# Patient Record
Sex: MALE | Race: WHITE | Employment: OTHER | URBAN - NONMETROPOLITAN AREA
[De-identification: names, ages, dates, MRNs, and addresses within clinical notes are randomized per-mention and may not be internally consistent; named-entity substitution may affect disease eponyms.]

---

## 2017-01-06 ENCOUNTER — OFFICE VISIT (OUTPATIENT)
Dept: UROLOGY | Age: 82
End: 2017-01-06

## 2017-01-06 VITALS
SYSTOLIC BLOOD PRESSURE: 130 MMHG | HEIGHT: 71 IN | HEART RATE: 64 BPM | DIASTOLIC BLOOD PRESSURE: 86 MMHG | WEIGHT: 201.4 LBS | BODY MASS INDEX: 28.19 KG/M2

## 2017-01-06 DIAGNOSIS — R97.20 ABNORMAL PSA: Primary | ICD-10-CM

## 2017-01-06 DIAGNOSIS — N40.1 BENIGN NON-NODULAR PROSTATIC HYPERPLASIA WITH LOWER URINARY TRACT SYMPTOMS: ICD-10-CM

## 2017-01-06 DIAGNOSIS — N30.00 ACUTE CYSTITIS WITHOUT HEMATURIA: ICD-10-CM

## 2017-01-06 PROCEDURE — 51798 US URINE CAPACITY MEASURE: CPT | Performed by: UROLOGY

## 2017-01-06 PROCEDURE — 99213 OFFICE O/P EST LOW 20 MIN: CPT | Performed by: UROLOGY

## 2017-01-09 ENCOUNTER — TELEPHONE (OUTPATIENT)
Dept: INTERNAL MEDICINE | Age: 82
End: 2017-01-09

## 2017-01-20 ENCOUNTER — OFFICE VISIT (OUTPATIENT)
Dept: PRIMARY CARE CLINIC | Age: 82
End: 2017-01-20

## 2017-01-20 VITALS
DIASTOLIC BLOOD PRESSURE: 80 MMHG | HEIGHT: 71 IN | WEIGHT: 202 LBS | BODY MASS INDEX: 28.28 KG/M2 | SYSTOLIC BLOOD PRESSURE: 160 MMHG | TEMPERATURE: 98.9 F | HEART RATE: 85 BPM | OXYGEN SATURATION: 93 %

## 2017-01-20 DIAGNOSIS — J31.0 RHINITIS, UNSPECIFIED TYPE: Primary | ICD-10-CM

## 2017-01-20 PROCEDURE — 1123F ACP DISCUSS/DSCN MKR DOCD: CPT | Performed by: NURSE PRACTITIONER

## 2017-01-20 PROCEDURE — 99213 OFFICE O/P EST LOW 20 MIN: CPT | Performed by: NURSE PRACTITIONER

## 2017-01-20 PROCEDURE — G8420 CALC BMI NORM PARAMETERS: HCPCS | Performed by: NURSE PRACTITIONER

## 2017-01-20 PROCEDURE — G8427 DOCREV CUR MEDS BY ELIG CLIN: HCPCS | Performed by: NURSE PRACTITIONER

## 2017-01-20 PROCEDURE — G8484 FLU IMMUNIZE NO ADMIN: HCPCS | Performed by: NURSE PRACTITIONER

## 2017-01-20 PROCEDURE — 4040F PNEUMOC VAC/ADMIN/RCVD: CPT | Performed by: NURSE PRACTITIONER

## 2017-01-20 PROCEDURE — 1036F TOBACCO NON-USER: CPT | Performed by: NURSE PRACTITIONER

## 2017-01-20 PROCEDURE — G8598 ASA/ANTIPLAT THER USED: HCPCS | Performed by: NURSE PRACTITIONER

## 2017-01-20 ASSESSMENT — ENCOUNTER SYMPTOMS
COUGH: 0
NAUSEA: 0
RHINORRHEA: 1
SINUS PAIN: 0
VOMITING: 0
WHEEZING: 0
SORE THROAT: 0

## 2017-01-23 RX ORDER — ALPRAZOLAM 0.25 MG/1
TABLET ORAL
Qty: 45 TABLET | Refills: 0 | OUTPATIENT
Start: 2017-01-23 | End: 2017-02-22 | Stop reason: SDUPTHER

## 2017-02-07 ENCOUNTER — PROCEDURE VISIT (OUTPATIENT)
Dept: UROLOGY | Age: 82
End: 2017-02-07

## 2017-02-07 ENCOUNTER — OFFICE VISIT (OUTPATIENT)
Dept: PRIMARY CARE CLINIC | Age: 82
End: 2017-02-07

## 2017-02-07 VITALS
HEART RATE: 64 BPM | HEIGHT: 71 IN | SYSTOLIC BLOOD PRESSURE: 122 MMHG | DIASTOLIC BLOOD PRESSURE: 88 MMHG | WEIGHT: 200.6 LBS | BODY MASS INDEX: 28.08 KG/M2

## 2017-02-07 VITALS
BODY MASS INDEX: 28 KG/M2 | DIASTOLIC BLOOD PRESSURE: 88 MMHG | TEMPERATURE: 97.5 F | WEIGHT: 200 LBS | OXYGEN SATURATION: 98 % | HEIGHT: 71 IN | HEART RATE: 70 BPM | RESPIRATION RATE: 14 BRPM | SYSTOLIC BLOOD PRESSURE: 138 MMHG

## 2017-02-07 DIAGNOSIS — R31.9 HEMATURIA: Primary | ICD-10-CM

## 2017-02-07 DIAGNOSIS — I10 ESSENTIAL HYPERTENSION: Primary | ICD-10-CM

## 2017-02-07 DIAGNOSIS — N30.00 ACUTE CYSTITIS WITHOUT HEMATURIA: ICD-10-CM

## 2017-02-07 LAB
BILIRUBIN, POC: NORMAL
BLOOD URINE, POC: NORMAL
CLARITY, POC: NORMAL
COLOR, POC: NORMAL
GLUCOSE URINE, POC: NORMAL
KETONES, POC: NORMAL
LEUKOCYTE EST, POC: NORMAL
NITRITE, POC: NORMAL
PH, POC: 8
PROTEIN, POC: NORMAL
SPECIFIC GRAVITY, POC: 1.01
UROBILINOGEN, POC: 0.2

## 2017-02-07 PROCEDURE — 1123F ACP DISCUSS/DSCN MKR DOCD: CPT | Performed by: FAMILY MEDICINE

## 2017-02-07 PROCEDURE — 4040F PNEUMOC VAC/ADMIN/RCVD: CPT | Performed by: UROLOGY

## 2017-02-07 PROCEDURE — 87086 URINE CULTURE/COLONY COUNT: CPT | Performed by: UROLOGY

## 2017-02-07 PROCEDURE — G8427 DOCREV CUR MEDS BY ELIG CLIN: HCPCS | Performed by: UROLOGY

## 2017-02-07 PROCEDURE — 1123F ACP DISCUSS/DSCN MKR DOCD: CPT | Performed by: UROLOGY

## 2017-02-07 PROCEDURE — 1036F TOBACCO NON-USER: CPT | Performed by: UROLOGY

## 2017-02-07 PROCEDURE — G8420 CALC BMI NORM PARAMETERS: HCPCS | Performed by: FAMILY MEDICINE

## 2017-02-07 PROCEDURE — 81003 URINALYSIS AUTO W/O SCOPE: CPT | Performed by: UROLOGY

## 2017-02-07 PROCEDURE — G8427 DOCREV CUR MEDS BY ELIG CLIN: HCPCS | Performed by: FAMILY MEDICINE

## 2017-02-07 PROCEDURE — 99212 OFFICE O/P EST SF 10 MIN: CPT | Performed by: FAMILY MEDICINE

## 2017-02-07 PROCEDURE — 99213 OFFICE O/P EST LOW 20 MIN: CPT | Performed by: UROLOGY

## 2017-02-07 PROCEDURE — G8420 CALC BMI NORM PARAMETERS: HCPCS | Performed by: UROLOGY

## 2017-02-07 PROCEDURE — G8598 ASA/ANTIPLAT THER USED: HCPCS | Performed by: FAMILY MEDICINE

## 2017-02-07 PROCEDURE — G8484 FLU IMMUNIZE NO ADMIN: HCPCS | Performed by: FAMILY MEDICINE

## 2017-02-07 PROCEDURE — 87186 SC STD MICRODIL/AGAR DIL: CPT | Performed by: UROLOGY

## 2017-02-07 PROCEDURE — G8484 FLU IMMUNIZE NO ADMIN: HCPCS | Performed by: UROLOGY

## 2017-02-07 PROCEDURE — 4040F PNEUMOC VAC/ADMIN/RCVD: CPT | Performed by: FAMILY MEDICINE

## 2017-02-07 PROCEDURE — G8598 ASA/ANTIPLAT THER USED: HCPCS | Performed by: UROLOGY

## 2017-02-07 PROCEDURE — 1036F TOBACCO NON-USER: CPT | Performed by: FAMILY MEDICINE

## 2017-02-07 RX ORDER — NITROFURANTOIN 25; 75 MG/1; MG/1
100 CAPSULE ORAL 2 TIMES DAILY
Qty: 20 CAPSULE | Refills: 0 | Status: SHIPPED | OUTPATIENT
Start: 2017-02-07 | End: 2017-02-07 | Stop reason: SDUPTHER

## 2017-02-07 RX ORDER — TRIMETHOPRIM 100 MG/1
100 TABLET ORAL DAILY
Qty: 90 TABLET | Refills: 3 | Status: ON HOLD | OUTPATIENT
Start: 2017-02-07 | End: 2018-02-14 | Stop reason: HOSPADM

## 2017-02-07 RX ORDER — NITROFURANTOIN MACROCRYSTALS 100 MG/1
100 CAPSULE ORAL 3 TIMES DAILY
Qty: 9 CAPSULE | Refills: 0 | Status: CANCELLED | OUTPATIENT
Start: 2017-02-07 | End: 2017-02-10

## 2017-02-07 RX ORDER — NITROFURANTOIN 25; 75 MG/1; MG/1
100 CAPSULE ORAL 2 TIMES DAILY
Qty: 20 CAPSULE | Refills: 0 | Status: SHIPPED | OUTPATIENT
Start: 2017-02-07 | End: 2017-02-17

## 2017-02-07 RX ORDER — POLYMYXIN B SULFATE AND TRIMETHOPRIM 1; 10000 MG/ML; [USP'U]/ML
SOLUTION OPHTHALMIC
COMMUNITY
Start: 2016-12-31 | End: 2017-12-13 | Stop reason: ALTCHOICE

## 2017-02-07 ASSESSMENT — ENCOUNTER SYMPTOMS
SHORTNESS OF BREATH: 0
NAUSEA: 1
COUGH: 0
WHEEZING: 0
ABDOMINAL PAIN: 1
CHEST TIGHTNESS: 0

## 2017-02-09 LAB
CULTURE: ABNORMAL
Lab: ABNORMAL
ORGANISM: ABNORMAL
SPECIMEN DESCRIPTION: ABNORMAL
STATUS: ABNORMAL

## 2017-02-10 ENCOUNTER — TELEPHONE (OUTPATIENT)
Dept: UROLOGY | Age: 82
End: 2017-02-10

## 2017-02-15 RX ORDER — FLUTICASONE PROPIONATE 50 MCG
SPRAY, SUSPENSION (ML) NASAL
Qty: 1 BOTTLE | Refills: 11 | Status: SHIPPED | OUTPATIENT
Start: 2017-02-15 | End: 2018-02-01 | Stop reason: SDUPTHER

## 2017-02-16 ENCOUNTER — OFFICE VISIT (OUTPATIENT)
Dept: CARDIOLOGY | Age: 82
End: 2017-02-16

## 2017-02-16 VITALS
WEIGHT: 199 LBS | BODY MASS INDEX: 27.86 KG/M2 | HEIGHT: 71 IN | HEART RATE: 65 BPM | RESPIRATION RATE: 16 BRPM | DIASTOLIC BLOOD PRESSURE: 80 MMHG | SYSTOLIC BLOOD PRESSURE: 134 MMHG

## 2017-02-16 DIAGNOSIS — I10 ESSENTIAL HYPERTENSION: Primary | ICD-10-CM

## 2017-02-16 PROCEDURE — G8598 ASA/ANTIPLAT THER USED: HCPCS | Performed by: INTERNAL MEDICINE

## 2017-02-16 PROCEDURE — G8484 FLU IMMUNIZE NO ADMIN: HCPCS | Performed by: INTERNAL MEDICINE

## 2017-02-16 PROCEDURE — G8427 DOCREV CUR MEDS BY ELIG CLIN: HCPCS | Performed by: INTERNAL MEDICINE

## 2017-02-16 PROCEDURE — G8420 CALC BMI NORM PARAMETERS: HCPCS | Performed by: INTERNAL MEDICINE

## 2017-02-16 PROCEDURE — 4040F PNEUMOC VAC/ADMIN/RCVD: CPT | Performed by: INTERNAL MEDICINE

## 2017-02-16 PROCEDURE — 1123F ACP DISCUSS/DSCN MKR DOCD: CPT | Performed by: INTERNAL MEDICINE

## 2017-02-16 PROCEDURE — 93000 ELECTROCARDIOGRAM COMPLETE: CPT | Performed by: INTERNAL MEDICINE

## 2017-02-16 PROCEDURE — 1036F TOBACCO NON-USER: CPT | Performed by: INTERNAL MEDICINE

## 2017-02-16 PROCEDURE — 99213 OFFICE O/P EST LOW 20 MIN: CPT | Performed by: INTERNAL MEDICINE

## 2017-02-22 RX ORDER — ALPRAZOLAM 0.25 MG/1
TABLET ORAL
Qty: 45 TABLET | Refills: 0 | OUTPATIENT
Start: 2017-02-22 | End: 2017-03-28 | Stop reason: SDUPTHER

## 2017-02-23 ENCOUNTER — OFFICE VISIT (OUTPATIENT)
Dept: PODIATRY | Age: 82
End: 2017-02-23

## 2017-02-23 VITALS
WEIGHT: 199.4 LBS | BODY MASS INDEX: 27.92 KG/M2 | RESPIRATION RATE: 20 BRPM | HEART RATE: 68 BPM | DIASTOLIC BLOOD PRESSURE: 60 MMHG | HEIGHT: 71 IN | SYSTOLIC BLOOD PRESSURE: 118 MMHG

## 2017-02-23 DIAGNOSIS — M79.675 PAIN IN TOES OF BOTH FEET: ICD-10-CM

## 2017-02-23 DIAGNOSIS — M79.674 PAIN IN TOES OF BOTH FEET: ICD-10-CM

## 2017-02-23 DIAGNOSIS — B35.1 DERMATOPHYTOSIS OF NAIL: Primary | ICD-10-CM

## 2017-02-23 PROCEDURE — 11720 DEBRIDE NAIL 1-5: CPT | Performed by: PODIATRIST

## 2017-02-23 PROCEDURE — 1036F TOBACCO NON-USER: CPT | Performed by: PODIATRIST

## 2017-03-07 RX ORDER — SIMVASTATIN 20 MG
TABLET ORAL
Qty: 30 TABLET | Refills: 11 | Status: SHIPPED | OUTPATIENT
Start: 2017-03-07

## 2017-03-08 RX ORDER — SIMVASTATIN 20 MG
TABLET ORAL
Qty: 30 TABLET | Refills: 11 | Status: CANCELLED | OUTPATIENT
Start: 2017-03-08

## 2017-03-29 RX ORDER — ALPRAZOLAM 0.25 MG/1
TABLET ORAL
Qty: 45 TABLET | Refills: 0 | OUTPATIENT
Start: 2017-03-29 | End: 2017-04-27 | Stop reason: SDUPTHER

## 2017-04-16 LAB
CULTURE: NORMAL
Lab: NORMAL
SPECIMEN DESCRIPTION: NORMAL
STATUS: NORMAL

## 2017-04-20 ENCOUNTER — OFFICE VISIT (OUTPATIENT)
Dept: INTERNAL MEDICINE | Age: 82
End: 2017-04-20
Payer: MEDICARE

## 2017-04-20 VITALS
WEIGHT: 198.2 LBS | TEMPERATURE: 97.2 F | RESPIRATION RATE: 16 BRPM | SYSTOLIC BLOOD PRESSURE: 132 MMHG | DIASTOLIC BLOOD PRESSURE: 68 MMHG | BODY MASS INDEX: 27.75 KG/M2 | HEART RATE: 68 BPM | HEIGHT: 71 IN

## 2017-04-20 DIAGNOSIS — E66.3 OVERWEIGHT: ICD-10-CM

## 2017-04-20 DIAGNOSIS — I10 ESSENTIAL HYPERTENSION: ICD-10-CM

## 2017-04-20 DIAGNOSIS — E78.2 MIXED HYPERLIPIDEMIA: ICD-10-CM

## 2017-04-20 DIAGNOSIS — K57.30 DIVERTICULOSIS OF LARGE INTESTINE WITHOUT HEMORRHAGE: ICD-10-CM

## 2017-04-20 DIAGNOSIS — N39.0 RECURRENT UTI: ICD-10-CM

## 2017-04-20 DIAGNOSIS — I47.1 PSVT (PAROXYSMAL SUPRAVENTRICULAR TACHYCARDIA) (HCC): ICD-10-CM

## 2017-04-20 DIAGNOSIS — M15.9 PRIMARY OSTEOARTHRITIS INVOLVING MULTIPLE JOINTS: ICD-10-CM

## 2017-04-20 DIAGNOSIS — F41.9 ANXIETY: ICD-10-CM

## 2017-04-20 DIAGNOSIS — I48.0 PAF (PAROXYSMAL ATRIAL FIBRILLATION) (HCC): ICD-10-CM

## 2017-04-20 DIAGNOSIS — I25.10 CORONARY ARTERY DISEASE INVOLVING NATIVE CORONARY ARTERY OF NATIVE HEART, ANGINA PRESENCE UNSPECIFIED: ICD-10-CM

## 2017-04-20 DIAGNOSIS — Z00.00 ROUTINE GENERAL MEDICAL EXAMINATION AT A HEALTH CARE FACILITY: Primary | ICD-10-CM

## 2017-04-20 PROCEDURE — 4040F PNEUMOC VAC/ADMIN/RCVD: CPT | Performed by: INTERNAL MEDICINE

## 2017-04-20 PROCEDURE — 99214 OFFICE O/P EST MOD 30 MIN: CPT | Performed by: INTERNAL MEDICINE

## 2017-04-20 PROCEDURE — 1036F TOBACCO NON-USER: CPT | Performed by: INTERNAL MEDICINE

## 2017-04-20 PROCEDURE — 1123F ACP DISCUSS/DSCN MKR DOCD: CPT | Performed by: INTERNAL MEDICINE

## 2017-04-20 PROCEDURE — G0439 PPPS, SUBSEQ VISIT: HCPCS | Performed by: INTERNAL MEDICINE

## 2017-04-20 PROCEDURE — G8598 ASA/ANTIPLAT THER USED: HCPCS | Performed by: INTERNAL MEDICINE

## 2017-04-20 PROCEDURE — G8420 CALC BMI NORM PARAMETERS: HCPCS | Performed by: INTERNAL MEDICINE

## 2017-04-20 PROCEDURE — G8427 DOCREV CUR MEDS BY ELIG CLIN: HCPCS | Performed by: INTERNAL MEDICINE

## 2017-04-20 ASSESSMENT — ANXIETY QUESTIONNAIRES: GAD7 TOTAL SCORE: 1

## 2017-04-20 ASSESSMENT — PATIENT HEALTH QUESTIONNAIRE - PHQ9: SUM OF ALL RESPONSES TO PHQ QUESTIONS 1-9: 0

## 2017-04-20 ASSESSMENT — LIFESTYLE VARIABLES: HOW OFTEN DO YOU HAVE A DRINK CONTAINING ALCOHOL: 0

## 2017-04-27 RX ORDER — ALPRAZOLAM 0.25 MG/1
TABLET ORAL
Qty: 45 TABLET | Refills: 0 | OUTPATIENT
Start: 2017-04-27 | End: 2017-05-30 | Stop reason: SDUPTHER

## 2017-05-09 ENCOUNTER — OFFICE VISIT (OUTPATIENT)
Dept: PODIATRY | Age: 82
End: 2017-05-09
Payer: MEDICARE

## 2017-05-09 VITALS
BODY MASS INDEX: 28.29 KG/M2 | HEART RATE: 70 BPM | DIASTOLIC BLOOD PRESSURE: 78 MMHG | WEIGHT: 197.6 LBS | SYSTOLIC BLOOD PRESSURE: 128 MMHG | HEIGHT: 70 IN

## 2017-05-09 DIAGNOSIS — M79.675 PAIN IN TOES OF BOTH FEET: ICD-10-CM

## 2017-05-09 DIAGNOSIS — B35.1 DERMATOPHYTOSIS OF NAIL: Primary | ICD-10-CM

## 2017-05-09 DIAGNOSIS — M79.674 PAIN IN TOES OF BOTH FEET: ICD-10-CM

## 2017-05-09 PROCEDURE — 11720 DEBRIDE NAIL 1-5: CPT | Performed by: PODIATRIST

## 2017-05-09 PROCEDURE — 1036F TOBACCO NON-USER: CPT | Performed by: PODIATRIST

## 2017-05-30 ENCOUNTER — PROCEDURE VISIT (OUTPATIENT)
Dept: UROLOGY | Age: 82
End: 2017-05-30
Payer: MEDICARE

## 2017-05-30 VITALS
WEIGHT: 198.6 LBS | BODY MASS INDEX: 28.5 KG/M2 | DIASTOLIC BLOOD PRESSURE: 80 MMHG | SYSTOLIC BLOOD PRESSURE: 118 MMHG | HEART RATE: 60 BPM

## 2017-05-30 DIAGNOSIS — N40.0 BENIGN NON-NODULAR PROSTATIC HYPERPLASIA WITHOUT LOWER URINARY TRACT SYMPTOMS: ICD-10-CM

## 2017-05-30 DIAGNOSIS — R31.9 HEMATURIA: Primary | ICD-10-CM

## 2017-05-30 LAB
BILIRUBIN, POC: NORMAL
BLOOD URINE, POC: NORMAL
CLARITY, POC: NORMAL
COLOR, POC: NORMAL
GLUCOSE URINE, POC: NORMAL
KETONES, POC: NORMAL
LEUKOCYTE EST, POC: NORMAL
NITRITE, POC: NORMAL
PH, POC: 6
PROTEIN, POC: NORMAL
SPECIFIC GRAVITY, POC: 1.01
UROBILINOGEN, POC: 0.2

## 2017-05-30 PROCEDURE — 52000 CYSTOURETHROSCOPY: CPT | Performed by: UROLOGY

## 2017-05-30 PROCEDURE — 99999 PR OFFICE/OUTPT VISIT,PROCEDURE ONLY: CPT | Performed by: UROLOGY

## 2017-05-30 PROCEDURE — 81003 URINALYSIS AUTO W/O SCOPE: CPT | Performed by: UROLOGY

## 2017-05-30 PROCEDURE — 1036F TOBACCO NON-USER: CPT | Performed by: UROLOGY

## 2017-05-30 RX ORDER — ALPRAZOLAM 0.25 MG/1
TABLET ORAL
Qty: 45 TABLET | Refills: 0 | OUTPATIENT
Start: 2017-05-30 | End: 2017-06-30 | Stop reason: SDUPTHER

## 2017-06-21 ENCOUNTER — OFFICE VISIT (OUTPATIENT)
Dept: PRIMARY CARE CLINIC | Age: 82
End: 2017-06-21
Payer: MEDICARE

## 2017-06-21 VITALS
HEIGHT: 71 IN | TEMPERATURE: 97.9 F | SYSTOLIC BLOOD PRESSURE: 130 MMHG | OXYGEN SATURATION: 97 % | WEIGHT: 199 LBS | DIASTOLIC BLOOD PRESSURE: 80 MMHG | HEART RATE: 68 BPM | BODY MASS INDEX: 27.86 KG/M2 | RESPIRATION RATE: 16 BRPM

## 2017-06-21 DIAGNOSIS — H10.12 ALLERGIC CONJUNCTIVITIS, LEFT: Primary | ICD-10-CM

## 2017-06-21 PROCEDURE — G8419 CALC BMI OUT NRM PARAM NOF/U: HCPCS | Performed by: NURSE PRACTITIONER

## 2017-06-21 PROCEDURE — 1123F ACP DISCUSS/DSCN MKR DOCD: CPT | Performed by: NURSE PRACTITIONER

## 2017-06-21 PROCEDURE — G8427 DOCREV CUR MEDS BY ELIG CLIN: HCPCS | Performed by: NURSE PRACTITIONER

## 2017-06-21 PROCEDURE — 99213 OFFICE O/P EST LOW 20 MIN: CPT | Performed by: NURSE PRACTITIONER

## 2017-06-21 PROCEDURE — 4040F PNEUMOC VAC/ADMIN/RCVD: CPT | Performed by: NURSE PRACTITIONER

## 2017-06-21 PROCEDURE — 1036F TOBACCO NON-USER: CPT | Performed by: NURSE PRACTITIONER

## 2017-06-21 PROCEDURE — G8598 ASA/ANTIPLAT THER USED: HCPCS | Performed by: NURSE PRACTITIONER

## 2017-06-21 ASSESSMENT — ENCOUNTER SYMPTOMS
EYE ITCHING: 1
EYE DISCHARGE: 1
RESPIRATORY NEGATIVE: 1
EYE REDNESS: 1

## 2017-06-29 RX ORDER — LISINOPRIL 10 MG/1
10 TABLET ORAL DAILY
Qty: 90 TABLET | Refills: 3 | Status: ON HOLD | OUTPATIENT
Start: 2017-06-29 | End: 2018-08-21 | Stop reason: HOSPADM

## 2017-06-30 RX ORDER — ALPRAZOLAM 0.25 MG/1
TABLET ORAL
Qty: 45 TABLET | Refills: 0 | Status: CANCELLED | OUTPATIENT
Start: 2017-06-30

## 2017-06-30 RX ORDER — ALPRAZOLAM 0.25 MG/1
TABLET ORAL
Qty: 45 TABLET | Refills: 0 | OUTPATIENT
Start: 2017-06-30 | End: 2017-08-02 | Stop reason: SDUPTHER

## 2017-07-19 ENCOUNTER — OFFICE VISIT (OUTPATIENT)
Dept: PODIATRY | Age: 82
End: 2017-07-19
Payer: MEDICARE

## 2017-07-19 VITALS
HEART RATE: 68 BPM | RESPIRATION RATE: 18 BRPM | DIASTOLIC BLOOD PRESSURE: 60 MMHG | WEIGHT: 201 LBS | BODY MASS INDEX: 28.14 KG/M2 | HEIGHT: 71 IN | SYSTOLIC BLOOD PRESSURE: 120 MMHG

## 2017-07-19 DIAGNOSIS — M79.676 PAIN OF TOE, UNSPECIFIED LATERALITY: ICD-10-CM

## 2017-07-19 DIAGNOSIS — B35.1 DERMATOPHYTOSIS OF NAIL: Primary | ICD-10-CM

## 2017-07-19 PROCEDURE — 1036F TOBACCO NON-USER: CPT | Performed by: PODIATRIST

## 2017-07-19 PROCEDURE — 11720 DEBRIDE NAIL 1-5: CPT | Performed by: PODIATRIST

## 2017-08-02 RX ORDER — ALPRAZOLAM 0.25 MG/1
TABLET ORAL
Qty: 45 TABLET | Refills: 0 | Status: SHIPPED | OUTPATIENT
Start: 2017-08-02 | End: 2017-09-02 | Stop reason: SDUPTHER

## 2017-09-14 ENCOUNTER — OFFICE VISIT (OUTPATIENT)
Dept: CARDIOLOGY | Age: 82
End: 2017-09-14
Payer: MEDICARE

## 2017-09-14 VITALS
HEIGHT: 71 IN | SYSTOLIC BLOOD PRESSURE: 122 MMHG | WEIGHT: 199 LBS | BODY MASS INDEX: 27.86 KG/M2 | DIASTOLIC BLOOD PRESSURE: 70 MMHG | HEART RATE: 65 BPM

## 2017-09-14 DIAGNOSIS — I10 ESSENTIAL HYPERTENSION: Primary | ICD-10-CM

## 2017-09-14 PROCEDURE — 1123F ACP DISCUSS/DSCN MKR DOCD: CPT | Performed by: INTERNAL MEDICINE

## 2017-09-14 PROCEDURE — G8427 DOCREV CUR MEDS BY ELIG CLIN: HCPCS | Performed by: INTERNAL MEDICINE

## 2017-09-14 PROCEDURE — 93000 ELECTROCARDIOGRAM COMPLETE: CPT | Performed by: INTERNAL MEDICINE

## 2017-09-14 PROCEDURE — G8417 CALC BMI ABV UP PARAM F/U: HCPCS | Performed by: INTERNAL MEDICINE

## 2017-09-14 PROCEDURE — G8598 ASA/ANTIPLAT THER USED: HCPCS | Performed by: INTERNAL MEDICINE

## 2017-09-14 PROCEDURE — 99213 OFFICE O/P EST LOW 20 MIN: CPT | Performed by: INTERNAL MEDICINE

## 2017-09-14 PROCEDURE — 4040F PNEUMOC VAC/ADMIN/RCVD: CPT | Performed by: INTERNAL MEDICINE

## 2017-09-14 PROCEDURE — 1036F TOBACCO NON-USER: CPT | Performed by: INTERNAL MEDICINE

## 2017-09-28 ENCOUNTER — OFFICE VISIT (OUTPATIENT)
Dept: PODIATRY | Age: 82
End: 2017-09-28
Payer: MEDICARE

## 2017-09-28 VITALS
WEIGHT: 198.8 LBS | SYSTOLIC BLOOD PRESSURE: 124 MMHG | HEART RATE: 68 BPM | RESPIRATION RATE: 20 BRPM | BODY MASS INDEX: 27.83 KG/M2 | DIASTOLIC BLOOD PRESSURE: 70 MMHG | HEIGHT: 71 IN

## 2017-09-28 DIAGNOSIS — M79.674 PAIN IN TOES OF BOTH FEET: ICD-10-CM

## 2017-09-28 DIAGNOSIS — B35.1 DERMATOPHYTOSIS OF NAIL: Primary | ICD-10-CM

## 2017-09-28 DIAGNOSIS — M79.675 PAIN IN TOES OF BOTH FEET: ICD-10-CM

## 2017-09-28 PROCEDURE — 1036F TOBACCO NON-USER: CPT | Performed by: PODIATRIST

## 2017-09-28 PROCEDURE — 11720 DEBRIDE NAIL 1-5: CPT | Performed by: PODIATRIST

## 2017-10-03 DIAGNOSIS — F41.9 ANXIETY: Primary | ICD-10-CM

## 2017-10-03 RX ORDER — ALPRAZOLAM 0.25 MG/1
TABLET ORAL
Qty: 45 TABLET | Refills: 0 | OUTPATIENT
Start: 2017-10-03 | End: 2017-11-01 | Stop reason: SDUPTHER

## 2017-10-19 ENCOUNTER — OFFICE VISIT (OUTPATIENT)
Dept: INTERNAL MEDICINE | Age: 82
End: 2017-10-19
Payer: MEDICARE

## 2017-10-19 VITALS
HEART RATE: 68 BPM | SYSTOLIC BLOOD PRESSURE: 122 MMHG | WEIGHT: 203.6 LBS | BODY MASS INDEX: 27.58 KG/M2 | DIASTOLIC BLOOD PRESSURE: 68 MMHG | HEIGHT: 72 IN | RESPIRATION RATE: 16 BRPM

## 2017-10-19 DIAGNOSIS — E66.3 OVERWEIGHT: ICD-10-CM

## 2017-10-19 DIAGNOSIS — I10 ESSENTIAL HYPERTENSION: Primary | ICD-10-CM

## 2017-10-19 DIAGNOSIS — E78.2 MIXED HYPERLIPIDEMIA: ICD-10-CM

## 2017-10-19 DIAGNOSIS — F41.9 ANXIETY: ICD-10-CM

## 2017-10-19 DIAGNOSIS — I48.0 PAF (PAROXYSMAL ATRIAL FIBRILLATION) (HCC): ICD-10-CM

## 2017-10-19 DIAGNOSIS — I47.1 PSVT (PAROXYSMAL SUPRAVENTRICULAR TACHYCARDIA) (HCC): ICD-10-CM

## 2017-10-19 DIAGNOSIS — M15.9 PRIMARY OSTEOARTHRITIS INVOLVING MULTIPLE JOINTS: ICD-10-CM

## 2017-10-19 DIAGNOSIS — I25.10 CORONARY ARTERY DISEASE INVOLVING NATIVE CORONARY ARTERY OF NATIVE HEART, ANGINA PRESENCE UNSPECIFIED: ICD-10-CM

## 2017-10-19 DIAGNOSIS — R49.0 HOARSENESS: ICD-10-CM

## 2017-10-19 PROCEDURE — G8417 CALC BMI ABV UP PARAM F/U: HCPCS | Performed by: INTERNAL MEDICINE

## 2017-10-19 PROCEDURE — 1123F ACP DISCUSS/DSCN MKR DOCD: CPT | Performed by: INTERNAL MEDICINE

## 2017-10-19 PROCEDURE — G8598 ASA/ANTIPLAT THER USED: HCPCS | Performed by: INTERNAL MEDICINE

## 2017-10-19 PROCEDURE — 99214 OFFICE O/P EST MOD 30 MIN: CPT | Performed by: INTERNAL MEDICINE

## 2017-10-19 PROCEDURE — G8484 FLU IMMUNIZE NO ADMIN: HCPCS | Performed by: INTERNAL MEDICINE

## 2017-10-19 PROCEDURE — 4040F PNEUMOC VAC/ADMIN/RCVD: CPT | Performed by: INTERNAL MEDICINE

## 2017-10-19 PROCEDURE — 1036F TOBACCO NON-USER: CPT | Performed by: INTERNAL MEDICINE

## 2017-10-19 PROCEDURE — G8427 DOCREV CUR MEDS BY ELIG CLIN: HCPCS | Performed by: INTERNAL MEDICINE

## 2017-10-19 NOTE — PROGRESS NOTES
DR. Adam Burgos - PROGRESS NOTE    CHIEF COMPLAINT/HISTORY OF CHIEF COMPLAINT: This 80 y.o.  male comes in today for ongoing evaluation and management of his hypertension, hyperlipidemia, coronary artery disease, paroxysmal atrial fibrillation, paroxysmal supraventricular tachycardia, anxiety, osteoarthritis, and being overweight. He has been having more problems with a hoarse voice. It is affecting his ability to sing. He has had some problems with runny nose for about a month and he took a little Cipro that he had left over and he thought that helped a little bit. He is on trimethoprim for urinary tract infection suppression. He is taking aspirin, Toprol XL, lisinopril, and Rythmol for hypertension, coronary artery disease, and paroxysmal supraventricular tachycardia. He sees the cardiologists for the PSVT as well. He denies any chest pain, dizziness, or palpitations. He uses clonidine once in a while, \"when his blood pressure spikes. \" He is on simvastatin for hyperlipidemia. He is not having any muscle aches from the simvastatin. He takes Xanax for anxiety. He uses glucosamine/chondroitin sulfate for arthritis, which has been stable. Otherwise he seems to be doing fairly well and denies any other complaints.        ALLERGIES/INTOLERANCES:   Allergies   Allergen Reactions    Pcn [Penicillins] Swelling     As a child    Sulfa Antibiotics     Cefdinir Other (See Comments)       MEDICATIONS:   Outpatient Prescriptions Marked as Taking for the 10/19/17 encounter (Office Visit) with Juana Martínez DO   Medication Sig Dispense Refill    ALPRAZolam (XANAX) 0.25 MG tablet Take 1/2 tablet by mouth in the morning and 1 tablet at bedtime 45 tablet 0    lisinopril (PRINIVIL;ZESTRIL) 10 MG tablet Take 1 tablet by mouth daily 90 tablet 3    simvastatin (ZOCOR) 20 MG tablet TAKE 1 TABLET BY MOUTH EVERY OTHER DAY  30 tablet 11    fluticasone (FLONASE) 50 MCG/ACT nasal spray USE 2 SPRAYS TO EACH NOSTRIL DAILY 1 Bottle 11    trimethoprim (TRIMPEX) 100 MG tablet Take 1 tablet by mouth daily 90 tablet 3    trimethoprim-polymyxin b (POLYTRIM) 24580-5.1 UNIT/ML-% ophthalmic solution       propafenone (RYTHMOL SR) 225 MG extended release capsule Take 1 capsule by mouth 2 times daily 180 capsule 3    metoprolol succinate (TOPROL XL) 25 MG extended release tablet Take 1 tablet by mouth 2 times daily 180 tablet 3    cloNIDine (CATAPRES) 0.1 MG tablet TAKE 1 TABLET BY MOUTH DAILY AS NEEDED (\"FOR SPIKE IN BLOOD PRESSURE\") 60 tablet 11    Multiple Vitamins-Minerals (PRESERVISION AREDS 2 PO) Take 1 capsule by mouth daily      acetaminophen (TYLENOL) 500 MG tablet Take 500 mg by mouth every 6 hours as needed for Pain      Coenzyme Q-10 100 MG CAPS Take 100 mg by mouth every other day.  tolnaftate (TINACTIN) 1 % external solution Apply topically nightly to toenails per Dr. Arnold Stein.  aspirin 81 MG tablet Take 81 mg by mouth daily.  GLUCOSAMINE-CHONDROITIN PO Take 3 tablets by mouth Daily. IMMUNIZATIONS: Reviewed for influenza and pneumococcal status as indicated in electronic record.     REVIEW OF SYSTEMS:     General: negative for - chills, fever or night sweats  Skin: negative for - pruritus or rash  Head: Negative for: headache or recent history of head trauma  Ear, Nose, Throat: positive for - hoarseness  negative for - epistaxis, nasal congestion, nasal discharge, sore throat, tinnitus or vertigo  Cardiovascular: negative for - chest pain, dyspnea on exertion or shortness of breath  Respiratory: negative for - cough, hemoptysis or wheezing  Gastrointestinal: negative for - constipation, diarrhea or nausea/vomiting  Genitourinary: negative for - dysuria, hematuria or nocturia  Musculoskeletal: positive for - joint pain  negative for - muscle pain or muscular weakness  Neurologic: negative for - gait disturbance, numbness/tingling, seizures or tremors  Psychiatric: positive for - anxiety  negative for - depression       PHYSICAL EXAMINATION:    Wt Readings from Last 2 Encounters:   10/19/17 203 lb 9.6 oz (92.4 kg)   09/28/17 198 lb 12.8 oz (90.2 kg)       Vitals: /68 (Site: Right Arm, Position: Sitting, Cuff Size: Medium Adult)   Pulse 68   Resp 16   Ht 5' 11.77\" (1.823 m)   Wt 203 lb 9.6 oz (92.4 kg)   BMI 27.79 kg/m²   General: This is a 80 y.o.  male who is alert and oriented to person, place and time. He appears to be his stated age and does not appear to be in any acute distress. Skin: Skin color, texture, turgor normal. No rashes or lesions. HEENT/Neck: Head: Normal, normocephalic, atraumatic. Eye: Normal external eye, conjunctiva, lids cornea, HAROON. Ears: Normal TM's bilaterally. Normal auditory canals and external ears. Non-tender. Nose: Normal external nose, mucus membranes and septum. Pharynx: Dental Hygiene adequate. Normal buccal mucosa. Normal pharynx. Neck / Thyroid: Supple, no masses, nodes, nodules or enlargement. Lungs: Normal - CTA without rales, rhonchi, or wheezing. Heart: regular rate and rhythm, S1, S2 normal, no murmur, click, rub or gallop No S3 or S4. Abdomen: Non-obese, soft, non-distended, non-tender, normal active bowel sounds, no masses palpated and no hepatosplenomegaly  Extremities: There is no clubbing, cyanosis, edema  Neurologic:  cranial nerves II-XII are grossly intact  Osteopathic Structural Examination: Patient was examined in the seated and standing positions. There was full range of motion in the cervical, thoracic, and lumbar spines. No scoliosis. No change in thoracic kyphosis or lumbar lordosis. No increased paravertebral muscle tenderness.

## 2017-10-19 NOTE — PROGRESS NOTES
Jone Aguero received counseling on the following healthy behaviors: nutrition and exercise  Reviewed prior labs and health maintenance  Continue current medications, diet and exercise. Discussed use, benefit, and side effects of prescribed medications. Barriers to medication compliance addressed. Patient given educational materials - see patient instructions  Was a self-tracking handout given in paper form or via EpicForcehart? No: not indicated    Requested Prescriptions      No prescriptions requested or ordered in this encounter       All patient questions answered. Patient voiced understanding. Quality Measures    Body mass index is 27.79 kg/m². Elevated. Weight control plan discussed: Healthy diet and regular exercise. BP: 122/68. Blood pressure is normal. Treatment plan: See main progress note    Fall Risk 4/20/2017 4/21/2016 4/16/2015 4/17/2014   2 or more falls in past year? no no no no   Fall with injury in past year? no no no no     The patient does not have a history of falls. I did not - not indicated , complete a risk assessment for falls. See progress note for plan, if risk assessment completed. Lab Results   Component Value Date    LDLCHOLESTEROL 73 04/14/2017    (goal LDL reduction with dx if diabetes is 50% LDL reduction)    PHQ Scores 4/20/2017 4/21/2016 4/16/2015 4/17/2014   PHQ2 Score 0 0 0 0   PHQ9 Score 0 0 0 0     See progress note for plan, if depression exists. Interpretation of Total Score: Major depression if the answer to questions 1 or 2 and 5 or more of questions 1 to 9 are at least [facility] than half the days. \"  Other depressive syndrome if questions 1 or 2 and two, three, or four of questions 1 to 9 are at least [facility] than half the days\"   Depression Severity: 5-9 = Mild depression, 10-14 = Moderate depression, 15-19 = Moderately severe depression, 20-27 = Severe depression

## 2017-10-19 NOTE — PROGRESS NOTES
Chronic Disease Visit Information    BP Readings from Last 3 Encounters:   09/28/17 124/70   09/14/17 122/70   07/19/17 120/60          LDL Cholesterol (mg/dL)   Date Value   04/14/2017 73     HDL (mg/dL)   Date Value   04/14/2017 32 (L)     BUN (mg/dL)   Date Value   04/14/2017 13     CREATININE (mg/dL)   Date Value   04/14/2017 0.96     Glucose (mg/dL)   Date Value   04/14/2017 102 (H)            Have you changed or started any medications since your last visit including any over-the-counter medicines, vitamins, or herbal medicines? no   Are you having any side effects from any of your medications? -  no  Have you stopped taking any of your medications? Is so, why? -  no    Have you seen any other physician or provider since your last visit? Yes - Records Obtained  Have you had any other diagnostic tests since your last visit? No  Have you been seen in the emergency room and/or had an admission to a hospital since we last saw you? No  Have you had your annual diabetic retinal (eye) exam? No  Have you had your routine dental cleaning in the past 6 months? yes -     Have you activated your NantMobile account? If not, what are your barriers?  No:      Patient Care Team:  Prince Davila,  as PCP - General (Internal Medicine)  Prince Lola DO as PCP - [facility] Attributed Provider         Medical History Review  Past Medical, Family, and Social History reviewed and does contribute to the patient presenting condition    Health Maintenance   Topic Date Due    DTaP/Tdap/Td vaccine (1 - Tdap) 12/31/1953    Flu vaccine (1) 10/20/2017 (Originally 9/1/2017)    Zostavax vaccine  Completed    Pneumococcal low/med risk  Completed

## 2017-10-19 NOTE — PATIENT INSTRUCTIONS
and call your doctor if you are having problems. It's also a good idea to know your test results and keep a list of the medicines you take. How can you care for yourself at home? Getting started  · Start slowly and set a short-term goal. For example, walk for 5 or 10 minutes every day. · Bit by bit, increase the amount you walk every day. Try for at least 30 minutes on most days of the week. You also may want to swim, bike, or do other activities. · If finding enough time is a problem, it is fine to be active in blocks of 10 minutes or more throughout your day and week. · To get the heart-healthy benefits of walking, you need to walk briskly enough to increase your heart rate and breathing, but not so fast that you cannot talk comfortably. · Wear comfortable shoes that fit well and provide good support for your feet and ankles. Staying with your plan  · After you've made walking a habit, set a longer-term goal. You may want to set a goal of walking briskly for longer or walking farther. Experts say to do 2½ hours of moderate activity a week. A faster heartbeat is what defines moderate-level activity. · To stay motivated, walk with friends, coworkers, or pets. · Use a phone gordon or pedometer to track your steps each day. Set a goal to increase your steps. Once you get there, set a higher goal. Aim for 10,000 steps a day. · If the weather keeps you from walking outside, go for walks at the mall with a friend. Local schools and churches may have indoor gyms where you can walk. Fitting a walk into your workday  · Park several blocks away from work, or get off the bus a few stops early. · Use the stairs instead of the elevator, at least for a few floors. · Suggest holding meetings with colleagues during a walk inside or outside the building. · Use the restroom that is the farthest from your desk or workstation. · Use your morning and afternoon breaks to take quick 15-minute walks.   Staying safe  · Know your surroundings. Walk in a well-lighted, safe place. If it is dark, walk with a partner. Wear light-colored clothing. If you can, buy a vest or jacket that reflects light. · Carry a cell phone for emergencies. · Drink plenty of water. Take a water bottle with you when you walk. This is very important if it is hot out. · Be careful not to slip on wet or icy ground. You can buy \"grippers\" for your shoes to help keep you from slipping. · Pay attention to your walking surface. Use sidewalks and paths. · If you have breathing problems like asthma or COPD, ask your doctor when it is safe for you to walk outdoors. Cold, dry air, smog, pollen, or other things in the air could cause breathing problems. Where can you learn more? Go to https://KeyweepepicAskablogr.healthVeenome. org and sign in to your Conduit account. Enter R159 in the M_SOLUTION box to learn more about \"Walking for Exercise: Care Instructions. \"     If you do not have an account, please click on the \"Sign Up Now\" link. Current as of: March 13, 2017  Content Version: 11.3  © 1324-1899 [employer]. Care instructions adapted under license by [facility] ([place] [facility]). If you have questions about a medical condition or this instruction, always ask your healthcare professional. [address] any warranty or liability for your use of this information.

## 2017-11-01 DIAGNOSIS — F41.9 ANXIETY: ICD-10-CM

## 2017-11-01 RX ORDER — CLONIDINE HYDROCHLORIDE 0.1 MG/1
TABLET ORAL
Qty: 60 TABLET | Refills: 11 | Status: ON HOLD | OUTPATIENT
Start: 2017-11-01 | End: 2018-02-14 | Stop reason: HOSPADM

## 2017-11-01 RX ORDER — ALPRAZOLAM 0.25 MG/1
TABLET ORAL
Qty: 45 TABLET | Refills: 0 | OUTPATIENT
Start: 2017-11-01 | End: 2017-12-07 | Stop reason: SDUPTHER

## 2017-11-08 ENCOUNTER — OFFICE VISIT (OUTPATIENT)
Dept: OTOLARYNGOLOGY | Age: 82
End: 2017-11-08
Payer: MEDICARE

## 2017-11-08 VITALS
DIASTOLIC BLOOD PRESSURE: 72 MMHG | HEART RATE: 72 BPM | SYSTOLIC BLOOD PRESSURE: 128 MMHG | HEIGHT: 71 IN | WEIGHT: 203 LBS | RESPIRATION RATE: 12 BRPM | BODY MASS INDEX: 28.42 KG/M2

## 2017-11-08 DIAGNOSIS — J38.7 PRESBYLARYNX: ICD-10-CM

## 2017-11-08 DIAGNOSIS — R49.0 DYSPHONIA: Primary | ICD-10-CM

## 2017-11-08 PROCEDURE — 31575 DIAGNOSTIC LARYNGOSCOPY: CPT | Performed by: OTOLARYNGOLOGY

## 2017-11-08 NOTE — PROGRESS NOTES
Larisa Calderon  11/8/17    Chief Complaint   Patient presents with   Orysia Emms     ref of Dr. Ángela Goodrich       HPI:  CO hoarseness x 2 mos, has recently improved, is mcghee, never smoked, no pain    Past Med Hx:     Past Medical History:   Diagnosis Date    Acute bronchitis     history of     Allergic rhinitis     Anxiety     Atrial fibrillation ([address]     paroxymsal     CAD (coronary artery disease)     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    Cataract, right     Choroidal nevus of right eye     Diverticulosis     Elevated PSA     Hemorrhoids     History of measles childhood    History of mumps childhood    Hyperlipidemia     Hypertension     Occult blood positive stool     refuses colonoscopy    Osteoarthritis     Overweight(278.02)     Palpitations     Pseudophakia, left eye     PSVT (paroxysmal supraventricular tachycardia) ([address]     Retinal detachment     left, history of     Seborrheic dermatitis         Past Surgical History:   Procedure Laterality Date    ABSCESS DRAINAGE  [id number]    multiple    APPENDECTOMY  1940 and [facility] [place] Left 5/7/2013    CORONARY ARTERY BYPASS GRAFT      RETINAL LASER Left 12/22/2011    detached retina    SKIN TAG REMOVAL  Nov 1999    TONSILLECTOMY         Family History:     Family History   Problem Relation Age of Onset    Diabetes Mother     Osteoporosis Mother     Other Father      complications of prostate surgery (bleeding)    Stroke Sister        Social Hx:     Social History     Social History    Marital status: Single     Spouse name: N/A    Number of children: N/A    Years of education: N/A     Occupational History    Not on file.      Social History Main Topics    Smoking status: Never Smoker    Smokeless tobacco: Never Used    Alcohol use No    Drug use: No    Sexual activity: Not on file     Other Topics Concern    Not on file     Social History Narrative    No narrative on file       ROS:   CV: cad   Endocrine:    Resp:     GI: n     Neuro:   PE:     General appearance:  Normal                 Ability to Communicate:  Normal       Head & Face:  Normal   Salivary Glands:  Normal              Facial Strength:  Normal   Ears:    Pinna:  Normal            EAC:  Normal      TMs:  Normal       [place]:  [place]     Finger Rub     Nose:    External: Normal    Septum:  Normal    Turbinates: Normal             Nasal Cavity: Normal         Naso Pharyngoscopy:     Nasal Endoscopy:      Oral Cavity & Oral Pharynx:    Tongue:  Normal    Teeth & Gums:             Palate:  Maryann     Tonsils:      FOM:  Normal     Other:     Procedure: FIBEROPTIC DIRECT LARYNGOSCOPY    Pre-op dx: dysphonia     Post - op dx: Same    Indications: same    Anesthesia: Topical spray of oxymetazoline & 4% lidocaine mixed 1:1    Description of procedure: Each nostril was sprayed with an oxymetazoline & 4% lidocaine mixture. After approximately 5 minutes a [employer] 4mm fiberoptic nasopharyngoscope was passed through the  nostril, through the naso pharynx to the hypopharynx. The base of tongue, epiglottis, aryepglotic folds, arytenoids, intra-arytenoid space, false and true vocal cords, and pyriform sinuses were visualized. Vocal cord mobility was also assessed. Findings:  Base of tongue: Normal    Epiglottis: Normal    Aryepiglottic folds: Normal    Arytenoids: Normal    False vocal cords: Normal    True vocal cords: Normal small glottic chink    Pyriform sinuses: Normal    Vocal cord mobility: Normal    Intra-aytenoid space: Normal    Hypopharynx : Normal   Neck:    Thyroid:Normal       Lymph nodes: Normal           Trachea:  Normal      Masses:  Normal    Other:        Eyes:    EOMs:      Nystagmus:      Neurological:    CN V:      CN VII:       Gait & Station:      Romberg:      Tandem Gait:      [place]:       Oriented x 3: Normal     Affect:  Normal    Data reviewed:      ASSESSMENT:  1. Dysphonia  MS LARYNGOSCOPY FLEXIBLE DIAGNOSTIC   2. Presbylarynx         PLAN: see prn, reassured pt  Return if symptoms worsen or fail to improve. No orders of the defined types were placed in this encounter.         Linda Bell MD

## 2017-11-11 DIAGNOSIS — I10 ESSENTIAL HYPERTENSION: ICD-10-CM

## 2017-11-13 ENCOUNTER — OFFICE VISIT (OUTPATIENT)
Dept: PRIMARY CARE CLINIC | Age: 82
End: 2017-11-13
Payer: MEDICARE

## 2017-11-13 VITALS
OXYGEN SATURATION: 99 % | HEART RATE: 79 BPM | BODY MASS INDEX: 27.72 KG/M2 | HEIGHT: 71 IN | DIASTOLIC BLOOD PRESSURE: 80 MMHG | WEIGHT: 198 LBS | SYSTOLIC BLOOD PRESSURE: 150 MMHG
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
## 2017-11-13 DIAGNOSIS — S00.81XA ABRASION, FACE W/O INFECTION: ICD-10-CM

## 2017-11-13 DIAGNOSIS — S61.412A SKIN TEAR OF LEFT HAND WITHOUT COMPLICATION, INITIAL ENCOUNTER: ICD-10-CM

## 2017-11-13 PROCEDURE — 1123F ACP DISCUSS/DSCN MKR DOCD: CPT | Performed by: NURSE PRACTITIONER

## 2017-11-13 PROCEDURE — 1036F TOBACCO NON-USER: CPT | Performed by: NURSE PRACTITIONER

## 2017-11-13 PROCEDURE — G8417 CALC BMI ABV UP PARAM F/U: HCPCS | Performed by: NURSE PRACTITIONER

## 2017-11-13 PROCEDURE — G8484 FLU IMMUNIZE NO ADMIN: HCPCS | Performed by: NURSE PRACTITIONER

## 2017-11-13 PROCEDURE — 99213 OFFICE O/P EST LOW 20 MIN: CPT | Performed by: NURSE PRACTITIONER

## 2017-11-13 PROCEDURE — G8598 ASA/ANTIPLAT THER USED: HCPCS | Performed by: NURSE PRACTITIONER

## 2017-11-13 PROCEDURE — G8427 DOCREV CUR MEDS BY ELIG CLIN: HCPCS | Performed by: NURSE PRACTITIONER

## 2017-11-13 PROCEDURE — 4040F PNEUMOC VAC/ADMIN/RCVD: CPT | Performed by: NURSE PRACTITIONER

## 2017-11-13 RX ORDER — METOPROLOL SUCCINATE 25 MG/1
25 TABLET, EXTENDED RELEASE ORAL 2 TIMES DAILY
Qty: 180 TABLET | Refills: 3 | Status: ON HOLD | OUTPATIENT
Start: 2017-11-13 | End: 2018-03-01 | Stop reason: HOSPADM

## 2017-11-13 ASSESSMENT — ENCOUNTER SYMPTOMS
VOMITING: 0
NAUSEA: 0
RESPIRATORY NEGATIVE: 1

## 2017-11-13 NOTE — PROGRESS NOTES
the morning and 1 tablet at bedtime 45 tablet 0    lisinopril (PRINIVIL;ZESTRIL) 10 MG tablet Take 1 tablet by mouth daily 90 tablet 3    simvastatin (ZOCOR) 20 MG tablet TAKE 1 TABLET BY MOUTH EVERY OTHER DAY  30 tablet 11    fluticasone (FLONASE) 50 MCG/ACT nasal spray USE 2 SPRAYS TO EACH NOSTRIL DAILY 1 Bottle 11    trimethoprim (TRIMPEX) 100 MG tablet Take 1 tablet by mouth daily 90 tablet 3    trimethoprim-polymyxin b (POLYTRIM) 72445-0.1 UNIT/ML-% ophthalmic solution       propafenone (RYTHMOL SR) 225 MG extended release capsule Take 1 capsule by mouth 2 times daily 180 capsule 3    Multiple Vitamins-Minerals (PRESERVISION AREDS 2 PO) Take 1 capsule by mouth daily      acetaminophen (TYLENOL) 500 MG tablet Take 500 mg by mouth every 6 hours as needed for Pain      Coenzyme Q-10 100 MG CAPS Take 100 mg by mouth every other day.  tolnaftate (TINACTIN) 1 % external solution Apply topically nightly to toenails per Dr. Sahara Arzola.  aspirin 81 MG tablet Take 81 mg by mouth daily.  GLUCOSAMINE-CHONDROITIN PO Take 3 tablets by mouth Daily. No current facility-administered medications for this visit. He is allergic to pcn [penicillins]; sulfa antibiotics; and cefdinir. Sylvia Jorge He  reports that he has never smoked. He has never used smokeless tobacco.      Objective:    Vitals:    11/13/17 1513   BP: (!) 150/80   Pulse: 79   SpO2: 99%     Body mass index is 27.62 kg/m². Review of Systems   Constitutional: Negative for appetite change and fever. HENT: Negative. Respiratory: Negative. Cardiovascular: Negative. Gastrointestinal: Negative for nausea and vomiting. Neurological: Negative for dizziness, syncope, facial asymmetry, light-headedness, numbness and headaches. Physical Exam   Constitutional: He is oriented to person, place, and time. He appears well-developed and well-nourished. HENT:   Head: Normocephalic.        Eyes: Pupils are equal, round, and reactive to light.   Neck: Normal range of motion. Neck supple. Cardiovascular: Normal rate, regular rhythm and normal heart sounds. Pulmonary/Chest: Effort normal and breath sounds normal.   Musculoskeletal:        Hands:  Neurological: He is alert and oriented to person, place, and time. He has normal strength. No cranial nerve deficit or sensory deficit. He displays a negative Romberg sign. GCS eye subscore is 4. GCS verbal subscore is 5. GCS motor subscore is 6. Skin: Skin is warm and dry. Psychiatric: He has a normal mood and affect. His behavior is normal. Thought content normal.   Nursing note and vitals reviewed. Assessment and Plan:    1. Fall, initial encounter     2. Skin tear of left hand without complication, initial encounter       Area on left hand cleansed with water and hebicleanse and steri strips placed. Patient tolerated well. Patient provided with information to care for the area. Follow up with PCP if symptoms worsen. Go to ER for vomiting, dizziness, or lightheadedness.      Electronically signed by Camden Davidson NP on 11/13/17 at 3:23 PM

## 2017-11-13 NOTE — PATIENT INSTRUCTIONS
you can lose your balance and fall. · Talk to your doctor if you have numbness in your feet. Preventing falls at home  · Remove raised doorway thresholds, throw rugs, and clutter. Repair loose carpet or raised areas in the floor. · Move furniture and electrical cords to keep them out of walking paths. · Use nonskid floor wax, and wipe up spills right away, especially on ceramic tile floors. · If you use a walker or cane, put rubber tips on it. If you use crutches, clean the bottoms of them regularly with an abrasive pad, such as steel wool. · Keep your house well lit, especially [place], and outside walkways. Use night-lights in areas such as hallways and bathrooms. Add extra light switches or use remote switches (such as switches that go on or off when you clap your hands) to make it easier to turn lights on if you have to get up during the night. · Install sturdy handrails on stairways. · Move items in your cabinets so that the things you use a lot are on the lower shelves (about waist level). · Keep a cordless phone and a flashlight with new batteries by your bed. If possible, put a phone in each of the main rooms of your house, or carry a cell phone in case you fall and cannot reach a phone. Or, you can wear a device around your neck or wrist. You push a button that sends a signal for help. · Wear low-heeled shoes that fit well and give your feet good support. Use footwear with nonskid soles. Check the heels and soles of your shoes for wear. Repair or replace worn heels or soles. · Do not wear socks without shoes on wood floors. · Walk on the grass when the sidewalks are slippery. If you live in an area that gets snow and ice in the winter, sprinkle salt on slippery steps and sidewalks. Preventing falls in the bath  · Install grab bars and nonskid mats inside and outside your shower or tub and near the toilet and sinks. · Use shower chairs and bath benches.   · Use a hand-held shower head that will allow you to sit while showering. · Get into a tub or shower by putting the weaker leg in first. Get out of a tub or shower with your strong side first.  · Repair loose toilet seats and consider installing a raised toilet seat to make getting on and off the toilet easier. · Keep your bathroom door unlocked while you are in the shower. Where can you learn more? Go to https://Levels Beyondpejameseweb.ImpactMedia. org and sign in to your Maven Biotechnologiest account. Enter 0476 [id number] 69 [phone] in the NewComLink box to learn more about \"Preventing Falls: Care Instructions. \"     If you do not have an account, please click on the \"Sign Up Now\" link. Current as of: August 4, 2016  Content Version: 11.3  © 0099-6021 [employer]. Care instructions adapted under license by [facility] ([facility]). If you have questions about a medical condition or this instruction, always ask your healthcare professional. [place] [address] any warranty or liability for your use of this information. Patient Education        Skin Tears: Care Instructions  Your Care Instructions  As we get older, our skin gets drier and more fragile. Sometimes this can cause the outer layers of skin to split and tear open. Skin tears are treated in different ways. In some cases, doctors use pieces of tape called Steri-Strips to pull the skin together and help it heal. Other times, it's best to leave the tear open and cover it with a special wound-care bandage. Skin tears are usually not serious. They usually heal in a few weeks. But how long you take to heal depends on your body and the type of tear you have. Sometimes the torn piece of skin is used to protect the wound while it heals. But that piece of skin does not heal. It may fall off on its own. Or the doctor may remove it. As your tear heals, it's important to keep it clean to help prevent infection. The doctor has checked you carefully, but problems can develop later.  If you notice any [employer] disclaims any warranty or liability for your use of this information.        Patient Education

## 2017-11-16 ENCOUNTER — OFFICE VISIT (OUTPATIENT)
Dept: PRIMARY CARE CLINIC | Age: 82
End: 2017-11-16
Payer: MEDICARE

## 2017-11-16 VITALS
BODY MASS INDEX: 28.22 KG/M2 | HEIGHT: 71 IN | SYSTOLIC BLOOD PRESSURE: 116 MMHG | HEART RATE: 60 BPM | RESPIRATION RATE: 16 BRPM | DIASTOLIC BLOOD PRESSURE: 64 MMHG | WEIGHT: 201.6 LBS | TEMPERATURE: 97.6 F

## 2017-11-16 DIAGNOSIS — L03.114 CELLULITIS OF LEFT HAND: Primary | ICD-10-CM

## 2017-11-16 PROCEDURE — 1123F ACP DISCUSS/DSCN MKR DOCD: CPT | Performed by: FAMILY MEDICINE

## 2017-11-16 PROCEDURE — G8417 CALC BMI ABV UP PARAM F/U: HCPCS | Performed by: FAMILY MEDICINE

## 2017-11-16 PROCEDURE — G8598 ASA/ANTIPLAT THER USED: HCPCS | Performed by: FAMILY MEDICINE

## 2017-11-16 PROCEDURE — G8484 FLU IMMUNIZE NO ADMIN: HCPCS | Performed by: FAMILY MEDICINE

## 2017-11-16 PROCEDURE — 1036F TOBACCO NON-USER: CPT | Performed by: FAMILY MEDICINE

## 2017-11-16 PROCEDURE — G8427 DOCREV CUR MEDS BY ELIG CLIN: HCPCS | Performed by: FAMILY MEDICINE

## 2017-11-16 PROCEDURE — 99213 OFFICE O/P EST LOW 20 MIN: CPT | Performed by: FAMILY MEDICINE

## 2017-11-16 PROCEDURE — 4040F PNEUMOC VAC/ADMIN/RCVD: CPT | Performed by: FAMILY MEDICINE

## 2017-11-16 RX ORDER — CIPROFLOXACIN 500 MG/1
500 TABLET, FILM COATED ORAL 2 TIMES DAILY
Qty: 10 TABLET | Refills: 0 | Status: SHIPPED | OUTPATIENT
Start: 2017-11-16 | End: 2017-11-21

## 2017-11-16 NOTE — PATIENT INSTRUCTIONS
peroxide or alcohol, which can slow healing. · If you have swelling in your legs (edema), support stockings and good skin care may help prevent leg sores and cellulitis. · Take care of your feet, especially if you have diabetes or other conditions that increase the risk of infection. Wear shoes and socks. Do not go barefoot. If you have athlete's foot or other skin problems on your feet, talk to your doctor about how to treat them. When should you call for help? Call your doctor now or seek immediate medical care if:  · You have signs that your infection is getting worse, such as:  ¨ Increased pain, swelling, warmth, or redness. ¨ Red streaks leading from the area. ¨ Pus draining from the area. ¨ A fever. · You get a rash. Watch closely for changes in your health, and be sure to contact your doctor if:  · You are not getting better after 1 day (24 hours). · You do not get better as expected. Where can you learn more? Go to [email or web address]://[email or web address] SuperTV.Restore [email or web address] org and sign in to your Work4 account. Enter X887 in the All My Data box to learn more about \"Cellulitis: Care Instructions. \"     If you do not have an account, please click on the \"Sign Up Now\" link. Current as of: October 13, 2016  Content Version: 11.3  © 7648-7906 [employer]Speak, [employer]orporated. Care instructions adapted under license by [facility] ([facility]). If you have questions about a medical condition or this instruction, always ask your healthcare professional. [place] [address] any warranty or liability for your use of this information.

## 2017-11-16 NOTE — PROGRESS NOTES
[facility]             [address] [facility], [place], [address]                        Telephone [phone]             Fax [phone]       Elsy Stern  1934  MRN:  [id number]  Date of visit:  11/16/17    Subjective:    Elsy Stern is a 80 y.o.  male who presents to [facility] today (11/16/17) for evaluation of: Other (seen in  11/13/17 for skin tear left hand,noticed this am area around site becoming red)      He was seen at Urgent Care on 11/13/2017 for a skin tear of the left hand. Steri-strips were applied. He has had increased redness and swelling over the past 1-2 days. He denies fever or chills. He is right-hand dominant.        He has the following problem list:  Patient Active Problem List   Diagnosis    Hypertension    Hyperlipidemia    Osteoarthritis    Allergic rhinitis    Diverticulosis    Overweight    Anxiety    [facility])    PSVT (paroxysmal supraventricular tachycardia) ([facility])    Dermatophytosis of nail    Coronary artery disease involving native coronary artery of native heart        Current medications are:  Current Outpatient Prescriptions   Medication Sig Dispense Refill    metoprolol succinate (TOPROL XL) 25 MG extended release tablet TAKE 1 TABLET BY MOUTH 2 TIMES DAILY 180 tablet 3    cloNIDine (CATAPRES) 0.1 MG tablet TAKE 1 TABLET BY MOUTH DAILY AS NEEDED (\"FOR SPIKE IN BLOOD PRESSURE\") 60 tablet 11    ALPRAZolam (XANAX) 0.25 MG tablet Take 1/2 tablet by mouth in the morning and 1 tablet at bedtime 45 tablet 0    lisinopril (PRINIVIL;ZESTRIL) 10 MG tablet Take 1 tablet by mouth daily 90 tablet 3    simvastatin (ZOCOR) 20 MG tablet TAKE 1 TABLET BY MOUTH EVERY OTHER DAY  30 tablet 11    fluticasone (FLONASE) 50 MCG/ACT nasal spray USE 2 SPRAYS TO EACH NOSTRIL DAILY 1 Bottle 11    trimethoprim (TRIMPEX) 100 MG tablet Take 1 tablet by mouth daily 90 tablet 3    trimethoprim-polymyxin b (POLYTRIM) 12439-8.1 UNIT/ML-% ophthalmic solution       propafenone (RYTHMOL SR) 225 MG extended release capsule Take 1 capsule by mouth 2 times daily 180 capsule 3    Multiple Vitamins-Minerals (PRESERVISION AREDS 2 PO) Take 1 capsule by mouth daily      acetaminophen (TYLENOL) 500 MG tablet Take 500 mg by mouth every 6 hours as needed for Pain      Coenzyme Q-10 100 MG CAPS Take 100 mg by mouth every other day.  tolnaftate (TINACTIN) 1 % external solution Apply topically nightly to toenails per Dr. Patti Dean.  aspirin 81 MG tablet Take 81 mg by mouth daily.  GLUCOSAMINE-CHONDROITIN PO Take 3 tablets by mouth Daily. No current facility-administered medications for this visit. He is allergic to pcn [penicillins]; sulfa antibiotics; and cefdinir. He  reports that he has never smoked. He has never used smokeless tobacco.      Objective:    Vitals:    11/16/17 0911   BP: 116/64   Site: Right Arm   Position: Sitting   Cuff Size: Large Adult   Pulse: 60   Resp: 16   Temp: 97.6 °F (36.4 °C)   Weight: 201 lb 9.6 oz (91.4 kg)   Height: 5' 11\" (1.803 m)     Body mass index is 28.12 kg/m². Well-nourished, well-developed overweight elderly  male, in no acute distress. There is a small skin tear on the dorsum of the left hand between the first and second metacarpo-phalangeal joint   There is a small area of erythema surrounding the skin tear. There is good range of motion of the left hand. Assessment and Plan:    Cellulitis of left hand  - ciprofloxacin (CIPRO) 500 MG tablet; Take 1 tablet by mouth 2 times daily for 5 days  Dispense: 10 tablet; Refill: 0    Printed information regarding Cellulitis was provided to the patient with his after visit summary. He was advised to follow up if symptoms worsen or do not resolve.        (Please note that portions of this note were completed with a voice-recognition program. Efforts were made to edit the dictation but occasionally words are mis-transcribed.)

## 2017-11-25 ENCOUNTER — OFFICE VISIT (OUTPATIENT)
Dept: PRIMARY CARE CLINIC | Age: 82
End: 2017-11-25
Payer: MEDICARE

## 2017-11-25 VITALS
BODY MASS INDEX: 28 KG/M2 | RESPIRATION RATE: 16 BRPM | HEIGHT: 71 IN | HEART RATE: 69 BPM | OXYGEN SATURATION: 98 % | DIASTOLIC BLOOD PRESSURE: 78 MMHG | WEIGHT: 200 LBS | SYSTOLIC BLOOD PRESSURE: 112 MMHG | TEMPERATURE: 97.6 F

## 2017-11-25 DIAGNOSIS — S61.402A OPEN WOUND OF LEFT HAND WITHOUT FOREIGN BODY, UNSPECIFIED WOUND TYPE, INITIAL ENCOUNTER: Primary | ICD-10-CM

## 2017-11-25 PROCEDURE — 4040F PNEUMOC VAC/ADMIN/RCVD: CPT | Performed by: PHYSICIAN ASSISTANT

## 2017-11-25 PROCEDURE — G8417 CALC BMI ABV UP PARAM F/U: HCPCS | Performed by: PHYSICIAN ASSISTANT

## 2017-11-25 PROCEDURE — 99213 OFFICE O/P EST LOW 20 MIN: CPT | Performed by: PHYSICIAN ASSISTANT

## 2017-11-25 PROCEDURE — G8484 FLU IMMUNIZE NO ADMIN: HCPCS | Performed by: PHYSICIAN ASSISTANT

## 2017-11-25 PROCEDURE — G8427 DOCREV CUR MEDS BY ELIG CLIN: HCPCS | Performed by: PHYSICIAN ASSISTANT

## 2017-11-25 PROCEDURE — 1036F TOBACCO NON-USER: CPT | Performed by: PHYSICIAN ASSISTANT

## 2017-11-25 PROCEDURE — G8598 ASA/ANTIPLAT THER USED: HCPCS | Performed by: PHYSICIAN ASSISTANT

## 2017-11-25 PROCEDURE — 1123F ACP DISCUSS/DSCN MKR DOCD: CPT | Performed by: PHYSICIAN ASSISTANT

## 2017-11-25 ASSESSMENT — ENCOUNTER SYMPTOMS: RESPIRATORY NEGATIVE: 1

## 2017-11-25 NOTE — PROGRESS NOTES
Subjective:      Patient ID: Isabel Baca is a 80 y.o. male. Wound Check   He was originally treated 3 to 5 days ago. The maximum temperature noted was less than 100.4 F. There has been bloody discharge from the wound. There is no redness present. The swelling has improved. There is no pain present. He has no difficulty moving the affected extremity or digit. Review of Systems   Constitutional: Negative. Respiratory: Negative. Cardiovascular: Negative. Skin: Positive for wound. Objective:   Physical Exam   Constitutional: He is oriented to person, place, and time. He appears well-developed. HENT:   Head: Normocephalic. Cardiovascular: Normal rate and regular rhythm. Pulmonary/Chest: Effort normal and breath sounds normal.   Neurological: He is alert and oriented to person, place, and time. Skin: Skin is warm. Superificial wound left 2nd MCP joint       Assessment:      1. Open wound of left hand without foreign body, unspecified wound type, initial encounter          Plan:      Topical Bactroban ointment. Local wound care advised. Follow-up PRN/as planned with PCP.

## 2017-11-27 DIAGNOSIS — I48.0 PAF (PAROXYSMAL ATRIAL FIBRILLATION) (HCC): ICD-10-CM

## 2017-11-27 RX ORDER — PROPAFENONE HYDROCHLORIDE 225 MG/1
225 CAPSULE, EXTENDED RELEASE ORAL 2 TIMES DAILY
Qty: 180 CAPSULE | Refills: 3 | Status: ON HOLD | OUTPATIENT
Start: 2017-11-27 | End: 2018-02-14 | Stop reason: HOSPADM

## 2017-11-27 NOTE — TELEPHONE ENCOUNTER
Pt is out of this medication and the pharmacy has given him enough to get to tomorrow. Could this be addressed by Dr Marlin Alvarado on 11/28/17?

## 2017-11-29 ENCOUNTER — OFFICE VISIT (OUTPATIENT)
Dept: PRIMARY CARE CLINIC | Age: 82
End: 2017-11-29
Payer: MEDICARE

## 2017-11-29 VITALS
BODY MASS INDEX: 28.11 KG/M2 | HEART RATE: 66 BPM | OXYGEN SATURATION: 97 % | HEIGHT: 71 IN | SYSTOLIC BLOOD PRESSURE: 118 MMHG | DIASTOLIC BLOOD PRESSURE: 66 MMHG | TEMPERATURE: 98.2 F | WEIGHT: 200.8 LBS

## 2017-11-29 DIAGNOSIS — S61.402D OPEN WOUND OF LEFT HAND WITHOUT FOREIGN BODY, UNSPECIFIED WOUND TYPE, SUBSEQUENT ENCOUNTER: Primary | ICD-10-CM

## 2017-11-29 PROCEDURE — 4040F PNEUMOC VAC/ADMIN/RCVD: CPT | Performed by: NURSE PRACTITIONER

## 2017-11-29 PROCEDURE — G8598 ASA/ANTIPLAT THER USED: HCPCS | Performed by: NURSE PRACTITIONER

## 2017-11-29 PROCEDURE — 1036F TOBACCO NON-USER: CPT | Performed by: NURSE PRACTITIONER

## 2017-11-29 PROCEDURE — 1123F ACP DISCUSS/DSCN MKR DOCD: CPT | Performed by: NURSE PRACTITIONER

## 2017-11-29 PROCEDURE — G8417 CALC BMI ABV UP PARAM F/U: HCPCS | Performed by: NURSE PRACTITIONER

## 2017-11-29 PROCEDURE — G8484 FLU IMMUNIZE NO ADMIN: HCPCS | Performed by: NURSE PRACTITIONER

## 2017-11-29 PROCEDURE — 99213 OFFICE O/P EST LOW 20 MIN: CPT | Performed by: NURSE PRACTITIONER

## 2017-11-29 PROCEDURE — G8427 DOCREV CUR MEDS BY ELIG CLIN: HCPCS | Performed by: NURSE PRACTITIONER

## 2017-11-29 ASSESSMENT — ENCOUNTER SYMPTOMS
RESPIRATORY NEGATIVE: 1
ABDOMINAL PAIN: 0
SWOLLEN GLANDS: 0
COUGH: 0

## 2017-11-29 NOTE — PATIENT INSTRUCTIONS
Patient Education        Skin Tears: Care Instructions  Your Care Instructions  As we get older, our skin gets drier and more fragile. Sometimes this can cause the outer layers of skin to split and tear open. Skin tears are treated in different ways. In some cases, doctors use pieces of tape called Steri-Strips to pull the skin together and help it heal. Other times, it's best to leave the tear open and cover it with a special wound-care bandage. Skin tears are usually not serious. They usually heal in a few weeks. But how long you take to heal depends on your body and the type of tear you have. Sometimes the torn piece of skin is used to protect the wound while it heals. But that piece of skin does not heal. It may fall off on its own. Or the doctor may remove it. As your tear heals, it's important to keep it clean to help prevent infection. The doctor has checked you carefully, but problems can develop later. If you notice any problems or new symptoms, get medical treatment right away. Follow-up care is a key part of your treatment and safety. Be sure to make and go to all appointments, and call your doctor if you are having problems. It's also a good idea to know your test results and keep a list of the medicines you take. How can you care for yourself at home? · If you have pain, ask your doctor if you can take an over-the-counter pain medicine, such as acetaminophen (Tylenol), ibuprofen (Advil, Motrin), or naproxen (Aleve). Be safe with medicines. Read and follow all instructions on the label. · If you have a bandage, follow your doctor's instructions for changing it. · If you have Steri-Strips, leave them on until they fall off. · Follow your doctor's instructions about bathing. · Gently wash the skin tear with plain water 2 times a day. Do not rub the area. · Let the area air dry. Or you can pat it carefully with a soft towel. When should you call for help?   Call your doctor now or seek immediate medical care if:  · You have signs of infection, such as:  ¨ Increased pain, swelling, warmth, or redness around the tear. ¨ Red streaks leading from the tear. ¨ Pus draining from the tear. ¨ A fever. · The tear starts to bleed a lot. Small amounts of blood are normal.  Watch closely for changes in your health, and be sure to contact your doctor if:  · You do not get better as expected. Where can you learn more? Go to https://Allinea Software.IntheGlo. org and sign in to your Vubiquity account. Enter A262 in the Wickr box to learn more about \"Skin Tears: Care Instructions. \"     If you do not have an account, please click on the \"Sign Up Now\" link. Current as of: March 20, 2017  Content Version: 11.3  © 1085-2585 LinPrim, Incorporated. Care instructions adapted under license by [facility] ([facility]). If you have questions about a medical condition or this instruction, always ask your healthcare professional. [place] [address] any warranty or liability for your use of this information.

## 2017-11-29 NOTE — PROGRESS NOTES
[facility]             [address] [facility], [place], [address]                        Telephone [phone]             Fax [phone]     Olinda Bradley  1934  MRN:  [id number]   Date of visit:  11/29/2017    Subjective:    Olinda Bradley is a 80 y.o.  male who presents to [facility] today (11/29/2017) for evaluation of:    Chief Complaint   Patient presents with    Hand Problem     infection on right hand       Hand Problem   This is a new problem. The current episode started 1 to 4 weeks ago (original injury on 11/13/17). The problem occurs constantly. The problem has been gradually improving. Pertinent negatives include no abdominal pain, chills, coughing, fever, headaches, rash, swollen glands or weakness. Nothing aggravates the symptoms. Treatments tried: Bactroban ointment. The treatment provided moderate relief. He has the following problem list:  Patient Active Problem List   Diagnosis    Hypertension    Hyperlipidemia    Osteoarthritis    Allergic rhinitis    Diverticulosis    Overweight    Anxiety    PA[facility]oxysmal [facility] fibrillation) ([facility])    PSVT (paroxysmal supraventricular tachycardia) ([facility])    Dermatophytosis of nail    Coronary artery disease involving native coronary artery of native heart        Current medications are:  Current Outpatient Prescriptions   Medication Sig Dispense Refill    propafenone (RYTHMOL SR) 225 MG extended release capsule Take 1 capsule by mouth 2 times daily 180 capsule 3    mupirocin (BACTROBAN) 2 % ointment Apply 2 times daily.  22 g 0    metoprolol succinate (TOPROL XL) 25 MG extended release tablet TAKE 1 TABLET BY MOUTH 2 TIMES DAILY 180 tablet 3    cloNIDine (CATAPRES) 0.1 MG tablet TAKE 1 TABLET BY MOUTH DAILY AS NEEDED (\"FOR SPIKE IN BLOOD PRESSURE\") 60 tablet 11    ALPRAZolam (XANAX) 0.25 MG tablet Take 1/2 tablet by mouth in the morning and 1

## 2017-12-07 DIAGNOSIS — F41.9 ANXIETY: ICD-10-CM

## 2017-12-07 RX ORDER — ALPRAZOLAM 0.25 MG/1
TABLET ORAL
Qty: 45 TABLET | Refills: 0 | OUTPATIENT
Start: 2017-12-07 | End: 2018-01-17 | Stop reason: SDUPTHER

## 2017-12-13 ENCOUNTER — OFFICE VISIT (OUTPATIENT)
Dept: OPHTHALMOLOGY | Age: 82
End: 2017-12-13
Payer: MEDICARE

## 2017-12-13 ENCOUNTER — OFFICE VISIT (OUTPATIENT)
Dept: PODIATRY | Age: 82
End: 2017-12-13
Payer: MEDICARE

## 2017-12-13 VITALS
DIASTOLIC BLOOD PRESSURE: 72 MMHG | SYSTOLIC BLOOD PRESSURE: 120 MMHG | HEART RATE: 64 BPM | WEIGHT: 203 LBS | HEIGHT: 71 IN | RESPIRATION RATE: 20 BRPM | BODY MASS INDEX: 28.42 KG/M2

## 2017-12-13 DIAGNOSIS — Z96.1 PSEUDOPHAKIA, LEFT EYE: ICD-10-CM

## 2017-12-13 DIAGNOSIS — M79.675 PAIN IN TOES OF BOTH FEET: ICD-10-CM

## 2017-12-13 DIAGNOSIS — H25.091 AGE-RELATED INCIPIENT CATARACT OF RIGHT EYE: Primary | ICD-10-CM

## 2017-12-13 DIAGNOSIS — B35.1 DERMATOPHYTOSIS OF NAIL: Primary | ICD-10-CM

## 2017-12-13 DIAGNOSIS — M79.674 PAIN IN TOES OF BOTH FEET: ICD-10-CM

## 2017-12-13 DIAGNOSIS — H35.3190 DRY ARMD: ICD-10-CM

## 2017-12-13 PROCEDURE — 11720 DEBRIDE NAIL 1-5: CPT | Performed by: PODIATRIST

## 2017-12-13 PROCEDURE — 4040F PNEUMOC VAC/ADMIN/RCVD: CPT | Performed by: OPHTHALMOLOGY

## 2017-12-13 PROCEDURE — 1123F ACP DISCUSS/DSCN MKR DOCD: CPT | Performed by: OPHTHALMOLOGY

## 2017-12-13 PROCEDURE — G8598 ASA/ANTIPLAT THER USED: HCPCS | Performed by: OPHTHALMOLOGY

## 2017-12-13 PROCEDURE — 1036F TOBACCO NON-USER: CPT | Performed by: OPHTHALMOLOGY

## 2017-12-13 PROCEDURE — G8427 DOCREV CUR MEDS BY ELIG CLIN: HCPCS | Performed by: OPHTHALMOLOGY

## 2017-12-13 PROCEDURE — 99214 OFFICE O/P EST MOD 30 MIN: CPT | Performed by: OPHTHALMOLOGY

## 2017-12-13 PROCEDURE — G8484 FLU IMMUNIZE NO ADMIN: HCPCS | Performed by: OPHTHALMOLOGY

## 2017-12-13 PROCEDURE — G8417 CALC BMI ABV UP PARAM F/U: HCPCS | Performed by: OPHTHALMOLOGY

## 2017-12-13 RX ORDER — PHENYLEPHRINE HCL 2.5 %
1 DROPS OPHTHALMIC (EYE) ONCE
Status: COMPLETED | OUTPATIENT
Start: 2017-12-13 | End: 2017-12-13

## 2017-12-13 RX ORDER — TROPICAMIDE 10 MG/ML
1 SOLUTION/ DROPS OPHTHALMIC ONCE
Status: COMPLETED | OUTPATIENT
Start: 2017-12-13 | End: 2017-12-13

## 2017-12-13 RX ORDER — BENOXINATE HCL/FLUORESCEIN SOD 0.4%-0.25%
1 DROPS OPHTHALMIC (EYE) ONCE
Status: COMPLETED | OUTPATIENT
Start: 2017-12-13 | End: 2017-12-13

## 2017-12-13 RX ADMIN — Medication 1 DROP: at 08:52

## 2017-12-13 RX ADMIN — Medication 1 DROP: at 08:44

## 2017-12-13 RX ADMIN — TROPICAMIDE 1 DROP: 10 SOLUTION/ DROPS OPHTHALMIC at 08:52

## 2017-12-13 ASSESSMENT — VISUAL ACUITY
METHOD: SNELLEN - LINEAR
OS_CC: 20/20
CORRECTION_TYPE: GLASSES
OD_CC+: -2

## 2017-12-13 ASSESSMENT — CONF VISUAL FIELD
OS_NORMAL: 1
METHOD: COUNTING FINGERS
OD_NORMAL: 1

## 2017-12-13 ASSESSMENT — SLIT LAMP EXAM - LIDS
COMMENTS: 2+ DERMATOCHALASIS - UPPER LID
COMMENTS: 3+ DERMATOCHALASIS - UPPER LID

## 2017-12-13 ASSESSMENT — TONOMETRY
OS_IOP_MMHG: 19
OD_IOP_MMHG: 20
IOP_METHOD: APPLANATION W FLURESS DROP

## 2017-12-13 ASSESSMENT — ENCOUNTER SYMPTOMS
GASTROINTESTINAL NEGATIVE: 1
RESPIRATORY NEGATIVE: 1

## 2017-12-13 NOTE — PROGRESS NOTES
tablet Take 225 mg by mouth 2 times daily. Yes Historical Provider, MD   GLUCOSAMINE-CHONDROITIN PO Take 3 tablets by mouth Daily. Yes Historical Provider, MD   Multiple Vitamins-Minerals (MULTIVITAMIN PO) Take 1 tablet by mouth daily. Yes Historical Provider, MD   Multiple Vitamins-Minerals (TOTAL FORMULA 3) TABS Take 1 tablet by mouth daily. Yes Historical Provider, MD   simvastatin (ZOCOR) 20 MG tablet Take 20 mg by mouth See Admin Instructions. ONE TABLET EVERY OTHER DAY    Historical Provider, MD       Social History   Substance Use Topics    Smoking status: Never Smoker    Smokeless tobacco: Never Used    Alcohol use No     Review of Systems: All 12 systems reviewed and pertinent positives noted above. Objective:  Vascular: DP and PT pulses palpable 2/4, bilateral.  CFT <3 seconds, bilateral.  Hair growth present to the level of the digits, bilateral.  Edema present, bilateral.  Varicosities absent, bilateral. Erythema absent, bilateral. Distal Rubor absent bilateral.  Temperature decreased bilateral. Hyperpigmentation absent bilateral. Atrophic skin no. Neurological: Sensation intact to light touch to level of digits, bilateral.  Protective sensation intact 10/10 sites via 5.07/10g [place] Monofilament, bilateral.  negative Tinel's, bilateral.  negative Valleix sign, bilateral.  Vibratory intact bilateral.  Reflexes Decreased bilateral.  Paresthesias negative. Dysthesias negative. Sharp/dull intact bilateral.    Musculoskeletal: pain present on palpation L/R hallux nail. Integument:  Nails left 1, 2 and right 1 thickened, dystrophic and crumbly, discolored with subungual debris. Hyperkeratotic tissue is absent. Assessment:  1. Dermatophytosis of nail  LA DEBRIDEMENT OF NAIL(S), 1-5   2. Pain in toes of both feet  LA DEBRIDEMENT OF NAIL(S), 1-5     Plan:  All Nails as mentioned above debrided in length and thickness.   Patient advised OTC methods for treatment of the mycotic nails. Patient will follow up in 10 weeks.

## 2017-12-13 NOTE — PROGRESS NOTES
He is here for a complete exam.     Past Medical History:   Diagnosis Date    Acute bronchitis     history of     Allergic rhinitis     Anxiety     Atrial fibrillation ([address]     paroxymsal     CAD (coronary artery disease)     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    Cataract, right     Choroidal nevus of right eye     Diverticulosis     Elevated PSA     Hemorrhoids     History of measles childhood    History of mumps childhood    Hyperlipidemia     Hypertension     Occult blood positive stool     refuses colonoscopy    Osteoarthritis     Overweight(278.02)     Palpitations     Pseudophakia, left eye     PSVT (paroxysmal supraventricular tachycardia) ([facility])     Retinal detachment     left, history of     Seborrheic dermatitis           Current Outpatient Prescriptions:     ALPRAZolam (XANAX) 0.25 MG tablet, Take 1/2 tablet by mouth in the morning and 1 tablet at bedtime. , Disp: 45 tablet, Rfl: 0    propafenone (RYTHMOL SR) 225 MG extended release capsule, Take 1 capsule by mouth 2 times daily, Disp: 180 capsule, Rfl: 3    metoprolol succinate (TOPROL XL) 25 MG extended release tablet, TAKE 1 TABLET BY MOUTH 2 TIMES DAILY, Disp: 180 tablet, Rfl: 3    cloNIDine (CATAPRES) 0.1 MG tablet, TAKE 1 TABLET BY MOUTH DAILY AS NEEDED (\"FOR SPIKE IN BLOOD PRESSURE\"), Disp: 60 tablet, Rfl: 11    lisinopril (PRINIVIL;ZESTRIL) 10 MG tablet, Take 1 tablet by mouth daily, Disp: 90 tablet, Rfl: 3    simvastatin (ZOCOR) 20 MG tablet, TAKE 1 TABLET BY MOUTH EVERY OTHER DAY , Disp: 30 tablet, Rfl: 11    fluticasone (FLONASE) 50 MCG/ACT nasal spray, USE 2 SPRAYS TO EACH NOSTRIL DAILY, Disp: 1 Bottle, Rfl: 11    trimethoprim (TRIMPEX) 100 MG tablet, Take 1 tablet by mouth daily, Disp: 90 tablet, Rfl: 3    Multiple Vitamins-Minerals (PRESERVISION AREDS 2 PO), Take 1 capsule by mouth daily, Disp: , Rfl:     acetaminophen (TYLENOL) 500 MG tablet, Take 500 mg by mouth every 6 hours as needed for Pain, Disp: , Rfl:     Coenzyme Q-10 100 MG CAPS, Take 100 mg by mouth every other day., Disp: , Rfl:     tolnaftate (TINACTIN) 1 % external solution, Apply topically nightly to toenails per Dr. Shyanne Horta., Disp: , Rfl:     aspirin 81 MG tablet, Take 81 mg by mouth daily. , Disp: , Rfl:     GLUCOSAMINE-CHONDROITIN PO, Take 3 tablets by mouth Daily. , Disp: , Rfl:      Allergies   Allergen Reactions    Pcn [Penicillins] Swelling     As a child    Sulfa Antibiotics     Cefdinir Other (See Comments)      ROS  Review of Systems   Constitutional: Negative. HENT: Negative. Respiratory: Negative. Cardiovascular: Negative. Gastrointestinal: Negative. Musculoskeletal: Negative. Skin: Negative. Neurological: Negative. Endo/Heme/Allergies: Negative.       Main Ophthalmology Exam     External Exam       Right Left    External Normal Normal          Slit Lamp Exam       Right Left    Lids/Lashes 3+ Dermatochalasis - upper lid 2+ Dermatochalasis - upper lid    Conjunctiva/Sclera White and quiet White and quiet    Cornea Clear Clear    Anterior Chamber Deep and quiet Deep and quiet    Iris Round and reactive Round and reactive    Lens 3+ Nuclear sclerosis Centered posterior chamber intraocular lens, Open posterior capsule    Vitreous Posterior vitreous detachment Normal          Fundus Exam       Right Left    Disc Normal Normal    C/D Ratio 0.25 0.25    Macula Mottling Intermediate soft drusen    Vessels Normal Normal    Periphery Choroidal nevus 1 * 0.5 DD flat medially Pavingstone degeneration                   Tonometry     Tonometry (Applanation w Fluress drop, 8:49 AM)       Right Left    Pressure 20 19              Visual Acuity     Visual Acuity (Snellen - Linear)       Right Left    Dist cc 20/25 -2 20/20    Correction:  Glasses              Pupils     Pupils       Pupils React APD    Right PERRL Brisk None    Left PERRL Brisk +1               Confrontational Visual Fields     Visual Fields (Counting fingers)       Right Left Full Full               Extraocular Movement     Extraocular Movement       Right Left     Full, Ortho Full, Ortho                IMPRESSION:  1. Age-related incipient cataract of right eye     2. Pseudophakia, left eye     3. Dry ARMD         PLAN:  Discussed all below with patient. 1. Age-related incipient cataract of right eye  monitor    2. Pseudophakia, left eye  Stable monitor    3. Dry ARMD  Antioxidants   BID    Patient Instructions   Take medication as prescribed. If you have any problems or concerns before your next scheduled appointment, please call the office at [phone]. No Follow-up on file.

## 2017-12-13 NOTE — PATIENT INSTRUCTIONS
Take medication as prescribed. If you have any problems or concerns before your next scheduled appointment, please call the office at [phone].

## 2018-01-10 ENCOUNTER — PROCEDURE VISIT (OUTPATIENT)
Dept: PODIATRY | Age: 83
End: 2018-01-10
Payer: MEDICARE

## 2018-01-10 VITALS
SYSTOLIC BLOOD PRESSURE: 130 MMHG | WEIGHT: 204 LBS | DIASTOLIC BLOOD PRESSURE: 70 MMHG | HEART RATE: 70 BPM | HEIGHT: 71 IN | BODY MASS INDEX: 28.56 KG/M2

## 2018-01-10 DIAGNOSIS — L03.032 PARONYCHIA, TOE, LEFT: Primary | ICD-10-CM

## 2018-01-10 PROCEDURE — 1036F TOBACCO NON-USER: CPT | Performed by: PODIATRIST

## 2018-01-10 PROCEDURE — G8427 DOCREV CUR MEDS BY ELIG CLIN: HCPCS | Performed by: PODIATRIST

## 2018-01-10 PROCEDURE — G8484 FLU IMMUNIZE NO ADMIN: HCPCS | Performed by: PODIATRIST

## 2018-01-10 PROCEDURE — G8598 ASA/ANTIPLAT THER USED: HCPCS | Performed by: PODIATRIST

## 2018-01-10 PROCEDURE — 4040F PNEUMOC VAC/ADMIN/RCVD: CPT | Performed by: PODIATRIST

## 2018-01-10 PROCEDURE — 1123F ACP DISCUSS/DSCN MKR DOCD: CPT | Performed by: PODIATRIST

## 2018-01-10 PROCEDURE — G8417 CALC BMI ABV UP PARAM F/U: HCPCS | Performed by: PODIATRIST

## 2018-01-10 PROCEDURE — 99213 OFFICE O/P EST LOW 20 MIN: CPT | Performed by: PODIATRIST

## 2018-01-10 RX ORDER — DOXYCYCLINE HYCLATE 100 MG/1
100 CAPSULE ORAL 2 TIMES DAILY
Qty: 20 CAPSULE | Refills: 0 | Status: SHIPPED | OUTPATIENT
Start: 2018-01-10 | End: 2018-01-20

## 2018-01-17 DIAGNOSIS — F41.9 ANXIETY: ICD-10-CM

## 2018-01-17 RX ORDER — ALPRAZOLAM 0.25 MG/1
TABLET ORAL
Qty: 45 TABLET | Refills: 0 | Status: ON HOLD | OUTPATIENT
Start: 2018-01-17 | End: 2018-03-16 | Stop reason: HOSPADM

## 2018-02-01 ENCOUNTER — OFFICE VISIT (OUTPATIENT)
Dept: PRIMARY CARE CLINIC | Age: 83
End: 2018-02-01
Payer: MEDICARE

## 2018-02-01 VITALS
DIASTOLIC BLOOD PRESSURE: 66 MMHG | WEIGHT: 202.8 LBS | OXYGEN SATURATION: 98 % | HEIGHT: 71 IN | RESPIRATION RATE: 16 BRPM | TEMPERATURE: 100.5 F | BODY MASS INDEX: 28.39 KG/M2 | SYSTOLIC BLOOD PRESSURE: 136 MMHG | HEART RATE: 74 BPM

## 2018-02-01 DIAGNOSIS — J04.0 LARYNGITIS: Primary | ICD-10-CM

## 2018-02-01 DIAGNOSIS — J00 ACUTE NASOPHARYNGITIS: ICD-10-CM

## 2018-02-01 PROCEDURE — G8598 ASA/ANTIPLAT THER USED: HCPCS | Performed by: NURSE PRACTITIONER

## 2018-02-01 PROCEDURE — 1036F TOBACCO NON-USER: CPT | Performed by: NURSE PRACTITIONER

## 2018-02-01 PROCEDURE — G8484 FLU IMMUNIZE NO ADMIN: HCPCS | Performed by: NURSE PRACTITIONER

## 2018-02-01 PROCEDURE — 4040F PNEUMOC VAC/ADMIN/RCVD: CPT | Performed by: NURSE PRACTITIONER

## 2018-02-01 PROCEDURE — 99213 OFFICE O/P EST LOW 20 MIN: CPT | Performed by: NURSE PRACTITIONER

## 2018-02-01 PROCEDURE — G8427 DOCREV CUR MEDS BY ELIG CLIN: HCPCS | Performed by: NURSE PRACTITIONER

## 2018-02-01 PROCEDURE — G8417 CALC BMI ABV UP PARAM F/U: HCPCS | Performed by: NURSE PRACTITIONER

## 2018-02-01 PROCEDURE — 1123F ACP DISCUSS/DSCN MKR DOCD: CPT | Performed by: NURSE PRACTITIONER

## 2018-02-01 RX ORDER — FLUTICASONE PROPIONATE 50 MCG
1 SPRAY, SUSPENSION (ML) NASAL DAILY
Qty: 1 BOTTLE | Refills: 0 | Status: SHIPPED | OUTPATIENT
Start: 2018-02-01 | End: 2022-08-22

## 2018-02-01 ASSESSMENT — ENCOUNTER SYMPTOMS
VOMITING: 0
DIARRHEA: 0
ABDOMINAL PAIN: 0
COUGH: 1
SORE THROAT: 0
VOICE CHANGE: 1
SHORTNESS OF BREATH: 0
SINUS PRESSURE: 0
RHINORRHEA: 1
WHEEZING: 0
NAUSEA: 0

## 2018-02-01 NOTE — PROGRESS NOTES
[facility]             [address] [facility], [place], [address]                        Telephone [phone]             Fax [phone]     Barrington Ramirez  1934  MRN:  [id number]   Date of visit:  2/1/2018    Subjective:    Barrington Ramirez is a 80 y.o.  male who presents to [facility] today (2/1/2018) for evaluation of:    Chief Complaint   Patient presents with   Linward Cutler     starting yesterday. bit of a runny nose.  Medication Refill     flonase       URI    This is a new problem. The current episode started in the past 7 days (X 5 days ). The problem has been gradually worsening. There has been no fever. Associated symptoms include congestion, coughing, rhinorrhea and sneezing. Pertinent negatives include no abdominal pain, chest pain, diarrhea, ear pain, headaches, nausea, plugged ear sensation, rash, sore throat, vomiting or wheezing. Treatments tried: flonase, tylenol. The treatment provided no relief. He has the following problem list:  Patient Active Problem List   Diagnosis    Hypertension    Hyperlipidemia    Osteoarthritis    Allergic rhinitis    Diverticulosis    Overweight    Anxiety    [facility]) ([facility])    PSVT (paroxysmal supraventricular tachycardia) ([facility])    Dermatophytosis of nail    Coronary artery disease involving native coronary artery of native heart        Current medications are:  Current Outpatient Prescriptions   Medication Sig Dispense Refill    fluticasone (FLONASE) 50 MCG/ACT nasal spray 1 spray by Nasal route daily 1 Bottle 0    ALPRAZolam (XANAX) 0.25 MG tablet Take 1/2 tablet by mouth in the morning and 1 tablet at bedtime.  45 tablet 0    propafenone (RYTHMOL SR) 225 MG extended release capsule Take 1 capsule by mouth 2 times daily 180 capsule 3    metoprolol succinate (TOPROL XL) 25 MG extended release tablet TAKE 1 TABLET BY MOUTH 2 TIMES Tympanic membrane, external ear and ear canal normal.   Left Ear: Tympanic membrane, external ear and ear canal normal.   Nose: Mucosal edema and rhinorrhea present. Right sinus exhibits maxillary sinus tenderness. Right sinus exhibits no frontal sinus tenderness. Left sinus exhibits maxillary sinus tenderness. Left sinus exhibits no frontal sinus tenderness. Mouth/Throat: Uvula is midline, oropharynx is clear and moist and mucous membranes are normal.   Eyes: Pupils are equal, round, and reactive to light. Neck: Normal range of motion. Neck supple. Cardiovascular: Normal rate, regular rhythm and normal heart sounds. Pulmonary/Chest: Effort normal and breath sounds normal.   Lymphadenopathy:     He has no cervical adenopathy. Neurological: He is alert and oriented to person, place, and time. Skin: Skin is warm and dry. Psychiatric: He has a normal mood and affect. His behavior is normal. Thought content normal.   Nursing note and vitals reviewed. Assessment and Plan:    1. Laryngitis     2. Acute nasopharyngitis  fluticasone (FLONASE) 50 MCG/ACT nasal spray     Warm salt water gargles and rest voice for 4-5 days. I also recommended Flonase and an antihistamine for sinus symptoms. he was also encouraged to use tylenol for fever. Increase water intake. Use cool mist humidifier at bedtime. Use nasal saline flush as needed. Good hand hygiene. he was instructed to return if there is no improvement or symptoms worsen.        Electronically signed by Rosa Ceballos NP on 2/1/18 at 9:47 AM

## 2018-02-04 ENCOUNTER — OFFICE VISIT (OUTPATIENT)
Dept: PRIMARY CARE CLINIC | Age: 83
End: 2018-02-04
Payer: MEDICARE

## 2018-02-04 VITALS
HEART RATE: 78 BPM | OXYGEN SATURATION: 96 % | DIASTOLIC BLOOD PRESSURE: 70 MMHG | HEIGHT: 71 IN | BODY MASS INDEX: 28.98 KG/M2 | RESPIRATION RATE: 16 BRPM | SYSTOLIC BLOOD PRESSURE: 120 MMHG | WEIGHT: 207 LBS | TEMPERATURE: 100.9 F

## 2018-02-04 DIAGNOSIS — J04.0 ACUTE VIRAL LARYNGITIS: Primary | ICD-10-CM

## 2018-02-04 PROCEDURE — 1036F TOBACCO NON-USER: CPT | Performed by: FAMILY MEDICINE

## 2018-02-04 PROCEDURE — G8417 CALC BMI ABV UP PARAM F/U: HCPCS | Performed by: FAMILY MEDICINE

## 2018-02-04 PROCEDURE — 1123F ACP DISCUSS/DSCN MKR DOCD: CPT | Performed by: FAMILY MEDICINE

## 2018-02-04 PROCEDURE — G8484 FLU IMMUNIZE NO ADMIN: HCPCS | Performed by: FAMILY MEDICINE

## 2018-02-04 PROCEDURE — G8598 ASA/ANTIPLAT THER USED: HCPCS | Performed by: FAMILY MEDICINE

## 2018-02-04 PROCEDURE — 99213 OFFICE O/P EST LOW 20 MIN: CPT | Performed by: FAMILY MEDICINE

## 2018-02-04 PROCEDURE — 4040F PNEUMOC VAC/ADMIN/RCVD: CPT | Performed by: FAMILY MEDICINE

## 2018-02-04 PROCEDURE — G8427 DOCREV CUR MEDS BY ELIG CLIN: HCPCS | Performed by: FAMILY MEDICINE

## 2018-02-04 ASSESSMENT — ENCOUNTER SYMPTOMS
WHEEZING: 0
COUGH: 1
EYES NEGATIVE: 1
TROUBLE SWALLOWING: 0
GASTROINTESTINAL NEGATIVE: 1
SHORTNESS OF BREATH: 0
RHINORRHEA: 1
CHEST TIGHTNESS: 0
ALLERGIC/IMMUNOLOGIC NEGATIVE: 1
CHOKING: 0
VOICE CHANGE: 1
STRIDOR: 0
SORE THROAT: 0

## 2018-02-04 NOTE — PROGRESS NOTES
Subjective:      Patient ID: Kostas House is a 80 y.o. male. HPI  Acute urgent care visit for laryngitis symptoms around his vocal cord area in the last 3-4 days. Mild cough at present. Not much. Some sense of fever. Tylenol helped. No sore throat to report. Rhinorrhea at the start, now better. Minimal congestion . Able to breath through nose. No trouble swallowing. No tightness of breathing or sob at present. Past Medical History:   Diagnosis Date    Acute bronchitis     history of     Allergic rhinitis     Anxiety     Atrial fibrillation (HCC)     paroxymsal     CAD (coronary artery disease)     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    Cataract, right     Choroidal nevus of right eye     Diverticulosis     Elevated PSA     Hemorrhoids     History of measles childhood    History of mumps childhood    Hyperlipidemia     Hypertension     Occult blood positive stool     refuses colonoscopy    Osteoarthritis     Overweight(278.02)     Palpitations     Pseudophakia, left eye     [facility]ox[facility] tachy[facility]) [facility])     Retinal detachment     left, history of     Seborrheic dermatitis      Past Surgical History:   Procedure Laterality Date    ABSCESS DRAINAGE  5426-7100    multiple    APPENDECTOMY  1940 and 1955    CATARACT REMOVAL Left 5/7/2013    CORONARY ARTERY BYPASS GRAFT      RETINAL LASER Left 12/22/2011    detached retina    SKIN TAG REMOVAL  Nov 1999    TONSILLECTOMY       Current Outpatient Prescriptions   Medication Sig Dispense Refill    fluticasone (FLONASE) 50 MCG/ACT nasal spray 1 spray by Nasal route daily 1 Bottle 0    ALPRAZolam (XANAX) 0.25 MG tablet Take 1/2 tablet by mouth in the morning and 1 tablet at bedtime.  45 tablet 0    propafenone (RYTHMOL SR) 225 MG extended release capsule Take 1 capsule by mouth 2 times daily 180 capsule 3    metoprolol succinate (TOPROL XL) 25 MG extended release tablet TAKE 1 TABLET BY MOUTH 2 TIMES DAILY 180 tablet 3   

## 2018-02-05 ENCOUNTER — HOSPITAL ENCOUNTER (OUTPATIENT)
Age: 83
Setting detail: SPECIMEN
Discharge: HOME | End: 2018-02-05
Payer: MEDICARE

## 2018-02-05 ENCOUNTER — OFFICE VISIT (OUTPATIENT)
Dept: PRIMARY CARE CLINIC | Age: 83
End: 2018-02-05
Payer: MEDICARE

## 2018-02-05 VITALS
DIASTOLIC BLOOD PRESSURE: 76 MMHG | SYSTOLIC BLOOD PRESSURE: 120 MMHG | WEIGHT: 205 LBS | TEMPERATURE: 101.8 F | HEART RATE: 80 BPM | BODY MASS INDEX: 28.59 KG/M2 | OXYGEN SATURATION: 95 %

## 2018-02-05 DIAGNOSIS — R30.0 DYSURIA: ICD-10-CM

## 2018-02-05 DIAGNOSIS — J06.9 VIRAL UPPER RESPIRATORY TRACT INFECTION: ICD-10-CM

## 2018-02-05 DIAGNOSIS — J04.0 LARYNGITIS: ICD-10-CM

## 2018-02-05 DIAGNOSIS — N30.01 ACUTE CYSTITIS WITH HEMATURIA: Primary | ICD-10-CM

## 2018-02-05 LAB
-: ABNORMAL
AMORPHOUS: ABNORMAL
BACTERIA: ABNORMAL
BILIRUBIN URINE: NEGATIVE
CASTS UA: ABNORMAL /LPF (ref 0–2)
COLOR: ABNORMAL
COMMENT UA: ABNORMAL
CRYSTALS, UA: ABNORMAL /HPF
EPITHELIAL CELLS UA: ABNORMAL /HPF (ref 0–5)
GLUCOSE URINE: NEGATIVE
KETONES, URINE: ABNORMAL
LEUKOCYTE ESTERASE, URINE: NEGATIVE
MUCUS: ABNORMAL
NITRITE, URINE: NEGATIVE
OTHER OBSERVATIONS UA: ABNORMAL
PH UA: 5 (ref 5–6)
PROTEIN UA: ABNORMAL
RBC UA: ABNORMAL /HPF (ref 0–4)
RENAL EPITHELIAL, UA: ABNORMAL /HPF
S PYO AG THROAT QL: NORMAL
SPECIFIC GRAVITY UA: 1.03 (ref 1.01–1.02)
TRICHOMONAS: ABNORMAL
TURBIDITY: ABNORMAL
URINE HGB: ABNORMAL
UROBILINOGEN, URINE: NORMAL
WBC UA: ABNORMAL /HPF (ref 0–4)
YEAST: ABNORMAL

## 2018-02-05 PROCEDURE — 81001 URINALYSIS AUTO W/SCOPE: CPT

## 2018-02-05 PROCEDURE — 87880 STREP A ASSAY W/OPTIC: CPT | Performed by: NURSE PRACTITIONER

## 2018-02-05 PROCEDURE — 99213 OFFICE O/P EST LOW 20 MIN: CPT | Performed by: NURSE PRACTITIONER

## 2018-02-05 PROCEDURE — G8427 DOCREV CUR MEDS BY ELIG CLIN: HCPCS | Performed by: NURSE PRACTITIONER

## 2018-02-05 PROCEDURE — G8484 FLU IMMUNIZE NO ADMIN: HCPCS | Performed by: NURSE PRACTITIONER

## 2018-02-05 PROCEDURE — 87077 CULTURE AEROBIC IDENTIFY: CPT

## 2018-02-05 PROCEDURE — G8598 ASA/ANTIPLAT THER USED: HCPCS | Performed by: NURSE PRACTITIONER

## 2018-02-05 PROCEDURE — 4040F PNEUMOC VAC/ADMIN/RCVD: CPT | Performed by: NURSE PRACTITIONER

## 2018-02-05 PROCEDURE — 87186 SC STD MICRODIL/AGAR DIL: CPT

## 2018-02-05 PROCEDURE — G8417 CALC BMI ABV UP PARAM F/U: HCPCS | Performed by: NURSE PRACTITIONER

## 2018-02-05 PROCEDURE — 87086 URINE CULTURE/COLONY COUNT: CPT

## 2018-02-05 PROCEDURE — 1036F TOBACCO NON-USER: CPT | Performed by: NURSE PRACTITIONER

## 2018-02-05 PROCEDURE — 1123F ACP DISCUSS/DSCN MKR DOCD: CPT | Performed by: NURSE PRACTITIONER

## 2018-02-05 RX ORDER — PREDNISONE 20 MG/1
20 TABLET ORAL DAILY
Qty: 5 TABLET | Refills: 0 | Status: SHIPPED | OUTPATIENT
Start: 2018-02-05 | End: 2018-02-10

## 2018-02-05 RX ORDER — LEVOFLOXACIN 500 MG/1
500 TABLET, FILM COATED ORAL DAILY
Qty: 10 TABLET | Refills: 0 | Status: ON HOLD | OUTPATIENT
Start: 2018-02-05 | End: 2018-02-14 | Stop reason: HOSPADM

## 2018-02-05 ASSESSMENT — ENCOUNTER SYMPTOMS
NAUSEA: 0
SHORTNESS OF BREATH: 0
VOMITING: 0
COUGH: 1
VOICE CHANGE: 1
SORE THROAT: 0
DIARRHEA: 0
RHINORRHEA: 1
ABDOMINAL PAIN: 0
WHEEZING: 0

## 2018-02-05 NOTE — PATIENT INSTRUCTIONS
clear your throat. This can cause more irritation of your larynx. Take an over-the-counter cough suppressant (if your doctor recommends it) if you have a dry cough that does not produce mucus. · Do not smoke or allow others to smoke around you. If you need help quitting, talk to your doctor about stop-smoking programs and medicines. These can increase your chances of quitting for good. · Use a humidifier or vaporizer to add moisture to your bedroom. Humidity helps to thin the mucus in the nasal membranes that causes stuffiness or postnasal drip. Follow the directions for cleaning the machine. · Drink plenty of fluids, enough so that your urine is light yellow or clear like water. If you have kidney, heart, or liver disease and have to limit fluids, talk with your doctor before you increase the amount of fluids you drink. · Use saline (saltwater) nasal washes to help keep your nasal passages open and wash out mucus and bacteria. You can buy saline nose drops at a grocery store or drugstore. Or, you can make your own at home by mixing ½ teaspoon salt, 1 cup water (at room temperature), and ½ teaspoon baking soda. If you make your own, fill a bulb syringe with the solution, insert the tip into your nostril, and squeeze gently. [employer] your nose. When should you call for help? Call 911 anytime you think you may need emergency care. For example, call if:  ? · You have trouble breathing. ?Call your doctor now or seek immediate medical care if:  ? · You have new or worse pain. ? · You have trouble swallowing. ? Watch closely for changes in your health, and be sure to contact your doctor if:  ? · You do not get better as expected. Where can you learn more? Go to https://[employer]pe[employer]iceweb.[employer]. org and sign in to your [employer] account. Enter Z507 in the [employer] box to learn more about \"Laryngitis: Care Instructions. \"     If you do not have an account, please click on the \"Sign Up Now\" the bladder. · Urinate often. Try to empty your bladder each time. · To relieve pain, take a hot bath or lay a heating pad (set on low) over your lower belly or genital area. Never go to sleep with a heating pad in place. To help prevent UTIs  · Drink plenty of fluids, enough so that your urine is light yellow or clear like water. If you have kidney, heart, or liver disease and have to limit fluids, talk with your doctor before you increase the amount of fluids you drink. · Urinate when you have the urge. Do not hold your urine for a long time. Urinate before you go to sleep. · Keep your penis clean. Catheter care  If you have a drainage tube (catheter) in place, the following steps will help you care for it. · Always wash your hands before and after touching your catheter. · Check the area around the urethra for inflammation or signs of infection. Signs of infection include irritated, swollen, red, or tender skin, or pus around the catheter. · Clean the area around the catheter with soap and water two times a day. Dry with a clean towel afterward. · Do not apply powder or lotion to the skin around the catheter. To empty the urine collection bag  · Wash your hands with soap and water. · Without touching the drain spout, remove the spout from its sleeve at the bottom of the collection bag. Open the valve on the spout. · Let the urine flow out of the bag and into the toilet or a container. Do not let the tubing or drain spout touch anything. · After you empty the bag, clean the end of the drain spout with tissue and water. Close the valve and put the drain spout back into its sleeve at the bottom of the collection bag. · Wash your hands with soap and water. When should you call for help? Call your doctor now or seek immediate medical care if:  ? · Symptoms such as a fever, chills, nausea, or vomiting get worse or happen for the first time. ? · You have new pain in your back just below your rib cage.

## 2018-02-05 NOTE — PROGRESS NOTES
is oriented to person, place, and time. He appears well-developed and well-nourished. HENT:   Head: Normocephalic. Right Ear: Tympanic membrane normal.   Left Ear: Tympanic membrane normal.   Nose: Mucosal edema and rhinorrhea present. Right sinus exhibits no maxillary sinus tenderness and no frontal sinus tenderness. Left sinus exhibits no maxillary sinus tenderness and no frontal sinus tenderness. Mouth/Throat: Uvula is midline and mucous membranes are normal. Posterior oropharyngeal erythema present. Eyes: Pupils are equal, round, and reactive to light. Neck: Normal range of motion. Neck supple. Cardiovascular: Normal rate, regular rhythm and normal heart sounds. Pulmonary/Chest: Effort normal and breath sounds normal.   Lymphadenopathy:     He has no cervical adenopathy. Neurological: He is alert and oriented to person, place, and time. Skin: Skin is warm and dry. Psychiatric: He has a normal mood and affect. His speech is normal and behavior is normal. Thought content normal. Cognition and memory are normal. He expresses inappropriate judgment (Patient urinated on pants when giving sample. Patient verbalized that he is \"not feeling well\". ). Nursing note and vitals reviewed.     Assessment and Plan:  Hospital Outpatient Visit on 02/05/2018   Component Date Value Ref Range Status    Color, UA 02/05/2018 NOT REPORTED  YEL Final    Turbidity UA 02/05/2018 NOT REPORTED  CLEAR Final    Glucose, Ur 02/05/2018 NEGATIVE  NEG Final    Bilirubin Urine 02/05/2018 NEGATIVE  NEG Final    Ketones, Urine 02/05/2018 1+* NEG Final    Specific Gravity, UA 02/05/2018 1.030* 1.010 - 1.025 Final    Urine Hgb 02/05/2018 1+* NEG Final    pH, UA 02/05/2018 5.0  5.0 - 6.0 Final    Protein, UA 02/05/2018 2+* NEG Final    Urobilinogen, Urine 02/05/2018 Normal  NORM Final    Nitrite, Urine 02/05/2018 NEGATIVE  NEG Final    Leukocyte Esterase, Urine 02/05/2018 NEGATIVE  NEG Final    Comment: Performed at

## 2018-02-06 ENCOUNTER — TELEPHONE (OUTPATIENT)
Dept: PRIMARY CARE CLINIC | Age: 83
End: 2018-02-06

## 2018-02-06 NOTE — TELEPHONE ENCOUNTER
Instructed per AARON HOSKINS to call and check on patient following up with UC visit from yesterday. Called and spoke with patient who was still very hoarse on the phone. Per patient he feels \"ok\" he no longer has a fever. He said he picked up his medication and took first dose last night. I instructed him to take the second dose asap. He was educated on the importance of drinking lots of water. Patient states when he first stands his \"knees feel wobbly\" I continued to educate on the importance of drinking enough fluids and taking the antibiotic as prescribed until completion. Patient voiced understanding. Instructed to the patient that I would call back and check on him again later today.

## 2018-02-07 ENCOUNTER — TELEPHONE (OUTPATIENT)
Dept: INTERNAL MEDICINE | Age: 83
End: 2018-02-07

## 2018-02-07 LAB
CULTURE: ABNORMAL
Lab: ABNORMAL
ORGANISM: ABNORMAL
ORGANISM: ABNORMAL
SPECIMEN DESCRIPTION: ABNORMAL
SPECIMEN DESCRIPTION: ABNORMAL
STATUS: ABNORMAL

## 2018-02-09 ENCOUNTER — OFFICE VISIT (OUTPATIENT)
Dept: PRIMARY CARE CLINIC | Age: 83
End: 2018-02-09
Payer: MEDICARE

## 2018-02-09 VITALS
HEIGHT: 71 IN | HEART RATE: 75 BPM | BODY MASS INDEX: 28.17 KG/M2 | SYSTOLIC BLOOD PRESSURE: 122 MMHG | OXYGEN SATURATION: 98 % | RESPIRATION RATE: 16 BRPM | DIASTOLIC BLOOD PRESSURE: 70 MMHG | WEIGHT: 201.2 LBS | TEMPERATURE: 98.3 F

## 2018-02-09 DIAGNOSIS — J04.0 LARYNGITIS: Primary | ICD-10-CM

## 2018-02-09 PROCEDURE — G8598 ASA/ANTIPLAT THER USED: HCPCS | Performed by: NURSE PRACTITIONER

## 2018-02-09 PROCEDURE — 1036F TOBACCO NON-USER: CPT | Performed by: NURSE PRACTITIONER

## 2018-02-09 PROCEDURE — G8484 FLU IMMUNIZE NO ADMIN: HCPCS | Performed by: NURSE PRACTITIONER

## 2018-02-09 PROCEDURE — G8417 CALC BMI ABV UP PARAM F/U: HCPCS | Performed by: NURSE PRACTITIONER

## 2018-02-09 PROCEDURE — 99213 OFFICE O/P EST LOW 20 MIN: CPT | Performed by: NURSE PRACTITIONER

## 2018-02-09 PROCEDURE — G8427 DOCREV CUR MEDS BY ELIG CLIN: HCPCS | Performed by: NURSE PRACTITIONER

## 2018-02-09 PROCEDURE — 1123F ACP DISCUSS/DSCN MKR DOCD: CPT | Performed by: NURSE PRACTITIONER

## 2018-02-09 PROCEDURE — 4040F PNEUMOC VAC/ADMIN/RCVD: CPT | Performed by: NURSE PRACTITIONER

## 2018-02-09 RX ORDER — PREDNISONE 10 MG/1
10 TABLET ORAL 2 TIMES DAILY
Qty: 10 TABLET | Refills: 0 | Status: ON HOLD | OUTPATIENT
Start: 2018-02-09 | End: 2018-02-14 | Stop reason: HOSPADM

## 2018-02-09 ASSESSMENT — ENCOUNTER SYMPTOMS
SORE THROAT: 1
COUGH: 1

## 2018-02-09 NOTE — PATIENT INSTRUCTIONS
link.  Current as of: May 12, 2017  Content Version: 11.5  © 2907-5759 [employer]. Care instructions adapted under license by [facility] ([facility]). If you have questions about a medical condition or this instruction, always ask your healthcare professional. [address] any warranty or liability for your use of this information.

## 2018-02-09 NOTE — PROGRESS NOTES
Subjective:      Patient ID: Jeanna Moss is a 80 y.o. male. Pharyngitis   This is a new problem. The current episode started in the past 7 days. The problem occurs constantly. The problem has been unchanged. Associated symptoms include coughing and a sore throat. Treatments tried: Levaquin; Prednisone; Tylenol. Review of Systems   Constitutional: Negative. HENT: Positive for sore throat. Respiratory: Positive for cough. Cardiovascular: Negative. Musculoskeletal: Negative. Skin: Negative. Neurological: Negative. Objective:   Physical Exam   Constitutional: He appears well-developed and well-nourished. HENT:   Head: Normocephalic and atraumatic. Right Ear: Tympanic membrane, external ear and ear canal normal.   Left Ear: Tympanic membrane, external ear and ear canal normal.   Nose: Nose normal.   Mouth/Throat: Uvula is midline, oropharynx is clear and moist and mucous membranes are normal.   Eyes: Conjunctivae, EOM and lids are normal. Pupils are equal, round, and reactive to light. Neck: Trachea normal and normal range of motion. Neck supple. Cardiovascular: Normal rate, regular rhythm, normal heart sounds and normal pulses. Pulmonary/Chest: Effort normal and breath sounds normal.   Abdominal: Soft. Bowel sounds are normal.   Musculoskeletal: Normal range of motion. Skin: Skin is warm, dry and intact. Vitals reviewed. Vitals:    02/09/18 1217   BP: 122/70   Pulse: 75   Resp: 16   Temp: 98.3 °F (36.8 °C)   SpO2: 98%     I have reviewed pertinent family and social history. Assessment:      1. Laryngitis  predniSONE (DELTASONE) 10 MG tablet           Plan:      Recommend continuing Levaquin that he just started two days ago due to mix up with other prescribed Prednisone. Will add more Prednisone for patient to take BID instead of daily. Recommend Neti Pot and Flonase OTC.   May use OTC acetaminophen prn pain/fever or ibuprofen prn

## 2018-02-10 ENCOUNTER — HOSPITAL ENCOUNTER (EMERGENCY)
Age: 83
Discharge: HOME | End: 2018-02-10
Attending: EMERGENCY MEDICINE
Payer: MEDICARE

## 2018-02-10 VITALS
SYSTOLIC BLOOD PRESSURE: 159 MMHG | DIASTOLIC BLOOD PRESSURE: 89 MMHG | RESPIRATION RATE: 14 BRPM | OXYGEN SATURATION: 96 % | TEMPERATURE: 98.2 F | HEART RATE: 74 BPM

## 2018-02-10 DIAGNOSIS — I10 HYPERTENSION, UNSPECIFIED TYPE: Primary | ICD-10-CM

## 2018-02-10 LAB
ABSOLUTE EOS #: 0.1 K/UL (ref 0–0.4)
ABSOLUTE IMMATURE GRANULOCYTE: ABNORMAL K/UL (ref 0–0.3)
ABSOLUTE LYMPH #: 0.8 K/UL (ref 1–4.8)
ABSOLUTE MONO #: 0.5 K/UL (ref 0.1–1.2)
ANION GAP SERPL CALCULATED.3IONS-SCNC: 13 MMOL/L (ref 9–17)
BASOPHILS # BLD: 1 % (ref 0–2)
BASOPHILS ABSOLUTE: 0 K/UL (ref 0–0.2)
BUN BLDV-MCNC: 17 MG/DL (ref 8–23)
BUN/CREAT BLD: 19 (ref 9–20)
CALCIUM SERPL-MCNC: 8.6 MG/DL (ref 8.6–10.4)
CHLORIDE BLD-SCNC: 94 MMOL/L (ref 98–107)
CO2: 28 MMOL/L (ref 20–31)
CREAT SERPL-MCNC: 0.88 MG/DL (ref 0.7–1.2)
DIFFERENTIAL TYPE: ABNORMAL
EKG ATRIAL RATE: 78 BPM
EKG P AXIS: 73 DEGREES
EKG P-R INTERVAL: 302 MS
EKG Q-T INTERVAL: 376 MS
EKG QRS DURATION: 90 MS
EKG QTC CALCULATION (BAZETT): 428 MS
EKG R AXIS: -45 DEGREES
EKG T AXIS: 71 DEGREES
EKG VENTRICULAR RATE: 78 BPM
EOSINOPHILS RELATIVE PERCENT: 2 % (ref 1–8)
GFR AFRICAN AMERICAN: >60 ML/MIN
GFR NON-AFRICAN AMERICAN: >60 ML/MIN
GFR SERPL CREATININE-BSD FRML MDRD: ABNORMAL ML/MIN/{1.73_M2}
GFR SERPL CREATININE-BSD FRML MDRD: ABNORMAL ML/MIN/{1.73_M2}
GLUCOSE BLD-MCNC: 109 MG/DL (ref 70–99)
HCT VFR BLD CALC: 45.3 % (ref 41–53)
HEMOGLOBIN: 15.4 G/DL (ref 13.5–17.5)
IMMATURE GRANULOCYTES: ABNORMAL %
LYMPHOCYTES # BLD: 9 % (ref 15–43)
MCH RBC QN AUTO: 31.2 PG (ref 26–34)
MCHC RBC AUTO-ENTMCNC: 34 G/DL (ref 31–37)
MCV RBC AUTO: 92 FL (ref 80–100)
MONOCYTES # BLD: 6 % (ref 6–14)
NRBC AUTOMATED: ABNORMAL PER 100 WBC
PDW BLD-RTO: 14.6 % (ref 11–14.5)
PLATELET # BLD: 240 K/UL (ref 140–450)
PLATELET ESTIMATE: ABNORMAL
PMV BLD AUTO: 8.8 FL (ref 6–12)
POTASSIUM SERPL-SCNC: 4.2 MMOL/L (ref 3.7–5.3)
RBC # BLD: 4.93 M/UL (ref 4.5–5.9)
RBC # BLD: ABNORMAL 10*6/UL
SEG NEUTROPHILS: 82 % (ref 44–74)
SEGMENTED NEUTROPHILS ABSOLUTE COUNT: 6.7 K/UL (ref 1.8–7.7)
SODIUM BLD-SCNC: 135 MMOL/L (ref 135–144)
WBC # BLD: 8.1 K/UL (ref 3.5–11)
WBC # BLD: ABNORMAL 10*3/UL

## 2018-02-10 PROCEDURE — 93005 ELECTROCARDIOGRAM TRACING: CPT

## 2018-02-10 PROCEDURE — 6370000000 HC RX 637 (ALT 250 FOR IP): Performed by: EMERGENCY MEDICINE

## 2018-02-10 PROCEDURE — 85025 COMPLETE CBC W/AUTO DIFF WBC: CPT

## 2018-02-10 PROCEDURE — 99283 EMERGENCY DEPT VISIT LOW MDM: CPT

## 2018-02-10 PROCEDURE — 36415 COLL VENOUS BLD VENIPUNCTURE: CPT

## 2018-02-10 PROCEDURE — 80048 BASIC METABOLIC PNL TOTAL CA: CPT

## 2018-02-10 RX ORDER — METOPROLOL TARTRATE 50 MG/1
50 TABLET, FILM COATED ORAL ONCE
Status: COMPLETED | OUTPATIENT
Start: 2018-02-10 | End: 2018-02-10

## 2018-02-10 RX ORDER — CLONIDINE HYDROCHLORIDE 0.1 MG/1
0.1 TABLET ORAL 2 TIMES DAILY
Qty: 30 TABLET | Refills: 0 | Status: ON HOLD | OUTPATIENT
Start: 2018-02-10 | End: 2018-02-14 | Stop reason: HOSPADM

## 2018-02-10 RX ORDER — OSELTAMIVIR PHOSPHATE 6 MG/ML
45 FOR SUSPENSION ORAL ONCE
Status: DISCONTINUED | OUTPATIENT
Start: 2018-02-10 | End: 2018-02-10 | Stop reason: HOSPADM

## 2018-02-10 RX ADMIN — METOPROLOL TARTRATE 50 MG: 50 TABLET ORAL at 18:56

## 2018-02-10 NOTE — ED PROVIDER NOTES
mouth every 6 hours as needed for Pain      Coenzyme Q-10 100 MG CAPS Take 100 mg by mouth every other day. tolnaftate (TINACTIN) 1 % external solution Apply topically nightly to toenails per Dr. Soledad Davis., Historical Med      aspirin 81 MG tablet Take 81 mg by mouth daily. GLUCOSAMINE-CHONDROITIN PO Take 3 tablets by mouth Daily. !! - Potential duplicate medications found. Please discuss with provider. ALLERGIES     is allergic to pcn [penicillins]; sulfa antibiotics; and cefdinir. FAMILY HISTORY     indicated that his mother is . He indicated that his father is . He indicated that the status of his sister is unknown.      family history includes Diabetes in his mother; Osteoporosis in his mother; Other in his father; Stroke in his sister. SOCIAL HISTORY      reports that he has never smoked. He has never used smokeless tobacco. He reports that he does not drink alcohol or use drugs. PHYSICAL EXAM     INITIAL VITALS:  tympanic temperature is 98.2 °F (36.8 °C). His blood pressure is 159/89 (abnormal) and his pulse is 74. His respiration is 14 and oxygen saturation is 96%. Constitutional: The patient is alert and well-developed, vital signs as noted. Psychiatric: Oriented to time, place and person, affect is appropriate for age. Eyes: Pupils equal and reactive to light. EOMI. Ears, nose, and throat: Oropharynx clearAirway is patent and there is no trismus present  Neck: No masses, trachea midline, and no thyromegaly. Respiratory: Clear to auscultation, full aeration throughout all fields. Cardiovascular: No murmurs, heart sounds normal, no thrills. Gastrointestinal: No masses, no hepatosplenomegaly, bowel sounds positive. No tenderness. Skin: No rashes or lesions on exposed surfaces, good skin turgor. No evidence of endorgan damage at this time  Extremities: Good range of motion, no edema.       DIFFERENTIAL DIAGNOSIS/ MEDICAL DECISION MAKING: [address]  [phone]    In 2 days        DISCHARGE MEDICATIONS:  Discharge Medication List as of 2/10/2018  7:31 PM      START taking these medications    Details   !! cloNIDine (CATAPRES) 0.1 MG tablet Take 1 tablet by mouth 2 times daily, Disp-30 tablet, R-0Print       !! - Potential duplicate medications found. Please discuss with provider.           (Please note that portions of this note were completed with a voice recognition program.  Efforts were made to edit the dictations but occasionally words are mis-transcribed.)    [place] DO  Attending Emergency Physician       Torrey Gold DO  02/11/18 1043

## 2018-02-12 ENCOUNTER — HOSPITAL ENCOUNTER (INPATIENT)
Age: 83
LOS: 2 days | Discharge: HOME | DRG: 193 | End: 2018-02-14
Attending: EMERGENCY MEDICINE | Admitting: INTERNAL MEDICINE
Payer: MEDICARE

## 2018-02-12 ENCOUNTER — HOSPITAL ENCOUNTER (OUTPATIENT)
Age: 83
Setting detail: SPECIMEN
Discharge: HOME | DRG: 193 | End: 2018-02-12
Payer: MEDICARE

## 2018-02-12 ENCOUNTER — APPOINTMENT (OUTPATIENT)
Dept: GENERAL RADIOLOGY | Age: 83
DRG: 193 | End: 2018-02-12
Payer: MEDICARE

## 2018-02-12 ENCOUNTER — OFFICE VISIT (OUTPATIENT)
Dept: PRIMARY CARE CLINIC | Age: 83
End: 2018-02-12

## 2018-02-12 VITALS
HEART RATE: 70 BPM | SYSTOLIC BLOOD PRESSURE: 90 MMHG | OXYGEN SATURATION: 95 % | TEMPERATURE: 98.4 F | WEIGHT: 198.2 LBS | HEIGHT: 71 IN | DIASTOLIC BLOOD PRESSURE: 68 MMHG | RESPIRATION RATE: 16 BRPM | BODY MASS INDEX: 27.75 KG/M2

## 2018-02-12 DIAGNOSIS — J18.9 PNEUMONIA DUE TO ORGANISM: Primary | ICD-10-CM

## 2018-02-12 DIAGNOSIS — I48.91 ATRIAL FIBRILLATION, UNSPECIFIED TYPE (HCC): Primary | ICD-10-CM

## 2018-02-12 DIAGNOSIS — R31.9 HEMATURIA, UNSPECIFIED TYPE: ICD-10-CM

## 2018-02-12 DIAGNOSIS — I48.0 PAROXYSMAL ATRIAL FIBRILLATION (HCC): ICD-10-CM

## 2018-02-12 DIAGNOSIS — I49.9 IRREGULAR HEARTBEAT: ICD-10-CM

## 2018-02-12 LAB
-: ABNORMAL
ABSOLUTE EOS #: 0.26 K/UL (ref 0–0.4)
ABSOLUTE IMMATURE GRANULOCYTE: ABNORMAL K/UL (ref 0–0.3)
ABSOLUTE LYMPH #: 0.96 K/UL (ref 1–4.8)
ABSOLUTE MONO #: 0.61 K/UL (ref 0.1–1.2)
AMORPHOUS: ABNORMAL
ANION GAP SERPL CALCULATED.3IONS-SCNC: 15 MMOL/L (ref 9–17)
BACTERIA: ABNORMAL
BASOPHILS # BLD: 1 % (ref 0–2)
BASOPHILS ABSOLUTE: 0.09 K/UL (ref 0–0.2)
BILIRUBIN URINE: NEGATIVE
BNP INTERPRETATION: ABNORMAL
BUN BLDV-MCNC: 14 MG/DL (ref 8–23)
BUN/CREAT BLD: 15 (ref 9–20)
CALCIUM SERPL-MCNC: 8.6 MG/DL (ref 8.6–10.4)
CASTS UA: ABNORMAL /LPF (ref 0–2)
CHLORIDE BLD-SCNC: 94 MMOL/L (ref 98–107)
CK MB: 4.6 NG/ML
CO2: 25 MMOL/L (ref 20–31)
COLOR: ABNORMAL
COMMENT UA: ABNORMAL
CREAT SERPL-MCNC: 0.96 MG/DL (ref 0.7–1.2)
CRYSTALS, UA: ABNORMAL /HPF
DIFFERENTIAL TYPE: ABNORMAL
DIRECT EXAM: NORMAL
EKG ATRIAL RATE: 117 BPM
EKG Q-T INTERVAL: 298 MS
EKG QRS DURATION: 92 MS
EKG QTC CALCULATION (BAZETT): 384 MS
EKG R AXIS: -49 DEGREES
EKG T AXIS: 84 DEGREES
EKG VENTRICULAR RATE: 100 BPM
EOSINOPHILS RELATIVE PERCENT: 3 % (ref 1–8)
EPITHELIAL CELLS UA: ABNORMAL /HPF (ref 0–5)
GFR AFRICAN AMERICAN: >60 ML/MIN
GFR NON-AFRICAN AMERICAN: >60 ML/MIN
GFR SERPL CREATININE-BSD FRML MDRD: ABNORMAL ML/MIN/{1.73_M2}
GFR SERPL CREATININE-BSD FRML MDRD: ABNORMAL ML/MIN/{1.73_M2}
GLUCOSE BLD-MCNC: 94 MG/DL (ref 70–99)
GLUCOSE URINE: NEGATIVE
HCT VFR BLD CALC: 46.4 % (ref 41–53)
HEMOGLOBIN: 15.6 G/DL (ref 13.5–17.5)
IMMATURE GRANULOCYTES: ABNORMAL %
INR BLD: 1.1
KETONES, URINE: NEGATIVE
LEUKOCYTE ESTERASE, URINE: ABNORMAL
LYMPHOCYTES # BLD: 11 % (ref 15–43)
Lab: NORMAL
MCH RBC QN AUTO: 31.4 PG (ref 26–34)
MCHC RBC AUTO-ENTMCNC: 33.7 G/DL (ref 31–37)
MCV RBC AUTO: 93.1 FL (ref 80–100)
MONOCYTES # BLD: 7 % (ref 6–14)
MORPHOLOGY: ABNORMAL
MUCUS: ABNORMAL
MYOGLOBIN: 81 NG/ML (ref 28–72)
NITRITE, URINE: NEGATIVE
NRBC AUTOMATED: ABNORMAL PER 100 WBC
OTHER OBSERVATIONS UA: ABNORMAL
PDW BLD-RTO: 13.8 % (ref 11–14.5)
PH UA: 5 (ref 5–6)
PLATELET # BLD: 297 K/UL (ref 140–450)
PLATELET ESTIMATE: ABNORMAL
PMV BLD AUTO: 9.1 FL (ref 6–12)
POTASSIUM SERPL-SCNC: 4.5 MMOL/L (ref 3.7–5.3)
PRO-BNP: 2833 PG/ML
PROTEIN UA: ABNORMAL
PROTHROMBIN TIME: 12 SEC (ref 9.4–11.3)
RBC # BLD: 4.98 M/UL (ref 4.5–5.9)
RBC # BLD: ABNORMAL 10*6/UL
RBC UA: >100 /HPF (ref 0–4)
RENAL EPITHELIAL, UA: ABNORMAL /HPF
SEG NEUTROPHILS: 78 % (ref 44–74)
SEGMENTED NEUTROPHILS ABSOLUTE COUNT: 6.78 K/UL (ref 1.8–7.7)
SODIUM BLD-SCNC: 134 MMOL/L (ref 135–144)
SPECIFIC GRAVITY UA: 1.02 (ref 1.01–1.02)
SPECIMEN DESCRIPTION: NORMAL
STATUS: NORMAL
TOTAL CK: 110 U/L (ref 39–308)
TRICHOMONAS: ABNORMAL
TROPONIN INTERP: NORMAL
TROPONIN INTERP: NORMAL
TROPONIN T: <0.03 NG/ML
TROPONIN T: <0.03 NG/ML
TURBIDITY: ABNORMAL
URINE HGB: ABNORMAL
UROBILINOGEN, URINE: NORMAL
WBC # BLD: 8.7 K/UL (ref 3.5–11)
WBC # BLD: ABNORMAL 10*3/UL
WBC UA: ABNORMAL /HPF (ref 0–4)
YEAST: ABNORMAL

## 2018-02-12 PROCEDURE — 85610 PROTHROMBIN TIME: CPT

## 2018-02-12 PROCEDURE — 93306 TTE W/DOPPLER COMPLETE: CPT

## 2018-02-12 PROCEDURE — 93005 ELECTROCARDIOGRAM TRACING: CPT

## 2018-02-12 PROCEDURE — 6360000002 HC RX W HCPCS: Performed by: INTERNAL MEDICINE

## 2018-02-12 PROCEDURE — 6370000000 HC RX 637 (ALT 250 FOR IP): Performed by: EMERGENCY MEDICINE

## 2018-02-12 PROCEDURE — 6360000002 HC RX W HCPCS: Performed by: EMERGENCY MEDICINE

## 2018-02-12 PROCEDURE — 83880 ASSAY OF NATRIURETIC PEPTIDE: CPT

## 2018-02-12 PROCEDURE — 82550 ASSAY OF CK (CPK): CPT

## 2018-02-12 PROCEDURE — 84484 ASSAY OF TROPONIN QUANT: CPT

## 2018-02-12 PROCEDURE — 36415 COLL VENOUS BLD VENIPUNCTURE: CPT

## 2018-02-12 PROCEDURE — 2060000000 HC ICU INTERMEDIATE R&B

## 2018-02-12 PROCEDURE — 6370000000 HC RX 637 (ALT 250 FOR IP)

## 2018-02-12 PROCEDURE — 97165 OT EVAL LOW COMPLEX 30 MIN: CPT | Performed by: OCCUPATIONAL THERAPIST

## 2018-02-12 PROCEDURE — 87040 BLOOD CULTURE FOR BACTERIA: CPT

## 2018-02-12 PROCEDURE — 6370000000 HC RX 637 (ALT 250 FOR IP): Performed by: INTERNAL MEDICINE

## 2018-02-12 PROCEDURE — 99285 EMERGENCY DEPT VISIT HI MDM: CPT

## 2018-02-12 PROCEDURE — 71045 X-RAY EXAM CHEST 1 VIEW: CPT

## 2018-02-12 PROCEDURE — G8987 SELF CARE CURRENT STATUS: HCPCS | Performed by: OCCUPATIONAL THERAPIST

## 2018-02-12 PROCEDURE — 87804 INFLUENZA ASSAY W/OPTIC: CPT

## 2018-02-12 PROCEDURE — 80048 BASIC METABOLIC PNL TOTAL CA: CPT

## 2018-02-12 PROCEDURE — 85025 COMPLETE CBC W/AUTO DIFF WBC: CPT

## 2018-02-12 PROCEDURE — G8989 SELF CARE D/C STATUS: HCPCS | Performed by: OCCUPATIONAL THERAPIST

## 2018-02-12 PROCEDURE — 82947 ASSAY GLUCOSE BLOOD QUANT: CPT

## 2018-02-12 PROCEDURE — G8988 SELF CARE GOAL STATUS: HCPCS | Performed by: OCCUPATIONAL THERAPIST

## 2018-02-12 PROCEDURE — 2580000003 HC RX 258: Performed by: INTERNAL MEDICINE

## 2018-02-12 PROCEDURE — 6360000002 HC RX W HCPCS

## 2018-02-12 PROCEDURE — 99223 1ST HOSP IP/OBS HIGH 75: CPT | Performed by: INTERNAL MEDICINE

## 2018-02-12 PROCEDURE — 82553 CREATINE MB FRACTION: CPT

## 2018-02-12 PROCEDURE — 83874 ASSAY OF MYOGLOBIN: CPT

## 2018-02-12 PROCEDURE — 96374 THER/PROPH/DIAG INJ IV PUSH: CPT

## 2018-02-12 PROCEDURE — 81001 URINALYSIS AUTO W/SCOPE: CPT

## 2018-02-12 RX ORDER — SODIUM CHLORIDE 0.9 % (FLUSH) 0.9 %
10 SYRINGE (ML) INJECTION EVERY 12 HOURS SCHEDULED
Status: DISCONTINUED | OUTPATIENT
Start: 2018-02-12 | End: 2018-02-14 | Stop reason: HOSPADM

## 2018-02-12 RX ORDER — LEVOFLOXACIN 5 MG/ML
750 INJECTION, SOLUTION INTRAVENOUS ONCE
Status: COMPLETED | OUTPATIENT
Start: 2018-02-12 | End: 2018-02-12

## 2018-02-12 RX ORDER — ACETAMINOPHEN 325 MG/1
650 TABLET ORAL EVERY 4 HOURS PRN
Status: DISCONTINUED | OUTPATIENT
Start: 2018-02-12 | End: 2018-02-14 | Stop reason: HOSPADM

## 2018-02-12 RX ORDER — LEVALBUTEROL 1.25 MG/.5ML
SOLUTION, CONCENTRATE RESPIRATORY (INHALATION)
Status: COMPLETED
Start: 2018-02-12 | End: 2018-02-12

## 2018-02-12 RX ORDER — ALBUTEROL SULFATE 2.5 MG/3ML
2.5 SOLUTION RESPIRATORY (INHALATION)
Status: DISCONTINUED | OUTPATIENT
Start: 2018-02-12 | End: 2018-02-12 | Stop reason: SDUPTHER

## 2018-02-12 RX ORDER — PROPAFENONE HYDROCHLORIDE 150 MG/1
150 TABLET, FILM COATED ORAL EVERY 8 HOURS SCHEDULED
Status: DISCONTINUED | OUTPATIENT
Start: 2018-02-12 | End: 2018-02-13

## 2018-02-12 RX ORDER — LISINOPRIL 5 MG/1
10 TABLET ORAL NIGHTLY
Status: DISCONTINUED | OUTPATIENT
Start: 2018-02-12 | End: 2018-02-14 | Stop reason: HOSPADM

## 2018-02-12 RX ORDER — ASPIRIN 81 MG/1
81 TABLET ORAL DAILY
Status: DISCONTINUED | OUTPATIENT
Start: 2018-02-12 | End: 2018-02-14 | Stop reason: HOSPADM

## 2018-02-12 RX ORDER — SODIUM CHLORIDE 0.9 % (FLUSH) 0.9 %
10 SYRINGE (ML) INJECTION PRN
Status: DISCONTINUED | OUTPATIENT
Start: 2018-02-12 | End: 2018-02-14 | Stop reason: HOSPADM

## 2018-02-12 RX ORDER — SODIUM CHLORIDE FOR INHALATION 0.9 %
3 VIAL, NEBULIZER (ML) INHALATION
Status: DISCONTINUED | OUTPATIENT
Start: 2018-02-12 | End: 2018-02-14 | Stop reason: HOSPADM

## 2018-02-12 RX ORDER — MULTIVITAMIN WITH FOLIC ACID 400 MCG
1 TABLET ORAL DAILY
Status: DISCONTINUED | OUTPATIENT
Start: 2018-02-12 | End: 2018-02-14 | Stop reason: HOSPADM

## 2018-02-12 RX ORDER — LEVOFLOXACIN 5 MG/ML
750 INJECTION, SOLUTION INTRAVENOUS EVERY 24 HOURS
Status: DISCONTINUED | OUTPATIENT
Start: 2018-02-13 | End: 2018-02-14 | Stop reason: HOSPADM

## 2018-02-12 RX ORDER — PROPAFENONE HYDROCHLORIDE 225 MG/1
225 CAPSULE, EXTENDED RELEASE ORAL 2 TIMES DAILY
Status: DISCONTINUED | OUTPATIENT
Start: 2018-02-12 | End: 2018-02-12 | Stop reason: CLARIF

## 2018-02-12 RX ORDER — METOPROLOL SUCCINATE 25 MG/1
25 TABLET, EXTENDED RELEASE ORAL 2 TIMES DAILY
Status: DISCONTINUED | OUTPATIENT
Start: 2018-02-12 | End: 2018-02-14 | Stop reason: HOSPADM

## 2018-02-12 RX ORDER — ALPRAZOLAM 0.25 MG/1
0.25 TABLET ORAL NIGHTLY
Status: DISCONTINUED | OUTPATIENT
Start: 2018-02-12 | End: 2018-02-14 | Stop reason: HOSPADM

## 2018-02-12 RX ORDER — CHOLECALCIFEROL (VITAMIN D3) 125 MCG
100 CAPSULE ORAL EVERY OTHER DAY
Status: DISCONTINUED | OUTPATIENT
Start: 2018-02-12 | End: 2018-02-12 | Stop reason: RX

## 2018-02-12 RX ORDER — SIMVASTATIN 20 MG
20 TABLET ORAL NIGHTLY
Status: DISCONTINUED | OUTPATIENT
Start: 2018-02-12 | End: 2018-02-14 | Stop reason: HOSPADM

## 2018-02-12 RX ORDER — ASPIRIN 81 MG/1
81 TABLET, CHEWABLE ORAL ONCE
Status: COMPLETED | OUTPATIENT
Start: 2018-02-12 | End: 2018-02-12

## 2018-02-12 RX ORDER — FLUTICASONE PROPIONATE 50 MCG
1 SPRAY, SUSPENSION (ML) NASAL DAILY PRN
Status: DISCONTINUED | OUTPATIENT
Start: 2018-02-12 | End: 2018-02-14 | Stop reason: HOSPADM

## 2018-02-12 RX ORDER — FUROSEMIDE 10 MG/ML
20 INJECTION INTRAMUSCULAR; INTRAVENOUS ONCE
Status: COMPLETED | OUTPATIENT
Start: 2018-02-12 | End: 2018-02-12

## 2018-02-12 RX ORDER — LEVALBUTEROL 1.25 MG/.5ML
1.25 SOLUTION, CONCENTRATE RESPIRATORY (INHALATION) EVERY 4 HOURS
Status: DISCONTINUED | OUTPATIENT
Start: 2018-02-12 | End: 2018-02-14 | Stop reason: HOSPADM

## 2018-02-12 RX ORDER — ALBUTEROL SULFATE 2.5 MG/3ML
2.5 SOLUTION RESPIRATORY (INHALATION)
Status: DISCONTINUED | OUTPATIENT
Start: 2018-02-12 | End: 2018-02-14 | Stop reason: HOSPADM

## 2018-02-12 RX ORDER — ALPRAZOLAM 0.25 MG/1
0.12 TABLET ORAL EVERY MORNING
Status: DISCONTINUED | OUTPATIENT
Start: 2018-02-13 | End: 2018-02-14 | Stop reason: HOSPADM

## 2018-02-12 RX ORDER — SODIUM CHLORIDE FOR INHALATION 0.9 %
VIAL, NEBULIZER (ML) INHALATION
Status: COMPLETED
Start: 2018-02-12 | End: 2018-02-12

## 2018-02-12 RX ADMIN — FUROSEMIDE 20 MG: 10 INJECTION, SOLUTION INTRAMUSCULAR; INTRAVENOUS at 11:14

## 2018-02-12 RX ADMIN — Medication 10 ML: at 20:48

## 2018-02-12 RX ADMIN — ALPRAZOLAM 0.25 MG: 0.25 TABLET ORAL at 21:08

## 2018-02-12 RX ADMIN — LEVALBUTEROL 1.25 MG: 1.25 SOLUTION, CONCENTRATE RESPIRATORY (INHALATION) at 16:55

## 2018-02-12 RX ADMIN — LEVALBUTEROL 1.25 MG: 1.25 SOLUTION, CONCENTRATE RESPIRATORY (INHALATION) at 23:50

## 2018-02-12 RX ADMIN — LEVOFLOXACIN 750 MG: 5 INJECTION, SOLUTION INTRAVENOUS at 11:46

## 2018-02-12 RX ADMIN — SIMVASTATIN 20 MG: 20 TABLET, FILM COATED ORAL at 21:07

## 2018-02-12 RX ADMIN — ENOXAPARIN SODIUM 90 MG: 100 INJECTION SUBCUTANEOUS at 13:21

## 2018-02-12 RX ADMIN — ISODIUM CHLORIDE 3 ML: 0.03 SOLUTION RESPIRATORY (INHALATION) at 16:50

## 2018-02-12 RX ADMIN — ENOXAPARIN SODIUM 90 MG: 100 INJECTION SUBCUTANEOUS at 21:04

## 2018-02-12 RX ADMIN — LEVALBUTEROL 1.25 MG: 1.25 SOLUTION, CONCENTRATE RESPIRATORY (INHALATION) at 20:21

## 2018-02-12 RX ADMIN — Medication 10 ML: at 13:21

## 2018-02-12 RX ADMIN — ASPIRIN 81 MG 81 MG: 81 TABLET ORAL at 09:58

## 2018-02-12 RX ADMIN — METOPROLOL SUCCINATE 25 MG: 25 TABLET, EXTENDED RELEASE ORAL at 21:07

## 2018-02-12 RX ADMIN — LISINOPRIL 10 MG: 5 TABLET ORAL at 21:07

## 2018-02-12 RX ADMIN — PROPAFENONE HYDROCHLORIDE 150 MG: 150 TABLET, FILM COATED ORAL at 18:59

## 2018-02-12 ASSESSMENT — PAIN DESCRIPTION - LOCATION: LOCATION: BACK

## 2018-02-12 ASSESSMENT — PAIN SCALES - GENERAL
PAINLEVEL_OUTOF10: 3
PAINLEVEL_OUTOF10: 0

## 2018-02-12 ASSESSMENT — ENCOUNTER SYMPTOMS
RESPIRATORY NEGATIVE: 1
GASTROINTESTINAL NEGATIVE: 1
EYES NEGATIVE: 1

## 2018-02-12 ASSESSMENT — PAIN DESCRIPTION - DESCRIPTORS: DESCRIPTORS: ACHING

## 2018-02-12 ASSESSMENT — PAIN DESCRIPTION - PAIN TYPE: TYPE: ACUTE PAIN

## 2018-02-12 ASSESSMENT — PAIN DESCRIPTION - ORIENTATION: ORIENTATION: LOWER

## 2018-02-12 NOTE — PROGRESS NOTES
Primary  Laundry Responsibility: Primary  Cleaning Responsibility: Primary  Shopping Responsibility: Primary  Ambulation Assistance: Independent  Transfer Assistance: Independent  Active : Yes  Occupation: Retired     Objective   Vision: Within Functional Limits  Hearing: Within functional limits    Orientation  Overall Orientation Status: Within Normal Limits  Standing Balance  Sit to stand: Modified independent  Stand to sit: Modified independent  ADL  LE Dressing: Independent  Toileting: Independent  Transfers  Sit to stand: Modified independent  Stand to sit: Modified independent     Cognition  Overall Cognitive Status: WNL  LUE AROM (degrees)  LUE AROM : WNL  Left Hand AROM (degrees)  Left Hand AROM: WNL  RUE AROM (degrees)  RUE AROM : WNL  Right Hand AROM (degrees)  Right Hand AROM: WNL  LUE Strength  Gross LUE Strength:  WNL  RUE Strength  Gross RUE Strength: WNL    Assessment   Performance deficits / Impairments: Decreased endurance;Decreased high-level IADLs  Decision Making: Low Complexity  Discharge Recommendations: Home with assist PRN  No Skilled OT: Independent with ADL's  Safety Devices  Safety Devices in place: Yes  Type of devices: Left in chair;Call light within reach        Discharge Recommendations:  Home with assist PRN     Plan   Plan  Times per week: acute care OT services not required    G-Code  OT G-codes  Functional Limitation: Self care  Self Care Current Status (): 0 percent impaired, limited or restricted  Self Care Goal Status (): 0 percent impaired, limited or restricted  Self Care Discharge Status (): 0 percent impaired, limited or restricted  OutComes Score    AM-PAC Score    Goals  Short term goals  Time Frame for Short term goals: n/a eval only     Therapy Time   Individual Concurrent Group Co-treatment   Time In 1552         Time Out 1600         Minutes 8         Timed Code Treatment Minutes: 0 Minutes       ROSA LU OTR, OT

## 2018-02-12 NOTE — PROGRESS NOTES
Patient taken to ED for further evaluation after EKG showed Afib. Patient was symptomatic with obvious diaphoresis, fatigue, irregular heart beat, abnormal EKG, and overall disheveled. Spoke with TK who ordered patient to ED. Patient taken to ED via wheelchair after agreement to plan.  Care of patient taken over at that time by ER personnel

## 2018-02-12 NOTE — H&P
(paroxysmal atrial fibrillation) (HCC)    PSVT (paroxysmal supraventricular tachycardia) (HCC)    Dermatophytosis of nail    Coronary artery disease involving native coronary artery of native heart    Atrial fibrillation (HCC)    Pneumonia due to organism   [facility]    IM  83 WM  [ANGELICA Clemente; MS Cardiology---[facility]]  FULL CODE     LOVENOX       RYTHMOL    Anti-infectives: Rocephin IV, Zithromax IV    Atrial fibrillation---RVR--2.12.2018----paroxysmal---recurrent        2D ECHO----2.12.2018---RVSP ~ 48 mm Hg---LVEF ~ 50%        EKG---2.12.2018---atrial fibrillation---104---LAHB---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  RML pneumonia---2.12.2018  UTI---POA---2.12.2018  Elevated BNP---2.12.2018    ASCVD         CABG---2000         MI---history   Arrhythmiahistory         PSVT         PAF  Hypertension  Hyperlipidemia  Overweight  Anxiety    PMH:   diverticulosis, allergic rhinitis, bronchitis, choroidal nevus right               eye, elevated PSA, measles, mumps, OA, hemorrhoids,               Hemocult-[+]stoolrefused colonoscopy, palpitations,              right cataract  PSH:   appendectomy3509-1656, tonsillectomy, left eye laser             detached retina---2011, left cataractIOL---2013, skin tag             MAKUOECH4728, multiple abscess drainages---3294-3897    Allergies:  Penicillin---swelling, sulfa, Cefdinir    Code Status: FULL CODE  OARRS---2.12.2018--[-]  PLAN:    1. Pneumonia--IV antibiotics---med nebs--O2  As needed  2. Atrial fibrllation----continued Rythmol for now----Cardiology to see--2D ECHO  3.  ASCVD---MI history-elevated BNP---see above---diuretics   4. UTI---POA---IV antibiotics--pending cultures  5. Home medications reviewed  6.   See orders     Note that over 50 minutes was spent in evaluation of the patient, review of the chart and pertinent records, discussion with family/staff, etc    Jessica Hernandez MD  3:49 PM  2/12/2018

## 2018-02-12 NOTE — PLAN OF CARE
Problem: Falls - Risk of  Goal: Absence of falls  Outcome: Ongoing      Problem: Safety:  Goal: Free from accidental physical injury  Free from accidental physical injury  Outcome: Ongoing    Goal: Free from intentional harm  Free from intentional harm  Outcome: Ongoing      Problem: Daily Care:  Goal: Daily care needs are met  Daily care needs are met  Outcome: Ongoing      Problem: Pain:  Goal: Patient's pain/discomfort is manageable  Patient's pain/discomfort is manageable  Outcome: Ongoing      Problem: Skin Integrity:  Goal: Skin integrity will stabilize  Skin integrity will stabilize  Outcome: Ongoing      Problem: Discharge Planning:  Goal: Patients continuum of care needs are met  Patients continuum of care needs are met  Outcome: Ongoing

## 2018-02-13 ENCOUNTER — APPOINTMENT (OUTPATIENT)
Dept: GENERAL RADIOLOGY | Age: 83
DRG: 193 | End: 2018-02-13
Payer: MEDICARE

## 2018-02-13 LAB
ABSOLUTE EOS #: 0.1 K/UL (ref 0–0.4)
ABSOLUTE IMMATURE GRANULOCYTE: ABNORMAL K/UL (ref 0–0.3)
ABSOLUTE LYMPH #: 1.1 K/UL (ref 1–4.8)
ABSOLUTE MONO #: 0.7 K/UL (ref 0.1–1.2)
ANION GAP SERPL CALCULATED.3IONS-SCNC: 17 MMOL/L (ref 9–17)
BASOPHILS # BLD: 0 % (ref 0–2)
BASOPHILS ABSOLUTE: 0 K/UL (ref 0–0.2)
BUN BLDV-MCNC: 12 MG/DL (ref 8–23)
BUN/CREAT BLD: 14 (ref 9–20)
CALCIUM SERPL-MCNC: 8.7 MG/DL (ref 8.6–10.4)
CHLORIDE BLD-SCNC: 93 MMOL/L (ref 98–107)
CO2: 26 MMOL/L (ref 20–31)
CREAT SERPL-MCNC: 0.87 MG/DL (ref 0.7–1.2)
DIFFERENTIAL TYPE: ABNORMAL
EOSINOPHILS RELATIVE PERCENT: 1 % (ref 1–8)
GFR AFRICAN AMERICAN: >60 ML/MIN
GFR NON-AFRICAN AMERICAN: >60 ML/MIN
GFR SERPL CREATININE-BSD FRML MDRD: ABNORMAL ML/MIN/{1.73_M2}
GFR SERPL CREATININE-BSD FRML MDRD: ABNORMAL ML/MIN/{1.73_M2}
GLUCOSE BLD-MCNC: 107 MG/DL (ref 75–110)
GLUCOSE BLD-MCNC: 111 MG/DL (ref 70–99)
HCT VFR BLD CALC: 52.2 % (ref 41–53)
HEMOGLOBIN: 17.4 G/DL (ref 13.5–17.5)
IMMATURE GRANULOCYTES: ABNORMAL %
LYMPHOCYTES # BLD: 11 % (ref 15–43)
MCH RBC QN AUTO: 30.8 PG (ref 26–34)
MCHC RBC AUTO-ENTMCNC: 33.3 G/DL (ref 31–37)
MCV RBC AUTO: 92.3 FL (ref 80–100)
MONOCYTES # BLD: 8 % (ref 6–14)
NRBC AUTOMATED: ABNORMAL PER 100 WBC
PDW BLD-RTO: 14.3 % (ref 11–14.5)
PLATELET # BLD: 296 K/UL (ref 140–450)
PLATELET ESTIMATE: ABNORMAL
PMV BLD AUTO: 8.9 FL (ref 6–12)
POTASSIUM SERPL-SCNC: 4 MMOL/L (ref 3.7–5.3)
RBC # BLD: 5.65 M/UL (ref 4.5–5.9)
RBC # BLD: ABNORMAL 10*6/UL
SEG NEUTROPHILS: 80 % (ref 44–74)
SEGMENTED NEUTROPHILS ABSOLUTE COUNT: 7.7 K/UL (ref 1.8–7.7)
SODIUM BLD-SCNC: 136 MMOL/L (ref 135–144)
TROPONIN INTERP: NORMAL
TROPONIN T: <0.03 NG/ML
WBC # BLD: 9.7 K/UL (ref 3.5–11)
WBC # BLD: ABNORMAL 10*3/UL

## 2018-02-13 PROCEDURE — 6360000002 HC RX W HCPCS: Performed by: INTERNAL MEDICINE

## 2018-02-13 PROCEDURE — 97116 GAIT TRAINING THERAPY: CPT | Performed by: PHYSICAL THERAPIST

## 2018-02-13 PROCEDURE — 2060000000 HC ICU INTERMEDIATE R&B

## 2018-02-13 PROCEDURE — 2580000003 HC RX 258: Performed by: INTERNAL MEDICINE

## 2018-02-13 PROCEDURE — 6370000000 HC RX 637 (ALT 250 FOR IP): Performed by: INTERNAL MEDICINE

## 2018-02-13 PROCEDURE — 99232 SBSQ HOSP IP/OBS MODERATE 35: CPT | Performed by: INTERNAL MEDICINE

## 2018-02-13 PROCEDURE — 6370000000 HC RX 637 (ALT 250 FOR IP)

## 2018-02-13 PROCEDURE — 51798 US URINE CAPACITY MEASURE: CPT

## 2018-02-13 PROCEDURE — 97161 PT EVAL LOW COMPLEX 20 MIN: CPT | Performed by: PHYSICAL THERAPIST

## 2018-02-13 PROCEDURE — 94640 AIRWAY INHALATION TREATMENT: CPT

## 2018-02-13 PROCEDURE — 80048 BASIC METABOLIC PNL TOTAL CA: CPT

## 2018-02-13 PROCEDURE — 36415 COLL VENOUS BLD VENIPUNCTURE: CPT

## 2018-02-13 PROCEDURE — 94760 N-INVAS EAR/PLS OXIMETRY 1: CPT

## 2018-02-13 PROCEDURE — 85025 COMPLETE CBC W/AUTO DIFF WBC: CPT

## 2018-02-13 PROCEDURE — 99222 1ST HOSP IP/OBS MODERATE 55: CPT | Performed by: INTERNAL MEDICINE

## 2018-02-13 PROCEDURE — G8979 MOBILITY GOAL STATUS: HCPCS | Performed by: PHYSICAL THERAPIST

## 2018-02-13 PROCEDURE — G8978 MOBILITY CURRENT STATUS: HCPCS | Performed by: PHYSICAL THERAPIST

## 2018-02-13 PROCEDURE — 71046 X-RAY EXAM CHEST 2 VIEWS: CPT

## 2018-02-13 RX ORDER — LISINOPRIL 5 MG/1
TABLET ORAL
Status: COMPLETED
Start: 2018-02-13 | End: 2018-02-13

## 2018-02-13 RX ADMIN — ASPIRIN 81 MG: 81 TABLET, COATED ORAL at 09:03

## 2018-02-13 RX ADMIN — Medication 10 ML: at 20:10

## 2018-02-13 RX ADMIN — HYDROCODONE BITARTRATE AND ACETAMINOPHEN 5 MG: 7.5; 325 TABLET ORAL at 22:49

## 2018-02-13 RX ADMIN — LEVALBUTEROL 1.25 MG: 1.25 SOLUTION, CONCENTRATE RESPIRATORY (INHALATION) at 15:54

## 2018-02-13 RX ADMIN — LEVALBUTEROL 1.25 MG: 1.25 SOLUTION, CONCENTRATE RESPIRATORY (INHALATION) at 04:37

## 2018-02-13 RX ADMIN — PROPAFENONE HYDROCHLORIDE 150 MG: 150 TABLET, FILM COATED ORAL at 05:53

## 2018-02-13 RX ADMIN — AMIODARONE HYDROCHLORIDE 1 MG/MIN: 50 INJECTION, SOLUTION INTRAVENOUS at 14:12

## 2018-02-13 RX ADMIN — AMIODARONE HYDROCHLORIDE 300 MG: 50 INJECTION, SOLUTION INTRAVENOUS at 13:42

## 2018-02-13 RX ADMIN — ENOXAPARIN SODIUM 90 MG: 100 INJECTION SUBCUTANEOUS at 09:03

## 2018-02-13 RX ADMIN — AMIODARONE HYDROCHLORIDE 1 MG/MIN: 50 INJECTION, SOLUTION INTRAVENOUS at 22:47

## 2018-02-13 RX ADMIN — LISINOPRIL 10 MG: 5 TABLET ORAL at 20:09

## 2018-02-13 RX ADMIN — THERA TABS 1 TABLET: TAB at 09:03

## 2018-02-13 RX ADMIN — ACETAMINOPHEN 650 MG: 325 TABLET ORAL at 20:09

## 2018-02-13 RX ADMIN — LEVALBUTEROL 1.25 MG: 1.25 SOLUTION, CONCENTRATE RESPIRATORY (INHALATION) at 20:27

## 2018-02-13 RX ADMIN — LEVALBUTEROL 1.25 MG: 1.25 SOLUTION, CONCENTRATE RESPIRATORY (INHALATION) at 11:38

## 2018-02-13 RX ADMIN — ALPRAZOLAM 0.25 MG: 0.25 TABLET ORAL at 20:09

## 2018-02-13 RX ADMIN — LEVALBUTEROL 1.25 MG: 1.25 SOLUTION, CONCENTRATE RESPIRATORY (INHALATION) at 23:57

## 2018-02-13 RX ADMIN — ENOXAPARIN SODIUM 90 MG: 100 INJECTION SUBCUTANEOUS at 21:00

## 2018-02-13 RX ADMIN — ALPRAZOLAM 0.12 MG: 0.25 TABLET ORAL at 09:03

## 2018-02-13 RX ADMIN — METOPROLOL SUCCINATE 25 MG: 25 TABLET, EXTENDED RELEASE ORAL at 20:09

## 2018-02-13 RX ADMIN — Medication 10 ML: at 09:03

## 2018-02-13 RX ADMIN — LEVOFLOXACIN 750 MG: 5 INJECTION, SOLUTION INTRAVENOUS at 09:03

## 2018-02-13 RX ADMIN — LEVALBUTEROL 1.25 MG: 1.25 SOLUTION, CONCENTRATE RESPIRATORY (INHALATION) at 08:49

## 2018-02-13 RX ADMIN — METOPROLOL SUCCINATE 25 MG: 25 TABLET, EXTENDED RELEASE ORAL at 08:10

## 2018-02-13 RX ADMIN — SIMVASTATIN 20 MG: 20 TABLET, FILM COATED ORAL at 20:09

## 2018-02-13 ASSESSMENT — PAIN SCALES - GENERAL
PAINLEVEL_OUTOF10: 7
PAINLEVEL_OUTOF10: 0
PAINLEVEL_OUTOF10: 0
PAINLEVEL_OUTOF10: 7
PAINLEVEL_OUTOF10: 4
PAINLEVEL_OUTOF10: 0

## 2018-02-13 ASSESSMENT — PAIN DESCRIPTION - PROGRESSION
CLINICAL_PROGRESSION: GRADUALLY WORSENING
CLINICAL_PROGRESSION: NOT CHANGED

## 2018-02-13 ASSESSMENT — PAIN DESCRIPTION - ONSET
ONSET: GRADUAL
ONSET: ON-GOING

## 2018-02-13 ASSESSMENT — PAIN DESCRIPTION - LOCATION
LOCATION: BACK
LOCATION: BACK

## 2018-02-13 ASSESSMENT — PAIN DESCRIPTION - DESCRIPTORS
DESCRIPTORS: ACHING;BURNING
DESCRIPTORS: ACHING

## 2018-02-13 ASSESSMENT — PAIN DESCRIPTION - ORIENTATION
ORIENTATION: LOWER
ORIENTATION: LOWER

## 2018-02-13 ASSESSMENT — PAIN DESCRIPTION - FREQUENCY
FREQUENCY: INTERMITTENT
FREQUENCY: INTERMITTENT

## 2018-02-13 ASSESSMENT — PAIN DESCRIPTION - PAIN TYPE
TYPE: ACUTE PAIN
TYPE: ACUTE PAIN

## 2018-02-13 NOTE — PROGRESS NOTES
Physical Therapy    Facility/Department: [facility]  PROGRESSIVE CARE  Initial Assessment    NAME: Kathy Smith  : 1934  MRN: [id number]    Date of Service: 2018    Patient Diagnosis(es): The primary encounter diagnosis was Pneumonia due to organism. A diagnosis of Paroxysmal atrial fibrillation (HCC) was also pertinent to this visit. has a past medical history of Acute bronchitis; Allergic rhinitis; Anxiety; Atrial fibrillation ([address] CAD (coronary artery disease); Cataract, right; Choroidal nevus of right eye; Diverticulosis; Elevated PSA; Hemorrhoids; History of measles; History of mumps; Hyperlipidemia; Hypertension; Occult blood positive stool; Osteoarthritis; Overweight(278.02); Palpitations; Pseudophakia, left eye; PSVT (paroxysmal supraventricular tachycardia) ([address] Retinal detachment; and Seborrheic dermatitis. has a past surgical history that includes Coronary artery bypass graft; Appendectomy ( and ); Tonsillectomy; retinal laser (Left, 2011); Cataract removal (Left, 2013); Skin tag removal (1999); and Abscess Drainage ([id number]).     Restrictions     Vision/Hearing  Vision: Within Functional Limits  Hearing: Within functional limits     Subjective  General  Chart Reviewed: Yes  Patient assessed for rehabilitation services?: Yes  Response To Previous Treatment: Not applicable  Family / Caregiver Present: No  Referring Practitioner: Giuliana Levy MD   Referral Date : 18  Diagnosis: Pneumonia; UTI  Follows Commands: Within Functional Limits  Subjective  Subjective: \"I feel alright today\"  Pain Screening  Patient Currently in Pain: Denies  Vital Signs  Patient Currently in Pain: Denies       Orientation  Orientation  Overall Orientation Status: Within Normal Limits    Social/Functional History  Social/Functional History  Lives With: Alone  Type of Home: House (condo)  Home Layout: One level  Home Access: Stairs to enter with rails  Entrance Stairs - Number of Steps: 2  Bathroom Shower/Tub: Walk-in shower  Bathroom Toilet: Handicap height  Bathroom Accessibility: Accessible  Receives Help From: [employer]  ADL Assistance: Independent  Homemaking Assistance: Independent  Homemaking Responsibilities: Yes  Meal Prep Responsibility: Primary  Laundry Responsibility: Primary  Cleaning Responsibility: Primary  Shopping Responsibility: Primary  Ambulation Assistance: Independent  Transfer Assistance: Independent  Active : Yes  Mode of Transportation: Car  Occupation: Retired  Leisure & Hobbies: [address] [place] [address] basketball games  Objective     Observation/Palpation  Posture: Good  Observation: Patient rec'd in chair, call light within reach, chair alarm in place (and returned upon exit of PT), agreeable to therapy    PROM RLE (degrees)  RLE PROM: WNL  AROM RLE (degrees)  RLE AROM: WNL  PROM LLE (degrees)  LLE PROM: WNL  AROM LLE (degrees)  LLE AROM : WNL  Strength RLE  Strength RLE: WFL  Strength LLE  Strength LLE: WFL        Bed mobility  Bridging: Independent  Rolling to Left: Independent  Rolling to Right: Independent  Supine to Sit: Independent  Sit to Supine: Independent  Scooting: Independent  Transfers  Sit to Stand: Independent  Stand to sit:  Independent  Bed to Chair: Independent  Stand Pivot Transfers: Independent  Lateral Transfers: Independent  Ambulation  Ambulation?: Yes  Ambulation 1  Surface: level tile  Device: Rolling Walker  Assistance: Stand by assistance  Quality of Gait: decreased step/stride length, decreased kirill, decreased endurance  Distance: 50'              Assessment   Body structures, Functions, Activity limitations: Decreased endurance;Decreased ADL status  Treatment Diagnosis: pneumonia, UTI  Prognosis: Good  Decision Making: Low Complexity  Patient Education: Yes  REQUIRES PT FOLLOW UP: Yes  Activity Tolerance  Activity Tolerance: Patient Tolerated treatment well;Patient limited by endurance     Discharge Recommendations:  Home independently      Plan   Plan  Times per week: 7  Times per day: Daily  Current Treatment Recommendations: Strengthening, ADL/Self-care Training, Gait Training, Home Exercise Program, Endurance Training  Safety Devices  Type of devices: All fall risk precautions in place, Call light within reach, Chair alarm in place, Gait belt, Patient at risk for falls, Left in chair, Nurse notified    G-Code  PT G-Codes  Functional Limitation: Mobility: Walking and moving around  Mobility: Walking and Moving Around Current Status ():  At least 1 percent but less than 20 percent impaired, limited or restricted  Mobility: Walking and Moving Around Goal Status (): 0 percent impaired, limited or restricted    Goals  Short term goals  Time Frame for Short term goals: 7 days  Short term goal 1: Assess functional ability, initiate HEP  Short term goal 2: Patient to amb 48' x 4 with least restrictive AD to prepare for D/C  Short term goal 3: Patient to tolerate 20+ min of strengthening activity and dynamic activity in room to prepare for D/C  Patient Goals   Patient goals : \"get stronger and go home\"       Therapy Time   Individual Concurrent Group Co-treatment   Time In 1105         Time Out 1130         Minutes 25         Timed Code Treatment Minutes: 12 Minutes       Jenny Whitt, PT, DPT

## 2018-02-13 NOTE — FLOWSHEET NOTE
Noris Henao is quite friendly but unable to speak above a wisper at the moment. Accepted prayer and blessing.

## 2018-02-13 NOTE — PROGRESS NOTES
Inocencia Fish is a 80 y.o. male patient.     Current Facility-Administered Medications   Medication Dose Route Frequency Provider Last Rate Last Dose    sodium chloride flush 0.9 % injection 10 mL  10 mL Intravenous 2 times per day Rendell Crew   10 mL at 02/12/18 2048    sodium chloride flush 0.9 % injection 10 mL  10 mL Intravenous PRN Rendell Crew        enoxaparin (LOVENOX) injection 90 mg  1 mg/kg Subcutaneous BID Rendell Crew   90 mg at 02/12/18 2104    levofloxacin (LEVAQUIN) 750 MG/150ML infusion 750 mg  750 mg Intravenous Q24H Nickolas Montana        acetaminophen (TYLENOL) tablet 650 mg  650 mg Oral Q4H PRN Rendell Crew        ALPRAZolam Valri Ko) tablet 0.25 mg  0.25 mg Oral Nightly Vallorie Cypher Montana   0.25 mg at 02/12/18 2108    aspirin EC tablet 81 mg  81 mg Oral Daily Nickolas Montana        fluticasone [facility]) 50 MCG/ACT nasal spray 1 spray  1 spray Nasal Daily PRN Rendell Crew        lisinopril (PRINIVIL;ZESTRIL) tablet 10 mg  10 mg Oral Nightly Rendell Crew   10 mg at 02/12/18 2107    metoprolol succinate (TOPROL XL) extended release tablet 25 mg  25 mg Oral BID Rendell Crew   25 mg at 02/12/18 2107    multivitamin 1 tablet  1 tablet Oral Daily Rendell Crew        simvastatin (ZOCOR) tablet 20 mg  20 mg Oral Nightly Rendell Crew   20 mg at 02/12/18 2107    levalbuterol (XOPENEX) nebulizer solution 1.25 mg  1.25 mg Nebulization Q4H Nickolas Montana   1.25 mg at 02/12/18 2350    ALPRAZolam (XANAX) tablet 0.125 mg  0.125 mg Oral QAM Rendell Crew        albuterol (PROVENTIL) nebulizer solution 2.5 mg  2.5 mg Nebulization As Directed RT PRN Rendell Crew        sodium chloride nebulizer 0.9 % solution 3 mL  3 mL Nebulization As Directed RT PRN Rendell Crew        benzocaine-menthol (CEPACOL) 1 lozenge  1 lozenge Oral Q2H PRN Rendell Crew        propafenone (RYTHMOL) tablet 150 mg  150 mg Oral 3 times per day Rendell Crew   150 mg at 02/12/18 2196     Allergies   Allergen Reactions    Pcn

## 2018-02-13 NOTE — PROGRESS NOTES
111 02/13/2018     Last 3 Troponin:  No results found for: TROPONINI        Physical Exam:  Vitals: BP (!) 142/77   Pulse 71   Temp 97.5 °F (36.4 °C)   Resp 18   Ht 5' 11\" (1.803 m)   Wt 198 lb (89.8 kg)   SpO2 96%   BMI 27.62 kg/m²   24 hour intake/output:  Intake/Output Summary (Last 24 hours) at 02/13/18 1157  Last data filed at 02/13/18 0828   Gross per 24 hour   Intake              240 ml   Output              200 ml   Net               40 ml     Last 3 weights: Wt Readings from Last 3 Encounters:   02/12/18 198 lb (89.8 kg)   02/12/18 198 lb 3.2 oz (89.9 kg)   02/09/18 201 lb 3.2 oz (91.3 kg)     HEENT: Normocephalic and Atraumatic  Neck: Supple, No Masses, Tenderness, Nodularity and No Lymphadenopathy  Chest/Lungs: Rales Present and Distant Breath Sounds  Cardiac: Irregularly Irregular  GI/Abdomen: Bowel Sounds Present and Soft, Non-tender, without Guarding or Rebound Tenderness  : Not examined  EXT/Skin: No Edema, No Cyanosis and No Clubbing  Neuro: Alert and Oriented and No Localizing Signs/Symptoms      Assessment:    Active Problems:    Atrial fibrillation (HCC)    Pneumonia due to organism  Resolved Problems:    * No resolved hospital problems.  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

## 2018-02-14 ENCOUNTER — APPOINTMENT (OUTPATIENT)
Dept: GENERAL RADIOLOGY | Age: 83
DRG: 193 | End: 2018-02-14
Payer: MEDICARE

## 2018-02-14 ENCOUNTER — APPOINTMENT (OUTPATIENT)
Dept: CT IMAGING | Age: 83
DRG: 193 | End: 2018-02-14
Payer: MEDICARE

## 2018-02-14 VITALS
HEIGHT: 71 IN | DIASTOLIC BLOOD PRESSURE: 87 MMHG | OXYGEN SATURATION: 97 % | SYSTOLIC BLOOD PRESSURE: 153 MMHG | RESPIRATION RATE: 18 BRPM | TEMPERATURE: 98.1 F | BODY MASS INDEX: 27.72 KG/M2 | HEART RATE: 88 BPM | WEIGHT: 198 LBS

## 2018-02-14 LAB
ABSOLUTE EOS #: 0.1 K/UL (ref 0–0.4)
ABSOLUTE IMMATURE GRANULOCYTE: ABNORMAL K/UL (ref 0–0.3)
ABSOLUTE LYMPH #: 1.1 K/UL (ref 1–4.8)
ABSOLUTE MONO #: 0.6 K/UL (ref 0.1–1.2)
ANION GAP SERPL CALCULATED.3IONS-SCNC: 15 MMOL/L (ref 9–17)
BASOPHILS # BLD: 1 % (ref 0–2)
BASOPHILS ABSOLUTE: 0 K/UL (ref 0–0.2)
BUN BLDV-MCNC: 15 MG/DL (ref 8–23)
BUN/CREAT BLD: 18 (ref 9–20)
CALCIUM SERPL-MCNC: 8.6 MG/DL (ref 8.6–10.4)
CHLORIDE BLD-SCNC: 98 MMOL/L (ref 98–107)
CO2: 24 MMOL/L (ref 20–31)
CREAT SERPL-MCNC: 0.85 MG/DL (ref 0.7–1.2)
DIFFERENTIAL TYPE: ABNORMAL
EOSINOPHILS RELATIVE PERCENT: 1 % (ref 1–8)
GFR AFRICAN AMERICAN: >60 ML/MIN
GFR NON-AFRICAN AMERICAN: >60 ML/MIN
GFR SERPL CREATININE-BSD FRML MDRD: ABNORMAL ML/MIN/{1.73_M2}
GFR SERPL CREATININE-BSD FRML MDRD: ABNORMAL ML/MIN/{1.73_M2}
GLUCOSE BLD-MCNC: 130 MG/DL (ref 70–99)
HCT VFR BLD CALC: 47.6 % (ref 41–53)
HEMOGLOBIN: 16.2 G/DL (ref 13.5–17.5)
IMMATURE GRANULOCYTES: ABNORMAL %
LYMPHOCYTES # BLD: 12 % (ref 15–43)
MCH RBC QN AUTO: 30.7 PG (ref 26–34)
MCHC RBC AUTO-ENTMCNC: 34 G/DL (ref 31–37)
MCV RBC AUTO: 90.4 FL (ref 80–100)
MONOCYTES # BLD: 7 % (ref 6–14)
NRBC AUTOMATED: ABNORMAL PER 100 WBC
PDW BLD-RTO: 14.4 % (ref 11–14.5)
PLATELET # BLD: 272 K/UL (ref 140–450)
PLATELET ESTIMATE: ABNORMAL
PMV BLD AUTO: 8.8 FL (ref 6–12)
POTASSIUM SERPL-SCNC: 3.9 MMOL/L (ref 3.7–5.3)
RBC # BLD: 5.27 M/UL (ref 4.5–5.9)
RBC # BLD: ABNORMAL 10*6/UL
SEG NEUTROPHILS: 79 % (ref 44–74)
SEGMENTED NEUTROPHILS ABSOLUTE COUNT: 7.2 K/UL (ref 1.8–7.7)
SODIUM BLD-SCNC: 137 MMOL/L (ref 135–144)
WBC # BLD: 9 K/UL (ref 3.5–11)
WBC # BLD: ABNORMAL 10*3/UL

## 2018-02-14 PROCEDURE — 6370000000 HC RX 637 (ALT 250 FOR IP): Performed by: INTERNAL MEDICINE

## 2018-02-14 PROCEDURE — 97116 GAIT TRAINING THERAPY: CPT

## 2018-02-14 PROCEDURE — 36415 COLL VENOUS BLD VENIPUNCTURE: CPT

## 2018-02-14 PROCEDURE — 6360000004 HC RX CONTRAST MEDICATION: Performed by: INTERNAL MEDICINE

## 2018-02-14 PROCEDURE — 99238 HOSP IP/OBS DSCHRG MGMT 30/<: CPT | Performed by: INTERNAL MEDICINE

## 2018-02-14 PROCEDURE — 71045 X-RAY EXAM CHEST 1 VIEW: CPT

## 2018-02-14 PROCEDURE — 85025 COMPLETE CBC W/AUTO DIFF WBC: CPT

## 2018-02-14 PROCEDURE — 6360000002 HC RX W HCPCS: Performed by: INTERNAL MEDICINE

## 2018-02-14 PROCEDURE — 2580000003 HC RX 258: Performed by: INTERNAL MEDICINE

## 2018-02-14 PROCEDURE — 71260 CT THORAX DX C+: CPT

## 2018-02-14 PROCEDURE — 80048 BASIC METABOLIC PNL TOTAL CA: CPT

## 2018-02-14 PROCEDURE — 94640 AIRWAY INHALATION TREATMENT: CPT

## 2018-02-14 PROCEDURE — 97110 THERAPEUTIC EXERCISES: CPT

## 2018-02-14 PROCEDURE — 99233 SBSQ HOSP IP/OBS HIGH 50: CPT | Performed by: INTERNAL MEDICINE

## 2018-02-14 RX ORDER — LEVOFLOXACIN 750 MG/1
750 TABLET ORAL DAILY
Qty: 4 TABLET | Refills: 0 | Status: SHIPPED | OUTPATIENT
Start: 2018-02-14 | End: 2018-02-18

## 2018-02-14 RX ORDER — AMIODARONE HYDROCHLORIDE 200 MG/1
400 TABLET ORAL 2 TIMES DAILY
Status: DISCONTINUED | OUTPATIENT
Start: 2018-02-14 | End: 2018-02-14 | Stop reason: HOSPADM

## 2018-02-14 RX ORDER — AMIODARONE HYDROCHLORIDE 400 MG/1
400 TABLET ORAL 2 TIMES DAILY
Qty: 30 TABLET | Refills: 0 | Status: SHIPPED | OUTPATIENT
Start: 2018-02-14 | End: 2018-02-22 | Stop reason: SDUPTHER

## 2018-02-14 RX ADMIN — THERA TABS 1 TABLET: TAB at 10:13

## 2018-02-14 RX ADMIN — ISODIUM CHLORIDE 3 ML: 0.03 SOLUTION RESPIRATORY (INHALATION) at 04:11

## 2018-02-14 RX ADMIN — AMIODARONE HYDROCHLORIDE 1 MG/MIN: 50 INJECTION, SOLUTION INTRAVENOUS at 07:10

## 2018-02-14 RX ADMIN — Medication 10 ML: at 10:13

## 2018-02-14 RX ADMIN — ASPIRIN 81 MG: 81 TABLET, COATED ORAL at 10:13

## 2018-02-14 RX ADMIN — LEVALBUTEROL 1.25 MG: 1.25 SOLUTION, CONCENTRATE RESPIRATORY (INHALATION) at 04:12

## 2018-02-14 RX ADMIN — LEVALBUTEROL 1.25 MG: 1.25 SOLUTION, CONCENTRATE RESPIRATORY (INHALATION) at 12:44

## 2018-02-14 RX ADMIN — METOPROLOL SUCCINATE 25 MG: 25 TABLET, EXTENDED RELEASE ORAL at 10:12

## 2018-02-14 RX ADMIN — LEVOFLOXACIN 750 MG: 5 INJECTION, SOLUTION INTRAVENOUS at 10:12

## 2018-02-14 RX ADMIN — AMIODARONE HYDROCHLORIDE 400 MG: 200 TABLET ORAL at 12:37

## 2018-02-14 RX ADMIN — ISODIUM CHLORIDE 3 ML: 0.03 SOLUTION RESPIRATORY (INHALATION) at 08:20

## 2018-02-14 RX ADMIN — ALPRAZOLAM 0.12 MG: 0.25 TABLET ORAL at 10:13

## 2018-02-14 RX ADMIN — LEVALBUTEROL 1.25 MG: 1.25 SOLUTION, CONCENTRATE RESPIRATORY (INHALATION) at 08:20

## 2018-02-14 RX ADMIN — IOPAMIDOL 75 ML: 755 INJECTION, SOLUTION INTRAVENOUS at 14:37

## 2018-02-14 RX ADMIN — ENOXAPARIN SODIUM 90 MG: 100 INJECTION SUBCUTANEOUS at 10:12

## 2018-02-14 RX ADMIN — ISODIUM CHLORIDE 3 ML: 0.03 SOLUTION RESPIRATORY (INHALATION) at 12:44

## 2018-02-14 ASSESSMENT — PAIN SCALES - GENERAL
PAINLEVEL_OUTOF10: 0

## 2018-02-14 NOTE — PROGRESS NOTES
Haja Mendoza is a 80 y.o. male patient.     Current Facility-Administered Medications   Medication Dose Route Frequency Provider Last Rate Last Dose    amiodarone (CORDARONE) 450 mg in dextrose 5 % 250 mL infusion  1 mg/min Intravenous Continuous Awa Becerra DO 33.3 mL/hr at 02/13/18 2247 1 mg/min at 02/13/18 2247    sodium chloride flush 0.9 % injection 10 mL  10 mL Intravenous 2 times per day Dorla Penning   10 mL at 02/13/18 2010    sodium chloride flush 0.9 % injection 10 mL  10 mL Intravenous PRN Dorla Penning        enoxaparin (LOVENOX) injection 90 mg  1 mg/kg Subcutaneous BID Dorla Penning   90 mg at 02/13/18 2100    levofloxacin (LEVAQUIN) 750 MG/150ML infusion 750 mg  750 mg Intravenous Q24H Dorla Penning   Stopped at 02/13/18 1040    acetaminophen (TYLENOL) tablet 650 mg  650 mg Oral Q4H PRN Dorla Penning   650 mg at 02/13/18 2009    ALPRAZolam Branda Lull) tablet 0.25 mg  0.25 mg Oral Nightly Nickolas D Montana   0.25 mg at 02/13/18 2009    aspirin EC tablet 81 mg  81 mg Oral Daily Dorla Penning   81 mg at 02/13/18 3010    fluticasone (FLONASE) 50 MCG/ACT nasal spray 1 spray  1 spray Nasal Daily PRN Dorla Penning        lisinopril (PRINIVIL;ZESTRIL) tablet 10 mg  10 mg Oral Nightly Dorla Penning   10 mg at 02/13/18 2009    metoprolol succinate (TOPROL XL) extended release tablet 25 mg  25 mg Oral BID Dorla Penning   25 mg at 02/13/18 2009    multivitamin 1 tablet  1 tablet Oral Daily Dorla Penning   1 tablet at 02/13/18 5299    simvastatin (ZOCOR) tablet 20 mg  20 mg Oral Nightly Dorla Penning   20 mg at 02/13/18 2009    levalbuterol (XOPENEX) nebulizer solution 1.25 mg  1.25 mg Nebulization Q4H Nickolas Montana   1.25 mg at 02/13/18 2027    ALPRAZolam (XANAX) tablet 0.125 mg  0.125 mg Oral QAM Nickolas Montana   0.125 mg at 02/13/18 2510    albuterol (PROVENTIL) nebulizer solution 2.5 mg  2.5 mg Nebulization As Directed RT PRN Dorla Riki        sodium chloride nebulizer 0.9 % solution 3 mL  3 mL

## 2018-02-14 NOTE — PROGRESS NOTES
Hospitalist Progress Note    Patient:  All Dailey     YOB: 1934    MRN: [id number]   Admit date: 2/12/2018     Acct: [de-identified]     PCP: DO RAN Ramos--Interval History:    Atrial fibrillation----remains in atrial fibrillation----amodarone gtt converted to PO---home-----2.14.2018 on Xarelto    Right-sided pneumonia----improved---has a possible mass present RUL ----> CT chest    UTI--on Levaquin    See note below         All other ROS negative except noted in HPI    Diet:  DIET CARDIAC;    Medications:  Scheduled Meds:   amiodarone  400 mg Oral BID    rivaroxaban  20 mg Oral Daily    sodium chloride flush  10 mL Intravenous 2 times per day    levofloxacin  750 mg Intravenous Q24H    ALPRAZolam  0.25 mg Oral Nightly    aspirin  81 mg Oral Daily    lisinopril  10 mg Oral Nightly    metoprolol succinate  25 mg Oral BID    multivitamin  1 tablet Oral Daily    simvastatin  20 mg Oral Nightly    levalbuterol  1.25 mg Nebulization Q4H    ALPRAZolam  0.125 mg Oral QAM     Continuous Infusions:   PRN Meds:sodium chloride flush, acetaminophen, fluticasone, albuterol, sodium chloride nebulizer, benzocaine-menthol    Objective:  Labs:  CBC with Differential:    Lab Results   Component Value Date    WBC 9.0 02/14/2018    RBC 5.27 02/14/2018    HGB 16.2 02/14/2018    HCT 47.6 02/14/2018     02/14/2018    MCV 90.4 02/14/2018    MCH 30.7 02/14/2018    MCHC 34.0 02/14/2018    RDW 14.4 02/14/2018    LYMPHOPCT 12 02/14/2018    MONOPCT 7 02/14/2018    BASOPCT 1 02/14/2018    MONOSABS 0.60 02/14/2018    LYMPHSABS 1.10 02/14/2018    EOSABS 0.10 02/14/2018    BASOSABS 0.00 02/14/2018    DIFFTYPE NOT REPORTED 02/14/2018     BMP:    Lab Results   Component Value Date     02/14/2018    K 3.9 02/14/2018    CL 98 02/14/2018    CO2 24 02/14/2018    BUN 15 02/14/2018    LABALBU 3.8 04/14/2017    CREATININE 0.85 02/14/2018    CALCIUM 8.6 02/14/2018    GFRAA >60 02/14/2018    LABGLOM >60 02/14/2018    GLUCOSE 130 02/14/2018           Physical Exam:  Vitals: BP (!) 160/84   Pulse 100   Temp 98.2 °F (36.8 °C) (Oral)   Resp 16   Ht 5' 11\" (1.803 m)   Wt 198 lb (89.8 kg)   SpO2 96%   BMI 27.62 kg/m²   24 hour intake/output:  Intake/Output Summary (Last 24 hours) at 02/14/18 1302  Last data filed at 02/14/18 1239   Gross per 24 hour   Intake             2306 ml   Output              600 ml   Net             1706 ml     Last 3 weights: Wt Readings from Last 3 Encounters:   02/12/18 198 lb (89.8 kg)   02/12/18 198 lb 3.2 oz (89.9 kg)   02/09/18 201 lb 3.2 oz (91.3 kg)     HEENT: Normocephalic and Atraumatic  Neck: Supple, No Masses, Tenderness, Nodularity and No Lymphadenopathy  Chest/Lungs: Clear to Auscultation without Rales, Rhonchi, or Wheezes  Cardiac: Irregularly Irregular  GI/Abdomen: Bowel Sounds Present, Soft, Non-tender, without Guarding or Rebound Tenderness, No Masses and No Tenderness  : Not examined  EXT/Skin: No Edema, No Cyanosis and No Clubbing  Neuro: Alert and Oriented, to Person, to Time, to Place, to Situation and No Localizing Signs/Symptoms      Assessment:    Active Problems:    Atrial fibrillation (HCC)    Pneumonia due to organism  Resolved Problems:    * No resolved hospital problems.  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  RML pneumonia---2.12.2018         CT chest2.14.2018---pending          CXR---2.14.2018---RUL infiltratecannot exclude mass CXR---2.13.2018---improving RUL airspace disease         CXR---2.12.2018RMLRUL airspace consolidation  UTI---POA---2.12.2018  Elevated BNP---2.12.2018  ASCVD         CABG---2000         MI---history   Arrhythmiahistory         PSVT         PAF  Hypertensionlabile   Hyperlipidemia  Overweight  Anxiety    PMH:   diverticulosis, allergic rhinitis, bronchitis, choroidal nevus right               eye, elevated PSA, measles, mumps, OA, hemorrhoids,               Hemocult-[+]stoolrefused colonoscopy, palpitations,              right cataract  PSH:   appendectomy2888-6164, tonsillectomy, left eye laser             detached retina---2011, left cataractIOL---2013, skin tag             TPHICIJI3745, multiple abscess drainages---9786-6101    Allergies:  Penicillin---swelling, sulfa, Cefdinir      Plan:  1. Home---2.14.2018  2. Atrial fibrillation----Amiodarone 400 BID and Xarelto  3. UTI---POA---Levaquin  4. Possible RUL mass ---CT chest  5. Home medications reviewed  6. Follow up [facility]  7. Follow up Dr. Alexandra Falk, IM  8.   See orders     Electronically signed by Rain Rojas on 2/14/2018 at 1:02 PM    Hospitalist

## 2018-02-14 NOTE — PLAN OF CARE
Problem: Falls - Risk of  Goal: Absence of falls  Outcome: Ongoing      Problem: Safety:  Goal: Free from accidental physical injury  Free from accidental physical injury   Outcome: Ongoing    Goal: Free from intentional harm  Free from intentional harm   Outcome: Ongoing      Problem: Daily Care:  Goal: Daily care needs are met  Daily care needs are met   Outcome: Ongoing      Problem: Pain:  Goal: Patient's pain/discomfort is manageable  Patient's pain/discomfort is manageable   Outcome: Ongoing    Goal: Pain level will decrease  Pain level will decrease   Outcome: Ongoing    Goal: Control of acute pain  Control of acute pain   Outcome: Ongoing    Goal: Control of chronic pain  Control of chronic pain   Outcome: Ongoing      Problem: Skin Integrity:  Goal: Skin integrity will stabilize  Skin integrity will stabilize   Outcome: Ongoing      Problem: Discharge Planning:  Goal: Patients continuum of care needs are met  Patients continuum of care needs are met   Outcome: Ongoing      Problem: IP MOBILITY  Goal: LTG - patient will demonstrate safe mobility requirements  Outcome: Ongoing

## 2018-02-15 ENCOUNTER — TELEPHONE (OUTPATIENT)
Dept: INTERNAL MEDICINE | Age: 83
End: 2018-02-15

## 2018-02-15 NOTE — DISCHARGE SUMMARY
[address]                   [address] [place] [address], [address] [place] [address]                                 DISCHARGE SUMMARY    PATIENT NAME: Lauren Ruffin                        :        1934  MED REC NO:   [id number]                             ROOM:       0217  ACCOUNT NO:   [de-identified]                           ADMIT DATE: 2018  PROVIDER:     Burgess Olivo                  DISCHARGE DATE: 2018    ATTENDING PHYSICIAN OF HOSPITALIZATION:  Pily Wild MD    PERSONAL PHYSICIAN:  Timmy Yates DO, Internal Medicine, [facility]. The patient is seen by [facility], [facility], this hospitalization. DIAGNOSES:  1. Right middle lobe pneumonia 2018. Initial CT scan of the chest  2018; right middle and right upper lobe airspace consolidation. Chest x-ray 2018, improving right upper lobe airspace disease. Chest  x-ray 2018, right upper lobe infiltrate, cannot exclude mass. CT  scan of the chest 2018, pending. 2.  Urinary tract infection, 2018. 3.  Atrial fibrillation, RVR, 2018, recurrent event. MI rule out,  2018. 2D echo 2018, RVSP approximately 48 mmHg, LVEF 50%. EKG  2018; atrial fibrillation ranging from 100 to 104, _____ old anterior  infarction. 4.  Coronary artery disease, CABG 2000, MI history. 5.  Arrhythmia history, PSVT and paroxysmal atrial fibrillation. 6.  Hypertension, labile. 7.  Hyperlipidemia. 8.  Overweight. 9.  Anxiety. Other medical problems set forth in the progress note of 2018,  incorporated for reference herein. HISTORY OF PRESENT ILLNESS AND HOSPITAL COURSE:  An 61-year-old white  female patient of  Timmy Yates DO, Internal Medicine, [facility], and also of [facility], [facility]. The patient presented with cough.   Imaging studies Coenzyme Q10, 100 mg p.o. every other day. 5.  Flonase nasal spray, one spray each nostril daily. 6.  Glucosamine and chondroitin sulfate three tablets daily. 7.  Lisinopril 10 mg p.o. daily. 8.  Metoprolol succinate (Toprol XL) 25 mg p.o. b.i.d.  9.  PreserVision one capsule by mouth daily. 10. Simvastatin (Zocor) 20 mg p.o. daily. 11.  _____ taken every other day. 12.  Tinactin 1% external solution topical to toenail daily per Dr. Debbie Miller,  DPM, wound care. SPECIFICALLY STOPPED THIS HOSPITALIZATION:  Clonidine, prednisone,  propafenone, trimethoprim (Trimpex). Note that the patient remains in atrial fibrillation at the time of  discharge. He will be seen and evaluated further by Cardiology in the  outpatient setting. Follow up with the patient's person physician, Ariel Mccollum DO, Internal Medicine, [facility].     Addendum:   CT demonstrate right-sided mass--T5 lytic changes----will be seen by pulmonology and to have PET scan---2.17.2018    Dougie Sanderson    D: 02/14/2018 13:39:17       T: 02/15/2018 2:53:45     [email or web address]  Job#: [id number]     Doc#: [id number]    CC:

## 2018-02-15 NOTE — TELEPHONE ENCOUNTER
Take 2 tablets by mouth daily 30 tablet 0    levofloxacin (LEVAQUIN) 750 MG tablet Take 1 tablet by mouth daily for 4 days 4 tablet 0    fluticasone (FLONASE) 50 MCG/ACT nasal spray 1 spray by Nasal route daily 1 Bottle 0    ALPRAZolam (XANAX) 0.25 MG tablet Take 1/2 tablet by mouth in the morning and 1 tablet at bedtime. 45 tablet 0    metoprolol succinate (TOPROL XL) 25 MG extended release tablet TAKE 1 TABLET BY MOUTH 2 TIMES DAILY 180 tablet 3    lisinopril (PRINIVIL;ZESTRIL) 10 MG tablet Take 1 tablet by mouth daily 90 tablet 3    simvastatin (ZOCOR) 20 MG tablet TAKE 1 TABLET BY MOUTH EVERY OTHER DAY  30 tablet 11    Multiple Vitamins-Minerals (PRESERVISION AREDS 2 PO) Take 1 capsule by mouth daily      acetaminophen (TYLENOL) 500 MG tablet Take 500 mg by mouth every 6 hours as needed for Pain      Coenzyme Q-10 100 MG CAPS Take 100 mg by mouth every other day.  tolnaftate (TINACTIN) 1 % external solution Apply topically nightly to toenails per Dr. Heavenly Zamarripa.  aspirin 81 MG tablet Take 81 mg by mouth daily.  GLUCOSAMINE-CHONDROITIN PO Take 3 tablets by mouth Daily. No current facility-administered medications for this visit. Current Medications (record all meds as 'taken' or 'not taken' in home med activity)  No outpatient prescriptions have been marked as taking for the 2/15/18 encounter (Telephone) with Willie Mayberry DO. Are there any questions about how the patient should take care of himself? No   Does the patient understand his diet regimen? yes   Does the patient understand his or her activity level? yes   Does the patient understand how to care for his wound? N/A   Does the patient understand how to monitor his weight?  yes   Does the patient understand what to do if he becomes short of breath? yes   Does the patient understand what to do if he has increased edema?   yes    Does the patient understand how to monitor Blood sugar?  yes    Is the patient having any trouble with ADL's? No        Home care services initiated: no     Services provided: none      Does that patient have equipment needs? No    Reinforce Follow-Up  - Does patient have an appointment with his PCP? yes  - Does patient have an appointment with his specialist physicians? Yes        Follow up appointment date: (7 days for more severe illness, 14 days for others)  Future Appointments  Date Time Provider Vanessa Aruna   2/21/2018 8:00 AM Griselda Carney, DPM [facility]   2/21/2018 9:00 AM AARON Holt [facility]   2/22/2018 9:00 AM MD RIANNA Eaton [facility]   2/28/2018 2:00 PM MD MARGARET KnoxOSCAR [facility]   4/12/2018 7:35 AM SCHEDULE, [address] [facility] [place]   4/19/2018 8:30 AM DO GENNARO Madera [facility]   9/13/2018 10:00 AM MD RIANNA Eaton [facility]       Other Interventions or Actions taken as result of  Assessment  - went over all medications that patient is to take and is not to take.         Cristal Mcintyre LPN

## 2018-02-17 ENCOUNTER — HOSPITAL ENCOUNTER (OUTPATIENT)
Dept: CT IMAGING | Age: 83
Discharge: HOME | End: 2018-02-19
Payer: MEDICARE

## 2018-02-17 DIAGNOSIS — R91.8 MASS OF UPPER LOBE OF RIGHT LUNG: ICD-10-CM

## 2018-02-17 PROCEDURE — 78815 PET IMAGE W/CT SKULL-THIGH: CPT

## 2018-02-18 LAB
CULTURE: NORMAL
Lab: NORMAL
SPECIMEN DESCRIPTION: NORMAL
STATUS: NORMAL

## 2018-02-21 ENCOUNTER — OFFICE VISIT (OUTPATIENT)
Dept: PODIATRY | Age: 83
End: 2018-02-21
Payer: MEDICARE

## 2018-02-21 ENCOUNTER — OFFICE VISIT (OUTPATIENT)
Dept: INTERNAL MEDICINE | Age: 83
End: 2018-02-21
Payer: MEDICARE

## 2018-02-21 VITALS
HEART RATE: 76 BPM | RESPIRATION RATE: 16 BRPM | SYSTOLIC BLOOD PRESSURE: 110 MMHG | TEMPERATURE: 97.8 F | BODY MASS INDEX: 23.53 KG/M2 | WEIGHT: 173.72 LBS | HEIGHT: 72 IN | OXYGEN SATURATION: 98 % | DIASTOLIC BLOOD PRESSURE: 60 MMHG

## 2018-02-21 VITALS
HEART RATE: 76 BPM | RESPIRATION RATE: 16 BRPM | BODY MASS INDEX: 24.22 KG/M2 | SYSTOLIC BLOOD PRESSURE: 110 MMHG | HEIGHT: 71 IN | WEIGHT: 173 LBS | DIASTOLIC BLOOD PRESSURE: 60 MMHG

## 2018-02-21 DIAGNOSIS — Z79.899 LONG TERM CURRENT USE OF AMIODARONE: ICD-10-CM

## 2018-02-21 DIAGNOSIS — J18.9 PNEUMONIA DUE TO ORGANISM: Primary | ICD-10-CM

## 2018-02-21 DIAGNOSIS — M79.674 PAIN IN TOES OF BOTH FEET: ICD-10-CM

## 2018-02-21 DIAGNOSIS — I48.91 ATRIAL FIBRILLATION WITH RVR (HCC): ICD-10-CM

## 2018-02-21 DIAGNOSIS — M79.675 PAIN IN TOES OF BOTH FEET: ICD-10-CM

## 2018-02-21 DIAGNOSIS — B35.1 DERMATOPHYTOSIS OF NAIL: Primary | ICD-10-CM

## 2018-02-21 DIAGNOSIS — R91.8 MASS OF UPPER LOBE OF RIGHT LUNG: ICD-10-CM

## 2018-02-21 PROCEDURE — 11720 DEBRIDE NAIL 1-5: CPT | Performed by: PODIATRIST

## 2018-02-21 PROCEDURE — 1111F DSCHRG MED/CURRENT MED MERGE: CPT | Performed by: INTERNAL MEDICINE

## 2018-02-21 PROCEDURE — 99495 TRANSJ CARE MGMT MOD F2F 14D: CPT | Performed by: INTERNAL MEDICINE

## 2018-02-21 NOTE — PROGRESS NOTES
Subjective:  Patient presents to [facility] today for routine foot care. Patient denies any new problems with their feet. Complains of pain in both big toes. Allergies   Allergen Reactions    Pcn [Penicillins] Swelling     As a child    Sulfa Antibiotics     Cefdinir Other (See Comments)       Past Medical History:   Diagnosis Date    Acute bronchitis     history of     Allergic rhinitis     Anxiety     Atrial fibrillation ([address]     paroxymsal     CAD (coronary artery disease)     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    Cataract, right     Choroidal nevus of right eye     Diverticulosis     Elevated PSA     Hemorrhoids     History of measles childhood    History of mumps childhood    Hyperlipidemia     Hypertension     Occult blood positive stool     refuses colonoscopy    Osteoarthritis     Overweight(278.02)     Palpitations     Pseudophakia, left eye     PSVT (paroxysmal supraventricular tachycardia) ([facility])     Retinal detachment     left, history of     Seborrheic dermatitis        Prior to Admission medications    Medication Sig Start Date End Date Taking? Authorizing Provider   Coenzyme Q10 (CO Q 10 PO) Take  by mouth daily. Yes Historical Provider, MD   POTASSIUM CHLORIDE by Does not apply route daily. Yes Historical Provider, MD   metoprolol (TOPROL-XL) 25 MG XL tablet Take 25 mg by mouth 2 times daily. Yes Historical Provider, MD   lisinopril (PRINIVIL;ZESTRIL) 10 MG tablet Take 10 mg by mouth daily. Yes Historical Provider, MD   aspirin 81 MG tablet Take 81 mg by mouth daily. Yes Historical Provider, MD   ALPRAZolam Pro Cools) 0.25 MG tablet Take 0.25 mg by mouth See Admin Instructions. 1/2 TABLET PO EVERY 8 HOURS PRN   Yes Historical Provider, MD    Extract (NEUTROGENA T/GEL EX) Apply  topically. Yes Historical Provider, MD   fluticasone (VERAMYST) 27.5 MCG/SPRAY nasal spray 2 sprays by Nasal route daily.    Yes Historical Provider, MD   propafenone (RYTHMOL) 225 MG

## 2018-02-21 NOTE — PROGRESS NOTES
Standing Status:   Future     Standing Expiration Date:   2/21/2019       Requested Prescriptions      No prescriptions requested or ordered in this encounter       Labs as ordered above. Patient's hospital record reviewed and medication list updated and reconciled. Patient's electronic medical record reviewed and updated with hospital information. Reviewed all progress notes, lab reports, and radiology reports from the hospitalization. All questions answered. Total time spent with patient was 45 minutes, more than 50% of which was spent educating him on his disease processes. He will return here as previously scheduled or sooner if needed. Medical decision making is moderate.         Electronically signed by Fabien Lozoya DO on 2/21/2018 at 9:56 AM  Internal Medicine

## 2018-02-22 ENCOUNTER — OFFICE VISIT (OUTPATIENT)
Dept: CARDIOLOGY | Age: 83
End: 2018-02-22
Payer: MEDICARE

## 2018-02-22 VITALS
WEIGHT: 195 LBS | BODY MASS INDEX: 27.3 KG/M2 | HEART RATE: 54 BPM | HEIGHT: 71 IN | SYSTOLIC BLOOD PRESSURE: 124 MMHG | DIASTOLIC BLOOD PRESSURE: 60 MMHG

## 2018-02-22 DIAGNOSIS — I48.0 PAF (PAROXYSMAL ATRIAL FIBRILLATION) (HCC): Primary | ICD-10-CM

## 2018-02-22 PROCEDURE — 99213 OFFICE O/P EST LOW 20 MIN: CPT | Performed by: INTERNAL MEDICINE

## 2018-02-22 RX ORDER — AMIODARONE HYDROCHLORIDE 200 MG/1
200 TABLET ORAL DAILY
Qty: 30 TABLET | Refills: 5 | Status: ON HOLD | OUTPATIENT
Start: 2018-02-22 | End: 2018-03-01 | Stop reason: HOSPADM

## 2018-02-25 ENCOUNTER — OFFICE VISIT (OUTPATIENT)
Dept: PRIMARY CARE CLINIC | Age: 83
End: 2018-02-25
Payer: MEDICARE

## 2018-02-25 ENCOUNTER — HOSPITAL ENCOUNTER (EMERGENCY)
Age: 83
Discharge: HOME | End: 2018-02-26
Attending: EMERGENCY MEDICINE
Payer: MEDICARE

## 2018-02-25 ENCOUNTER — HOSPITAL ENCOUNTER (OUTPATIENT)
Age: 83
Setting detail: SPECIMEN
Discharge: HOME | End: 2018-02-25
Payer: MEDICARE

## 2018-02-25 ENCOUNTER — APPOINTMENT (OUTPATIENT)
Dept: GENERAL RADIOLOGY | Age: 83
End: 2018-02-25
Payer: MEDICARE

## 2018-02-25 VITALS
BODY MASS INDEX: 27.2 KG/M2 | WEIGHT: 195 LBS | TEMPERATURE: 98.2 F | DIASTOLIC BLOOD PRESSURE: 88 MMHG | OXYGEN SATURATION: 98 % | HEART RATE: 88 BPM | SYSTOLIC BLOOD PRESSURE: 138 MMHG

## 2018-02-25 DIAGNOSIS — I10 ESSENTIAL HYPERTENSION: ICD-10-CM

## 2018-02-25 DIAGNOSIS — R30.0 DYSURIA: ICD-10-CM

## 2018-02-25 DIAGNOSIS — N39.0 URINARY TRACT INFECTION WITHOUT HEMATURIA, SITE UNSPECIFIED: Primary | ICD-10-CM

## 2018-02-25 DIAGNOSIS — I10 HYPERTENSION, UNSPECIFIED TYPE: Primary | ICD-10-CM

## 2018-02-25 LAB
-: ABNORMAL
ABSOLUTE EOS #: 0.2 K/UL (ref 0–0.4)
ABSOLUTE IMMATURE GRANULOCYTE: ABNORMAL K/UL (ref 0–0.3)
ABSOLUTE LYMPH #: 0.9 K/UL (ref 1–4.8)
ABSOLUTE MONO #: 0.4 K/UL (ref 0.1–1.2)
AMORPHOUS: ABNORMAL
ANION GAP SERPL CALCULATED.3IONS-SCNC: 11 MMOL/L (ref 9–17)
BACTERIA: ABNORMAL
BASOPHILS # BLD: 1 % (ref 0–2)
BASOPHILS ABSOLUTE: 0 K/UL (ref 0–0.2)
BILIRUBIN URINE: NEGATIVE
BUN BLDV-MCNC: 10 MG/DL (ref 8–23)
BUN/CREAT BLD: 9 (ref 9–20)
CALCIUM SERPL-MCNC: 9.1 MG/DL (ref 8.6–10.4)
CASTS UA: ABNORMAL /LPF (ref 0–2)
CHLORIDE BLD-SCNC: 93 MMOL/L (ref 98–107)
CO2: 31 MMOL/L (ref 20–31)
COLOR: ABNORMAL
COMMENT UA: ABNORMAL
CREAT SERPL-MCNC: 1.06 MG/DL (ref 0.7–1.2)
CRYSTALS, UA: ABNORMAL /HPF
DIFFERENTIAL TYPE: ABNORMAL
EKG ATRIAL RATE: 69 BPM
EKG P AXIS: 67 DEGREES
EKG P-R INTERVAL: 352 MS
EKG Q-T INTERVAL: 414 MS
EKG QRS DURATION: 94 MS
EKG QTC CALCULATION (BAZETT): 443 MS
EKG R AXIS: -56 DEGREES
EKG T AXIS: 79 DEGREES
EKG VENTRICULAR RATE: 69 BPM
EOSINOPHILS RELATIVE PERCENT: 2 % (ref 1–8)
EPITHELIAL CELLS UA: ABNORMAL /HPF (ref 0–5)
GFR AFRICAN AMERICAN: >60 ML/MIN
GFR NON-AFRICAN AMERICAN: >60 ML/MIN
GFR SERPL CREATININE-BSD FRML MDRD: ABNORMAL ML/MIN/{1.73_M2}
GFR SERPL CREATININE-BSD FRML MDRD: ABNORMAL ML/MIN/{1.73_M2}
GLUCOSE BLD-MCNC: 93 MG/DL (ref 70–99)
GLUCOSE URINE: NEGATIVE
HCT VFR BLD CALC: 46.7 % (ref 41–53)
HEMOGLOBIN: 16.2 G/DL (ref 13.5–17.5)
IMMATURE GRANULOCYTES: ABNORMAL %
KETONES, URINE: ABNORMAL
LEUKOCYTE ESTERASE, URINE: ABNORMAL
LYMPHOCYTES # BLD: 12 % (ref 15–43)
MCH RBC QN AUTO: 31.5 PG (ref 26–34)
MCHC RBC AUTO-ENTMCNC: 34.7 G/DL (ref 31–37)
MCV RBC AUTO: 90.9 FL (ref 80–100)
MONOCYTES # BLD: 6 % (ref 6–14)
MUCUS: ABNORMAL
MYOGLOBIN: 97 NG/ML (ref 28–72)
NITRITE, URINE: POSITIVE
NRBC AUTOMATED: ABNORMAL PER 100 WBC
OTHER OBSERVATIONS UA: ABNORMAL
PDW BLD-RTO: 14.4 % (ref 11–14.5)
PH UA: 5.5 (ref 5–6)
PLATELET # BLD: 181 K/UL (ref 140–450)
PLATELET ESTIMATE: ABNORMAL
PMV BLD AUTO: 8.4 FL (ref 6–12)
POTASSIUM SERPL-SCNC: 4.3 MMOL/L (ref 3.7–5.3)
PROTEIN UA: ABNORMAL
RBC # BLD: 5.14 M/UL (ref 4.5–5.9)
RBC # BLD: ABNORMAL 10*6/UL
RBC UA: >50 /HPF (ref 0–4)
RENAL EPITHELIAL, UA: ABNORMAL /HPF
SEG NEUTROPHILS: 79 % (ref 44–74)
SEGMENTED NEUTROPHILS ABSOLUTE COUNT: 6.1 K/UL (ref 1.8–7.7)
SODIUM BLD-SCNC: 135 MMOL/L (ref 135–144)
SPECIFIC GRAVITY UA: 1.03 (ref 1.01–1.02)
TRICHOMONAS: ABNORMAL
TROPONIN INTERP: NORMAL
TROPONIN T: <0.03 NG/ML
TURBIDITY: ABNORMAL
URINE HGB: ABNORMAL
UROBILINOGEN, URINE: NORMAL
WBC # BLD: 7.7 K/UL (ref 3.5–11)
WBC # BLD: ABNORMAL 10*3/UL
WBC UA: >50 /HPF (ref 0–4)
YEAST: ABNORMAL

## 2018-02-25 PROCEDURE — 87086 URINE CULTURE/COLONY COUNT: CPT

## 2018-02-25 PROCEDURE — 71045 X-RAY EXAM CHEST 1 VIEW: CPT

## 2018-02-25 PROCEDURE — 81001 URINALYSIS AUTO W/SCOPE: CPT

## 2018-02-25 PROCEDURE — 87186 SC STD MICRODIL/AGAR DIL: CPT

## 2018-02-25 PROCEDURE — 83874 ASSAY OF MYOGLOBIN: CPT

## 2018-02-25 PROCEDURE — G8417 CALC BMI ABV UP PARAM F/U: HCPCS | Performed by: FAMILY MEDICINE

## 2018-02-25 PROCEDURE — G8598 ASA/ANTIPLAT THER USED: HCPCS | Performed by: FAMILY MEDICINE

## 2018-02-25 PROCEDURE — 93005 ELECTROCARDIOGRAM TRACING: CPT

## 2018-02-25 PROCEDURE — 84484 ASSAY OF TROPONIN QUANT: CPT

## 2018-02-25 PROCEDURE — 36415 COLL VENOUS BLD VENIPUNCTURE: CPT

## 2018-02-25 PROCEDURE — G8484 FLU IMMUNIZE NO ADMIN: HCPCS | Performed by: FAMILY MEDICINE

## 2018-02-25 PROCEDURE — 80048 BASIC METABOLIC PNL TOTAL CA: CPT

## 2018-02-25 PROCEDURE — 87088 URINE BACTERIA CULTURE: CPT

## 2018-02-25 PROCEDURE — 99283 EMERGENCY DEPT VISIT LOW MDM: CPT

## 2018-02-25 PROCEDURE — 1111F DSCHRG MED/CURRENT MED MERGE: CPT | Performed by: FAMILY MEDICINE

## 2018-02-25 PROCEDURE — 85025 COMPLETE CBC W/AUTO DIFF WBC: CPT

## 2018-02-25 PROCEDURE — 1123F ACP DISCUSS/DSCN MKR DOCD: CPT | Performed by: FAMILY MEDICINE

## 2018-02-25 PROCEDURE — 6370000000 HC RX 637 (ALT 250 FOR IP): Performed by: EMERGENCY MEDICINE

## 2018-02-25 PROCEDURE — 1036F TOBACCO NON-USER: CPT | Performed by: FAMILY MEDICINE

## 2018-02-25 PROCEDURE — G8427 DOCREV CUR MEDS BY ELIG CLIN: HCPCS | Performed by: FAMILY MEDICINE

## 2018-02-25 PROCEDURE — 4040F PNEUMOC VAC/ADMIN/RCVD: CPT | Performed by: FAMILY MEDICINE

## 2018-02-25 PROCEDURE — 99214 OFFICE O/P EST MOD 30 MIN: CPT | Performed by: FAMILY MEDICINE

## 2018-02-25 RX ORDER — ASPIRIN 325 MG
325 TABLET ORAL ONCE
Status: COMPLETED | OUTPATIENT
Start: 2018-02-25 | End: 2018-02-25

## 2018-02-25 RX ORDER — NITROFURANTOIN 25; 75 MG/1; MG/1
100 CAPSULE ORAL 2 TIMES DAILY
Qty: 14 CAPSULE | Refills: 0 | Status: ON HOLD | OUTPATIENT
Start: 2018-02-25 | End: 2018-08-21 | Stop reason: HOSPADM

## 2018-02-25 RX ORDER — NITROGLYCERIN 0.4 MG/1
0.4 TABLET SUBLINGUAL EVERY 5 MIN PRN
Status: DISCONTINUED | OUTPATIENT
Start: 2018-02-25 | End: 2018-02-26 | Stop reason: HOSPADM

## 2018-02-25 RX ADMIN — NITROGLYCERIN 0.4 MG: 0.4 TABLET SUBLINGUAL at 23:37

## 2018-02-25 RX ADMIN — ASPIRIN 325 MG ORAL TABLET 325 MG: 325 PILL ORAL at 23:36

## 2018-02-25 ASSESSMENT — ENCOUNTER SYMPTOMS
BACK PAIN: 1
EYES NEGATIVE: 1
RESPIRATORY NEGATIVE: 1
GASTROINTESTINAL NEGATIVE: 1

## 2018-02-25 NOTE — PROGRESS NOTES
Diagnoses   Name Primary?  Urinary tract infection without hematuria, site unspecified Yes    Dysuria              Plan:      Orders Placed This Encounter   Medications    nitrofurantoin, macrocrystal-monohydrate, (MACROBID) 100 MG capsule     Sig: Take 1 capsule by mouth 2 times daily     Dispense:  14 capsule     Refill:  0     Orders Placed This Encounter   Procedures    UA W/REFLEX CULTURE     Standing Status:   Future     Number of Occurrences:   1     Standing Expiration Date:   2/25/2019     I did review his UA today which does show evidence of infection. RX as above. Increase fluids and rest    Return  if no improvement in symptoms or if any further symptoms arise.

## 2018-02-26 VITALS
HEART RATE: 72 BPM | DIASTOLIC BLOOD PRESSURE: 101 MMHG | WEIGHT: 195 LBS | OXYGEN SATURATION: 96 % | BODY MASS INDEX: 27.2 KG/M2 | RESPIRATION RATE: 20 BRPM | SYSTOLIC BLOOD PRESSURE: 198 MMHG | TEMPERATURE: 97.2 F

## 2018-02-26 VITALS
DIASTOLIC BLOOD PRESSURE: 84 MMHG | HEIGHT: 71 IN | HEART RATE: 64 BPM | SYSTOLIC BLOOD PRESSURE: 162 MMHG | WEIGHT: 195 LBS | OXYGEN SATURATION: 97 % | TEMPERATURE: 97.2 F | BODY MASS INDEX: 27.3 KG/M2 | RESPIRATION RATE: 18 BRPM

## 2018-02-26 DIAGNOSIS — I10 ESSENTIAL HYPERTENSION: Primary | ICD-10-CM

## 2018-02-26 LAB
MYOGLOBIN: 60 NG/ML (ref 28–72)
POC TROPONIN I: <0.02 NG/ML (ref 0–0.08)
POC TROPONIN INTERP: NORMAL

## 2018-02-26 PROCEDURE — 36415 COLL VENOUS BLD VENIPUNCTURE: CPT

## 2018-02-26 PROCEDURE — 83874 ASSAY OF MYOGLOBIN: CPT

## 2018-02-26 PROCEDURE — 84484 ASSAY OF TROPONIN QUANT: CPT

## 2018-02-26 PROCEDURE — 99283 EMERGENCY DEPT VISIT LOW MDM: CPT

## 2018-02-26 PROCEDURE — 6370000000 HC RX 637 (ALT 250 FOR IP): Performed by: EMERGENCY MEDICINE

## 2018-02-26 RX ORDER — HYDRALAZINE HYDROCHLORIDE 25 MG/1
25 TABLET, FILM COATED ORAL ONCE
Status: COMPLETED | OUTPATIENT
Start: 2018-02-26 | End: 2018-02-26

## 2018-02-26 RX ADMIN — HYDRALAZINE HYDROCHLORIDE 25 MG: 25 TABLET, FILM COATED ORAL at 06:34

## 2018-02-26 ASSESSMENT — ENCOUNTER SYMPTOMS
ABDOMINAL DISTENTION: 0
SHORTNESS OF BREATH: 0
EYE REDNESS: 0
FACIAL SWELLING: 0
SHORTNESS OF BREATH: 0
CHEST TIGHTNESS: 0
EYE DISCHARGE: 0
ABDOMINAL PAIN: 0
EYE DISCHARGE: 0
ABDOMINAL DISTENTION: 0
EYE REDNESS: 0
CHEST TIGHTNESS: 0
FACIAL SWELLING: 0
ABDOMINAL PAIN: 0

## 2018-02-26 NOTE — FLOWSHEET NOTE
Responded to a phone call from nurse in [facility] ED. Patient had been in ED twice with high blood pressure. Yeyo's only other living relative is an older sister who was transported via life-flight to [place] [address] yesterday. She is currently in Cardiac ICU and not responding to treatment. He has no family support and no way to get to [place]. Due to the hour and limited public resources in [place] I agree to transport and support him until spiritual care can take over at [place] [address] . Concern is transport home if she expires. Listened while cardiologist reviewed the code status with Demetrio Michelle and presented him with the recent history which included two times that her heart had failed. He advised comfort care and a consult with Palliative Care. Demetrio Michelle agreed with palliative care and consented to a DNR-CC. Touched base with Spiritual Care. Spoke with  who responded to the rapid response during the night. Updated her with the code change. With her help obtained contact information for his . Made contact with her and arranged to meet her at [facility]. Rickey[facility]. Remained with Demetrio Michelle for support until she arrived. Palliative Care consulted during the wait. Decision was made to begin to withdraw life support after her arrival.  Departed for home at 1300 with  agreeing to provide spiritual support and transportation for Demetrio Michelle after Lady's expected death.

## 2018-02-26 NOTE — FLOWSHEET NOTE
02/26/18 0645   Encounter Summary   Services provided to: Patient   Referral/Consult From: Nurse   Support System Yazidi[employer] community   Place of [employer] (first [employer])   Contact Yazidi Completed   Complexity of Encounter High   Length of Encounter 5 hours   Grief and Life Adjustment   Type End of life   Assessment Grieving;Loneliness;Coping   Intervention Active listening;Explored feelings, thoughts, concerns;Prayer;Contacted support as requested per patient/family request   Who? Jv Toscano)   Why? ([place]'s request)   Outcome Connection/belonging;Expressed feelings/needs/concerns;Coping;Grieving

## 2018-02-26 NOTE — ED PROVIDER NOTES
(MACROBID) 100 MG CAPSULE    Take 1 capsule by mouth 2 times daily    RIVAROXABAN (XARELTO) 10 MG TABS TABLET    Take 2 tablets by mouth daily    SIMVASTATIN (ZOCOR) 20 MG TABLET    TAKE 1 TABLET BY MOUTH EVERY OTHER DAY     TOLNAFTATE (TINACTIN) 1 % EXTERNAL SOLUTION    Apply topically nightly to toenails per Dr. Yara Nixon. ALLERGIES     Pcn [penicillins]; Sulfa antibiotics; and Cefdinir    FAMILY HISTORY       Family History   Problem Relation Age of Onset    Diabetes Mother     Osteoporosis Mother     Other Father      complications of prostate surgery (bleeding)    Stroke Sister           SOCIAL HISTORY       Social History     Social History    Marital status: Single     Spouse name: N/A    Number of children: N/A    Years of education: N/A     Social History Main Topics    Smoking status: Never Smoker    Smokeless tobacco: Never Used      Comment: manju kenney rrt 02/12/2018    Alcohol use No    Drug use: No    Sexual activity: Not Asked     Other Topics Concern    None     Social History Narrative    None         PHYSICAL EXAM    (up to 7 for level 4, 8 or more for level 5)     ED Triage Vitals [02/25/18 2311]   BP Temp Temp Source Pulse Resp SpO2 Height Weight   (!) 231/106 97.2 °F (36.2 °C) Tympanic 80 14 97 % 5' 11\" (1.803 m) 195 lb (88.5 kg)       Physical Exam   Constitutional: He is oriented to person, place, and time. He appears well-nourished. HENT:   Head: Normocephalic. Eyes: Conjunctivae are normal. Pupils are equal, round, and reactive to light. Neck: Normal range of motion. Neck supple. Cardiovascular: Normal rate and regular rhythm. Pulmonary/Chest: Effort normal and breath sounds normal.   Abdominal: Soft. He exhibits no distension. There is no tenderness. Musculoskeletal: Normal range of motion. He exhibits no edema. Neurological: He is alert and oriented to person, place, and time. Skin: Skin is warm and dry. Nursing note and vitals reviewed.         DIAGNOSTIC RESULTS     EKG: All EKG's are interpreted by the Emergency Department Physician who either signs or Co-signs this chart in the absence of a cardiologist.    EKG Interpretation    Interpreted by me    Rhythm: 1st degree AV block  Rate: normal  Axis: normal  Ectopy: none  Conduction: left ant fasicular block  ST Segments: no acute change  T Waves: no acute change  Q Waves: none    Clinical Impression: no acute changes and nonspecific EKG, compared with previous EKG from 2/10/18 no changes      RADIOLOGY:   Non-plain film images such as CT, Ultrasound and MRI are read by the radiologist. Plain radiographic images are visualized and preliminarily interpreted by the emergency physician with the below findings:    Xr Chest Portable    Result Date: 2/26/2018  Improving right upper lobe airspace disease. Interpretation per the Radiologist below, if available at the time of this note:    XR CHEST PORTABLE   Preliminary Result   Improving right upper lobe airspace disease. ED BEDSIDE ULTRASOUND:   Performed by ED Physician - none    LABS:  Labs Reviewed   CBC WITH AUTO DIFFERENTIAL - Abnormal; Notable for the following:        Result Value    Seg Neutrophils 79 (*)     Lymphocytes 12 (*)     Absolute Lymph # 0.90 (*)     All other components within normal limits   BASIC METABOLIC PANEL - Abnormal; Notable for the following:     Chloride 93 (*)     All other components within normal limits   MYOGLOBIN, SERUM - Abnormal; Notable for the following:     Myoglobin 97 (*)     All other components within normal limits   TROPONIN   MYOGLOBIN, SERUM   POCT TROPONIN       All other labs were within normal range or not returned as of this dictation.     EMERGENCY DEPARTMENT COURSE and DIFFERENTIAL DIAGNOSIS/MDM:   Vitals:    Vitals:    02/25/18 2311 02/25/18 2354 02/25/18 2355 02/26/18 0057   BP: (!) 231/106 127/76 134/80 (!) 176/86   Pulse: 80 84  74   Resp: 14 16  18   Temp: 97.2 °F (36.2 °C)      TempSrc: Tympanic SpO2: 97%   98%   Weight: 195 lb (88.5 kg)      Height: 5' 11\" (1.803 m)              RE-EVALUATION:    The pt is feeling better. BP improved. Tge pt is anxious to get home to his phone to see how his sister is doing. The patient denies chest pain or SOB, he denies any symptoms other then his UTI symptoms which he is on therapy for. I spoke with the patient about the test results and answered any questions. The patient is told to return to the ER if any changes or worsening of their symptoms. The patient voices understanding. CONSULTS:  None    PROCEDURES:  None    FINAL IMPRESSION      1. Hypertension, unspecified type    2. Essential hypertension          DISPOSITION/PLAN   DISPOSITION Decision To Discharge 02/26/2018 02:17:24 AM      CONDITION ON DISPOSITION:  stable    PATIENT REFERRED TO:  Gracy Davila DO  [address] [place] [address] [facility]  [phone]    Schedule an appointment as soon as possible for a visit in 2 days        DISCHARGE MEDICATIONS:  New Prescriptions    No medications on file       (Please note that portions of this note were completed with a voice recognition program.  Efforts were made to edit the dictations but occasionally words are mis-transcribed.)    Jasen Lopes D.O., F.A.C.E.P.   Attending Emergency Physician          Jasen Lopes DO  02/26/18 0076

## 2018-02-26 NOTE — ED TRIAGE NOTES
States B/P going back up after he found out that sister is dying.   Took another catapres 15 minutes PTA

## 2018-02-27 ENCOUNTER — HOSPITAL ENCOUNTER (EMERGENCY)
Age: 83
Discharge: HOME | End: 2018-02-27
Attending: EMERGENCY MEDICINE
Payer: MEDICARE

## 2018-02-27 ENCOUNTER — HOSPITAL ENCOUNTER (INPATIENT)
Age: 83
LOS: 2 days | Discharge: NURSING, REHAB OR LONG-TERM CARE | DRG: 813 | End: 2018-03-01
Attending: EMERGENCY MEDICINE | Admitting: INTERNAL MEDICINE
Payer: MEDICARE

## 2018-02-27 VITALS
BODY MASS INDEX: 27.3 KG/M2 | OXYGEN SATURATION: 95 % | WEIGHT: 195 LBS | HEART RATE: 82 BPM | RESPIRATION RATE: 16 BRPM | HEIGHT: 71 IN | TEMPERATURE: 97.3 F | SYSTOLIC BLOOD PRESSURE: 173 MMHG | DIASTOLIC BLOOD PRESSURE: 91 MMHG

## 2018-02-27 VITALS
SYSTOLIC BLOOD PRESSURE: 168 MMHG | DIASTOLIC BLOOD PRESSURE: 82 MMHG | OXYGEN SATURATION: 97 % | TEMPERATURE: 98.2 F | RESPIRATION RATE: 16 BRPM | HEART RATE: 85 BPM

## 2018-02-27 DIAGNOSIS — N39.0 URINARY TRACT INFECTION WITH HEMATURIA, SITE UNSPECIFIED: Primary | ICD-10-CM

## 2018-02-27 DIAGNOSIS — N39.0 URINARY TRACT INFECTION WITHOUT HEMATURIA, SITE UNSPECIFIED: Primary | ICD-10-CM

## 2018-02-27 DIAGNOSIS — R32 URINARY INCONTINENCE, UNSPECIFIED TYPE: ICD-10-CM

## 2018-02-27 DIAGNOSIS — E86.0 MILD DEHYDRATION: Primary | ICD-10-CM

## 2018-02-27 DIAGNOSIS — F41.9 ANXIETY: ICD-10-CM

## 2018-02-27 DIAGNOSIS — R31.9 URINARY TRACT INFECTION WITH HEMATURIA, SITE UNSPECIFIED: Primary | ICD-10-CM

## 2018-02-27 LAB
ANION GAP SERPL CALCULATED.3IONS-SCNC: 13 MMOL/L (ref 9–17)
BUN BLDV-MCNC: 10 MG/DL (ref 8–23)
BUN/CREAT BLD: 11 (ref 9–20)
CALCIUM SERPL-MCNC: 8.7 MG/DL (ref 8.6–10.4)
CHLORIDE BLD-SCNC: 91 MMOL/L (ref 98–107)
CO2: 28 MMOL/L (ref 20–31)
CREAT SERPL-MCNC: 0.94 MG/DL (ref 0.7–1.2)
GFR AFRICAN AMERICAN: >60 ML/MIN
GFR NON-AFRICAN AMERICAN: >60 ML/MIN
GFR SERPL CREATININE-BSD FRML MDRD: ABNORMAL ML/MIN/{1.73_M2}
GFR SERPL CREATININE-BSD FRML MDRD: ABNORMAL ML/MIN/{1.73_M2}
GLUCOSE BLD-MCNC: 110 MG/DL (ref 70–99)
HCT VFR BLD CALC: 39.4 % (ref 41–53)
HEMOGLOBIN: 13.7 G/DL (ref 13.5–17.5)
POTASSIUM SERPL-SCNC: 4.2 MMOL/L (ref 3.7–5.3)
SODIUM BLD-SCNC: 132 MMOL/L (ref 135–144)

## 2018-02-27 PROCEDURE — 2580000003 HC RX 258: Performed by: INTERNAL MEDICINE

## 2018-02-27 PROCEDURE — 80048 BASIC METABOLIC PNL TOTAL CA: CPT

## 2018-02-27 PROCEDURE — 36415 COLL VENOUS BLD VENIPUNCTURE: CPT

## 2018-02-27 PROCEDURE — 96360 HYDRATION IV INFUSION INIT: CPT

## 2018-02-27 PROCEDURE — 2060000000 HC ICU INTERMEDIATE R&B

## 2018-02-27 PROCEDURE — 96361 HYDRATE IV INFUSION ADD-ON: CPT

## 2018-02-27 PROCEDURE — 51702 INSERT TEMP BLADDER CATH: CPT

## 2018-02-27 PROCEDURE — G8978 MOBILITY CURRENT STATUS: HCPCS

## 2018-02-27 PROCEDURE — 85018 HEMOGLOBIN: CPT

## 2018-02-27 PROCEDURE — 99283 EMERGENCY DEPT VISIT LOW MDM: CPT

## 2018-02-27 PROCEDURE — 97161 PT EVAL LOW COMPLEX 20 MIN: CPT

## 2018-02-27 PROCEDURE — 2500000003 HC RX 250 WO HCPCS: Performed by: INTERNAL MEDICINE

## 2018-02-27 PROCEDURE — 94760 N-INVAS EAR/PLS OXIMETRY 1: CPT

## 2018-02-27 PROCEDURE — 85014 HEMATOCRIT: CPT

## 2018-02-27 PROCEDURE — 2580000003 HC RX 258: Performed by: EMERGENCY MEDICINE

## 2018-02-27 PROCEDURE — G8979 MOBILITY GOAL STATUS: HCPCS

## 2018-02-27 PROCEDURE — 99284 EMERGENCY DEPT VISIT MOD MDM: CPT

## 2018-02-27 PROCEDURE — 99223 1ST HOSP IP/OBS HIGH 75: CPT | Performed by: INTERNAL MEDICINE

## 2018-02-27 PROCEDURE — 6370000000 HC RX 637 (ALT 250 FOR IP): Performed by: INTERNAL MEDICINE

## 2018-02-27 RX ORDER — ASPIRIN 81 MG/1
81 TABLET ORAL DAILY
Status: DISCONTINUED | OUTPATIENT
Start: 2018-02-28 | End: 2018-03-01 | Stop reason: HOSPADM

## 2018-02-27 RX ORDER — ALPRAZOLAM 0.25 MG/1
0.12 TABLET ORAL EVERY MORNING
Status: DISCONTINUED | OUTPATIENT
Start: 2018-02-27 | End: 2018-03-01 | Stop reason: HOSPADM

## 2018-02-27 RX ORDER — SIMVASTATIN 20 MG
20 TABLET ORAL
Status: DISCONTINUED | OUTPATIENT
Start: 2018-02-27 | End: 2018-03-01 | Stop reason: HOSPADM

## 2018-02-27 RX ORDER — AMIODARONE HYDROCHLORIDE 200 MG/1
200 TABLET ORAL DAILY
Status: DISCONTINUED | OUTPATIENT
Start: 2018-02-28 | End: 2018-03-01

## 2018-02-27 RX ORDER — SODIUM CHLORIDE 9 MG/ML
INJECTION, SOLUTION INTRAVENOUS CONTINUOUS
Status: DISCONTINUED | OUTPATIENT
Start: 2018-02-27 | End: 2018-02-28

## 2018-02-27 RX ORDER — SODIUM CHLORIDE 0.9 % (FLUSH) 0.9 %
10 SYRINGE (ML) INJECTION EVERY 12 HOURS SCHEDULED
Status: DISCONTINUED | OUTPATIENT
Start: 2018-02-27 | End: 2018-03-01 | Stop reason: HOSPADM

## 2018-02-27 RX ORDER — CHOLECALCIFEROL (VITAMIN D3) 125 MCG
100 CAPSULE ORAL EVERY OTHER DAY
Status: DISCONTINUED | OUTPATIENT
Start: 2018-02-27 | End: 2018-02-27 | Stop reason: RX

## 2018-02-27 RX ORDER — ACETAMINOPHEN 325 MG/1
650 TABLET ORAL EVERY 4 HOURS PRN
Status: DISCONTINUED | OUTPATIENT
Start: 2018-02-27 | End: 2018-03-01 | Stop reason: HOSPADM

## 2018-02-27 RX ORDER — HYDROCODONE BITARTRATE AND ACETAMINOPHEN 5; 325 MG/1; MG/1
1 TABLET ORAL EVERY 4 HOURS PRN
Status: DISCONTINUED | OUTPATIENT
Start: 2018-02-27 | End: 2018-03-01 | Stop reason: HOSPADM

## 2018-02-27 RX ORDER — METOPROLOL SUCCINATE 25 MG/1
25 TABLET, EXTENDED RELEASE ORAL 2 TIMES DAILY
Status: DISCONTINUED | OUTPATIENT
Start: 2018-02-27 | End: 2018-03-01

## 2018-02-27 RX ORDER — SODIUM CHLORIDE 0.9 % (FLUSH) 0.9 %
10 SYRINGE (ML) INJECTION PRN
Status: DISCONTINUED | OUTPATIENT
Start: 2018-02-27 | End: 2018-03-01 | Stop reason: HOSPADM

## 2018-02-27 RX ORDER — ALPRAZOLAM 0.25 MG/1
0.25 TABLET ORAL NIGHTLY
Status: DISCONTINUED | OUTPATIENT
Start: 2018-02-27 | End: 2018-03-01 | Stop reason: HOSPADM

## 2018-02-27 RX ORDER — PHENAZOPYRIDINE HYDROCHLORIDE 100 MG/1
200 TABLET, FILM COATED ORAL
Status: DISCONTINUED | OUTPATIENT
Start: 2018-02-27 | End: 2018-03-01 | Stop reason: HOSPADM

## 2018-02-27 RX ORDER — LISINOPRIL 5 MG/1
10 TABLET ORAL DAILY
Status: DISCONTINUED | OUTPATIENT
Start: 2018-02-27 | End: 2018-03-01 | Stop reason: HOSPADM

## 2018-02-27 RX ORDER — MULTIVITAMIN WITH FOLIC ACID 400 MCG
1 TABLET ORAL DAILY
Status: DISCONTINUED | OUTPATIENT
Start: 2018-02-27 | End: 2018-03-01 | Stop reason: HOSPADM

## 2018-02-27 RX ORDER — FLUTICASONE PROPIONATE 50 MCG
1 SPRAY, SUSPENSION (ML) NASAL DAILY
Status: DISCONTINUED | OUTPATIENT
Start: 2018-02-28 | End: 2018-03-01 | Stop reason: HOSPADM

## 2018-02-27 RX ORDER — KETOCONAZOLE 20 MG/G
CREAM TOPICAL 2 TIMES DAILY
Status: DISCONTINUED | OUTPATIENT
Start: 2018-02-27 | End: 2018-03-01 | Stop reason: HOSPADM

## 2018-02-27 RX ORDER — 0.9 % SODIUM CHLORIDE 0.9 %
500 INTRAVENOUS SOLUTION INTRAVENOUS ONCE
Status: COMPLETED | OUTPATIENT
Start: 2018-02-27 | End: 2018-02-27

## 2018-02-27 RX ORDER — 0.9 % SODIUM CHLORIDE 0.9 %
1000 INTRAVENOUS SOLUTION INTRAVENOUS ONCE
Status: COMPLETED | OUTPATIENT
Start: 2018-02-27 | End: 2018-02-27

## 2018-02-27 RX ADMIN — KETOCONAZOLE: 20 CREAM TOPICAL at 23:18

## 2018-02-27 RX ADMIN — AZTREONAM 1 G: 1 INJECTION, POWDER, LYOPHILIZED, FOR SOLUTION INTRAMUSCULAR; INTRAVENOUS at 23:18

## 2018-02-27 RX ADMIN — PHENAZOPYRIDINE HYDROCHLORIDE 200 MG: 100 TABLET ORAL at 15:10

## 2018-02-27 RX ADMIN — SODIUM CHLORIDE 1000 ML: 9 INJECTION, SOLUTION INTRAVENOUS at 06:52

## 2018-02-27 RX ADMIN — SODIUM CHLORIDE: 9 INJECTION, SOLUTION INTRAVENOUS at 15:40

## 2018-02-27 RX ADMIN — ALPRAZOLAM 0.12 MG: 0.25 TABLET ORAL at 15:11

## 2018-02-27 RX ADMIN — SIMVASTATIN 20 MG: 20 TABLET, FILM COATED ORAL at 20:54

## 2018-02-27 RX ADMIN — HYDROCODONE BITARTRATE AND ACETAMINOPHEN 1 TABLET: 5; 325 TABLET ORAL at 15:10

## 2018-02-27 RX ADMIN — SODIUM CHLORIDE 500 ML: 9 INJECTION, SOLUTION INTRAVENOUS at 08:05

## 2018-02-27 RX ADMIN — THERA TABS 1 TABLET: TAB at 15:11

## 2018-02-27 RX ADMIN — METOPROLOL SUCCINATE 25 MG: 25 TABLET, EXTENDED RELEASE ORAL at 20:54

## 2018-02-27 RX ADMIN — LISINOPRIL 10 MG: 5 TABLET ORAL at 15:11

## 2018-02-27 RX ADMIN — AZTREONAM 1 G: 1 INJECTION, POWDER, LYOPHILIZED, FOR SOLUTION INTRAMUSCULAR; INTRAVENOUS at 17:45

## 2018-02-27 RX ADMIN — ALPRAZOLAM 0.25 MG: 0.25 TABLET ORAL at 20:54

## 2018-02-27 ASSESSMENT — PAIN SCALES - GENERAL
PAINLEVEL_OUTOF10: 0
PAINLEVEL_OUTOF10: 0
PAINLEVEL_OUTOF10: 3
PAINLEVEL_OUTOF10: 6
PAINLEVEL_OUTOF10: 8

## 2018-02-27 ASSESSMENT — ENCOUNTER SYMPTOMS
SORE THROAT: 0
VOMITING: 0
ABDOMINAL PAIN: 0
DIARRHEA: 0
NAUSEA: 0
SORE THROAT: 0
SHORTNESS OF BREATH: 0
SHORTNESS OF BREATH: 0
ABDOMINAL PAIN: 0
NAUSEA: 0
VOMITING: 0
DIARRHEA: 0

## 2018-02-27 ASSESSMENT — PAIN DESCRIPTION - LOCATION
LOCATION: PENIS
LOCATION: PENIS

## 2018-02-27 ASSESSMENT — PAIN DESCRIPTION - PAIN TYPE
TYPE: ACUTE PAIN
TYPE: ACUTE PAIN

## 2018-02-27 ASSESSMENT — PAIN DESCRIPTION - DESCRIPTORS
DESCRIPTORS: BURNING
DESCRIPTORS: SHARP

## 2018-02-27 ASSESSMENT — PAIN DESCRIPTION - FREQUENCY
FREQUENCY: CONTINUOUS
FREQUENCY: INTERMITTENT

## 2018-02-27 ASSESSMENT — PAIN DESCRIPTION - ONSET: ONSET: SUDDEN

## 2018-02-27 NOTE — ED PROVIDER NOTES
DISPOSITION        Condition on Disposition    stable    PATIENT REFERRED TO:  Bravo Gibbs, DO  [address] [facility]  [phone]            DISCHARGE MEDICATIONS:  New Prescriptions    No medications on file       (Please note that portions of this note were completed with a voice recognition program.  Efforts were made to edit the dictations but occasionally words are mis-transcribed.)    Siddiqui MD   Attending Emergency Physician         Shaan Polanco MD  02/27/18 1214       Shaan Polanco MD  02/27/18 1229

## 2018-02-27 NOTE — FLOWSHEET NOTE
Yeyo's sister  yesterday. He was left making end-of-life decisions with no family for support. His Evangelical family and the medical professionals have been very kind and compassionate. After several ED visits he finally agreed to admission for treatment. [id number] [phone]   Encounter Summary   Services provided to: Patient   Referral/Consult From: Nurse   Support System [employer]onden[employer]ational/[facility] community   [facility]   Contact Nondenominational Completed   Complexity of Encounter High   Length of Encounter 45 minutes   Grief and Life Adjustment   Type Grief and loss   Assessment Calm; Approachable   Intervention Explored feelings, thoughts, concerns;Contacted support as requested per patient/family request   Who? ([employer] support)   Why? Raeann Vyaskyle has no living close relatives.)   At Request Of Raeann Tran asked for support)   Outcome Connection/belonging

## 2018-02-27 NOTE — PROGRESS NOTES
Physical Therapy    Facility/Department: [facility]  PROGRESSIVE CARE  Initial Assessment    NAME: Nayely Arrington  : 1934  MRN: [id number]    Date of Service: 2018    Patient Diagnosis(es): The primary encounter diagnosis was Urinary tract infection with hematuria, site unspecified. A diagnosis of Urinary incontinence, unspecified type was also pertinent to this visit. has a past medical history of Acute bronchitis; Allergic rhinitis; Anxiety; Atrial fibrillation ([address] CAD (coronary artery disease); Cataract, right; Choroidal nevus of right eye; Diverticulosis; Elevated PSA; Hemorrhoids; History of measles; History of mumps; Hyperlipidemia; Hypertension; Occult blood positive stool; Osteoarthritis; Overweight(278.02); Palpitations; Pseudophakia, left eye; PSVT (paroxysmal supraventricular tachycardia) ([address] Retinal detachment; and Seborrheic dermatitis. has a past surgical history that includes Coronary artery bypass graft; Appendectomy ( and ); Tonsillectomy; retinal laser (Left, 2011); Cataract removal (Left, 2013); Skin tag removal (1999); and Abscess Drainage ([id number]).     Restrictions     Vision/Hearing        Subjective  General  Chart Reviewed: Yes  Patient assessed for rehabilitation services?: Yes  Pain Assessment  Pain Level: 8  Pain Type: Acute pain       Orientation  Orientation  Overall Orientation Status: Within Functional Limits    Social/Functional History  Social/Functional History  Lives With: Alone  Type of Home: Apartment  Home Layout: One level  Bathroom Shower/Tub: Tub/Shower unit  Bathroom Toilet: Standard  Bathroom Accessibility: Walker accessible  ADL Assistance: Independent  Homemaking Assistance: Independent  Homemaking Responsibilities: Yes  Ambulation Assistance: Independent  Transfer Assistance: Independent  Active : Yes  Occupation: Retired  Objective     Observation/Palpation  Posture: Good    AROM RLE (degrees)  RLE AROM: WFL  AROM LLE (degrees)  LLE AROM : WFL  Strength RLE  Strength RLE: WFL  Strength LLE  Strength LLE: WFL        Bed mobility  Supine to Sit: Contact guard assistance  Sit to Supine: Contact guard assistance  Transfers  Sit to Stand: Minimal Assistance  Stand to sit: Minimal Assistance  Ambulation  Ambulation?: Yes  Ambulation 1  Surface: level tile  Device: No Device  Assistance: Minimal assistance  Quality of Gait: antalgic   Distance: 5'              Assessment   Body structures, Functions, Activity limitations: Decreased functional mobility ; Decreased ADL status; Decreased balance  Prognosis: Good  Decision Making: Low Complexity  REQUIRES PT FOLLOW UP: Yes  Activity Tolerance  Activity Tolerance: Patient limited by pain     Discharge Recommendations:  Continue to assess pending progress, Home independently      Plan   Plan  Times per day: Daily  Current Treatment Recommendations: Strengthening, Balance Training, Transfer Training, Gait Training  Safety Devices  Type of devices: Call light within reach, Left in chair    G-Code  PT G-Codes  Functional Limitation: Mobility: Walking and moving around  Mobility: Walking and Moving Around Current Status ():  At least 1 percent but less than 20 percent impaired, limited or restricted  Mobility: Walking and Moving Around Goal Status (): 0 percent impaired, limited or restricted  OutComes Score                                           AM-PAC Score             Goals  Short term goals  Time Frame for Short term goals: 3days   Short term goal 1: Indpendent in amb x 50ft  Short term goal 2: Bed Mobility Mod I   Short term goal 3: Transfers Mod I   Patient Goals   Patient goals : return home        Therapy Time   Individual Concurrent Group Co-treatment   Time In 0240         Time Out 0250         Minutes [address], PT

## 2018-02-27 NOTE — ED PROVIDER NOTES
achievable. COMPARISON: None. HISTORY: ORDERING SYSTEM PROVIDED HISTORY: r/o right lung mass TECHNOLOGIST PROVIDED HISTORY: Ordering Physician Provided Reason for Exam: abnormal CXR, cough, A-fib, FINDINGS: Mediastinum: Enlarged right hilar lymph nodes are present, 14 mm. No mediastinal or left hilar adenopathy. No acute aortic abnormality. Heart size is normal.  Coronary artery calcification is evident. No pericardial effusion is seen. Lungs/pleura: Azygos lobe, a normal variation is noted. There is a mass extending to the right perihilar location of 4.3 x 1.9 x 3.0 cm, ill-defined, with satellite lesion of 2.0 x 0.8 cm located laterally and cranial to this location. A few smaller nodules along the superior aspect of this mass are noted averaging 3-4 mm. Appearance is suspicious for pulmonary malignancy. There is mild focal airspace disease in the posterior aspect of the right upper lobe. Granulomatous calcifications are present in the left lower lobe, and soft tissue density nodule of 14 mm is present in the left lower lobe on image 107. No effusions are present. Upper Abdomen: Incidental gallstones are present. Hypodensity is present in the left lobe of the liver of 8 mm, Hounsfield numbers suggesting a cyst.  No other findings of note are present in the upper abdomen. Soft Tissues/Bones: Spondylosis is present in the dorsal spine. The patient has a lytic area in the left superior aspect of T5. Given lung findings, findings are suspicious for possible metastatic lesion. No acute soft tissue abnormality. Estimated biologic radiation dose for this procedure:  580.62 mGy/cm2. Ill-defined irregular right upper lobe with satellite lesions and right hilar adenopathy most compatible with pulmonary malignancy. Patient has a lytic lesion on the left side of the superior endplate of T5 suspicious for metastasis. RECOMMENDATIONS: Consider tissue sampling of the lung nodule.   PET-CT imaging may prove airspace consolidation in the inferior aspect of the right upper lobe. No pneumothorax or sizable pleural effusion. No free air beneath the diaphragm. No evidence of acute osseous abnormality. Airspace consolidation in the mid right lung, likely right upper lobe pneumonia. Short-term radiographic follow-up to complete resolution is recommended. Pet Ct Skull Base To Mid Thigh    Result Date: 2/19/2018  EXAMINATION: WHOLE BODY PET/CT 2/17/2018 TECHNIQUE: Following IV injection of 16.4 mCi of F 18 -FDG, PET  tumor imaging was acquired from the base of the skull to the mid thighs. Computed tomography was used for purposes of attenuation correction and anatomic localization. Fusion imaging was utilized for interpretation. Uptake time 67 mins. Glucose level 83 mg/dl. COMPARISON: CT chest 02/14/2018 HISTORY: ORDERING SYSTEM PROVIDED HISTORY: Mass of upper lobe of right lung TECHNOLOGIST PROVIDED HISTORY: Reason for exam:->possible lytic lesion T5 in context of pneumonia/mass/hilar adenotpathy RUL. Identify targets for biopsy FINDINGS: HEAD/NECK: No metabolically active cervical lymphadenopathy. CHEST: Re- demonstration of right upper lobe masslike opacity measuring 4.3 cm which is FDG avid with maximum SUV of 8.9. No metabolically active adenopathy is visualized within the chest, in particular right hilar soft tissue prominence as seen on prior chest CT is not FDG avid. ABDOMEN/PELVIS: No metabolically active intraperitoneal mass. No metabolically active abdominal or pelvic lymphadenopathy. Physiologic activity in the gastrointestinal and genitourinary systems. BONES/SOFT TISSUE:  Focal activity extending along the L1 superior endplate. No aggressive osseous lesion, in particular well-circumscribed left superior T5 lytic lesion is not FDG avid. INCIDENTAL CT FINDINGS: Azygos lobe is noted. Coronary artery calcification. Few small layering gallstones. Prostate is mildly enlarged.   Multiple pelvic phleboliths. Mild aortic atherosclerosis. 1. Re- demonstration of right upper lobe masslike opacity which is FDG avid most consistent with primary lung malignancy. 2. No evidence of metastatic disease, in particular no FDG avid mediastinal adenopathy and no focal uptake is seen in region of right hilar soft tissue prominence and well-circumscribed left superior T5 lucent lesion is not FDG avid suggesting benign etiology likely bone cyst. 3. Focal uptake seen along superior endplate of L1 likely relating to combination of degenerative change and mild chronic compression deformity. 4. Multiple chronic findings as detailed above. LABS:  Results for orders placed or performed during the hospital encounter of 37/69/86   Basic Metabolic Panel   Result Value Ref Range    Glucose 110 (H) 70 - 99 mg/dL    BUN 10 8 - 23 mg/dL    CREATININE 0.94 0.70 - 1.20 mg/dL    Bun/Cre Ratio 11 9 - 20    Calcium 8.7 8.6 - 10.4 mg/dL    Sodium 132 (L) 135 - 144 mmol/L    Potassium 4.2 3.7 - 5.3 mmol/L    Chloride 91 (L) 98 - 107 mmol/L    CO2 28 20 - 31 mmol/L    Anion Gap 13 9 - 17 mmol/L    GFR Non-African American >60 >60 mL/min    GFR African American >60 >60 mL/min    GFR Comment          GFR Staging NOT REPORTED        EMERGENCY DEPARTMENT COURSE:     The patient was given the following medications:  Orders Placed This Encounter   Medications    0.9 % sodium chloride bolus    0.9 % sodium chloride bolus        Vitals:    Vitals:    02/27/18 0622   BP: (!) 197/95   Pulse: 74   Resp: 16   Temp: 97.3 °F (36.3 °C)   TempSrc: Tympanic   SpO2: 95%   Weight: 195 lb (88.5 kg)   Height: 5' 11\" (1.803 m)     -------------------------  BP: (!) 197/95, Temp: 97.3 °F (36.3 °C), Pulse: 74, Resp: 16      Re-evaluation Notes    7:22 AM:   Patient is doing well on reevaluation. No acute changes. Creatinine is normal.  Mild hyponatremia. Plan will be to continue with some IV hydration and discharge home. He is declining admission.

## 2018-02-27 NOTE — ED PROVIDER NOTES
. He indicated that his father is . He indicated that the status of his sister is unknown.      family history includes Diabetes in his mother; Osteoporosis in his mother; Other in his father; Stroke in his sister. SOCIAL HISTORY      reports that he has never smoked. He has never used smokeless tobacco. He reports that he does not drink alcohol or use drugs. PHYSICAL EXAM     INITIAL VITALS:  tympanic temperature is 98.2 °F (36.8 °C). His blood pressure is 168/82 (abnormal) and his pulse is 85. His respiration is 16 and oxygen saturation is 97%. Physical Exam   Constitutional: He is well-developed, well-nourished, and in no distress. Non-toxic appearance. He does not have a sickly appearance. No distress. HENT:   Head: Normocephalic and atraumatic. Right Ear: External ear normal.   Left Ear: External ear normal.   Nose: Nose normal.   Mouth/Throat: Oropharynx is clear and moist.   Eyes: Conjunctivae and EOM are normal. Right eye exhibits no discharge. Left eye exhibits no discharge. No scleral icterus. Neck: Normal range of motion. Neck supple. Cardiovascular: Normal rate, regular rhythm, normal heart sounds and intact distal pulses. Pulmonary/Chest: Effort normal and breath sounds normal. He has no wheezes. Abdominal: Soft. Bowel sounds are normal. There is no tenderness. There is no guarding. No abdominal tenderness. Benign abdominal exam.   Musculoskeletal: Normal range of motion. Neurological: He is alert. GCS score is 15. Skin: Skin is warm and dry. He is not diaphoretic. Psychiatric: Affect normal.   Vitals reviewed. DIFFERENTIAL DIAGNOSIS/ MDM:     At this time I feel the patient has decreased urination because he has not been drinking. He tells me since yesterday he has only had water with his medications. Clinically he does not appear dehydrated. He appears otherwise well and nontoxic.   At this time I do feel he is appropriate for discharge and

## 2018-02-27 NOTE — H&P
HOSPITALIST ADMISSION H&P      REASON FOR ADMISSION:  UTI---POA---uncontrolled urination----hematuria  ESTIMATED LENGTH OF STAY:  > 2 midnights---2-3 days    ATTENDING/ADMITTING PHYSICIAN: West Davila MD  PCP: [address] [place] [address]    HISTORY OF PRESENT ILLNESS:      The patient is a 80 y.o. male patient of [address] [place] [address] who presents with lower abdominal pain--suprapubic pain----initially had difficulty starting a stream, after hydration unable to control his bladder function with spontaneous uncontrolled urination----has hematuria---grossly bloody----sanchez catheter placed---has some clots and episodic bladder spasm----patient is on Xarelto due paroxysmal atrial fibrillation which further exacerbates his bleeding. Grief reaction--death of sister yesterday    Anxiety        See below for additional PMH.     Patient xlai-pvxqtotbwu-[id number]-available records reviewed, including, but not limited to,  ER reports--labs--office records----personal notes       Past Medical History:   Diagnosis Date    Acute bronchitis     history of     Allergic rhinitis     Anxiety     Atrial fibrillation ([address]     paroxymsal     CAD (coronary artery disease)     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    Cataract, right     Choroidal nevus of right eye     Diverticulosis     Elevated PSA     Hemorrhoids     History of measles childhood    History of mumps childhood    Hyperlipidemia     Hypertension     Occult blood positive stool     refuses colonoscopy    Osteoarthritis     Overweight(278.02)     Palpitations     Pseudophakia, left eye     PSVT (paroxysmal supraventricular tachycardia) ([address]     Retinal detachment     left, history of     Seborrheic dermatitis            Past Surgical History:   Procedure Laterality Date    ABSCESS DRAINAGE  [id number]    multiple    APPENDECTOMY  1940 and [facility] Left 5/7/2013    CORONARY ARTERY BYPASS GRAFT      RETINAL LASER Left 12/22/2011    detached EXAM:  Vitals:  BP (!) 185/82   Pulse 78   Temp 99.1 °F (37.3 °C)   Resp 18   Ht 5' 11\" (1.803 m)   Wt 192 lb 10.9 oz (87.4 kg)   SpO2 98%   BMI 26.87 kg/m²     HEENT: Normocephalic and Atraumatic  Neck: Supple, No Masses, Tenderness, Nodularity and No Lymphadenopathy  Chest/Lungs: Clear to Auscultation without Rales, Rhonchi, or Wheezes  Cardiac: Regular Rate and Rhythm  GI/Abdomen:  Bowel Sounds Present and Soft, Non-tender, without Guarding or Rebound Tenderness  : suprapubic tenderness---sanchez catheter---gross hematuia--cherry-to-dark red  EXT/Skin: No Edema, No Cyanosis and No Clubbing  Neuro: Alert and Oriented, to Person, to Time, to Place, to Situation and No Localizing Signs/Symptoms        LABS:    CBC with Differential:    Lab Results   Component Value Date    WBC 7.7 02/25/2018    RBC 5.14 02/25/2018    HGB 16.2 02/25/2018    HCT 46.7 02/25/2018     02/25/2018    MCV 90.9 02/25/2018    MCH 31.5 02/25/2018    MCHC 34.7 02/25/2018    RDW 14.4 02/25/2018    LYMPHOPCT 12 02/25/2018    MONOPCT 6 02/25/2018    BASOPCT 1 02/25/2018    MONOSABS 0.40 02/25/2018    LYMPHSABS 0.90 02/25/2018    EOSABS 0.20 02/25/2018    BASOSABS 0.00 02/25/2018    DIFFTYPE NOT REPORTED 02/25/2018     BMP:    Lab Results   Component Value Date     02/27/2018    K 4.2 02/27/2018    CL 91 02/27/2018    CO2 28 02/27/2018    BUN 10 02/27/2018    LABALBU 3.8 04/14/2017    CREATININE 0.94 02/27/2018    CALCIUM 8.7 02/27/2018    GFRAA >60 02/27/2018    LABGLOM >60 02/27/2018    GLUCOSE 110 02/27/2018     U/A:    Lab Results   Component Value Date    NITRITE Neg 05/30/2017    COLORU NOT REPORTED 02/25/2018    PROTEINU 2+ 02/25/2018    PHUR 5.5 02/25/2018    WBCUA >50 02/25/2018    RBCUA >50 02/25/2018    MUCUS NOT REPORTED 02/25/2018    TRICHOMONAS NOT REPORTED 02/25/2018    YEAST NOT REPORTED 02/25/2018    BACTERIA 4+ 02/25/2018    SPECGRAV 1.030 02/25/2018    LEUKOCYTESUR 2+ 02/25/2018    UROBILINOGEN Normal PMH:   diverticulosis, allergic rhinitis, bronchitis, choroidal nevus right               eye, elevated PSA, measles, mumps, OA, hemorrhoids,               Hemocult-[+]stoolrefused colonoscopy, palpitations,              right cataract, UTI---POA---2.12.2018  PSH:   appendectomy[id number], tonsillectomy, left eye laser             detached retina---2011, left cataractIOL---2013, skin tag             [id number]0511, multiple abscess drainages---[id number]    Allergies:  Penicillin---swelling, sulfa, Cefdinir  Code Status:  FULL CODE  OARRS---2.27.2018--[-]      PLAN:    1. UTI---POA----Azactam--Macrobid  2. Hematuria---urine cultures---irrigation of sanchez catheter----hold Xarelto----recheck H&H  3. Discomfort---pyridium----Norco---irrigation if clot  4. IV fluids  5. Home medications reviewed  6.   See orders     Note that over 50 minutes was spent in evaluation of the patient, review of the chart and pertinent records, discussion with family/staff, etc    Adriana Bean MD  2:58 PM  2/27/2018

## 2018-02-28 LAB
ABSOLUTE EOS #: 0.2 K/UL (ref 0–0.4)
ABSOLUTE IMMATURE GRANULOCYTE: ABNORMAL K/UL (ref 0–0.3)
ABSOLUTE LYMPH #: 0.7 K/UL (ref 1–4.8)
ABSOLUTE MONO #: 0.4 K/UL (ref 0.1–1.2)
ANION GAP SERPL CALCULATED.3IONS-SCNC: 11 MMOL/L (ref 9–17)
BASOPHILS # BLD: 1 % (ref 0–2)
BASOPHILS ABSOLUTE: 0 K/UL (ref 0–0.2)
BUN BLDV-MCNC: 8 MG/DL (ref 8–23)
BUN/CREAT BLD: 11 (ref 9–20)
CALCIUM SERPL-MCNC: 8.2 MG/DL (ref 8.6–10.4)
CHLORIDE BLD-SCNC: 101 MMOL/L (ref 98–107)
CO2: 27 MMOL/L (ref 20–31)
CREAT SERPL-MCNC: 0.71 MG/DL (ref 0.7–1.2)
CULTURE: ABNORMAL
CULTURE: ABNORMAL
DIFFERENTIAL TYPE: ABNORMAL
EOSINOPHILS RELATIVE PERCENT: 4 % (ref 1–8)
GFR AFRICAN AMERICAN: >60 ML/MIN
GFR NON-AFRICAN AMERICAN: >60 ML/MIN
GFR SERPL CREATININE-BSD FRML MDRD: ABNORMAL ML/MIN/{1.73_M2}
GFR SERPL CREATININE-BSD FRML MDRD: ABNORMAL ML/MIN/{1.73_M2}
GLUCOSE BLD-MCNC: 100 MG/DL (ref 70–99)
HCT VFR BLD CALC: 39.4 % (ref 41–53)
HEMOGLOBIN: 13.6 G/DL (ref 13.5–17.5)
IMMATURE GRANULOCYTES: ABNORMAL %
LYMPHOCYTES # BLD: 12 % (ref 15–43)
Lab: ABNORMAL
MCH RBC QN AUTO: 31.2 PG (ref 26–34)
MCHC RBC AUTO-ENTMCNC: 34.5 G/DL (ref 31–37)
MCV RBC AUTO: 90.5 FL (ref 80–100)
MONOCYTES # BLD: 7 % (ref 6–14)
NRBC AUTOMATED: ABNORMAL PER 100 WBC
ORGANISM: ABNORMAL
PDW BLD-RTO: 14.6 % (ref 11–14.5)
PLATELET # BLD: 167 K/UL (ref 140–450)
PLATELET ESTIMATE: ABNORMAL
PMV BLD AUTO: 8.6 FL (ref 6–12)
POTASSIUM SERPL-SCNC: 3.7 MMOL/L (ref 3.7–5.3)
RBC # BLD: 4.36 M/UL (ref 4.5–5.9)
RBC # BLD: ABNORMAL 10*6/UL
SEG NEUTROPHILS: 76 % (ref 44–74)
SEGMENTED NEUTROPHILS ABSOLUTE COUNT: 4.7 K/UL (ref 1.8–7.7)
SODIUM BLD-SCNC: 139 MMOL/L (ref 135–144)
SPECIMEN DESCRIPTION: ABNORMAL
SPECIMEN DESCRIPTION: ABNORMAL
STATUS: ABNORMAL
WBC # BLD: 6.1 K/UL (ref 3.5–11)
WBC # BLD: ABNORMAL 10*3/UL

## 2018-02-28 PROCEDURE — 94760 N-INVAS EAR/PLS OXIMETRY 1: CPT

## 2018-02-28 PROCEDURE — 85025 COMPLETE CBC W/AUTO DIFF WBC: CPT

## 2018-02-28 PROCEDURE — 97116 GAIT TRAINING THERAPY: CPT

## 2018-02-28 PROCEDURE — 97165 OT EVAL LOW COMPLEX 30 MIN: CPT | Performed by: OCCUPATIONAL THERAPIST

## 2018-02-28 PROCEDURE — 80048 BASIC METABOLIC PNL TOTAL CA: CPT

## 2018-02-28 PROCEDURE — 99232 SBSQ HOSP IP/OBS MODERATE 35: CPT | Performed by: INTERNAL MEDICINE

## 2018-02-28 PROCEDURE — G8978 MOBILITY CURRENT STATUS: HCPCS

## 2018-02-28 PROCEDURE — G8987 SELF CARE CURRENT STATUS: HCPCS | Performed by: OCCUPATIONAL THERAPIST

## 2018-02-28 PROCEDURE — 2500000003 HC RX 250 WO HCPCS

## 2018-02-28 PROCEDURE — 2060000000 HC ICU INTERMEDIATE R&B

## 2018-02-28 PROCEDURE — G8989 SELF CARE D/C STATUS: HCPCS | Performed by: OCCUPATIONAL THERAPIST

## 2018-02-28 PROCEDURE — G8980 MOBILITY D/C STATUS: HCPCS

## 2018-02-28 PROCEDURE — 6370000000 HC RX 637 (ALT 250 FOR IP): Performed by: INTERNAL MEDICINE

## 2018-02-28 PROCEDURE — 93005 ELECTROCARDIOGRAM TRACING: CPT

## 2018-02-28 PROCEDURE — 2580000003 HC RX 258: Performed by: INTERNAL MEDICINE

## 2018-02-28 PROCEDURE — G8988 SELF CARE GOAL STATUS: HCPCS | Performed by: OCCUPATIONAL THERAPIST

## 2018-02-28 PROCEDURE — 97110 THERAPEUTIC EXERCISES: CPT

## 2018-02-28 PROCEDURE — G8979 MOBILITY GOAL STATUS: HCPCS

## 2018-02-28 PROCEDURE — 2500000003 HC RX 250 WO HCPCS: Performed by: INTERNAL MEDICINE

## 2018-02-28 PROCEDURE — 36415 COLL VENOUS BLD VENIPUNCTURE: CPT

## 2018-02-28 RX ORDER — METOPROLOL TARTRATE 5 MG/5ML
5 INJECTION INTRAVENOUS ONCE
Status: COMPLETED | OUTPATIENT
Start: 2018-02-28 | End: 2018-02-28

## 2018-02-28 RX ORDER — ALPRAZOLAM 0.25 MG/1
0.25 TABLET ORAL ONCE
Status: COMPLETED | OUTPATIENT
Start: 2018-02-28 | End: 2018-02-28

## 2018-02-28 RX ORDER — METOPROLOL TARTRATE 5 MG/5ML
INJECTION INTRAVENOUS
Status: COMPLETED
Start: 2018-02-28 | End: 2018-02-28

## 2018-02-28 RX ORDER — POLYVINYL ALCOHOL 14 MG/ML
1 SOLUTION/ DROPS OPHTHALMIC PRN
Status: DISCONTINUED | OUTPATIENT
Start: 2018-02-28 | End: 2018-03-01 | Stop reason: HOSPADM

## 2018-02-28 RX ADMIN — PHENAZOPYRIDINE HYDROCHLORIDE 200 MG: 100 TABLET ORAL at 08:14

## 2018-02-28 RX ADMIN — AZTREONAM 1 G: 1 INJECTION, POWDER, LYOPHILIZED, FOR SOLUTION INTRAMUSCULAR; INTRAVENOUS at 23:17

## 2018-02-28 RX ADMIN — ALPRAZOLAM 0.25 MG: 0.25 TABLET ORAL at 21:30

## 2018-02-28 RX ADMIN — ALPRAZOLAM 0.12 MG: 0.25 TABLET ORAL at 08:13

## 2018-02-28 RX ADMIN — KETOCONAZOLE: 20 CREAM TOPICAL at 21:30

## 2018-02-28 RX ADMIN — METOPROLOL SUCCINATE 25 MG: 25 TABLET, EXTENDED RELEASE ORAL at 21:30

## 2018-02-28 RX ADMIN — KETOCONAZOLE: 20 CREAM TOPICAL at 08:15

## 2018-02-28 RX ADMIN — AMIODARONE HYDROCHLORIDE 200 MG: 200 TABLET ORAL at 08:14

## 2018-02-28 RX ADMIN — ASPIRIN 81 MG: 81 TABLET, COATED ORAL at 08:14

## 2018-02-28 RX ADMIN — METOPROLOL TARTRATE 5 MG: 5 INJECTION, SOLUTION INTRAVENOUS at 17:09

## 2018-02-28 RX ADMIN — HYDROCODONE BITARTRATE AND ACETAMINOPHEN 1 TABLET: 5; 325 TABLET ORAL at 04:28

## 2018-02-28 RX ADMIN — FLUTICASONE PROPIONATE 1 SPRAY: 50 SPRAY, METERED NASAL at 08:32

## 2018-02-28 RX ADMIN — Medication 10 ML: at 21:31

## 2018-02-28 RX ADMIN — THERA TABS 1 TABLET: TAB at 08:14

## 2018-02-28 RX ADMIN — Medication 10 ML: at 08:32

## 2018-02-28 RX ADMIN — PHENAZOPYRIDINE HYDROCHLORIDE 200 MG: 100 TABLET ORAL at 18:03

## 2018-02-28 RX ADMIN — METOPROLOL TARTRATE 5 MG: 5 INJECTION INTRAVENOUS at 17:09

## 2018-02-28 RX ADMIN — ALPRAZOLAM 0.25 MG: 0.25 TABLET ORAL at 15:01

## 2018-02-28 RX ADMIN — AZTREONAM 1 G: 1 INJECTION, POWDER, LYOPHILIZED, FOR SOLUTION INTRAMUSCULAR; INTRAVENOUS at 08:32

## 2018-02-28 RX ADMIN — AZTREONAM 1 G: 1 INJECTION, POWDER, LYOPHILIZED, FOR SOLUTION INTRAMUSCULAR; INTRAVENOUS at 16:31

## 2018-02-28 RX ADMIN — METOPROLOL SUCCINATE 25 MG: 25 TABLET, EXTENDED RELEASE ORAL at 08:13

## 2018-02-28 RX ADMIN — PHENAZOPYRIDINE HYDROCHLORIDE 200 MG: 100 TABLET ORAL at 13:35

## 2018-02-28 RX ADMIN — LISINOPRIL 10 MG: 5 TABLET ORAL at 08:14

## 2018-02-28 RX ADMIN — SODIUM CHLORIDE: 9 INJECTION, SOLUTION INTRAVENOUS at 01:27

## 2018-02-28 ASSESSMENT — PAIN DESCRIPTION - LOCATION: LOCATION: BACK

## 2018-02-28 ASSESSMENT — PAIN DESCRIPTION - PAIN TYPE: TYPE: ACUTE PAIN

## 2018-02-28 ASSESSMENT — PAIN SCALES - GENERAL
PAINLEVEL_OUTOF10: 0
PAINLEVEL_OUTOF10: 0
PAINLEVEL_OUTOF10: 7
PAINLEVEL_OUTOF10: 0

## 2018-02-28 NOTE — PROGRESS NOTES
Hospitalist Progress Note    Patient:  Rafael Steward     YOB: 1934    MRN: [id number]   Admit date: 2/27/2018     Acct: [de-identified]     PCP: DO RAN Cruz--Interval History:   UTI---POA---previous culture E coli---on Azactam and Macrobid    Hematuria----had blood loss due to the same---Xarelto held---sanchez --> pyridium-colored urine today---Urology to see    Atrial fibrillation---paroxysmal---risk of bleeding >> CVA at this time    Right lung mass---outpatient evaluation--see PET scan below    See note below     All other ROS negative except noted in HPI    Diet:  DIET CARDIAC;    Medications:  Scheduled Meds:   sodium chloride flush  10 mL Intravenous 2 times per day    amiodarone  200 mg Oral Daily    aspirin  81 mg Oral Daily    fluticasone  1 spray Nasal Daily    lisinopril  10 mg Oral Daily    metoprolol succinate  25 mg Oral BID    multivitamin  1 tablet Oral Daily    simvastatin  20 mg Oral Q48H    ALPRAZolam  0.25 mg Oral Nightly    ALPRAZolam  0.125 mg Oral QAM    phenazopyridine  200 mg Oral TID WC    aztreonam  1 g Intravenous Q8H    ketoconazole   Topical BID    influenza virus vaccine  0.5 mL Intramuscular Once     Continuous Infusions:   sodium chloride 100 mL/hr at 02/28/18 0127     PRN Meds:polyvinyl alcohol, sodium chloride flush, HYDROcodone 5 mg - acetaminophen, acetaminophen    Objective:  Labs:  CBC with Differential:    Lab Results   Component Value Date    WBC 6.1 02/28/2018    RBC 4.36 02/28/2018    HGB 13.6 02/28/2018    HCT 39.4 02/28/2018     02/28/2018    MCV 90.5 02/28/2018    MCH 31.2 02/28/2018    MCHC 34.5 02/28/2018    RDW 14.6 02/28/2018    LYMPHOPCT 12 02/28/2018    MONOPCT 7 02/28/2018    BASOPCT 1 02/28/2018    MONOSABS 0.40 02/28/2018    LYMPHSABS 0.70 02/28/2018    EOSABS 0.20 02/28/2018    BASOSABS 0.00 02/28/2018    DIFFTYPE NOT REPORTED 02/28/2018     BMP:    Lab Results   Component Value Date     02/28/2018    K 3.7

## 2018-02-28 NOTE — PROGRESS NOTES
Pt heart rate in the 140-160's - pt sitting in chair, eating supper. Denies chest pain or SOB - states that his heart did feel 'fluttery' earlier. Dr Kailee Camilo notified, EKG performed. 5mL of Lopressor given IV. Writer to continue to monitor.

## 2018-02-28 NOTE — PROGRESS NOTES
Tub/Shower unit  Bathroom Toilet: Standard  Bathroom Accessibility: Walker accessible  ADL Assistance: Independent  Homemaking Assistance: Independent  Homemaking Responsibilities: Yes  Meal Prep Responsibility: Primary  Laundry Responsibility: Primary  Cleaning Responsibility: Primary  Shopping Responsibility: Primary  Ambulation Assistance: Independent  Transfer Assistance: Independent  Active : Yes  Occupation: Retired       Objective   Vision: Within Functional Limits  Hearing: Within functional limits    Orientation  Overall Orientation Status: Within Normal Limits     Standing Balance  Sit to stand: Independent  Stand to sit: Independent  ADL  LE Dressing: Independent  Toileting: Independent  Transfers  Sit to stand: Independent  Stand to sit: Independent     Cognition  Overall Cognitive Status: WNL    LUE AROM (degrees)  LUE AROM : WNL  Left Hand AROM (degrees)  Left Hand AROM: WNL  RUE AROM (degrees)  RUE AROM : WNL  Right Hand AROM (degrees)  Right Hand AROM: WNL  LUE Strength  Gross LUE Strength:  WNL  RUE Strength  Gross RUE Strength: WNL    Assessment   Performance deficits / Impairments: Decreased endurance  Decision Making: Low Complexity  Discharge Recommendations: Home with assist PRN  No Skilled OT: Independent with ADL's  REQUIRES OT FOLLOW UP: No  Safety Devices  Safety Devices in place: Yes  Type of devices: Left in chair;Call light within reach        Discharge Recommendations:  Home with assist PRN     Plan   Plan  Times per week: acute care OT services not required    G-Code  OT G-codes  Functional Limitation: Self care  Self Care Current Status (): 0 percent impaired, limited or restricted  Self Care Goal Status (): 0 percent impaired, limited or restricted  Self Care Discharge Status (): 0 percent impaired, limited or restricted  OutComes Score    AM-PAC Score    Goals  Short term goals  Time Frame for Short term goals: n/a eval only       Therapy Time   Individual Concurrent Group Co-treatment   Time In 0086         Time Out 1458         Minutes 21         Timed Code Treatment Minutes: 0 Minutes       ROSA LU OTR, OT

## 2018-02-28 NOTE — PROGRESS NOTES
Physical Therapy  Facility/Department: [facility]  PROGRESSIVE CARE  Daily Treatment Note  NAME: Robina Monterroso  : 1934  MRN: [id number]    Date of Service: 2018    Patient Diagnosis(es):   Patient Active Problem List    Diagnosis Date Noted    UTI (urinary tract infection) 2018    Mass of upper lobe of right lung 2018    Pneumonia due to organism     Coronary artery disease involving native coronary artery of native heart 10/20/2016    Dermatophytosis of nail 01/10/2014    Paroxysmal atrial fibrillation ([address] 2013    Anxiety     Hypertension     Hyperlipidemia     Osteoarthritis     Allergic rhinitis     Diverticulosis     Overweight        Past Medical History:   Diagnosis Date    Acute bronchitis     history of     Allergic rhinitis     Anxiety     Atrial fibrillation ([address]     paroxymsal     CAD (coronary artery disease)     post CABG 2000    Cataract, right     Choroidal nevus of right eye     Diverticulosis     Elevated PSA     Hemorrhoids     History of measles childhood    History of mumps childhood    Hyperlipidemia     Hypertension     Occult blood positive stool     refuses colonoscopy    Osteoarthritis     Overweight(278.02)     Palpitations     Pseudophakia, left eye     [facility]     Retinal detachment     left, history of     Seborrheic dermatitis      Past Surgical History:   Procedure Laterality Date    ABSCESS DRAINAGE  [id number]    multiple    APPENDECTOMY  1940 and [facility] Left 2013    CORONARY ARTERY BYPASS GRAFT      RETINAL LASER Left 2011    detached retina    SKIN TAG REMOVAL  1999    TONSILLECTOMY         Restrictions     Subjective   General  Chart Reviewed: Yes  Response To Previous Treatment: Patient with no complaints from previous session.   Family / Caregiver Present: No  Subjective  Subjective: Pt found sitting in chair upon arrival. Agreeable to

## 2018-03-01 ENCOUNTER — TELEPHONE (OUTPATIENT)
Dept: INTERNAL MEDICINE | Age: 83
End: 2018-03-01

## 2018-03-01 VITALS
TEMPERATURE: 98.8 F | SYSTOLIC BLOOD PRESSURE: 162 MMHG | HEIGHT: 71 IN | RESPIRATION RATE: 16 BRPM | WEIGHT: 192.68 LBS | BODY MASS INDEX: 26.98 KG/M2 | OXYGEN SATURATION: 93 % | HEART RATE: 65 BPM | DIASTOLIC BLOOD PRESSURE: 76 MMHG

## 2018-03-01 LAB
ABSOLUTE EOS #: 0.3 K/UL (ref 0–0.4)
ABSOLUTE IMMATURE GRANULOCYTE: ABNORMAL K/UL (ref 0–0.3)
ABSOLUTE LYMPH #: 0.7 K/UL (ref 1–4.8)
ABSOLUTE MONO #: 0.4 K/UL (ref 0.1–1.2)
ANION GAP SERPL CALCULATED.3IONS-SCNC: 13 MMOL/L (ref 9–17)
BASOPHILS # BLD: 1 % (ref 0–2)
BASOPHILS ABSOLUTE: 0 K/UL (ref 0–0.2)
BUN BLDV-MCNC: 9 MG/DL (ref 8–23)
BUN/CREAT BLD: 10 (ref 9–20)
CALCIUM SERPL-MCNC: 8.9 MG/DL (ref 8.6–10.4)
CHLORIDE BLD-SCNC: 97 MMOL/L (ref 98–107)
CO2: 30 MMOL/L (ref 20–31)
CREAT SERPL-MCNC: 0.93 MG/DL (ref 0.7–1.2)
DIFFERENTIAL TYPE: ABNORMAL
EOSINOPHILS RELATIVE PERCENT: 5 % (ref 1–8)
GFR AFRICAN AMERICAN: >60 ML/MIN
GFR NON-AFRICAN AMERICAN: >60 ML/MIN
GFR SERPL CREATININE-BSD FRML MDRD: ABNORMAL ML/MIN/{1.73_M2}
GFR SERPL CREATININE-BSD FRML MDRD: ABNORMAL ML/MIN/{1.73_M2}
GLUCOSE BLD-MCNC: 90 MG/DL (ref 70–99)
HCT VFR BLD CALC: 43.9 % (ref 41–53)
HEMOGLOBIN: 15.1 G/DL (ref 13.5–17.5)
IMMATURE GRANULOCYTES: ABNORMAL %
LYMPHOCYTES # BLD: 12 % (ref 15–43)
MCH RBC QN AUTO: 31 PG (ref 26–34)
MCHC RBC AUTO-ENTMCNC: 34.3 G/DL (ref 31–37)
MCV RBC AUTO: 90.6 FL (ref 80–100)
MONOCYTES # BLD: 7 % (ref 6–14)
NRBC AUTOMATED: ABNORMAL PER 100 WBC
PDW BLD-RTO: 14.8 % (ref 11–14.5)
PLATELET # BLD: 188 K/UL (ref 140–450)
PLATELET ESTIMATE: ABNORMAL
PMV BLD AUTO: 8.8 FL (ref 6–12)
POTASSIUM SERPL-SCNC: 3.7 MMOL/L (ref 3.7–5.3)
RBC # BLD: 4.85 M/UL (ref 4.5–5.9)
RBC # BLD: ABNORMAL 10*6/UL
SEG NEUTROPHILS: 75 % (ref 44–74)
SEGMENTED NEUTROPHILS ABSOLUTE COUNT: 4.3 K/UL (ref 1.8–7.7)
SODIUM BLD-SCNC: 140 MMOL/L (ref 135–144)
WBC # BLD: 5.8 K/UL (ref 3.5–11)
WBC # BLD: ABNORMAL 10*3/UL

## 2018-03-01 PROCEDURE — 80048 BASIC METABOLIC PNL TOTAL CA: CPT

## 2018-03-01 PROCEDURE — 97116 GAIT TRAINING THERAPY: CPT | Performed by: PHYSICAL THERAPY ASSISTANT

## 2018-03-01 PROCEDURE — 85025 COMPLETE CBC W/AUTO DIFF WBC: CPT

## 2018-03-01 PROCEDURE — 36415 COLL VENOUS BLD VENIPUNCTURE: CPT

## 2018-03-01 PROCEDURE — 2580000003 HC RX 258: Performed by: INTERNAL MEDICINE

## 2018-03-01 PROCEDURE — 6370000000 HC RX 637 (ALT 250 FOR IP): Performed by: INTERNAL MEDICINE

## 2018-03-01 PROCEDURE — 6370000000 HC RX 637 (ALT 250 FOR IP)

## 2018-03-01 PROCEDURE — 94760 N-INVAS EAR/PLS OXIMETRY 1: CPT

## 2018-03-01 PROCEDURE — 99221 1ST HOSP IP/OBS SF/LOW 40: CPT | Performed by: INTERNAL MEDICINE

## 2018-03-01 PROCEDURE — 97110 THERAPEUTIC EXERCISES: CPT | Performed by: PHYSICAL THERAPY ASSISTANT

## 2018-03-01 PROCEDURE — 2500000003 HC RX 250 WO HCPCS: Performed by: INTERNAL MEDICINE

## 2018-03-01 PROCEDURE — 99238 HOSP IP/OBS DSCHRG MGMT 30/<: CPT | Performed by: INTERNAL MEDICINE

## 2018-03-01 RX ORDER — METOPROLOL SUCCINATE 50 MG/1
50 TABLET, EXTENDED RELEASE ORAL 2 TIMES DAILY
Qty: 60 TABLET | Refills: 0 | Status: ON HOLD | OUTPATIENT
Start: 2018-03-01 | End: 2018-08-21 | Stop reason: HOSPADM

## 2018-03-01 RX ORDER — METOPROLOL SUCCINATE 50 MG/1
50 TABLET, EXTENDED RELEASE ORAL 2 TIMES DAILY
Status: DISCONTINUED | OUTPATIENT
Start: 2018-03-01 | End: 2018-03-01 | Stop reason: HOSPADM

## 2018-03-01 RX ORDER — AMIODARONE HYDROCHLORIDE 200 MG/1
200 TABLET ORAL 2 TIMES DAILY
Status: DISCONTINUED | OUTPATIENT
Start: 2018-03-01 | End: 2018-03-01 | Stop reason: HOSPADM

## 2018-03-01 RX ORDER — NITROFURANTOIN 25; 75 MG/1; MG/1
100 CAPSULE ORAL EVERY 12 HOURS SCHEDULED
Status: DISCONTINUED | OUTPATIENT
Start: 2018-03-01 | End: 2018-03-01 | Stop reason: RX

## 2018-03-01 RX ORDER — ALPRAZOLAM 0.25 MG/1
TABLET ORAL
Qty: 6 TABLET | Refills: 0 | Status: ON HOLD | OUTPATIENT
Start: 2018-03-01 | End: 2018-03-14

## 2018-03-01 RX ORDER — NITROFURANTOIN MACROCRYSTALS 100 MG/1
100 CAPSULE ORAL EVERY 12 HOURS SCHEDULED
Status: DISCONTINUED | OUTPATIENT
Start: 2018-03-01 | End: 2018-03-01 | Stop reason: HOSPADM

## 2018-03-01 RX ORDER — METOPROLOL SUCCINATE 25 MG/1
TABLET, EXTENDED RELEASE ORAL
Status: COMPLETED
Start: 2018-03-01 | End: 2018-03-01

## 2018-03-01 RX ORDER — AMIODARONE HYDROCHLORIDE 200 MG/1
200 TABLET ORAL 2 TIMES DAILY
Qty: 30 TABLET | Refills: 0 | Status: SHIPPED | OUTPATIENT
Start: 2018-03-01 | End: 2018-12-06 | Stop reason: DRUGHIGH

## 2018-03-01 RX ADMIN — PHENAZOPYRIDINE HYDROCHLORIDE 200 MG: 100 TABLET ORAL at 11:46

## 2018-03-01 RX ADMIN — METOPROLOL SUCCINATE 25 MG: 25 TABLET, EXTENDED RELEASE ORAL at 07:47

## 2018-03-01 RX ADMIN — Medication 10 ML: at 07:50

## 2018-03-01 RX ADMIN — AMIODARONE HYDROCHLORIDE 200 MG: 200 TABLET ORAL at 07:47

## 2018-03-01 RX ADMIN — METOPROLOL SUCCINATE: 50 TABLET, EXTENDED RELEASE ORAL at 10:23

## 2018-03-01 RX ADMIN — KETOCONAZOLE: 20 CREAM TOPICAL at 07:48

## 2018-03-01 RX ADMIN — AZTREONAM 1 G: 1 INJECTION, POWDER, LYOPHILIZED, FOR SOLUTION INTRAMUSCULAR; INTRAVENOUS at 08:35

## 2018-03-01 RX ADMIN — ALPRAZOLAM 0.12 MG: 0.25 TABLET ORAL at 07:49

## 2018-03-01 RX ADMIN — THERA TABS 1 TABLET: TAB at 07:47

## 2018-03-01 RX ADMIN — Medication 10 ML: at 08:36

## 2018-03-01 RX ADMIN — METOPROLOL SUCCINATE: 25 TABLET, FILM COATED, EXTENDED RELEASE ORAL at 10:23

## 2018-03-01 RX ADMIN — FLUTICASONE PROPIONATE 1 SPRAY: 50 SPRAY, METERED NASAL at 07:47

## 2018-03-01 RX ADMIN — NITROFURANTOIN (MACROCRYSTALS) 100 MG: 100 CAPSULE ORAL at 10:25

## 2018-03-01 RX ADMIN — LISINOPRIL 10 MG: 5 TABLET ORAL at 07:47

## 2018-03-01 RX ADMIN — PHENAZOPYRIDINE HYDROCHLORIDE 200 MG: 100 TABLET ORAL at 07:47

## 2018-03-01 RX ADMIN — ASPIRIN 81 MG: 81 TABLET, COATED ORAL at 07:48

## 2018-03-01 ASSESSMENT — PAIN SCALES - GENERAL
PAINLEVEL_OUTOF10: 0

## 2018-03-01 NOTE — PROGRESS NOTES
Hospitalist Progress Note    Patient:  Maria Elena Hogan     YOB: 1934    MRN: [id number]   Admit date: 2/27/2018     Acct: [de-identified]     PCP: Ethan Gaines DO    CC--Interval History:   Hematuria---resolved with cessation of anticoagulation--irrigation urinary bladder--Urology consult    Atrial fibrillation--flutter--event of RVR--2.28.2018 ---> Lopressor 5 mg IV -->  Decreased rate and subsequent event afterward---increased Toprol XL to 50 mg PO BID----Cardiology to see    grief reaction and extreme anxiety--death of sister    See note below     Lung mass--outpatient work-up     All other ROS negative except noted in HPI    Diet:  DIET CARDIAC;    Medications:  Scheduled Meds:   metoprolol succinate  50 mg Oral BID    nitrofurantoin  100 mg Oral 2 times per day    sodium chloride flush  10 mL Intravenous 2 times per day    amiodarone  200 mg Oral Daily    aspirin  81 mg Oral Daily    fluticasone  1 spray Nasal Daily    lisinopril  10 mg Oral Daily    multivitamin  1 tablet Oral Daily    simvastatin  20 mg Oral Q48H    ALPRAZolam  0.25 mg Oral Nightly    ALPRAZolam  0.125 mg Oral QAM    phenazopyridine  200 mg Oral TID WC    ketoconazole   Topical BID    influenza virus vaccine  0.5 mL Intramuscular Once     Continuous Infusions:  PRN Meds:polyvinyl alcohol, sodium chloride flush, HYDROcodone 5 mg - acetaminophen, acetaminophen    Objective:  Labs:  CBC with Differential:    Lab Results   Component Value Date    WBC 5.8 03/01/2018    RBC 4.85 03/01/2018    HGB 15.1 03/01/2018    HCT 43.9 03/01/2018     03/01/2018    MCV 90.6 03/01/2018    MCH 31.0 03/01/2018    MCHC 34.3 03/01/2018    RDW 14.8 03/01/2018    LYMPHOPCT 12 03/01/2018    MONOPCT 7 03/01/2018    BASOPCT 1 03/01/2018    MONOSABS 0.40 03/01/2018    LYMPHSABS 0.70 03/01/2018    EOSABS 0.30 03/01/2018    BASOSABS 0.00 03/01/2018    DIFFTYPE NOT REPORTED 03/01/2018     BMP:    Lab Results   Component Value Date    NA 140 03/01/2018    K 3.7 03/01/2018    CL 97 03/01/2018    CO2 30 03/01/2018    BUN 9 03/01/2018    LABALBU 3.8 04/14/2017    CREATININE 0.93 03/01/2018    CALCIUM 8.9 03/01/2018    GFRAA >60 03/01/2018    LABGLOM >60 03/01/2018    GLUCOSE 90 03/01/2018           Physical Exam:  Vitals: BP (!) 162/76   Pulse 65   Temp 98.8 °F (37.1 °C)   Resp 16   Ht 5' 11\" (1.803 m)   Wt 192 lb 10.9 oz (87.4 kg)   SpO2 93%   BMI 26.87 kg/m²   24 hour intake/output:  Intake/Output Summary (Last 24 hours) at 03/01/18 1229  Last data filed at 03/01/18 0750   Gross per 24 hour   Intake              250 ml   Output              625 ml   Net             -375 ml     Last 3 weights: Wt Readings from Last 3 Encounters:   02/27/18 192 lb 10.9 oz (87.4 kg)   02/27/18 195 lb (88.5 kg)   02/26/18 195 lb (88.5 kg)     HEENT: Normocephalic and Atraumatic  Neck: Supple, No Masses, Tenderness, Nodularity and No Lymphadenopathy  Chest/Lungs: Clear to Auscultation without Rales, Rhonchi, or Wheezes  Cardiac: Irregularly Irregular  GI/Abdomen: Bowel Sounds Present and Soft, Non-tender, without Guarding or Rebound Tenderness  : sanchez catheter--urine now pyridum yellow-orange and clear--no clots  EXT/Skin: No Cyanosis, No Clubbing and Edema Present--mild  Neuro: alert--generalized weakness      Assessment:    Active Problems:    UTI (urinary tract infection)  Resolved Problems:    * No resolved hospital problems.  *    JUNIOR JOHNS              dc    IM  83 WM  [sr ANGELICA Cerda; [facility]---[facility], hi Mostafa, Urology]  FULL CODE     XARELTO--held    AMIODARONE PO    Anti-infectives:   Rocephin  IV, Macrobid    UTI---POA2.27.2018---uncontrolled urination   Hematuria---2.27.2018  Atrial fibrillation---flutter---2.28.2018        EKG----2.28.2018---atrial fibrillationflutter---140LAHB---                     old anterolateral infarction   Atrial fibrillation---RVR--2.12.2018----paroxysmal---recurrent        MI ruled signed by Germán Holden on 3/1/2018 at 12:29 PM    Hospitalist

## 2018-03-01 NOTE — PROGRESS NOTES
(FLUZONE;FLUARIX;FLULAVAL;AFLURIA) injection 0.5 mL  0.5 mL Intramuscular Once Lemond Blum         Allergies   Allergen Reactions    Pcn [Penicillins] Swelling     As a child    Sulfa Antibiotics     Cefdinir Other (See Comments)     Active Problems:    UTI (urinary tract infection)  Resolved Problems:    * No resolved hospital problems. *    Blood pressure (!) 164/89, pulse 125, temperature 99.1 °F (37.3 °C), temperature source Oral, resp. rate 18, height 5' 11\" (1.803 m), weight 192 lb 10.9 oz (87.4 kg), SpO2 93 %. Subjective Patient is resting comfortably this evening. He is a little less anxious with some visitors that came this evening. Objective  Patient is well appearing in no acute distress. The patient is breathing easily, heart is NSR at this time. The patient's lower extremities have a full ROM and no edema.     Assessment & Plan   UTI - to see urology, urine is clearing with xarelto held  A fib/flutter - resolved at this time and rate remains controlled    [place] Silvino, SHANIA  2/28/2018

## 2018-03-01 NOTE — PROGRESS NOTES
Physical Therapy  Facility/Department: [address] [place] [address] CARE  Daily Treatment Note  NAME: All Dailey  : 1934  MRN: [id number]    Date of Service: 3/1/2018    Patient Diagnosis(es):   Patient Active Problem List    Diagnosis Date Noted    UTI (urinary tract infection) 2018    Mass of upper lobe of right lung 2018    Pneumonia due to organism     Coronary artery disease involving native coronary artery of native heart 10/20/2016    Dermatophytosis of nail 01/10/2014    Paroxysmal atrial fibrillation ([address] 2013    Anxiety     Hypertension     Hyperlipidemia     Osteoarthritis     Allergic rhinitis     Diverticulosis     Overweight        Past Medical History:   Diagnosis Date    Acute bronchitis     history of     Allergic rhinitis     Anxiety     Atrial fibrillation ([address]     paroxymsal     CAD (coronary artery disease)     post CABG 2000    Cataract, right     Choroidal nevus of right eye     Diverticulosis     Elevated PSA     Hemorrhoids     History of measles childhood    History of mumps childhood    Hyperlipidemia     Hypertension     Occult blood positive stool     refuses colonoscopy    Osteoarthritis     Overweight(278.02)     Palpitations     Pseudophakia, left eye     [facility]     Retinal detachment     left, history of     Seborrheic dermatitis      Past Surgical History:   Procedure Laterality Date    ABSCESS DRAINAGE  [id number]    multiple    APPENDECTOMY  1940 and [address] [facility] [place] Left 2013    CORONARY ARTERY BYPASS GRAFT      RETINAL LASER Left 2011    detached retina    SKIN TAG REMOVAL  1999    TONSILLECTOMY         Restrictions     Subjective   General  Chart Reviewed: Yes  Response To Previous Treatment: Patient with no complaints from previous session.   Family / Caregiver Present: No  Subjective  Subjective: Pt. received from nursing upon arrival. Pt agreeable

## 2018-03-02 LAB
EKG ATRIAL RATE: 272 BPM
EKG Q-T INTERVAL: 304 MS
EKG QRS DURATION: 98 MS
EKG QTC CALCULATION (BAZETT): 464 MS
EKG R AXIS: -53 DEGREES
EKG T AXIS: 101 DEGREES
EKG VENTRICULAR RATE: 140 BPM

## 2018-03-02 NOTE — DISCHARGE SUMMARY
tolerated. MEDICATIONS WITH CHANGES:  1. Amiodarone 200 mg p.o. b.i.d.  2.  Metoprolol succinate (Toprol XL) 50 mg p.o. b.i.d. FOLLOWING MEDICATIONS CONTINUED, NO CHANGE:  1. Acetaminophen (Tylenol) 500 mg p.o. q.6 hours p.r.n. pain. 2.  Alprazolam (Xanax) 0.125 morning, 0.25 at evening, bedtime. 3.  Aspirin 81 mg p.o. daily and CoQ10 100 mg p.o. every 48 hours. 4.  Flonase nasal spray one spray each nostril daily. 5.  Glucosamine and chondroitin sulfate three tablets daily. 6.  Lisinopril 10 mg p.o. daily. 7.  Macrobid 100 mg p.o. b.i.d. two additional days. 8.  PreserVision AREDS two one capsule p.o. daily. 9.  Zocor (simvastatin) 20 mg p.o. every 48 hours. 10.  Tinactin 1% external solution to toenails daily. Given the patient's  hematuria, the patient's Xarelto (rivaroxaban) 10 mg was held and  discontinued. Until such time the patient was seen by Urology recommend  tamsulosin 0.4 mg p.o. daily together with Urecholine 25 mg p.o. four times  daily. Followup with the patient's personal provider, Tomas Cazares DO,  Internal Medicine, [facility],  [facility] consultants, Dr. Megan Hummel and Dr. Darryle Fonder of Urology. Any aspect of the patient's care not discussed in the chart and/or  dictation will be addressed and treated as an outpatient. The patient's medications have been reviewed including, but not limited to,  pre-hospital, hospital and discharge medications. The patient and/or the  patient's personal representatives were specifically advised the only  medications to be taken are those set forth in the discharge orders and no  other medications should be taken. Any prior medications not on the  discharge orders are specifically discontinued.      The patient has been advised of the financial relationship and ownership  interests between [facility] physicians and employees  of [facility] and [facility].         Marlene Mata    D: 03/01/2018 16:32:01       T: 03/02/2018 0:00:11     [email or web address]  Job#: [id number]     Doc#: [id number]    CC:

## 2018-03-05 ENCOUNTER — HOSPITAL ENCOUNTER (OUTPATIENT)
Age: 83
Setting detail: SPECIMEN
Discharge: HOME | End: 2018-03-05
Payer: MEDICARE

## 2018-03-05 LAB
ANION GAP SERPL CALCULATED.3IONS-SCNC: 11 MMOL/L (ref 9–17)
BUN BLDV-MCNC: 11 MG/DL (ref 8–23)
BUN/CREAT BLD: 13 (ref 9–20)
CALCIUM SERPL-MCNC: 8.4 MG/DL (ref 8.6–10.4)
CHLORIDE BLD-SCNC: 102 MMOL/L (ref 98–107)
CO2: 29 MMOL/L (ref 20–31)
CREAT SERPL-MCNC: 0.87 MG/DL (ref 0.7–1.2)
GFR AFRICAN AMERICAN: >60 ML/MIN
GFR NON-AFRICAN AMERICAN: >60 ML/MIN
GFR SERPL CREATININE-BSD FRML MDRD: ABNORMAL ML/MIN/{1.73_M2}
GFR SERPL CREATININE-BSD FRML MDRD: ABNORMAL ML/MIN/{1.73_M2}
GLUCOSE BLD-MCNC: 99 MG/DL (ref 70–99)
HCT VFR BLD CALC: 40.3 % (ref 41–53)
HEMOGLOBIN: 13.6 G/DL (ref 13.5–17.5)
MCH RBC QN AUTO: 30.8 PG (ref 26–34)
MCHC RBC AUTO-ENTMCNC: 33.9 G/DL (ref 31–37)
MCV RBC AUTO: 91 FL (ref 80–100)
NRBC AUTOMATED: ABNORMAL PER 100 WBC
PDW BLD-RTO: 14.8 % (ref 11–14.5)
PLATELET # BLD: 173 K/UL (ref 140–450)
PMV BLD AUTO: 8.4 FL (ref 6–12)
POTASSIUM SERPL-SCNC: 3.8 MMOL/L (ref 3.7–5.3)
RBC # BLD: 4.42 M/UL (ref 4.5–5.9)
SODIUM BLD-SCNC: 142 MMOL/L (ref 135–144)
WBC # BLD: 5.9 K/UL (ref 3.5–11)

## 2018-03-05 PROCEDURE — 85027 COMPLETE CBC AUTOMATED: CPT

## 2018-03-05 PROCEDURE — 80048 BASIC METABOLIC PNL TOTAL CA: CPT

## 2018-03-05 PROCEDURE — 36415 COLL VENOUS BLD VENIPUNCTURE: CPT

## 2018-03-07 ENCOUNTER — OFFICE VISIT (OUTPATIENT)
Dept: UROLOGY | Age: 83
End: 2018-03-07
Payer: MEDICARE

## 2018-03-07 VITALS
HEART RATE: 78 BPM | HEIGHT: 71 IN | WEIGHT: 192.68 LBS | BODY MASS INDEX: 26.98 KG/M2 | SYSTOLIC BLOOD PRESSURE: 120 MMHG | DIASTOLIC BLOOD PRESSURE: 72 MMHG

## 2018-03-07 DIAGNOSIS — R31.0 GROSS HEMATURIA: ICD-10-CM

## 2018-03-07 DIAGNOSIS — R33.9 URINARY RETENTION: ICD-10-CM

## 2018-03-07 DIAGNOSIS — N30.00 ACUTE CYSTITIS WITHOUT HEMATURIA: Primary | ICD-10-CM

## 2018-03-07 DIAGNOSIS — R97.20 ELEVATED PSA: ICD-10-CM

## 2018-03-07 PROCEDURE — G8417 CALC BMI ABV UP PARAM F/U: HCPCS | Performed by: UROLOGY

## 2018-03-07 PROCEDURE — G8598 ASA/ANTIPLAT THER USED: HCPCS | Performed by: UROLOGY

## 2018-03-07 PROCEDURE — 99215 OFFICE O/P EST HI 40 MIN: CPT | Performed by: UROLOGY

## 2018-03-07 PROCEDURE — 1111F DSCHRG MED/CURRENT MED MERGE: CPT | Performed by: UROLOGY

## 2018-03-07 PROCEDURE — 1123F ACP DISCUSS/DSCN MKR DOCD: CPT | Performed by: UROLOGY

## 2018-03-07 PROCEDURE — G8484 FLU IMMUNIZE NO ADMIN: HCPCS | Performed by: UROLOGY

## 2018-03-07 PROCEDURE — 1036F TOBACCO NON-USER: CPT | Performed by: UROLOGY

## 2018-03-07 PROCEDURE — 4040F PNEUMOC VAC/ADMIN/RCVD: CPT | Performed by: UROLOGY

## 2018-03-07 PROCEDURE — G8427 DOCREV CUR MEDS BY ELIG CLIN: HCPCS | Performed by: UROLOGY

## 2018-03-07 RX ORDER — TAMSULOSIN HYDROCHLORIDE 0.4 MG/1
0.4 CAPSULE ORAL 2 TIMES DAILY
Qty: 30 CAPSULE | Status: SHIPPED | COMMUNITY
Start: 2018-03-07 | End: 2019-06-26 | Stop reason: SDUPTHER

## 2018-03-07 RX ORDER — TAMSULOSIN HYDROCHLORIDE 0.4 MG/1
0.4 CAPSULE ORAL DAILY
COMMUNITY
End: 2018-03-07 | Stop reason: DRUGHIGH

## 2018-03-12 ENCOUNTER — TELEPHONE (OUTPATIENT)
Dept: INTERNAL MEDICINE | Age: 83
End: 2018-03-12

## 2018-03-12 PROCEDURE — 87088 URINE BACTERIA CULTURE: CPT

## 2018-03-12 PROCEDURE — 87186 SC STD MICRODIL/AGAR DIL: CPT

## 2018-03-12 PROCEDURE — 87086 URINE CULTURE/COLONY COUNT: CPT

## 2018-03-12 PROCEDURE — 81001 URINALYSIS AUTO W/SCOPE: CPT

## 2018-03-13 ENCOUNTER — APPOINTMENT (OUTPATIENT)
Dept: GENERAL RADIOLOGY | Age: 83
End: 2018-03-13
Payer: MEDICARE

## 2018-03-13 ENCOUNTER — HOSPITAL ENCOUNTER (OUTPATIENT)
Age: 83
Setting detail: SPECIMEN
Discharge: HOME | End: 2018-03-13
Payer: MEDICARE

## 2018-03-13 ENCOUNTER — HOSPITAL ENCOUNTER (EMERGENCY)
Age: 83
Discharge: ANOTHER HOSPITAL | End: 2018-03-13
Attending: EMERGENCY MEDICINE
Payer: MEDICARE

## 2018-03-13 ENCOUNTER — HOSPITAL ENCOUNTER (INPATIENT)
Age: 83
LOS: 3 days | Discharge: NURSING, REHAB OR LONG-TERM CARE | DRG: 871 | End: 2018-03-16
Attending: INTERNAL MEDICINE | Admitting: FAMILY MEDICINE
Payer: MEDICARE

## 2018-03-13 VITALS
WEIGHT: 192 LBS | HEART RATE: 86 BPM | HEIGHT: 71 IN | OXYGEN SATURATION: 97 % | DIASTOLIC BLOOD PRESSURE: 61 MMHG | TEMPERATURE: 101.3 F | BODY MASS INDEX: 26.88 KG/M2 | SYSTOLIC BLOOD PRESSURE: 105 MMHG | RESPIRATION RATE: 16 BRPM

## 2018-03-13 DIAGNOSIS — N30.00 ACUTE CYSTITIS WITHOUT HEMATURIA: ICD-10-CM

## 2018-03-13 DIAGNOSIS — A41.9 SEPSIS, DUE TO UNSPECIFIED ORGANISM: Primary | ICD-10-CM

## 2018-03-13 PROBLEM — D70.3 NEUTROPENIA ASSOCIATED WITH INFECTION (HCC): Status: ACTIVE | Noted: 2018-03-13

## 2018-03-13 PROBLEM — N17.9 AKI (ACUTE KIDNEY INJURY) (HCC): Status: ACTIVE | Noted: 2018-03-13

## 2018-03-13 PROBLEM — E87.20 LACTIC ACID ACIDOSIS: Status: ACTIVE | Noted: 2018-03-13

## 2018-03-13 PROBLEM — N39.0 SEPSIS DUE TO URINARY TRACT INFECTION (HCC): Status: ACTIVE | Noted: 2018-03-13

## 2018-03-13 PROBLEM — N39.0 RECURRENT UTI: Status: ACTIVE | Noted: 2018-03-13

## 2018-03-13 PROBLEM — I50.22 CHRONIC SYSTOLIC CHF (CONGESTIVE HEART FAILURE) (HCC): Status: ACTIVE | Noted: 2018-03-13

## 2018-03-13 LAB
-: ABNORMAL
-: ABNORMAL
ABSOLUTE EOS #: 0 K/UL (ref 0–0.4)
ABSOLUTE EOS #: 0.03 K/UL (ref 0–0.4)
ABSOLUTE IMMATURE GRANULOCYTE: 0.18 K/UL (ref 0–0.3)
ABSOLUTE IMMATURE GRANULOCYTE: ABNORMAL K/UL (ref 0–0.3)
ABSOLUTE LYMPH #: 0 K/UL (ref 1–4.8)
ABSOLUTE LYMPH #: 0.27 K/UL (ref 1–4.8)
ABSOLUTE MONO #: 0.02 K/UL (ref 0.1–1.2)
ABSOLUTE MONO #: 0.36 K/UL (ref 0.1–0.8)
ALBUMIN SERPL-MCNC: 3 G/DL (ref 3.5–5.2)
ALBUMIN/GLOBULIN RATIO: 1.4 (ref 1–2.5)
ALP BLD-CCNC: 96 U/L (ref 40–129)
ALT SERPL-CCNC: 25 U/L (ref 5–41)
AMORPHOUS: ABNORMAL
AMORPHOUS: ABNORMAL
ANION GAP SERPL CALCULATED.3IONS-SCNC: 13 MMOL/L (ref 9–17)
ANION GAP SERPL CALCULATED.3IONS-SCNC: 24 MMOL/L (ref 9–17)
AST SERPL-CCNC: 30 U/L
BACTERIA: ABNORMAL
BACTERIA: ABNORMAL
BASOPHILS # BLD: 0 % (ref 0–2)
BASOPHILS # BLD: 1 % (ref 0–2)
BASOPHILS ABSOLUTE: 0 K/UL (ref 0–0.2)
BASOPHILS ABSOLUTE: 0.02 K/UL (ref 0–0.2)
BILIRUB SERPL-MCNC: 0.9 MG/DL (ref 0.3–1.2)
BILIRUBIN URINE: NEGATIVE
BILIRUBIN URINE: NEGATIVE
BUN BLDV-MCNC: 19 MG/DL (ref 8–23)
BUN BLDV-MCNC: 20 MG/DL (ref 8–23)
BUN/CREAT BLD: 12 (ref 9–20)
BUN/CREAT BLD: ABNORMAL (ref 9–20)
CALCIUM SERPL-MCNC: 8.3 MG/DL (ref 8.6–10.4)
CALCIUM SERPL-MCNC: 8.8 MG/DL (ref 8.6–10.4)
CASTS UA: ABNORMAL /LPF (ref 0–2)
CASTS UA: ABNORMAL /LPF (ref 0–2)
CHLORIDE BLD-SCNC: 103 MMOL/L (ref 98–107)
CHLORIDE BLD-SCNC: 92 MMOL/L (ref 98–107)
CO2: 22 MMOL/L (ref 20–31)
CO2: 23 MMOL/L (ref 20–31)
COLOR: ABNORMAL
COLOR: YELLOW
COMMENT UA: ABNORMAL
COMMENT UA: ABNORMAL
CREAT SERPL-MCNC: 1.18 MG/DL (ref 0.7–1.2)
CREAT SERPL-MCNC: 1.63 MG/DL (ref 0.7–1.2)
CREATININE URINE: 56.5 MG/DL (ref 39–259)
CREATININE URINE: 56.7 MG/DL (ref 39–259)
CRYSTALS, UA: ABNORMAL /HPF
CRYSTALS, UA: ABNORMAL /HPF
DIFFERENTIAL TYPE: ABNORMAL
DIFFERENTIAL TYPE: ABNORMAL
DIRECT EXAM: NORMAL
EKG ATRIAL RATE: 69 BPM
EKG P AXIS: 68 DEGREES
EKG P-R INTERVAL: 340 MS
EKG Q-T INTERVAL: 442 MS
EKG QRS DURATION: 94 MS
EKG QTC CALCULATION (BAZETT): 473 MS
EKG R AXIS: -33 DEGREES
EKG T AXIS: 83 DEGREES
EKG VENTRICULAR RATE: 69 BPM
EOSINOPHIL,URINE: NORMAL
EOSINOPHILS RELATIVE PERCENT: 0 % (ref 1–4)
EOSINOPHILS RELATIVE PERCENT: 2 % (ref 1–8)
EPITHELIAL CELLS UA: ABNORMAL /HPF (ref 0–5)
EPITHELIAL CELLS UA: ABNORMAL /HPF (ref 0–5)
GFR AFRICAN AMERICAN: 49 ML/MIN
GFR AFRICAN AMERICAN: >60 ML/MIN
GFR NON-AFRICAN AMERICAN: 41 ML/MIN
GFR NON-AFRICAN AMERICAN: 59 ML/MIN
GFR SERPL CREATININE-BSD FRML MDRD: ABNORMAL ML/MIN/{1.73_M2}
GLUCOSE BLD-MCNC: 102 MG/DL (ref 70–99)
GLUCOSE BLD-MCNC: 103 MG/DL (ref 70–99)
GLUCOSE URINE: NEGATIVE
GLUCOSE URINE: NEGATIVE
HCT VFR BLD CALC: 38.1 % (ref 40.7–50.3)
HCT VFR BLD CALC: 43.2 % (ref 41–53)
HEMOGLOBIN: 12.2 G/DL (ref 13–17)
HEMOGLOBIN: 14.6 G/DL (ref 13.5–17.5)
IMMATURE GRANULOCYTES: 1 %
IMMATURE GRANULOCYTES: ABNORMAL %
INR BLD: 1.1
KETONES, URINE: NEGATIVE
KETONES, URINE: NEGATIVE
LACTIC ACID, SEPSIS WHOLE BLOOD: 2 MMOL/L (ref 0.5–1.9)
LACTIC ACID, SEPSIS WHOLE BLOOD: 2 MMOL/L (ref 0.5–1.9)
LACTIC ACID, SEPSIS: ABNORMAL MMOL/L (ref 0.5–1.9)
LACTIC ACID, SEPSIS: ABNORMAL MMOL/L (ref 0.5–1.9)
LACTIC ACID: 7.6 MMOL/L (ref 0.5–2.2)
LEUKOCYTE ESTERASE, URINE: ABNORMAL
LEUKOCYTE ESTERASE, URINE: ABNORMAL
LYMPHOCYTES # BLD: 0 % (ref 24–44)
LYMPHOCYTES # BLD: 16 % (ref 15–43)
Lab: NORMAL
MAGNESIUM: 1.8 MG/DL (ref 1.6–2.6)
MCH RBC QN AUTO: 29.9 PG (ref 25.2–33.5)
MCH RBC QN AUTO: 30.9 PG (ref 26–34)
MCHC RBC AUTO-ENTMCNC: 32 G/DL (ref 28.4–34.8)
MCHC RBC AUTO-ENTMCNC: 33.9 G/DL (ref 31–37)
MCV RBC AUTO: 91.2 FL (ref 80–100)
MCV RBC AUTO: 93.4 FL (ref 82.6–102.9)
MICROALBUMIN/CREAT 24H UR: 24 MG/L
MICROALBUMIN/CREAT UR-RTO: 42 MCG/MG CREAT
MONOCYTES # BLD: 1 % (ref 6–14)
MONOCYTES # BLD: 2 % (ref 1–7)
MORPHOLOGY: ABNORMAL
MORPHOLOGY: ABNORMAL
MUCUS: ABNORMAL
MUCUS: ABNORMAL
NITRITE, URINE: NEGATIVE
NITRITE, URINE: POSITIVE
NRBC AUTOMATED: 0 PER 100 WBC
NRBC AUTOMATED: ABNORMAL PER 100 WBC
OSMOLALITY URINE: 227 MOSM/KG (ref 80–1300)
OTHER OBSERVATIONS UA: ABNORMAL
OTHER OBSERVATIONS UA: ABNORMAL
PDW BLD-RTO: 14.2 % (ref 11.8–14.4)
PDW BLD-RTO: 15.2 % (ref 11–14.5)
PH UA: 5 (ref 5–8)
PH UA: 5.5 (ref 5–6)
PHOSPHORUS: 4 MG/DL (ref 2.5–4.5)
PLATELET # BLD: 154 K/UL (ref 140–450)
PLATELET # BLD: ABNORMAL K/UL (ref 138–453)
PLATELET ESTIMATE: ABNORMAL
PLATELET ESTIMATE: ABNORMAL
PLATELET, FLUORESCENCE: 134 K/UL (ref 138–453)
PLATELET, IMMATURE FRACTION: 3.3 % (ref 1.1–10.3)
PMV BLD AUTO: 9 FL (ref 6–12)
PMV BLD AUTO: ABNORMAL FL (ref 8.1–13.5)
POTASSIUM SERPL-SCNC: 3.9 MMOL/L (ref 3.7–5.3)
POTASSIUM SERPL-SCNC: 4.1 MMOL/L (ref 3.7–5.3)
PROTEIN UA: ABNORMAL
PROTEIN UA: NEGATIVE
PROTHROMBIN TIME: 11.9 SEC (ref 9–12)
RBC # BLD: 4.08 M/UL (ref 4.21–5.77)
RBC # BLD: 4.73 M/UL (ref 4.5–5.9)
RBC # BLD: ABNORMAL 10*6/UL
RBC # BLD: ABNORMAL 10*6/UL
RBC UA: ABNORMAL /HPF (ref 0–2)
RBC UA: ABNORMAL /HPF (ref 0–4)
RENAL EPITHELIAL, UA: ABNORMAL /HPF
RENAL EPITHELIAL, UA: ABNORMAL /HPF
SEG NEUTROPHILS: 80 % (ref 44–74)
SEG NEUTROPHILS: 97 % (ref 36–66)
SEGMENTED NEUTROPHILS ABSOLUTE COUNT: 1.36 K/UL (ref 1.8–7.7)
SEGMENTED NEUTROPHILS ABSOLUTE COUNT: 17.36 K/UL (ref 1.8–7.7)
SERUM OSMOLALITY: 284 MOSM/KG (ref 275–295)
SODIUM BLD-SCNC: 138 MMOL/L (ref 135–144)
SODIUM BLD-SCNC: 139 MMOL/L (ref 135–144)
SPECIFIC GRAVITY UA: 1.01 (ref 1.01–1.02)
SPECIFIC GRAVITY UA: 1.01 (ref 1–1.03)
SPECIMEN DESCRIPTION: NORMAL
STATUS: NORMAL
TOTAL PROTEIN, URINE: 16 MG/DL
TOTAL PROTEIN: 5.2 G/DL (ref 6.4–8.3)
TRICHOMONAS: ABNORMAL
TRICHOMONAS: ABNORMAL
TROPONIN INTERP: NORMAL
TROPONIN INTERP: NORMAL
TROPONIN T: <0.03 NG/ML
TROPONIN T: <0.03 NG/ML
TURBIDITY: ABNORMAL
TURBIDITY: ABNORMAL
URINE HGB: ABNORMAL
URINE HGB: ABNORMAL
URINE TOTAL PROTEIN CREATININE RATIO: 0.28 (ref 0–0.2)
UROBILINOGEN, URINE: NORMAL
UROBILINOGEN, URINE: NORMAL
WBC # BLD: 1.7 K/UL (ref 3.5–11)
WBC # BLD: 17.9 K/UL (ref 3.5–11.3)
WBC # BLD: ABNORMAL 10*3/UL
WBC # BLD: ABNORMAL 10*3/UL
WBC UA: >100 /HPF (ref 0–4)
WBC UA: ABNORMAL /HPF (ref 0–5)
YEAST: ABNORMAL
YEAST: ABNORMAL

## 2018-03-13 PROCEDURE — 87086 URINE CULTURE/COLONY COUNT: CPT

## 2018-03-13 PROCEDURE — 6370000000 HC RX 637 (ALT 250 FOR IP): Performed by: EMERGENCY MEDICINE

## 2018-03-13 PROCEDURE — 83605 ASSAY OF LACTIC ACID: CPT

## 2018-03-13 PROCEDURE — 85025 COMPLETE CBC W/AUTO DIFF WBC: CPT

## 2018-03-13 PROCEDURE — 6360000002 HC RX W HCPCS: Performed by: FAMILY MEDICINE

## 2018-03-13 PROCEDURE — 99223 1ST HOSP IP/OBS HIGH 75: CPT | Performed by: FAMILY MEDICINE

## 2018-03-13 PROCEDURE — 87804 INFLUENZA ASSAY W/OPTIC: CPT

## 2018-03-13 PROCEDURE — 83735 ASSAY OF MAGNESIUM: CPT

## 2018-03-13 PROCEDURE — 71046 X-RAY EXAM CHEST 2 VIEWS: CPT

## 2018-03-13 PROCEDURE — 87077 CULTURE AEROBIC IDENTIFY: CPT

## 2018-03-13 PROCEDURE — 80053 COMPREHEN METABOLIC PANEL: CPT

## 2018-03-13 PROCEDURE — 36415 COLL VENOUS BLD VENIPUNCTURE: CPT

## 2018-03-13 PROCEDURE — 51798 US URINE CAPACITY MEASURE: CPT

## 2018-03-13 PROCEDURE — 2580000003 HC RX 258: Performed by: NURSE PRACTITIONER

## 2018-03-13 PROCEDURE — 96365 THER/PROPH/DIAG IV INF INIT: CPT

## 2018-03-13 PROCEDURE — 85610 PROTHROMBIN TIME: CPT

## 2018-03-13 PROCEDURE — 87205 SMEAR GRAM STAIN: CPT

## 2018-03-13 PROCEDURE — 87186 SC STD MICRODIL/AGAR DIL: CPT

## 2018-03-13 PROCEDURE — 80048 BASIC METABOLIC PNL TOTAL CA: CPT

## 2018-03-13 PROCEDURE — 2060000000 HC ICU INTERMEDIATE R&B

## 2018-03-13 PROCEDURE — 84300 ASSAY OF URINE SODIUM: CPT

## 2018-03-13 PROCEDURE — 87040 BLOOD CULTURE FOR BACTERIA: CPT

## 2018-03-13 PROCEDURE — 84156 ASSAY OF PROTEIN URINE: CPT

## 2018-03-13 PROCEDURE — 83930 ASSAY OF BLOOD OSMOLALITY: CPT

## 2018-03-13 PROCEDURE — 6360000002 HC RX W HCPCS: Performed by: EMERGENCY MEDICINE

## 2018-03-13 PROCEDURE — 2580000003 HC RX 258: Performed by: EMERGENCY MEDICINE

## 2018-03-13 PROCEDURE — 85055 RETICULATED PLATELET ASSAY: CPT

## 2018-03-13 PROCEDURE — 93005 ELECTROCARDIOGRAM TRACING: CPT

## 2018-03-13 PROCEDURE — 84100 ASSAY OF PHOSPHORUS: CPT

## 2018-03-13 PROCEDURE — 82043 UR ALBUMIN QUANTITATIVE: CPT

## 2018-03-13 PROCEDURE — 84484 ASSAY OF TROPONIN QUANT: CPT

## 2018-03-13 PROCEDURE — 99285 EMERGENCY DEPT VISIT HI MDM: CPT

## 2018-03-13 PROCEDURE — 83935 ASSAY OF URINE OSMOLALITY: CPT

## 2018-03-13 PROCEDURE — 2500000003 HC RX 250 WO HCPCS: Performed by: FAMILY MEDICINE

## 2018-03-13 PROCEDURE — 94762 N-INVAS EAR/PLS OXIMTRY CONT: CPT

## 2018-03-13 PROCEDURE — 82570 ASSAY OF URINE CREATININE: CPT

## 2018-03-13 PROCEDURE — 2580000003 HC RX 258: Performed by: FAMILY MEDICINE

## 2018-03-13 PROCEDURE — 81001 URINALYSIS AUTO W/SCOPE: CPT

## 2018-03-13 RX ORDER — ACETAMINOPHEN 325 MG/1
650 TABLET ORAL EVERY 4 HOURS PRN
Status: DISCONTINUED | OUTPATIENT
Start: 2018-03-13 | End: 2018-03-16 | Stop reason: HOSPADM

## 2018-03-13 RX ORDER — 0.9 % SODIUM CHLORIDE 0.9 %
1000 INTRAVENOUS SOLUTION INTRAVENOUS ONCE
Status: COMPLETED | OUTPATIENT
Start: 2018-03-13 | End: 2018-03-13

## 2018-03-13 RX ORDER — SODIUM CHLORIDE 0.9 % (FLUSH) 0.9 %
10 SYRINGE (ML) INJECTION EVERY 12 HOURS SCHEDULED
Status: DISCONTINUED | OUTPATIENT
Start: 2018-03-13 | End: 2018-03-16 | Stop reason: HOSPADM

## 2018-03-13 RX ORDER — 0.9 % SODIUM CHLORIDE 0.9 %
30 INTRAVENOUS SOLUTION INTRAVENOUS ONCE
Status: COMPLETED | OUTPATIENT
Start: 2018-03-13 | End: 2018-03-13

## 2018-03-13 RX ORDER — 0.9 % SODIUM CHLORIDE 0.9 %
1000 INTRAVENOUS SOLUTION INTRAVENOUS ONCE
Status: DISCONTINUED | OUTPATIENT
Start: 2018-03-13 | End: 2018-03-13

## 2018-03-13 RX ORDER — SODIUM CHLORIDE 0.9 % (FLUSH) 0.9 %
10 SYRINGE (ML) INJECTION PRN
Status: DISCONTINUED | OUTPATIENT
Start: 2018-03-13 | End: 2018-03-16 | Stop reason: HOSPADM

## 2018-03-13 RX ORDER — HYDROCODONE BITARTRATE AND ACETAMINOPHEN 5; 325 MG/1; MG/1
2 TABLET ORAL EVERY 4 HOURS PRN
Status: DISCONTINUED | OUTPATIENT
Start: 2018-03-13 | End: 2018-03-16 | Stop reason: HOSPADM

## 2018-03-13 RX ORDER — SODIUM CHLORIDE 9 MG/ML
INJECTION, SOLUTION INTRAVENOUS CONTINUOUS
Status: DISCONTINUED | OUTPATIENT
Start: 2018-03-13 | End: 2018-03-16 | Stop reason: HOSPADM

## 2018-03-13 RX ORDER — ACETAMINOPHEN 325 MG/1
650 TABLET ORAL ONCE
Status: COMPLETED | OUTPATIENT
Start: 2018-03-13 | End: 2018-03-13

## 2018-03-13 RX ORDER — HYDROCODONE BITARTRATE AND ACETAMINOPHEN 5; 325 MG/1; MG/1
1 TABLET ORAL EVERY 4 HOURS PRN
Status: DISCONTINUED | OUTPATIENT
Start: 2018-03-13 | End: 2018-03-16 | Stop reason: HOSPADM

## 2018-03-13 RX ORDER — HEPARIN SODIUM 5000 [USP'U]/ML
5000 INJECTION, SOLUTION INTRAVENOUS; SUBCUTANEOUS EVERY 8 HOURS SCHEDULED
Status: DISCONTINUED | OUTPATIENT
Start: 2018-03-13 | End: 2018-03-14

## 2018-03-13 RX ORDER — VANCOMYCIN HYDROCHLORIDE 1 G/200ML
1000 INJECTION, SOLUTION INTRAVENOUS EVERY 12 HOURS
Status: DISCONTINUED | OUTPATIENT
Start: 2018-03-13 | End: 2018-03-13

## 2018-03-13 RX ADMIN — VANCOMYCIN HYDROCHLORIDE 1000 MG: 1 INJECTION, POWDER, LYOPHILIZED, FOR SOLUTION INTRAVENOUS at 02:23

## 2018-03-13 RX ADMIN — HEPARIN SODIUM 5000 UNITS: 5000 INJECTION, SOLUTION INTRAVENOUS; SUBCUTANEOUS at 08:46

## 2018-03-13 RX ADMIN — SODIUM CHLORIDE 1000 ML: 9 INJECTION, SOLUTION INTRAVENOUS at 02:19

## 2018-03-13 RX ADMIN — ACETAMINOPHEN 650 MG: 325 TABLET ORAL at 01:41

## 2018-03-13 RX ADMIN — SODIUM CHLORIDE 2613 ML: 9 INJECTION, SOLUTION INTRAVENOUS at 08:44

## 2018-03-13 RX ADMIN — AZTREONAM 1000 MG: 1 INJECTION, POWDER, LYOPHILIZED, FOR SOLUTION INTRAMUSCULAR; INTRAVENOUS at 16:55

## 2018-03-13 RX ADMIN — AZTREONAM 1000 MG: 1 INJECTION, POWDER, LYOPHILIZED, FOR SOLUTION INTRAMUSCULAR; INTRAVENOUS at 08:47

## 2018-03-13 RX ADMIN — SODIUM CHLORIDE: 9 INJECTION, SOLUTION INTRAVENOUS at 14:26

## 2018-03-13 RX ADMIN — HEPARIN SODIUM 5000 UNITS: 5000 INJECTION, SOLUTION INTRAVENOUS; SUBCUTANEOUS at 15:24

## 2018-03-13 RX ADMIN — HEPARIN SODIUM 5000 UNITS: 5000 INJECTION, SOLUTION INTRAVENOUS; SUBCUTANEOUS at 22:30

## 2018-03-13 RX ADMIN — SODIUM CHLORIDE 1000 ML: 9 INJECTION, SOLUTION INTRAVENOUS at 03:55

## 2018-03-13 ASSESSMENT — ENCOUNTER SYMPTOMS
WHEEZING: 0
BACK PAIN: 0
SHORTNESS OF BREATH: 0
NAUSEA: 0
COUGH: 0
DIARRHEA: 0
CHEST TIGHTNESS: 0
EYE PAIN: 0
RECTAL PAIN: 0
BLOOD IN STOOL: 0
CONSTIPATION: 0
SINUS PRESSURE: 0
SORE THROAT: 0
VOMITING: 0
ABDOMINAL PAIN: 0

## 2018-03-13 ASSESSMENT — PAIN SCALES - GENERAL
PAINLEVEL_OUTOF10: 0
PAINLEVEL_OUTOF10: 0

## 2018-03-13 NOTE — ED PROVIDER NOTES
COENZYME Q-10 100 MG CAPS    Take 100 mg by mouth every other day. FLUTICASONE (FLONASE) 50 MCG/ACT NASAL SPRAY    1 spray by Nasal route daily    GLUCOSAMINE-CHONDROITIN PO    Take 3 tablets by mouth Daily. LISINOPRIL (PRINIVIL;ZESTRIL) 10 MG TABLET    Take 1 tablet by mouth daily    METOPROLOL SUCCINATE (TOPROL XL) 50 MG EXTENDED RELEASE TABLET    Take 1 tablet by mouth 2 times daily    MULTIPLE VITAMINS-MINERALS (PRESERVISION AREDS 2 PO)    Take 1 capsule by mouth daily    NITROFURANTOIN, MACROCRYSTAL-MONOHYDRATE, (MACROBID) 100 MG CAPSULE    Take 1 capsule by mouth 2 times daily    SIMVASTATIN (ZOCOR) 20 MG TABLET    TAKE 1 TABLET BY MOUTH EVERY OTHER DAY     TAMSULOSIN (FLOMAX) 0.4 MG CAPSULE    Take 1 capsule by mouth 2 times daily    TOLNAFTATE (TINACTIN) 1 % EXTERNAL SOLUTION    Apply topically nightly to toenails per Dr. Tali Cohen. ALLERGIES     is allergic to pcn [penicillins]; sulfa antibiotics; and cefdinir. FAMILY HISTORY     indicated that his mother is . He indicated that his father is . He indicated that the status of his sister is unknown.      family history includes Diabetes in his mother; Osteoporosis in his mother; Other in his father; Stroke in his sister. SOCIAL HISTORY      reports that he has never smoked. He has never used smokeless tobacco. He reports that he does not drink alcohol or use drugs. PHYSICAL EXAM     INITIAL VITALS:  height is 5' 11\" (1.803 m) and weight is 192 lb (87.1 kg). His temperature is 101.3 °F (38.5 °C). His blood pressure is 118/58 (abnormal) and his pulse is 92. His respiration is 24 and oxygen saturation is 97%. : Patient is very pleasant, elderly male in no apparent distress. Sats are in the mid 90s on 2 L of oxygen. HEENT: Head is atraumatic. Conjunctiva are clear. Mouth shows moist mucous membranes. Neck: Supple. No meningismus. Respiratory: Lung sounds are clear bilateral without wheezes or rhonchi.   Cardiac: Heart is tachycardic without murmur, secondary to an elevated. Temp  GI: Abdomen soft, nontender, good active bowel sounds. Skin: Warm and dry. No rash. Neuro: Patient alert and oriented ×3. Cranial nerves to 12 are intact grossly.   He moves all 4 extremities well    DIFFERENTIAL DIAGNOSIS/ MDM:     Fever, pneumonia, UTI, bacteremia, sepsis    DIAGNOSTIC RESULTS         RADIOLOGY:   I directly visualized the following  images and reviewed the radiologist interpretations:  XR CHEST STANDARD (2 VW)   Final Result   Stable mild hazy density within the right upper lobe which may represent mild   residual pneumonia versus scar tissue               LABS:  Labs Reviewed   CBC WITH AUTO DIFFERENTIAL - Abnormal; Notable for the following:        Result Value    WBC 1.7 (*)     RDW 15.2 (*)     Monocytes 1 (*)     Seg Neutrophils 80 (*)     Absolute Mono # 0.02 (*)     Absolute Lymph # 0.27 (*)     Segs Absolute 1.36 (*)     All other components within normal limits   BASIC METABOLIC PANEL - Abnormal; Notable for the following:     Glucose 103 (*)     CREATININE 1.63 (*)     Chloride 92 (*)     Anion Gap 24 (*)     GFR Non- 41 (*)     GFR  49 (*)     All other components within normal limits   LACTIC ACID - Abnormal; Notable for the following:     Lactic Acid 7.6 (*)     All other components within normal limits   RAPID INFLUENZA A/B ANTIGENS   CULTURE BLOOD #1   CULTURE BLOOD #1   CULTURE BLOOD #1         EMERGENCY DEPARTMENT COURSE:   Vitals:    Vitals:    03/13/18 0128 03/13/18 0159 03/13/18 0239 03/13/18 0249   BP: (!) 154/72 135/67  (!) 118/58   Pulse: 116 108 94 92   Resp: 16 24 24 24   Temp: 104.2 °F (40.1 °C)  101.3 °F (38.5 °C)    TempSrc: Tympanic      SpO2: 93% 91% 96% 97%   Weight: 192 lb (87.1 kg)      Height: 5' 11\" (1.803 m)        -------------------------  BP: (!) 118/58, Temp: 101.3 °F (38.5 °C), Pulse: 92, Resp: 24    Orders Placed This Encounter   Medications   

## 2018-03-13 NOTE — H&P
[facility]   [address]    HISTORY AND PHYSICAL EXAMINATION            Date:   3/13/2018  Patient name:  Hermila Dudley  Date of admission:  3/13/2018  6:34 AM  MRN:   [id number]  Account:  [de-identified]  YOB: 1934  PCP:    Gilmer Lamar DO  Room:   1007/1007-01  Code Status:    Full Code    Chief Complaint:     Fever    History Obtained From:     patient, electronic medical record    History of Present Illness:     Hermila Dudley is a 80 y.o. male who presents As a transfer from outside facility due to sepsis secondary to UTI. patient presented originally with fevers, chills, tiredness and fatigue and dysuria. For several days that became progressively worse. Yesterday before presented to the ED. At the ED, patient was lethargic,feverish, tired, tachycardic, hypotensive . Upon reviewing the chart. The previous urine culture was E. coli resistant to Cipro, patient is allergic to penicillin and sulfa.         At ED  patient was started on IV vancomycin    d he is admitted to the hospital for the management of Sepsis due to urinary tract infection [facility])      Past Medical History:     Past Medical History:   Diagnosis Date    Acute bronchitis     history of     Allergic rhinitis     Anxiety     Atrial fibrillation ([address]     paroxymsal     CAD (coronary artery disease)     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    Cataract, right     Choroidal nevus of right eye     Diverticulosis     Elevated PSA     Hemorrhoids     History of measles childhood    History of mumps childhood    Hyperlipidemia     Hypertension     Occult blood positive stool     refuses colonoscopy    Osteoarthritis     Overweight(278.02)     Palpitations     Pseudophakia, left eye     PSVT (paroxysmal supraventricular tachycardia) (HCC)     Retinal detachment     left, history of     Seborrheic dermatitis         Past Surgical History:     Past Surgical History:   Procedure 81 mg by mouth daily. Historical Provider, MD   GLUCOSAMINE-CHONDROITIN PO Take 3 tablets by mouth Daily. Historical Provider, MD        Allergies:     Pcn [penicillins]; Sulfa antibiotics; and Cefdinir    Social History:     Tobacco:    reports that he has never smoked. He has never used smokeless tobacco.  Alcohol:      reports that he does not drink alcohol. Drug Use:  reports that he does not use drugs. Family History:     Family History   Problem Relation Age of Onset    Diabetes Mother     Osteoporosis Mother     Other Father      complications of prostate surgery (bleeding)    Stroke Sister        Review of Systems:   Review of Systems   Constitutional: Positive for activity change, chills, fatigue and fever. Negative for appetite change and unexpected weight change. HENT: Negative for ear discharge, sinus pressure and sore throat. Eyes: Negative for pain. Respiratory: Negative for cough, chest tightness, shortness of breath and wheezing. Cardiovascular: Negative for chest pain, palpitations and leg swelling. Gastrointestinal: Negative for abdominal pain, blood in stool, constipation, diarrhea, nausea, rectal pain and vomiting. Endocrine: Negative for polyuria. Genitourinary: Positive for dysuria, frequency and urgency. Negative for flank pain. Musculoskeletal: Negative for back pain, gait problem and myalgias. Skin: Negative for rash. Neurological: Negative for speech difficulty and headaches. Psychiatric/Behavioral: Negative for decreased concentration. All other systems reviewed and are negative.     Physical Exam:     Vitals:    18 0645 18 0653 18 0714 18 0737   BP: (!) 90/56   (!) 82/51   Pulse: 83      Resp: 20   21   Temp: 97.7 °F (36.5 °C)   98.3 °F (36.8 °C)   TempSrc: Oral   Axillary   SpO2: 96%      Weight:  192 lb 0.3 oz (87.1 kg)     Height:   5' 10.87\" (1.8 m)      Temp (24hrs), Av.4 °F (38 °C), Min:97.7 °F (36.5 °C), Max:104.2 (L) >60 mL/min    GFR Comment          GFR Staging NOT REPORTED    Lactic Acid    Collection Time: 03/13/18  1:35 AM   Result Value Ref Range    Lactic Acid 7.6 (H) 0.5 - 2.2 mmol/L   Rapid influenza A/B antigens    Collection Time: 03/13/18  1:38 AM   Result Value Ref Range    Specimen Description . NASOPHARYNGEAL SWAB     Special Requests NOT REPORTED     Direct Exam       NEGATIVE FOR INFLUENZA A AND B ANTIGEN. * A negative result does not exclude    Direct Exam        Influenza infection. Negative results should be confirmed by PCR testing. Direct Exam       Performed at [facility] [address] [address] [phone]     Status FINAL 03/13/2018        Imaging/Diagonstics:  Xr Chest Standard (2 Vw)    Result Date: 3/13/2018  EXAMINATION: TWO VIEWS OF THE CHEST 3/13/2018 1:58 am COMPARISON: 02/25/2018 HISTORY: ORDERING SYSTEM PROVIDED HISTORY: fever TECHNOLOGIST PROVIDED HISTORY: Reason for exam:->fever Ordering Physician Provided Reason for Exam: Fever chills, slight cough, not feeling well, hx of coronary artery bypass graft Acuity: Acute Type of Exam: Initial Relevant Medical/Surgical History: hx of coronary artery bypass graft FINDINGS: There is a mild hazy infiltrate within the right upper lobe which may represent residual pneumonia versus scar. No significant change is identified The cardiomediastinal silhouette is without acute process. There is no evidence of pneumothorax. The osseous structures are without acute process.      Stable mild hazy density within the right upper lobe which may represent mild residual pneumonia versus scar tissue     Xr Chest Standard (2 Vw)    Result Date: 2/13/2018  EXAMINATION: TWO VIEWS OF THE CHEST 2/13/2018 6:04 am COMPARISON: 02/12/2018 HISTORY: ORDERING SYSTEM PROVIDED HISTORY: pneumonia TECHNOLOGIST PROVIDED HISTORY: Reason for exam:->pneumonia Ordering Physician Provided Reason for Exam: Pneumonia follow up, non smoker Acuity: Acute Type of Exam: Subsequent/Follow-up FINDINGS: Cardiac silhouette is borderline prominent. Patient is status post midline sternotomy. With there has been interval decrease in the caliber of right upper lobe airspace disease, concerning for bronchopneumonia. No new airspace disease identified. No obvious pleural effusion or pneumothorax. Improving right upper lobe airspace disease. Ct Chest W Contrast    Result Date: 2/14/2018  EXAMINATION: CT OF THE CHEST WITH CONTRAST 2/14/2018 2:37 pm TECHNIQUE: CT of the chest was performed with the administration of intravenous contrast. Multiplanar reformatted images are provided for review. Dose modulation, iterative reconstruction, and/or weight based adjustment of the mA/kV was utilized to reduce the radiation dose to as low as reasonably achievable. COMPARISON: None. HISTORY: ORDERING SYSTEM PROVIDED HISTORY: r/o right lung mass TECHNOLOGIST PROVIDED HISTORY: Ordering Physician Provided Reason for Exam: abnormal CXR, cough, A-fib, FINDINGS: Mediastinum: Enlarged right hilar lymph nodes are present, 14 mm. No mediastinal or left hilar adenopathy. No acute aortic abnormality. Heart size is normal.  Coronary artery calcification is evident. No pericardial effusion is seen. Lungs/pleura: Azygos lobe, a normal variation is noted. There is a mass extending to the right perihilar location of 4.3 x 1.9 x 3.0 cm, ill-defined, with satellite lesion of 2.0 x 0.8 cm located laterally and cranial to this location. A few smaller nodules along the superior aspect of this mass are noted averaging 3-4 mm. Appearance is suspicious for pulmonary malignancy. There is mild focal airspace disease in the posterior aspect of the right upper lobe. Granulomatous calcifications are present in the left lower lobe, and soft tissue density nodule of 14 mm is present in the left lower lobe on image 107. No effusions are present.  Upper patchy right upper lobe infiltrate. No new focal lung consolidation is seen. No large pleural effusions. Azygos fissure on the right. Monitor leads overlie the chest.     No significant change in right upper lobe infiltrate. Follow-up studies are recommended to confirm complete resolution and rule out an underlying mass. Xr Chest Portable    Result Date: 2/12/2018  EXAMINATION: SINGLE VIEW OF THE CHEST 2/12/2018 10:18 am COMPARISON: Chest x-ray dated 12/27/2012 HISTORY: ORDERING SYSTEM PROVIDED HISTORY: chest pain TECHNOLOGIST PROVIDED HISTORY: Reason for exam:->chest pain Ordering Physician Provided Reason for Exam: Weakness and heart palpitations today Acuity: Acute Type of Exam: Initial FINDINGS: Median sternotomy wires are noted. There is mild prominence of cardiac size. No significant vascular congestion. There is airspace consolidation in the inferior aspect of the right upper lobe. No pneumothorax or sizable pleural effusion. No free air beneath the diaphragm. No evidence of acute osseous abnormality. Airspace consolidation in the mid right lung, likely right upper lobe pneumonia. Short-term radiographic follow-up to complete resolution is recommended. Pet Ct Skull Base To Mid Thigh    Result Date: 2/19/2018  EXAMINATION: WHOLE BODY PET/CT 2/17/2018 TECHNIQUE: Following IV injection of 16.4 mCi of F 18 -FDG, PET  tumor imaging was acquired from the base of the skull to the mid thighs. Computed tomography was used for purposes of attenuation correction and anatomic localization. Fusion imaging was utilized for interpretation. Uptake time 67 mins. Glucose level 83 mg/dl. COMPARISON: CT chest 02/14/2018 HISTORY: ORDERING SYSTEM PROVIDED HISTORY: Mass of upper lobe of right lung TECHNOLOGIST PROVIDED HISTORY: Reason for exam:->possible lytic lesion T5 in context of pneumonia/mass/hilar adenotpathy RUL.  Identify targets for biopsy FINDINGS: HEAD/NECK: No metabolically active cervical lymphadenopathy. CHEST: Re- demonstration of right upper lobe masslike opacity measuring 4.3 cm which is FDG avid with maximum SUV of 8.9. No metabolically active adenopathy is visualized within the chest, in particular right hilar soft tissue prominence as seen on prior chest CT is not FDG avid. ABDOMEN/PELVIS: No metabolically active intraperitoneal mass. No metabolically active abdominal or pelvic lymphadenopathy. Physiologic activity in the gastrointestinal and genitourinary systems. BONES/SOFT TISSUE:  Focal activity extending along the L1 superior endplate. No aggressive osseous lesion, in particular well-circumscribed left superior T5 lytic lesion is not FDG avid. INCIDENTAL CT FINDINGS: Azygos lobe is noted. Coronary artery calcification. Few small layering gallstones. Prostate is mildly enlarged. Multiple pelvic phleboliths. Mild aortic atherosclerosis. 1. Re- demonstration of right upper lobe masslike opacity which is FDG avid most consistent with primary lung malignancy. 2. No evidence of metastatic disease, in particular no FDG avid mediastinal adenopathy and no focal uptake is seen in region of right hilar soft tissue prominence and well-circumscribed left superior T5 lucent lesion is not FDG avid suggesting benign etiology likely bone cyst. 3. Focal uptake seen along superior endplate of L1 likely relating to combination of degenerative change and mild chronic compression deformity. 4. Multiple chronic findings as detailed above.        Assessment :      Principal Problem:    Sepsis due to urinary tract infection ([address]  Active Problems:    Essential hypertension    Overweight    Paroxysmal atrial fibrillation (HCC)    Coronary artery disease involving native coronary artery of native heart    Recurrent UTI    Lactic acid acidosis    MARITA (acute kidney injury) (HCC)    Neutropenia associated with infection (HCC)    Chronic systolic CHF (congestive heart failure) ([address]  Resolved Problems:    *

## 2018-03-13 NOTE — PLAN OF CARE
[facility]   [address]    Second Visit Note  For more detailed information please refer to the progress note of the day      3/13/2018    4:56 PM    Name:   Erika Damian  MRN:     [id number]     Keysha:      [de-identified]   Room:   [facility]1007-01   Day:  0  Admit Date:  3/13/2018  6:34 AM    PCP:   Shelli Mosher DO  Code Status:  Full Code        Pt vitals were reviewed   New labs were reviewed   Patient was seen    Updated plan :     1. Improving blood pressure no more fevers. Use IV fluids to rate of 100 mL per hour.         Tala Stinson MD  3/13/2018  4:56 PM

## 2018-03-13 NOTE — ED TRIAGE NOTES
Patient states tremors and blood in urine since yesterday. This evening shaking increased and Nursing Home staff reports low oxygen level. Pulse ox 92% on room air at this time.

## 2018-03-14 LAB
ALBUMIN SERPL-MCNC: 2.2 G/DL (ref 3.5–5.2)
ALBUMIN/GLOBULIN RATIO: 0.9 (ref 1–2.5)
ALP BLD-CCNC: 78 U/L (ref 40–129)
ALT SERPL-CCNC: 20 U/L (ref 5–41)
ANION GAP SERPL CALCULATED.3IONS-SCNC: 11 MMOL/L (ref 9–17)
AST SERPL-CCNC: 31 U/L
BILIRUB SERPL-MCNC: 0.62 MG/DL (ref 0.3–1.2)
BUN BLDV-MCNC: 16 MG/DL (ref 8–23)
BUN/CREAT BLD: ABNORMAL (ref 9–20)
CALCIUM IONIZED: 1.07 MMOL/L (ref 1.13–1.33)
CALCIUM SERPL-MCNC: 7.5 MG/DL (ref 8.6–10.4)
CHLORIDE BLD-SCNC: 107 MMOL/L (ref 98–107)
CO2: 21 MMOL/L (ref 20–31)
CREAT SERPL-MCNC: 1 MG/DL (ref 0.7–1.2)
GFR AFRICAN AMERICAN: >60 ML/MIN
GFR NON-AFRICAN AMERICAN: >60 ML/MIN
GFR SERPL CREATININE-BSD FRML MDRD: ABNORMAL ML/MIN/{1.73_M2}
GFR SERPL CREATININE-BSD FRML MDRD: ABNORMAL ML/MIN/{1.73_M2}
GLUCOSE BLD-MCNC: 90 MG/DL (ref 70–99)
HCT VFR BLD CALC: 35 % (ref 40.7–50.3)
HEMOGLOBIN: 11.1 G/DL (ref 13–17)
LACTIC ACID, WHOLE BLOOD: 1 MMOL/L (ref 0.7–2.1)
LACTIC ACID: NORMAL MMOL/L
MAGNESIUM: 2 MG/DL (ref 1.6–2.6)
MCH RBC QN AUTO: 29.5 PG (ref 25.2–33.5)
MCHC RBC AUTO-ENTMCNC: 31.7 G/DL (ref 28.4–34.8)
MCV RBC AUTO: 93.1 FL (ref 82.6–102.9)
NRBC AUTOMATED: 0 PER 100 WBC
PDW BLD-RTO: 14.1 % (ref 11.8–14.4)
PHOSPHORUS: 2.1 MG/DL (ref 2.5–4.5)
PLATELET # BLD: ABNORMAL K/UL (ref 138–453)
PLATELET, FLUORESCENCE: 107 K/UL (ref 138–453)
PLATELET, IMMATURE FRACTION: 4.1 % (ref 1.1–10.3)
PMV BLD AUTO: ABNORMAL FL (ref 8.1–13.5)
POTASSIUM SERPL-SCNC: 3.7 MMOL/L (ref 3.7–5.3)
RBC # BLD: 3.76 M/UL (ref 4.21–5.77)
SODIUM BLD-SCNC: 139 MMOL/L (ref 135–144)
TOTAL PROTEIN: 4.7 G/DL (ref 6.4–8.3)
WBC # BLD: 11.1 K/UL (ref 3.5–11.3)

## 2018-03-14 PROCEDURE — 85027 COMPLETE CBC AUTOMATED: CPT

## 2018-03-14 PROCEDURE — 6360000002 HC RX W HCPCS: Performed by: FAMILY MEDICINE

## 2018-03-14 PROCEDURE — 84100 ASSAY OF PHOSPHORUS: CPT

## 2018-03-14 PROCEDURE — 2580000003 HC RX 258: Performed by: FAMILY MEDICINE

## 2018-03-14 PROCEDURE — 6370000000 HC RX 637 (ALT 250 FOR IP): Performed by: NURSE PRACTITIONER

## 2018-03-14 PROCEDURE — 2500000003 HC RX 250 WO HCPCS: Performed by: FAMILY MEDICINE

## 2018-03-14 PROCEDURE — 80053 COMPREHEN METABOLIC PANEL: CPT

## 2018-03-14 PROCEDURE — 82330 ASSAY OF CALCIUM: CPT

## 2018-03-14 PROCEDURE — 93005 ELECTROCARDIOGRAM TRACING: CPT

## 2018-03-14 PROCEDURE — 6370000000 HC RX 637 (ALT 250 FOR IP): Performed by: FAMILY MEDICINE

## 2018-03-14 PROCEDURE — 36415 COLL VENOUS BLD VENIPUNCTURE: CPT

## 2018-03-14 PROCEDURE — 94762 N-INVAS EAR/PLS OXIMTRY CONT: CPT

## 2018-03-14 PROCEDURE — 83605 ASSAY OF LACTIC ACID: CPT

## 2018-03-14 PROCEDURE — 2060000000 HC ICU INTERMEDIATE R&B

## 2018-03-14 PROCEDURE — 99233 SBSQ HOSP IP/OBS HIGH 50: CPT | Performed by: FAMILY MEDICINE

## 2018-03-14 PROCEDURE — 85055 RETICULATED PLATELET ASSAY: CPT

## 2018-03-14 PROCEDURE — 83735 ASSAY OF MAGNESIUM: CPT

## 2018-03-14 RX ORDER — TAMSULOSIN HYDROCHLORIDE 0.4 MG/1
0.4 CAPSULE ORAL 2 TIMES DAILY
Status: DISCONTINUED | OUTPATIENT
Start: 2018-03-14 | End: 2018-03-16 | Stop reason: HOSPADM

## 2018-03-14 RX ORDER — METOPROLOL TARTRATE 5 MG/5ML
5 INJECTION INTRAVENOUS EVERY 6 HOURS PRN
Status: DISCONTINUED | OUTPATIENT
Start: 2018-03-14 | End: 2018-03-16 | Stop reason: HOSPADM

## 2018-03-14 RX ORDER — ONDANSETRON 2 MG/ML
INJECTION INTRAMUSCULAR; INTRAVENOUS
Status: DISPENSED
Start: 2018-03-14 | End: 2018-03-15

## 2018-03-14 RX ORDER — CHOLECALCIFEROL (VITAMIN D3) 125 MCG
100 CAPSULE ORAL EVERY OTHER DAY
Status: DISCONTINUED | OUTPATIENT
Start: 2018-03-14 | End: 2018-03-14 | Stop reason: RX

## 2018-03-14 RX ORDER — ASPIRIN 81 MG/1
81 TABLET, CHEWABLE ORAL DAILY
Status: DISCONTINUED | OUTPATIENT
Start: 2018-03-14 | End: 2018-03-16 | Stop reason: HOSPADM

## 2018-03-14 RX ORDER — POTASSIUM CHLORIDE 20 MEQ/1
20 TABLET, EXTENDED RELEASE ORAL ONCE
Status: COMPLETED | OUTPATIENT
Start: 2018-03-14 | End: 2018-03-14

## 2018-03-14 RX ORDER — ONDANSETRON 2 MG/ML
4 INJECTION INTRAMUSCULAR; INTRAVENOUS EVERY 6 HOURS PRN
Status: DISCONTINUED | OUTPATIENT
Start: 2018-03-14 | End: 2018-03-16 | Stop reason: HOSPADM

## 2018-03-14 RX ORDER — LISINOPRIL 10 MG/1
10 TABLET ORAL DAILY
Status: DISCONTINUED | OUTPATIENT
Start: 2018-03-14 | End: 2018-03-16 | Stop reason: HOSPADM

## 2018-03-14 RX ORDER — AMIODARONE HYDROCHLORIDE 200 MG/1
200 TABLET ORAL 2 TIMES DAILY
Status: DISCONTINUED | OUTPATIENT
Start: 2018-03-14 | End: 2018-03-16 | Stop reason: HOSPADM

## 2018-03-14 RX ORDER — FLUTICASONE PROPIONATE 50 MCG
1 SPRAY, SUSPENSION (ML) NASAL DAILY
Status: DISCONTINUED | OUTPATIENT
Start: 2018-03-14 | End: 2018-03-16 | Stop reason: HOSPADM

## 2018-03-14 RX ORDER — METOPROLOL SUCCINATE 50 MG/1
50 TABLET, EXTENDED RELEASE ORAL 2 TIMES DAILY
Status: DISCONTINUED | OUTPATIENT
Start: 2018-03-14 | End: 2018-03-16 | Stop reason: HOSPADM

## 2018-03-14 RX ORDER — SIMVASTATIN 20 MG
20 TABLET ORAL NIGHTLY
Status: DISCONTINUED | OUTPATIENT
Start: 2018-03-14 | End: 2018-03-16 | Stop reason: HOSPADM

## 2018-03-14 RX ADMIN — SIMVASTATIN 20 MG: 20 TABLET, FILM COATED ORAL at 20:11

## 2018-03-14 RX ADMIN — AZTREONAM 1000 MG: 1 INJECTION, POWDER, LYOPHILIZED, FOR SOLUTION INTRAMUSCULAR; INTRAVENOUS at 01:00

## 2018-03-14 RX ADMIN — ONDANSETRON 4 MG: 2 INJECTION INTRAMUSCULAR; INTRAVENOUS at 17:37

## 2018-03-14 RX ADMIN — FLUTICASONE PROPIONATE 1 SPRAY: 50 SPRAY, METERED NASAL at 09:29

## 2018-03-14 RX ADMIN — ACETAMINOPHEN 650 MG: 325 TABLET ORAL at 09:28

## 2018-03-14 RX ADMIN — TAMSULOSIN HYDROCHLORIDE 0.4 MG: 0.4 CAPSULE ORAL at 20:11

## 2018-03-14 RX ADMIN — RIVAROXABAN 20 MG: 20 TABLET, FILM COATED ORAL at 17:55

## 2018-03-14 RX ADMIN — METOPROLOL SUCCINATE 50 MG: 50 TABLET, FILM COATED, EXTENDED RELEASE ORAL at 09:30

## 2018-03-14 RX ADMIN — AMIODARONE HYDROCHLORIDE 200 MG: 200 TABLET ORAL at 20:11

## 2018-03-14 RX ADMIN — HEPARIN SODIUM 5000 UNITS: 5000 INJECTION, SOLUTION INTRAVENOUS; SUBCUTANEOUS at 05:45

## 2018-03-14 RX ADMIN — METOPROLOL SUCCINATE 50 MG: 50 TABLET, FILM COATED, EXTENDED RELEASE ORAL at 20:11

## 2018-03-14 RX ADMIN — CEFTRIAXONE SODIUM 1 G: 1 INJECTION, POWDER, FOR SOLUTION INTRAMUSCULAR; INTRAVENOUS at 13:23

## 2018-03-14 RX ADMIN — AZTREONAM 1000 MG: 1 INJECTION, POWDER, LYOPHILIZED, FOR SOLUTION INTRAMUSCULAR; INTRAVENOUS at 08:32

## 2018-03-14 RX ADMIN — TAMSULOSIN HYDROCHLORIDE 0.4 MG: 0.4 CAPSULE ORAL at 09:30

## 2018-03-14 RX ADMIN — LISINOPRIL 10 MG: 10 TABLET ORAL at 13:16

## 2018-03-14 RX ADMIN — SODIUM CHLORIDE: 9 INJECTION, SOLUTION INTRAVENOUS at 08:40

## 2018-03-14 RX ADMIN — POTASSIUM CHLORIDE 20 MEQ: 20 TABLET, EXTENDED RELEASE ORAL at 11:13

## 2018-03-14 RX ADMIN — ASPIRIN 81 MG: 81 TABLET, CHEWABLE ORAL at 09:30

## 2018-03-14 RX ADMIN — AMIODARONE HYDROCHLORIDE 200 MG: 200 TABLET ORAL at 10:41

## 2018-03-14 ASSESSMENT — PAIN DESCRIPTION - LOCATION
LOCATION: BACK

## 2018-03-14 ASSESSMENT — PAIN DESCRIPTION - FREQUENCY
FREQUENCY: CONTINUOUS
FREQUENCY: INTERMITTENT
FREQUENCY: CONTINUOUS

## 2018-03-14 ASSESSMENT — PAIN DESCRIPTION - PAIN TYPE
TYPE: ACUTE PAIN

## 2018-03-14 ASSESSMENT — PAIN DESCRIPTION - ORIENTATION
ORIENTATION: LEFT

## 2018-03-14 ASSESSMENT — PAIN DESCRIPTION - DESCRIPTORS
DESCRIPTORS: ACHING

## 2018-03-14 ASSESSMENT — PAIN SCALES - GENERAL
PAINLEVEL_OUTOF10: 0
PAINLEVEL_OUTOF10: 2
PAINLEVEL_OUTOF10: 2

## 2018-03-14 ASSESSMENT — PAIN DESCRIPTION - ONSET
ONSET: ON-GOING
ONSET: ON-GOING

## 2018-03-14 ASSESSMENT — ENCOUNTER SYMPTOMS
DIARRHEA: 0
SHORTNESS OF BREATH: 0
ABDOMINAL PAIN: 0
VOMITING: 0
CONSTIPATION: 0
NAUSEA: 0
WHEEZING: 0

## 2018-03-14 NOTE — PLAN OF CARE
[facility]   [address]    Second Visit Note  For more detailed information please refer to the progress note of the day      3/14/2018    2:58 PM    Name:   Robina Monterroso  MRN:     [id number]     Acct:      [de-identified]   Room:   [facility]1007-01   Day:  1  Admit Date:  3/13/2018  6:34 AM    PCP:   Velia Townsend DO  Code Status:  Full Code        Pt vitals were reviewed   New labs were reviewed   Patient was seen    Updated plan :   Patient went into paroxysmal A. fib that converted to sinus rhythm, by itself    [id number] Score for Atrial Fibrillation Stroke Risk   Risk   Factors  Component Value   C CHF Yes 1   H HTN Yes 1   A2 Age >= 76 Yes,  (80 y.o.) 2   D DM No 0   S2 Prior Stroke/TIA No 0   V Vascular Disease No 0   A Age 74-69 No,  (80 y.o.) 0   Sc Sex male 0    [id number]  Score  4   Score last updated 5/54/66 3:28 PM    Click here for a link to the UpToDate guideline \"Atrial Fibrillation: Anticoagulation therapy to prevent embolization    Disclaimer: Risk Score calculation is dependent on accuracy of patient problem list and past encounter diagnosis. Start Xarelto, lopressor prn, monitor for hematuria     Patient was originally on Xarelto that was held due to Hematuria in the previous admission. No hematuria  This admission.       Keyshawn Cannon MD  3/14/2018  2:58 PM

## 2018-03-14 NOTE — PROGRESS NOTES
Vitals:  Patient Vitals for the past 24 hrs:   BP Temp Temp src Pulse Resp SpO2   18 0741 (!) 154/78 97.5 °F (36.4 °C) Oral 72 18 95 %   18 0342 (!) 147/74 98.6 °F (37 °C) Oral 73 18 96 %   18 2200 (!) 110/53 98.2 °F (36.8 °C) Oral 64 15 98 %   18 1900 (!) 114/49 - - 74 20 97 %   18 1830 130/69 97.6 °F (36.4 °C) Oral 77 20 99 %   18 1800 (!) 144/65 - - 78 - 100 %   18 1700 127/63 - - 64 - 100 %   18 1630 - - - - - 99 %   18 1600 120/62 97.6 °F (36.4 °C) - 71 - 96 %   18 1500 (!) 122/58 - - 64 - 99 %   18 1400 (!) 96/57 - - 64 - 99 %   18 1200 125/66 97.2 °F (36.2 °C) Oral 74 - -   18 1100 (!) 94/55 - - 69 - -   18 1000 (!) 91/53 - - 68 - -   18 0900 (!) 87/53 - - 71 - -     Temp (24hrs), Av.8 °F (36.6 °C), Min:97.2 °F (36.2 °C), Max:98.6 °F (37 °C)    No results for input(s): POCGLU in the last 72 hours. I/O (24Hr):     Intake/Output Summary (Last 24 hours) at 18 0847  Last data filed at 18 0546   Gross per 24 hour   Intake             5199 ml   Output              839 ml   Net             4360 ml       Labs:  Recent Results (from the past 24 hour(s))   Lactate, Sepsis    Collection Time: 18 11:56 AM   Result Value Ref Range    Lactic Acid, Sepsis NOT REPORTED 0.5 - 1.9 mmol/L    Lactic Acid, Sepsis, Whole Blood 2.0 (H) 0.5 - 1.9 mmol/L   Creatinine, Random Urine    Collection Time: 18 12:33 PM   Result Value Ref Range    Creatinine, Ur 56.7 39.0 - 259.0 mg/dL   Osmolality, Urine    Collection Time: 18 12:33 PM   Result Value Ref Range    Osmolality, Ur 227 80 - 1300 mOsm/kg   Protein / creatinine ratio, urine    Collection Time: 18 12:33 PM   Result Value Ref Range    Total Protein, Urine 16 mg/dL    Urine Total Protein Creatinine Ratio 0.28 (H) 0.00 - 0.20   Eosinophils, Urine    Collection Time: 18 12:33 PM   Result Value Ref Range    Eosinophil, Ur NONE SEEN NSN   UA Eyes: Conjunctivae are normal. Right eye exhibits no discharge. Left eye exhibits no discharge. No scleral icterus. Neck: Neck supple. No erythema present. Cardiovascular: Normal rate, regular rhythm and normal heart sounds. No murmur heard. Pulmonary/Chest: Effort normal and breath sounds normal. He has no wheezes. Abdominal: Soft. Bowel sounds are normal. He exhibits no mass. There is no tenderness. There is no rebound and no guarding. Musculoskeletal: He exhibits no edema or tenderness. Neurological: He is alert and oriented to person, place, and time. There are no new focal motor or sensory deficits, normal muscle tone and bulk, no abnormal sensation, normal speech. Skin: Skin is warm. No rash noted. Psychiatric: His behavior is normal. Cognition and memory are impaired. Nursing note and vitals reviewed. Assessment:        Principal Problem:    Sepsis due to urinary tract infection ([facility]  Active Problems:    Essential hypertension    Overweight    Paroxysmal atrial fibrillation (HCC)    Coronary artery disease involving native coronary artery of native heart    Recurrent UTI    Lactic acid acidosis    MARITA (acute kidney injury) (HCC)    Neutropenia associated with infection (HCC)    Chronic systolic CHF (congestive heart failure) ([facility]  Resolved Problems:    * No resolved hospital problems. *    Plan:        1. Sepsis secondary to likely UTI: Pending urine culture results, previous cultures are sensitive to aztreonam.  Patient is allergic to penicillin and sulfa and previous organism was resistant to Cipro. DC vancomycin that was started by ER and started aztreonam, IV fluid support, monitor lactic acid, pending cultures. Improving. Urine culture positive for E. coli, pending sensitivity. Patient currently report tolerance of insulin, will switch antibiotics to IV Rocephin, monitor for admission.   2. Patient has history of proximal A. fib, not currently on anticoagulation, currently normal sinus rhythm. EKG stable. Continue monitoring. Stable  3. Lactic acidosis: Secondary to above: IV hydration. Improving  Repeat lactic acid. 4. MARITA secondary to hypotension secondary to above. Likely ATN: urine analysis, urine sodium, urine creatinine and urine eosinophils, protein in urine,  serum osmolality, urine osmolality, avoid nephrotoxic medication, monitor input and output, resolved  5. Neutropenia secondary to sepsis, improved  6. Chronic systolic CHF, monitor with cautious IV nose  7. HTN: stable,resume home meds  8. DVT prophylaxis: heparin (porcine) injection 5,000 TID  9. PT, OT, pending evaluation       IV Fluids: sodium chloride Last Rate: 100 mL/hr at 03/14/18 0840,  reduced to 50 mg/h  Electrolytes: Supp as needed  Nutrition:  DIET CARDIAC;    Consults: None    Time Spent on patient care is  38 mins in patient examination, evaluation, counseling as well as coordination of care. . Directly related to the complexity of the case. Discussed care plan with nurse after getting input from the nurse.     Above plan discussed with the patient in room, who agreed to the above plan     Please call if any questions    Aracely Fischer MD  [facility] Hospitalist  3/14/2018  8:47 AM     (Please note that this chart was generated using voice recognition Dragon dictation software program. Although every effort was made to ensure the accuracy of this automated transcription, some errors in transcription may have occurred.)

## 2018-03-14 NOTE — CARE COORDINATION
[address] Transitions  3/14/2018    Patient: Vicki Aguilar Patient : 1934   MRN: [id number]    Reason for Admission: UTI, sepsis   RARS: [facility] Risk Score: 22.25     Spoke with   Current discharge plan - is to return back to [facility]. Patient discussing with physician now that the plan is to resume Xarelto again since no recent hematuria & monitor. Care Transitions will follow.     Readmission Risk  Patient Active Problem List   Diagnosis    Essential hypertension    Hyperlipidemia    Osteoarthritis    Allergic rhinitis    Diverticulosis    Overweight    Anxiety    Paroxysmal atrial fibrillation (HCC)    Dermatophytosis of nail    Coronary artery disease involving native coronary artery of native heart    Pneumonia due to organism    Mass of upper lobe of right lung    UTI (urinary tract infection)    Sepsis due to urinary tract infection ([address]    Recurrent UTI    Lactic acid acidosis    MARITA (acute kidney injury) ([facility]    Neutropenia associated with infection (HCC)    Chronic systolic CHF (congestive heart failure) [facility])       Future Appointments  Date Time Provider Vanessa Valdovinos   2018 1:30 PM MD RIANNA Johnson [facility]   2018 7:35 AM SCHEDULE, [address] [facility] [place]   2018 8:30 AM DO GENNARO Martinez DP   2018 11:00 AM MERCY Sanchez DP   2018 9:30 AM MD RIANNA Johnson [facility]     Nathan Jorge RN
Met with patient to discuss discharge planning. Plan is to return to [address] [place] [address].   Patient states his  cousin is looking in to housing for him
call    [phone]  Received call from Jannie Castellano, patient has a bed to return to at time of discharge      Electronically signed by Abigail Nageotte, RN on 3/13/18 at 12:10 PM

## 2018-03-14 NOTE — FLOWSHEET NOTE
18 2000   Encounter Summary   Services provided to: Patient   Referral/Consult From: [address] [facility] Other (Comment)  (This patient no longer has family members)   Place of [address] [place] [address] Drive (3/13)   Volunteer Visit (3/13/2018)   Complexity of Encounter Low   Length of Encounter 15 minutes   Spiritual Assessment Completed Yes   Routine   Type Initial   Assessment Calm; Approachable; Loneliness;Coping;Peaceful   Intervention Active listening;Prayer;Sustaining presence/ Ministry of presence; Discussed belief system/[employer]aritan practices/ekta   Outcome Acceptance;Expressed gratitude;Engaged in conversation;Encouraged; Hopeful;Receptive     This patient was visited in his room, and he was open and receptive to a visit. The patient was mostly positive, and he shared that he had been a  for many years. The patient shared that he was the only person left in his family, and that his sister had  last week. The chaplains are available to provide spiritual care and support.

## 2018-03-15 LAB
ANION GAP SERPL CALCULATED.3IONS-SCNC: 9 MMOL/L (ref 9–17)
BUN BLDV-MCNC: 10 MG/DL (ref 8–23)
BUN/CREAT BLD: ABNORMAL (ref 9–20)
CALCIUM SERPL-MCNC: 8 MG/DL (ref 8.6–10.4)
CHLORIDE BLD-SCNC: 104 MMOL/L (ref 98–107)
CO2: 25 MMOL/L (ref 20–31)
CREAT SERPL-MCNC: 0.88 MG/DL (ref 0.7–1.2)
CULTURE: ABNORMAL
CULTURE: ABNORMAL
EKG ATRIAL RATE: 288 BPM
EKG Q-T INTERVAL: 360 MS
EKG QRS DURATION: 100 MS
EKG QTC CALCULATION (BAZETT): 480 MS
EKG R AXIS: -31 DEGREES
EKG T AXIS: 95 DEGREES
EKG VENTRICULAR RATE: 107 BPM
GFR AFRICAN AMERICAN: >60 ML/MIN
GFR NON-AFRICAN AMERICAN: >60 ML/MIN
GFR SERPL CREATININE-BSD FRML MDRD: ABNORMAL ML/MIN/{1.73_M2}
GFR SERPL CREATININE-BSD FRML MDRD: ABNORMAL ML/MIN/{1.73_M2}
GLUCOSE BLD-MCNC: 111 MG/DL (ref 70–99)
Lab: ABNORMAL
ORGANISM: ABNORMAL
POTASSIUM SERPL-SCNC: 3.7 MMOL/L (ref 3.7–5.3)
SODIUM BLD-SCNC: 138 MMOL/L (ref 135–144)
SODIUM,UR: <20 MMOL/L
SPECIMEN DESCRIPTION: ABNORMAL
SPECIMEN DESCRIPTION: ABNORMAL
STATUS: ABNORMAL

## 2018-03-15 PROCEDURE — G8978 MOBILITY CURRENT STATUS: HCPCS

## 2018-03-15 PROCEDURE — G8987 SELF CARE CURRENT STATUS: HCPCS

## 2018-03-15 PROCEDURE — 97162 PT EVAL MOD COMPLEX 30 MIN: CPT

## 2018-03-15 PROCEDURE — 97530 THERAPEUTIC ACTIVITIES: CPT

## 2018-03-15 PROCEDURE — 36415 COLL VENOUS BLD VENIPUNCTURE: CPT

## 2018-03-15 PROCEDURE — G8988 SELF CARE GOAL STATUS: HCPCS

## 2018-03-15 PROCEDURE — 97166 OT EVAL MOD COMPLEX 45 MIN: CPT

## 2018-03-15 PROCEDURE — 6370000000 HC RX 637 (ALT 250 FOR IP): Performed by: FAMILY MEDICINE

## 2018-03-15 PROCEDURE — 99232 SBSQ HOSP IP/OBS MODERATE 35: CPT | Performed by: FAMILY MEDICINE

## 2018-03-15 PROCEDURE — 97535 SELF CARE MNGMENT TRAINING: CPT

## 2018-03-15 PROCEDURE — G8979 MOBILITY GOAL STATUS: HCPCS

## 2018-03-15 PROCEDURE — 2060000000 HC ICU INTERMEDIATE R&B

## 2018-03-15 PROCEDURE — 2580000003 HC RX 258: Performed by: FAMILY MEDICINE

## 2018-03-15 PROCEDURE — 6360000002 HC RX W HCPCS: Performed by: FAMILY MEDICINE

## 2018-03-15 PROCEDURE — 94762 N-INVAS EAR/PLS OXIMTRY CONT: CPT

## 2018-03-15 PROCEDURE — 80048 BASIC METABOLIC PNL TOTAL CA: CPT

## 2018-03-15 RX ADMIN — METOPROLOL SUCCINATE 50 MG: 50 TABLET, FILM COATED, EXTENDED RELEASE ORAL at 08:07

## 2018-03-15 RX ADMIN — TAMSULOSIN HYDROCHLORIDE 0.4 MG: 0.4 CAPSULE ORAL at 22:15

## 2018-03-15 RX ADMIN — RIVAROXABAN 20 MG: 20 TABLET, FILM COATED ORAL at 16:59

## 2018-03-15 RX ADMIN — AMIODARONE HYDROCHLORIDE 200 MG: 200 TABLET ORAL at 08:07

## 2018-03-15 RX ADMIN — SODIUM CHLORIDE: 9 INJECTION, SOLUTION INTRAVENOUS at 17:00

## 2018-03-15 RX ADMIN — TAMSULOSIN HYDROCHLORIDE 0.4 MG: 0.4 CAPSULE ORAL at 08:07

## 2018-03-15 RX ADMIN — ONDANSETRON 4 MG: 2 INJECTION INTRAMUSCULAR; INTRAVENOUS at 06:18

## 2018-03-15 RX ADMIN — CEFTRIAXONE SODIUM 1 G: 1 INJECTION, POWDER, FOR SOLUTION INTRAMUSCULAR; INTRAVENOUS at 12:01

## 2018-03-15 RX ADMIN — SIMVASTATIN 20 MG: 20 TABLET, FILM COATED ORAL at 22:15

## 2018-03-15 RX ADMIN — FLUTICASONE PROPIONATE 1 SPRAY: 50 SPRAY, METERED NASAL at 08:07

## 2018-03-15 RX ADMIN — METOPROLOL SUCCINATE 50 MG: 50 TABLET, FILM COATED, EXTENDED RELEASE ORAL at 22:15

## 2018-03-15 RX ADMIN — LISINOPRIL 10 MG: 10 TABLET ORAL at 08:07

## 2018-03-15 RX ADMIN — ASPIRIN 81 MG: 81 TABLET, CHEWABLE ORAL at 08:07

## 2018-03-15 RX ADMIN — AMIODARONE HYDROCHLORIDE 200 MG: 200 TABLET ORAL at 22:15

## 2018-03-15 ASSESSMENT — ENCOUNTER SYMPTOMS
CONSTIPATION: 0
VOMITING: 0
ABDOMINAL PAIN: 0
WHEEZING: 0
NAUSEA: 0
DIARRHEA: 0
SHORTNESS OF BREATH: 0

## 2018-03-15 ASSESSMENT — PAIN SCALES - GENERAL: PAINLEVEL_OUTOF10: 0

## 2018-03-15 NOTE — FLOWSHEET NOTE
Visit:  paged to patient's room to help complete advance directives. Patient was present with his cousin, Quinten Ocamop. Assessment: Patient and cousin were both engrossed in conversation and it took a while to get to the advance directives. Patient seemed a bit confused about them. Cousin was hesitant to be named as HPOA. Intervention:  provided a listening and supportive presence.  explained advance directives documents. Neither patient not cousin had the contact information for their cousin, Ben Branch, whom the patient wants to name as his HPOA.  encouraged cousin to bring that information in tomorrow so the document can be filled out then, or at [facility] where he will be discharged to. Plan: Chaplains to remain available to help patient complete the document if needed. 03/15/18 1157   Encounter Summary   Services provided to: Patient and family together   Referral/Consult From: Patient   Continue Visiting (3/15/18)   Complexity of Encounter Moderate   Length of Encounter 15 minutes   Routine   Type Follow up   Assessment Calm; Approachable   Intervention Active listening;Explored feelings, thoughts, concerns;Nurtured hope   Outcome Expressed gratitude;Engaged in conversation;Expressed feelings/needs/concerns;Receptive; Shared reminiscences   Advance Directives (For Healthcare)   Advance Directives Documents explained

## 2018-03-15 NOTE — PROGRESS NOTES
appears well-developed and well-nourished. HENT:   Head: Normocephalic and atraumatic. Right Ear: External ear normal.   Left Ear: External ear normal.   Eyes: Conjunctivae are normal. Right eye exhibits no discharge. Left eye exhibits no discharge. No scleral icterus. Neck: Neck supple. No erythema present. Cardiovascular: Normal rate, regular rhythm and normal heart sounds. No murmur heard. Pulmonary/Chest: Effort normal and breath sounds normal. He has no wheezes. Abdominal: Soft. Bowel sounds are normal. He exhibits no mass. There is no tenderness. There is no rebound and no guarding. Musculoskeletal: He exhibits no edema or tenderness. Neurological: He is alert and oriented to person, place, and time. There are no new focal motor or sensory deficits, normal muscle tone and bulk, no abnormal sensation, normal speech. Skin: Skin is warm. No rash noted. Psychiatric: His behavior is normal. Cognition and memory are impaired. Nursing note and vitals reviewed. Assessment:        Principal Problem:    Sepsis due to urinary tract infection (HCC)  Active Problems:    Paroxysmal atrial fibrillation (HCC)    Essential hypertension    Overweight    Coronary artery disease involving native coronary artery of native heart    Recurrent UTI    Lactic acid acidosis    MARITA (acute kidney injury) (HCC)    Neutropenia associated with infection (HCC)    Chronic systolic CHF (congestive heart failure) ([facility] [address]  Resolved Problems:    * No resolved hospital problems. *    Plan:        1. Sepsis secondary to likely UTI: Pending urine culture results, previous cultures are sensitive to aztreonam.  Patient is allergic to penicillin and sulfa and previous organism was resistant to Cipro. DC vancomycin that was started by ER and started aztreonam, IV fluid support, monitor lactic acid, pending cultures. Improving. Urine culture positive for E. coli, pending sensitivity.   Patient currently report tolerance of

## 2018-03-15 NOTE — PROGRESS NOTES
Occupational Therapy   Occupational Therapy Initial Assessment  Date: 3/15/2018   Patient Name: Neha Wolf  MRN: [id number]     : 1934    Copied from H&P:  History of Present Illness:      Neha Wolf is a 80 y.o. male who presents As a transfer from outside facility due to sepsis secondary to UTI. patient presented originally with fevers, chills, tiredness and fatigue and dysuria. For several days that became progressively worse. Yesterday before presented to the ED. At the ED, patient was lethargic,feverish, tired, tachycardic, hypotensive .     Upon reviewing the chart. The previous urine culture was E. coli resistant to Cipro, patient is allergic to penicillin and sulfa.          At ED  patient was started on IV vancomycin     he is admitted to the hospital for the management of Sepsis due to urinary tract infection [facility])    Patient Diagnosis(es): There were no encounter diagnoses. has a past medical history of Acute bronchitis; Allergic rhinitis; Anxiety; Atrial fibrillation ([address] CAD (coronary artery disease); Cataract, right; Choroidal nevus of right eye; Diverticulosis; Elevated PSA; Hemorrhoids; History of measles; History of mumps; Hyperlipidemia; Hypertension; Occult blood positive stool; Osteoarthritis; Overweight(278.02); Palpitations; Pseudophakia, left eye; PSVT (paroxysmal supraventricular tachycardia) ([address] Retinal detachment; and Seborrheic dermatitis. has a past surgical history that includes Coronary artery bypass graft; Appendectomy (0 and ); Tonsillectomy; retinal laser (Left, 2011); Cataract removal (Left, 2013); Skin tag removal (1999); and Abscess Drainage ([id number]). Restrictions  Restrictions/Precautions  Restrictions/Precautions: Fall Risk, General Precautions  Required Braces or Orthoses?: No  Position Activity Restriction  Other position/activity restrictions:  Activity as tolerated    Subjective   General  Patient assessed for rehabilitation

## 2018-03-16 VITALS
OXYGEN SATURATION: 97 % | TEMPERATURE: 97.7 F | BODY MASS INDEX: 26.17 KG/M2 | RESPIRATION RATE: 18 BRPM | SYSTOLIC BLOOD PRESSURE: 166 MMHG | HEIGHT: 71 IN | WEIGHT: 186.95 LBS | DIASTOLIC BLOOD PRESSURE: 72 MMHG | HEART RATE: 57 BPM

## 2018-03-16 PROBLEM — E87.20 LACTIC ACID ACIDOSIS: Status: RESOLVED | Noted: 2018-03-13 | Resolved: 2018-03-16

## 2018-03-16 PROBLEM — A41.9 SEPSIS DUE TO URINARY TRACT INFECTION (HCC): Status: RESOLVED | Noted: 2018-03-13 | Resolved: 2018-03-16

## 2018-03-16 PROBLEM — R31.0 GROSS HEMATURIA: Status: RESOLVED | Noted: 2018-03-16 | Resolved: 2018-03-16

## 2018-03-16 PROBLEM — N17.9 AKI (ACUTE KIDNEY INJURY) (HCC): Status: RESOLVED | Noted: 2018-03-13 | Resolved: 2018-03-16

## 2018-03-16 PROBLEM — R31.0 GROSS HEMATURIA: Status: ACTIVE | Noted: 2018-03-16

## 2018-03-16 PROBLEM — N39.0 SEPSIS DUE TO URINARY TRACT INFECTION (HCC): Status: RESOLVED | Noted: 2018-03-13 | Resolved: 2018-03-16

## 2018-03-16 PROBLEM — D70.3 NEUTROPENIA ASSOCIATED WITH INFECTION (HCC): Status: RESOLVED | Noted: 2018-03-13 | Resolved: 2018-03-16

## 2018-03-16 LAB
ANION GAP SERPL CALCULATED.3IONS-SCNC: 11 MMOL/L (ref 9–17)
BUN BLDV-MCNC: 8 MG/DL (ref 8–23)
BUN/CREAT BLD: ABNORMAL (ref 9–20)
CALCIUM SERPL-MCNC: 7.9 MG/DL (ref 8.6–10.4)
CHLORIDE BLD-SCNC: 103 MMOL/L (ref 98–107)
CO2: 24 MMOL/L (ref 20–31)
CREAT SERPL-MCNC: 0.76 MG/DL (ref 0.7–1.2)
CULTURE: NO GROWTH
CULTURE: NORMAL
GFR AFRICAN AMERICAN: >60 ML/MIN
GFR NON-AFRICAN AMERICAN: >60 ML/MIN
GFR SERPL CREATININE-BSD FRML MDRD: ABNORMAL ML/MIN/{1.73_M2}
GFR SERPL CREATININE-BSD FRML MDRD: ABNORMAL ML/MIN/{1.73_M2}
GLUCOSE BLD-MCNC: 105 MG/DL (ref 70–99)
Lab: NORMAL
POTASSIUM SERPL-SCNC: 3.6 MMOL/L (ref 3.7–5.3)
SODIUM BLD-SCNC: 138 MMOL/L (ref 135–144)
SPECIMEN DESCRIPTION: NORMAL
STATUS: NORMAL

## 2018-03-16 PROCEDURE — 97110 THERAPEUTIC EXERCISES: CPT

## 2018-03-16 PROCEDURE — 99239 HOSP IP/OBS DSCHRG MGMT >30: CPT | Performed by: FAMILY MEDICINE

## 2018-03-16 PROCEDURE — 2580000003 HC RX 258: Performed by: NURSE PRACTITIONER

## 2018-03-16 PROCEDURE — 6360000002 HC RX W HCPCS: Performed by: FAMILY MEDICINE

## 2018-03-16 PROCEDURE — 6370000000 HC RX 637 (ALT 250 FOR IP): Performed by: NURSE PRACTITIONER

## 2018-03-16 PROCEDURE — 80048 BASIC METABOLIC PNL TOTAL CA: CPT

## 2018-03-16 PROCEDURE — 94762 N-INVAS EAR/PLS OXIMTRY CONT: CPT

## 2018-03-16 PROCEDURE — 36415 COLL VENOUS BLD VENIPUNCTURE: CPT

## 2018-03-16 PROCEDURE — 6370000000 HC RX 637 (ALT 250 FOR IP): Performed by: FAMILY MEDICINE

## 2018-03-16 RX ORDER — CEPHALEXIN 500 MG/1
500 CAPSULE ORAL EVERY 6 HOURS SCHEDULED
Status: DISCONTINUED | OUTPATIENT
Start: 2018-03-16 | End: 2018-03-16 | Stop reason: HOSPADM

## 2018-03-16 RX ORDER — CEPHALEXIN 250 MG/1
250 CAPSULE ORAL EVERY 6 HOURS SCHEDULED
Status: DISCONTINUED | OUTPATIENT
Start: 2018-03-16 | End: 2018-03-16

## 2018-03-16 RX ORDER — CEPHALEXIN 500 MG/1
500 CAPSULE ORAL 4 TIMES DAILY
Qty: 28 CAPSULE | Refills: 0 | Status: SHIPPED | OUTPATIENT
Start: 2018-03-16 | End: 2018-03-23

## 2018-03-16 RX ADMIN — Medication 10 ML: at 08:13

## 2018-03-16 RX ADMIN — HYDROCODONE BITARTRATE AND ACETAMINOPHEN 1 TABLET: 5; 325 TABLET ORAL at 03:37

## 2018-03-16 RX ADMIN — ASPIRIN 81 MG: 81 TABLET, CHEWABLE ORAL at 08:12

## 2018-03-16 RX ADMIN — CEPHALEXIN 500 MG: 500 CAPSULE ORAL at 09:39

## 2018-03-16 RX ADMIN — METOPROLOL SUCCINATE 50 MG: 50 TABLET, FILM COATED, EXTENDED RELEASE ORAL at 08:12

## 2018-03-16 RX ADMIN — AMIODARONE HYDROCHLORIDE 200 MG: 200 TABLET ORAL at 08:12

## 2018-03-16 RX ADMIN — LISINOPRIL 10 MG: 10 TABLET ORAL at 08:12

## 2018-03-16 RX ADMIN — TAMSULOSIN HYDROCHLORIDE 0.4 MG: 0.4 CAPSULE ORAL at 08:12

## 2018-03-16 RX ADMIN — FLUTICASONE PROPIONATE 1 SPRAY: 50 SPRAY, METERED NASAL at 08:14

## 2018-03-16 RX ADMIN — ONDANSETRON 4 MG: 2 INJECTION INTRAMUSCULAR; INTRAVENOUS at 08:13

## 2018-03-16 ASSESSMENT — ENCOUNTER SYMPTOMS
NAUSEA: 0
DIARRHEA: 0
CONSTIPATION: 0
SHORTNESS OF BREATH: 0
ABDOMINAL PAIN: 0
VOMITING: 0
WHEEZING: 0

## 2018-03-16 ASSESSMENT — PAIN DESCRIPTION - PAIN TYPE: TYPE: CHRONIC PAIN

## 2018-03-16 ASSESSMENT — PAIN SCALES - GENERAL
PAINLEVEL_OUTOF10: 4
PAINLEVEL_OUTOF10: 4

## 2018-03-16 ASSESSMENT — PAIN DESCRIPTION - ORIENTATION: ORIENTATION: LOWER

## 2018-03-16 ASSESSMENT — PAIN DESCRIPTION - LOCATION: LOCATION: BACK

## 2018-03-16 NOTE — PROGRESS NOTES
Called report to the [facility] spoke to [employer]. Report given.   Told RN patient is to leave Here at 11:00

## 2018-03-16 NOTE — PROGRESS NOTES
[facility]   [address]    Progress Note    3/16/2018    7:45 AM    Name:   Jasmin Hamilton  MRN:     [id number]     [place]:      [de-identified]   Room:   [address] [place] [address] Day:  3  Admit Date:  3/13/2018  6:34 AM    PCP:   Kassie Flores DO  Code Status:  Full Code    Subjective:     C/C: Sepsis secondary to UTI, fevers    Interval History Status: Improving. The patient is a 80 y.o.  male who is admitted to the hospital for the management of sepsis secondary to UTI     Patient seen and examined at the bed side , no new acute events overnight except  . No fevers overnight, no chills, urinary symptoms are improving. No shortness of breath,     Patient feels Better. No palpitation, no change in heart rate, patient had episode of A. fib yesterday. .  Patient was restarted on Xarelto, developed hematuria which happened before. Stable CBC in the morning. Pt denies any Chest pain , new pain, vomiting. Notes from nursing staff and Consults had been reviewed, and the overnight progress had been checked with the nursing staff as well. Brief History:     Jasmin Hamilton is a 80 y.o. male who presents As a transfer from outside facility due to sepsis secondary to UTI. patient presented originally with fevers, chills, tiredness and fatigue and dysuria. For several days that became progressively worse. Yesterday before presented to the ED. At the ED, patient was lethargic,feverish, tired, tachycardic, hypotensive . Upon reviewing the chart. The previous urine culture was E. coli resistant to Cipro, patient is allergic to penicillin and sulfa. At ED  patient was started on IV vancomycin    d he is admitted to the hospital for the management of Sepsis due to urinary tract infection      Review of Systems:   Review of Systems   Constitutional: Negative for activity change, appetite change, fatigue and fever.    Respiratory: Negative for shortness of

## 2018-03-16 NOTE — PROGRESS NOTES
Physical Therapy  Facility/Department: [facility]  Daily Treatment Note  NAME: Yudith Wilkerson  : 1934  MRN: [id number]    Date of Service: 3/16/2018    Patient Diagnosis(es):   Patient Active Problem List    Diagnosis Date Noted    Recurrent UTI 2018    Chronic systolic CHF (congestive heart failure) ([address] 2018    UTI (urinary tract infection) 2018    Mass of upper lobe of right lung 2018    Pneumonia due to organism     Coronary artery disease involving native coronary artery of native heart 10/20/2016    Dermatophytosis of nail 01/10/2014    Paroxysmal atrial fibrillation ([address] 2013    Anxiety     Essential hypertension     Hyperlipidemia     Osteoarthritis     Allergic rhinitis     Diverticulosis     Overweight        Past Medical History:   Diagnosis Date    Acute bronchitis     history of     Allergic rhinitis     Anxiety     Atrial fibrillation ([address]     paroxymsal     CAD (coronary artery disease)     post CABG 2000    Cataract, right     Choroidal nevus of right eye     Diverticulosis     Elevated PSA     Hemorrhoids     History of measles childhood    History of mumps childhood    Hyperlipidemia     Hypertension     Occult blood positive stool     refuses colonoscopy    Osteoarthritis     Overweight(278.02)     Palpitations     Pseudophakia, left eye     PSVT (paroxysmal supraventricular tachycardia) (HCC)     Retinal detachment     left, history of     Seborrheic dermatitis      Past Surgical History:   Procedure Laterality Date    ABSCESS DRAINAGE  [id number]    multiple    APPENDECTOMY  1940 and [facility] Left 2013    CORONARY ARTERY BYPASS GRAFT      RETINAL LASER Left 2011    detached retina    SKIN TAG REMOVAL  1999    TONSILLECTOMY         Restrictions  Restrictions/Precautions  Restrictions/Precautions: Fall Risk, General Precautions  Required Braces or Orthoses?: No  Position Activity mobility and transfers independently  Short term goal 4: Pt to have increased dynamic standing balance to Good minus in order to decrease fall risk   Patient Goals   Patient goals : Return to [facility] for a short time, eventually live in assisted living    Plan    Plan  Times per week: 5-6x/wk  Current Treatment Recommendations: Strengthening, ROM, Transfer Training, Functional Mobility Training, Balance Training, Gait Training, Endurance Training  Safety Devices  Type of devices: Gait belt, Call light within reach, Nurse notified, Left in chair     Therapy Time   Individual Concurrent Group Co-treatment   Time In 0906         Time Out 0931         Minutes Carleen COONEY 6Dariel, PTA

## 2018-03-19 ENCOUNTER — TELEPHONE (OUTPATIENT)
Dept: INTERNAL MEDICINE | Age: 83
End: 2018-03-19

## 2018-03-19 LAB
CULTURE: NORMAL
Lab: NORMAL
SPECIMEN DESCRIPTION: NORMAL
STATUS: NORMAL

## 2018-03-20 LAB
CULTURE: ABNORMAL
Lab: ABNORMAL
ORGANISM: ABNORMAL
SPECIMEN DESCRIPTION: ABNORMAL
SPECIMEN DESCRIPTION: ABNORMAL
STATUS: ABNORMAL

## 2018-03-20 PROCEDURE — 99308 SBSQ NF CARE LOW MDM 20: CPT | Performed by: NURSE PRACTITIONER

## 2018-03-21 ENCOUNTER — HOSPITAL ENCOUNTER (OUTPATIENT)
Age: 83
Setting detail: SPECIMEN
Discharge: HOME | End: 2018-03-21
Payer: MEDICARE

## 2018-03-21 ENCOUNTER — CARE COORDINATION (OUTPATIENT)
Dept: CASE MANAGEMENT | Age: 83
End: 2018-03-21

## 2018-03-21 LAB
-: ABNORMAL
AMORPHOUS: ABNORMAL
ANION GAP SERPL CALCULATED.3IONS-SCNC: 14 MMOL/L (ref 9–17)
BACTERIA: ABNORMAL
BILIRUBIN URINE: NEGATIVE
BUN BLDV-MCNC: 9 MG/DL (ref 8–23)
BUN/CREAT BLD: 10 (ref 9–20)
CALCIUM SERPL-MCNC: 8.3 MG/DL (ref 8.6–10.4)
CASTS UA: ABNORMAL /LPF (ref 0–2)
CHLORIDE BLD-SCNC: 96 MMOL/L (ref 98–107)
CO2: 28 MMOL/L (ref 20–31)
COLOR: ABNORMAL
COMMENT UA: ABNORMAL
CREAT SERPL-MCNC: 0.88 MG/DL (ref 0.7–1.2)
CRYSTALS, UA: ABNORMAL /HPF
EPITHELIAL CELLS UA: ABNORMAL /HPF (ref 0–5)
GFR AFRICAN AMERICAN: >60 ML/MIN
GFR NON-AFRICAN AMERICAN: >60 ML/MIN
GFR SERPL CREATININE-BSD FRML MDRD: ABNORMAL ML/MIN/{1.73_M2}
GFR SERPL CREATININE-BSD FRML MDRD: ABNORMAL ML/MIN/{1.73_M2}
GLUCOSE BLD-MCNC: 82 MG/DL (ref 70–99)
GLUCOSE URINE: NEGATIVE
HCT VFR BLD CALC: 38.3 % (ref 41–53)
HEMOGLOBIN: 13 G/DL (ref 13.5–17.5)
KETONES, URINE: ABNORMAL
LEUKOCYTE ESTERASE, URINE: NEGATIVE
MCH RBC QN AUTO: 30.7 PG (ref 26–34)
MCHC RBC AUTO-ENTMCNC: 33.9 G/DL (ref 31–37)
MCV RBC AUTO: 90.6 FL (ref 80–100)
MUCUS: ABNORMAL
NITRITE, URINE: NEGATIVE
NRBC AUTOMATED: ABNORMAL PER 100 WBC
OTHER OBSERVATIONS UA: ABNORMAL
PDW BLD-RTO: 14.8 % (ref 11–14.5)
PH UA: 6 (ref 5–6)
PLATELET # BLD: 202 K/UL (ref 140–450)
PMV BLD AUTO: 9.4 FL (ref 6–12)
POTASSIUM SERPL-SCNC: 3.3 MMOL/L (ref 3.7–5.3)
PROTEIN UA: NEGATIVE
RBC # BLD: 4.23 M/UL (ref 4.5–5.9)
RBC UA: ABNORMAL /HPF (ref 0–4)
RENAL EPITHELIAL, UA: ABNORMAL /HPF
SODIUM BLD-SCNC: 138 MMOL/L (ref 135–144)
SPECIFIC GRAVITY UA: 1.01 (ref 1.01–1.02)
TRICHOMONAS: ABNORMAL
TURBIDITY: ABNORMAL
URINE HGB: NEGATIVE
UROBILINOGEN, URINE: NORMAL
WBC # BLD: 6 K/UL (ref 3.5–11)
WBC UA: ABNORMAL /HPF (ref 0–4)
YEAST: ABNORMAL

## 2018-03-21 PROCEDURE — 85027 COMPLETE CBC AUTOMATED: CPT

## 2018-03-21 PROCEDURE — 81001 URINALYSIS AUTO W/SCOPE: CPT

## 2018-03-21 PROCEDURE — 36415 COLL VENOUS BLD VENIPUNCTURE: CPT

## 2018-03-21 PROCEDURE — 80048 BASIC METABOLIC PNL TOTAL CA: CPT

## 2018-03-21 PROCEDURE — 87086 URINE CULTURE/COLONY COUNT: CPT

## 2018-03-21 NOTE — CARE COORDINATION
[facility] Update Call    3/21/2018    Patient: Ricardo Goldman Patient : 1934   MRN: [id number]  Reason for Admission: There are no discharge diagnoses documented for the most recent discharge. Discharge Date: 3/16/18 RARS: [facility] Risk Score: 22.25       Care Transitions Post Acute Facility Update    Care Transitions Interventions  Post Acute Facility:  [address] Update       Secure e-mail sent to staff @ SNF. Discharge planning, clinical and functional progress report requested.

## 2018-03-22 LAB
CULTURE: NO GROWTH
CULTURE: NORMAL
Lab: NORMAL
SPECIMEN DESCRIPTION: NORMAL
SPECIMEN DESCRIPTION: NORMAL
STATUS: NORMAL

## 2018-03-26 ENCOUNTER — HOSPITAL ENCOUNTER (OUTPATIENT)
Age: 83
Setting detail: SPECIMEN
Discharge: HOME | End: 2018-03-26
Payer: MEDICARE

## 2018-03-26 LAB
ANION GAP SERPL CALCULATED.3IONS-SCNC: 12 MMOL/L (ref 9–17)
BUN BLDV-MCNC: 10 MG/DL (ref 8–23)
BUN/CREAT BLD: 11 (ref 9–20)
CALCIUM SERPL-MCNC: 8.5 MG/DL (ref 8.6–10.4)
CHLORIDE BLD-SCNC: 97 MMOL/L (ref 98–107)
CO2: 28 MMOL/L (ref 20–31)
CREAT SERPL-MCNC: 0.95 MG/DL (ref 0.7–1.2)
GFR AFRICAN AMERICAN: >60 ML/MIN
GFR NON-AFRICAN AMERICAN: >60 ML/MIN
GFR SERPL CREATININE-BSD FRML MDRD: ABNORMAL ML/MIN/{1.73_M2}
GFR SERPL CREATININE-BSD FRML MDRD: ABNORMAL ML/MIN/{1.73_M2}
GLUCOSE BLD-MCNC: 109 MG/DL (ref 70–99)
POTASSIUM SERPL-SCNC: 4 MMOL/L (ref 3.7–5.3)
SODIUM BLD-SCNC: 137 MMOL/L (ref 135–144)

## 2018-03-26 PROCEDURE — 36415 COLL VENOUS BLD VENIPUNCTURE: CPT

## 2018-03-26 PROCEDURE — 80048 BASIC METABOLIC PNL TOTAL CA: CPT

## 2018-03-28 ENCOUNTER — CARE COORDINATION (OUTPATIENT)
Dept: CASE MANAGEMENT | Age: 83
End: 2018-03-28

## 2018-03-28 ENCOUNTER — OFFICE VISIT (OUTPATIENT)
Dept: UROLOGY | Age: 83
End: 2018-03-28
Payer: MEDICARE

## 2018-03-28 VITALS
SYSTOLIC BLOOD PRESSURE: 110 MMHG | WEIGHT: 186.95 LBS | DIASTOLIC BLOOD PRESSURE: 60 MMHG | HEIGHT: 71 IN | BODY MASS INDEX: 26.17 KG/M2 | HEART RATE: 62 BPM

## 2018-03-28 DIAGNOSIS — N39.0 URINARY TRACT INFECTION WITHOUT HEMATURIA, SITE UNSPECIFIED: Primary | ICD-10-CM

## 2018-03-28 DIAGNOSIS — R39.9 LOWER URINARY TRACT SYMPTOMS (LUTS): ICD-10-CM

## 2018-03-28 PROCEDURE — G8417 CALC BMI ABV UP PARAM F/U: HCPCS | Performed by: UROLOGY

## 2018-03-28 PROCEDURE — G8484 FLU IMMUNIZE NO ADMIN: HCPCS | Performed by: UROLOGY

## 2018-03-28 PROCEDURE — G8427 DOCREV CUR MEDS BY ELIG CLIN: HCPCS | Performed by: UROLOGY

## 2018-03-28 PROCEDURE — G8598 ASA/ANTIPLAT THER USED: HCPCS | Performed by: UROLOGY

## 2018-03-28 PROCEDURE — 4040F PNEUMOC VAC/ADMIN/RCVD: CPT | Performed by: UROLOGY

## 2018-03-28 PROCEDURE — 99213 OFFICE O/P EST LOW 20 MIN: CPT | Performed by: UROLOGY

## 2018-03-28 PROCEDURE — 1111F DSCHRG MED/CURRENT MED MERGE: CPT | Performed by: UROLOGY

## 2018-03-28 PROCEDURE — 51798 US URINE CAPACITY MEASURE: CPT | Performed by: UROLOGY

## 2018-03-28 PROCEDURE — 1036F TOBACCO NON-USER: CPT | Performed by: UROLOGY

## 2018-03-28 PROCEDURE — 1123F ACP DISCUSS/DSCN MKR DOCD: CPT | Performed by: UROLOGY

## 2018-03-28 RX ORDER — ALPRAZOLAM 0.25 MG/1
TABLET ORAL
COMMUNITY
End: 2018-10-08 | Stop reason: SDUPTHER

## 2018-03-28 NOTE — PROGRESS NOTES
Rangel Stern MD   Urology Clinic Consultation / New Patient Visit      Patient:  Kostas House  YOB: 1934  Date: 3/28/2018    HISTORY OF PRESENT ILLNESS:   The patient is a 80 y.o. male who presents today for evaluation of the following problem(s): hematuria, BPH, retention  Overall the problem(s) : show no change. Associated Symptoms: No dysuria, gross hematuria. Pain Severity: Pain Score:   0 - No pain    Summary of old records: Prostate:  >50 grams, hypervascular, obstructive  Bladder: No tumor noted. High median bar, multiple diverticula, cellules  Neg hematuria work up in the past  On flomax  (Patient's old records, notes and chart reviewed and summarized above.)     2 weeks forE. Coli UTI that was treated, urinary retention of unknown amount, sanchez placed   [place] that he was emptying well prior to urgent care visit  Poor ECOG    Today:  Here for PVR check, scanned at 111ml  flomax BID  Content with urination, minimal daytime issues  Some nocturia      Last several PSA's:  Lab Results   Component Value Date    PSA 6.63 (H) 12/28/2016    PSA 4.73 (H) 12/30/2015    PSA 6.28 (H) 01/06/2015       Last total testosterone:  No results found for: TESTOSTERONE    Urinalysis today:  No results found for this visit on 03/28/18.       Last BUN and creatinine:  Lab Results   Component Value Date    BUN 10 03/26/2018     Lab Results   Component Value Date    CREATININE 0.95 03/26/2018       Imaging Reviewed during this Office Visit:   (results were independently reviewed by physician and radiology report verified)    PAST MEDICAL, FAMILY AND SOCIAL HISTORY:  Past Medical History:   Diagnosis Date    Acute bronchitis     history of     Allergic rhinitis     Anxiety     Atrial fibrillation ([address]     paroxymsal     CAD (coronary artery disease)     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    Cataract, right     Choroidal nevus of right eye     Diverticulosis     Elevated PSA     Hemorrhoids     History of measles or retention  Flomax BID for LUTS  Make sure to address constipation  Return in about 3 months (around 6/28/2018).            MD [facility]

## 2018-03-29 ENCOUNTER — HOSPITAL ENCOUNTER (OUTPATIENT)
Age: 83
Setting detail: SPECIMEN
Discharge: HOME | End: 2018-03-29
Payer: COMMERCIAL

## 2018-03-29 PROCEDURE — 87086 URINE CULTURE/COLONY COUNT: CPT

## 2018-03-29 PROCEDURE — 81001 URINALYSIS AUTO W/SCOPE: CPT

## 2018-03-30 LAB
-: NORMAL
AMORPHOUS: NORMAL
BACTERIA: NORMAL
BILIRUBIN URINE: NEGATIVE
CASTS UA: NORMAL /LPF (ref 0–2)
COLOR: ABNORMAL
COMMENT UA: ABNORMAL
CRYSTALS, UA: NORMAL /HPF
EPITHELIAL CELLS UA: NORMAL /HPF (ref 0–5)
GLUCOSE URINE: NEGATIVE
KETONES, URINE: NEGATIVE
LEUKOCYTE ESTERASE, URINE: ABNORMAL
MUCUS: NORMAL
NITRITE, URINE: NEGATIVE
OTHER OBSERVATIONS UA: NORMAL
PH UA: 5.5 (ref 5–6)
PROTEIN UA: NEGATIVE
RBC UA: NORMAL /HPF (ref 0–4)
RENAL EPITHELIAL, UA: NORMAL /HPF
SPECIFIC GRAVITY UA: 1.02 (ref 1.01–1.02)
TRICHOMONAS: NORMAL
TURBIDITY: ABNORMAL
URINE HGB: NEGATIVE
UROBILINOGEN, URINE: NORMAL
WBC UA: NORMAL /HPF (ref 0–4)
YEAST: NORMAL

## 2018-03-31 LAB
CULTURE: NORMAL
CULTURE: NORMAL
Lab: NORMAL
SPECIMEN DESCRIPTION: NORMAL
SPECIMEN DESCRIPTION: NORMAL
STATUS: NORMAL

## 2018-03-31 PROCEDURE — 99305 1ST NF CARE MODERATE MDM 35: CPT | Performed by: INTERNAL MEDICINE

## 2018-04-02 ENCOUNTER — OUTSIDE SERVICES (OUTPATIENT)
Dept: INTERNAL MEDICINE | Age: 83
End: 2018-04-02
Payer: MEDICARE

## 2018-04-02 DIAGNOSIS — R53.1 GENERAL WEAKNESS: ICD-10-CM

## 2018-04-02 DIAGNOSIS — N39.0 URINARY TRACT INFECTION WITHOUT HEMATURIA, SITE UNSPECIFIED: Primary | ICD-10-CM

## 2018-04-02 DIAGNOSIS — I25.10 CORONARY ARTERY DISEASE INVOLVING NATIVE CORONARY ARTERY OF NATIVE HEART, ANGINA PRESENCE UNSPECIFIED: ICD-10-CM

## 2018-04-02 DIAGNOSIS — R91.8 MASS OF UPPER LOBE OF RIGHT LUNG: ICD-10-CM

## 2018-04-02 DIAGNOSIS — I10 ESSENTIAL HYPERTENSION: ICD-10-CM

## 2018-04-02 DIAGNOSIS — F41.9 ANXIETY: ICD-10-CM

## 2018-04-02 DIAGNOSIS — I48.0 PAROXYSMAL ATRIAL FIBRILLATION (HCC): ICD-10-CM

## 2018-04-02 DIAGNOSIS — E78.2 MIXED HYPERLIPIDEMIA: ICD-10-CM

## 2018-04-02 ASSESSMENT — ENCOUNTER SYMPTOMS
EYE REDNESS: 0
VOMITING: 0
EYE PAIN: 0
EYE DISCHARGE: 0
DIARRHEA: 0
CONSTIPATION: 0
BLOOD IN STOOL: 0
ABDOMINAL PAIN: 0
NAUSEA: 0
WHEEZING: 0
BACK PAIN: 0
SHORTNESS OF BREATH: 0
COUGH: 0
SORE THROAT: 0
SPUTUM PRODUCTION: 0
ORTHOPNEA: 0

## 2018-04-04 ENCOUNTER — TELEPHONE (OUTPATIENT)
Dept: INTERNAL MEDICINE | Age: 83
End: 2018-04-04

## 2018-04-05 ENCOUNTER — OFFICE VISIT (OUTPATIENT)
Dept: CARDIOLOGY | Age: 83
End: 2018-04-05
Payer: MEDICARE

## 2018-04-05 VITALS
HEIGHT: 71 IN | SYSTOLIC BLOOD PRESSURE: 138 MMHG | BODY MASS INDEX: 26.46 KG/M2 | WEIGHT: 189 LBS | DIASTOLIC BLOOD PRESSURE: 88 MMHG | HEART RATE: 60 BPM

## 2018-04-05 DIAGNOSIS — I48.0 PAROXYSMAL ATRIAL FIBRILLATION (HCC): Primary | ICD-10-CM

## 2018-04-05 PROCEDURE — 93000 ELECTROCARDIOGRAM COMPLETE: CPT | Performed by: INTERNAL MEDICINE

## 2018-04-05 PROCEDURE — 99213 OFFICE O/P EST LOW 20 MIN: CPT | Performed by: INTERNAL MEDICINE

## 2018-04-05 RX ORDER — POLYETHYLENE GLYCOL 3350 17 G/17G
17 POWDER, FOR SOLUTION ORAL DAILY PRN
COMMUNITY
End: 2018-04-19 | Stop reason: ALTCHOICE

## 2018-04-05 RX ORDER — ONDANSETRON 4 MG/1
4 TABLET, FILM COATED ORAL EVERY 8 HOURS PRN
COMMUNITY
End: 2018-04-19 | Stop reason: ALTCHOICE

## 2018-04-05 RX ORDER — DOCUSATE SODIUM 100 MG/1
100 CAPSULE, LIQUID FILLED ORAL 2 TIMES DAILY
COMMUNITY
End: 2018-04-19 | Stop reason: ALTCHOICE

## 2018-04-06 ENCOUNTER — OFFICE VISIT (OUTPATIENT)
Dept: PULMONOLOGY | Age: 83
End: 2018-04-06
Payer: MEDICARE

## 2018-04-06 ENCOUNTER — CARE COORDINATION (OUTPATIENT)
Dept: CASE MANAGEMENT | Age: 83
End: 2018-04-06

## 2018-04-06 VITALS
SYSTOLIC BLOOD PRESSURE: 134 MMHG | RESPIRATION RATE: 12 BRPM | HEIGHT: 71 IN | WEIGHT: 186 LBS | HEART RATE: 62 BPM | BODY MASS INDEX: 26.04 KG/M2 | TEMPERATURE: 96.8 F | OXYGEN SATURATION: 95 % | DIASTOLIC BLOOD PRESSURE: 84 MMHG

## 2018-04-06 DIAGNOSIS — I48.0 PAROXYSMAL ATRIAL FIBRILLATION (HCC): ICD-10-CM

## 2018-04-06 DIAGNOSIS — R91.8 MASS OF UPPER LOBE OF RIGHT LUNG: Primary | ICD-10-CM

## 2018-04-06 PROCEDURE — 1123F ACP DISCUSS/DSCN MKR DOCD: CPT | Performed by: INTERNAL MEDICINE

## 2018-04-06 PROCEDURE — 1036F TOBACCO NON-USER: CPT | Performed by: INTERNAL MEDICINE

## 2018-04-06 PROCEDURE — 99204 OFFICE O/P NEW MOD 45 MIN: CPT | Performed by: INTERNAL MEDICINE

## 2018-04-06 PROCEDURE — G8427 DOCREV CUR MEDS BY ELIG CLIN: HCPCS | Performed by: INTERNAL MEDICINE

## 2018-04-06 PROCEDURE — G8417 CALC BMI ABV UP PARAM F/U: HCPCS | Performed by: INTERNAL MEDICINE

## 2018-04-06 PROCEDURE — G8598 ASA/ANTIPLAT THER USED: HCPCS | Performed by: INTERNAL MEDICINE

## 2018-04-06 PROCEDURE — 1111F DSCHRG MED/CURRENT MED MERGE: CPT | Performed by: INTERNAL MEDICINE

## 2018-04-06 PROCEDURE — 4040F PNEUMOC VAC/ADMIN/RCVD: CPT | Performed by: INTERNAL MEDICINE

## 2018-04-09 ENCOUNTER — TELEPHONE (OUTPATIENT)
Dept: INTERNAL MEDICINE | Age: 83
End: 2018-04-09

## 2018-04-11 PROBLEM — N39.0 UTI (URINARY TRACT INFECTION): Status: RESOLVED | Noted: 2018-02-27 | Resolved: 2018-04-11

## 2018-04-12 ENCOUNTER — HOSPITAL ENCOUNTER (OUTPATIENT)
Age: 83
Setting detail: SPECIMEN
Discharge: HOME | End: 2018-04-12
Payer: MEDICARE

## 2018-04-12 ENCOUNTER — TELEPHONE (OUTPATIENT)
Dept: INTERNAL MEDICINE | Age: 83
End: 2018-04-12

## 2018-04-12 LAB
ABSOLUTE EOS #: 0.4 K/UL (ref 0–0.4)
ABSOLUTE IMMATURE GRANULOCYTE: ABNORMAL K/UL (ref 0–0.3)
ABSOLUTE LYMPH #: 1 K/UL (ref 1–4.8)
ABSOLUTE MONO #: 0.5 K/UL (ref 0.1–1.2)
ALBUMIN SERPL-MCNC: 3.3 G/DL (ref 3.5–5.2)
ALBUMIN/GLOBULIN RATIO: 1.3 (ref 1–2.5)
ALP BLD-CCNC: 80 U/L (ref 40–129)
ALT SERPL-CCNC: 16 U/L (ref 5–41)
ANION GAP SERPL CALCULATED.3IONS-SCNC: 9 MMOL/L (ref 9–17)
AST SERPL-CCNC: 18 U/L
BASOPHILS # BLD: 1 % (ref 0–2)
BASOPHILS ABSOLUTE: 0.1 K/UL (ref 0–0.2)
BILIRUB SERPL-MCNC: 0.55 MG/DL (ref 0.3–1.2)
BUN BLDV-MCNC: 12 MG/DL (ref 8–23)
BUN/CREAT BLD: 11 (ref 9–20)
CALCIUM SERPL-MCNC: 8.7 MG/DL (ref 8.6–10.4)
CHLORIDE BLD-SCNC: 100 MMOL/L (ref 98–107)
CHOLESTEROL/HDL RATIO: 2.7
CHOLESTEROL: 112 MG/DL
CO2: 29 MMOL/L (ref 20–31)
CREAT SERPL-MCNC: 1.07 MG/DL (ref 0.7–1.2)
DIFFERENTIAL TYPE: ABNORMAL
EOSINOPHILS RELATIVE PERCENT: 7 % (ref 1–8)
GFR AFRICAN AMERICAN: >60 ML/MIN
GFR NON-AFRICAN AMERICAN: >60 ML/MIN
GFR SERPL CREATININE-BSD FRML MDRD: ABNORMAL ML/MIN/{1.73_M2}
GFR SERPL CREATININE-BSD FRML MDRD: ABNORMAL ML/MIN/{1.73_M2}
GLUCOSE BLD-MCNC: 92 MG/DL (ref 70–99)
HCT VFR BLD CALC: 38 % (ref 41–53)
HDLC SERPL-MCNC: 41 MG/DL
HEMOGLOBIN: 13 G/DL (ref 13.5–17.5)
IMMATURE GRANULOCYTES: ABNORMAL %
LDL CHOLESTEROL: 54 MG/DL (ref 0–130)
LYMPHOCYTES # BLD: 17 % (ref 15–43)
MCH RBC QN AUTO: 30.7 PG (ref 26–34)
MCHC RBC AUTO-ENTMCNC: 34.2 G/DL (ref 31–37)
MCV RBC AUTO: 89.8 FL (ref 80–100)
MONOCYTES # BLD: 8 % (ref 6–14)
NRBC AUTOMATED: ABNORMAL PER 100 WBC
PDW BLD-RTO: 15.6 % (ref 11–14.5)
PLATELET # BLD: 151 K/UL (ref 140–450)
PLATELET ESTIMATE: ABNORMAL
PMV BLD AUTO: 9.2 FL (ref 6–12)
POTASSIUM SERPL-SCNC: 3.9 MMOL/L (ref 3.7–5.3)
RBC # BLD: 4.23 M/UL (ref 4.5–5.9)
RBC # BLD: ABNORMAL 10*6/UL
SEG NEUTROPHILS: 67 % (ref 44–74)
SEGMENTED NEUTROPHILS ABSOLUTE COUNT: 3.8 K/UL (ref 1.8–7.7)
SODIUM BLD-SCNC: 138 MMOL/L (ref 135–144)
THYROXINE, FREE: 1.31 NG/DL (ref 0.93–1.7)
TOTAL PROTEIN: 5.9 G/DL (ref 6.4–8.3)
TRIGL SERPL-MCNC: 87 MG/DL
TSH SERPL DL<=0.05 MIU/L-ACNC: 7.23 MIU/L (ref 0.3–5)
VLDLC SERPL CALC-MCNC: NORMAL MG/DL (ref 1–30)
WBC # BLD: 5.7 K/UL (ref 3.5–11)
WBC # BLD: ABNORMAL 10*3/UL

## 2018-04-12 PROCEDURE — 87086 URINE CULTURE/COLONY COUNT: CPT

## 2018-04-12 PROCEDURE — 85025 COMPLETE CBC W/AUTO DIFF WBC: CPT

## 2018-04-12 PROCEDURE — 36415 COLL VENOUS BLD VENIPUNCTURE: CPT

## 2018-04-12 PROCEDURE — 87186 SC STD MICRODIL/AGAR DIL: CPT

## 2018-04-12 PROCEDURE — 84439 ASSAY OF FREE THYROXINE: CPT

## 2018-04-12 PROCEDURE — 84443 ASSAY THYROID STIM HORMONE: CPT

## 2018-04-12 PROCEDURE — 80053 COMPREHEN METABOLIC PANEL: CPT

## 2018-04-12 PROCEDURE — 87088 URINE BACTERIA CULTURE: CPT

## 2018-04-12 PROCEDURE — 80061 LIPID PANEL: CPT

## 2018-04-12 PROCEDURE — 81001 URINALYSIS AUTO W/SCOPE: CPT

## 2018-04-13 LAB
-: ABNORMAL
AMORPHOUS: ABNORMAL
BACTERIA: ABNORMAL
BILIRUBIN URINE: NEGATIVE
CASTS UA: ABNORMAL /LPF (ref 0–2)
COLOR: ABNORMAL
COMMENT UA: ABNORMAL
CRYSTALS, UA: ABNORMAL /HPF
EPITHELIAL CELLS UA: ABNORMAL /HPF (ref 0–5)
GLUCOSE URINE: NEGATIVE
KETONES, URINE: NEGATIVE
LEUKOCYTE ESTERASE, URINE: ABNORMAL
MUCUS: ABNORMAL
NITRITE, URINE: NEGATIVE
OTHER OBSERVATIONS UA: ABNORMAL
PH UA: 6 (ref 5–6)
PROTEIN UA: NEGATIVE
RBC UA: ABNORMAL /HPF (ref 0–4)
RENAL EPITHELIAL, UA: ABNORMAL /HPF
SPECIFIC GRAVITY UA: 1.01 (ref 1.01–1.02)
TRICHOMONAS: ABNORMAL
TURBIDITY: ABNORMAL
URINE HGB: ABNORMAL
UROBILINOGEN, URINE: NORMAL
WBC UA: >100 /HPF (ref 0–4)
YEAST: ABNORMAL

## 2018-04-16 ENCOUNTER — CARE COORDINATION (OUTPATIENT)
Dept: CASE MANAGEMENT | Age: 83
End: 2018-04-16

## 2018-04-16 DIAGNOSIS — I50.22 CHRONIC SYSTOLIC CHF (CONGESTIVE HEART FAILURE) (HCC): Primary | ICD-10-CM

## 2018-04-16 LAB
CULTURE: ABNORMAL
CULTURE: ABNORMAL
Lab: ABNORMAL
ORGANISM: ABNORMAL
SPECIMEN DESCRIPTION: ABNORMAL
SPECIMEN DESCRIPTION: ABNORMAL
STATUS: ABNORMAL

## 2018-04-16 PROCEDURE — 1111F DSCHRG MED/CURRENT MED MERGE: CPT

## 2018-04-18 ENCOUNTER — HOSPITAL ENCOUNTER (OUTPATIENT)
Dept: CT IMAGING | Age: 83
Discharge: HOME | End: 2018-04-20
Payer: MEDICARE

## 2018-04-18 DIAGNOSIS — R91.8 MASS OF UPPER LOBE OF RIGHT LUNG: ICD-10-CM

## 2018-04-18 PROCEDURE — 71250 CT THORAX DX C-: CPT

## 2018-04-19 ENCOUNTER — OFFICE VISIT (OUTPATIENT)
Dept: INTERNAL MEDICINE | Age: 83
End: 2018-04-19
Payer: MEDICARE

## 2018-04-19 VITALS
SYSTOLIC BLOOD PRESSURE: 126 MMHG | HEART RATE: 68 BPM | BODY MASS INDEX: 25.49 KG/M2 | WEIGHT: 188.2 LBS | RESPIRATION RATE: 16 BRPM | HEIGHT: 72 IN | DIASTOLIC BLOOD PRESSURE: 68 MMHG

## 2018-04-19 DIAGNOSIS — I48.0 PAROXYSMAL ATRIAL FIBRILLATION (HCC): ICD-10-CM

## 2018-04-19 DIAGNOSIS — F41.9 ANXIETY: ICD-10-CM

## 2018-04-19 DIAGNOSIS — I10 ESSENTIAL HYPERTENSION: ICD-10-CM

## 2018-04-19 DIAGNOSIS — N40.1 BENIGN PROSTATIC HYPERPLASIA WITH INCOMPLETE BLADDER EMPTYING: ICD-10-CM

## 2018-04-19 DIAGNOSIS — R39.14 BENIGN PROSTATIC HYPERPLASIA WITH INCOMPLETE BLADDER EMPTYING: ICD-10-CM

## 2018-04-19 DIAGNOSIS — Z00.00 ROUTINE GENERAL MEDICAL EXAMINATION AT A HEALTH CARE FACILITY: Primary | ICD-10-CM

## 2018-04-19 DIAGNOSIS — I25.10 CORONARY ARTERY DISEASE INVOLVING NATIVE CORONARY ARTERY OF NATIVE HEART, ANGINA PRESENCE UNSPECIFIED: ICD-10-CM

## 2018-04-19 DIAGNOSIS — R91.8 MASS OF UPPER LOBE OF RIGHT LUNG: ICD-10-CM

## 2018-04-19 DIAGNOSIS — Z13.6 SCREENING FOR CARDIOVASCULAR CONDITION: ICD-10-CM

## 2018-04-19 DIAGNOSIS — E78.2 MIXED HYPERLIPIDEMIA: ICD-10-CM

## 2018-04-19 DIAGNOSIS — E66.3 OVERWEIGHT: ICD-10-CM

## 2018-04-19 DIAGNOSIS — I50.22 CHRONIC SYSTOLIC CHF (CONGESTIVE HEART FAILURE) (HCC): ICD-10-CM

## 2018-04-19 DIAGNOSIS — N39.0 RECURRENT UTI: ICD-10-CM

## 2018-04-19 DIAGNOSIS — M15.9 PRIMARY OSTEOARTHRITIS INVOLVING MULTIPLE JOINTS: ICD-10-CM

## 2018-04-19 PROCEDURE — 1123F ACP DISCUSS/DSCN MKR DOCD: CPT | Performed by: INTERNAL MEDICINE

## 2018-04-19 PROCEDURE — G8417 CALC BMI ABV UP PARAM F/U: HCPCS | Performed by: INTERNAL MEDICINE

## 2018-04-19 PROCEDURE — G0446 INTENS BEHAVE THER CARDIO DX: HCPCS | Performed by: INTERNAL MEDICINE

## 2018-04-19 PROCEDURE — G8598 ASA/ANTIPLAT THER USED: HCPCS | Performed by: INTERNAL MEDICINE

## 2018-04-19 PROCEDURE — 99214 OFFICE O/P EST MOD 30 MIN: CPT | Performed by: INTERNAL MEDICINE

## 2018-04-19 PROCEDURE — G0438 PPPS, INITIAL VISIT: HCPCS | Performed by: INTERNAL MEDICINE

## 2018-04-19 PROCEDURE — G8427 DOCREV CUR MEDS BY ELIG CLIN: HCPCS | Performed by: INTERNAL MEDICINE

## 2018-04-19 PROCEDURE — 1036F TOBACCO NON-USER: CPT | Performed by: INTERNAL MEDICINE

## 2018-04-19 PROCEDURE — 4040F PNEUMOC VAC/ADMIN/RCVD: CPT | Performed by: INTERNAL MEDICINE

## 2018-04-19 RX ORDER — POTASSIUM CHLORIDE 20 MEQ/1
20 TABLET, EXTENDED RELEASE ORAL DAILY
COMMUNITY

## 2018-04-19 ASSESSMENT — ANXIETY QUESTIONNAIRES: GAD7 TOTAL SCORE: 0

## 2018-04-19 ASSESSMENT — PATIENT HEALTH QUESTIONNAIRE - PHQ9: SUM OF ALL RESPONSES TO PHQ QUESTIONS 1-9: 1

## 2018-04-19 ASSESSMENT — LIFESTYLE VARIABLES: HOW OFTEN DO YOU HAVE A DRINK CONTAINING ALCOHOL: 0

## 2018-04-23 ENCOUNTER — OUTSIDE SERVICES (OUTPATIENT)
Dept: INTERNAL MEDICINE | Age: 83
End: 2018-04-23
Payer: MEDICARE

## 2018-04-23 DIAGNOSIS — E66.3 OVERWEIGHT: ICD-10-CM

## 2018-04-23 DIAGNOSIS — N39.0 RECURRENT UTI: Primary | ICD-10-CM

## 2018-04-23 DIAGNOSIS — I10 ESSENTIAL HYPERTENSION: ICD-10-CM

## 2018-04-23 DIAGNOSIS — A41.9 SEPSIS, DUE TO UNSPECIFIED ORGANISM: ICD-10-CM

## 2018-04-23 DIAGNOSIS — I50.22 CHRONIC SYSTOLIC CHF (CONGESTIVE HEART FAILURE) (HCC): ICD-10-CM

## 2018-04-23 DIAGNOSIS — E78.2 MIXED HYPERLIPIDEMIA: ICD-10-CM

## 2018-04-23 DIAGNOSIS — F41.9 ANXIETY: ICD-10-CM

## 2018-04-23 DIAGNOSIS — I25.10 CORONARY ARTERY DISEASE INVOLVING NATIVE CORONARY ARTERY OF NATIVE HEART, ANGINA PRESENCE UNSPECIFIED: ICD-10-CM

## 2018-04-23 DIAGNOSIS — M15.9 PRIMARY OSTEOARTHRITIS INVOLVING MULTIPLE JOINTS: ICD-10-CM

## 2018-04-23 DIAGNOSIS — I48.0 PAROXYSMAL ATRIAL FIBRILLATION (HCC): ICD-10-CM

## 2018-04-24 ENCOUNTER — OFFICE VISIT (OUTPATIENT)
Dept: PULMONOLOGY | Age: 83
End: 2018-04-24
Payer: MEDICARE

## 2018-04-24 VITALS
DIASTOLIC BLOOD PRESSURE: 70 MMHG | BODY MASS INDEX: 25.73 KG/M2 | HEART RATE: 66 BPM | HEIGHT: 72 IN | OXYGEN SATURATION: 96 % | WEIGHT: 190 LBS | SYSTOLIC BLOOD PRESSURE: 118 MMHG

## 2018-04-24 DIAGNOSIS — I48.0 PAROXYSMAL ATRIAL FIBRILLATION (HCC): ICD-10-CM

## 2018-04-24 DIAGNOSIS — R91.8 MASS OF UPPER LOBE OF RIGHT LUNG: Primary | ICD-10-CM

## 2018-04-24 PROCEDURE — 4040F PNEUMOC VAC/ADMIN/RCVD: CPT | Performed by: NURSE PRACTITIONER

## 2018-04-24 PROCEDURE — 1123F ACP DISCUSS/DSCN MKR DOCD: CPT | Performed by: NURSE PRACTITIONER

## 2018-04-24 PROCEDURE — G8598 ASA/ANTIPLAT THER USED: HCPCS | Performed by: NURSE PRACTITIONER

## 2018-04-24 PROCEDURE — 1036F TOBACCO NON-USER: CPT | Performed by: NURSE PRACTITIONER

## 2018-04-24 PROCEDURE — G8427 DOCREV CUR MEDS BY ELIG CLIN: HCPCS | Performed by: NURSE PRACTITIONER

## 2018-04-24 PROCEDURE — 99213 OFFICE O/P EST LOW 20 MIN: CPT | Performed by: NURSE PRACTITIONER

## 2018-04-24 PROCEDURE — G8417 CALC BMI ABV UP PARAM F/U: HCPCS | Performed by: NURSE PRACTITIONER

## 2018-05-02 ENCOUNTER — OFFICE VISIT (OUTPATIENT)
Dept: PODIATRY | Age: 83
End: 2018-05-02
Payer: MEDICARE

## 2018-05-02 VITALS
WEIGHT: 192 LBS | SYSTOLIC BLOOD PRESSURE: 122 MMHG | HEIGHT: 71 IN | HEART RATE: 68 BPM | DIASTOLIC BLOOD PRESSURE: 80 MMHG | BODY MASS INDEX: 26.88 KG/M2

## 2018-05-02 DIAGNOSIS — M79.675 PAIN IN TOES OF BOTH FEET: ICD-10-CM

## 2018-05-02 DIAGNOSIS — B35.1 DERMATOPHYTOSIS OF NAIL: Primary | ICD-10-CM

## 2018-05-02 DIAGNOSIS — M79.674 PAIN IN TOES OF BOTH FEET: ICD-10-CM

## 2018-05-02 PROCEDURE — 11720 DEBRIDE NAIL 1-5: CPT | Performed by: PODIATRIST

## 2018-05-10 ENCOUNTER — TELEPHONE (OUTPATIENT)
Dept: INTERNAL MEDICINE | Age: 83
End: 2018-05-10

## 2018-07-09 ENCOUNTER — TELEPHONE (OUTPATIENT)
Dept: INTERNAL MEDICINE | Age: 83
End: 2018-07-09

## 2018-07-11 ENCOUNTER — OFFICE VISIT (OUTPATIENT)
Dept: UROLOGY | Age: 83
End: 2018-07-11
Payer: MEDICARE

## 2018-07-11 VITALS
SYSTOLIC BLOOD PRESSURE: 132 MMHG | HEART RATE: 72 BPM | DIASTOLIC BLOOD PRESSURE: 82 MMHG | HEIGHT: 71 IN | BODY MASS INDEX: 28.39 KG/M2 | WEIGHT: 202.8 LBS

## 2018-07-11 DIAGNOSIS — R33.9 URINARY RETENTION: ICD-10-CM

## 2018-07-11 DIAGNOSIS — R33.9 INCOMPLETE BLADDER EMPTYING: Primary | ICD-10-CM

## 2018-07-11 DIAGNOSIS — R97.20 ELEVATED PSA: ICD-10-CM

## 2018-07-11 DIAGNOSIS — R39.9 LOWER URINARY TRACT SYMPTOMS (LUTS): ICD-10-CM

## 2018-07-11 PROCEDURE — 1036F TOBACCO NON-USER: CPT | Performed by: UROLOGY

## 2018-07-11 PROCEDURE — G8598 ASA/ANTIPLAT THER USED: HCPCS | Performed by: UROLOGY

## 2018-07-11 PROCEDURE — G8427 DOCREV CUR MEDS BY ELIG CLIN: HCPCS | Performed by: UROLOGY

## 2018-07-11 PROCEDURE — 4040F PNEUMOC VAC/ADMIN/RCVD: CPT | Performed by: UROLOGY

## 2018-07-11 PROCEDURE — 1101F PT FALLS ASSESS-DOCD LE1/YR: CPT | Performed by: UROLOGY

## 2018-07-11 PROCEDURE — G8417 CALC BMI ABV UP PARAM F/U: HCPCS | Performed by: UROLOGY

## 2018-07-11 PROCEDURE — 51798 US URINE CAPACITY MEASURE: CPT | Performed by: UROLOGY

## 2018-07-11 PROCEDURE — 1123F ACP DISCUSS/DSCN MKR DOCD: CPT | Performed by: UROLOGY

## 2018-07-11 PROCEDURE — 99213 OFFICE O/P EST LOW 20 MIN: CPT | Performed by: UROLOGY

## 2018-07-11 NOTE — PROGRESS NOTES
(FLONASE) 50 MCG/ACT nasal spray 1 spray by Nasal route daily 1 Bottle 0    lisinopril (PRINIVIL;ZESTRIL) 10 MG tablet Take 1 tablet by mouth daily 90 tablet 3    simvastatin (ZOCOR) 20 MG tablet TAKE 1 TABLET BY MOUTH EVERY OTHER DAY  30 tablet 11    Multiple Vitamins-Minerals (PRESERVISION AREDS 2 PO) Take 1 capsule by mouth daily      acetaminophen (TYLENOL) 500 MG tablet Take 500 mg by mouth every 6 hours as needed for Pain      Coenzyme Q-10 100 MG CAPS Take 100 mg by mouth every other day.  tolnaftate (TINACTIN) 1 % external solution Apply topically nightly to toenails per Dr. Ramirez Epstein.  aspirin 81 MG tablet Take 81 mg by mouth daily.  GLUCOSAMINE-CHONDROITIN PO Take 3 tablets by mouth Daily. Pcn [penicillins]; Sulfa antibiotics; and Cefdinir  History   Smoking Status    Never Smoker   Smokeless Tobacco    Never Used     Comment: never smoked syant rrt,2/27/2018       History   Alcohol Use No       REVIEW OF SYSTEMS:  Constitutional: negative  Eyes: negative  Respiratory: negative  Cardiovascular: negative  Gastrointestinal: negative  Musculoskeletal: negative  Genitourinary: negative except for what is in HPI  Skin: negative   Neurological: negative  Hematological/Lymphatic: negative  Psychological: negative    Physical Exam:    This a 80 y.o. male   Vitals:    07/11/18 1257   BP: 132/82   Pulse: 72     Constitutional: Patient in no acute distress; Neuro: alert and oriented to person place and time. Psych: Mood and affect normal.  Skin: Normal        Assessment and Plan      1. Incomplete bladder emptying    2. Lower urinary tract symptoms (LUTS)    3. Urinary retention    4. Elevated PSA- due to enlarged benign prostate           Plan:       stable overall, no more UTI's; Content  Flomax BID for LUTS  Address Constipation  Return in about 6 months (around 1/11/2019).            Page Ramos MD  [facility]

## 2018-07-11 NOTE — LETTER
[facility]      7/11/18    Patient: Fozia Brooks  YOB: 1934    Dear Sterling Calderon DO,    I had the pleasure of seeing one of your patients, Nigel Nolasco in the office today. Below are the relevant portions of my assessment and plan of care. IMPRESSION:     1. Incomplete bladder emptying    2. Lower urinary tract symptoms (LUTS)    3. Urinary retention    4. Elevated PSA- due to enlarged benign prostate        PLAN:      stable overall, no more UTI's; Content  Flomax BID for LUTS  Address Constipation  Return in about 6 months (around 1/11/2019). The patient is agreeable and happy with our plan. I will surely keep you updated on Fozia Brooks in the future. Should you have any questions, please do not hesitate to call me, [phone].     Sincerely,     Mina Puente MD

## 2018-07-13 ENCOUNTER — HOSPITAL ENCOUNTER (OUTPATIENT)
Dept: LAB | Age: 83
Setting detail: SPECIMEN
Discharge: HOME | End: 2018-07-13
Payer: MEDICARE

## 2018-07-13 DIAGNOSIS — M10.9 GOUT, UNSPECIFIED CAUSE, UNSPECIFIED CHRONICITY, UNSPECIFIED SITE: Primary | ICD-10-CM

## 2018-07-13 DIAGNOSIS — M10.9 GOUT, UNSPECIFIED CAUSE, UNSPECIFIED CHRONICITY, UNSPECIFIED SITE: ICD-10-CM

## 2018-07-13 LAB — URIC ACID: 4.7 MG/DL (ref 3.4–7)

## 2018-07-13 PROCEDURE — 36415 COLL VENOUS BLD VENIPUNCTURE: CPT

## 2018-07-13 PROCEDURE — 84550 ASSAY OF BLOOD/URIC ACID: CPT

## 2018-08-15 ENCOUNTER — APPOINTMENT (OUTPATIENT)
Dept: GENERAL RADIOLOGY | Age: 83
End: 2018-08-15
Payer: MEDICARE

## 2018-08-15 ENCOUNTER — HOSPITAL ENCOUNTER (INPATIENT)
Age: 83
LOS: 6 days | Discharge: HOME | DRG: 872 | End: 2018-08-21
Attending: INTERNAL MEDICINE | Admitting: INTERNAL MEDICINE
Payer: MEDICARE

## 2018-08-15 ENCOUNTER — HOSPITAL ENCOUNTER (EMERGENCY)
Age: 83
Discharge: ANOTHER HOSPITAL | End: 2018-08-15
Attending: EMERGENCY MEDICINE
Payer: MEDICARE

## 2018-08-15 VITALS
SYSTOLIC BLOOD PRESSURE: 118 MMHG | RESPIRATION RATE: 21 BRPM | DIASTOLIC BLOOD PRESSURE: 59 MMHG | BODY MASS INDEX: 26.5 KG/M2 | OXYGEN SATURATION: 96 % | WEIGHT: 190 LBS | TEMPERATURE: 99.9 F | HEART RATE: 83 BPM

## 2018-08-15 DIAGNOSIS — A41.9 SEPSIS, DUE TO UNSPECIFIED ORGANISM: Primary | ICD-10-CM

## 2018-08-15 DIAGNOSIS — N39.0 URINARY TRACT INFECTION WITHOUT HEMATURIA, SITE UNSPECIFIED: ICD-10-CM

## 2018-08-15 LAB
-: ABNORMAL
ABSOLUTE EOS #: 0 K/UL (ref 0–0.4)
ABSOLUTE IMMATURE GRANULOCYTE: ABNORMAL K/UL (ref 0–0.3)
ABSOLUTE LYMPH #: 0.5 K/UL (ref 1–4.8)
ABSOLUTE MONO #: 0 K/UL (ref 0.1–1.2)
ALBUMIN SERPL-MCNC: 4.5 G/DL (ref 3.5–5.2)
ALBUMIN/GLOBULIN RATIO: 1.5 (ref 1–2.5)
ALP BLD-CCNC: 98 U/L (ref 40–129)
ALT SERPL-CCNC: 46 U/L (ref 5–41)
AMORPHOUS: ABNORMAL
ANION GAP SERPL CALCULATED.3IONS-SCNC: 17 MMOL/L (ref 9–17)
AST SERPL-CCNC: 39 U/L
BACTERIA: ABNORMAL
BASOPHILS # BLD: 0 % (ref 0–2)
BASOPHILS ABSOLUTE: 0 K/UL (ref 0–0.2)
BILIRUB SERPL-MCNC: 0.52 MG/DL (ref 0.3–1.2)
BILIRUBIN URINE: NEGATIVE
BNP INTERPRETATION: ABNORMAL
BUN BLDV-MCNC: 12 MG/DL (ref 8–23)
BUN/CREAT BLD: 10 (ref 9–20)
CALCIUM SERPL-MCNC: 8.8 MG/DL (ref 8.6–10.4)
CASTS UA: ABNORMAL /LPF (ref 0–2)
CHLORIDE BLD-SCNC: 97 MMOL/L (ref 98–107)
CO2: 25 MMOL/L (ref 20–31)
COLOR: ABNORMAL
COMMENT UA: ABNORMAL
CREAT SERPL-MCNC: 1.22 MG/DL (ref 0.7–1.2)
CRYSTALS, UA: ABNORMAL /HPF
DIFFERENTIAL TYPE: ABNORMAL
EOSINOPHILS RELATIVE PERCENT: 1 % (ref 1–8)
EPITHELIAL CELLS UA: ABNORMAL /HPF (ref 0–5)
GFR AFRICAN AMERICAN: >60 ML/MIN
GFR NON-AFRICAN AMERICAN: 57 ML/MIN
GFR SERPL CREATININE-BSD FRML MDRD: ABNORMAL ML/MIN/{1.73_M2}
GFR SERPL CREATININE-BSD FRML MDRD: ABNORMAL ML/MIN/{1.73_M2}
GLUCOSE BLD-MCNC: 126 MG/DL (ref 70–99)
GLUCOSE URINE: NEGATIVE
HCT VFR BLD CALC: 48 % (ref 41–53)
HEMOGLOBIN: 16 G/DL (ref 13.5–17.5)
IMMATURE GRANULOCYTES: ABNORMAL %
KETONES, URINE: NEGATIVE
LACTIC ACID, SEPSIS WHOLE BLOOD: ABNORMAL MMOL/L (ref 0.5–1.9)
LACTIC ACID, SEPSIS: 6.5 MMOL/L (ref 0.5–1.9)
LEUKOCYTE ESTERASE, URINE: ABNORMAL
LYMPHOCYTES # BLD: 10 % (ref 15–43)
MCH RBC QN AUTO: 31.5 PG (ref 26–34)
MCHC RBC AUTO-ENTMCNC: 33.4 G/DL (ref 31–37)
MCV RBC AUTO: 94.2 FL (ref 80–100)
MONOCYTES # BLD: 0 % (ref 6–14)
MUCUS: ABNORMAL
MYOGLOBIN: 73 NG/ML (ref 28–72)
NITRITE, URINE: POSITIVE
NRBC AUTOMATED: ABNORMAL PER 100 WBC
OTHER OBSERVATIONS UA: ABNORMAL
PDW BLD-RTO: 14.9 % (ref 11–14.5)
PH UA: 5.5 (ref 5–6)
PLATELET # BLD: 153 K/UL (ref 140–450)
PLATELET ESTIMATE: ABNORMAL
PMV BLD AUTO: 9.1 FL (ref 6–12)
POTASSIUM SERPL-SCNC: 4.2 MMOL/L (ref 3.7–5.3)
PRO-BNP: 529 PG/ML
PROTEIN UA: ABNORMAL
RBC # BLD: 5.09 M/UL (ref 4.5–5.9)
RBC # BLD: ABNORMAL 10*6/UL
RBC UA: ABNORMAL /HPF (ref 0–4)
RENAL EPITHELIAL, UA: ABNORMAL /HPF
SEG NEUTROPHILS: 89 % (ref 44–74)
SEGMENTED NEUTROPHILS ABSOLUTE COUNT: 4.4 K/UL (ref 1.8–7.7)
SODIUM BLD-SCNC: 139 MMOL/L (ref 135–144)
SPECIFIC GRAVITY UA: 1.02 (ref 1.01–1.02)
TOTAL PROTEIN: 7.6 G/DL (ref 6.4–8.3)
TRICHOMONAS: ABNORMAL
TROPONIN INTERP: NORMAL
TROPONIN T: <0.03 NG/ML
TURBIDITY: ABNORMAL
URINE HGB: ABNORMAL
UROBILINOGEN, URINE: NORMAL
WBC # BLD: 4.9 K/UL (ref 3.5–11)
WBC # BLD: ABNORMAL 10*3/UL
WBC UA: >50 /HPF (ref 0–4)
YEAST: ABNORMAL

## 2018-08-15 PROCEDURE — 87186 SC STD MICRODIL/AGAR DIL: CPT

## 2018-08-15 PROCEDURE — 2580000003 HC RX 258: Performed by: STUDENT IN AN ORGANIZED HEALTH CARE EDUCATION/TRAINING PROGRAM

## 2018-08-15 PROCEDURE — 96365 THER/PROPH/DIAG IV INF INIT: CPT

## 2018-08-15 PROCEDURE — 83874 ASSAY OF MYOGLOBIN: CPT

## 2018-08-15 PROCEDURE — 87184 SC STD DISK METHOD PER PLATE: CPT

## 2018-08-15 PROCEDURE — 87040 BLOOD CULTURE FOR BACTERIA: CPT

## 2018-08-15 PROCEDURE — 71045 X-RAY EXAM CHEST 1 VIEW: CPT

## 2018-08-15 PROCEDURE — 2000000000 HC ICU R&B

## 2018-08-15 PROCEDURE — 6370000000 HC RX 637 (ALT 250 FOR IP): Performed by: EMERGENCY MEDICINE

## 2018-08-15 PROCEDURE — 83605 ASSAY OF LACTIC ACID: CPT

## 2018-08-15 PROCEDURE — 36415 COLL VENOUS BLD VENIPUNCTURE: CPT

## 2018-08-15 PROCEDURE — 2580000003 HC RX 258: Performed by: EMERGENCY MEDICINE

## 2018-08-15 PROCEDURE — 87641 MR-STAPH DNA AMP PROBE: CPT

## 2018-08-15 PROCEDURE — 83880 ASSAY OF NATRIURETIC PEPTIDE: CPT

## 2018-08-15 PROCEDURE — 6360000002 HC RX W HCPCS: Performed by: EMERGENCY MEDICINE

## 2018-08-15 PROCEDURE — 84484 ASSAY OF TROPONIN QUANT: CPT

## 2018-08-15 PROCEDURE — 81001 URINALYSIS AUTO W/SCOPE: CPT

## 2018-08-15 PROCEDURE — 85025 COMPLETE CBC W/AUTO DIFF WBC: CPT

## 2018-08-15 PROCEDURE — 96375 TX/PRO/DX INJ NEW DRUG ADDON: CPT

## 2018-08-15 PROCEDURE — 99285 EMERGENCY DEPT VISIT HI MDM: CPT

## 2018-08-15 PROCEDURE — 96367 TX/PROPH/DG ADDL SEQ IV INF: CPT

## 2018-08-15 PROCEDURE — 80053 COMPREHEN METABOLIC PANEL: CPT

## 2018-08-15 PROCEDURE — 6370000000 HC RX 637 (ALT 250 FOR IP)

## 2018-08-15 PROCEDURE — 6360000002 HC RX W HCPCS

## 2018-08-15 PROCEDURE — 51702 INSERT TEMP BLADDER CATH: CPT

## 2018-08-15 PROCEDURE — 87086 URINE CULTURE/COLONY COUNT: CPT

## 2018-08-15 PROCEDURE — 87088 URINE BACTERIA CULTURE: CPT

## 2018-08-15 RX ORDER — ONDANSETRON 2 MG/ML
4 INJECTION INTRAMUSCULAR; INTRAVENOUS EVERY 6 HOURS PRN
Status: DISCONTINUED | OUTPATIENT
Start: 2018-08-15 | End: 2018-08-21 | Stop reason: HOSPADM

## 2018-08-15 RX ORDER — SODIUM CHLORIDE 9 MG/ML
INJECTION, SOLUTION INTRAVENOUS CONTINUOUS
Status: DISCONTINUED | OUTPATIENT
Start: 2018-08-15 | End: 2018-08-15 | Stop reason: HOSPADM

## 2018-08-15 RX ORDER — SODIUM CHLORIDE 0.9 % (FLUSH) 0.9 %
10 SYRINGE (ML) INJECTION EVERY 12 HOURS SCHEDULED
Status: DISCONTINUED | OUTPATIENT
Start: 2018-08-15 | End: 2018-08-21 | Stop reason: HOSPADM

## 2018-08-15 RX ORDER — ACETAMINOPHEN 650 MG/1
650 SUPPOSITORY RECTAL ONCE
Status: COMPLETED | OUTPATIENT
Start: 2018-08-15 | End: 2018-08-15

## 2018-08-15 RX ORDER — 0.9 % SODIUM CHLORIDE 0.9 %
2000 INTRAVENOUS SOLUTION INTRAVENOUS ONCE
Status: COMPLETED | OUTPATIENT
Start: 2018-08-15 | End: 2018-08-16

## 2018-08-15 RX ORDER — SODIUM CHLORIDE 9 MG/ML
INJECTION, SOLUTION INTRAVENOUS CONTINUOUS
Status: DISCONTINUED | OUTPATIENT
Start: 2018-08-15 | End: 2018-08-21 | Stop reason: HOSPADM

## 2018-08-15 RX ORDER — 0.9 % SODIUM CHLORIDE 0.9 %
500 INTRAVENOUS SOLUTION INTRAVENOUS ONCE
Status: COMPLETED | OUTPATIENT
Start: 2018-08-15 | End: 2018-08-15

## 2018-08-15 RX ORDER — ONDANSETRON 2 MG/ML
4 INJECTION INTRAMUSCULAR; INTRAVENOUS ONCE
Status: COMPLETED | OUTPATIENT
Start: 2018-08-15 | End: 2018-08-15

## 2018-08-15 RX ORDER — ACETAMINOPHEN 650 MG/1
SUPPOSITORY RECTAL
Status: COMPLETED
Start: 2018-08-15 | End: 2018-08-15

## 2018-08-15 RX ORDER — ONDANSETRON 2 MG/ML
INJECTION INTRAMUSCULAR; INTRAVENOUS
Status: COMPLETED
Start: 2018-08-15 | End: 2018-08-15

## 2018-08-15 RX ORDER — IBUPROFEN 800 MG/1
800 TABLET ORAL ONCE
Status: COMPLETED | OUTPATIENT
Start: 2018-08-15 | End: 2018-08-15

## 2018-08-15 RX ORDER — SODIUM CHLORIDE 0.9 % (FLUSH) 0.9 %
10 SYRINGE (ML) INJECTION PRN
Status: DISCONTINUED | OUTPATIENT
Start: 2018-08-15 | End: 2018-08-21 | Stop reason: HOSPADM

## 2018-08-15 RX ADMIN — ACETAMINOPHEN: 650 SUPPOSITORY RECTAL at 17:32

## 2018-08-15 RX ADMIN — SODIUM CHLORIDE 500 ML: 0.9 INJECTION, SOLUTION INTRAVENOUS at 18:20

## 2018-08-15 RX ADMIN — ONDANSETRON 4 MG: 2 INJECTION INTRAMUSCULAR; INTRAVENOUS at 17:29

## 2018-08-15 RX ADMIN — SODIUM CHLORIDE: 9 INJECTION, SOLUTION INTRAVENOUS at 20:43

## 2018-08-15 RX ADMIN — SODIUM CHLORIDE: 9 INJECTION, SOLUTION INTRAVENOUS at 23:59

## 2018-08-15 RX ADMIN — SODIUM CHLORIDE 500 ML: 900 INJECTION, SOLUTION INTRAVENOUS at 17:30

## 2018-08-15 RX ADMIN — IBUPROFEN 800 MG: 800 TABLET ORAL at 18:25

## 2018-08-15 RX ADMIN — MEROPENEM 1 G: 1 INJECTION, POWDER, FOR SOLUTION INTRAVENOUS at 19:17

## 2018-08-15 RX ADMIN — CEFTRIAXONE 1 G: 1 INJECTION, POWDER, FOR SOLUTION INTRAMUSCULAR; INTRAVENOUS at 18:36

## 2018-08-15 RX ADMIN — VANCOMYCIN HYDROCHLORIDE 1000 MG: 1 INJECTION, POWDER, LYOPHILIZED, FOR SOLUTION INTRAVENOUS at 20:37

## 2018-08-15 RX ADMIN — SODIUM CHLORIDE: 9 INJECTION, SOLUTION INTRAVENOUS at 17:50

## 2018-08-15 RX ADMIN — Medication 10 ML: at 23:30

## 2018-08-15 ASSESSMENT — PAIN SCALES - GENERAL: PAINLEVEL_OUTOF10: 0

## 2018-08-15 ASSESSMENT — ENCOUNTER SYMPTOMS
EYE DISCHARGE: 0
COUGH: 0
DIARRHEA: 0
COLOR CHANGE: 0
CONSTIPATION: 0
EYE PAIN: 0
ABDOMINAL PAIN: 0
NAUSEA: 0
EYE REDNESS: 0
SORE THROAT: 0
VOMITING: 0
WHEEZING: 0
STRIDOR: 0
SHORTNESS OF BREATH: 0

## 2018-08-15 ASSESSMENT — PAIN DESCRIPTION - PAIN TYPE: TYPE: ACUTE PAIN

## 2018-08-15 NOTE — ED PROVIDER NOTES
[facility] ED  [address] [place]  Phone: [phone]        Pt Name: Judith Estrada  MRN: [id number]  Margaretgfkirti 1934  Date of evaluation: 8/15/18      CHIEF COMPLAINT       Chief Complaint   Patient presents with    Fever       HISTORY OF PRESENT ILLNESS  (Location/Symptom, Timing/Onset, Context/Setting, Quality, Duration, Modifying Factors, Severity.)    Judith Estrada is a 80 y.o. male who presents With fever from assisted living. Apparently he has been like this all day from staff there. He has had tremors and has felt cold. They thought that he was having an anxiety attack and gave him some anxiety medication. However symptoms have worsened. Patient is really unable to give me any history at this time. He does have tremors on arrival.  He relates that he has to urinate. He relates that it has started to hurt with urination just recently when asked. He denies any other symptoms during my exam.    REVIEW OF SYSTEMS    (2-9 systems for level 4, 10 or more for level 5)     Review of Systems   Constitutional: Positive for chills and fever. Negative for activity change, appetite change and fatigue. HENT: Negative for congestion, ear pain and sore throat. Eyes: Negative for pain, discharge and redness. Respiratory: Negative for cough, shortness of breath, wheezing and stridor. Cardiovascular: Negative for chest pain. Gastrointestinal: Negative for abdominal pain, constipation, diarrhea, nausea and vomiting. Genitourinary: Positive for dysuria. Negative for decreased urine volume and difficulty urinating. Musculoskeletal: Negative for arthralgias and myalgias. Skin: Negative for color change and rash. Neurological: Positive for tremors. Negative for dizziness, weakness and headaches. Psychiatric/Behavioral: Negative for behavioral problems and confusion. PAST MEDICAL HISTORY    has a past medical history of Acute bronchitis;  Allergic rhinitis; mass. There is no tenderness. There is no rebound and no guarding. Musculoskeletal: Normal range of motion. He exhibits no edema or tenderness. Lymphadenopathy:     He has no cervical adenopathy. Neurological: He is alert. He is disoriented. He displays tremor. He exhibits normal muscle tone. GCS eye subscore is 4. GCS verbal subscore is 4. GCS motor subscore is 5. Skin: Skin is warm. No rash noted. He is not diaphoretic. Psychiatric: He has a normal mood and affect. His behavior is normal.       DIFFERENTIAL DIAGNOSIS/ MDM:     I did discuss with patient family in the room that I think he will need transfer to [facility]. [facility] as this is likely sepsis from urinary tract infection. Family relates he normally goes to [facility]. [facility] when he can't stay here. Appears to be urinary tract infection causing sepsis. DIAGNOSTIC RESULTS     EKG: All EKG's are interpreted by the Emergency Department Physician who either signs or Co-signs this chart in the absence of a cardiologist.    None    RADIOLOGY:   Non-plain film images such as CT, Ultrasound and MRI are read by the radiologist. John Pin radiographic images are visualized and the radiologist interpretations are reviewed as follows:     Interpretation per the Radiologist below, if available at the time of this note:    Xr Chest Portable    Result Date: 8/15/2018  EXAMINATION: SINGLE XRAY VIEW OF THE CHEST, 8/15/2018 6:04 pm COMPARISON: Chest x-ray dated 03/13/2018. CT chest dated 04/18/2018. HISTORY: ORDERING SYSTEM PROVIDED HISTORY: fever TECHNOLOGIST PROVIDED HISTORY: Reason for exam:->fever Ordering Physician Provided Reason for Exam: fever Acuity: Acute Type of Exam: Initial FINDINGS: Median sternotomy wires are noted. Cardiomediastinal silhouette is stable. There is mild vascular congestion and diffuse interstitial thickening, increased from the previous chest radiograph. No dense airspace consolidation.   There is minimal blunting at the costophrenic angles, suggesting tiny pleural effusions. No evidence of pneumothorax. Azygos lobe is noted. No free air beneath the diaphragm. No acute osseous abnormality identified. Vascular congestion and increased interstitial thickening, likely interstitial edema. Other possibility would be atypical pneumonia or bronchiolitis. Tiny pleural effusions are suspected as well. No dense airspace consolidation.      LABS:  Results for orders placed or performed during the hospital encounter of 08/15/18   CBC with DIFF   Result Value Ref Range    WBC 4.9 3.5 - 11.0 k/uL    RBC 5.09 4.5 - 5.9 m/uL    Hemoglobin 16.0 13.5 - 17.5 g/dL    Hematocrit 48.0 41 - 53 %    MCV 94.2 80 - 100 fL    MCH 31.5 26 - 34 pg    MCHC 33.4 31 - 37 g/dL    RDW 14.9 (H) 11.0 - 14.5 %    Platelets 537 624 - 647 k/uL    MPV 9.1 6.0 - 12.0 fL    NRBC Automated NOT REPORTED per 100 WBC    Differential Type NOT REPORTED     Immature Granulocytes NOT REPORTED 0 %    Absolute Immature Granulocyte NOT REPORTED 0.00 - 0.30 k/uL    WBC Morphology NOT REPORTED     RBC Morphology NOT REPORTED     Platelet Estimate NOT REPORTED     Seg Neutrophils 89 (H) 44 - 74 %    Lymphocytes 10 (L) 15 - 43 %    Monocytes 0 (L) 6 - 14 %    Eosinophils % 1 1 - 8 %    Basophils 0 0 - 2 %    Segs Absolute 4.40 1.8 - 7.7 k/uL    Absolute Lymph # 0.50 (L) 1.0 - 4.8 k/uL    Absolute Mono # 0.00 (L) 0.1 - 1.2 k/uL    Absolute Eos # 0.00 0.0 - 0.4 k/uL    Basophils # 0.00 0.0 - 0.2 k/uL   Comp Metabolic Prof   Result Value Ref Range    Glucose 126 (H) 70 - 99 mg/dL    BUN 12 8 - 23 mg/dL    CREATININE 1.22 (H) 0.70 - 1.20 mg/dL    Bun/Cre Ratio 10 9 - 20    Calcium 8.8 8.6 - 10.4 mg/dL    Sodium 139 135 - 144 mmol/L    Potassium 4.2 3.7 - 5.3 mmol/L    Chloride 97 (L) 98 - 107 mmol/L    CO2 25 20 - 31 mmol/L    Anion Gap 17 9 - 17 mmol/L    Alkaline Phosphatase 98 40 - 129 U/L    ALT 46 (H) 5 - 41 U/L    AST 39 <40 U/L    Total Bilirubin 0.52 0.3 - 1.2 mg/dL    Total Protein 7.6

## 2018-08-16 ENCOUNTER — TELEPHONE (OUTPATIENT)
Dept: INTERNAL MEDICINE | Age: 83
End: 2018-08-16

## 2018-08-16 LAB
ABSOLUTE EOS #: 0 K/UL (ref 0–0.4)
ABSOLUTE IMMATURE GRANULOCYTE: 0 K/UL (ref 0–0.3)
ABSOLUTE LYMPH #: 0.95 K/UL (ref 1–4.8)
ABSOLUTE MONO #: 0.95 K/UL (ref 0.1–0.8)
ALBUMIN SERPL-MCNC: 3.3 G/DL (ref 3.5–5.2)
ALBUMIN/GLOBULIN RATIO: 1.5 (ref 1–2.5)
ALP BLD-CCNC: 65 U/L (ref 40–129)
ALT SERPL-CCNC: 36 U/L (ref 5–41)
ANION GAP SERPL CALCULATED.3IONS-SCNC: 11 MMOL/L (ref 9–17)
AST SERPL-CCNC: 34 U/L
BASOPHILS # BLD: 0 % (ref 0–2)
BASOPHILS ABSOLUTE: 0 K/UL (ref 0–0.2)
BILIRUB SERPL-MCNC: 0.73 MG/DL (ref 0.3–1.2)
BUN BLDV-MCNC: 13 MG/DL (ref 8–23)
BUN/CREAT BLD: ABNORMAL (ref 9–20)
CALCIUM SERPL-MCNC: 7.6 MG/DL (ref 8.6–10.4)
CHLORIDE BLD-SCNC: 105 MMOL/L (ref 98–107)
CO2: 22 MMOL/L (ref 20–31)
CREAT SERPL-MCNC: 1.09 MG/DL (ref 0.7–1.2)
DIFFERENTIAL TYPE: ABNORMAL
EOSINOPHILS RELATIVE PERCENT: 0 % (ref 1–4)
GFR AFRICAN AMERICAN: >60 ML/MIN
GFR NON-AFRICAN AMERICAN: >60 ML/MIN
GFR SERPL CREATININE-BSD FRML MDRD: ABNORMAL ML/MIN/{1.73_M2}
GFR SERPL CREATININE-BSD FRML MDRD: ABNORMAL ML/MIN/{1.73_M2}
GLUCOSE BLD-MCNC: 113 MG/DL (ref 70–99)
HCT VFR BLD CALC: 42.7 % (ref 40.7–50.3)
HEMOGLOBIN: 13.6 G/DL (ref 13–17)
IMMATURE GRANULOCYTES: 0 %
LACTIC ACID, SEPSIS WHOLE BLOOD: 2.1 MMOL/L (ref 0.5–1.9)
LACTIC ACID, SEPSIS WHOLE BLOOD: 2.4 MMOL/L (ref 0.5–1.9)
LACTIC ACID, SEPSIS: ABNORMAL MMOL/L (ref 0.5–1.9)
LACTIC ACID, SEPSIS: ABNORMAL MMOL/L (ref 0.5–1.9)
LACTIC ACID, WHOLE BLOOD: 2 MMOL/L (ref 0.7–2.1)
LACTIC ACID, WHOLE BLOOD: 2.2 MMOL/L (ref 0.7–2.1)
LYMPHOCYTES # BLD: 6 % (ref 24–44)
MCH RBC QN AUTO: 30.5 PG (ref 25.2–33.5)
MCHC RBC AUTO-ENTMCNC: 31.9 G/DL (ref 28.4–34.8)
MCV RBC AUTO: 95.7 FL (ref 82.6–102.9)
MONOCYTES # BLD: 6 % (ref 1–7)
MORPHOLOGY: NORMAL
MRSA, DNA, NASAL: NORMAL
NRBC AUTOMATED: 0 PER 100 WBC
PDW BLD-RTO: 13.9 % (ref 11.8–14.4)
PLATELET # BLD: 136 K/UL (ref 138–453)
PLATELET ESTIMATE: ABNORMAL
PMV BLD AUTO: 10.9 FL (ref 8.1–13.5)
POTASSIUM SERPL-SCNC: 4.5 MMOL/L (ref 3.7–5.3)
RBC # BLD: 4.46 M/UL (ref 4.21–5.77)
RBC # BLD: ABNORMAL 10*6/UL
SEG NEUTROPHILS: 88 % (ref 36–66)
SEGMENTED NEUTROPHILS ABSOLUTE COUNT: 14 K/UL (ref 1.8–7.7)
SODIUM BLD-SCNC: 138 MMOL/L (ref 135–144)
SPECIMEN DESCRIPTION: NORMAL
TOTAL PROTEIN: 5.5 G/DL (ref 6.4–8.3)
WBC # BLD: 15.9 K/UL (ref 3.5–11.3)
WBC # BLD: ABNORMAL 10*3/UL

## 2018-08-16 PROCEDURE — 85025 COMPLETE CBC W/AUTO DIFF WBC: CPT

## 2018-08-16 PROCEDURE — 36415 COLL VENOUS BLD VENIPUNCTURE: CPT

## 2018-08-16 PROCEDURE — 2580000003 HC RX 258: Performed by: STUDENT IN AN ORGANIZED HEALTH CARE EDUCATION/TRAINING PROGRAM

## 2018-08-16 PROCEDURE — 6360000002 HC RX W HCPCS: Performed by: STUDENT IN AN ORGANIZED HEALTH CARE EDUCATION/TRAINING PROGRAM

## 2018-08-16 PROCEDURE — 6370000000 HC RX 637 (ALT 250 FOR IP): Performed by: STUDENT IN AN ORGANIZED HEALTH CARE EDUCATION/TRAINING PROGRAM

## 2018-08-16 PROCEDURE — 80053 COMPREHEN METABOLIC PANEL: CPT

## 2018-08-16 PROCEDURE — 83605 ASSAY OF LACTIC ACID: CPT

## 2018-08-16 PROCEDURE — 94762 N-INVAS EAR/PLS OXIMTRY CONT: CPT

## 2018-08-16 PROCEDURE — 99223 1ST HOSP IP/OBS HIGH 75: CPT | Performed by: INTERNAL MEDICINE

## 2018-08-16 PROCEDURE — 1200000000 HC SEMI PRIVATE

## 2018-08-16 RX ORDER — AMIODARONE HYDROCHLORIDE 200 MG/1
200 TABLET ORAL 2 TIMES DAILY
Status: DISCONTINUED | OUTPATIENT
Start: 2018-08-16 | End: 2018-08-21 | Stop reason: HOSPADM

## 2018-08-16 RX ORDER — ALBUTEROL SULFATE 2.5 MG/3ML
2.5 SOLUTION RESPIRATORY (INHALATION) EVERY 6 HOURS PRN
Status: DISCONTINUED | OUTPATIENT
Start: 2018-08-16 | End: 2018-08-17

## 2018-08-16 RX ORDER — 0.9 % SODIUM CHLORIDE 0.9 %
500 INTRAVENOUS SOLUTION INTRAVENOUS ONCE
Status: COMPLETED | OUTPATIENT
Start: 2018-08-16 | End: 2018-08-16

## 2018-08-16 RX ORDER — METOPROLOL SUCCINATE 50 MG/1
50 TABLET, EXTENDED RELEASE ORAL 2 TIMES DAILY
Status: DISCONTINUED | OUTPATIENT
Start: 2018-08-16 | End: 2018-08-19

## 2018-08-16 RX ADMIN — MEROPENEM 1 G: 1 INJECTION, POWDER, FOR SOLUTION INTRAVENOUS at 11:34

## 2018-08-16 RX ADMIN — SODIUM CHLORIDE 500 ML: 9 INJECTION, SOLUTION INTRAVENOUS at 08:56

## 2018-08-16 RX ADMIN — Medication 10 ML: at 19:16

## 2018-08-16 RX ADMIN — ENOXAPARIN SODIUM 40 MG: 40 INJECTION SUBCUTANEOUS at 08:56

## 2018-08-16 RX ADMIN — AMIODARONE HYDROCHLORIDE 200 MG: 200 TABLET ORAL at 19:17

## 2018-08-16 RX ADMIN — SODIUM CHLORIDE 2000 ML: 9 INJECTION, SOLUTION INTRAVENOUS at 00:00

## 2018-08-16 RX ADMIN — MEROPENEM 1 G: 1 INJECTION, POWDER, FOR SOLUTION INTRAVENOUS at 19:17

## 2018-08-16 RX ADMIN — METOPROLOL SUCCINATE 50 MG: 50 TABLET, FILM COATED, EXTENDED RELEASE ORAL at 19:17

## 2018-08-16 RX ADMIN — Medication 10 ML: at 08:58

## 2018-08-16 RX ADMIN — MEROPENEM 1 G: 1 INJECTION, POWDER, FOR SOLUTION INTRAVENOUS at 03:08

## 2018-08-16 ASSESSMENT — PAIN SCALES - GENERAL: PAINLEVEL_OUTOF10: 0

## 2018-08-16 NOTE — PROGRESS NOTES
Critical care team - Resident sign-out to medicine service      Date and time: 8/16/2018 5:52 PM  Patient's name:  Eileen Caldwell Record Number: [id number]  Patient's account/billing number: [de-identified]  Patient's YOB: 1934  Age: 80 y.o. Date of Admission: 8/15/2018 11:04 PM  Length of stay during current admission: 1    Primary Care Physician: Carmen Rodrigues DO    Code Status: Full Code    Mode of physician to physician communication:        [] Via telephone   [] In person     Date and time of sign-out: 8/16/2018 5:52 PM    Accepting Internal Medicine resident: Dr. Yvrose Sepulveda    Accepting Medicine team: IM Team 2    Accepting team's attending: Dr. Rajendra Pan    Patient's current ICU Bed:  127    Patient's assigned bed on floor:  235        [x] Med-Surg Monitored [] Step-down       [] Psychiatry ICU       [] Psych floor     Reason for ICU admission:     urosepsis    ICU course summary:     Patient is an 80-year-old female who was transferred from [place]. Past medical history significant for CAD status pass out H, BPH, hypertension, hyperlipidemia. Patient presented with fever and chills. At that time she received 2 fluids a liter boluses along with marrow pattern on vancomycin and ceftriaxone. Here patient was found to have lactic acidosis of 6.5 as well as leukocytosis. She was also found to have a positive UTI. Patient also has a history of atrial fibrillation and is on amiodarone. Patient was started on meropenem here- sensitivity back - Non ESBL E coli.  Antibiotic changed to nitrofurantoin    Procedures during patient's ICU stay:     None    Current Vitals:     BP (!) 122/103   Pulse 72   Temp 98.2 °F (36.8 °C) (Oral)   Resp 17   Ht 5' 11\" (1.803 m)   Wt 205 lb 11 oz (93.3 kg)   SpO2 94%   BMI 28.69 kg/m²       Cultures:     Blood cultures:                 [] None drawn      [x] Negative             []  Positive (Details:  )  Urine Culture:                   [] None

## 2018-08-16 NOTE — PROGRESS NOTES
PTT:  No results found for: APTT, PTT    Comprehensive Metabolic Profile:   Recent Labs      08/15/18   1749   NA  139   K  4.2   CL  97*   CO2  25   BUN  12   CREATININE  1.22*   GLUCOSE  126*   CALCIUM  8.8   PROT  7.6   LABALBU  4.5   BILITOT  0.52   ALKPHOS  98   AST  39   ALT  46*      Magnesium:   Lab Results   Component Value Date    MG 2.0 03/14/2018     Phosphorus:   Lab Results   Component Value Date    PHOS 2.1 03/14/2018     Ionized Calcium:   Lab Results   Component Value Date    CAION 1.07 03/14/2018        Urinalysis:     Troponin: No results for input(s): TROPONINI in the last 72 hours.     Microbiology:    Cultures during this admission:     Blood cultures:                 [] None drawn      [] Negative             []  Positive (Details:  )  Urine Culture:                   [] None drawn      [] Negative             []  Positive (Details:  )  Sputum Culture:               [] None drawn       [] Negative             []  Positive (Details:  )   Endotracheal aspirate:     [] None drawn       [] Negative             []  Positive (Details:  )     Other pertinent Labs:       Radiology/Imaging:     Chest Xray (8/16/2018):    ASSESSMENT:     Patient Active Problem List    Diagnosis Date Noted    Sepsis due to urinary tract infection ([facility] 08/15/2018    Sepsis due to urinary tract infection ([facility] 08/15/2018    Benign prostatic hyperplasia with incomplete bladder emptying 04/19/2018    Recurrent UTI 03/13/2018    MARITA (acute kidney injury) ([facility] 03/13/2018    Chronic systolic CHF (congestive heart failure) ([facility] 03/13/2018    Mass of upper lobe of right lung 02/21/2018    Coronary artery disease involving native coronary artery of native heart 10/20/2016    Paroxysmal atrial fibrillation ([facility] 11/07/2013    Anxiety     Essential hypertension     Hyperlipidemia     Osteoarthritis     Allergic rhinitis     Diverticulosis     Overweight        Additional assessment:    ·         PLAN:     [employer] PER PROTOCOL:  [] No   [] Yes  [x] N/A    DISCONTINUE ANY LABS:   [x] No   [] Yes    ICU PROPHYLAXIS:  Stress ulcer:  [] PPI Agent  [x] [id number] [] Sucralfate  [] Other:  VTE:   [x] Enoxaparin  [] Unfract. Heparin Subcut  [] EPC Cuffs    NUTRITION:  [] NPO [] Tube Feeding (Specify: ) [] TPN  [x] PO (Diet: Diet NPO Effective Now)    HOME MEDICATIONS RECONCILED: [x] No  [] Yes    INSULIN DRIP:   [x] No   [] Yes    CONSULTATION NEEDED:  [x] No   [] Yes    FAMILY UPDATED:    [x] No   [] Yes    TRANSFER OUT OF ICU:   [] No   [x] Yes    ADDITIONAL PLAN:    Continue IV fluids at rate of 100 mL per hour  3. Trend  lactic acid  4. Start meropenem pending urine culture results   5. Follow up  blood culture results  6. Follow BMP and CBC        Maria Isabel Puga M.D.              Critical care resident,  Department of Internal Medicine/ Critical care  [facility])             8/16/2018, 2:30 AM

## 2018-08-16 NOTE — H&P
mouth daily    Historical Provider, MD   ALPRAZolam Rayen People) 0.25 MG tablet Take by mouth Give 1/2 tablet in morning and 1 tablet at bedtime . Historical Provider, MD   tamsulosin (FLOMAX) 0.4 MG capsule Take 1 capsule by mouth 2 times daily 3/7/18   Sara Morton MD   metoprolol succinate (TOPROL XL) 50 MG extended release tablet Take 1 tablet by mouth 2 times daily 3/1/18   Gracie Seton   amiodarone (CORDARONE) 200 MG tablet Take 1 tablet by mouth 2 times daily 3/1/18   Gracie Seton   nitrofurantoin, macrocrystal-monohydrate, (MACROBID) 100 MG capsule Take 1 capsule by mouth 2 times daily 2/25/18   Fouzia Ventura DO   fluticasone Josh Rosette) 50 MCG/ACT nasal spray 1 spray by Nasal route daily 2/1/18   Deep Vazquez APRN - CNP   lisinopril (PRINIVIL;ZESTRIL) 10 MG tablet Take 1 tablet by mouth daily 6/29/17   Brain Badillo MD   simvastatin (ZOCOR) 20 MG tablet TAKE 1 TABLET BY MOUTH EVERY OTHER DAY  3/7/17   Guillermo Cerda DO   Multiple Vitamins-Minerals (PRESERVISION AREDS 2 PO) Take 1 capsule by mouth daily    Historical Provider, MD   acetaminophen (TYLENOL) 500 MG tablet Take 500 mg by mouth every 6 hours as needed for Pain    Historical Provider, MD   Coenzyme Q-10 100 MG CAPS Take 100 mg by mouth every other day. Historical Provider, MD   tolnaftate (TINACTIN) 1 % external solution Apply topically nightly to toenails per Dr. Shasta Barnhart. Historical Provider, MD   aspirin 81 MG tablet Take 81 mg by mouth daily. Historical Provider, MD   GLUCOSAMINE-CHONDROITIN PO Take 3 tablets by mouth Daily. Historical Provider, MD       Social History:   TOBACCO:   reports that he has never smoked. He has never used smokeless tobacco.  ETOH:   reports that he does not drink alcohol. DRUGS:  reports that he does not use drugs.   OCCUPATION:      Family History:   Family History   Problem Relation Age of Onset    Diabetes Mother     Osteoporosis Mother     Other Father         complications of prostate surgery (bleeding)    Stroke Sister     Other Sister         respiratory failure           REVIEW OF SYSTEMS (ROS):  Review of Systems -   · Constitutional: positive for chills  · Eyes: Negative for visual changes, diplopia  · ENT: Negative for mouth sores, sore throat. · Cardiovascular: Negative for lightheadedness ,chest pain, palpitations   · Respiratory:Negative for Shortness of breath,cough or wheezing. · Gastrointestinal: Negative for nausea/vomiting, change in bowel habits, abdominal pain  · Genitourinary:Negative for change in bladder habits, dysuria, hematuria. · Musculoskeletal: Negative for joint pain   · Neurological: Negative for headache, change in muscle strength numbness/tingling    Physical Exam:    Vitals: There were no vitals taken for this visit. Last Body weight:   Wt Readings from Last 3 Encounters:   08/15/18 190 lb (86.2 kg)   07/11/18 202 lb 12.8 oz (92 kg)   05/02/18 192 lb (87.1 kg)       Body Mass Index : There is no height or weight on file to calculate BMI. PHYSICAL EXAMINATION :  · General appearance: awake, alert, cooperative  · HEENT: Head: Normocephalic, no lesions, without obvious abnormality. · Lungs: clear to auscultation bilaterally,CABG scar nahid on chest  · Heart: regular rate and rhythm, S1, S2 normal, no murmur  · Abdomen: soft, non-tender; bowel sounds normal; no masses,  no organomegaly  · Extremities: extremities normal, atraumatic, no cyanosis or edema  · Neurological:  Awake, alert, oriented to name, place and time. Cranial nerves II-XII are grossly intact. Reflexes normal and symmetric.  Sensation grossly normal  · Eye no icterus no redness  · Beck in place    Any additional physical findings:    VENT SETTINGS (Comprehensive) (if applicable):          Laboratory findings:-    CBC:   Recent Labs      08/15/18   1749   WBC  4.9   HGB  16.0   PLT  153     BMP:    Recent Labs      08/15/18   1749   NA  139   K  4.2   CL  97*   CO2  25   BUN  12 CREATININE  1.22*   GLUCOSE  126*     S. Calcium:  Recent Labs      08/15/18   1749   CALCIUM  8.8     S. Ionized Calcium:No results for input(s): IONCA in the last 72 hours. S. Magnesium:No results for input(s): MG in the last 72 hours. S. Phosphorus:No results for input(s): PHOS in the last 72 hours. S. Glucose:No results for input(s): POCGLU in the last 72 hours. Glycosylated hemoglobin A1C: No results for input(s): LABA1C in the last 72 hours. INR: No results for input(s): INR in the last 72 hours. Hepatic functions:   Recent Labs      08/15/18   1749   ALKPHOS  98   ALT  46*   AST  39   PROT  7.6   BILITOT  0.52   LABALBU  4.5     Pancreatic functions:No results for input(s): LACTA, AMYLASE in the last 72 hours. S. Lactic Acid: No results for input(s): LACTA in the last 72 hours. Cardiac enzymes:No results for input(s): CKTOTAL, CKMB, CKMBINDEX, TROPONINI in the last 72 hours. BNP:No results for input(s): BNP in the last 72 hours. Lipid profile: No results for input(s): CHOL, TRIG, HDL, LDLCALC in the last 72 hours.     Invalid input(s): LDL  Blood Gases: No results found for: PH, PCO2, PO2, HCO3, O2SAT  Thyroid functions:   Lab Results   Component Value Date    TSH 7.23 04/12/2018        Urinalysis:     Microbiology:    Cultures during this admission:     Blood cultures:                 [] None drawn      [] Negative             []  Positive (Details:  )  Urine Culture:                   [] None drawn      [] Negative             []  Positive (Details:  )  Sputum Culture:               [] None drawn       [] Negative             []  Positive (Details:  )   Endotracheal aspirate:     [] None drawn       [] Negative             []  Positive (Details:  )         -----------------------------------------------------------------  Radiological reports:    (See actual reports for details)      Recent Cardiac Catheterization summary:   (See actual reports for details)    Electrocardiogram:     Last Echocardiogram findings:   (See actual reports for details)       Assessment and Plan       Active Problems:    Sepsis due to urinary tract infection ([address]   Hx  of proximal atrial fibrillation on amiodarone  History of hypertension  History of hyperlipidemia  History of BPH  MARITA        Plan:    1. Will give fluid boluses  2. Continue IV fluids at rate of 100 mL per hour  3. Trend  lactic acid  4. Start meropenem pending urine culture results   5. Follow up  blood culture results  6.  Follow BMP and CBC        Lenin Gee M.D.  PGY -2  Department of Internal Medicine/ Critical care  [facility])             8/15/2018, 11:18 PM

## 2018-08-16 NOTE — PLAN OF CARE
Problem: Infection, Septic Shock:  Goal: Will show no infection signs and symptoms  Will show no infection signs and symptoms   Outcome: Ongoing      Problem: Skin Integrity - Impaired:  Goal: Absence of new skin breakdown  Absence of new skin breakdown   Outcome: Ongoing      Problem: Falls - Risk of:  Goal: Will remain free from falls  Will remain free from falls   Outcome: Ongoing      Problem: Risk for Impaired Skin Integrity  Goal: Tissue integrity - skin and mucous membranes  Structural intactness and normal physiological function of skin and  mucous membranes.    Outcome: Ongoing

## 2018-08-16 NOTE — H&P
Attending Physician Statement  I have discussed the case of Haylee Morales, including pertinent history and exam findings with the resident/fellow/NP/PA. I have seen and examined the patient and the key elements of the encounter have been performed by me. I agree with the assessment, plan and orders as documented by the resident/fellow/NP/PA  With changes made to the note as needed. Pt was seen during rounds. Review of Systems:   In addition to the pertinent positives and negatives as stated within HPI and the review of systems as documented in their notes, all other systems were reviewed when able to and are reported negative. Patient admitted with chills. 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 History of benign prostatic hypertrophy  Essential hypertension. Unspecified hyperlipidemia.  paroxysmal atrial fibrillation. Lactic acidosis secondary to sepsis. Chronic anticoagulation for atrial fibrillation  Allergy to penicillin, sulfa and Cefdinir. Beta blocker use. Amiodarone use. Mild hypoproteinemia, could be an acute phase reactant. Leukocytosis secondary to UTI. ? Pulmonary vascular congestion in a patient with an ejection fraction of 50%. Monitor vena cava dimension and adjust fluids accordingly. Follow-up on the cultures and adjust antibiotics accordingly. Monitor and control blood pressure. Continue anticoagulation. Continue beta blockers and amiodarone  We will transfer the patient to a monitored bed.           Alba Goodwin  8/16/2018  9:07 AM

## 2018-08-16 NOTE — TELEPHONE ENCOUNTER
08/15/2018 GP fax. Mirandaelisabeth Riya ... \"R\" c/o feeling very cold before dinner. Was shaking so much was unable to get accurate vital signs. Temp  98.1,  RR  18. Shaking too much for BP. Tried manual and automatic cuffs. Writer assisted \"R\" to bathroom. States \" I fel I need to pee,\"  Urinated very little. Urine bright red with blood. Also produce several blood clots approx size of a dime.   \"R\" sent to [facility] ER for eval.  Please advise

## 2018-08-16 NOTE — CARE COORDINATION
Case Management Initial Discharge Plan  Monisha Idanha,         Readmission Risk              Risk of Unplanned Readmission:        24               Met with:patient to discuss discharge plans. Information verified: address, contacts, phone number, , insurance Yes  PCP: Jamila Gan DO  Date of last visit: 2018    Insurance Provider: Nicolette Avila    Discharge Planning    Living Arrangements:  Alone   Support Systems:  Family Members, Other (Comment)    Home has 1 stories   stairs to climb to get into front door, n/a stairs to climb to reach second floor  Location of bedroom/bathroom in home main    Patient able to perform ADL's:Independent    Current Services (outpatient & in home) none  DME equipment: none  DME provider:     Pharmacy: [facility]   Potential Assistance Purchasing Medications:  No  Does patient want to participate in local refill/ meds to beds program?  No    Potential Assistance Needed:  Transportation    Patient agreeable to home care: No  [place] of choice provided:  n/a    Prior SNF/Rehab Placement and Facility: [facility]  Agreeable to SNF/Rehab: Yes  [place] of choice provided: yes   Evaluation: n/a    Expected Discharge date:  18  Patient expects to be discharged to:  [facility]  Follow Up Appointment: Best Day/ Time:      Transportation provider: senior living  Transportation arrangements needed for discharge: he is not sure    Discharge Plan: Pt states he lives at [facility]. He states he is independent. He does aerobics 3x/week and outpatient therapy 2x/week at [address] [place] [address]. He does not drive but has transportation from [facility]. He states a nurse brings his meds every day. His POA is Derjuwan Olp - he is a relative  He plans to return to [facility], however, if he needs a SNF he would like [facility].       Electronically signed by Jordyn Martinez RN on 18 at 9:54 AM

## 2018-08-16 NOTE — PLAN OF CARE
Problem: Infection, Septic Shock:  Goal: Will show no infection signs and symptoms  Will show no infection signs and symptoms  Outcome: Ongoing      Problem: Tissue Perfusion, Altered:  Goal: Circulatory function within specified parameters  Circulatory function within specified parameters  Outcome: Ongoing      Problem: Skin Integrity - Impaired:  Goal: Absence of new skin breakdown  Absence of new skin breakdown  Outcome: Ongoing

## 2018-08-17 LAB
ABSOLUTE EOS #: 0.09 K/UL (ref 0–0.4)
ABSOLUTE IMMATURE GRANULOCYTE: 0 K/UL (ref 0–0.3)
ABSOLUTE LYMPH #: 0.37 K/UL (ref 1–4.8)
ABSOLUTE MONO #: 0.09 K/UL (ref 0.1–0.8)
ANION GAP SERPL CALCULATED.3IONS-SCNC: 14 MMOL/L (ref 9–17)
BASOPHILS # BLD: 1 % (ref 0–2)
BASOPHILS ABSOLUTE: 0.09 K/UL (ref 0–0.2)
BUN BLDV-MCNC: 11 MG/DL (ref 8–23)
BUN/CREAT BLD: ABNORMAL (ref 9–20)
CALCIUM SERPL-MCNC: 8.4 MG/DL (ref 8.6–10.4)
CHLORIDE BLD-SCNC: 102 MMOL/L (ref 98–107)
CO2: 23 MMOL/L (ref 20–31)
CREAT SERPL-MCNC: 0.91 MG/DL (ref 0.7–1.2)
DIFFERENTIAL TYPE: ABNORMAL
EOSINOPHILS RELATIVE PERCENT: 1 % (ref 1–4)
GFR AFRICAN AMERICAN: >60 ML/MIN
GFR NON-AFRICAN AMERICAN: >60 ML/MIN
GFR SERPL CREATININE-BSD FRML MDRD: ABNORMAL ML/MIN/{1.73_M2}
GFR SERPL CREATININE-BSD FRML MDRD: ABNORMAL ML/MIN/{1.73_M2}
GLUCOSE BLD-MCNC: 103 MG/DL (ref 70–99)
HCT VFR BLD CALC: 47.2 % (ref 40.7–50.3)
HEMOGLOBIN: 15.6 G/DL (ref 13–17)
IMMATURE GRANULOCYTES: 0 %
LYMPHOCYTES # BLD: 4 % (ref 24–44)
MCH RBC QN AUTO: 30.4 PG (ref 25.2–33.5)
MCHC RBC AUTO-ENTMCNC: 33.1 G/DL (ref 28.4–34.8)
MCV RBC AUTO: 92 FL (ref 82.6–102.9)
MONOCYTES # BLD: 1 % (ref 1–7)
MORPHOLOGY: NORMAL
NRBC AUTOMATED: 0 PER 100 WBC
PDW BLD-RTO: 13.9 % (ref 11.8–14.4)
PLATELET # BLD: ABNORMAL K/UL (ref 138–453)
PLATELET ESTIMATE: ABNORMAL
PLATELET, FLUORESCENCE: 117 K/UL (ref 138–453)
PLATELET, IMMATURE FRACTION: 3.8 % (ref 1.1–10.3)
PMV BLD AUTO: ABNORMAL FL (ref 8.1–13.5)
POTASSIUM SERPL-SCNC: 3.8 MMOL/L (ref 3.7–5.3)
RBC # BLD: 5.13 M/UL (ref 4.21–5.77)
RBC # BLD: ABNORMAL 10*6/UL
SEG NEUTROPHILS: 93 % (ref 36–66)
SEGMENTED NEUTROPHILS ABSOLUTE COUNT: 8.56 K/UL (ref 1.8–7.7)
SODIUM BLD-SCNC: 139 MMOL/L (ref 135–144)
WBC # BLD: 9.2 K/UL (ref 3.5–11.3)
WBC # BLD: ABNORMAL 10*3/UL

## 2018-08-17 PROCEDURE — G8978 MOBILITY CURRENT STATUS: HCPCS

## 2018-08-17 PROCEDURE — 2500000003 HC RX 250 WO HCPCS: Performed by: EMERGENCY MEDICINE

## 2018-08-17 PROCEDURE — 85025 COMPLETE CBC W/AUTO DIFF WBC: CPT

## 2018-08-17 PROCEDURE — 80048 BASIC METABOLIC PNL TOTAL CA: CPT

## 2018-08-17 PROCEDURE — 99233 SBSQ HOSP IP/OBS HIGH 50: CPT | Performed by: INTERNAL MEDICINE

## 2018-08-17 PROCEDURE — G8979 MOBILITY GOAL STATUS: HCPCS

## 2018-08-17 PROCEDURE — 94762 N-INVAS EAR/PLS OXIMTRY CONT: CPT

## 2018-08-17 PROCEDURE — 97162 PT EVAL MOD COMPLEX 30 MIN: CPT

## 2018-08-17 PROCEDURE — 85055 RETICULATED PLATELET ASSAY: CPT

## 2018-08-17 PROCEDURE — 6360000002 HC RX W HCPCS: Performed by: STUDENT IN AN ORGANIZED HEALTH CARE EDUCATION/TRAINING PROGRAM

## 2018-08-17 PROCEDURE — 2580000003 HC RX 258: Performed by: STUDENT IN AN ORGANIZED HEALTH CARE EDUCATION/TRAINING PROGRAM

## 2018-08-17 PROCEDURE — 6370000000 HC RX 637 (ALT 250 FOR IP): Performed by: EMERGENCY MEDICINE

## 2018-08-17 PROCEDURE — 6370000000 HC RX 637 (ALT 250 FOR IP): Performed by: STUDENT IN AN ORGANIZED HEALTH CARE EDUCATION/TRAINING PROGRAM

## 2018-08-17 PROCEDURE — 2000000000 HC ICU R&B

## 2018-08-17 PROCEDURE — 97530 THERAPEUTIC ACTIVITIES: CPT

## 2018-08-17 PROCEDURE — 36415 COLL VENOUS BLD VENIPUNCTURE: CPT

## 2018-08-17 PROCEDURE — 6370000000 HC RX 637 (ALT 250 FOR IP): Performed by: HOSPITALIST

## 2018-08-17 RX ORDER — LISINOPRIL 10 MG/1
10 TABLET ORAL DAILY
Status: DISCONTINUED | OUTPATIENT
Start: 2018-08-17 | End: 2018-08-18

## 2018-08-17 RX ORDER — METOPROLOL SUCCINATE 50 MG/1
50 TABLET, EXTENDED RELEASE ORAL 2 TIMES DAILY
Status: DISCONTINUED | OUTPATIENT
Start: 2018-08-17 | End: 2018-08-17

## 2018-08-17 RX ORDER — HYDRALAZINE HYDROCHLORIDE 25 MG/1
25 TABLET, FILM COATED ORAL ONCE
Status: COMPLETED | OUTPATIENT
Start: 2018-08-17 | End: 2018-08-17

## 2018-08-17 RX ORDER — HYDRALAZINE HYDROCHLORIDE 50 MG/1
50 TABLET, FILM COATED ORAL EVERY 8 HOURS SCHEDULED
Status: DISCONTINUED | OUTPATIENT
Start: 2018-08-17 | End: 2018-08-18

## 2018-08-17 RX ORDER — UREA 10 %
2 LOTION (ML) TOPICAL NIGHTLY PRN
Status: DISCONTINUED | OUTPATIENT
Start: 2018-08-17 | End: 2018-08-18

## 2018-08-17 RX ORDER — AMLODIPINE BESYLATE 5 MG/1
5 TABLET ORAL DAILY
Status: DISCONTINUED | OUTPATIENT
Start: 2018-08-17 | End: 2018-08-19

## 2018-08-17 RX ADMIN — NICARDIPINE HYDROCHLORIDE 5 MG/HR: 0.1 INJECTION, SOLUTION INTRAVENOUS at 13:12

## 2018-08-17 RX ADMIN — SODIUM CHLORIDE: 9 INJECTION, SOLUTION INTRAVENOUS at 01:00

## 2018-08-17 RX ADMIN — ENOXAPARIN SODIUM 40 MG: 40 INJECTION SUBCUTANEOUS at 09:26

## 2018-08-17 RX ADMIN — METOPROLOL SUCCINATE 50 MG: 50 TABLET, FILM COATED, EXTENDED RELEASE ORAL at 20:00

## 2018-08-17 RX ADMIN — AMIODARONE HYDROCHLORIDE 200 MG: 200 TABLET ORAL at 09:26

## 2018-08-17 RX ADMIN — MEROPENEM 1 G: 1 INJECTION, POWDER, FOR SOLUTION INTRAVENOUS at 11:14

## 2018-08-17 RX ADMIN — HYDRALAZINE HYDROCHLORIDE 50 MG: 50 TABLET, FILM COATED ORAL at 22:48

## 2018-08-17 RX ADMIN — MEROPENEM 1 G: 1 INJECTION, POWDER, FOR SOLUTION INTRAVENOUS at 18:40

## 2018-08-17 RX ADMIN — HYDRALAZINE HYDROCHLORIDE 25 MG: 25 TABLET, FILM COATED ORAL at 13:04

## 2018-08-17 RX ADMIN — Medication 10 ML: at 20:15

## 2018-08-17 RX ADMIN — AMIODARONE HYDROCHLORIDE 200 MG: 200 TABLET ORAL at 20:00

## 2018-08-17 RX ADMIN — LISINOPRIL 10 MG: 10 TABLET ORAL at 11:55

## 2018-08-17 RX ADMIN — AMLODIPINE BESYLATE 5 MG: 5 TABLET ORAL at 18:40

## 2018-08-17 RX ADMIN — MEROPENEM 1 G: 1 INJECTION, POWDER, FOR SOLUTION INTRAVENOUS at 04:29

## 2018-08-17 RX ADMIN — METOPROLOL SUCCINATE 50 MG: 50 TABLET, FILM COATED, EXTENDED RELEASE ORAL at 09:26

## 2018-08-17 ASSESSMENT — PAIN SCALES - GENERAL
PAINLEVEL_OUTOF10: 0

## 2018-08-17 NOTE — CARE COORDINATION
Spoke with pt. Plan is to return to [facility]. Spoke with Melissa Lagos RN ([phone]) from [facility]. She states there is nursing service available. They could straight cath pt as needed or provide other nursing services. They can accept pt back at any time. Will need to fax discharge instructions to   [phone]. Will need to call them to notify of discharge date and time.

## 2018-08-17 NOTE — PROGRESS NOTES
Smoking Cessation - topics covered   []  Health Risks  []  Benefits of Quitting   []  Smoking Cessation  [x]  Patient has no history of tobacco use  []  Patient is former smoker. [x]  No need for tobacco cessation education. []  Booklet given  []  Patient verbalizes understanding. []  Patient denies need for tobacco cessation education.   Rob Joel  8:06 AM

## 2018-08-17 NOTE — PLAN OF CARE
Problem: Infection, Septic Shock:  Goal: Will show no infection signs and symptoms  Will show no infection signs and symptoms   Outcome: Ongoing      Problem: Tissue Perfusion, Altered:  Goal: Circulatory function within specified parameters  Circulatory function within specified parameters   Outcome: Ongoing      Problem: Skin Integrity - Impaired:  Goal: Absence of new skin breakdown  Absence of new skin breakdown   Outcome: Ongoing      Problem: Falls - Risk of:  Goal: Will remain free from falls  Will remain free from falls   Outcome: Ongoing    Goal: Absence of physical injury  Absence of physical injury   Outcome: Ongoing      Problem: Risk for Impaired Skin Integrity  Goal: Tissue integrity - skin and mucous membranes  Structural intactness and normal physiological function of skin and  mucous membranes.    Outcome: Ongoing      Problem: Musculor/Skeletal Functional Status  Goal: Highest potential functional level  Outcome: Ongoing

## 2018-08-18 LAB
ANION GAP SERPL CALCULATED.3IONS-SCNC: 13 MMOL/L (ref 9–17)
BUN BLDV-MCNC: 10 MG/DL (ref 8–23)
BUN/CREAT BLD: ABNORMAL (ref 9–20)
CALCIUM SERPL-MCNC: 8.6 MG/DL (ref 8.6–10.4)
CHLORIDE BLD-SCNC: 98 MMOL/L (ref 98–107)
CO2: 25 MMOL/L (ref 20–31)
CREAT SERPL-MCNC: 0.84 MG/DL (ref 0.7–1.2)
GFR AFRICAN AMERICAN: >60 ML/MIN
GFR NON-AFRICAN AMERICAN: >60 ML/MIN
GFR SERPL CREATININE-BSD FRML MDRD: ABNORMAL ML/MIN/{1.73_M2}
GFR SERPL CREATININE-BSD FRML MDRD: ABNORMAL ML/MIN/{1.73_M2}
GLUCOSE BLD-MCNC: 105 MG/DL (ref 70–99)
POTASSIUM SERPL-SCNC: 3.6 MMOL/L (ref 3.7–5.3)
SODIUM BLD-SCNC: 136 MMOL/L (ref 135–144)

## 2018-08-18 PROCEDURE — 6370000000 HC RX 637 (ALT 250 FOR IP): Performed by: STUDENT IN AN ORGANIZED HEALTH CARE EDUCATION/TRAINING PROGRAM

## 2018-08-18 PROCEDURE — 6370000000 HC RX 637 (ALT 250 FOR IP): Performed by: HOSPITALIST

## 2018-08-18 PROCEDURE — 6360000002 HC RX W HCPCS: Performed by: STUDENT IN AN ORGANIZED HEALTH CARE EDUCATION/TRAINING PROGRAM

## 2018-08-18 PROCEDURE — 80048 BASIC METABOLIC PNL TOTAL CA: CPT

## 2018-08-18 PROCEDURE — 2580000003 HC RX 258: Performed by: STUDENT IN AN ORGANIZED HEALTH CARE EDUCATION/TRAINING PROGRAM

## 2018-08-18 PROCEDURE — 36415 COLL VENOUS BLD VENIPUNCTURE: CPT

## 2018-08-18 PROCEDURE — 6370000000 HC RX 637 (ALT 250 FOR IP): Performed by: EMERGENCY MEDICINE

## 2018-08-18 PROCEDURE — 99233 SBSQ HOSP IP/OBS HIGH 50: CPT | Performed by: INTERNAL MEDICINE

## 2018-08-18 PROCEDURE — 1200000000 HC SEMI PRIVATE

## 2018-08-18 RX ORDER — LISINOPRIL 20 MG/1
20 TABLET ORAL DAILY
Status: DISCONTINUED | OUTPATIENT
Start: 2018-08-18 | End: 2018-08-20

## 2018-08-18 RX ORDER — UREA 10 %
3 LOTION (ML) TOPICAL NIGHTLY PRN
Status: DISCONTINUED | OUTPATIENT
Start: 2018-08-18 | End: 2018-08-21 | Stop reason: HOSPADM

## 2018-08-18 RX ADMIN — CEFTRIAXONE SODIUM 1 G: 1 INJECTION, POWDER, FOR SOLUTION INTRAMUSCULAR; INTRAVENOUS at 10:07

## 2018-08-18 RX ADMIN — LISINOPRIL 20 MG: 20 TABLET ORAL at 10:07

## 2018-08-18 RX ADMIN — HYDRALAZINE HYDROCHLORIDE 50 MG: 50 TABLET, FILM COATED ORAL at 06:22

## 2018-08-18 RX ADMIN — Medication 3 MG: at 22:24

## 2018-08-18 RX ADMIN — AMIODARONE HYDROCHLORIDE 200 MG: 200 TABLET ORAL at 20:38

## 2018-08-18 RX ADMIN — AMLODIPINE BESYLATE 5 MG: 5 TABLET ORAL at 09:37

## 2018-08-18 RX ADMIN — MEROPENEM 1 G: 1 INJECTION, POWDER, FOR SOLUTION INTRAVENOUS at 02:12

## 2018-08-18 RX ADMIN — Medication 10 ML: at 20:39

## 2018-08-18 RX ADMIN — Medication 10 ML: at 09:38

## 2018-08-18 RX ADMIN — METOPROLOL SUCCINATE 50 MG: 50 TABLET, FILM COATED, EXTENDED RELEASE ORAL at 09:37

## 2018-08-18 RX ADMIN — AMIODARONE HYDROCHLORIDE 200 MG: 200 TABLET ORAL at 09:37

## 2018-08-18 RX ADMIN — Medication 2 MG: at 00:04

## 2018-08-18 RX ADMIN — ENOXAPARIN SODIUM 40 MG: 40 INJECTION SUBCUTANEOUS at 09:37

## 2018-08-18 RX ADMIN — METOPROLOL SUCCINATE 50 MG: 50 TABLET, FILM COATED, EXTENDED RELEASE ORAL at 20:38

## 2018-08-18 ASSESSMENT — PAIN SCALES - GENERAL
PAINLEVEL_OUTOF10: 0

## 2018-08-18 NOTE — PROGRESS NOTES
Critical Care Team - Daily Progress Note      Date and time: 8/18/2018 8:33 AM  Patient's name:  Jelly Herrera  Medical Record Number: [id number]  Patient's account/billing number: [de-identified]  Patient's YOB: 1934  Age: 80 y.o. Date of Admission: 8/15/2018 11:04 PM  Length of stay during current admission: 3      Primary Care Physician: Lita Mckinney DO  ICU Attending Physician: Dr. Otilio Degroot Status: Full Code    Reason for ICU admission:   sepsis      SUBJECTIVE:     OVERNIGHT EVENTS:     No Acute events overnight  She spent pressure increased yesterday during day it increased to 157 systolic, Cardene drip  started which has been tapered down and shut off, overnight patient was started on hydralazine 50 mg 3 times a day. At present blood pressure fluctuating between 150-160  Afebrile   Urine culture showed E.  Coli, waiting  for sensitivity  results    AWAKE & FOLLOWING COMMANDS:  [] No   [x] Yes    CURRENT VENTILATION STATUS:     [] Ventilator  [] BIPAP  [] Nasal Cannula [x] Room Air      IF INTUBATED, ET TUBE MARKING AT LOWER LIP:       cms    SECRETIONS Amount:  [] Small [] Moderate  [] Large  [x] None  Color:     [] White [] Colored  [] Bloody    SEDATION:  RAAS Score:  [] Propofol gtt  [] Versed gtt  [] Ativan gtt   [x] No Sedation    PARALYZED:  [x] No    [] Yes    DIARRHEA:                [x] No                [] Yes  (C. Difficile status: [] positive                                                                                                                       [] negative                                                                                                                     [] pending)    VASOPRESSORS:  [x] No    [] Yes    If yes -   [] Levophed       [] Dopamine     [] Vasopressin       [] Dobutamine  [] Phenylephrine         [] Epinephrine    CENTRAL LINES:     [x] No   [] Yes   (Date of Insertion:   )           If yes -     [] Right IJ     [] Left IJ [] Right Femoral [] Left Femoral                   [] Right Subclavian [] Left Subclavian       MIRZA'S CATHETER:   [x] No   [x] Yes  (Date of Insertion:   )     URINE OUTPUT:            [x] Good   [] Low              [] Anuric    4 litres over last 24 hrs   OBJECTIVE:     VITAL SIGNS:  BP (!) 145/66   Pulse 65   Temp 97.9 °F (36.6 °C) (Oral)   Resp 13   Ht 5' 11\" (1.803 m)   Wt 205 lb 11 oz (93.3 kg)   SpO2 96%   BMI 28.69 kg/m²   Tmax over 24 hours:  Temp (24hrs), Av.9 °F (36.6 °C), Min:97.5 °F (36.4 °C), Max:98.2 °F (36.8 °C)      Patient Vitals for the past 6 hrs:   BP Temp Temp src Pulse Resp SpO2   18 0800 (!) 145/66 97.9 °F (36.6 °C) Oral 65 13 96 %   18 0700 (!) 158/73 - - 52 12 97 %   18 0630 (!) 154/70 - - 52 13 96 %   18 0600 (!) 148/86 - - 53 15 94 %   18 0530 (!) 158/71 - - 53 12 96 %   18 0500 - - - - - 97 %   18 0430 (!) 151/71 - - 54 14 96 %   18 0400 (!) 178/80 97.5 °F (36.4 °C) Oral 68 16 98 %   18 0330 (!) 146/79 - - 58 14 95 %   18 0300 (!) 153/71 - - 58 13 96 %         Intake/Output Summary (Last 24 hours) at 18 0833  Last data filed at 18 0754   Gross per 24 hour   Intake              813 ml   Output             4065 ml   Net            -3252 ml     Wt Readings from Last 2 Encounters:   08/15/18 205 lb 11 oz (93.3 kg)   08/15/18 190 lb (86.2 kg)     Body mass index is 28.69 kg/m².         PHYSICAL EXAMINATION:         Any additional physical findings:    MEDICATIONS:    Scheduled Meds:   lisinopril  20 mg Oral Daily    meropenem  1 g Intravenous Q8H    amLODIPine  5 mg Oral Daily    hydrALAZINE  50 mg Oral 3 times per day    metoprolol succinate  50 mg Oral BID    amiodarone  200 mg Oral BID    sodium chloride flush  10 mL Intravenous 2 times per day    enoxaparin  40 mg Subcutaneous Daily     Continuous Infusions:   niCARdipine Stopped (18 1445)    sodium chloride 10 mL/hr at 18 0835     PRN Meds: drawn       [] Negative             []  Positive (Details:  )     Other pertinent Labs:       Radiology/Imaging:     Chest Xray (8/18/2018):    ASSESSMENT:     Patient Active Problem List    Diagnosis Date Noted    Sepsis due to urinary tract infection ([facility] 08/15/2018    Sepsis due to urinary tract infection ([facility] 08/15/2018    Benign prostatic hyperplasia with incomplete bladder emptying 04/19/2018    Recurrent UTI 03/13/2018    MARITA (acute kidney injury) ([facility] 03/13/2018    Chronic systolic CHF (congestive heart failure) ([facility] 03/13/2018    Mass of upper lobe of right lung 02/21/2018    Coronary artery disease involving native coronary artery of native heart 10/20/2016    Paroxysmal atrial fibrillation ([facility] 11/07/2013    Anxiety     Essential hypertension     Hyperlipidemia     Osteoarthritis     Allergic rhinitis     Diverticulosis     Overweight        Additional assessment:    ·         PLAN:     [employer] PER PROTOCOL:  [] No   [] Yes  [x] N/A    DISCONTINUE ANY LABS:   [x] No   [] Yes    ICU PROPHYLAXIS:  Stress ulcer:  [] PPI Agent  [] [id number] [] Sucralfate  [] Other:  VTE:   [x] Enoxaparin  [] Unfract. Heparin Subcut  [] EPC Cuffs    NUTRITION:  [] NPO [] Tube Feeding (Specify: ) [] TPN  [x] PO (Diet: DIET CARDIAC;)    HOME MEDICATIONS RECONCILED: [x] No  [] Yes    INSULIN DRIP:   [x] No   [] Yes    CONSULTATION NEEDED:  [x] No   [] Yes    FAMILY UPDATED:    [x] No   [] Yes    TRANSFER OUT OF ICU:   [] No   [x] Yes    ADDITIONAL PLAN:    1.  IV fluids reduce to KVO  2. Change antibiotic to narrow  spectrum once sensitivity results are back   3. Follow-up blood culture. 4. PT/OT  5. Lovenox for DVT prophylaxis  6. Transfer out of ICU  7.  Will  confirm from patient's pharmacy if  the patient still taking xarelto as was discontinued for hematuria. Estefana Spatz, M.D.              Critical care resident,  Department of Internal Medicine/ Critical care  [facility])             8/18/2018, 8:33 AM

## 2018-08-19 PROBLEM — R00.1 BRADYCARDIA: Status: ACTIVE | Noted: 2018-08-19

## 2018-08-19 LAB
CULTURE: ABNORMAL
Lab: ABNORMAL
ORGANISM: ABNORMAL
ORGANISM: ABNORMAL
SPECIMEN DESCRIPTION: ABNORMAL
STATUS: ABNORMAL

## 2018-08-19 PROCEDURE — 6370000000 HC RX 637 (ALT 250 FOR IP): Performed by: STUDENT IN AN ORGANIZED HEALTH CARE EDUCATION/TRAINING PROGRAM

## 2018-08-19 PROCEDURE — 99233 SBSQ HOSP IP/OBS HIGH 50: CPT | Performed by: INTERNAL MEDICINE

## 2018-08-19 PROCEDURE — 94762 N-INVAS EAR/PLS OXIMTRY CONT: CPT

## 2018-08-19 PROCEDURE — 6360000002 HC RX W HCPCS: Performed by: STUDENT IN AN ORGANIZED HEALTH CARE EDUCATION/TRAINING PROGRAM

## 2018-08-19 PROCEDURE — 2580000003 HC RX 258: Performed by: STUDENT IN AN ORGANIZED HEALTH CARE EDUCATION/TRAINING PROGRAM

## 2018-08-19 PROCEDURE — 1200000000 HC SEMI PRIVATE

## 2018-08-19 RX ORDER — HYDRALAZINE HYDROCHLORIDE 20 MG/ML
5 INJECTION INTRAMUSCULAR; INTRAVENOUS EVERY 6 HOURS PRN
Status: DISCONTINUED | OUTPATIENT
Start: 2018-08-19 | End: 2018-08-20

## 2018-08-19 RX ORDER — POTASSIUM CHLORIDE 20 MEQ/1
40 TABLET, EXTENDED RELEASE ORAL ONCE
Status: COMPLETED | OUTPATIENT
Start: 2018-08-19 | End: 2018-08-19

## 2018-08-19 RX ORDER — METOPROLOL SUCCINATE 100 MG/1
100 TABLET, EXTENDED RELEASE ORAL DAILY
Status: DISCONTINUED | OUTPATIENT
Start: 2018-08-20 | End: 2018-08-19

## 2018-08-19 RX ORDER — METOPROLOL SUCCINATE 50 MG/1
50 TABLET, EXTENDED RELEASE ORAL DAILY
Status: DISCONTINUED | OUTPATIENT
Start: 2018-08-20 | End: 2018-08-21

## 2018-08-19 RX ORDER — AMLODIPINE BESYLATE 10 MG/1
10 TABLET ORAL DAILY
Status: DISCONTINUED | OUTPATIENT
Start: 2018-08-19 | End: 2018-08-21 | Stop reason: HOSPADM

## 2018-08-19 RX ADMIN — HYDRALAZINE HYDROCHLORIDE 5 MG: 20 INJECTION INTRAMUSCULAR; INTRAVENOUS at 23:11

## 2018-08-19 RX ADMIN — SODIUM CHLORIDE: 9 INJECTION, SOLUTION INTRAVENOUS at 09:08

## 2018-08-19 RX ADMIN — METOPROLOL SUCCINATE 50 MG: 50 TABLET, FILM COATED, EXTENDED RELEASE ORAL at 09:02

## 2018-08-19 RX ADMIN — AMIODARONE HYDROCHLORIDE 200 MG: 200 TABLET ORAL at 20:32

## 2018-08-19 RX ADMIN — LISINOPRIL 20 MG: 20 TABLET ORAL at 09:03

## 2018-08-19 RX ADMIN — AMLODIPINE BESYLATE 10 MG: 10 TABLET ORAL at 09:02

## 2018-08-19 RX ADMIN — CEFTRIAXONE SODIUM 1 G: 1 INJECTION, POWDER, FOR SOLUTION INTRAMUSCULAR; INTRAVENOUS at 09:08

## 2018-08-19 RX ADMIN — POTASSIUM CHLORIDE 40 MEQ: 1500 TABLET, EXTENDED RELEASE ORAL at 09:02

## 2018-08-19 RX ADMIN — HYDRALAZINE HYDROCHLORIDE 5 MG: 20 INJECTION INTRAMUSCULAR; INTRAVENOUS at 16:36

## 2018-08-19 RX ADMIN — Medication 10 ML: at 09:02

## 2018-08-19 RX ADMIN — AMIODARONE HYDROCHLORIDE 200 MG: 200 TABLET ORAL at 09:02

## 2018-08-19 RX ADMIN — ENOXAPARIN SODIUM 40 MG: 40 INJECTION SUBCUTANEOUS at 09:02

## 2018-08-19 ASSESSMENT — PAIN SCALES - GENERAL
PAINLEVEL_OUTOF10: 0

## 2018-08-19 NOTE — PLAN OF CARE
Problem: Infection, Septic Shock:  Goal: Will show no infection signs and symptoms  Will show no infection signs and symptoms   Outcome: Ongoing      Problem: Tissue Perfusion, Altered:  Goal: Circulatory function within specified parameters  Circulatory function within specified parameters   Outcome: Ongoing      Problem: Skin Integrity - Impaired:  Goal: Absence of new skin breakdown  Absence of new skin breakdown   Outcome: Ongoing      Problem: Falls - Risk of:  Goal: Will remain free from falls  Will remain free from falls   Outcome: Ongoing    Goal: Absence of physical injury  Absence of physical injury   Outcome: Ongoing      Problem: Risk for Impaired Skin Integrity  Goal: Tissue integrity - skin and mucous membranes  Structural intactness and normal physiological function of skin and  mucous membranes.    Outcome: Ongoing

## 2018-08-19 NOTE — PLAN OF CARE
Problem: Infection, Septic Shock:  Goal: Will show no infection signs and symptoms  Will show no infection signs and symptoms   Outcome: Ongoing      Problem: Tissue Perfusion, Altered:  Goal: Circulatory function within specified parameters  Circulatory function within specified parameters   Outcome: Ongoing      Problem: Skin Integrity - Impaired:  Goal: Absence of new skin breakdown  Absence of new skin breakdown   Outcome: Ongoing      Problem: Falls - Risk of:  Goal: Will remain free from falls  Will remain free from falls   Outcome: Ongoing      Problem: Risk for Impaired Skin Integrity  Goal: Tissue integrity - skin and mucous membranes  Structural intactness and normal physiological function of skin and  mucous membranes.    Outcome: Ongoing

## 2018-08-19 NOTE — PROGRESS NOTES
Blood Glucose:   Recent Labs      08/17/18   0757  08/18/18   1014   GLUCOSE  103*  105*        PT/INR:    Lab Results   Component Value Date    PROTIME 11.9 03/13/2018    INR 1.1 03/13/2018     PTT:  No results found for: APTT, PTT    Comprehensive Metabolic Profile:   Recent Labs      08/17/18   0757  08/18/18   1014   NA  139  136   K  3.8  3.6*   CL  102  98   CO2  23  25   BUN  11  10   CREATININE  0.91  0.84   GLUCOSE  103*  105*   CALCIUM  8.4*  8.6      Magnesium:   Lab Results   Component Value Date    MG 2.0 03/14/2018     Phosphorus:   Lab Results   Component Value Date    PHOS 2.1 03/14/2018     Ionized Calcium:   Lab Results   Component Value Date    CAION 1.07 03/14/2018        Urinalysis:     Troponin: No results for input(s): TROPONINI in the last 72 hours.     Microbiology:    Cultures during this admission:     Blood cultures:                 [] None drawn      [] Negative             []  Positive (Details:  )  Urine Culture:                   [] None drawn      [] Negative             []  Positive (Details:  )  Sputum Culture:               [] None drawn       [] Negative             []  Positive (Details:  )   Endotracheal aspirate:     [] None drawn       [] Negative             []  Positive (Details:  )     Other pertinent Labs:       Radiology/Imaging:     Chest Xray (8/19/2018):    ASSESSMENT:     Patient Active Problem List    Diagnosis Date Noted    Sepsis due to urinary tract infection ([facility] 08/15/2018    Sepsis due to urinary tract infection ([facility] 08/15/2018    Benign prostatic hyperplasia with incomplete bladder emptying 04/19/2018    Recurrent UTI 03/13/2018    MARITA (acute kidney injury) ([facility] 03/13/2018    Chronic systolic CHF (congestive heart failure) ([facility] 03/13/2018    Mass of upper lobe of right lung 02/21/2018    Coronary artery disease involving native coronary artery of native heart 10/20/2016    Paroxysmal atrial fibrillation ([facility] 11/07/2013    Anxiety     Essential

## 2018-08-20 LAB
-: NORMAL
ABSOLUTE EOS #: 0.1 K/UL (ref 0–0.44)
ABSOLUTE IMMATURE GRANULOCYTE: 0.07 K/UL (ref 0–0.3)
ABSOLUTE LYMPH #: 0.95 K/UL (ref 1.1–3.7)
ABSOLUTE MONO #: 0.41 K/UL (ref 0.1–1.2)
ANION GAP SERPL CALCULATED.3IONS-SCNC: 10 MMOL/L (ref 9–17)
BASOPHILS # BLD: 1 % (ref 0–2)
BASOPHILS ABSOLUTE: 0.04 K/UL (ref 0–0.2)
BUN BLDV-MCNC: 12 MG/DL (ref 8–23)
BUN/CREAT BLD: ABNORMAL (ref 9–20)
CALCIUM SERPL-MCNC: 9 MG/DL (ref 8.6–10.4)
CHLORIDE BLD-SCNC: 101 MMOL/L (ref 98–107)
CO2: 24 MMOL/L (ref 20–31)
CREAT SERPL-MCNC: 0.84 MG/DL (ref 0.7–1.2)
DIFFERENTIAL TYPE: ABNORMAL
EOSINOPHILS RELATIVE PERCENT: 1 % (ref 1–4)
GFR AFRICAN AMERICAN: >60 ML/MIN
GFR NON-AFRICAN AMERICAN: >60 ML/MIN
GFR SERPL CREATININE-BSD FRML MDRD: ABNORMAL ML/MIN/{1.73_M2}
GFR SERPL CREATININE-BSD FRML MDRD: ABNORMAL ML/MIN/{1.73_M2}
GLUCOSE BLD-MCNC: 108 MG/DL (ref 70–99)
HCT VFR BLD CALC: 49 % (ref 40.7–50.3)
HEMOGLOBIN: 16.6 G/DL (ref 13–17)
IMMATURE GRANULOCYTES: 1 %
LYMPHOCYTES # BLD: 12 % (ref 24–43)
MCH RBC QN AUTO: 30.5 PG (ref 25.2–33.5)
MCHC RBC AUTO-ENTMCNC: 33.9 G/DL (ref 28.4–34.8)
MCV RBC AUTO: 90.1 FL (ref 82.6–102.9)
MONOCYTES # BLD: 5 % (ref 3–12)
NRBC AUTOMATED: 0 PER 100 WBC
PDW BLD-RTO: 14 % (ref 11.8–14.4)
PLATELET # BLD: 165 K/UL (ref 138–453)
PLATELET ESTIMATE: ABNORMAL
PMV BLD AUTO: 11 FL (ref 8.1–13.5)
POTASSIUM SERPL-SCNC: 3.9 MMOL/L (ref 3.7–5.3)
RBC # BLD: 5.44 M/UL (ref 4.21–5.77)
RBC # BLD: ABNORMAL 10*6/UL
REASON FOR REJECTION: NORMAL
SEG NEUTROPHILS: 80 % (ref 36–65)
SEGMENTED NEUTROPHILS ABSOLUTE COUNT: 6.23 K/UL (ref 1.5–8.1)
SODIUM BLD-SCNC: 135 MMOL/L (ref 135–144)
WBC # BLD: 7.8 K/UL (ref 3.5–11.3)
WBC # BLD: ABNORMAL 10*3/UL
ZZ NTE CLEAN UP: ORDERED TEST: NORMAL
ZZ NTE WITH NAME CLEAN UP: SPECIMEN SOURCE: NORMAL

## 2018-08-20 PROCEDURE — 6370000000 HC RX 637 (ALT 250 FOR IP): Performed by: EMERGENCY MEDICINE

## 2018-08-20 PROCEDURE — 80048 BASIC METABOLIC PNL TOTAL CA: CPT

## 2018-08-20 PROCEDURE — 85025 COMPLETE CBC W/AUTO DIFF WBC: CPT

## 2018-08-20 PROCEDURE — 99233 SBSQ HOSP IP/OBS HIGH 50: CPT | Performed by: INTERNAL MEDICINE

## 2018-08-20 PROCEDURE — 36430 TRANSFUSION BLD/BLD COMPNT: CPT

## 2018-08-20 PROCEDURE — 6360000002 HC RX W HCPCS: Performed by: STUDENT IN AN ORGANIZED HEALTH CARE EDUCATION/TRAINING PROGRAM

## 2018-08-20 PROCEDURE — 6370000000 HC RX 637 (ALT 250 FOR IP): Performed by: STUDENT IN AN ORGANIZED HEALTH CARE EDUCATION/TRAINING PROGRAM

## 2018-08-20 PROCEDURE — 94762 N-INVAS EAR/PLS OXIMTRY CONT: CPT

## 2018-08-20 PROCEDURE — 2580000003 HC RX 258: Performed by: STUDENT IN AN ORGANIZED HEALTH CARE EDUCATION/TRAINING PROGRAM

## 2018-08-20 PROCEDURE — 97110 THERAPEUTIC EXERCISES: CPT

## 2018-08-20 PROCEDURE — 36415 COLL VENOUS BLD VENIPUNCTURE: CPT

## 2018-08-20 PROCEDURE — 51798 US URINE CAPACITY MEASURE: CPT

## 2018-08-20 PROCEDURE — 1200000000 HC SEMI PRIVATE

## 2018-08-20 RX ORDER — TAMSULOSIN HYDROCHLORIDE 0.4 MG/1
0.4 CAPSULE ORAL DAILY
Status: DISCONTINUED | OUTPATIENT
Start: 2018-08-20 | End: 2018-08-21 | Stop reason: HOSPADM

## 2018-08-20 RX ORDER — LISINOPRIL 20 MG/1
40 TABLET ORAL DAILY
Status: DISCONTINUED | OUTPATIENT
Start: 2018-08-20 | End: 2018-08-21 | Stop reason: HOSPADM

## 2018-08-20 RX ORDER — HYDRALAZINE HYDROCHLORIDE 25 MG/1
25 TABLET, FILM COATED ORAL EVERY 6 HOURS PRN
Status: DISCONTINUED | OUTPATIENT
Start: 2018-08-20 | End: 2018-08-21 | Stop reason: HOSPADM

## 2018-08-20 RX ADMIN — HYDRALAZINE HYDROCHLORIDE 25 MG: 25 TABLET, FILM COATED ORAL at 16:14

## 2018-08-20 RX ADMIN — SODIUM CHLORIDE: 9 INJECTION, SOLUTION INTRAVENOUS at 09:28

## 2018-08-20 RX ADMIN — ENOXAPARIN SODIUM 40 MG: 40 INJECTION SUBCUTANEOUS at 08:13

## 2018-08-20 RX ADMIN — METOPROLOL TARTRATE 25 MG: 25 TABLET ORAL at 21:04

## 2018-08-20 RX ADMIN — CEFTRIAXONE SODIUM 1 G: 1 INJECTION, POWDER, FOR SOLUTION INTRAMUSCULAR; INTRAVENOUS at 09:29

## 2018-08-20 RX ADMIN — Medication 10 ML: at 09:28

## 2018-08-20 RX ADMIN — Medication 10 ML: at 21:06

## 2018-08-20 RX ADMIN — AMLODIPINE BESYLATE 10 MG: 10 TABLET ORAL at 08:14

## 2018-08-20 RX ADMIN — LISINOPRIL 40 MG: 20 TABLET ORAL at 08:13

## 2018-08-20 RX ADMIN — METOPROLOL TARTRATE 25 MG: 25 TABLET ORAL at 10:29

## 2018-08-20 RX ADMIN — AMIODARONE HYDROCHLORIDE 200 MG: 200 TABLET ORAL at 08:14

## 2018-08-20 RX ADMIN — HYDRALAZINE HYDROCHLORIDE 25 MG: 25 TABLET, FILM COATED ORAL at 10:29

## 2018-08-20 RX ADMIN — AMIODARONE HYDROCHLORIDE 200 MG: 200 TABLET ORAL at 21:05

## 2018-08-20 RX ADMIN — TAMSULOSIN HYDROCHLORIDE 0.4 MG: 0.4 CAPSULE ORAL at 13:17

## 2018-08-20 RX ADMIN — Medication 3 MG: at 21:04

## 2018-08-20 ASSESSMENT — PAIN DESCRIPTION - PAIN TYPE: TYPE: ACUTE PAIN

## 2018-08-20 ASSESSMENT — PAIN SCALES - GENERAL: PAINLEVEL_OUTOF10: 0

## 2018-08-20 NOTE — PROGRESS NOTES
Perfect serve sent to Internal Med intern on call regarding patients BP:186/98. Po hydralazine given. Will continue to monitor.

## 2018-08-20 NOTE — PROGRESS NOTES
Perfect serve sent to Dr Alba Richardson regarding patients HR in the mid 46s and giving lopressor. Held for now. All other blood pressure medications given.  Will continue to monitor

## 2018-08-20 NOTE — FLOWSHEET NOTE
DATE: 2018    NAME: Krishna Shah  MRN: [id number]   : 1934    Patient not seen this date for Physical Therapy due to:  [] Blood transfusion in progress  [] Hemodialysis  []  Patient Declined  [] Spine Precautions   [] Strict Bedrest  [] Surgery/ Procedure  [] Testing      [x] Other: hypertension, RN awaiting new order for BP medication, will check back in pm if time allows        [] PT being discontinued at this time. Patient independent. No further needs. [] PT being discontinued at this time as the patient has been transferred to palliative care. No further needs.     Britta Boggs, PTA

## 2018-08-20 NOTE — PROGRESS NOTES
Patient oreinted x4 but confused at time. Bed down in lowest position and locked. Call light within reach. Bed alarm on. Will continue to monitor.

## 2018-08-20 NOTE — PROGRESS NOTES
Perfect serve sent to Dr Lon Arevalo about BP still 176/90. Order to give hydralazine. None available via IV per pharmacy. Perfect serve sent to have this switched to oral. Awaiting orders.

## 2018-08-20 NOTE — PLAN OF CARE
Problem: Skin Integrity - Impaired:  Goal: Absence of new skin breakdown  Absence of new skin breakdown   Outcome: Ongoing      Problem: Falls - Risk of:  Goal: Will remain free from falls  Will remain free from falls   Outcome: Ongoing

## 2018-08-20 NOTE — PROGRESS NOTES
Spoke with Dr Teresa Martinez reguarding patients BP medications. Give as ordered as he has a high tolerance per Dr Teresa Martinez. Will continue to monitor.

## 2018-08-20 NOTE — PROGRESS NOTES
Patient bladder scanned per order q6. No straight cath needed at this time.  Will continue to monitor

## 2018-08-20 NOTE — FLOWSHEET NOTE
Report called to Pieter on [facility]. No questions at this time.  Electronically signed by Artemio Allen RN on 8/20/2018 at 12:47 AM

## 2018-08-20 NOTE — PROGRESS NOTES
[facility]  Occupational Therapy Not Seen Note    DATE: 2018  Name: Edith Williamson  : 1934  MRN: [id number]    Patient not available for Occupational Therapy due to:    [] Testing:    [] Hemodialysis    [] Blood Transfusion in Progress    []Refusal by Patient:    [] Surgery/Procedure:    [] Strict Bedrest    [] Sedation    [] Spine Precautions     [] Pt being transferred to palliative care at this time. Spoke with pt/family and OT services to be defered. [] Pt independent with functional mobility and functional tasks.  Pt with no OT acute care needs at this time, will defer OT eval.    [x] Other: Per RN Eloisa, pt hypertensive and awaiting BP medication, RN requests to hold therapy this AM    Next Scheduled Treatment: Will check back PM as able or 2018    Signature: PRESLEY Lemos/GUMARO

## 2018-08-20 NOTE — PROGRESS NOTES
Physical Therapy  Facility/Department: [facility]  Daily Treatment Note  NAME: Charito Goodwin  : 1934  MRN: [id number]    Date of Service: 2018    Discharge Recommendations:  Home with assist PRN        Patient Diagnosis(es): There were no encounter diagnoses. has a past medical history of Acute bronchitis; Allergic rhinitis; Anxiety; Cataract, right; Choroidal nevus of right eye; Chronic systolic CHF (congestive heart failure) ([address] Coronary artery disease involving native coronary artery of native heart; Dermatophytosis of nail; Diverticulosis; Elevated PSA; Hemorrhoids; History of measles; History of mumps; Hyperlipidemia; Hypertension; Mass of upper lobe of right lung; Occult blood positive stool; Osteoarthritis; Overweight; Paroxysmal atrial fibrillation ([address] Pneumonia due to organism; Pseudophakia, left eye; PSVT (paroxysmal supraventricular tachycardia) ([address] Recurrent UTI; Retinal detachment; Seborrheic dermatitis; and Urinary tract infection without hematuria. has a past surgical history that includes Coronary artery bypass graft; Appendectomy ( and ); Tonsillectomy; retinal laser (Left, 2011); Cataract removal (Left, 2013); Skin tag removal (1999); and Abscess Drainage ([id number]). Restrictions  Restrictions/Precautions  Restrictions/Precautions: Fall Risk  Required Braces or Orthoses?: No  Position Activity Restriction  Other position/activity restrictions: Up with assist  Subjective   General  Response To Previous Treatment: Patient with no complaints from previous session. Family / Caregiver Present: No  Subjective  Subjective: pt resting supine in bed and was pleasant upon arrival. pt eager to get up and go on a walk.  pt and RN agreeable to therapy this pm.  Pain Screening  Patient Currently in Pain: Denies  Vital Signs  Patient Currently in Pain: Denies       Orientation  Orientation  Overall Orientation Status: Within Normal Limits  Objective Bed mobility  Rolling to Left: Supervision  Rolling to Right: Supervision  Supine to Sit: Stand by assistance  Sit to Supine: Stand by assistance  Scooting: Contact guard assistance  Comment: required assistance to be boosted up to [facility]  Transfers  Sit to Stand: Stand by assistance  Stand to sit: Stand by assistance  Ambulation  Ambulation?: Yes  Ambulation 1  Surface: level tile  Device: No Device  Assistance: Contact guard assistance  Quality of Gait: pt at appropriate kirill, appropriate stride, and steady. Fast walker but safe ambulation  Distance: 600 ft     Balance  Posture: Good  Sitting - Static: Good  Sitting - Dynamic: Good  Standing - Static: Good  Standing - Dynamic: Good  Exercises  Hamstring Sets: x10  Quad Sets: x10  Heelslides: x10  Hip Abduction: x10  Knee Long Arc Quad: x10  Ankle Pumps: x10   AROM RLE (degrees)  RLE AROM: WFL  AROM LLE (degrees)  LLE AROM : WFL  AROM RUE (degrees)  RUE AROM : WFL  AROM LUE (degrees)  LUE AROM : WFL  Strength RLE  Strength RLE: WFL  Strength LLE  Strength LLE: WFL  Strength RUE  Strength RUE: WFL  Strength LUE  Strength LUE: WFL                 Assessment   Assessment: pt performed bed mobility supervision and transfers SBA. pt amb. 600 ft with no device CGA with good kirill and stride length and no LOB. Pt steady in ambulation. Pt safe to ambulate with RN or family in hallways.   Prognosis: Good  Decision Making: Medium Complexity  REQUIRES PT FOLLOW UP: Yes  Activity Tolerance  Activity Tolerance: Patient Tolerated treatment well     G-Code     OutComes Score     AM-PAC Score       Goals  Short term goals  Time Frame for Short term goals: 14 visits  Short term goal 1: Pt will ambulate 350ft with no unsteadiness noted  Short term goal 2: Pt will be independent with transfers  Short term goal 3: Pt will be independent with bed mobility  Short term goal 4: Pt will demonstrate good standing balance   Short term goal 5: Pt will tolerate 30 minutes of activity

## 2018-08-21 VITALS
BODY MASS INDEX: 28.7 KG/M2 | TEMPERATURE: 97.7 F | HEIGHT: 71 IN | HEART RATE: 69 BPM | SYSTOLIC BLOOD PRESSURE: 135 MMHG | WEIGHT: 205.03 LBS | OXYGEN SATURATION: 97 % | RESPIRATION RATE: 14 BRPM | DIASTOLIC BLOOD PRESSURE: 68 MMHG

## 2018-08-21 PROBLEM — A41.9 SEPSIS DUE TO URINARY TRACT INFECTION (HCC): Status: RESOLVED | Noted: 2018-08-15 | Resolved: 2018-08-21

## 2018-08-21 PROBLEM — N39.0 SEPSIS DUE TO URINARY TRACT INFECTION (HCC): Status: RESOLVED | Noted: 2018-08-15 | Resolved: 2018-08-21

## 2018-08-21 LAB
ABSOLUTE EOS #: 0.14 K/UL (ref 0–0.44)
ABSOLUTE IMMATURE GRANULOCYTE: 0.11 K/UL (ref 0–0.3)
ABSOLUTE LYMPH #: 1.08 K/UL (ref 1.1–3.7)
ABSOLUTE MONO #: 0.59 K/UL (ref 0.1–1.2)
ANION GAP SERPL CALCULATED.3IONS-SCNC: 14 MMOL/L (ref 9–17)
BASOPHILS # BLD: 1 % (ref 0–2)
BASOPHILS ABSOLUTE: 0.04 K/UL (ref 0–0.2)
BUN BLDV-MCNC: 14 MG/DL (ref 8–23)
BUN/CREAT BLD: ABNORMAL (ref 9–20)
CALCIUM SERPL-MCNC: 8.7 MG/DL (ref 8.6–10.4)
CHLORIDE BLD-SCNC: 98 MMOL/L (ref 98–107)
CO2: 27 MMOL/L (ref 20–31)
CREAT SERPL-MCNC: 0.99 MG/DL (ref 0.7–1.2)
DIFFERENTIAL TYPE: ABNORMAL
EOSINOPHILS RELATIVE PERCENT: 2 % (ref 1–4)
GFR AFRICAN AMERICAN: >60 ML/MIN
GFR NON-AFRICAN AMERICAN: >60 ML/MIN
GFR SERPL CREATININE-BSD FRML MDRD: ABNORMAL ML/MIN/{1.73_M2}
GFR SERPL CREATININE-BSD FRML MDRD: ABNORMAL ML/MIN/{1.73_M2}
GLUCOSE BLD-MCNC: 106 MG/DL (ref 70–99)
HCT VFR BLD CALC: 46 % (ref 40.7–50.3)
HEMOGLOBIN: 15.6 G/DL (ref 13–17)
IMMATURE GRANULOCYTES: 1 %
LYMPHOCYTES # BLD: 14 % (ref 24–43)
MCH RBC QN AUTO: 30.5 PG (ref 25.2–33.5)
MCHC RBC AUTO-ENTMCNC: 33.9 G/DL (ref 28.4–34.8)
MCV RBC AUTO: 90 FL (ref 82.6–102.9)
MONOCYTES # BLD: 8 % (ref 3–12)
NRBC AUTOMATED: 0 PER 100 WBC
PDW BLD-RTO: 13.7 % (ref 11.8–14.4)
PLATELET # BLD: 153 K/UL (ref 138–453)
PLATELET ESTIMATE: ABNORMAL
PMV BLD AUTO: 11.3 FL (ref 8.1–13.5)
POTASSIUM SERPL-SCNC: 3.8 MMOL/L (ref 3.7–5.3)
RBC # BLD: 5.11 M/UL (ref 4.21–5.77)
RBC # BLD: ABNORMAL 10*6/UL
SEG NEUTROPHILS: 74 % (ref 36–65)
SEGMENTED NEUTROPHILS ABSOLUTE COUNT: 5.7 K/UL (ref 1.5–8.1)
SODIUM BLD-SCNC: 139 MMOL/L (ref 135–144)
WBC # BLD: 7.7 K/UL (ref 3.5–11.3)
WBC # BLD: ABNORMAL 10*3/UL

## 2018-08-21 PROCEDURE — 6370000000 HC RX 637 (ALT 250 FOR IP): Performed by: STUDENT IN AN ORGANIZED HEALTH CARE EDUCATION/TRAINING PROGRAM

## 2018-08-21 PROCEDURE — 85025 COMPLETE CBC W/AUTO DIFF WBC: CPT

## 2018-08-21 PROCEDURE — 80048 BASIC METABOLIC PNL TOTAL CA: CPT

## 2018-08-21 PROCEDURE — 2580000003 HC RX 258: Performed by: STUDENT IN AN ORGANIZED HEALTH CARE EDUCATION/TRAINING PROGRAM

## 2018-08-21 PROCEDURE — 97116 GAIT TRAINING THERAPY: CPT

## 2018-08-21 PROCEDURE — 99233 SBSQ HOSP IP/OBS HIGH 50: CPT | Performed by: INTERNAL MEDICINE

## 2018-08-21 PROCEDURE — 6360000002 HC RX W HCPCS: Performed by: STUDENT IN AN ORGANIZED HEALTH CARE EDUCATION/TRAINING PROGRAM

## 2018-08-21 PROCEDURE — 36415 COLL VENOUS BLD VENIPUNCTURE: CPT

## 2018-08-21 PROCEDURE — 97530 THERAPEUTIC ACTIVITIES: CPT

## 2018-08-21 RX ORDER — ISOSORBIDE MONONITRATE 30 MG/1
30 TABLET, EXTENDED RELEASE ORAL DAILY
Qty: 30 TABLET | Refills: 3 | Status: SHIPPED | OUTPATIENT
Start: 2018-08-22

## 2018-08-21 RX ORDER — ISOSORBIDE MONONITRATE 30 MG/1
30 TABLET, EXTENDED RELEASE ORAL DAILY
Status: DISCONTINUED | OUTPATIENT
Start: 2018-08-21 | End: 2018-08-21 | Stop reason: HOSPADM

## 2018-08-21 RX ORDER — LISINOPRIL 40 MG/1
40 TABLET ORAL DAILY
Qty: 30 TABLET | Refills: 3 | Status: SHIPPED | OUTPATIENT
Start: 2018-08-22

## 2018-08-21 RX ORDER — AMLODIPINE BESYLATE 10 MG/1
10 TABLET ORAL DAILY
Qty: 30 TABLET | Refills: 3 | Status: SHIPPED | OUTPATIENT
Start: 2018-08-22 | End: 2021-05-20

## 2018-08-21 RX ORDER — NITROFURANTOIN 25; 75 MG/1; MG/1
100 CAPSULE ORAL 2 TIMES DAILY
Qty: 14 CAPSULE | Refills: 0 | Status: SHIPPED | OUTPATIENT
Start: 2018-08-21 | End: 2018-08-28

## 2018-08-21 RX ADMIN — AMIODARONE HYDROCHLORIDE 200 MG: 200 TABLET ORAL at 09:19

## 2018-08-21 RX ADMIN — METOPROLOL TARTRATE 25 MG: 25 TABLET ORAL at 09:19

## 2018-08-21 RX ADMIN — CEFTRIAXONE SODIUM 1 G: 1 INJECTION, POWDER, FOR SOLUTION INTRAMUSCULAR; INTRAVENOUS at 09:22

## 2018-08-21 RX ADMIN — AMLODIPINE BESYLATE 10 MG: 10 TABLET ORAL at 09:19

## 2018-08-21 RX ADMIN — LISINOPRIL 40 MG: 20 TABLET ORAL at 09:19

## 2018-08-21 RX ADMIN — ENOXAPARIN SODIUM 40 MG: 40 INJECTION SUBCUTANEOUS at 09:19

## 2018-08-21 RX ADMIN — TAMSULOSIN HYDROCHLORIDE 0.4 MG: 0.4 CAPSULE ORAL at 09:19

## 2018-08-21 RX ADMIN — ISOSORBIDE MONONITRATE 30 MG: 30 TABLET ORAL at 09:19

## 2018-08-21 NOTE — CARE COORDINATION
Discharge 1 [facility] Case Management Department  Written by: Myrtle Saint, RN    Patient Name: Sommer Encarnacion  Attending Provider: Cathy Lowery MD  Admit Date: 8/15/2018 11:04 PM  MRN: [id number]  Account: [de-identified]                     : 1934  Discharge Date: 18      Disposition: home to [address] [place] [address].   Eleonora Emanuel RN to fax 176 Vince Cabello RN

## 2018-08-21 NOTE — PROGRESS NOTES
Nona Resendez MD  Attending and Faculty Internal Medicine  [facility]  Internal Medicine [address] [place] [address], [facility]   8/21/18

## 2018-08-21 NOTE — PROGRESS NOTES
Strengthening, Transfer Training, Endurance Training, Gait Training, Balance Training, Functional Mobility Training, Cognitive/Perceptual Training, Stair training, Home Exercise Program, ADL/Self-care Training, Neuromuscular Re-education  Safety Devices  Type of devices: Call light within reach, Gait belt, Nurse notified, Chair alarm in place, Left in chair  Restraints  Initially in place: No     Therapy Time   Individual Concurrent Group Co-treatment   Time In 1014         Time Out 1038         Minutes 24                 Molly Nassar, PTA

## 2018-08-21 NOTE — PROGRESS NOTES
Patient continues to attempt to leave unit. Placed back in bed, bed alarm on. Frequent nursing rounds for safety.

## 2018-08-22 ENCOUNTER — CARE COORDINATION (OUTPATIENT)
Dept: CASE MANAGEMENT | Age: 83
End: 2018-08-22

## 2018-08-22 ENCOUNTER — TELEPHONE (OUTPATIENT)
Dept: PHARMACY | Age: 83
End: 2018-08-22

## 2018-08-22 DIAGNOSIS — I10 ESSENTIAL HYPERTENSION: Primary | ICD-10-CM

## 2018-08-22 LAB
CULTURE: NORMAL
CULTURE: NORMAL
Lab: NORMAL
Lab: NORMAL
SPECIMEN DESCRIPTION: NORMAL
SPECIMEN DESCRIPTION: NORMAL
STATUS: NORMAL
STATUS: NORMAL

## 2018-08-22 NOTE — TELEPHONE ENCOUNTER
Medication Management Service  [facility]  [phone]     CLINICAL PHARMACY NOTE:  Telephone Follow-up    Post-Discharge Transitions of Care [facility])    Patient discharged from [facility] on 8/21/18. Attempt made to reach patient by telephone for medication review. Upon further review, patient was discharged to SNF. Post-discharge ANN-MARIE medication review with patient not completed due to patient discharge to SNF. [address] [place] [address]  PharmD Candidate 2019 8/22/2018  11:40 AM     I have discussed the care of this patient with the student. I agree with the assessment and plan as documented by the student. Bonilla Perry, Pharm. D., [address] [facility] Medication Management Service  [phone]  8/22/2018  12:23 PM      CLINICAL PHARMACY NOTE   POST-DISCHARGE TELEPHONE FOLLOW-UP ADDENDUM    For Pharmacy Admin Tracking Only    TCM Call Made?: No  [facility] Select Patient?: Yes  Total # of Interventions Recommended: 0  Total # Interventions Accepted: 0  Intervention Severity:   - Level 1 Intervention Present?: No   - Level 2 #: 0   - Level 3 #: 0  Outreach Status: Not Completed - SNF/LTC/Hospice  Care Coordinator Outreach to Patient?: No  Provider Contacted?: No  Waiting on response from: n/a  Time Spent (min): 15    Additional Documentation:

## 2018-08-22 NOTE — CARE COORDINATION
[facility] Transitions Initial Follow Up Call    Call within 2 business days of discharge: Yes    Patient: Rubens Rodríguez Patient : 1934   MRN: <[id number]>  Reason for Admission: There are no discharge diagnoses documented for the most recent discharge. Discharge Date: 18 RARS: Readmission Risk Score: 22     Spoke with: Audi Pacheco, RN at [facility] 3: Cande Kim    Per Audi Pacheco at [facility], patient's transition home went well. He has everything he needs. He has his medications, he took xarelto this morning. Audi Pacheco stated that he is still a little weak but other than that doing ok. He is not having any issues, needs, or concerns to report per Audi Pacheco. All care needs met by facility, meds, meals, and transportation. No further CTC calls indicated. F/U appt scheduled.     Non-face-to-face services provided:  Obtained and reviewed discharge summary and/or continuity of care documents    Care Transitions 24 Hour Call    Care Transitions Interventions         Follow Up  Future Appointments  Date Time Provider Vanessa Aruna   2018 9:30 AM MD RIANNA Kern [facility]   2018 1:00 PM DAYANARA Gonzalez - CNP DINT [facility]   10/11/2018 1:15 PM MD RIANNA Jung [facility]   2018 1:00 PM MD Mikayla Sawyer [facility]   2018 2:40 PM MD JOCELYN Fuentes [facility]   2019 10:00 AM [facility] CT ROOM MD CT SCAN [facility] [place]   2019 9:30 AM MD MIKE PritchardM DP       Cecilia Martinez RN

## 2018-08-23 ENCOUNTER — OFFICE VISIT (OUTPATIENT)
Dept: CARDIOLOGY | Age: 83
End: 2018-08-23
Payer: MEDICARE

## 2018-08-23 VITALS
DIASTOLIC BLOOD PRESSURE: 60 MMHG | HEIGHT: 71 IN | SYSTOLIC BLOOD PRESSURE: 100 MMHG | HEART RATE: 69 BPM | WEIGHT: 197 LBS | BODY MASS INDEX: 27.58 KG/M2

## 2018-08-23 DIAGNOSIS — I48.0 PAF (PAROXYSMAL ATRIAL FIBRILLATION) (HCC): Primary | ICD-10-CM

## 2018-08-23 DIAGNOSIS — I10 ESSENTIAL HYPERTENSION: ICD-10-CM

## 2018-08-23 DIAGNOSIS — I25.10 CORONARY ARTERY DISEASE INVOLVING NATIVE CORONARY ARTERY OF NATIVE HEART WITHOUT ANGINA PECTORIS: ICD-10-CM

## 2018-08-23 PROCEDURE — 93000 ELECTROCARDIOGRAM COMPLETE: CPT | Performed by: INTERNAL MEDICINE

## 2018-08-23 PROCEDURE — 99213 OFFICE O/P EST LOW 20 MIN: CPT | Performed by: INTERNAL MEDICINE

## 2018-08-23 ASSESSMENT — ENCOUNTER SYMPTOMS
ABDOMINAL PAIN: 0
ABDOMINAL DISTENTION: 0
CHEST TIGHTNESS: 0
SHORTNESS OF BREATH: 0

## 2018-08-23 NOTE — PROGRESS NOTES
problem. Respiratory: Negative for chest tightness and shortness of breath. Gastrointestinal: Negative for abdominal distention and abdominal pain. Musculoskeletal: Negative for arthralgias. Neurological: Negative for dizziness and syncope. Psychiatric/Behavioral: Negative for agitation and behavioral problems. Physical Exam:  Ht 5' 11\" (1.803 m)   Wt 197 lb (89.4 kg)   BMI 27.48 kg/m²  100/60  Physical Exam   Constitutional: He is oriented to person, place, and time. He appears well-developed and well-nourished. HENT:   Head: Normocephalic and atraumatic. Neck: No JVD present. Cardiovascular: Normal rate and regular rhythm. No murmur heard. Pulmonary/Chest: He has no wheezes. He has no rales. Abdominal: There is no tenderness. There is no rebound and no guarding. Musculoskeletal: He exhibits no edema. Neurological: He is alert and oriented to person, place, and time. He has normal reflexes. Psychiatric: He has a normal mood and affect. His behavior is normal.       Cardiac Data:      Labs:     CBC:   Recent Labs      08/20/18   1238  08/21/18   0608   WBC  7.8  7.7   HGB  16.6  15.6   HCT  49.0  46.0   PLT  165  153     BMP:   Recent Labs      08/21/18   0608   NA  139   K  3.8   CO2  27   BUN  14   CREATININE  0.99   LABGLOM  >60   GLUCOSE  106*     PT/INR: No results for input(s): PROTIME, INR in the last 72 hours. FASTING LIPID PANEL:  Lab Results   Component Value Date    HDL 41 04/12/2018    TRIG 87 04/12/2018     LIVER PROFILE:No results for input(s): AST, ALT, LABALBU in the last 72 hours.     IMPRESSION:    Patient Active Problem List   Diagnosis    Essential hypertension    Hyperlipidemia    Osteoarthritis    Allergic rhinitis    Diverticulosis    Overweight    Anxiety    Atrial fibrillation ([facility]    Coronary artery disease involving native coronary artery of native heart    Mass of upper lobe of right lung    Recurrent UTI    MARITA (acute kidney injury)

## 2018-08-28 ENCOUNTER — TELEPHONE (OUTPATIENT)
Dept: INTERNAL MEDICINE | Age: 83
End: 2018-08-28

## 2018-08-28 NOTE — TELEPHONE ENCOUNTER
08-27-18  GP fax. Resident has 2+ piting edema bilateral feet and ankles. May we have an order for DAVID hose?

## 2018-09-04 ENCOUNTER — HOSPITAL ENCOUNTER (OUTPATIENT)
Dept: LAB | Age: 83
Setting detail: SPECIMEN
Discharge: HOME | End: 2018-09-04
Payer: MEDICARE

## 2018-09-04 ENCOUNTER — OFFICE VISIT (OUTPATIENT)
Dept: INTERNAL MEDICINE | Age: 83
End: 2018-09-04
Payer: MEDICARE

## 2018-09-04 VITALS
WEIGHT: 204 LBS | DIASTOLIC BLOOD PRESSURE: 60 MMHG | BODY MASS INDEX: 28.45 KG/M2 | SYSTOLIC BLOOD PRESSURE: 110 MMHG | TEMPERATURE: 96.4 F | HEART RATE: 64 BPM

## 2018-09-04 DIAGNOSIS — R31.9 HEMATURIA, UNSPECIFIED TYPE: Primary | ICD-10-CM

## 2018-09-04 DIAGNOSIS — M15.9 PRIMARY OSTEOARTHRITIS INVOLVING MULTIPLE JOINTS: ICD-10-CM

## 2018-09-04 DIAGNOSIS — R79.89 ELEVATED TSH: ICD-10-CM

## 2018-09-04 DIAGNOSIS — I10 ESSENTIAL HYPERTENSION: ICD-10-CM

## 2018-09-04 DIAGNOSIS — F41.9 ANXIETY: ICD-10-CM

## 2018-09-04 DIAGNOSIS — E03.9 HYPOTHYROIDISM, UNSPECIFIED TYPE: ICD-10-CM

## 2018-09-04 DIAGNOSIS — Z87.440 HISTORY OF UTI: ICD-10-CM

## 2018-09-04 DIAGNOSIS — I25.10 CORONARY ARTERY DISEASE INVOLVING NATIVE CORONARY ARTERY OF NATIVE HEART, ANGINA PRESENCE UNSPECIFIED: ICD-10-CM

## 2018-09-04 DIAGNOSIS — R41.3 MEMORY PROBLEM: ICD-10-CM

## 2018-09-04 DIAGNOSIS — E66.3 OVERWEIGHT: ICD-10-CM

## 2018-09-04 DIAGNOSIS — R31.9 HEMATURIA, UNSPECIFIED TYPE: ICD-10-CM

## 2018-09-04 DIAGNOSIS — I50.22 CHRONIC SYSTOLIC CHF (CONGESTIVE HEART FAILURE) (HCC): ICD-10-CM

## 2018-09-04 DIAGNOSIS — I48.20 CHRONIC ATRIAL FIBRILLATION (HCC): ICD-10-CM

## 2018-09-04 DIAGNOSIS — E78.2 MIXED HYPERLIPIDEMIA: ICD-10-CM

## 2018-09-04 LAB
-: ABNORMAL
AMORPHOUS: ABNORMAL
BACTERIA: ABNORMAL
BILIRUBIN URINE: NEGATIVE
CASTS UA: ABNORMAL /LPF (ref 0–2)
COLOR: NORMAL
COMMENT UA: NORMAL
CRYSTALS, UA: ABNORMAL /HPF
EPITHELIAL CELLS UA: ABNORMAL /HPF (ref 0–5)
GLUCOSE URINE: NEGATIVE
KETONES, URINE: NEGATIVE
LEUKOCYTE ESTERASE, URINE: NEGATIVE
MUCUS: ABNORMAL
NITRITE, URINE: NEGATIVE
OTHER OBSERVATIONS UA: ABNORMAL
PH UA: 5.5 (ref 5–6)
PROTEIN UA: NEGATIVE
RBC UA: ABNORMAL /HPF (ref 0–4)
RENAL EPITHELIAL, UA: ABNORMAL /HPF
SPECIFIC GRAVITY UA: 1.02 (ref 1.01–1.02)
THYROXINE, FREE: 0.85 NG/DL (ref 0.93–1.7)
TRICHOMONAS: ABNORMAL
TSH SERPL DL<=0.05 MIU/L-ACNC: 21.15 MIU/L (ref 0.3–5)
TURBIDITY: NORMAL
URINE HGB: NEGATIVE
UROBILINOGEN, URINE: NORMAL
WBC UA: ABNORMAL /HPF (ref 0–4)
YEAST: ABNORMAL

## 2018-09-04 PROCEDURE — G8417 CALC BMI ABV UP PARAM F/U: HCPCS | Performed by: NURSE PRACTITIONER

## 2018-09-04 PROCEDURE — 1036F TOBACCO NON-USER: CPT | Performed by: NURSE PRACTITIONER

## 2018-09-04 PROCEDURE — 84443 ASSAY THYROID STIM HORMONE: CPT

## 2018-09-04 PROCEDURE — G8427 DOCREV CUR MEDS BY ELIG CLIN: HCPCS | Performed by: NURSE PRACTITIONER

## 2018-09-04 PROCEDURE — 1111F DSCHRG MED/CURRENT MED MERGE: CPT | Performed by: NURSE PRACTITIONER

## 2018-09-04 PROCEDURE — 1101F PT FALLS ASSESS-DOCD LE1/YR: CPT | Performed by: NURSE PRACTITIONER

## 2018-09-04 PROCEDURE — 4040F PNEUMOC VAC/ADMIN/RCVD: CPT | Performed by: NURSE PRACTITIONER

## 2018-09-04 PROCEDURE — 36415 COLL VENOUS BLD VENIPUNCTURE: CPT

## 2018-09-04 PROCEDURE — 81001 URINALYSIS AUTO W/SCOPE: CPT

## 2018-09-04 PROCEDURE — 1123F ACP DISCUSS/DSCN MKR DOCD: CPT | Performed by: NURSE PRACTITIONER

## 2018-09-04 PROCEDURE — G8598 ASA/ANTIPLAT THER USED: HCPCS | Performed by: NURSE PRACTITIONER

## 2018-09-04 PROCEDURE — 87086 URINE CULTURE/COLONY COUNT: CPT

## 2018-09-04 PROCEDURE — 84439 ASSAY OF FREE THYROXINE: CPT

## 2018-09-04 PROCEDURE — 99214 OFFICE O/P EST MOD 30 MIN: CPT | Performed by: NURSE PRACTITIONER

## 2018-09-04 RX ORDER — POLYVINYL ALCOHOL 14 MG/ML
1 SOLUTION/ DROPS OPHTHALMIC EVERY 6 HOURS PRN
COMMUNITY

## 2018-09-04 RX ORDER — LEVOTHYROXINE SODIUM 0.03 MG/1
25 TABLET ORAL DAILY
Qty: 30 TABLET | Refills: 3 | Status: SHIPPED | OUTPATIENT
Start: 2018-09-04 | End: 2018-10-18 | Stop reason: DRUGHIGH

## 2018-09-04 ASSESSMENT — ENCOUNTER SYMPTOMS
DIARRHEA: 0
CHEST TIGHTNESS: 0
SHORTNESS OF BREATH: 0
SORE THROAT: 0
VOMITING: 0
NAUSEA: 0

## 2018-09-05 LAB
CULTURE: NORMAL
Lab: NORMAL
SPECIMEN DESCRIPTION: NORMAL
STATUS: NORMAL

## 2018-09-06 ENCOUNTER — TELEPHONE (OUTPATIENT)
Dept: CARDIOLOGY | Age: 83
End: 2018-09-06

## 2018-09-19 ENCOUNTER — TELEPHONE (OUTPATIENT)
Dept: OTHER | Facility: CLINIC | Age: 83
End: 2018-09-19

## 2018-09-19 ENCOUNTER — APPOINTMENT (OUTPATIENT)
Dept: CT IMAGING | Age: 83
End: 2018-09-19
Payer: MEDICARE

## 2018-09-19 ENCOUNTER — HOSPITAL ENCOUNTER (EMERGENCY)
Age: 83
Discharge: HOME | End: 2018-09-19
Attending: EMERGENCY MEDICINE
Payer: MEDICARE

## 2018-09-19 VITALS
BODY MASS INDEX: 28 KG/M2 | HEIGHT: 71 IN | HEART RATE: 56 BPM | DIASTOLIC BLOOD PRESSURE: 73 MMHG | SYSTOLIC BLOOD PRESSURE: 130 MMHG | WEIGHT: 200 LBS | TEMPERATURE: 96.6 F | OXYGEN SATURATION: 98 % | RESPIRATION RATE: 16 BRPM

## 2018-09-19 DIAGNOSIS — S16.1XXA CERVICAL STRAIN, ACUTE, INITIAL ENCOUNTER: ICD-10-CM

## 2018-09-19 DIAGNOSIS — S09.90XA INJURY OF HEAD, INITIAL ENCOUNTER: Primary | ICD-10-CM

## 2018-09-19 PROCEDURE — 72125 CT NECK SPINE W/O DYE: CPT

## 2018-09-19 PROCEDURE — 70450 CT HEAD/BRAIN W/O DYE: CPT

## 2018-09-19 PROCEDURE — 99284 EMERGENCY DEPT VISIT MOD MDM: CPT

## 2018-09-19 ASSESSMENT — ENCOUNTER SYMPTOMS
BACK PAIN: 0
NAUSEA: 0
ABDOMINAL PAIN: 0
SORE THROAT: 0
DIARRHEA: 0
TROUBLE SWALLOWING: 0
SHORTNESS OF BREATH: 0
WHEEZING: 0
VOMITING: 0
CONSTIPATION: 0
BLOOD IN STOOL: 0

## 2018-09-19 NOTE — ED NOTES
Patient sent from [facility] after fall yesterday. Nurse states was sitting on bench he thought it had a back and leaned back and fell unsure of surface he fell onto. Patient denies any injury. No redness, bruising, or abrasion noted to head.  Nurse at [facility] states Neuro checks WDL spoke with o/c doctor to give him an update and wanted patient sent in for evaluation     Rea Harrison RN  09/19/18 2698

## 2018-09-19 NOTE — ED PROVIDER NOTES
(ARTIFICIAL TEARS) 1.4 % OPHTHALMIC SOLUTION    Place 1 drop into both eyes every 6 hours as needed    POTASSIUM CHLORIDE (KLOR-CON M) 20 MEQ EXTENDED RELEASE TABLET    Take 20 mEq by mouth daily    RIVAROXABAN (XARELTO) 10 MG TABS TABLET    Take 2 tablets by mouth daily (with breakfast)    SIMVASTATIN (ZOCOR) 20 MG TABLET    TAKE 1 TABLET BY MOUTH EVERY OTHER DAY     TAMSULOSIN (FLOMAX) 0.4 MG CAPSULE    Take 1 capsule by mouth 2 times daily    TOLNAFTATE (TINACTIN) 1 % EXTERNAL SOLUTION    Apply topically nightly to toenails per Dr. Fatuma Jackson. ALLERGIES     is allergic to pcn [penicillins]; sulfa antibiotics; and cefdinir. FAMILY HISTORY     indicated that his mother is . He indicated that his father is . He indicated that his sister is . family history includes Diabetes in his mother; Osteoporosis in his mother; Other in his father and sister; Stroke in his sister. SOCIAL HISTORY      reports that he has never smoked. He has never used smokeless tobacco. He reports that he does not drink alcohol or use drugs. PHYSICAL EXAM     INITIAL VITALS:  height is 5' 11\" (1.803 m) and weight is 200 lb (90.7 kg). His tympanic temperature is 96.6 °F (35.9 °C). His blood pressure is 143/81 (abnormal) and his pulse is 60. His respiration is 16 and oxygen saturation is 97%. Physical Exam   Constitutional: He is oriented to person, place, and time. He appears well-developed and well-nourished. No distress. HENT:   Head: Normocephalic and atraumatic. Right Ear: External ear normal.   Left Ear: External ear normal.   Mouth/Throat: Oropharynx is clear and moist.   Eyes: Pupils are equal, round, and reactive to light. EOM are normal.   Neck: Normal range of motion. Neck supple. Assessment paraspinal tenderness in the lower neck no midline tenderness   Cardiovascular: Normal rate and regular rhythm. Pulmonary/Chest: Effort normal and breath sounds normal.   Abdominal: Soft.  Bowel sounds are normal.   Musculoskeletal: Normal range of motion. Neurological: He is alert and oriented to person, place, and time. Skin: Skin is warm and dry. He is not diaphoretic. Psychiatric: He has a normal mood and affect. His behavior is normal.       DIFFERENTIAL DIAGNOSIS/ MDM:     Chemical fall with neck and head pain on blood thinner neurologically intact currently we'll do a CT of the head and neck    DIAGNOSTIC RESULTS     EKG: All EKG's are interpreted by the Emergency Department Physician who either signs or Co-signs this chart in the absence of a cardiologist.        RADIOLOGY:   I directly visualized the following  images and reviewed the radiologist interpretations:     EXAMINATION:   CT OF THE HEAD WITHOUT CONTRAST; CT OF THE CERVICAL SPINE WITHOUT CONTRAST   9/19/2018 2:00 pm       TECHNIQUE:   CT of the head was performed without the administration of intravenous   contrast. Dose modulation, iterative reconstruction, and/or weight based   adjustment of the mA/kV was utilized to reduce the radiation dose to as low   as reasonably achievable.; CT of the cervical spine was performed without the   administration of intravenous contrast. Multiplanar reformatted images are   provided for review. Dose modulation, iterative reconstruction, and/or weight   based adjustment of the mA/kV was utilized to reduce the radiation dose to as   low as reasonably achievable.       COMPARISON:   None.       HISTORY:   ORDERING SYSTEM PROVIDED HISTORY: fall yesterday on blood thinners   TECHNOLOGIST PROVIDED HISTORY:       Ordering Physician Provided Reason for Exam: Patient sent from [facility]   after fall yesterday. Nurse states was sitting on bench he thought it had a   back and leaned back and fell unsure of surface he fell onto. Patient denies   any injury.  No redness, bruising, or abrasion noted to head.       FINDINGS:   CT HEAD:       BRAIN/VENTRICLES: There is no acute intracranial hemorrhage, mass data to display        EMERGENCY DEPARTMENT COURSE:   Vitals:    Vitals:    09/19/18 1333   BP: (!) 143/81   Pulse: 60   Resp: 16   Temp: 96.6 °F (35.9 °C)   TempSrc: Tympanic   SpO2: 97%   Weight: 200 lb (90.7 kg)   Height: 5' 11\" (1.803 m)     -------------------------  BP: (!) 143/81, Temp: 96.6 °F (35.9 °C), Pulse: 60, Resp: 16        Re-evaluation Notes    Patient with no complaints    CRITICAL CARE:   None        CONSULTS:      PROCEDURES:  None    FINAL IMPRESSION      1. Injury of head, initial encounter    2. Cervical strain, acute, initial encounter          DISPOSITION/PLAN   DISPOSITION Discharged    Condition on Disposition    Stable    PATIENT REFERRED TO:  Lizzierenetta GregoryDO aysha  [address] [place] [address] Familia Marteln  [phone]    Schedule an appointment as soon as possible for a visit in 3 days        DISCHARGE MEDICATIONS:  New Prescriptions    No medications on file       (Please note that portions of this note were completed with a voice recognition program.  Efforts were made to edit the dictations but occasionally words are mis-transcribed.)    Salinas MD, F.A.A.E.M.   Attending Emergency Physician                            Yordy Zamora MD  09/19/18 0949

## 2018-09-19 NOTE — ED NOTES
[facility] called again about transport states they were transporting another resident to an appt and will be here to get him asap.       Denis Boone RN  09/19/18 8414

## 2018-09-19 NOTE — ED NOTES
Report to Gina at [facility]. [facility] will send transport to pick him up.      Akbar Dash RN  09/19/18 9829

## 2018-09-21 ENCOUNTER — OFFICE VISIT (OUTPATIENT)
Dept: NEUROLOGY | Age: 83
End: 2018-09-21
Payer: MEDICARE

## 2018-09-21 VITALS
DIASTOLIC BLOOD PRESSURE: 76 MMHG | BODY MASS INDEX: 28.11 KG/M2 | WEIGHT: 200.8 LBS | SYSTOLIC BLOOD PRESSURE: 126 MMHG | HEIGHT: 71 IN | HEART RATE: 72 BPM

## 2018-09-21 DIAGNOSIS — I47.1 PSVT (PAROXYSMAL SUPRAVENTRICULAR TACHYCARDIA) (HCC): ICD-10-CM

## 2018-09-21 DIAGNOSIS — G63 POLYNEUROPATHY ASSOCIATED WITH UNDERLYING DISEASE (HCC): ICD-10-CM

## 2018-09-21 DIAGNOSIS — I48.91 ATRIAL FIBRILLATION, UNSPECIFIED TYPE (HCC): ICD-10-CM

## 2018-09-21 DIAGNOSIS — F41.9 ANXIETY: ICD-10-CM

## 2018-09-21 DIAGNOSIS — I67.82 CEREBRAL ISCHEMIA: ICD-10-CM

## 2018-09-21 DIAGNOSIS — E03.9 HYPOTHYROIDISM, UNSPECIFIED TYPE: ICD-10-CM

## 2018-09-21 DIAGNOSIS — I63.10 CEREBRAL INFARCTION DUE TO EMBOLISM OF PRECEREBRAL ARTERY (HCC): ICD-10-CM

## 2018-09-21 DIAGNOSIS — M15.9 PRIMARY OSTEOARTHRITIS INVOLVING MULTIPLE JOINTS: ICD-10-CM

## 2018-09-21 DIAGNOSIS — F02.80 DEMENTIA ASSOCIATED WITH OTHER UNDERLYING DISEASE WITHOUT BEHAVIORAL DISTURBANCE (HCC): Primary | ICD-10-CM

## 2018-09-21 DIAGNOSIS — I10 ESSENTIAL HYPERTENSION: ICD-10-CM

## 2018-09-21 DIAGNOSIS — Z91.81 RISK FOR FALLS: ICD-10-CM

## 2018-09-21 DIAGNOSIS — I25.10 CORONARY ARTERY DISEASE INVOLVING NATIVE CORONARY ARTERY OF NATIVE HEART WITHOUT ANGINA PECTORIS: ICD-10-CM

## 2018-09-21 DIAGNOSIS — Z91.89 AT HIGH RISK FOR STROKE: ICD-10-CM

## 2018-09-21 DIAGNOSIS — R41.3 MEMORY PROBLEM: ICD-10-CM

## 2018-09-21 DIAGNOSIS — H33.20 RETINAL DETACHMENT, UNSPECIFIED LATERALITY: ICD-10-CM

## 2018-09-21 DIAGNOSIS — R26.89 BALANCE PROBLEM: ICD-10-CM

## 2018-09-21 PROBLEM — I47.10 PSVT (PAROXYSMAL SUPRAVENTRICULAR TACHYCARDIA): Status: ACTIVE | Noted: 2018-09-21

## 2018-09-21 PROCEDURE — 1101F PT FALLS ASSESS-DOCD LE1/YR: CPT | Performed by: PSYCHIATRY & NEUROLOGY

## 2018-09-21 PROCEDURE — G8427 DOCREV CUR MEDS BY ELIG CLIN: HCPCS | Performed by: PSYCHIATRY & NEUROLOGY

## 2018-09-21 PROCEDURE — 99205 OFFICE O/P NEW HI 60 MIN: CPT | Performed by: PSYCHIATRY & NEUROLOGY

## 2018-09-21 PROCEDURE — G8417 CALC BMI ABV UP PARAM F/U: HCPCS | Performed by: PSYCHIATRY & NEUROLOGY

## 2018-09-21 ASSESSMENT — ENCOUNTER SYMPTOMS
EYE DISCHARGE: 0
CONSTIPATION: 0
VOICE CHANGE: 0
VISUAL CHANGE: 0
EYE PAIN: 0
ABDOMINAL DISTENTION: 0
COLOR CHANGE: 0
SINUS PRESSURE: 0
CHEST TIGHTNESS: 0
SHORTNESS OF BREATH: 0
SORE THROAT: 0
CHOKING: 0
BACK PAIN: 1
APNEA: 0
EYE REDNESS: 0
WHEEZING: 0
COUGH: 0
DIARRHEA: 0
ABDOMINAL PAIN: 0
FACIAL SWELLING: 0
BOWEL INCONTINENCE: 0
TROUBLE SWALLOWING: 0
NAUSEA: 0
EYE ITCHING: 0
BLOOD IN STOOL: 0
PHOTOPHOBIA: 0
VOMITING: 0

## 2018-09-21 NOTE — PROGRESS NOTES
HEAD INJURY  IN   SEPT. 2018                          HAD  CT  HEAD  AND  C  SPINE    ON   9/19/2018                                 SHOWED  NO  ACUTE  PATHOLOGY                    4)    PERIPHERAL  POLYNEUROPATHY                             5)    H/O  BALANCE PROBLEMS                               HIGH  RISK  FOR  FALLS                      6)   H/O    ATRIAL  FIBRILLATION                               ON    XARELTO                        7)   MULTIPLE  CARDIAC  CONDITIONS                                  BEING   FOLLOWED  BY  HIS  CARDIOLOGY                     8)   MULTIPLE  CO MORBID  MEDICAL  CONDITIONS                                  BEING  FOLLOWED  BY  HIS    PCP.                        9)    HIGH  RISK  FOR  STROKE,  CEREBRAL ISCHEMIA                        10)    PATIENT  IS  A  RESIDENT   OF  [facility]  LIVING  FACILITY                      PRECIPITATING  FACTORS: including  fever/infection, exertion/relaxation, position change, stress, weather change, medications/alcohol, time of day/darkness/light  Are    absent                                                             MODIFYING  FACTORS:  fever/infection, exertion/relaxation, position change, stress, weather change, medications/alcohol, time of day/darkness/light     Are  absent         Patient   Indicates   The  Presence   And  The  Absence  Of  The  Following  Associated  And   Additional  Neurological    Symptoms:                                Balance  And coordination problems  present           Gait problems     absent            Headaches      absent              Migraines           absent           Memory problems        present           Confusion        absent            Paresthesia numbness          absent           Seizures  And  Starring  Episodes           absent           Syncope,  Near  syncopal episodes         absent           Speech problems           absent             Swallowing  Problems      absent            Dizziness, Light headedness           absent                        Vertigo        absent             Generalized   Weakness    absent              focal  Weakness     absent             Tremors         absent              Sleep  Problems     absent             History  Of   Recent   Head  Injury     absent             History  Of   Recent  TIA     absent             History  Of   Recent    Stroke     absent             Neck  Pain and  Neck muscle  Spasms  absent             Radiating  down   And   Weakness           absent            Lower back   Pain  And     Spasms  absent            Radiating    Down   And   Weakness          absent                H/O   FALLS        absent               History  Of   Visual  Symptoms    absent                Associated   Diplopia       absent                                Also   Additional   Symptoms   Present    As  Documented    In   The detailed    Review  Of  Systems   And    Please   Refer   To    Them for   Additional  Information. Any components  That are either  Unobtainable  Or  Limited  In   HPI, ROS  And/or PFSH   Are   Due   To   Patient's  Medical  Problems,  Clinical  Condition and/or lack of other  Alternate resources.           RECORDS   REVIEWED:  historical medical records     INFORMATION   REVIEWED:     MEDICAL   HISTORY,     SURGICAL   HISTORY,   MEDICATIONS   LIST,   ALLERGIES AND  DRUG  INTOLERANCES,     FAMILY   HISTORY,  SOCIAL  HISTORY,    PROBLEM  LIST   FOR  PATIENT  CARE   COORDINATION    Past Medical History:   Diagnosis Date    Acute bronchitis     by history    Allergic rhinitis     Anxiety     Cataract, right     Choroidal nevus of right eye     Chronic systolic CHF (congestive heart failure) ([facility] [address] 3/13/2018    Coronary artery disease involving native coronary artery of native heart 10/20/2016    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 LIMA TO LAD, SVG TO DIAGONAL2    Dermatophytosis of nail 1/10/2014    Diverticulosis     Elevated PSA     Hemorrhoids  History of measles childhood    History of mumps childhood    Hyperlipidemia     Hypertension     Mass of upper lobe of right lung 2/21/2018    Occult blood positive stool     refuses colonoscopy    Osteoarthritis     Overweight     Paroxysmal atrial fibrillation ([facility] [address] 11/7/2013    Pneumonia due to organism     Pseudophakia, left eye     PSVT (paroxysmal supraventricular tachycardia) ([facility] [address]     Recurrent UTI 3/13/2018    Retinal detachment     left, history of     Seborrheic dermatitis     Urinary tract infection without hematuria 3/28/2018         Past Surgical History:   Procedure Laterality Date    ABSCESS DRAINAGE  [id number]    multiple    APPENDECTOMY  [address] and [facility] Left 5/7/2013    CORONARY ARTERY BYPASS GRAFT      RETINAL LASER Left 12/22/2011    detached retina    SKIN TAG REMOVAL  Nov 1999    TONSILLECTOMY           Current Outpatient Prescriptions   Medication Sig Dispense Refill    Multiple Vitamins-Minerals (PRESERVISION AREDS PO) Take by mouth daily      isosorbide mononitrate (IMDUR) 30 MG extended release tablet Take 1 tablet by mouth daily 30 tablet 3    lisinopril (PRINIVIL;ZESTRIL) 40 MG tablet Take 1 tablet by mouth daily 30 tablet 3    metoprolol tartrate (LOPRESSOR) 25 MG tablet Take 1 tablet by mouth 2 times daily 60 tablet 3    amLODIPine (NORVASC) 10 MG tablet Take 1 tablet by mouth daily 30 tablet 3    rivaroxaban (XARELTO) 10 MG TABS tablet Take 2 tablets by mouth daily (with breakfast) 30 tablet 2    Multiple Vitamins-Minerals (MULTIVITAMIN ADULT PO) Take 1 tablet by mouth daily      potassium chloride (KLOR-CON M) 20 MEQ extended release tablet Take 20 mEq by mouth daily      ALPRAZolam (XANAX) 0.25 MG tablet Take by mouth Give 1/2 tablet in morning and 1 tablet at bedtime .       tamsulosin (FLOMAX) 0.4 MG capsule Take 1 capsule by mouth 2 times daily 30 capsule     amiodarone (CORDARONE) 200 MG tablet Take 1 tablet by mouth 2 times daily (Patient taking differently: Take 200 mg by mouth daily ) 30 tablet 0    fluticasone (FLONASE) 50 MCG/ACT nasal spray 1 spray by Nasal route daily 1 Bottle 0    simvastatin (ZOCOR) 20 MG tablet TAKE 1 TABLET BY MOUTH EVERY OTHER DAY  30 tablet 11    Coenzyme Q-10 100 MG CAPS Take 100 mg by mouth every other day.  tolnaftate (TINACTIN) 1 % external solution Apply topically nightly to toenails per Dr. Tay Mckeon.  aspirin 81 MG tablet Take 81 mg by mouth daily.  GLUCOSAMINE-CHONDROITIN PO Take 3 tablets by mouth Daily.  polyvinyl alcohol (ARTIFICIAL TEARS) 1.4 % ophthalmic solution Place 1 drop into both eyes every 6 hours as needed      levothyroxine (SYNTHROID) 25 MCG tablet Take 1 tablet by mouth Daily 30 tablet 3    acetaminophen (TYLENOL) 500 MG tablet Take 500 mg by mouth every 6 hours as needed for Pain       No current facility-administered medications for this visit. Allergies   Allergen Reactions    Pcn [Penicillins] Swelling     As a child  Pt tolerated ceftriaxone , and keflex with no intolerance    Sulfa Antibiotics     Cefdinir Other (See Comments)     Pt tolerated ceftriaxone , and keflex with no intolerance         Family History   Problem Relation Age of Onset    Diabetes Mother     Osteoporosis Mother     Other Father         complications of prostate surgery (bleeding)    Stroke Sister     Other Sister         respiratory failure         Social History     Social History    Marital status: Single     Spouse name: N/A    Number of children: N/A    Years of education: N/A     Occupational History    Not on file.      Social History Main Topics    Smoking status: Never Smoker    Smokeless tobacco: Never Used      Comment: never smoked iwona rrt,2/27/2018    Alcohol use No    Drug use: No    Sexual activity: Not on file     Other Topics Concern    Not on file     Social History Narrative    No narrative on file       Vitals:    09/21/18 1103   BP: Respiratory: Negative for apnea, cough, choking, chest tightness, shortness of breath and wheezing. Cardiovascular: Negative for chest pain, palpitations, leg swelling and near-syncope. Gastrointestinal: Negative for abdominal distention, abdominal pain, blood in stool, bowel incontinence, constipation, diarrhea, nausea and vomiting. Endocrine: Negative for cold intolerance, heat intolerance, polydipsia, polyphagia and polyuria. Genitourinary: Negative for bladder incontinence. Musculoskeletal: Positive for back pain. Negative for arthralgias, gait problem, joint swelling, myalgias, neck pain and neck stiffness. Skin: Negative for color change, pallor, rash and wound. Allergic/Immunologic: Negative for environmental allergies, food allergies and immunocompromised state. Neurological: Positive for loss of balance. Negative for dizziness, vertigo, tremors, focal weakness, seizures, syncope, facial asymmetry, speech difficulty, weakness, light-headedness, numbness and headaches. Hematological: Negative for adenopathy. Does not bruise/bleed easily. Psychiatric/Behavioral: Positive for decreased concentration and memory loss. Negative for agitation, behavioral problems, confusion, dysphoric mood, hallucinations, self-injury, sleep disturbance and suicidal ideas. The patient is not nervous/anxious and is not hyperactive. OBJECTIVE:    Physical Exam   Constitutional: He appears well-developed and well-nourished. He is cooperative. HENT:   Head: Normocephalic and atraumatic. Head is without raccoon's eyes and without Rico's sign. Right Ear: External ear normal.   Left Ear: External ear normal.   Nose: Nose normal.   Mouth/Throat: Oropharynx is clear and moist.   Eyes: Conjunctivae are normal.   Neck: Normal range of motion. Neck supple. No muscular tenderness present. Carotid bruit is not present. No neck rigidity. No tracheal deviation and normal range of motion present.  No Brudzinski's Corneal  Reflexes         7 CN :  Normal  Facial  Symmetry  And  Strength. No facial  Weakness. 8 CN :  Hearing  Appears   DECREASED        9, 10 CN: Normal spontaneous, reflex palate movements       11 CN:   Normal  Shoulder shrug and  strength       12 CN :   Normal  Tongue movements and  Tongue  In midline                      No tongue   Fasciculations or atrophy     C) MOTOR  EXAM:                 Strength  In upper  And  Lower extremities   within normal limits             No  Drift. No  Atrophy             Rapid alternating  And  repetitions  Movements  within normal limits               Muscle  Tone  In upper  And  Lower  Extremities  normal              No rigidity. No  Spasticity. Bradykinesia   absent               No  Asterixis. Sustention  Tremor , Resting  Tremor   absent                  No other  Abnormal  Movements noted         D) SENSORY :             light touch, pinprick, position  And  Vibration  DECREASED                          IN   GLOVE  AND  STOCKING  MANNER    E) REFLEXES:                 Deep  Tendon  Reflexes     DECREASED                  No pathological  Reflexes  Bilaterally.                                 F) COORDINATION  AND  GAIT :                              Station and  Gait    SLOW                                     Romberg's test   POSITIVE                        Ataxia negative    ASSESSMENT:    Patient Active Problem List   Diagnosis    Essential hypertension    Hyperlipidemia    Osteoarthritis    Allergic rhinitis    Diverticulosis    Overweight    Anxiety    Atrial fibrillation ([address]    Coronary artery disease involving native coronary artery of native heart    Mass of upper lobe of right lung    Recurrent UTI    MARITA (acute kidney injury) ([address]    Chronic systolic CHF (congestive heart failure) (HCC)    Benign prostatic hyperplasia with incomplete bladder emptying    Sepsis due to urinary tract infection ([address]    Problems  Were discussed. * Results  Of  The  Previous  Diagnostic tests were reviewed and questions answered. patient  understand the same. Medical  Decision  Making  Was  Made  Based on the   Complexity  Of  Above  Mentioned  Diagnoses,    Data reviewed   & diagnostic  Tests  Reviewed,  Risk  Of  Significant   Co morbidities and complicating   Factors. Medical  Decision  Was   High  Complexity  Due   To  The  Patient's  Multiple  Symptoms,  Advancing   Disease,  Complex  Treatment  Regimen,  Multiple medications and   Risk  Of   Side  Effects,  Difficulty  In  Medication  Management  And  Diagnostic  Challenges   In  View  Of  The  Associated   Co  Morbid  Conditions   And  Problems. * FALL   PRECAUTIONS. THESE  REVIEWED   AND  DISCUSSED      *   BE  CAREFUL  WITH  ACTIVITIES        *  SUPERVISED   CARE      *   ADEQUATE   FLUID  INTAKE   AND  ELECTROLYTE  BALANCE         * KEEP  DAIRY  OF   THE  NEUROLOGICAL  SYMPTOMS        RECORDING THE    DURATION  AND  FREQUENCY. *  AVOID    CONDITIONS  AND  FACTORS   THAT  MAKE                  NEUROLOGICAL  SYMPTOMS  WORSE. *  TO  MAINTAIN  REGULAR  SLEEP  WAKE  CYCLES.      *   TO  HAVE  ADEQUATE  REST  AND   SLEEP    HOURS.      *    AVOID  ANY USAGE OF                 TOBACCO,  EXCESSIVE  ALCOHOL  AND   ILLEGAL   SUBSTANCES      *   Compliance   With  Medications   And  Instructions    *  May   Use  Pill  Box,    If  Needed        *    To  Continue  The    Antiplatelet  therapy    As   Recommended  Was   Discussed      *    Prophylactic  Use   Of     Vitamin   B   Complex,  Folic  Acid,    Vitamin  B12    Multivitamin,   Calcium  With  magnesium  And  Vit D    Supplementations   Over  The  Counter  Discussed       *  PATIENT  IS  ALSO   COUNSELED   TO  KEEP    ACTIVITIES:         A)   SIMPLE      B)  ORGANIZED      C)  WRITE   DOWN                 *  EVALUATIONS  AND  FOLLOW UP:    IF  PATIENT  IS WILLING                   * PHYSICAL  THERAPY   / OCCUPATIONAL THERAPY                                  * CARDIOLOGY                      *   IN  VIEW  OF  THE  PATIENT'S    MULTIPLE   NEUROLOGICAL                           SYMPTOMS  AND  CONCERNS    FOR  PROLONGED   DURATION,                           AND    MULTIPLE   CO MORBID  MEDICAL  CONDITIONS,                           PATIENT    NEEDS  NEURO  DIAGNOSTIC  EVALUATIONS                      FOR   ANY   NEUROLOGICAL  PATHOLOGIES    AND  OTHER                        CORRECTABLE   ETIOLOGIES;     AND                              PATIENT  REQUEST  THE  SAME. Orders Placed This Encounter   Procedures    US Transcranial Doppler Complete    US Carotid Artery Bilateral    Vitamin B12 & Folate    [facility] Occupational [facility]- [place]    EEG               *PATIENT   TO  FOLLOW  UP  WITH   PRIMARY  CARE            AND   OTHER  CONSULTANTS  AS  BEFORE. *  Maintain   Healthy  Life Style    With   Periodic  Monitoring  Of    Any  Medical  Conditions  Including   Elevated  Blood  Pressure,  Lipid  Profile,   Blood  Sugar levels  And   Heart  Disease. *   Period   Screening  For  Cancers  Involving  Breast,  Colon,  lungs  And  Other  Organs  As  Applicable,  In consultation   With  Your  Primary Care Providers. * Second  Neurological  Opinion  And  Evaluations  In  [facility]  Setting  If  Patient  Is  Interested. * Please   Contact   Neurology  Clinic   Early   If   Are  Any  New  Neurological                           Symptoms   And  Any neurological  Concerns.                 *  If  The  Patient remains  Neurologically  Stable   Return   To  [facility] Neurology Department   IN  1-2   MONTHS  TIME   FOR  FURTHER              FOLLOW UP.                 *  If   There is  Any  Significant  Worsening   Of  Current  Symptoms  And  Or  If patient Develops   Any additional  New  Neurological  Symptoms  Or  Significant  Concerns   Should  Call  911 or  Go  To  Emergency  Department  For  Further  Immediate  Evaluation. *   The  Neurological   Findings,  Possible  Diagnosis,  Differential diagnoses   And  Options  For    Further   Investigations   And  management   Are  Discussed  Comprehensively. Medications   And  Prescription   Risks  And  Side effects  Are   Also  Discussed. The  Above  Were  Reviewed  With  patient and   questions  Answered  In  Detail. More   Than   50% of face  To face Time   Was  Spent  On  Counseling   And   Coordination  Of  Care   Of   Patient's multiple   Neurological  Problems   And   Comorbid  Medical   Conditions. Electronically signed by Rodrigo Crow MD   Board Certified in  Neurology &  In  [facility] of Psychiatry and Neurology ([employer])      DISCLAIMER:   Although every effort was made to ensure the accuracy of this  electronic transcription, some errors in transcription may have occurred. GENERAL PATIENT INSTRUCTIONS:     A Healthy Lifestyle: Care Instructions  Your Care Instructions  A healthy lifestyle can help you feel good, stay at a healthy weight, and have plenty of energy for both work and play. A healthy lifestyle is something you can share with your whole family. A healthy lifestyle also can lower your risk for serious health problems, such as high blood pressure, heart disease, and diabetes. You can follow a few steps listed below to improve your health and the health of your family. Follow-up care is a key part of your treatment and safety. Be sure to make and go to all appointments, and call your doctor if you are having problems. Its also a good idea to know your test results and keep a list of the medicines you take. How can you care for yourself at home? Do not eat too much sugar, fat, or fast foods.  You can still quit.  Limit how much alcohol you drink. Moderate amounts of alcohol (up to 2 drinks a day for men, 1 drink a day for women) are okay. But drinking too much can lead to liver problems, high blood pressure, and other health problems. Family health  If you have a family, there are many things you can do together to improve your health. Eat meals together as a family as often as possible. Eat healthy foods. This includes fruits, vegetables, lean meats and dairy, and whole grains. Include your family in your fitness plan. Most people think of activities such as jogging or tennis as the way to fitness, but there are many ways you and your family can be more active. Anything that makes you breathe hard and gets your heart pumping is exercise. Here are some tips:  Walk to do errands or to take your child to school or the bus. Go for a family bike ride after dinner instead of watching TV. Where can you learn more? Go to https://[employer] org and sign in to your [employer] account. Enter Y435 in the [employer] box to learn more about \"A Healthy Lifestyle: Care Instructions. \"     If you do not have an account, please click on the \"Sign Up Now\" link. Current as of: July 26, 2016  Content Version: 11.2  © 5327-2667 .[employer]. Care instructions adapted under license by [facility] ([facility]). If you have questions about a medical condition or this instruction, always ask your healthcare professional. [place] [address] any warranty or liability for your use of this information.

## 2018-09-21 NOTE — PATIENT INSTRUCTIONS
[facility] NEUROLOGY    Due to the complex nature of our neurological testing, It is the policy of the Neurology Department not to release the results of your testing over the phone. Once all testing is completed and we have all the results back, Dr. Harper Finch will then personally go over all the results with you and answer any questions that you may have during a follow up appointment. If you are unable to keep this appointment, please notify the [address] [place] [address] @ [phone], as soon as possible. Please bring your prescription bottles to all appointments. * FALL   PRECAUTIONS. *  USE   WALKING  ASSISTANCE  DEVICES     QUAD  CANE       . * SUPERVISED  CARE        *   ADEQUATE   FLUID  INTAKE   AND  ELECTROLYTE  BALANCE           * KEEP  DAIRY  OF   THE  NEUROLOGICAL  SYMPTOMS        *  TO  MAINTAIN  REGULAR  SLEEP  WAKE  CYCLES. *   TO  HAVE  ADEQUATE  REST  AND   SLEEP    HOURS.    *    AVOID  USAGE OF            TOBACCO,  EXCESSIVE  ALCOHOL  AND   ILLEGAL   SUBSTANCES,  IF  ANY        *  Maintain   Healthy  Life Style    With   Periodic  Monitoring  Of    Any  Medical  Conditions  Including   Elevated  Blood  Pressure,  Lipid  Profile,   Blood  Sugar levels  And   Heart  Disease. *   Period   Screening  For  Cancers  Involving  Breast,  Colon,   lungs  And  Other  Organs  As  Applicable,  In consultation   With  Your  Primary Care Providers. *  If   There is  Any  Significant  Worsening   Of  Current  Symptoms  And  Or  If    Any additional  New  Neurological  Symptoms  Or  Significant  Concerns   Should  Call  911 or  Go  To  Emergency  Department  For  Further  Immediate  Evaluation.

## 2018-09-24 ENCOUNTER — HOSPITAL ENCOUNTER (OUTPATIENT)
Dept: INTERVENTIONAL RADIOLOGY/VASCULAR | Age: 83
Discharge: HOME | End: 2018-09-26
Payer: MEDICARE

## 2018-09-24 DIAGNOSIS — R26.89 BALANCE PROBLEM: ICD-10-CM

## 2018-09-24 DIAGNOSIS — I67.82 CEREBRAL ISCHEMIA: ICD-10-CM

## 2018-09-24 DIAGNOSIS — F02.80 DEMENTIA ASSOCIATED WITH OTHER UNDERLYING DISEASE WITHOUT BEHAVIORAL DISTURBANCE (HCC): ICD-10-CM

## 2018-09-24 PROCEDURE — 93880 EXTRACRANIAL BILAT STUDY: CPT

## 2018-09-27 ENCOUNTER — OFFICE VISIT (OUTPATIENT)
Dept: INTERNAL MEDICINE | Age: 83
End: 2018-09-27
Payer: MEDICARE

## 2018-09-27 ENCOUNTER — HOSPITAL ENCOUNTER (OUTPATIENT)
Age: 83
Setting detail: SPECIMEN
Discharge: HOME | End: 2018-09-27
Payer: MEDICARE

## 2018-09-27 VITALS
TEMPERATURE: 96.7 F | BODY MASS INDEX: 28.31 KG/M2 | HEART RATE: 80 BPM | SYSTOLIC BLOOD PRESSURE: 110 MMHG | DIASTOLIC BLOOD PRESSURE: 66 MMHG | WEIGHT: 203 LBS

## 2018-09-27 DIAGNOSIS — S09.90XD INJURY OF HEAD, SUBSEQUENT ENCOUNTER: Primary | ICD-10-CM

## 2018-09-27 DIAGNOSIS — R26.89 BALANCE PROBLEM: ICD-10-CM

## 2018-09-27 DIAGNOSIS — F02.80 DEMENTIA ASSOCIATED WITH OTHER UNDERLYING DISEASE WITHOUT BEHAVIORAL DISTURBANCE (HCC): ICD-10-CM

## 2018-09-27 DIAGNOSIS — I67.82 CEREBRAL ISCHEMIA: ICD-10-CM

## 2018-09-27 LAB
FOLATE: 19.1 NG/ML
VITAMIN B-12: 675 PG/ML (ref 232–1245)

## 2018-09-27 PROCEDURE — G8417 CALC BMI ABV UP PARAM F/U: HCPCS | Performed by: NURSE PRACTITIONER

## 2018-09-27 PROCEDURE — G8427 DOCREV CUR MEDS BY ELIG CLIN: HCPCS | Performed by: NURSE PRACTITIONER

## 2018-09-27 PROCEDURE — 36415 COLL VENOUS BLD VENIPUNCTURE: CPT

## 2018-09-27 PROCEDURE — G8598 ASA/ANTIPLAT THER USED: HCPCS | Performed by: NURSE PRACTITIONER

## 2018-09-27 PROCEDURE — 1036F TOBACCO NON-USER: CPT | Performed by: NURSE PRACTITIONER

## 2018-09-27 PROCEDURE — 99213 OFFICE O/P EST LOW 20 MIN: CPT | Performed by: NURSE PRACTITIONER

## 2018-09-27 PROCEDURE — 1101F PT FALLS ASSESS-DOCD LE1/YR: CPT | Performed by: NURSE PRACTITIONER

## 2018-09-27 PROCEDURE — 82746 ASSAY OF FOLIC ACID SERUM: CPT

## 2018-09-27 PROCEDURE — 82607 VITAMIN B-12: CPT

## 2018-09-27 PROCEDURE — 1123F ACP DISCUSS/DSCN MKR DOCD: CPT | Performed by: NURSE PRACTITIONER

## 2018-09-27 PROCEDURE — 4040F PNEUMOC VAC/ADMIN/RCVD: CPT | Performed by: NURSE PRACTITIONER

## 2018-09-27 ASSESSMENT — ENCOUNTER SYMPTOMS
DIARRHEA: 0
CHEST TIGHTNESS: 0
SHORTNESS OF BREATH: 0
NAUSEA: 0
VOMITING: 0

## 2018-09-27 NOTE — PROGRESS NOTES
[facility] Internal Medicine  [address] [place], [address] Sania Pickering  [phone]      Kem Lima is a 80 y.o. male who presents today for his medical conditions/complaints as noted below. Kem Lima is c/o of Follow-Up from [facility] to [facility] ER on 9/19/18 [place]  )      HPI:     HPI  Patient is here for ER follow up after a head injury due to a fall. Initial incident occurred 09/18/18, patient states he sat down and leaned back thinking there was a back on his seat, but fell over and struck his head. He fell onto a concrete surface. He denies loss of consciousness with the incident. He is currently taking xarelto for atrial fibrillation. This was not discontinued following his ER visit. Head CT and cervical spine CT showed no acute intracranial process or abnormalities. He has since followed up with neurology. He has not had any problems with headaches or dizziness since the incident. He has continues to exercise at the [id number] [place] [employer]. Denies any balance problems or gait issues.      Current Outpatient Prescriptions   Medication Sig Dispense Refill    polyvinyl alcohol (ARTIFICIAL TEARS) 1.4 % ophthalmic solution Place 1 drop into both eyes every 6 hours as needed      Multiple Vitamins-Minerals (PRESERVISION AREDS PO) Take by mouth daily      isosorbide mononitrate (IMDUR) 30 MG extended release tablet Take 1 tablet by mouth daily 30 tablet 3    lisinopril (PRINIVIL;ZESTRIL) 40 MG tablet Take 1 tablet by mouth daily 30 tablet 3    metoprolol tartrate (LOPRESSOR) 25 MG tablet Take 1 tablet by mouth 2 times daily 60 tablet 3    amLODIPine (NORVASC) 10 MG tablet Take 1 tablet by mouth daily 30 tablet 3    rivaroxaban (XARELTO) 10 MG TABS tablet Take 2 tablets by mouth daily (with breakfast) 30 tablet 2    Multiple Vitamins-Minerals (MULTIVITAMIN ADULT PO) Take 1 tablet by mouth daily      potassium chloride (KLOR-CON M) 20 MEQ extended release tablet and myalgias. Neurological: Negative for dizziness, syncope and headaches. Psychiatric/Behavioral: Negative for behavioral problems and confusion. Objective:     Vitals:    09/27/18 0836   BP: 110/66   Site: Right Upper Arm   Position: Sitting   Pulse: 80   Temp: 96.7 °F (35.9 °C)   TempSrc: Tympanic   Weight: 203 lb (92.1 kg)     Physical Exam   Constitutional: He appears well-developed and well-nourished. Cardiovascular: Normal rate and regular rhythm. Pulmonary/Chest: Effort normal and breath sounds normal.   Abdominal: Soft. There is no tenderness. Lymphadenopathy:     He has no cervical adenopathy. Neurological:   II: PERRLA  III, IV, V:  EOM intact, no nystagmus, no evidence of ptosis  V:  Light touch intact bilaterally  VII:  Muscle of facial expression intact  VIII:  Patient able to hear finger rub, no gait abnormalities  IX, X:  Palatal movement intact  XI:  No abnormalities noted in shoulder elevation  XII:  No abnormalities noted in tongue movement   Psychiatric: He has a normal mood and affect. His behavior is normal.   Nursing note and vitals reviewed. Assessment/Plan:        1. Injury of head, subsequent encounter  - Continue to follow with neurology        Return for continue to follow up with Dr Winston Mackay as scheduled. No orders of the defined types were placed in this encounter. No orders of the defined types were placed in this encounter. All patient questions answered. Pt voiced understanding.              Electronically signed by ADALID Caldwell on 9/27/2018 at 8:57 AM

## 2018-10-08 DIAGNOSIS — F41.9 ANXIETY: Primary | ICD-10-CM

## 2018-10-08 RX ORDER — ALPRAZOLAM 0.25 MG/1
TABLET ORAL
Qty: 30 TABLET | Refills: 5 | Status: SHIPPED | OUTPATIENT
Start: 2018-10-08 | End: 2018-12-06 | Stop reason: DRUGHIGH

## 2018-10-10 ENCOUNTER — HOSPITAL ENCOUNTER (OUTPATIENT)
Dept: NEUROLOGY | Age: 83
Discharge: HOME | End: 2018-10-10
Payer: MEDICARE

## 2018-10-10 DIAGNOSIS — I67.82 CEREBRAL ISCHEMIA: ICD-10-CM

## 2018-10-10 DIAGNOSIS — R26.89 BALANCE PROBLEM: ICD-10-CM

## 2018-10-10 DIAGNOSIS — F02.80 DEMENTIA ASSOCIATED WITH OTHER UNDERLYING DISEASE WITHOUT BEHAVIORAL DISTURBANCE (HCC): ICD-10-CM

## 2018-10-10 DIAGNOSIS — I63.10 CEREBRAL INFARCTION DUE TO EMBOLISM OF PRECEREBRAL ARTERY (HCC): ICD-10-CM

## 2018-10-10 PROCEDURE — 95957 EEG DIGITAL ANALYSIS: CPT

## 2018-10-10 PROCEDURE — 93886 INTRACRANIAL COMPLETE STUDY: CPT

## 2018-10-10 PROCEDURE — 95813 EEG EXTND MNTR 61-119 MIN: CPT

## 2018-10-10 PROCEDURE — 93892 TCD EMBOLI DETECT W/O INJ: CPT

## 2018-10-11 ENCOUNTER — OFFICE VISIT (OUTPATIENT)
Dept: CARDIOLOGY | Age: 83
End: 2018-10-11
Payer: MEDICARE

## 2018-10-11 VITALS
SYSTOLIC BLOOD PRESSURE: 122 MMHG | WEIGHT: 204 LBS | HEIGHT: 71 IN | HEART RATE: 60 BPM | DIASTOLIC BLOOD PRESSURE: 60 MMHG | BODY MASS INDEX: 28.56 KG/M2

## 2018-10-11 DIAGNOSIS — I48.0 PAROXYSMAL ATRIAL FIBRILLATION (HCC): Primary | ICD-10-CM

## 2018-10-11 PROCEDURE — 99213 OFFICE O/P EST LOW 20 MIN: CPT | Performed by: INTERNAL MEDICINE

## 2018-10-11 PROCEDURE — 93000 ELECTROCARDIOGRAM COMPLETE: CPT | Performed by: INTERNAL MEDICINE

## 2018-10-11 NOTE — PROGRESS NOTES
mononitrate (IMDUR) 30 MG extended release tablet Take 1 tablet by mouth daily 8/22/18  Yes April Bruno MD   lisinopril (PRINIVIL;ZESTRIL) 40 MG tablet Take 1 tablet by mouth daily 8/22/18  Yes April Bruno MD   metoprolol tartrate (LOPRESSOR) 25 MG tablet Take 1 tablet by mouth 2 times daily 8/21/18  Yes April Bruno MD   amLODIPine (NORVASC) 10 MG tablet Take 1 tablet by mouth daily 8/22/18  Yes April Bruno MD   rivaroxaban (XARELTO) 10 MG TABS tablet Take 2 tablets by mouth daily (with breakfast) 8/21/18  Yes April Bruno MD   Multiple Vitamins-Minerals (MULTIVITAMIN ADULT PO) Take 1 tablet by mouth daily   Yes Historical Provider, MD   potassium chloride (KLOR-CON M) 20 MEQ extended release tablet Take 20 mEq by mouth daily   Yes Historical Provider, MD   tamsulosin (FLOMAX) 0.4 MG capsule Take 1 capsule by mouth 2 times daily 3/7/18  Yes Matilde Badillo MD   amiodarone (CORDARONE) 200 MG tablet Take 1 tablet by mouth 2 times daily  Patient taking differently: Take 200 mg by mouth daily  3/1/18  Yes Eluterio Francesca   fluticasone [facility]) 50 MCG/ACT nasal spray 1 spray by Nasal route daily 2/1/18  Yes Severa Decree, APRN - CNP   simvastatin (ZOCOR) 20 MG tablet TAKE 1 TABLET BY MOUTH EVERY OTHER DAY  3/7/17  Yes Guillermo Cerda DO   acetaminophen (TYLENOL) 500 MG tablet Take 500 mg by mouth every 6 hours as needed for Pain   Yes Historical Provider, MD   Coenzyme Q-10 100 MG CAPS Take 100 mg by mouth every other day. Yes Historical Provider, MD   tolnaftate (TINACTIN) 1 % external solution Apply topically nightly to toenails per Dr. Aldo Goodrich. Yes Historical Provider, MD   aspirin 81 MG tablet Take 81 mg by mouth daily. Yes Historical Provider, MD   GLUCOSAMINE-CHONDROITIN PO Take 3 tablets by mouth Daily. Yes Historical Provider, MD       Allergies:  Pcn [penicillins]; Sulfa antibiotics; and Cefdinir    Social History:   reports that he has never smoked.  He has never used smokeless tobacco.

## 2018-10-12 NOTE — PROGRESS NOTES
Chronic Disease Visit Information    BP Readings from Last 3 Encounters:   08/23/18 100/60   08/21/18 135/68   08/15/18 (!) 118/59          Microalb/Crt. Ratio (mcg/mg creat)   Date Value   03/13/2018 42 (H)     LDL Cholesterol (mg/dL)   Date Value   04/12/2018 54     HDL (mg/dL)   Date Value   04/12/2018 41     BUN (mg/dL)   Date Value   08/21/2018 14     CREATININE (mg/dL)   Date Value   08/21/2018 0.99     Glucose (mg/dL)   Date Value   08/21/2018 106 (H)            Have you changed or started any medications since your last visit including any over-the-counter medicines, vitamins, or herbal medicines? no   Are you having any side effects from any of your medications? -  no  Have you stopped taking any of your medications? Is so, why? -  no    Have you seen any other physician or provider since your last visit? No  Have you had any other diagnostic tests since your last visit? No  Have you been seen in the emergency room and/or had an admission to a hospital since we last saw you? No  Have you had your annual diabetic retinal (eye) exam? No  Have you had your routine dental cleaning in the past 6 months? no    Have you activated your [email or web address] account? If not, what are your barriers?  No:      Patient Care Team:  Ean Tran DO as PCP - General (Internal Medicine)  Ean Tran DO as PCP - [facility] Attributed Provider  Lauren Ware MD as Consulting Physician (Cardiology)  Edil Damian DPM as Surgeon ([place])  Michel Hammer MD as Consulting Physician (Urology)  Laisha Cobb MD as Consulting Physician (Otolaryngology)  Josh Aguilera MD as Consulting Physician (Pulmonology)         Medical History Review  Past Medical, Family, and Social History reviewed and does contribute to the patient presenting condition    Health Maintenance   Topic Date Due    DTaP/Tdap/Td vaccine (1 - Tdap) [birth year]31/1953    Flu vaccine (1) 09/01/2018    TSH testing  04/12/2019    Potassium monitoring
Orders faxed to [facility] to have this completed
SPECIFY(EX-CATH,MIDSTREAM,CYSTO,ETC)? Answer:   clean catch    TSH With Reflex Ft4     Standing Status:   Future     Number of Occurrences:   1     Standing Expiration Date:   9/4/2019    Basic Metabolic Panel     Standing Status:   Future     Standing Expiration Date:   9/4/2019    TSH without Reflex     Standing Status:   Future     Standing Expiration Date:   9/4/2019    T4, Free     Standing Status:   Future     Standing Expiration Date:   1/9/2564   Parish Lozoya MD, Neurology [facility]     Referral Priority:   Routine     Referral Type:   Eval and Treat     Referral Reason:   Specialty Services Required     Referred to Provider:   Mauro Alfredo MD     Requested Specialty:   Neurology     Number of Visits Requested:   1     Orders Placed This Encounter   Medications    levothyroxine (SYNTHROID) 25 MCG tablet     Sig: Take 1 tablet by mouth Daily     Dispense:  30 tablet     Refill:  3       All patient questions answered. Pt voiced understanding.              Electronically signed by ADALID Mccormack on 9/5/2018 at 12:12 PM

## 2018-10-17 ENCOUNTER — TELEPHONE (OUTPATIENT)
Dept: INTERNAL MEDICINE | Age: 83
End: 2018-10-17

## 2018-10-18 ENCOUNTER — TELEPHONE (OUTPATIENT)
Dept: INTERNAL MEDICINE | Age: 83
End: 2018-10-18

## 2018-10-18 ENCOUNTER — HOSPITAL ENCOUNTER (OUTPATIENT)
Age: 83
Setting detail: SPECIMEN
Discharge: HOME | End: 2018-10-18
Payer: MEDICARE

## 2018-10-18 DIAGNOSIS — Z79.899 LONG TERM CURRENT USE OF AMIODARONE: ICD-10-CM

## 2018-10-18 DIAGNOSIS — I48.91 ATRIAL FIBRILLATION WITH RVR (HCC): ICD-10-CM

## 2018-10-18 LAB
THYROXINE, FREE: 0.67 NG/DL (ref 0.93–1.7)
TSH SERPL DL<=0.05 MIU/L-ACNC: 20.55 MIU/L (ref 0.3–5)

## 2018-10-18 PROCEDURE — 84439 ASSAY OF FREE THYROXINE: CPT

## 2018-10-18 PROCEDURE — 84443 ASSAY THYROID STIM HORMONE: CPT

## 2018-10-18 PROCEDURE — 36415 COLL VENOUS BLD VENIPUNCTURE: CPT

## 2018-10-18 RX ORDER — LEVOTHYROXINE SODIUM 0.05 MG/1
50 TABLET ORAL DAILY
Qty: 30 TABLET | Refills: 11 | Status: SHIPPED | OUTPATIENT
Start: 2018-10-18 | End: 2018-12-06 | Stop reason: SDUPTHER

## 2018-10-18 NOTE — TELEPHONE ENCOUNTER
----- Message from Suzi Prater DO sent at 10/18/2018 10:13 AM EDT -----  Thyroid hormone levels still low. Need to increase Synthroid dose.  What pharmacy should I send new prescription to?

## 2018-10-18 NOTE — TELEPHONE ENCOUNTER
Spoke with Annalise Card at [facility] uses [address]

## 2018-10-22 ENCOUNTER — TELEPHONE (OUTPATIENT)
Dept: INTERNAL MEDICINE | Age: 83
End: 2018-10-22

## 2018-10-22 ENCOUNTER — HOSPITAL ENCOUNTER (OUTPATIENT)
Age: 83
Setting detail: SPECIMEN
Discharge: HOME | End: 2018-10-22
Payer: MEDICARE

## 2018-10-22 LAB
-: ABNORMAL
AMORPHOUS: ABNORMAL
BACTERIA: ABNORMAL
BILIRUBIN URINE: NEGATIVE
CASTS UA: ABNORMAL /LPF (ref 0–2)
COLOR: ABNORMAL
COMMENT UA: ABNORMAL
CRYSTALS, UA: ABNORMAL /HPF
EPITHELIAL CELLS UA: ABNORMAL /HPF (ref 0–5)
GLUCOSE URINE: NEGATIVE
KETONES, URINE: NEGATIVE
LEUKOCYTE ESTERASE, URINE: ABNORMAL
MUCUS: ABNORMAL
NITRITE, URINE: POSITIVE
OTHER OBSERVATIONS UA: ABNORMAL
PH UA: 5.5 (ref 5–6)
PROTEIN UA: NEGATIVE
RBC UA: ABNORMAL /HPF (ref 0–4)
RENAL EPITHELIAL, UA: ABNORMAL /HPF
SPECIFIC GRAVITY UA: 1.01 (ref 1.01–1.02)
TRICHOMONAS: ABNORMAL
TURBIDITY: ABNORMAL
URINE HGB: ABNORMAL
UROBILINOGEN, URINE: NORMAL
WBC UA: >100 /HPF (ref 0–4)
YEAST: ABNORMAL

## 2018-10-22 PROCEDURE — 87088 URINE BACTERIA CULTURE: CPT

## 2018-10-22 PROCEDURE — 87086 URINE CULTURE/COLONY COUNT: CPT

## 2018-10-22 PROCEDURE — 87186 SC STD MICRODIL/AGAR DIL: CPT

## 2018-10-22 PROCEDURE — 81001 URINALYSIS AUTO W/SCOPE: CPT

## 2018-10-23 ENCOUNTER — OFFICE VISIT (OUTPATIENT)
Dept: INTERNAL MEDICINE | Age: 83
End: 2018-10-23
Payer: MEDICARE

## 2018-10-23 VITALS
DIASTOLIC BLOOD PRESSURE: 58 MMHG | WEIGHT: 202 LBS | SYSTOLIC BLOOD PRESSURE: 88 MMHG | BODY MASS INDEX: 28.17 KG/M2 | TEMPERATURE: 97.8 F | OXYGEN SATURATION: 96 % | HEART RATE: 58 BPM

## 2018-10-23 DIAGNOSIS — N30.00 ACUTE CYSTITIS WITHOUT HEMATURIA: Primary | ICD-10-CM

## 2018-10-23 DIAGNOSIS — N39.0 RECURRENT UTI: ICD-10-CM

## 2018-10-23 DIAGNOSIS — R97.20 ELEVATED PSA: ICD-10-CM

## 2018-10-23 PROCEDURE — G8484 FLU IMMUNIZE NO ADMIN: HCPCS | Performed by: NURSE PRACTITIONER

## 2018-10-23 PROCEDURE — 99213 OFFICE O/P EST LOW 20 MIN: CPT | Performed by: NURSE PRACTITIONER

## 2018-10-23 PROCEDURE — G8427 DOCREV CUR MEDS BY ELIG CLIN: HCPCS | Performed by: NURSE PRACTITIONER

## 2018-10-23 PROCEDURE — G8598 ASA/ANTIPLAT THER USED: HCPCS | Performed by: NURSE PRACTITIONER

## 2018-10-23 PROCEDURE — 1101F PT FALLS ASSESS-DOCD LE1/YR: CPT | Performed by: NURSE PRACTITIONER

## 2018-10-23 PROCEDURE — G8417 CALC BMI ABV UP PARAM F/U: HCPCS | Performed by: NURSE PRACTITIONER

## 2018-10-23 PROCEDURE — 1123F ACP DISCUSS/DSCN MKR DOCD: CPT | Performed by: NURSE PRACTITIONER

## 2018-10-23 PROCEDURE — 1036F TOBACCO NON-USER: CPT | Performed by: NURSE PRACTITIONER

## 2018-10-23 PROCEDURE — 4040F PNEUMOC VAC/ADMIN/RCVD: CPT | Performed by: NURSE PRACTITIONER

## 2018-10-23 RX ORDER — DOXYCYCLINE HYCLATE 50 MG/1
TABLET, DELAYED RELEASE ORAL
Qty: 22 TABLET | Refills: 0 | Status: SHIPPED | OUTPATIENT
Start: 2018-10-23 | End: 2018-11-20 | Stop reason: ALTCHOICE

## 2018-10-23 ASSESSMENT — ENCOUNTER SYMPTOMS
VOMITING: 0
SWOLLEN GLANDS: 0
SHORTNESS OF BREATH: 0
RESPIRATORY NEGATIVE: 1
ABDOMINAL PAIN: 0
NAUSEA: 0
SORE THROAT: 1
EYES NEGATIVE: 1
GASTROINTESTINAL NEGATIVE: 1
CHEST TIGHTNESS: 0
DIARRHEA: 0
ALLERGIC/IMMUNOLOGIC NEGATIVE: 1

## 2018-10-23 NOTE — PROGRESS NOTES
Subjective:      Patient ID: Kiesha Ledesma is a 80 y.o. male. Dysuria    This is a new problem. The current episode started in the past 7 days. The problem occurs every urination. The problem has been unchanged. The quality of the pain is described as burning. The pain is at a severity of 3/10. The pain is mild. There has been no fever. He is not sexually active. There is no history of pyelonephritis. Associated symptoms include chills (mild intolerance to cold), frequency and urgency. Pertinent negatives include no flank pain, hematuria or vomiting. He has tried nothing for the symptoms. The treatment provided no relief. His past medical history is significant for recurrent UTIs. Pharyngitis   This is a new problem. The current episode started 1 to 4 weeks ago. The problem occurs constantly. The problem has been gradually improving. Associated symptoms include chills (mild intolerance to cold) and a sore throat. Pertinent negatives include no abdominal pain, diaphoresis, fatigue, fever, headaches, neck pain, swollen glands, vomiting or weakness. The symptoms are aggravated by coughing. He has tried drinking and rest for the symptoms. The treatment provided mild relief. Review of Systems   Constitutional: Positive for chills (mild intolerance to cold). Negative for activity change, diaphoresis, fatigue and fever. HENT: Positive for sore throat. Eyes: Negative. Respiratory: Negative. Cardiovascular: Negative. Gastrointestinal: Negative. Negative for abdominal pain and vomiting. Endocrine: Negative. Genitourinary: Positive for decreased urine volume, dysuria, frequency and urgency. Negative for flank pain, hematuria, penile pain and scrotal swelling. Musculoskeletal: Negative. Negative for neck pain. Skin: Negative. Allergic/Immunologic: Negative. Neurological: Negative. Negative for weakness and headaches. Hematological: Negative. Psychiatric/Behavioral: Negative.
 Sulfa Antibiotics     Cefdinir Other (See Comments)     Pt tolerated ceftriaxone , and keflex with no intolerance       Health Maintenance   Topic Date Due    DTaP/Tdap/Td vaccine (1 - Tdap) 12/31/1953    Flu vaccine (1) 09/01/2018    Potassium monitoring  08/21/2019    Creatinine monitoring  08/21/2019    TSH testing  10/18/2019    Pneumococcal high/highest risk  Completed       Subjective:      Review of Systems   Constitutional: Negative for chills and fever. HENT: Positive for sore throat. Negative for congestion. Respiratory: Negative for chest tightness and shortness of breath. Cardiovascular: Negative for chest pain and leg swelling. Gastrointestinal: Negative for diarrhea, nausea and vomiting. Genitourinary: Positive for dysuria, frequency and urgency. Negative for difficulty urinating. Neurological: Negative for dizziness and headaches. Objective:     Vitals:    10/23/18 1409   BP: (!) 88/58   Pulse: 58   Temp: 97.8 °F (36.6 °C)   TempSrc: Tympanic   SpO2: 96%   Weight: 202 lb (91.6 kg)     Physical Exam   Constitutional: He appears well-developed and well-nourished. Cardiovascular: Normal rate and regular rhythm. Pulmonary/Chest: Effort normal and breath sounds normal.   Abdominal: Soft. There is no tenderness. There is no CVA tenderness. Musculoskeletal: He exhibits no edema. Lymphadenopathy:     He has no cervical adenopathy. Psychiatric: He has a normal mood and affect. His behavior is normal.   Nursing note and vitals reviewed. Assessment/Plan:            1. Acute cystitis without hematuria  2. Recurrent UTI  - Per result note, patient was to be started on doxycycline per orders from Dr. Tk Saucedo. Patient is unsure if he has started this, [facility] states orders were not received. Will start doxycycline per Dr. Aniceto Toro order:  50 mg 2 PO BID on 1st day then 1 PO BID x 9 days.   - Will refer to urology given recurrent UTI  - Doxycycline Hyclate 50 MG TBEC;

## 2018-10-24 LAB
CULTURE: ABNORMAL
Lab: ABNORMAL
ORGANISM: ABNORMAL
SPECIMEN DESCRIPTION: ABNORMAL
STATUS: ABNORMAL

## 2018-10-30 ENCOUNTER — HOSPITAL ENCOUNTER (OUTPATIENT)
Age: 83
Discharge: HOME | End: 2018-10-30
Payer: MEDICARE

## 2018-10-30 DIAGNOSIS — N39.0 RECURRENT UTI: Primary | ICD-10-CM

## 2018-10-30 DIAGNOSIS — N39.0 RECURRENT UTI: ICD-10-CM

## 2018-10-30 LAB
-: ABNORMAL
AMORPHOUS: ABNORMAL
BACTERIA: ABNORMAL
BILIRUBIN URINE: NEGATIVE
CASTS UA: ABNORMAL /LPF (ref 0–2)
COLOR: ABNORMAL
COMMENT UA: ABNORMAL
CRYSTALS, UA: ABNORMAL /HPF
EPITHELIAL CELLS UA: ABNORMAL /HPF (ref 0–5)
GLUCOSE URINE: NEGATIVE
KETONES, URINE: NEGATIVE
LEUKOCYTE ESTERASE, URINE: NEGATIVE
MUCUS: ABNORMAL
NITRITE, URINE: NEGATIVE
OTHER OBSERVATIONS UA: ABNORMAL
PH UA: 5 (ref 5–6)
PROTEIN UA: NEGATIVE
RBC UA: ABNORMAL /HPF (ref 0–4)
RENAL EPITHELIAL, UA: ABNORMAL /HPF
SPECIFIC GRAVITY UA: 1.03 (ref 1.01–1.02)
TRICHOMONAS: ABNORMAL
TURBIDITY: ABNORMAL
URINE HGB: NEGATIVE
UROBILINOGEN, URINE: NORMAL
WBC UA: ABNORMAL /HPF (ref 0–4)
YEAST: ABNORMAL

## 2018-10-30 PROCEDURE — 81001 URINALYSIS AUTO W/SCOPE: CPT

## 2018-11-05 ENCOUNTER — TELEPHONE (OUTPATIENT)
Dept: INTERNAL MEDICINE | Age: 83
End: 2018-11-05

## 2018-11-05 DIAGNOSIS — F41.9 ANXIETY: ICD-10-CM

## 2018-11-05 RX ORDER — ALPRAZOLAM 0.25 MG/1
TABLET ORAL
Qty: 30 TABLET | Refills: 5 | OUTPATIENT
Start: 2018-11-05 | End: 2019-04-05

## 2018-11-19 ENCOUNTER — HOSPITAL ENCOUNTER (OUTPATIENT)
Age: 83
Setting detail: SPECIMEN
Discharge: HOME | End: 2018-11-19
Payer: MEDICARE

## 2018-11-19 ENCOUNTER — TELEPHONE (OUTPATIENT)
Dept: INTERNAL MEDICINE | Age: 83
End: 2018-11-19

## 2018-11-19 LAB
-: ABNORMAL
AMORPHOUS: ABNORMAL
BACTERIA: ABNORMAL
BILIRUBIN URINE: NEGATIVE
CASTS UA: ABNORMAL /LPF (ref 0–2)
COLOR: ABNORMAL
COMMENT UA: ABNORMAL
CRYSTALS, UA: ABNORMAL /HPF
EPITHELIAL CELLS UA: ABNORMAL /HPF (ref 0–5)
GLUCOSE URINE: NEGATIVE
KETONES, URINE: NEGATIVE
LEUKOCYTE ESTERASE, URINE: ABNORMAL
MUCUS: ABNORMAL
NITRITE, URINE: POSITIVE
OTHER OBSERVATIONS UA: ABNORMAL
PH UA: 6 (ref 5–6)
PROTEIN UA: NEGATIVE
RBC UA: ABNORMAL /HPF (ref 0–4)
RENAL EPITHELIAL, UA: ABNORMAL /HPF
SPECIFIC GRAVITY UA: 1.01 (ref 1.01–1.02)
TRICHOMONAS: ABNORMAL
TURBIDITY: ABNORMAL
URINE HGB: NEGATIVE
UROBILINOGEN, URINE: NORMAL
WBC UA: >100 /HPF (ref 0–4)
YEAST: ABNORMAL

## 2018-11-19 PROCEDURE — 81001 URINALYSIS AUTO W/SCOPE: CPT

## 2018-11-19 PROCEDURE — 87086 URINE CULTURE/COLONY COUNT: CPT

## 2018-11-19 PROCEDURE — 87088 URINE BACTERIA CULTURE: CPT

## 2018-11-19 PROCEDURE — 87186 SC STD MICRODIL/AGAR DIL: CPT

## 2018-11-20 ENCOUNTER — TELEPHONE (OUTPATIENT)
Dept: INTERNAL MEDICINE | Age: 83
End: 2018-11-20
Payer: MEDICARE

## 2018-11-20 DIAGNOSIS — N39.0 RECURRENT UTI: Primary | ICD-10-CM

## 2018-11-20 DIAGNOSIS — N30.00 ACUTE CYSTITIS WITHOUT HEMATURIA: ICD-10-CM

## 2018-11-20 DIAGNOSIS — R30.0 DYSURIA: ICD-10-CM

## 2018-11-20 PROCEDURE — 51798 US URINE CAPACITY MEASURE: CPT | Performed by: NURSE PRACTITIONER

## 2018-11-20 RX ORDER — DOXYCYCLINE HYCLATE 100 MG
100 TABLET ORAL 2 TIMES DAILY
Qty: 14 TABLET | Refills: 0 | Status: SHIPPED | OUTPATIENT
Start: 2018-11-20 | End: 2018-11-27

## 2018-11-20 NOTE — TELEPHONE ENCOUNTER
Have rx doxycycline, culture pending  Have patient continue with urology referral. Will order PVR and renal US for further evaluation. Limited medication options due to patient allergies and amiodarone treatment. Will monitor for culture results.   Discussed with Dr. Corinna Koehler, agrees with plan of care

## 2018-11-21 ENCOUNTER — TELEPHONE (OUTPATIENT)
Dept: INTERNAL MEDICINE | Age: 83
End: 2018-11-21

## 2018-11-22 LAB
CULTURE: ABNORMAL
Lab: ABNORMAL
ORGANISM: ABNORMAL
SPECIMEN DESCRIPTION: ABNORMAL
STATUS: ABNORMAL

## 2018-11-27 ENCOUNTER — OFFICE VISIT (OUTPATIENT)
Dept: NEUROLOGY | Age: 83
End: 2018-11-27
Payer: MEDICARE

## 2018-11-27 VITALS
WEIGHT: 201.94 LBS | SYSTOLIC BLOOD PRESSURE: 118 MMHG | OXYGEN SATURATION: 95 % | DIASTOLIC BLOOD PRESSURE: 76 MMHG | HEIGHT: 71 IN | BODY MASS INDEX: 28.27 KG/M2 | HEART RATE: 63 BPM

## 2018-11-27 DIAGNOSIS — R26.89 BALANCE PROBLEM: ICD-10-CM

## 2018-11-27 DIAGNOSIS — F41.9 ANXIETY: ICD-10-CM

## 2018-11-27 DIAGNOSIS — I10 ESSENTIAL HYPERTENSION: ICD-10-CM

## 2018-11-27 DIAGNOSIS — R41.3 MEMORY PROBLEM: ICD-10-CM

## 2018-11-27 DIAGNOSIS — I47.1 PSVT (PAROXYSMAL SUPRAVENTRICULAR TACHYCARDIA) (HCC): ICD-10-CM

## 2018-11-27 DIAGNOSIS — Z91.89 AT HIGH RISK FOR STROKE: ICD-10-CM

## 2018-11-27 DIAGNOSIS — I67.82 CEREBRAL ISCHEMIA: ICD-10-CM

## 2018-11-27 DIAGNOSIS — I48.91 ATRIAL FIBRILLATION, UNSPECIFIED TYPE (HCC): ICD-10-CM

## 2018-11-27 DIAGNOSIS — F02.80 DEMENTIA ASSOCIATED WITH OTHER UNDERLYING DISEASE WITHOUT BEHAVIORAL DISTURBANCE (HCC): Primary | ICD-10-CM

## 2018-11-27 DIAGNOSIS — E03.9 HYPOTHYROIDISM, UNSPECIFIED TYPE: ICD-10-CM

## 2018-11-27 DIAGNOSIS — G63 POLYNEUROPATHY ASSOCIATED WITH UNDERLYING DISEASE (HCC): ICD-10-CM

## 2018-11-27 DIAGNOSIS — I25.10 CORONARY ARTERY DISEASE INVOLVING NATIVE CORONARY ARTERY OF NATIVE HEART WITHOUT ANGINA PECTORIS: ICD-10-CM

## 2018-11-27 PROCEDURE — 99214 OFFICE O/P EST MOD 30 MIN: CPT | Performed by: PSYCHIATRY & NEUROLOGY

## 2018-11-27 PROCEDURE — 1101F PT FALLS ASSESS-DOCD LE1/YR: CPT | Performed by: PSYCHIATRY & NEUROLOGY

## 2018-11-27 PROCEDURE — G8482 FLU IMMUNIZE ORDER/ADMIN: HCPCS | Performed by: PSYCHIATRY & NEUROLOGY

## 2018-11-27 PROCEDURE — 1036F TOBACCO NON-USER: CPT | Performed by: PSYCHIATRY & NEUROLOGY

## 2018-11-27 PROCEDURE — G8598 ASA/ANTIPLAT THER USED: HCPCS | Performed by: PSYCHIATRY & NEUROLOGY

## 2018-11-27 PROCEDURE — G8427 DOCREV CUR MEDS BY ELIG CLIN: HCPCS | Performed by: PSYCHIATRY & NEUROLOGY

## 2018-11-27 PROCEDURE — 1123F ACP DISCUSS/DSCN MKR DOCD: CPT | Performed by: PSYCHIATRY & NEUROLOGY

## 2018-11-27 PROCEDURE — 4040F PNEUMOC VAC/ADMIN/RCVD: CPT | Performed by: PSYCHIATRY & NEUROLOGY

## 2018-11-27 PROCEDURE — G8417 CALC BMI ABV UP PARAM F/U: HCPCS | Performed by: PSYCHIATRY & NEUROLOGY

## 2018-11-27 RX ORDER — MINERAL OIL
OIL (ML) MISCELLANEOUS PRN
COMMUNITY
End: 2018-12-06 | Stop reason: ALTCHOICE

## 2018-11-27 ASSESSMENT — ENCOUNTER SYMPTOMS
CONSTIPATION: 0
PHOTOPHOBIA: 0
EYE ITCHING: 0
VOMITING: 0
NAUSEA: 0
VOICE CHANGE: 0
APNEA: 0
EYE DISCHARGE: 0
DIARRHEA: 0
EYE REDNESS: 0
SHORTNESS OF BREATH: 0
BLOOD IN STOOL: 0
TROUBLE SWALLOWING: 0
CHEST TIGHTNESS: 0
VISUAL CHANGE: 0
SORE THROAT: 0
BOWEL INCONTINENCE: 0
COLOR CHANGE: 0
EYE PAIN: 0
ABDOMINAL DISTENTION: 0
WHEEZING: 0
FACIAL SWELLING: 0
CHOKING: 0
COUGH: 0
BACK PAIN: 1
SINUS PRESSURE: 0
ABDOMINAL PAIN: 0

## 2018-11-27 NOTE — PROGRESS NOTES
3)   H/O   FALL   WITH  MILD  HEAD INJURY  IN   SEPT. 2018                          HAD  CT  HEAD  AND  C  SPINE    ON   9/19/2018                                 SHOWED  NO  ACUTE  PATHOLOGY                      4)    PERIPHERAL  POLYNEUROPATHY                               5)    H/O  BALANCE PROBLEMS                               HIGH  RISK  FOR  FALLS                        6)   H/O    ATRIAL  FIBRILLATION                               ON    XARELTO                          7)   MULTIPLE  CARDIAC  CONDITIONS                                  BEING   FOLLOWED  BY  HIS  CARDIOLOGY                       8)   MULTIPLE  CO MORBID  MEDICAL  CONDITIONS                                  BEING  FOLLOWED  BY  HIS    PCP. 9)    HIGH  RISK  FOR  STROKE,  CEREBRAL ISCHEMIA                           10)     CT   HEAD  ,  CT  SPINE    CAROTID  DOPPLER,   EEG      IN    SEPT. 2018                                  SHOWED  NO  SIGNIFICANT    PATHOLOGIES                            11)    PATIENT  IS  A  RESIDENT   OF  SUPERVISED  LIVING  FACILITY                       12)      PATIENT  DENIES  ANY  NEW  NEUROLOGICAL   CONCERNS.                       PRECIPITATING  FACTORS: including  fever/infection, exertion/relaxation, position change, stress, weather change, medications/alcohol, time of day/darkness/light  Are    absent                                                             MODIFYING  FACTORS:  fever/infection, exertion/relaxation, position change, stress, weather change, medications/alcohol, time of day/darkness/light     Are  absent         Patient   Indicates   The  Presence   And  The  Absence  Of  The  Following  Associated  And   Additional  Neurological    Symptoms:                                Balance  And coordination problems  present           Gait problems     absent            Headaches      absent              Migraines           absent           Memory problems        present plaque is seen within the left internal carotid artery without   significant stenosis.       ICA/CCA ratio of 0.6           Impression   The right internal carotid artery demonstrates 0-50% stenosis.       The left internal carotid artery demonstrates 0-50% stenosis.       Bilateral vertebral arteries are patent with flow in the normal direction       CT OF THE HEAD WITHOUT CONTRAST; CT OF THE CERVICAL SPINE WITHOUT CONTRAST   9/19/2018 2:00 pm       TECHNIQUE:   CT of the head was performed without the administration of intravenous   contrast. Dose modulation, iterative reconstruction, and/or weight based   adjustment of the mA/kV was utilized to reduce the radiation dose to as low   as reasonably achievable.; CT of the cervical spine was performed without the   administration of intravenous contrast. Multiplanar reformatted images are   provided for review. Dose modulation, iterative reconstruction, and/or weight   based adjustment of the mA/kV was utilized to reduce the radiation dose to as   low as reasonably achievable.       COMPARISON:   None.       HISTORY:   ORDERING SYSTEM PROVIDED HISTORY: fall yesterday on blood thinners   TECHNOLOGIST PROVIDED HISTORY:       Ordering Physician Provided Reason for Exam: Patient sent from [facility]   after fall yesterday. Nurse states was sitting on bench he thought it had a   back and leaned back and fell unsure of surface he fell onto. Patient denies   any injury.  No redness, bruising, or abrasion noted to head.       FINDINGS:   CT HEAD:       BRAIN/VENTRICLES: There is no acute intracranial hemorrhage, mass effect or   midline shift.  No abnormal extra-axial fluid collection.  The gray-white   differentiation is maintained without evidence of an acute infarct.  There is   no evidence of hydrocephalus.       ORBITS: The visualized portion of the orbits demonstrate no acute abnormality.       SINUSES: The visualized paranasal sinuses and mastoid air cells demonstrate ask your healthcare professional. [place] [address] any warranty or liability for your use of this information.

## 2018-12-06 ENCOUNTER — HOSPITAL ENCOUNTER (OUTPATIENT)
Dept: LAB | Age: 83
Discharge: HOME | End: 2018-12-06
Payer: MEDICARE

## 2018-12-06 ENCOUNTER — OFFICE VISIT (OUTPATIENT)
Dept: INTERNAL MEDICINE | Age: 83
End: 2018-12-06
Payer: MEDICARE

## 2018-12-06 VITALS
DIASTOLIC BLOOD PRESSURE: 68 MMHG | SYSTOLIC BLOOD PRESSURE: 118 MMHG | HEART RATE: 66 BPM | WEIGHT: 210 LBS | BODY MASS INDEX: 29.4 KG/M2 | HEIGHT: 71 IN

## 2018-12-06 DIAGNOSIS — I10 ESSENTIAL HYPERTENSION: Primary | ICD-10-CM

## 2018-12-06 DIAGNOSIS — R39.14 BENIGN PROSTATIC HYPERPLASIA WITH INCOMPLETE BLADDER EMPTYING: ICD-10-CM

## 2018-12-06 DIAGNOSIS — I25.10 CORONARY ARTERY DISEASE INVOLVING NATIVE CORONARY ARTERY OF NATIVE HEART WITHOUT ANGINA PECTORIS: ICD-10-CM

## 2018-12-06 DIAGNOSIS — I48.0 PAROXYSMAL ATRIAL FIBRILLATION (HCC): ICD-10-CM

## 2018-12-06 DIAGNOSIS — I10 ESSENTIAL HYPERTENSION: ICD-10-CM

## 2018-12-06 DIAGNOSIS — E03.4 HYPOTHYROIDISM DUE TO ACQUIRED ATROPHY OF THYROID: ICD-10-CM

## 2018-12-06 DIAGNOSIS — N40.1 BENIGN PROSTATIC HYPERPLASIA WITH INCOMPLETE BLADDER EMPTYING: ICD-10-CM

## 2018-12-06 DIAGNOSIS — F02.80 DEMENTIA ASSOCIATED WITH OTHER UNDERLYING DISEASE WITHOUT BEHAVIORAL DISTURBANCE (HCC): ICD-10-CM

## 2018-12-06 DIAGNOSIS — R91.8 MASS OF UPPER LOBE OF RIGHT LUNG: ICD-10-CM

## 2018-12-06 DIAGNOSIS — M15.9 PRIMARY OSTEOARTHRITIS INVOLVING MULTIPLE JOINTS: ICD-10-CM

## 2018-12-06 DIAGNOSIS — E78.2 MIXED HYPERLIPIDEMIA: ICD-10-CM

## 2018-12-06 DIAGNOSIS — E03.9 HYPOTHYROIDISM, UNSPECIFIED TYPE: ICD-10-CM

## 2018-12-06 DIAGNOSIS — E66.3 OVERWEIGHT: ICD-10-CM

## 2018-12-06 DIAGNOSIS — F41.9 ANXIETY: ICD-10-CM

## 2018-12-06 DIAGNOSIS — I50.22 CHRONIC SYSTOLIC CHF (CONGESTIVE HEART FAILURE) (HCC): ICD-10-CM

## 2018-12-06 PROBLEM — H33.20 RETINAL DETACHMENT: Status: RESOLVED | Noted: 2018-09-21 | Resolved: 2018-12-06

## 2018-12-06 PROBLEM — R00.1 BRADYCARDIA: Status: RESOLVED | Noted: 2018-08-19 | Resolved: 2018-12-06

## 2018-12-06 PROBLEM — N39.0 SEPSIS DUE TO URINARY TRACT INFECTION (HCC): Status: RESOLVED | Noted: 2018-08-15 | Resolved: 2018-12-06

## 2018-12-06 PROBLEM — A41.9 SEPSIS DUE TO URINARY TRACT INFECTION (HCC): Status: RESOLVED | Noted: 2018-08-15 | Resolved: 2018-12-06

## 2018-12-06 PROBLEM — N17.9 AKI (ACUTE KIDNEY INJURY) (HCC): Status: RESOLVED | Noted: 2018-03-13 | Resolved: 2018-12-06

## 2018-12-06 LAB
ANION GAP SERPL CALCULATED.3IONS-SCNC: 11 MMOL/L (ref 9–17)
BUN BLDV-MCNC: 15 MG/DL (ref 8–23)
BUN/CREAT BLD: 13 (ref 9–20)
CALCIUM SERPL-MCNC: 9 MG/DL (ref 8.6–10.4)
CHLORIDE BLD-SCNC: 104 MMOL/L (ref 98–107)
CO2: 26 MMOL/L (ref 20–31)
CREAT SERPL-MCNC: 1.15 MG/DL (ref 0.7–1.2)
GFR AFRICAN AMERICAN: >60 ML/MIN
GFR NON-AFRICAN AMERICAN: >60 ML/MIN
GFR SERPL CREATININE-BSD FRML MDRD: ABNORMAL ML/MIN/{1.73_M2}
GFR SERPL CREATININE-BSD FRML MDRD: ABNORMAL ML/MIN/{1.73_M2}
GLUCOSE BLD-MCNC: 102 MG/DL (ref 70–99)
POTASSIUM SERPL-SCNC: 4.3 MMOL/L (ref 3.7–5.3)
SODIUM BLD-SCNC: 141 MMOL/L (ref 135–144)
THYROXINE, FREE: 1.12 NG/DL (ref 0.93–1.7)
TSH SERPL DL<=0.05 MIU/L-ACNC: 11.69 MIU/L (ref 0.3–5)

## 2018-12-06 PROCEDURE — 80048 BASIC METABOLIC PNL TOTAL CA: CPT

## 2018-12-06 PROCEDURE — 36415 COLL VENOUS BLD VENIPUNCTURE: CPT

## 2018-12-06 PROCEDURE — 1123F ACP DISCUSS/DSCN MKR DOCD: CPT | Performed by: INTERNAL MEDICINE

## 2018-12-06 PROCEDURE — G8417 CALC BMI ABV UP PARAM F/U: HCPCS | Performed by: INTERNAL MEDICINE

## 2018-12-06 PROCEDURE — G8598 ASA/ANTIPLAT THER USED: HCPCS | Performed by: INTERNAL MEDICINE

## 2018-12-06 PROCEDURE — 84439 ASSAY OF FREE THYROXINE: CPT

## 2018-12-06 PROCEDURE — 1036F TOBACCO NON-USER: CPT | Performed by: INTERNAL MEDICINE

## 2018-12-06 PROCEDURE — 84443 ASSAY THYROID STIM HORMONE: CPT

## 2018-12-06 PROCEDURE — G8427 DOCREV CUR MEDS BY ELIG CLIN: HCPCS | Performed by: INTERNAL MEDICINE

## 2018-12-06 PROCEDURE — 1101F PT FALLS ASSESS-DOCD LE1/YR: CPT | Performed by: INTERNAL MEDICINE

## 2018-12-06 PROCEDURE — 99214 OFFICE O/P EST MOD 30 MIN: CPT | Performed by: INTERNAL MEDICINE

## 2018-12-06 PROCEDURE — G8482 FLU IMMUNIZE ORDER/ADMIN: HCPCS | Performed by: INTERNAL MEDICINE

## 2018-12-06 PROCEDURE — 4040F PNEUMOC VAC/ADMIN/RCVD: CPT | Performed by: INTERNAL MEDICINE

## 2018-12-06 RX ORDER — ALPRAZOLAM 0.25 MG/1
TABLET ORAL
COMMUNITY
End: 2019-03-29 | Stop reason: SDUPTHER

## 2018-12-06 RX ORDER — LEVOTHYROXINE SODIUM 0.07 MG/1
75 TABLET ORAL DAILY
Qty: 30 TABLET | Refills: 11
Start: 2018-12-06 | End: 2019-01-31 | Stop reason: SDUPTHER

## 2018-12-06 RX ORDER — AMIODARONE HYDROCHLORIDE 200 MG/1
200 TABLET ORAL DAILY
COMMUNITY
End: 2019-06-13

## 2018-12-06 NOTE — PATIENT INSTRUCTIONS
Patient Education        Eating Healthy Foods: Care Instructions  Your Care Instructions    Eating healthy foods can help lower your risk for disease. Healthy food gives you energy and keeps your heart strong, your brain active, your muscles working, and your bones strong. A healthy diet includes a variety of foods from the basic food groups: grains, vegetables, fruits, milk and milk products, and meat and beans. Some people may eat more of their favorite foods from only one food group and, as a result, miss getting the nutrients they need. So, it is important to pay attention not only to what you eat but also to what you are missing from your diet. You can eat a healthy, balanced diet by making a few small changes. Follow-up care is a key part of your treatment and safety. Be sure to make and go to all appointments, and call your doctor if you are having problems. It's also a good idea to know your test results and keep a list of the medicines you take. How can you care for yourself at home? Look at what you eat  · Keep a food diary for a week or two and record everything you eat or drink. Track the number of servings you eat from each food group. · For a balanced diet every day, eat a variety of:  ? 6 or more ounce-equivalents of grains, such as cereals, breads, crackers, rice, or pasta, every day. An ounce-equivalent is 1 slice of bread, 1 cup of ready-to-eat cereal, or ½ cup of cooked rice, cooked pasta, or cooked cereal.  ? 2½ cups of vegetables, especially:  § Dark-green vegetables such as broccoli and spinach. § Orange vegetables such as carrots and sweet potatoes. § Dry beans (such as casillas and kidney beans) and peas (such as lentils). ? 2 cups of fresh, frozen, or canned fruit. A small apple or 1 banana or orange equals 1 cup. ? 3 cups of nonfat or low-fat milk, yogurt, or other milk products. ? 5½ ounces of meat and beans, such as chicken, fish, lean meat, beans, nuts, and seeds.  One egg, 1 tablespoon of peanut butter, ½ ounce nuts or seeds, or ¼ cup of cooked beans equals 1 ounce of meat. · Learn how to read food labels for serving sizes and ingredients. Fast-food and convenience-food meals often contain few or no fruits or vegetables. Make sure you eat some fruits and vegetables to make the meal more nutritious. · Look at your food diary. For each food group, add up what you have eaten and then divide the total by the number of days. This will give you an idea of how much you are eating from each food group. See if you can find some ways to change your diet to make it more healthy. Start small  · Do not try to make dramatic changes to your diet all at once. You might feel that you are missing out on your favorite foods and then be more likely to fail. · Start slowly, and gradually change your habits. Try some of the following:  ? Use whole wheat bread instead of white bread. ? Use nonfat or low-fat milk instead of whole milk. ? Eat brown rice instead of white rice, and eat whole wheat pasta instead of white-flour pasta. ? Try low-fat cheeses and low-fat yogurt. ? Add more fruits and vegetables to meals and have them for snacks. ? Add lettuce, tomato, cucumber, and onion to sandwiches. ? Add fruit to yogurt and cereal.  Enjoy food  · You can still eat your favorite foods. You just may need to eat less of them. If your favorite foods are high in fat, salt, and sugar, limit how often you eat them, but do not cut them out entirely. · Eat a wide variety of foods. Make healthy choices when eating out  · The type of restaurant you choose can help you make healthy choices. Even fast-food chains are now offering more low-fat or healthier choices on the menu. · Choose smaller portions, or take half of your meal home. · When eating out, try:  ? A veggie pizza with a whole wheat crust or grilled chicken (instead of sausage or pepperoni).   ? Pasta with roasted vegetables, grilled chicken, or safe  · Know your surroundings. Walk in a well-lighted, safe place. If it is dark, walk with a partner. Wear light-colored clothing. If you can, buy a vest or jacket that reflects light. · Carry a cell phone for emergencies. · Drink plenty of water. Take a water bottle with you when you walk. This is very important if it is hot out. · Be careful not to slip on wet or icy ground. You can buy \"grippers\" for your shoes to help keep you from slipping. · Pay attention to your walking surface. Use sidewalks and paths. · If you have breathing problems like asthma or COPD, ask your doctor when it is safe for you to walk outdoors. Cold, dry air, smog, pollen, or other things in the air could cause breathing problems. Where can you learn more? Go to https://Pareto Networkspejameseweb.Remind. org and sign in to your [employer] account. Enter R159 in the Exit Games box to learn more about \"Walking for Exercise: Care Instructions. \"     If you do not have an account, please click on the \"Sign Up Now\" link. Current as of: December 7, 2017  Content Version: 11.8  © 9735-9112 Healthwise, Incorporated. Care instructions adapted under license by [facility] ([facility]). If you have questions about a medical condition or this instruction, always ask your healthcare professional. [place] [address] any warranty or liability for your use of this information.

## 2018-12-06 NOTE — PROGRESS NOTES
DR. Vish Anthony - PROGRESS NOTE    CHIEF COMPLAINT/HISTORY OF CHIEF COMPLAINT: This 80 y.o.  male, resident of [facility], comes in today for ongoing evaluation and management of his hypertension, hyperlipidemia, hypothyroidism, coronary artery disease, congestive heart failure, paroxysmal atrial fibrillation, paroxysmal supraventricular tachycardia, anxiety, osteoarthritis, dementia, and being overweight. Over the summer he was referred to Dr. Shereen Gordon for memory issues and was diagnosed with dementia. He declined to be put on medication for dementia. He has continued to follow with Dr. 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 He sees Dr. Pierre Carrero for benign prostatic hypertrophy, for which he is on Flomax. He is taking Norvasc, Imdur, Lopressor, amiodarone, lisinopril, Xarelto, and aspirin for hypertension, coronary artery disease, congestive heart failure, and paroxysmal atrial fibrillation. He sees the cardiologists here for the atrial fibrillation and CHF as well. He denies any chest pain, dizziness, or palpitations. He is on simvastatin for hyperlipidemia. He is not having any muscle aches from the simvastatin. He takes Xanax for anxiety. He uses glucosamine/chondroitin sulfate for arthritis, which has been stable. He is on Synthroid for hypothyroidism. He denies any excessive fatigue or temperature intolerance. He just had his thyroid levels rechecked today.  Otherwise he seems to be doing fairly well and denies any other complaints.        ALLERGIES/INTOLERANCES:   Allergies   Allergen Reactions    Pcn [Penicillins] Swelling     As a child  Pt tolerated ceftriaxone , and keflex with no intolerance    Sulfa Antibiotics     Cefdinir Other (See Comments)     Pt tolerated ceftriaxone , and keflex with no intolerance       MEDICATIONS:   Outpatient Prescriptions Marked as Taking for the 12/6/18 encounter (Office

## 2018-12-07 ENCOUNTER — HOSPITAL ENCOUNTER (OUTPATIENT)
Age: 83
Setting detail: SPECIMEN
Discharge: HOME | End: 2018-12-07
Payer: MEDICARE

## 2018-12-07 ENCOUNTER — TELEPHONE (OUTPATIENT)
Dept: INTERNAL MEDICINE | Age: 83
End: 2018-12-07

## 2018-12-07 ENCOUNTER — OFFICE VISIT (OUTPATIENT)
Dept: OPHTHALMOLOGY | Age: 83
End: 2018-12-07
Payer: MEDICARE

## 2018-12-07 DIAGNOSIS — H25.811 COMBINED FORMS OF AGE-RELATED CATARACT OF RIGHT EYE: ICD-10-CM

## 2018-12-07 DIAGNOSIS — Z96.1 PSEUDOPHAKIA OF LEFT EYE: ICD-10-CM

## 2018-12-07 DIAGNOSIS — H35.3132 INTERMEDIATE STAGE NONEXUDATIVE AGE-RELATED MACULAR DEGENERATION OF BOTH EYES: Primary | ICD-10-CM

## 2018-12-07 DIAGNOSIS — F02.80 DEMENTIA ASSOCIATED WITH OTHER UNDERLYING DISEASE WITHOUT BEHAVIORAL DISTURBANCE (HCC): ICD-10-CM

## 2018-12-07 LAB
-: ABNORMAL
AMORPHOUS: ABNORMAL
BACTERIA: ABNORMAL
BILIRUBIN URINE: NEGATIVE
CASTS UA: ABNORMAL /LPF (ref 0–2)
COLOR: ABNORMAL
COMMENT UA: ABNORMAL
CRYSTALS, UA: ABNORMAL /HPF
EPITHELIAL CELLS UA: ABNORMAL /HPF (ref 0–5)
GLUCOSE URINE: NEGATIVE
KETONES, URINE: NEGATIVE
LEUKOCYTE ESTERASE, URINE: ABNORMAL
MUCUS: ABNORMAL
NITRITE, URINE: NEGATIVE
OTHER OBSERVATIONS UA: ABNORMAL
PH UA: 5.5 (ref 5–6)
PROTEIN UA: NEGATIVE
RBC UA: ABNORMAL /HPF (ref 0–4)
RENAL EPITHELIAL, UA: ABNORMAL /HPF
SPECIFIC GRAVITY UA: 1.03 (ref 1.01–1.02)
TRICHOMONAS: ABNORMAL
TURBIDITY: ABNORMAL
URINE HGB: NEGATIVE
UROBILINOGEN, URINE: NORMAL
WBC UA: ABNORMAL /HPF (ref 0–4)
YEAST: ABNORMAL

## 2018-12-07 PROCEDURE — 99214 OFFICE O/P EST MOD 30 MIN: CPT | Performed by: OPHTHALMOLOGY

## 2018-12-07 PROCEDURE — 1036F TOBACCO NON-USER: CPT | Performed by: OPHTHALMOLOGY

## 2018-12-07 PROCEDURE — G8482 FLU IMMUNIZE ORDER/ADMIN: HCPCS | Performed by: OPHTHALMOLOGY

## 2018-12-07 PROCEDURE — G8417 CALC BMI ABV UP PARAM F/U: HCPCS | Performed by: OPHTHALMOLOGY

## 2018-12-07 PROCEDURE — G8427 DOCREV CUR MEDS BY ELIG CLIN: HCPCS | Performed by: OPHTHALMOLOGY

## 2018-12-07 PROCEDURE — 87086 URINE CULTURE/COLONY COUNT: CPT

## 2018-12-07 PROCEDURE — 1101F PT FALLS ASSESS-DOCD LE1/YR: CPT | Performed by: OPHTHALMOLOGY

## 2018-12-07 PROCEDURE — 4040F PNEUMOC VAC/ADMIN/RCVD: CPT | Performed by: OPHTHALMOLOGY

## 2018-12-07 PROCEDURE — 1123F ACP DISCUSS/DSCN MKR DOCD: CPT | Performed by: OPHTHALMOLOGY

## 2018-12-07 PROCEDURE — 81001 URINALYSIS AUTO W/SCOPE: CPT

## 2018-12-07 PROCEDURE — G8598 ASA/ANTIPLAT THER USED: HCPCS | Performed by: OPHTHALMOLOGY

## 2018-12-07 RX ORDER — PHENYLEPHRINE HYDROCHLORIDE 100 MG/ML
1 SOLUTION/ DROPS OPHTHALMIC ONCE
Status: COMPLETED | OUTPATIENT
Start: 2018-12-07 | End: 2018-12-07

## 2018-12-07 RX ORDER — CYCLOPENTOLATE HYDROCHLORIDE 10 MG/ML
1 SOLUTION/ DROPS OPHTHALMIC ONCE
Status: COMPLETED | OUTPATIENT
Start: 2018-12-07 | End: 2018-12-07

## 2018-12-07 RX ADMIN — PHENYLEPHRINE HYDROCHLORIDE 1 DROP: 100 SOLUTION/ DROPS OPHTHALMIC at 14:11

## 2018-12-07 RX ADMIN — CYCLOPENTOLATE HYDROCHLORIDE 1 DROP: 10 SOLUTION/ DROPS OPHTHALMIC at 13:43

## 2018-12-07 ASSESSMENT — VISUAL ACUITY
OD_PH_CC: 20/30
CORRECTION_TYPE: GLASSES
OS_PH_CC: 20/25
OS_CC: 20/25
OD_CC+: 1
METHOD: SNELLEN - LINEAR
OS_PH_CC+: 2
OD_PH_CC+: 1
OS_CC+: 2

## 2018-12-07 ASSESSMENT — ENCOUNTER SYMPTOMS
EYES NEGATIVE: 0
ALLERGIC/IMMUNOLOGIC NEGATIVE: 0
GASTROINTESTINAL NEGATIVE: 0
RESPIRATORY NEGATIVE: 0

## 2018-12-07 ASSESSMENT — SLIT LAMP EXAM - LIDS
COMMENTS: MILD BLEPHARITIS. MILD MGD
COMMENTS: MILD BLEPHARITIS. MILD MGD

## 2018-12-07 ASSESSMENT — TONOMETRY
OS_IOP_MMHG: 11
IOP_METHOD: NON-CONTACT AIR PUFF
OD_IOP_MMHG: 15

## 2018-12-07 ASSESSMENT — CONF VISUAL FIELD
OD_NORMAL: 1
OS_NORMAL: 1

## 2018-12-07 NOTE — PROGRESS NOTES
Extraocular Movement     Extraocular Movement       Right Left     Full, Ortho Full, Ortho               Not recorded          Past Medical History:   Diagnosis Date    Acute bronchitis     by history    MARITA (acute kidney injury) ([facility] [address] 3/13/2018    Allergic rhinitis     Anxiety     Bradycardia 8/19/2018    Cataract, right     Choroidal nevus of right eye     Chronic systolic CHF (congestive heart failure) ([address] 3/13/2018    Coronary artery disease involving native coronary artery of native heart 10/20/2016    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 LIMA TO LAD, SVG TO DIAGONAL2    Dementia associated with 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 behavioral disturbance 9/21/2018    Dermatophytosis of nail 1/10/2014    Diverticulosis     Elevated PSA     Hemorrhoids     History of measles childhood    History of mumps childhood    Hyperlipidemia     Hypertension     Hypothyroidism 9/4/2018    Mass of upper lobe of right lung 2/21/2018    Occult blood positive stool     refuses colonoscopy    Osteoarthritis     Overweight     Paroxysmal atrial fibrillation ([address] 11/7/2013    Pneumonia due to organism     Pseudophakia, left eye     PSVT (paroxysmal supraventricular tachycardia) ([facility] [address]     Recurrent UTI 3/13/2018    Retinal detachment     left, history of     Seborrheic dermatitis     Sepsis due to urinary tract infection ([address] 8/15/2018    Urinary tract infection without hematuria 3/28/2018          Current Outpatient Prescriptions:     ALPRAZolam (XANAX) 0.25 MG tablet, 1/2 tablet in the morning and 1 tablet at bedtime for anxiety . , Disp: , Rfl:     amiodarone (CORDARONE) 200 MG tablet, Take 200 mg by mouth daily, Disp: , Rfl:     levothyroxine (SYNTHROID) 75 MCG tablet, Take 1 tablet by mouth Daily, Disp: 30 tablet, Rfl: 11    Dextromethorphan-Guaifenesin (ROBITUSSIN DM PO), Take 5 mLs by mouth every 6 hours as needed, Disp: , Rfl:     polyvinyl alcohol (ARTIFICIAL TEARS) 1.4 % ophthalmic solution, Place 1 IMPRESSION:  1. Intermediate stage nonexudative age-related macular degeneration of both eyes    2. Combined forms of age-related cataract of right eye    3. Pseudophakia of left eye    4. Dementia associated with 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 behavioral disturbance        PLAN:    1. Intermediate stage nonexudative age-related macular degeneration of both eyes    Counseled pt regarding ARMD and the potential for severe vision loss   and the importance of regular ophthalmic examinations. Advised pt to take AREDS 2 MVI daily and use [employer] Grid daily. Follow with serial examinations. 2. Combined forms of age-related cataract of right eye    Counseled pt regarding Cataracts. Counseled pt regarding the importance of routine   ophthalmic examinations to monitor cataracts. Advised pt to exercise caution while operating a   motor vehicle or performing other critical visual tasks. Follow. 3. Pseudophakia of left eye    Follow. 4. Dementia associated with 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 behavioral disturbance    Pt is not a surgical candidate for cataract surgery.     Electronically signed by Celina Horne MD on 12/7/2018 at 1:34 PM

## 2018-12-08 LAB
CULTURE: NORMAL
Lab: NORMAL
SPECIMEN DESCRIPTION: NORMAL
STATUS: NORMAL

## 2018-12-10 ENCOUNTER — TELEPHONE (OUTPATIENT)
Dept: INTERNAL MEDICINE | Age: 83
End: 2018-12-10

## 2018-12-12 ENCOUNTER — OFFICE VISIT (OUTPATIENT)
Dept: UROLOGY | Age: 83
End: 2018-12-12
Payer: MEDICARE

## 2018-12-12 VITALS
DIASTOLIC BLOOD PRESSURE: 62 MMHG | HEART RATE: 63 BPM | WEIGHT: 208.8 LBS | SYSTOLIC BLOOD PRESSURE: 128 MMHG | BODY MASS INDEX: 29.23 KG/M2 | HEIGHT: 71 IN

## 2018-12-12 DIAGNOSIS — N39.0 RECURRENT UTI: ICD-10-CM

## 2018-12-12 DIAGNOSIS — N40.1 BENIGN NON-NODULAR PROSTATIC HYPERPLASIA WITH LOWER URINARY TRACT SYMPTOMS: ICD-10-CM

## 2018-12-12 DIAGNOSIS — R39.9 LOWER URINARY TRACT SYMPTOMS (LUTS): Primary | ICD-10-CM

## 2018-12-12 DIAGNOSIS — R97.20 ELEVATED PSA: ICD-10-CM

## 2018-12-12 PROCEDURE — 99213 OFFICE O/P EST LOW 20 MIN: CPT | Performed by: UROLOGY

## 2018-12-12 PROCEDURE — 4040F PNEUMOC VAC/ADMIN/RCVD: CPT | Performed by: UROLOGY

## 2018-12-12 PROCEDURE — 1101F PT FALLS ASSESS-DOCD LE1/YR: CPT | Performed by: UROLOGY

## 2018-12-12 PROCEDURE — G8598 ASA/ANTIPLAT THER USED: HCPCS | Performed by: UROLOGY

## 2018-12-12 PROCEDURE — G8417 CALC BMI ABV UP PARAM F/U: HCPCS | Performed by: UROLOGY

## 2018-12-12 PROCEDURE — G8427 DOCREV CUR MEDS BY ELIG CLIN: HCPCS | Performed by: UROLOGY

## 2018-12-12 PROCEDURE — G8482 FLU IMMUNIZE ORDER/ADMIN: HCPCS | Performed by: UROLOGY

## 2018-12-12 PROCEDURE — 1123F ACP DISCUSS/DSCN MKR DOCD: CPT | Performed by: UROLOGY

## 2018-12-12 PROCEDURE — 1036F TOBACCO NON-USER: CPT | Performed by: UROLOGY

## 2018-12-12 RX ORDER — NITROFURANTOIN 25; 75 MG/1; MG/1
100 CAPSULE ORAL DAILY
Qty: 90 CAPSULE | Refills: 3 | Status: SHIPPED | OUTPATIENT
Start: 2018-12-12 | End: 2019-06-26 | Stop reason: SDUPTHER

## 2018-12-12 NOTE — LETTER
[facility]      12/12/18    Patient: Yeyo Cotton  YOB: 1934    Dear Edilson Rowell DO,    I had the pleasure of seeing one of your patients, Marian Brewer in the office today. Below are the relevant portions of my assessment and plan of care. IMPRESSION:     1. Lower urinary tract symptoms (LUTS)    2. Elevated PSA- due to enlarged benign prostate    3. Benign non-nodular prostatic hyperplasia with lower urinary tract symptoms     4. Recurrent UTI        PLAN:      BPH- stable overall, no more UTI's; Content  Flomax BID for LUTS  Recurrent UTIs-  macrobid daily  Return in about 6 months (around 6/12/2019). The patient is agreeable and happy with our plan. I will surely keep you updated on Yeyo Cotton in the future. Should you have any questions, please do not hesitate to call me, [phone].     Sincerely,     Kevan Henry MD

## 2019-01-31 ENCOUNTER — TELEPHONE (OUTPATIENT)
Dept: INTERNAL MEDICINE | Age: 84
End: 2019-01-31

## 2019-01-31 ENCOUNTER — HOSPITAL ENCOUNTER (OUTPATIENT)
Age: 84
Setting detail: SPECIMEN
Discharge: HOME | End: 2019-01-31
Payer: MEDICARE

## 2019-01-31 DIAGNOSIS — E03.4 HYPOTHYROIDISM DUE TO ACQUIRED ATROPHY OF THYROID: ICD-10-CM

## 2019-01-31 LAB
THYROXINE, FREE: 1.31 NG/DL (ref 0.93–1.7)
TSH SERPL DL<=0.05 MIU/L-ACNC: 10.16 MIU/L (ref 0.3–5)

## 2019-01-31 PROCEDURE — 84439 ASSAY OF FREE THYROXINE: CPT

## 2019-01-31 PROCEDURE — 84443 ASSAY THYROID STIM HORMONE: CPT

## 2019-01-31 PROCEDURE — 36415 COLL VENOUS BLD VENIPUNCTURE: CPT

## 2019-01-31 RX ORDER — LEVOTHYROXINE SODIUM 0.1 MG/1
100 TABLET ORAL DAILY
Qty: 30 TABLET | Refills: 11
Start: 2019-01-31 | End: 2019-04-15 | Stop reason: ALTCHOICE

## 2019-01-31 RX ORDER — LEVOTHYROXINE SODIUM 0.07 MG/1
75 TABLET ORAL DAILY
Qty: 30 TABLET | Refills: 11
Start: 2019-01-31 | End: 2019-01-31 | Stop reason: DRUGHIGH

## 2019-02-21 ENCOUNTER — OUTSIDE SERVICES (OUTPATIENT)
Dept: INTERNAL MEDICINE | Age: 84
End: 2019-02-21
Payer: MEDICARE

## 2019-02-21 DIAGNOSIS — I50.22 CHRONIC SYSTOLIC CHF (CONGESTIVE HEART FAILURE) (HCC): ICD-10-CM

## 2019-02-21 DIAGNOSIS — F41.9 ANXIETY: ICD-10-CM

## 2019-02-21 DIAGNOSIS — E03.4 HYPOTHYROIDISM DUE TO ACQUIRED ATROPHY OF THYROID: ICD-10-CM

## 2019-02-21 DIAGNOSIS — E78.2 MIXED HYPERLIPIDEMIA: ICD-10-CM

## 2019-02-21 DIAGNOSIS — I10 ESSENTIAL HYPERTENSION: Primary | ICD-10-CM

## 2019-02-21 DIAGNOSIS — I48.0 PAROXYSMAL ATRIAL FIBRILLATION (HCC): ICD-10-CM

## 2019-02-21 DIAGNOSIS — I25.10 CORONARY ARTERY DISEASE INVOLVING NATIVE CORONARY ARTERY OF NATIVE HEART WITHOUT ANGINA PECTORIS: ICD-10-CM

## 2019-02-21 DIAGNOSIS — M79.674 TOE PAIN, BILATERAL: ICD-10-CM

## 2019-02-21 DIAGNOSIS — M79.675 TOE PAIN, BILATERAL: ICD-10-CM

## 2019-02-21 DIAGNOSIS — M15.9 PRIMARY OSTEOARTHRITIS INVOLVING MULTIPLE JOINTS: ICD-10-CM

## 2019-02-21 DIAGNOSIS — H61.23 BILATERAL IMPACTED CERUMEN: ICD-10-CM

## 2019-02-21 DIAGNOSIS — F02.80 DEMENTIA ASSOCIATED WITH OTHER UNDERLYING DISEASE WITHOUT BEHAVIORAL DISTURBANCE (HCC): ICD-10-CM

## 2019-02-21 ASSESSMENT — ENCOUNTER SYMPTOMS
CHEST TIGHTNESS: 0
NAUSEA: 0
SORE THROAT: 0
DIARRHEA: 0
VOMITING: 0
SHORTNESS OF BREATH: 0

## 2019-03-07 ENCOUNTER — OFFICE VISIT (OUTPATIENT)
Dept: INTERNAL MEDICINE | Age: 84
End: 2019-03-07
Payer: MEDICARE

## 2019-03-07 VITALS
HEIGHT: 71 IN | BODY MASS INDEX: 30.21 KG/M2 | WEIGHT: 215.8 LBS | SYSTOLIC BLOOD PRESSURE: 120 MMHG | HEART RATE: 68 BPM | DIASTOLIC BLOOD PRESSURE: 66 MMHG | RESPIRATION RATE: 16 BRPM

## 2019-03-07 DIAGNOSIS — N40.1 BENIGN PROSTATIC HYPERPLASIA WITH INCOMPLETE BLADDER EMPTYING: ICD-10-CM

## 2019-03-07 DIAGNOSIS — I25.10 CORONARY ARTERY DISEASE INVOLVING NATIVE CORONARY ARTERY OF NATIVE HEART WITHOUT ANGINA PECTORIS: ICD-10-CM

## 2019-03-07 DIAGNOSIS — E03.4 HYPOTHYROIDISM DUE TO ACQUIRED ATROPHY OF THYROID: ICD-10-CM

## 2019-03-07 DIAGNOSIS — M15.9 PRIMARY OSTEOARTHRITIS INVOLVING MULTIPLE JOINTS: ICD-10-CM

## 2019-03-07 DIAGNOSIS — I48.0 PAROXYSMAL ATRIAL FIBRILLATION (HCC): ICD-10-CM

## 2019-03-07 DIAGNOSIS — F41.9 ANXIETY: ICD-10-CM

## 2019-03-07 DIAGNOSIS — E78.2 MIXED HYPERLIPIDEMIA: ICD-10-CM

## 2019-03-07 DIAGNOSIS — I10 ESSENTIAL HYPERTENSION: Primary | ICD-10-CM

## 2019-03-07 DIAGNOSIS — I50.22 CHRONIC SYSTOLIC CHF (CONGESTIVE HEART FAILURE) (HCC): ICD-10-CM

## 2019-03-07 DIAGNOSIS — R39.14 BENIGN PROSTATIC HYPERPLASIA WITH INCOMPLETE BLADDER EMPTYING: ICD-10-CM

## 2019-03-07 DIAGNOSIS — E66.9 CLASS 1 OBESITY WITH SERIOUS COMORBIDITY AND BODY MASS INDEX (BMI) OF 30.0 TO 30.9 IN ADULT, UNSPECIFIED OBESITY TYPE: ICD-10-CM

## 2019-03-07 DIAGNOSIS — F02.80 DEMENTIA ASSOCIATED WITH OTHER UNDERLYING DISEASE WITHOUT BEHAVIORAL DISTURBANCE (HCC): ICD-10-CM

## 2019-03-07 PROBLEM — N39.0 RECURRENT UTI: Status: RESOLVED | Noted: 2018-03-13 | Resolved: 2019-03-07

## 2019-03-07 PROBLEM — E66.811 CLASS 1 OBESITY WITH SERIOUS COMORBIDITY AND BODY MASS INDEX (BMI) OF 30.0 TO 30.9 IN ADULT: Status: ACTIVE | Noted: 2019-03-07

## 2019-03-07 PROCEDURE — 1101F PT FALLS ASSESS-DOCD LE1/YR: CPT | Performed by: INTERNAL MEDICINE

## 2019-03-07 PROCEDURE — 4040F PNEUMOC VAC/ADMIN/RCVD: CPT | Performed by: INTERNAL MEDICINE

## 2019-03-07 PROCEDURE — G8417 CALC BMI ABV UP PARAM F/U: HCPCS | Performed by: INTERNAL MEDICINE

## 2019-03-07 PROCEDURE — 99214 OFFICE O/P EST MOD 30 MIN: CPT | Performed by: INTERNAL MEDICINE

## 2019-03-07 PROCEDURE — G8598 ASA/ANTIPLAT THER USED: HCPCS | Performed by: INTERNAL MEDICINE

## 2019-03-07 PROCEDURE — G8427 DOCREV CUR MEDS BY ELIG CLIN: HCPCS | Performed by: INTERNAL MEDICINE

## 2019-03-07 PROCEDURE — 1036F TOBACCO NON-USER: CPT | Performed by: INTERNAL MEDICINE

## 2019-03-07 PROCEDURE — 1123F ACP DISCUSS/DSCN MKR DOCD: CPT | Performed by: INTERNAL MEDICINE

## 2019-03-07 PROCEDURE — G8482 FLU IMMUNIZE ORDER/ADMIN: HCPCS | Performed by: INTERNAL MEDICINE

## 2019-03-29 DIAGNOSIS — F41.9 ANXIETY: Primary | ICD-10-CM

## 2019-03-29 RX ORDER — ALPRAZOLAM 0.25 MG/1
TABLET ORAL
Qty: 45 TABLET | Refills: 0 | Status: SHIPPED | OUTPATIENT
Start: 2019-03-29 | End: 2019-04-28

## 2019-04-03 ENCOUNTER — TELEPHONE (OUTPATIENT)
Dept: INTERNAL MEDICINE | Age: 84
End: 2019-04-03

## 2019-04-11 ENCOUNTER — HOSPITAL ENCOUNTER (OUTPATIENT)
Age: 84
Setting detail: SPECIMEN
Discharge: HOME | End: 2019-04-11
Payer: MEDICARE

## 2019-04-11 PROCEDURE — 84443 ASSAY THYROID STIM HORMONE: CPT

## 2019-04-11 PROCEDURE — 84439 ASSAY OF FREE THYROXINE: CPT

## 2019-04-11 PROCEDURE — 36415 COLL VENOUS BLD VENIPUNCTURE: CPT

## 2019-04-12 LAB
THYROXINE, FREE: 1.57 NG/DL (ref 0.93–1.7)
TSH SERPL DL<=0.05 MIU/L-ACNC: 6.71 MIU/L (ref 0.3–5)

## 2019-04-15 RX ORDER — LEVOTHYROXINE SODIUM 112 UG/1
112 TABLET ORAL DAILY
Qty: 30 TABLET | Refills: 11 | Status: SHIPPED | OUTPATIENT
Start: 2019-04-15

## 2019-04-15 RX ORDER — LEVOTHYROXINE SODIUM 112 UG/1
112 TABLET ORAL DAILY
COMMUNITY
End: 2019-04-15 | Stop reason: SDUPTHER

## 2019-04-19 ENCOUNTER — HOSPITAL ENCOUNTER (OUTPATIENT)
Dept: CT IMAGING | Age: 84
Discharge: HOME | End: 2019-04-21
Payer: MEDICARE

## 2019-04-19 DIAGNOSIS — R91.8 MASS OF UPPER LOBE OF RIGHT LUNG: ICD-10-CM

## 2019-04-19 PROCEDURE — 71250 CT THORAX DX C-: CPT

## 2019-04-24 ENCOUNTER — OFFICE VISIT (OUTPATIENT)
Dept: PULMONOLOGY | Age: 84
End: 2019-04-24
Payer: MEDICARE

## 2019-04-24 VITALS
BODY MASS INDEX: 30.52 KG/M2 | TEMPERATURE: 96.9 F | WEIGHT: 218 LBS | RESPIRATION RATE: 16 BRPM | OXYGEN SATURATION: 97 % | SYSTOLIC BLOOD PRESSURE: 122 MMHG | HEIGHT: 71 IN | HEART RATE: 76 BPM | DIASTOLIC BLOOD PRESSURE: 70 MMHG

## 2019-04-24 DIAGNOSIS — R91.1 NODULE OF MIDDLE LOBE OF RIGHT LUNG: ICD-10-CM

## 2019-04-24 DIAGNOSIS — I10 ESSENTIAL HYPERTENSION: ICD-10-CM

## 2019-04-24 DIAGNOSIS — I50.22 CHRONIC SYSTOLIC CHF (CONGESTIVE HEART FAILURE) (HCC): ICD-10-CM

## 2019-04-24 DIAGNOSIS — I48.0 PAROXYSMAL ATRIAL FIBRILLATION (HCC): ICD-10-CM

## 2019-04-24 DIAGNOSIS — R91.8 MASS OF UPPER LOBE OF RIGHT LUNG: Primary | ICD-10-CM

## 2019-04-24 PROCEDURE — 99214 OFFICE O/P EST MOD 30 MIN: CPT | Performed by: INTERNAL MEDICINE

## 2019-04-24 PROCEDURE — G8598 ASA/ANTIPLAT THER USED: HCPCS | Performed by: INTERNAL MEDICINE

## 2019-04-24 PROCEDURE — 1123F ACP DISCUSS/DSCN MKR DOCD: CPT | Performed by: INTERNAL MEDICINE

## 2019-04-24 PROCEDURE — 4040F PNEUMOC VAC/ADMIN/RCVD: CPT | Performed by: INTERNAL MEDICINE

## 2019-04-24 PROCEDURE — G8427 DOCREV CUR MEDS BY ELIG CLIN: HCPCS | Performed by: INTERNAL MEDICINE

## 2019-04-24 PROCEDURE — 1036F TOBACCO NON-USER: CPT | Performed by: INTERNAL MEDICINE

## 2019-04-24 PROCEDURE — G8417 CALC BMI ABV UP PARAM F/U: HCPCS | Performed by: INTERNAL MEDICINE

## 2019-04-24 NOTE — PROGRESS NOTES
[x] Up to date    [] Indicated   [] Refused  [] Contraindicated            PAST MEDICAL HISTORY:       Diagnosis Date    Acute bronchitis     by history    MARITA (acute kidney injury) ([address] 3/13/2018    Allergic rhinitis     Anxiety     Bradycardia 8/19/2018    Cataract, right     Choroidal nevus of right eye     Chronic systolic CHF (congestive heart failure) ([address] 3/13/2018    Coronary artery disease involving native coronary artery of native heart 10/20/2016    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 LIMA TO LAD, SVG TO DIAGONAL2    Dementia associated with 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 behavioral disturbance 9/21/2018    Dermatophytosis of nail 1/10/2014    Diverticulosis     Elevated PSA     Hemorrhoids     History of measles childhood    History of mumps childhood    Hyperlipidemia     Hypertension     Hypothyroidism 9/4/2018    Mass of upper lobe of right lung 2/21/2018    Occult blood positive stool     refuses colonoscopy    Osteoarthritis     Overweight     Paroxysmal atrial fibrillation ([address] 11/7/2013    Pneumonia due to organism     Pseudophakia, left eye     PSVT (paroxysmal supraventricular tachycardia) (HCC)     Recurrent UTI 3/13/2018    Retinal detachment     left, history of     Seborrheic dermatitis     Sepsis due to urinary tract infection ([address] 8/15/2018    Urinary tract infection without hematuria 3/28/2018         Family History:       Problem Relation Age of Onset    Diabetes Mother     Osteoporosis Mother     Other Father         complications of prostate surgery (bleeding)    Stroke Sister     Other Sister         respiratory failure       SURGICAL HISTORY:   Past Surgical History:   Procedure Laterality Date    ABSCESS DRAINAGE  [id number]    multiple    APPENDECTOMY  1940 and [facility] Left 5/7/2013    CORONARY ARTERY BYPASS GRAFT      RETINAL LASER Left 12/22/2011    detached retina    SKIN TAG REMOVAL  Nov 1999    TONSILLECTOMY SOCIAL AND OCCUPATIONAL HEALTH:      There  No  history of TB or TB exposure. There  no asbestos or silica dust exposure. The patient reports  no coal, foundry, quarry or [employer] exposure. Travel history reveals [place] . There  no history of recreational or IV drug use. There  no hot tube exposure. Pets  No     Occupational history  and  . TOBACCO:   reports that he has never smoked. He has never used smokeless tobacco.  ETOH:   reports that he does not drink alcohol. ALLERGIES:      Allergies   Allergen Reactions    Pcn [Penicillins] Swelling     As a child  Pt tolerated ceftriaxone , and keflex with no intolerance    Sulfa Antibiotics     Cefdinir Other (See Comments)     Pt tolerated ceftriaxone , and keflex with no intolerance         Home Meds:   Prior to Admission medications    Medication Sig Start Date End Date Taking?  Authorizing Provider   levothyroxine (SYNTHROID) 112 MCG tablet Take 1 tablet by mouth Daily 4/15/19  Yes Guillermo Cerda DO   ALPRAZolam (XANAX) 0.25 MG tablet TAKE 1/2 TABLET BY MOUTH EVERY MORNING TAKE 1 TABLET BY MOUTH AT BEDTIME 3/29/19 4/28/19 Yes Guillermo Cerda DO   nitrofurantoin, macrocrystal-monohydrate, (MACROBID) 100 MG capsule Take 1 capsule by mouth daily 12/12/18  Yes Jose Enrique Sol MD   amiodarone (CORDARONE) 200 MG tablet Take 200 mg by mouth daily   Yes Historical Provider, MD   Dextromethorphan-Guaifenesin (ROBITUSSIN DM PO) Take 5 mLs by mouth every 6 hours as needed   Yes Historical Provider, MD   polyvinyl alcohol (ARTIFICIAL TEARS) 1.4 % ophthalmic solution Place 1 drop into both eyes every 6 hours as needed   Yes Historical Provider, MD   Multiple Vitamins-Minerals (PRESERVISION AREDS PO) Take by mouth daily   Yes Historical Provider, MD   isosorbide mononitrate (IMDUR) 30 MG extended release tablet Take 1 tablet by mouth daily 8/22/18  Yes Frida Rolle MD   lisinopril (PRINIVIL;ZESTRIL) 40 MG tablet Take 1 tablet by mouth daily 8/22/18  Yes Shilpi Barr MD   metoprolol tartrate (LOPRESSOR) 25 MG tablet Take 1 tablet by mouth 2 times daily 8/21/18  Yes Shilpi Barr MD   amLODIPine (NORVASC) 10 MG tablet Take 1 tablet by mouth daily 8/22/18  Yes Shilpi Barr MD   rivaroxaban (XARELTO) 10 MG TABS tablet Take 2 tablets by mouth daily (with breakfast) 8/21/18  Yes Shilpi Barr MD   Multiple Vitamins-Minerals (MULTIVITAMIN ADULT PO) Take 1 tablet by mouth daily   Yes Historical Provider, MD   potassium chloride (KLOR-CON M) 20 MEQ extended release tablet Take 20 mEq by mouth daily   Yes Historical Provider, MD   tamsulosin (FLOMAX) 0.4 MG capsule Take 1 capsule by mouth 2 times daily 3/7/18  Yes Corazon Jiang MD   fluticasone (FLONASE) 50 MCG/ACT nasal spray 1 spray by Nasal route daily 2/1/18  Yes Kristie Sensing, APRN - CNP   simvastatin (ZOCOR) 20 MG tablet TAKE 1 TABLET BY MOUTH EVERY OTHER DAY  3/7/17  Yes Guillermo Cerda DO   acetaminophen (TYLENOL) 500 MG tablet Take 500 mg by mouth every 6 hours as needed for Pain   Yes Historical Provider, MD   Coenzyme Q-10 100 MG CAPS Take 100 mg by mouth every other day. Yes Historical Provider, MD   tolnaftate (TINACTIN) 1 % external solution Apply topically nightly to toenails per Dr. Beau Alcantar. Yes Historical Provider, MD   aspirin 81 MG tablet Take 81 mg by mouth daily. Yes Historical Provider, MD   GLUCOSAMINE-CHONDROITIN PO Take 3 tablets by mouth Daily. Yes Historical Provider, MD            Review of Systems -  General ROS: negative for - chills, fatigue, fever or weight loss  ENT ROS: negative for - headaches, oral lesions or sore throat  Cardiovascular ROS: no chest pain , orthopnea or pnd   Gastrointestinal ROS: no abdominal pain, change in bowel habits, or black or bloody stools  Skin - no rash   Neuro - no blurry vision , no loc .  No focal weakness   msk - no jt tenderness or swelling    Vascular - no claudication , rest completed and negative   Lymphatic - complete and negative   Hematology - oncology - complete and negative   Allergy immunology - complete and negative    no burning or hematuria    Vitals:  /70 (Site: Right Upper Arm, Position: Sitting, Cuff Size: Medium Adult)   Pulse 76   Temp 96.9 °F (36.1 °C) (Tympanic)   Resp 16   Ht 5' 10.98\" (1.803 m)   Wt 218 lb (98.9 kg)   SpO2 97%   BMI 30.42 kg/m²     PHYSICAL EXAM:  Head and neck atraumatic, normocephalic    Lymph nodes-no cervical, supraclavicular lymphadenopathy    Neck-no JVP elevation    Lungs - Ventilating all lobes without any rales, rhonchi, wheezes or dullness. No bronchial breath sounds. Chest expansion equal bilaterally    CVS- S1, S2 regular. No S3 no S4, no murmurs    Abdomen-nontender, nondistended. Bowel sounds are present. No organomegaly    Lower extremity-no edema    Upper extremity-no edema    Neurological-grossly normal cranial nerves. No overt motor deficit           IMPRESSION:     Diagnosis Orders   1. Mass of upper lobe of right lung resolved      2. Paroxysmal atrial fibrillation (HCC)     3. Chronic systolic CHF (congestive heart failure) ([facility] [address]     4. Essential hypertension          :                PLAN:      CT of chest done 4/19/2019 reviewed Patient's right upper lobe lung mass has resolved. Lung nodule in right middle lobe is 4 mm and stable  . He does not complain of shortness of breath, congestive heart failure is stable. Hypertension is under control  Follow-up in one year      Requested Prescriptions      No prescriptions requested or ordered in this encounter       There are no discontinued medications. Shelby Youngblood received counseling on the following healthy behaviors: nutrition, exercise and medication adherence    Patient given educational materials : see patient instruction       Discussed use, benefit, and side effects of prescribed medications. Barriers to medication compliance addressed.       All patient

## 2019-05-02 DIAGNOSIS — F41.9 ANXIETY: Primary | ICD-10-CM

## 2019-05-02 RX ORDER — ALPRAZOLAM 0.25 MG/1
TABLET ORAL
Qty: 45 TABLET | Refills: 0 | Status: SHIPPED | OUTPATIENT
Start: 2019-05-02 | End: 2019-06-01

## 2019-05-07 ENCOUNTER — OFFICE VISIT (OUTPATIENT)
Dept: NEUROLOGY | Age: 84
End: 2019-05-07
Payer: MEDICARE

## 2019-05-07 VITALS
OXYGEN SATURATION: 97 % | HEART RATE: 84 BPM | DIASTOLIC BLOOD PRESSURE: 74 MMHG | WEIGHT: 218.03 LBS | SYSTOLIC BLOOD PRESSURE: 132 MMHG | BODY MASS INDEX: 30.52 KG/M2 | HEIGHT: 71 IN

## 2019-05-07 DIAGNOSIS — I48.0 PAROXYSMAL ATRIAL FIBRILLATION (HCC): ICD-10-CM

## 2019-05-07 DIAGNOSIS — F41.9 ANXIETY: ICD-10-CM

## 2019-05-07 DIAGNOSIS — F02.80 DEMENTIA ASSOCIATED WITH OTHER UNDERLYING DISEASE WITHOUT BEHAVIORAL DISTURBANCE (HCC): Primary | ICD-10-CM

## 2019-05-07 DIAGNOSIS — R39.14 BENIGN PROSTATIC HYPERPLASIA WITH INCOMPLETE BLADDER EMPTYING: ICD-10-CM

## 2019-05-07 DIAGNOSIS — I25.10 CORONARY ARTERY DISEASE INVOLVING NATIVE CORONARY ARTERY OF NATIVE HEART WITHOUT ANGINA PECTORIS: ICD-10-CM

## 2019-05-07 DIAGNOSIS — E03.4 HYPOTHYROIDISM DUE TO ACQUIRED ATROPHY OF THYROID: ICD-10-CM

## 2019-05-07 DIAGNOSIS — Z91.89 AT HIGH RISK FOR STROKE: ICD-10-CM

## 2019-05-07 DIAGNOSIS — R41.3 MEMORY PROBLEM: ICD-10-CM

## 2019-05-07 DIAGNOSIS — I67.82 CEREBRAL ISCHEMIA: ICD-10-CM

## 2019-05-07 DIAGNOSIS — Z91.81 RISK FOR FALLS: ICD-10-CM

## 2019-05-07 DIAGNOSIS — I10 ESSENTIAL HYPERTENSION: ICD-10-CM

## 2019-05-07 DIAGNOSIS — G63 POLYNEUROPATHY ASSOCIATED WITH UNDERLYING DISEASE (HCC): ICD-10-CM

## 2019-05-07 DIAGNOSIS — I50.22 CHRONIC SYSTOLIC CHF (CONGESTIVE HEART FAILURE) (HCC): ICD-10-CM

## 2019-05-07 DIAGNOSIS — I48.91 ATRIAL FIBRILLATION, UNSPECIFIED TYPE (HCC): ICD-10-CM

## 2019-05-07 DIAGNOSIS — E78.2 MIXED HYPERLIPIDEMIA: ICD-10-CM

## 2019-05-07 DIAGNOSIS — E66.9 CLASS 1 OBESITY WITH SERIOUS COMORBIDITY AND BODY MASS INDEX (BMI) OF 30.0 TO 30.9 IN ADULT, UNSPECIFIED OBESITY TYPE: ICD-10-CM

## 2019-05-07 DIAGNOSIS — R26.89 BALANCE PROBLEM: ICD-10-CM

## 2019-05-07 DIAGNOSIS — N40.1 BENIGN PROSTATIC HYPERPLASIA WITH INCOMPLETE BLADDER EMPTYING: ICD-10-CM

## 2019-05-07 PROCEDURE — G8598 ASA/ANTIPLAT THER USED: HCPCS | Performed by: PSYCHIATRY & NEUROLOGY

## 2019-05-07 PROCEDURE — G8417 CALC BMI ABV UP PARAM F/U: HCPCS | Performed by: PSYCHIATRY & NEUROLOGY

## 2019-05-07 PROCEDURE — G8427 DOCREV CUR MEDS BY ELIG CLIN: HCPCS | Performed by: PSYCHIATRY & NEUROLOGY

## 2019-05-07 PROCEDURE — 1036F TOBACCO NON-USER: CPT | Performed by: PSYCHIATRY & NEUROLOGY

## 2019-05-07 PROCEDURE — 99214 OFFICE O/P EST MOD 30 MIN: CPT | Performed by: PSYCHIATRY & NEUROLOGY

## 2019-05-07 PROCEDURE — 4040F PNEUMOC VAC/ADMIN/RCVD: CPT | Performed by: PSYCHIATRY & NEUROLOGY

## 2019-05-07 PROCEDURE — 1123F ACP DISCUSS/DSCN MKR DOCD: CPT | Performed by: PSYCHIATRY & NEUROLOGY

## 2019-05-07 ASSESSMENT — ENCOUNTER SYMPTOMS
TROUBLE SWALLOWING: 0
CHOKING: 0
ABDOMINAL PAIN: 0
COUGH: 0
VOMITING: 0
BOWEL INCONTINENCE: 0
FACIAL SWELLING: 0
BACK PAIN: 1
SINUS PRESSURE: 0
PHOTOPHOBIA: 0
EYE REDNESS: 0
VISUAL CHANGE: 0
NAUSEA: 0
EYE PAIN: 0
VOICE CHANGE: 0
CHEST TIGHTNESS: 0
SHORTNESS OF BREATH: 0
EYE ITCHING: 0
BLOOD IN STOOL: 0
EYE DISCHARGE: 0
APNEA: 0
SORE THROAT: 0
WHEEZING: 0
CONSTIPATION: 0
DIARRHEA: 0
COLOR CHANGE: 0
ABDOMINAL DISTENTION: 0

## 2019-05-07 NOTE — PROGRESS NOTES
[facility]  Neurology  [address] [place] [address]  Phone: [phone]   Fax: [phone]    SUBJECTIVE:     PATIENT ID:  Aristeo Bauer is a  RIGHT  HANDED 80 y.o. male. Neurologic Problem   The patient's primary symptoms include clumsiness, focal sensory loss, a loss of balance and memory loss. The patient's pertinent negatives include no altered mental status, focal weakness, near-syncope, slurred speech, syncope, visual change or weakness. This is a chronic problem. Episode onset: MORE  THAN   2  YEARS. The neurological problem developed insidiously. The problem is unchanged. There was lower extremity, upper extremity, right-sided and left-sided focality noted. Associated symptoms include back pain. Pertinent negatives include no abdominal pain, auditory change, aura, bladder incontinence, bowel incontinence, chest pain, confusion, diaphoresis, dizziness, fatigue, fever, headaches, light-headedness, nausea, neck pain, palpitations, shortness of breath, vertigo or vomiting. Past treatments include nothing. The treatment provided no relief. His past medical history is significant for dementia. There is no history of a bleeding disorder, a clotting disorder, a CVA, head trauma, liver disease, mood changes or seizures. History obtained from  The patient   AND  HIS  TRANSPORTER     and other  available medical records were  Also  reviewed. The  Duration,  Quality,  Severity,  Location,  Timing,  Context,  Modifying  Factors   Of   The   Chief   Complaint   And  Present  Illness   Was   Reviewed   In   Chronological   Manner.                                         CURRENT PATIENT'S  MAIN  CONCERNS INCLUDE :                     1)    H/O   CHRONIC   MEMORY  PROBLEMS                                       FOR    2   YEARS                         2)  PATIENT  NOT  CONCERNED   ABOUT  MEMORY  PROBLEMS   AND                             NOT  INTERESTED  IN MEDICATION  FOR DEMENTIA                              3)   H/O   FALL   WITH  MILD  HEAD INJURY  IN   SEPT. 2018                          HAD  CT  HEAD  AND  C  SPINE    ON   9/19/2018                                 SHOWED  NO  ACUTE  PATHOLOGY                                      -  NO   H/O     RECURRENCE OF  FALLING                              4)    PERIPHERAL  POLYNEUROPATHY                               5)    H/O   CHRONIC  MILD     BALANCE PROBLEMS                                             -    STABLE                            HIGH  RISK  FOR  FALLS                              6)   H/O    ATRIAL  FIBRILLATION                               ON    XARELTO                              7)   MULTIPLE  CARDIAC  CONDITIONS                                  BEING   FOLLOWED  BY  HIS  CARDIOLOGY                           8)   MULTIPLE  CO MORBID  MEDICAL  CONDITIONS                                  BEING  FOLLOWED  BY  HIS    PCP. 9)    HIGH  RISK  FOR  STROKE,  CEREBRAL ISCHEMIA                               10)     CT   HEAD  ,  CT  SPINE    CAROTID  DOPPLER,   EEG      IN    SEPT. 2018                                  SHOWED  NO  SIGNIFICANT    PATHOLOGIES                           11)   H/O  MILD  ANXIETY  -    ON  XANAX                                         -   STABLE                             12)    PATIENT  IS  A  RESIDENT   OF  SUPERVISED  LIVING  FACILITY                         13)      PATIENT  DENIES  ANY  NEW  NEUROLOGICAL   CONCERNS.                               PRECIPITATING  FACTORS: including  fever/infection, exertion/relaxation, position change, stress, weather change, medications/alcohol, time of day/darkness/light  Are    absent                                                             MODIFYING  FACTORS:  fever/infection, exertion/relaxation, position change, stress, weather change, medications/alcohol, time of day/darkness/light     Are  absent         Patient   Indicates HISTORY,     SURGICAL   HISTORY,   MEDICATIONS   LIST,   ALLERGIES AND  DRUG  INTOLERANCES,     FAMILY   HISTORY,  SOCIAL  HISTORY,    PROBLEM  LIST   FOR  PATIENT  CARE   COORDINATION    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 LIMA TO LAD, SVG TO DIAGONAL2    Dementia associated with 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 behavioral disturbance 9/21/2018    Dermatophytosis of nail 1/10/2014    Diverticulosis     Elevated PSA     Hemorrhoids     History of measles childhood    History of mumps childhood    Hyperlipidemia     Hypertension     Hypothyroidism 9/4/2018    Mass of upper lobe of right lung 2/21/2018    Occult blood positive stool     refuses colonoscopy    Osteoarthritis     Overweight     Paroxysmal atrial fibrillation ([address] 11/7/2013    Pneumonia due to organism     Pseudophakia, left eye     PSVT (paroxysmal supraventricular tachycardia) ([address]     Recurrent UTI 3/13/2018    Retinal detachment     left, history of     Seborrheic dermatitis     Sepsis due to urinary tract infection ([address] 8/15/2018    Urinary tract infection without hematuria 3/28/2018         Past Surgical History:   Procedure Laterality Date    ABSCESS DRAINAGE  [id number]    multiple    APPENDECTOMY  1940 and [facility] Left 5/7/2013    CORONARY ARTERY BYPASS GRAFT      RETINAL LASER Left 12/22/2011    detached retina    SKIN TAG REMOVAL  Nov 1999    TONSILLECTOMY           Current Outpatient Medications   Medication Sig Dispense Refill    ALPRAZolam (XANAX) 0.25 MG tablet TAKE 1/2 OF A TABLET BY MOUTH DAILY, IN THE MORNING. facility-administered medications for this visit.           Allergies   Allergen Reactions    Pcn [Penicillins] Swelling     As a child  Pt tolerated ceftriaxone , and keflex with no intolerance    Sulfa Antibiotics     Cefdinir Other (See Comments)     Pt tolerated ceftriaxone , and keflex with no intolerance         Family History   Problem Relation Age of Onset    Diabetes Mother     Osteoporosis Mother     Other Father         complications of prostate surgery (bleeding)    Stroke Sister     Other Sister         respiratory failure         Social History     Socioeconomic History    Marital status: Single     Spouse name: Not on file    Number of children: Not on file    Years of education: Not on file    Highest education level: Not on file   Occupational History    Not on file   Social Needs    Financial resource strain: Not on file    Food insecurity:     Worry: Not on file     Inability: Not on file    Transportation needs:     Medical: Not on file     Non-medical: Not on file   Tobacco Use    Smoking status: Never Smoker    Smokeless tobacco: Never Used    Tobacco comment: never smoked iwona rrt,2/27/2018   Substance and Sexual Activity    Alcohol use: No     Alcohol/week: 0.0 oz    Drug use: No    Sexual activity: Not on file   Lifestyle    Physical activity:     Days per week: Not on file     Minutes per session: Not on file    Stress: Not on file   Relationships    Social connections:     Talks on phone: Not on file     Gets together: Not on file     Attends [employer] service: Not on file     Active member of club or organization: Not on file     Attends meetings of clubs or organizations: Not on file     Relationship status: Not on file    Intimate partner violence:     Fear of current or ex partner: Not on file     Emotionally abused: Not on file     Physically abused: Not on file     Forced sexual activity: Not on file   Other Topics Concern    Not on file   Social History Narrative    Not on file       Vitals:    05/07/19 1346   BP: 132/74   Pulse: 84   SpO2: 97%         Wt Readings from Last 3 Encounters:   05/07/19 218 lb 0.6 oz (98.9 kg)   04/24/19 218 lb (98.9 kg)   03/07/19 215 lb 12.8 oz (97.9 kg)         BP Readings from Last 3 Encounters:   05/07/19 132/74   04/24/19 122/70   03/07/19 120/66       Hematology and Coagulation  Lab Results   Component Value Date    WBC 7.7 08/21/2018    RBC 5.11 08/21/2018    HGB 15.6 08/21/2018    HCT 46.0 08/21/2018    MCV 90.0 08/21/2018    MCH 30.5 08/21/2018    MCHC 33.9 08/21/2018    RDW 13.7 08/21/2018     08/21/2018    MPV 11.3 08/21/2018     Chemistries  Lab Results   Component Value Date     12/06/2018    K 4.3 12/06/2018     12/06/2018    CO2 26 12/06/2018    BUN 15 12/06/2018    CREATININE 1.15 12/06/2018    CALCIUM 9.0 12/06/2018    PROT 5.5 08/16/2018    LABALBU 3.3 08/16/2018    BILITOT 0.73 08/16/2018    ALKPHOS 65 08/16/2018    AST 34 08/16/2018    ALT 36 08/16/2018     Lab Results   Component Value Date    ALKPHOS 65 08/16/2018    ALT 36 08/16/2018    AST 34 08/16/2018    PROT 5.5 08/16/2018    BILITOT 0.73 08/16/2018    LABALBU 3.3 08/16/2018     Lab Results   Component Value Date    BUN 15 12/06/2018    CREATININE 1.15 12/06/2018     Lab Results   Component Value Date    CALCIUM 9.0 12/06/2018    MG 2.0 03/14/2018     Lab Results   Component Value Date    AST 34 08/16/2018    ALT 36 08/16/2018       Lab Results   Component Value Date    URICACID 4.7 07/13/2018     Lab Results   Component Value Date    CKTOTAL 110 02/12/2018       Review of Systems   Constitutional: Negative for appetite change, chills, diaphoresis, fatigue, fever and unexpected weight change. HENT: Negative for congestion, dental problem, drooling, ear discharge, ear pain, facial swelling, hearing loss, mouth sores, nosebleeds, postnasal drip, sinus pressure, sore throat, tinnitus, trouble swallowing and voice change.     Eyes: Negative No tracheal deviation and normal range of motion present. No Brudzinski's sign and no Kernig's sign noted. No thyroid mass and no thyromegaly present. Cardiovascular: Normal rate and regular rhythm. Pulmonary/Chest: Effort normal.   Musculoskeletal: He exhibits no edema or tenderness. Skin: Skin is warm. No rash noted. No cyanosis or erythema. No pallor. Nails show no clubbing. Psychiatric: His mood appears not anxious. His affect is not angry, not blunt, not labile and not inappropriate. He is slowed. He is not agitated, not aggressive, not hyperactive, not withdrawn, not actively hallucinating and not combative. Thought content is not paranoid and not delusional. Cognition and memory are impaired. He does not express impulsivity or inappropriate judgment. He does not exhibit a depressed mood. He expresses no homicidal and no suicidal ideation. He expresses no suicidal plans and no homicidal plans. He exhibits abnormal recent memory. He exhibits normal remote memory. He is attentive. Neurologic Exam    NEUROLOGICAL EXAMINATION :      A) MENTAL STATUS:                   Alert and  oriented  To time, place  And  Person. No Aphasia. No  Dysarthria. Able   To  Follow three  Step commands without   Any  Difficulty. No right  To left confusion. SLOW  Speech  And language function. Insight and  Judgment ,Fund  Of  Knowledge DECREASED              Recent  And  Remote memory  DECRESED              Attention &Concentration are   DECREASED                                               B) CRANIAL NERVES :             2 CN : Visual  Acuity  And  Visual fields  within normal limits                      Fundi  Could  Not  Be  Could  Not  Be  Evaluated. 3,4,6 CN : Both  Pupils are   Reactive and  Equal.                          Extraocular   Movements  Are  Intact. No  Nystagmus. No  ABDELRAHMAN.   No  Afferent Pupillary  Defect noted. 5 CN :  Normal  Facial sensations and Corneal  Reflexes         7 CN :  Normal  Facial  Symmetry  And  Strength. No facial  Weakness. 8 CN :  Hearing  Appears   DECREASED        9, 10 CN: Normal spontaneous, reflex palate movements       11 CN:   Normal  Shoulder shrug and  strength       12 CN :   Normal  Tongue movements and  Tongue  In midline                      No tongue   Fasciculations or atrophy     C) MOTOR  EXAM:                 Strength  In upper  And  Lower extremities   within normal limits             No  Drift. No  Atrophy             Rapid alternating  And  repetitions  Movements  within normal limits               Muscle  Tone  In upper  And  Lower  Extremities  normal              No rigidity. No  Spasticity. Bradykinesia   absent               No  Asterixis. Sustention  Tremor , Resting  Tremor   absent                  No other  Abnormal  Movements noted         D) SENSORY :             light touch, pinprick, position  And  Vibration  DECREASED                          IN   GLOVE  AND  STOCKING  MANNER    E) REFLEXES:                 Deep  Tendon  Reflexes     DECREASED                  No pathological  Reflexes  Bilaterally.                                 F) COORDINATION  AND  GAIT :                              Station and  Gait    SLOW                                     Romberg's test   POSITIVE                        Ataxia negative    ASSESSMENT:    Patient Active Problem List   Diagnosis    Hyperlipidemia    Osteoarthritis    Allergic rhinitis    Diverticulosis    Anxiety    Atrial fibrillation ([address]    Coronary artery disease involving native coronary artery of native heart    Mass of upper lobe of right lung    Chronic systolic CHF (congestive heart failure) (HCC)    Benign prostatic hyperplasia with incomplete bladder emptying    Hypothyroidism    Hypertension    Dementia associated with 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 behavioral disturbance    Intermediate stage nonexudative age-related macular degeneration of both eyes    Combined forms of age-related cataract of right eye    Pseudophakia of left eye    Class 1 obesity with serious comorbidity and body mass index (BMI) of 30.0 to 30.9 in adult     ULTRASOUND EVALUATION OF THE CAROTID ARTERIES       9/24/2018       COMPARISON:   None.       HISTORY:   ORDERING SYSTEM PROVIDED HISTORY: Dementia associated with other underlying   disease without behavioral disturbance       FINDINGS:       RIGHT:       The right common carotid artery demonstrates peak systolic velocity of 85   cm/sec.       The right internal carotid artery demonstrates the systolic velocity of 53   cm/sec.       The external carotid artery is patent.  The vertebral artery demonstrates   normal antegrade flow.       Minimal calcified plaque is seen at the right carotid bulb.  No significant   stenosis is seen.       ICA/CCA ratio of 0.6.           LEFT:       The left common carotid artery demonstrates peak systolic velocity of 77   cm/sec.       The left internal carotid artery demonstrates the systolic velocity of 49   cm/sec.       The external carotid artery is patent.  The vertebral artery demonstrates   normal antegrade flow.       Mild calcified plaque is seen within the left internal carotid artery without   significant stenosis.       ICA/CCA ratio of 0.6           Impression   The right internal carotid artery demonstrates 0-50% stenosis.       The left internal carotid artery demonstrates 0-50% stenosis.       Bilateral vertebral arteries are patent with flow in the normal direction       CT OF THE HEAD WITHOUT CONTRAST; CT OF THE CERVICAL SPINE WITHOUT CONTRAST   9/19/2018 2:00 pm       TECHNIQUE:   CT of the head was performed without the administration of intravenous   contrast. Dose modulation, iterative reconstruction, and/or weight based   adjustment of the mA/kV was utilized to reduce the radiation dose to as low   as reasonably achievable.; CT of the cervical spine was performed without the   administration of intravenous contrast. Multiplanar reformatted images are   provided for review. Dose modulation, iterative reconstruction, and/or weight   based adjustment of the mA/kV was utilized to reduce the radiation dose to as   low as reasonably achievable.       COMPARISON:   None.       HISTORY:   ORDERING SYSTEM PROVIDED HISTORY: fall yesterday on blood thinners   TECHNOLOGIST PROVIDED HISTORY:       Ordering Physician Provided Reason for Exam: Patient sent from [facility]   after fall yesterday. Nurse states was sitting on bench he thought it had a   back and leaned back and fell unsure of surface he fell onto. Patient denies   any injury.  No redness, bruising, or abrasion noted to head.       FINDINGS:   CT HEAD:       BRAIN/VENTRICLES: There is no acute intracranial hemorrhage, mass effect or   midline shift.  No abnormal extra-axial fluid collection.  The gray-white   differentiation is maintained without evidence of an acute infarct.  There is   no evidence of hydrocephalus.       ORBITS: The visualized portion of the orbits demonstrate no acute abnormality.       SINUSES: The visualized paranasal sinuses and mastoid air cells demonstrate   no acute abnormality.       SOFT TISSUES/SKULL:  No acute abnormality of the visualized skull or soft   tissues.       CT CERVICAL SPINE:       BONE/ALIGNMENT:  No acute fracture, subluxation, compression deformity or   suspicious osseous lesions are identified.  AP alignment of the cervical   spine is maintained.       DEGENERATIVE CHANGES:  There is multilevel degenerative disc disease with   essentially complete loss of the disc space at C6-C7 and C7-T1.  There is   multilevel anterior spurring.  There are hypertrophic degenerative changes of   the facet and uncovertebral joints throughout the cervical spine as well as   the atlantoaxial joint.  These findings contribute to moderate neural   foraminal stenosis at C3-C4, moderate bilateral neural foraminal stenosis at   C4-C5, and mild to moderate right neural foraminal stenosis at C5-C6 and   C6-C7.  Disc osteophyte complexes at C4-C5, C5-C6 and C6-C7 cause mild   impingement of the ventral thecal sac at these levels.       SOFT TISSUES:  No prevertebral soft tissue thickening.  The incidentally   imaged paravertebral soft tissues have a normal noncontrast CT appearance. No abnormal lymphadenopathy appreciated.  Thyroid has a normal noncontrast CT   appearance.  Visualized lung parenchyma is well aerated.           Impression   1. No CT evidence for acute intracranial process. 2.  Multilevel degenerative disc disease and hypertrophic degenerative   changes of the cervical spine, but no CT evidence for acute osseous   abnormality.                 VISITING DIAGNOSIS:    No diagnosis found. CONCERNS   &   INCREASED   RISK   FOR         * TIA,  CEREBRO  VASCULAR  ISCHEMIA, STROKE      *   COGNITIVE  &   MEMORY PROBLEMS  AND  DEMENTIAS        *   PERIPHERAL  NEUROPATHY,   NEUROPATHIC  PAIN      *     BALANCE PROBLMES   AND  FALL            VARIOUS  RISK   FACTORS   WERE  REVIEWED   AND   DISCUSSED. *  PATIENT   HAS  MULTIPLE   MEDICAL,          NEUROLOGICAL   PROBLEMS . PATIENT'S   MANAGEMENT  IS  CHALLENGING. PLAN:       Zeus Hernandez  Of  The  Diagnoses,  The  Management & Treatment  Options           AND    Care  plan  Were        Reviewed and   Discussed   With  patient. * Goals  And  Expectations  Of  The  Therapy  Discussed   And  Reviewed. *   Benefits   And   Side  Effect  Profile  Of  Medication/s   Were   Discussed             * Need   For  Further   Follow up For  The  Various  Problems  Were discussed. * Results  Of  The  Previous  Diagnostic tests were reviewed and questions answered. patient  understand the same. Medical  Decision  Making  Was  Made  Based on the   Complexity  Of  Above  Mentioned  Diagnoses,    Data reviewed   & diagnostic  Tests  Reviewed,  Risk  Of  Significant   Co morbidities and complicating   Factors. Medical  Decision  Was   High  Complexity  Due   To  The  Patient's  Multiple  Symptoms,  Advancing   Disease,  Complex  Treatment  Regimen,  Multiple medications and   Risk  Of   Side  Effects,  Difficulty  In  Medication  Management  And  Diagnostic  Challenges   In  View  Of  The  Associated   Co  Morbid  Conditions   And  Problems. * FALL   PRECAUTIONS. THESE  REVIEWED   AND  DISCUSSED      *   BE  CAREFUL  WITH  ACTIVITIES        *  SUPERVISED   CARE      *   ADEQUATE   FLUID  INTAKE   AND  ELECTROLYTE  BALANCE         * KEEP  DAIRY  OF   THE  NEUROLOGICAL  SYMPTOMS        RECORDING THE    DURATION  AND  FREQUENCY. *  AVOID    CONDITIONS  AND  FACTORS   THAT  MAKE                  NEUROLOGICAL  SYMPTOMS  WORSE. *  TO  MAINTAIN  REGULAR  SLEEP  WAKE  CYCLES.      *   TO  HAVE  ADEQUATE  REST  AND   SLEEP    HOURS.      *    AVOID  ANY USAGE OF                 TOBACCO,  EXCESSIVE  ALCOHOL  AND   ILLEGAL   SUBSTANCES      *   Compliance   With  Medications   And  Instructions    *  May   Use  Pill  Box,    If  Needed        *    To  Continue  The    Antiplatelet  therapy    As   Recommended  Was   Discussed      *    Prophylactic  Use   Of     Vitamin   B   Complex,  Folic  Acid,    Vitamin  B12    Multivitamin,   Calcium  With  magnesium  And  Vit D    Supplementations   Over  The  Counter  Discussed       *  PATIENT  IS  ALSO   COUNSELED   TO  KEEP    ACTIVITIES:         A)   SIMPLE      B)  ORGANIZED      C)  WRITE   DOWN                 *  EVALUATIONS  AND  FOLLOW UP:    IF  PATIENT  IS  WILLING                   * PHYSICAL  THERAPY   / OCCUPATIONAL THERAPY                                  * CARDIOLOGY                                        * PATIENT  NOT  CONCERNED   ABOUT  MEMORY  PROBLEMS   AND                             NOT  INTERESTED  IN MEDICATION  FOR  DEMENTIA                        *PATIENT   TO  FOLLOW  UP  WITH   PRIMARY  CARE            AND   OTHER  CONSULTANTS  AS  BEFORE. *  Maintain   Healthy  Life Style    With   Periodic  Monitoring  Of    Any  Medical  Conditions  Including   Elevated  Blood  Pressure,  Lipid  Profile,   Blood  Sugar levels  And   Heart  Disease. *   Period   Screening  For  Cancers  Involving  Breast,  Colon,  lungs  And  Other  Organs  As  Applicable,  In consultation   With  Your  Primary Care Providers. * Second  Neurological  Opinion  And  Evaluations  In  [facility]  Setting  If  Patient  Is  Interested. * Please   Contact   Neurology  Clinic   Early   If   Are  Any  New  Neurological                           Symptoms   And  Any neurological  Concerns. *  If  The  Patient remains  Neurologically  Stable   Return   To  [facility] Neurology Department   IN  6 - 12   MONTHS  TIME   FOR  FURTHER              FOLLOW UP.                 *  If   There is  Any  Significant  Worsening   Of  Current  Symptoms  And  Or  If patient  Develops   Any additional  New  Neurological  Symptoms  Or  Significant  Concerns   Should  Call  911 or  Go  To  Emergency  Department  For  Further  Immediate  Evaluation. *   The  Neurological   Findings,  Possible  Diagnosis,  Differential diagnoses   And  Options  For    Further   Investigations   And  management   Are  Discussed  Comprehensively. Medications   And  Prescription   Risks  And  Side effects  Are   Also  Discussed. The  Above  Were  Reviewed  With  patient and   questions  Answered  In  Detail.               More   Than   50% of face  To face Time   Was  Spent  On  Counseling   And   Coordination  Of  Care   Of   Patient's multiple   Neurological  Problems And   Comorbid  Medical   Conditions. Electronically signed by Yinka Nice MD   Board Certified in  Neurology &  In  [facility] ([facility])      DISCLAIMER:   Although every effort was made to ensure the accuracy of this  electronic transcription, some errors in transcription may have occurred. GENERAL PATIENT INSTRUCTIONS:     A Healthy Lifestyle: Care Instructions  Your Care Instructions  A healthy lifestyle can help you feel good, stay at a healthy weight, and have plenty of energy for both work and play. A healthy lifestyle is something you can share with your whole family. A healthy lifestyle also can lower your risk for serious health problems, such as high blood pressure, heart disease, and diabetes. You can follow a few steps listed below to improve your health and the health of your family. Follow-up care is a key part of your treatment and safety. Be sure to make and go to all appointments, and call your doctor if you are having problems. Its also a good idea to know your test results and keep a list of the medicines you take. How can you care for yourself at home? Do not eat too much sugar, fat, or fast foods. You can still have dessert and treats now and then. The goal is moderation. Start small to improve your eating habits. Pay attention to portion sizes, drink less juice and soda pop, and eat more fruits and vegetables. Eat a healthy amount of food. A 3-ounce serving of meat, for example, is about the size of a deck of cards. Fill the rest of your plate with vegetables and whole grains. Limit the amount of soda and sports drinks you have every day. Drink more water when you are thirsty. Eat at least 5 servings of fruits and vegetables every day. It may seem like a lot, but it is not hard to reach this goal. A serving or helping is 1 piece of fruit, 1 cup of vegetables, or 2 cups of leafy, raw vegetables.  Have an apple or some carrot sticks as an afternoon snack instead of a candy bar. Try to have fruits and/or vegetables at every meal.  Make exercise part of your daily routine. You may want to start with simple activities, such as walking, bicycling, or slow swimming. Try to be active 30 to 60 minutes every day. You do not need to do all 30 to 60 minutes all at once. For example, you can exercise 3 times a day for 10 or 20 minutes. Moderate exercise is safe for most people, but it is always a good idea to talk to your doctor before starting an exercise program.  Keep moving. [employer] the lawn, work in the garden, or [employer]. Take the stairs instead of the elevator at work. If you smoke, quit. People who smoke have an increased risk for heart attack, stroke, cancer, and other lung illnesses. Quitting is hard, but there are ways to boost your chance of quitting tobacco for good. Use nicotine gum, patches, or lozenges. Ask your doctor about stop-smoking programs and medicines. Keep trying. In addition to reducing your risk of diseases in the future, you will notice some benefits soon after you stop using tobacco. If you have shortness of breath or asthma symptoms, they will likely get better within a few weeks after you quit. Limit how much alcohol you drink. Moderate amounts of alcohol (up to 2 drinks a day for men, 1 drink a day for women) are okay. But drinking too much can lead to liver problems, high blood pressure, and other health problems. Family health  If you have a family, there are many things you can do together to improve your health. Eat meals together as a family as often as possible. Eat healthy foods. This includes fruits, vegetables, lean meats and dairy, and whole grains. Include your family in your fitness plan. Most people think of activities such as jogging or tennis as the way to fitness, but there are many ways you and your family can be more active.  Anything that makes you breathe hard and gets your heart pumping is exercise. Here are some tips:  Walk to do errands or to take your child to school or the bus. Go for a family bike ride after dinner instead of watching TV. Where can you learn more? Go to https://chpewilliseb.PERORA. org and sign in to your [employer] account. Enter U875 in the [employer] box to learn more about \"A Healthy Lifestyle: Care Instructions. \"     If you do not have an account, please click on the \"Sign Up Now\" link. Current as of: July 26, 2016  Content Version: 11.2  © 9797-8254 [employer]. Care instructions adapted under license by [facility] ([facility]). If you have questions about a medical condition or this instruction, always ask your healthcare professional. [address] any warranty or liability for your use of this information.

## 2019-05-08 DIAGNOSIS — F41.9 ANXIETY: ICD-10-CM

## 2019-05-08 RX ORDER — ALPRAZOLAM 0.25 MG/1
TABLET ORAL
Qty: 45 TABLET | Refills: 5 | Status: CANCELLED | OUTPATIENT
Start: 2019-05-08

## 2019-05-28 ENCOUNTER — TELEPHONE (OUTPATIENT)
Dept: INTERNAL MEDICINE | Age: 84
End: 2019-05-28

## 2019-05-29 ENCOUNTER — HOSPITAL ENCOUNTER (OUTPATIENT)
Age: 84
Setting detail: SPECIMEN
Discharge: HOME | End: 2019-05-29
Payer: MEDICARE

## 2019-05-29 PROCEDURE — 81001 URINALYSIS AUTO W/SCOPE: CPT

## 2019-05-29 PROCEDURE — 87086 URINE CULTURE/COLONY COUNT: CPT

## 2019-05-30 LAB
-: ABNORMAL
AMORPHOUS: ABNORMAL
BACTERIA: ABNORMAL
BILIRUBIN URINE: NEGATIVE
CASTS UA: ABNORMAL /LPF (ref 0–2)
COLOR: ABNORMAL
COMMENT UA: ABNORMAL
CRYSTALS, UA: ABNORMAL /HPF
EPITHELIAL CELLS UA: ABNORMAL /HPF (ref 0–5)
GLUCOSE URINE: NEGATIVE
KETONES, URINE: NEGATIVE
LEUKOCYTE ESTERASE, URINE: ABNORMAL
MUCUS: ABNORMAL
NITRITE, URINE: NEGATIVE
OTHER OBSERVATIONS UA: ABNORMAL
PH UA: 5.5 (ref 5–6)
PROTEIN UA: NEGATIVE
RBC UA: ABNORMAL /HPF (ref 0–4)
RENAL EPITHELIAL, UA: ABNORMAL /HPF
SPECIFIC GRAVITY UA: 1.02 (ref 1.01–1.02)
TRICHOMONAS: ABNORMAL
TURBIDITY: ABNORMAL
URINE HGB: NEGATIVE
UROBILINOGEN, URINE: NORMAL
WBC UA: ABNORMAL /HPF (ref 0–4)
YEAST: ABNORMAL

## 2019-05-31 LAB
CULTURE: NO GROWTH
Lab: NORMAL
SPECIMEN DESCRIPTION: NORMAL

## 2019-06-06 ENCOUNTER — HOSPITAL ENCOUNTER (OUTPATIENT)
Age: 84
Setting detail: SPECIMEN
Discharge: HOME | End: 2019-06-06
Payer: MEDICARE

## 2019-06-06 DIAGNOSIS — E03.4 HYPOTHYROIDISM DUE TO ACQUIRED ATROPHY OF THYROID: ICD-10-CM

## 2019-06-06 DIAGNOSIS — E78.2 MIXED HYPERLIPIDEMIA: ICD-10-CM

## 2019-06-06 DIAGNOSIS — I10 ESSENTIAL HYPERTENSION: ICD-10-CM

## 2019-06-06 LAB
ABSOLUTE EOS #: 0.3 K/UL (ref 0–0.4)
ABSOLUTE IMMATURE GRANULOCYTE: ABNORMAL K/UL (ref 0–0.3)
ABSOLUTE LYMPH #: 0.9 K/UL (ref 1–4.8)
ABSOLUTE MONO #: 0.4 K/UL (ref 0.1–1.2)
ALBUMIN SERPL-MCNC: 4.2 G/DL (ref 3.5–5.2)
ALBUMIN/GLOBULIN RATIO: 1.4 (ref 1–2.5)
ALP BLD-CCNC: 103 U/L (ref 40–129)
ALT SERPL-CCNC: 21 U/L (ref 5–41)
ANION GAP SERPL CALCULATED.3IONS-SCNC: 12 MMOL/L (ref 9–17)
AST SERPL-CCNC: 22 U/L
BASOPHILS # BLD: 1 % (ref 0–2)
BASOPHILS ABSOLUTE: 0 K/UL (ref 0–0.2)
BILIRUB SERPL-MCNC: 0.6 MG/DL (ref 0.3–1.2)
BUN BLDV-MCNC: 14 MG/DL (ref 8–23)
BUN/CREAT BLD: 13 (ref 9–20)
CALCIUM SERPL-MCNC: 9.2 MG/DL (ref 8.6–10.4)
CHLORIDE BLD-SCNC: 102 MMOL/L (ref 98–107)
CHOLESTEROL/HDL RATIO: 3.1
CHOLESTEROL: 127 MG/DL
CO2: 25 MMOL/L (ref 20–31)
CREAT SERPL-MCNC: 1.09 MG/DL (ref 0.7–1.2)
DIFFERENTIAL TYPE: ABNORMAL
EOSINOPHILS RELATIVE PERCENT: 5 % (ref 1–8)
GFR AFRICAN AMERICAN: >60 ML/MIN
GFR NON-AFRICAN AMERICAN: >60 ML/MIN
GFR SERPL CREATININE-BSD FRML MDRD: NORMAL ML/MIN/{1.73_M2}
GFR SERPL CREATININE-BSD FRML MDRD: NORMAL ML/MIN/{1.73_M2}
GLUCOSE BLD-MCNC: 98 MG/DL (ref 70–99)
HCT VFR BLD CALC: 46.4 % (ref 41–53)
HDLC SERPL-MCNC: 41 MG/DL
HEMOGLOBIN: 15.5 G/DL (ref 13.5–17.5)
IMMATURE GRANULOCYTES: ABNORMAL %
LDL CHOLESTEROL: 60 MG/DL (ref 0–130)
LYMPHOCYTES # BLD: 13 % (ref 15–43)
MCH RBC QN AUTO: 30.3 PG (ref 26–34)
MCHC RBC AUTO-ENTMCNC: 33.5 G/DL (ref 31–37)
MCV RBC AUTO: 90.6 FL (ref 80–100)
MONOCYTES # BLD: 6 % (ref 6–14)
NRBC AUTOMATED: ABNORMAL PER 100 WBC
PDW BLD-RTO: 14.8 % (ref 11–14.5)
PLATELET # BLD: 178 K/UL (ref 140–450)
PLATELET ESTIMATE: ABNORMAL
PMV BLD AUTO: 9.6 FL (ref 6–12)
POTASSIUM SERPL-SCNC: 3.9 MMOL/L (ref 3.7–5.3)
RBC # BLD: 5.12 M/UL (ref 4.5–5.9)
RBC # BLD: ABNORMAL 10*6/UL
SEG NEUTROPHILS: 75 % (ref 44–74)
SEGMENTED NEUTROPHILS ABSOLUTE COUNT: 5.3 K/UL (ref 1.8–7.7)
SODIUM BLD-SCNC: 139 MMOL/L (ref 135–144)
THYROXINE, FREE: 1.67 NG/DL (ref 0.93–1.7)
TOTAL PROTEIN: 7.2 G/DL (ref 6.4–8.3)
TRIGL SERPL-MCNC: 132 MG/DL
TSH SERPL DL<=0.05 MIU/L-ACNC: 3.49 MIU/L (ref 0.3–5)
VLDLC SERPL CALC-MCNC: NORMAL MG/DL (ref 1–30)
WBC # BLD: 7 K/UL (ref 3.5–11)
WBC # BLD: ABNORMAL 10*3/UL

## 2019-06-06 PROCEDURE — 84439 ASSAY OF FREE THYROXINE: CPT

## 2019-06-06 PROCEDURE — 36415 COLL VENOUS BLD VENIPUNCTURE: CPT

## 2019-06-06 PROCEDURE — 84443 ASSAY THYROID STIM HORMONE: CPT

## 2019-06-06 PROCEDURE — 85025 COMPLETE CBC W/AUTO DIFF WBC: CPT

## 2019-06-06 PROCEDURE — 80061 LIPID PANEL: CPT

## 2019-06-06 PROCEDURE — 80053 COMPREHEN METABOLIC PANEL: CPT

## 2019-06-07 ENCOUNTER — OFFICE VISIT (OUTPATIENT)
Dept: OPHTHALMOLOGY | Age: 84
End: 2019-06-07
Payer: MEDICARE

## 2019-06-07 DIAGNOSIS — H35.3132 INTERMEDIATE STAGE NONEXUDATIVE AGE-RELATED MACULAR DEGENERATION OF BOTH EYES: Primary | ICD-10-CM

## 2019-06-07 DIAGNOSIS — Z96.1 PSEUDOPHAKIA OF LEFT EYE: ICD-10-CM

## 2019-06-07 DIAGNOSIS — H25.811 COMBINED FORMS OF AGE-RELATED CATARACT OF RIGHT EYE: ICD-10-CM

## 2019-06-07 PROCEDURE — 1036F TOBACCO NON-USER: CPT | Performed by: OPHTHALMOLOGY

## 2019-06-07 PROCEDURE — 1123F ACP DISCUSS/DSCN MKR DOCD: CPT | Performed by: OPHTHALMOLOGY

## 2019-06-07 PROCEDURE — 99214 OFFICE O/P EST MOD 30 MIN: CPT | Performed by: OPHTHALMOLOGY

## 2019-06-07 PROCEDURE — G8598 ASA/ANTIPLAT THER USED: HCPCS | Performed by: OPHTHALMOLOGY

## 2019-06-07 PROCEDURE — G8417 CALC BMI ABV UP PARAM F/U: HCPCS | Performed by: OPHTHALMOLOGY

## 2019-06-07 PROCEDURE — G8427 DOCREV CUR MEDS BY ELIG CLIN: HCPCS | Performed by: OPHTHALMOLOGY

## 2019-06-07 PROCEDURE — 4040F PNEUMOC VAC/ADMIN/RCVD: CPT | Performed by: OPHTHALMOLOGY

## 2019-06-07 RX ORDER — PHENYLEPHRINE HYDROCHLORIDE 100 MG/ML
1 SOLUTION/ DROPS OPHTHALMIC ONCE
Status: COMPLETED | OUTPATIENT
Start: 2019-06-07 | End: 2019-06-07

## 2019-06-07 RX ORDER — CYCLOPENTOLATE HYDROCHLORIDE 10 MG/ML
1 SOLUTION/ DROPS OPHTHALMIC ONCE
Status: COMPLETED | OUTPATIENT
Start: 2019-06-07 | End: 2019-06-07

## 2019-06-07 RX ADMIN — PHENYLEPHRINE HYDROCHLORIDE 1 DROP: 100 SOLUTION/ DROPS OPHTHALMIC at 13:41

## 2019-06-07 RX ADMIN — CYCLOPENTOLATE HYDROCHLORIDE 1 DROP: 10 SOLUTION/ DROPS OPHTHALMIC at 13:41

## 2019-06-07 ASSESSMENT — VISUAL ACUITY
OS_CC: 20/20
OD_PH_CC: 20/30
METHOD: SNELLEN - LINEAR
CORRECTION_TYPE: GLASSES

## 2019-06-07 ASSESSMENT — SLIT LAMP EXAM - LIDS
COMMENTS: MILD BLEPHARITIS. MILD MGD
COMMENTS: MILD BLEPHARITIS. MILD MGD

## 2019-06-07 ASSESSMENT — TONOMETRY
IOP_METHOD: PALPATION
OS_IOP_MMHG: SOFT
OD_IOP_MMHG: SOFT

## 2019-06-07 NOTE — PROGRESS NOTES
[facility] 3/13/2018    Allergic rhinitis     Anxiety     Bradycardia 8/19/2018    Cataract, right     Choroidal nevus of right eye     Chronic systolic CHF (congestive heart failure) ([facility] 3/13/2018    Coronary artery disease involving native coronary artery of native heart 10/20/2016    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 LIMA TO LAD, SVG TO DIAGONAL2    Dementia associated with 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 behavioral disturbance 9/21/2018    Dermatophytosis of nail 1/10/2014    Diverticulosis     Elevated PSA     Hemorrhoids     History of measles childhood    History of mumps childhood    Hyperlipidemia     Hypertension     Hypothyroidism 9/4/2018    Mass of upper lobe of right lung 2/21/2018    Occult blood positive stool     refuses colonoscopy    Osteoarthritis     Overweight     Paroxysmal atrial fibrillation ([facility] 11/7/2013    Pneumonia due to organism     Pseudophakia, left eye     PSVT (paroxysmal supraventricular tachycardia) (HCC)     Recurrent UTI 3/13/2018    Retinal detachment     left, history of     Seborrheic dermatitis     Sepsis due to urinary tract infection ([facility] 8/15/2018    Urinary tract infection without hematuria 3/28/2018          Current Outpatient Medications:     levothyroxine (SYNTHROID) 112 MCG tablet, Take 1 tablet by mouth Daily, Disp: 30 tablet, Rfl: 11    nitrofurantoin, macrocrystal-monohydrate, (MACROBID) 100 MG capsule, Take 1 capsule by mouth daily, Disp: 90 capsule, Rfl: 3    amiodarone (CORDARONE) 200 MG tablet, Take 200 mg by mouth daily, Disp: , Rfl:     Dextromethorphan-Guaifenesin (ROBITUSSIN DM PO), Take 5 mLs by mouth every 6 hours as needed, Disp: , Rfl:     polyvinyl alcohol (ARTIFICIAL TEARS) 1.4 % ophthalmic solution, Place 1 drop into both eyes every 6 hours as needed, Disp: , Rfl:     Multiple Vitamins-Minerals (PRESERVISION AREDS PO), Take by mouth daily, Disp: , Rfl:     isosorbide mononitrate (IMDUR) 30 MG extended release tablet, Take 1 age-related macular degeneration of both eyes    Counseled pt regarding ARMD and the potential for severe vision loss   and the importance of regular ophthalmic examinations. Advised pt to take AREDS 2 MVI daily and use [employer] Grid daily. Follow with serial examinations. 2. Combined forms of age-related cataract of right eye    Counseled pt regarding Cataracts. Counseled pt regarding the importance of routine   ophthalmic examinations to monitor cataracts. Advised pt to exercise caution while operating a   motor vehicle or performing other critical visual tasks. Follow. 3. Pseudophakia of left eye    Follow.     Electronically signed by Susan Saini MD on 6/7/2019 at 1:37 PM

## 2019-06-13 ENCOUNTER — OFFICE VISIT (OUTPATIENT)
Dept: CARDIOLOGY | Age: 84
End: 2019-06-13
Payer: MEDICARE

## 2019-06-13 VITALS
BODY MASS INDEX: 30.64 KG/M2 | DIASTOLIC BLOOD PRESSURE: 60 MMHG | HEART RATE: 60 BPM | WEIGHT: 214 LBS | HEIGHT: 70 IN | SYSTOLIC BLOOD PRESSURE: 116 MMHG

## 2019-06-13 DIAGNOSIS — I48.0 PAROXYSMAL ATRIAL FIBRILLATION (HCC): Primary | ICD-10-CM

## 2019-06-13 PROCEDURE — G8417 CALC BMI ABV UP PARAM F/U: HCPCS | Performed by: INTERNAL MEDICINE

## 2019-06-13 PROCEDURE — 93000 ELECTROCARDIOGRAM COMPLETE: CPT | Performed by: INTERNAL MEDICINE

## 2019-06-13 PROCEDURE — 1123F ACP DISCUSS/DSCN MKR DOCD: CPT | Performed by: INTERNAL MEDICINE

## 2019-06-13 PROCEDURE — 4040F PNEUMOC VAC/ADMIN/RCVD: CPT | Performed by: INTERNAL MEDICINE

## 2019-06-13 PROCEDURE — G8427 DOCREV CUR MEDS BY ELIG CLIN: HCPCS | Performed by: INTERNAL MEDICINE

## 2019-06-13 PROCEDURE — 99214 OFFICE O/P EST MOD 30 MIN: CPT | Performed by: INTERNAL MEDICINE

## 2019-06-13 PROCEDURE — G8598 ASA/ANTIPLAT THER USED: HCPCS | Performed by: INTERNAL MEDICINE

## 2019-06-13 PROCEDURE — 1036F TOBACCO NON-USER: CPT | Performed by: INTERNAL MEDICINE

## 2019-06-13 RX ORDER — AMIODARONE HYDROCHLORIDE 100 MG/1
100 TABLET ORAL DAILY
Qty: 90 TABLET | Refills: 1 | Status: SHIPPED | OUTPATIENT
Start: 2019-06-13 | End: 2019-06-14 | Stop reason: DRUGHIGH

## 2019-06-13 NOTE — PROGRESS NOTES
Today's Date: 6/13/2019  Patient Name: Juarez Hammer  Patient's age: 80 y.o., 1934          The patient is a 80 y.o.  male is in the office for f/u, Patient has been doing well cardiac wise, no new cardiac complaints. He denies angina, PND/Orthopnea. No recent falls. Past Medical History:   has a past medical history of Acute bronchitis, MARITA (acute kidney injury) ([address] Allergic rhinitis, Anxiety, Bradycardia, Cataract, right, Choroidal nevus of right eye, Chronic systolic CHF (congestive heart failure) ([address] Coronary artery disease involving native coronary artery of native heart, Dementia associated with 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 behavioral disturbance, Dermatophytosis of nail, Diverticulosis, Elevated PSA, Hemorrhoids, History of measles, History of mumps, Hyperlipidemia, Hypertension, Hypothyroidism, Mass of upper lobe of right lung, Occult blood positive stool, Osteoarthritis, Overweight, Paroxysmal atrial fibrillation ([address] Pneumonia due to organism, Pseudophakia, left eye, PSVT (paroxysmal supraventricular tachycardia) ([address] Recurrent UTI, Retinal detachment, Seborrheic dermatitis, Sepsis due to urinary tract infection ([address] and Urinary tract infection without hematuria. Past Surgical History:   has a past surgical history that includes Coronary artery bypass graft; Appendectomy (1940 and 1955); Tonsillectomy; retinal laser (Left, 12/22/2011); Cataract removal (Left, 5/7/2013); Skin tag removal (Nov 1999); and Abscess Drainage ([id number]). Home Medications:    Prior to Admission medications    Medication Sig Start Date End Date Taking?  Authorizing Provider   levothyroxine (SYNTHROID) 112 MCG tablet Take 1 tablet by mouth Daily 4/15/19  Yes Guillermo Cerda DO   nitrofurantoin, macrocrystal-monohydrate, (MACROBID) 100 MG capsule Take 1 capsule by mouth daily 12/12/18  Yes Raya Mccollum MD   amiodarone (CORDARONE) 200 MG tablet Take 200 mg by Historical Provider, MD   GLUCOSAMINE-CHONDROITIN PO Take 3 tablets by mouth Daily. Yes Historical Provider, MD       Allergies:  Pcn [penicillins]; Sulfa antibiotics; and Cefdinir    Social History:   reports that he has never smoked. He has never used smokeless tobacco. He reports that he does not drink alcohol or use drugs. REVIEW OF SYSTEMS:  CONSTITUTIONAL:NEGATIVE  HEENT:NEG  Cardiovascular: No chest pain, Yes dyspnea on exertion, No palpitations. Lower extremity edema: No  RESPIRATORY: MONTIEL  GASTROINTESTINAL:  negative  GENITOURINARY:  negative  INTEGUMENT:  negative  MUSCULOSKELETAL:  positive for  pain  NEUROLOGICAL:  negative    PHYSICAL EXAM:      /60   Pulse 60   Ht 5' 10\" (1.778 m)   Wt 214 lb (97.1 kg)   BMI 30.71 kg/m²    HEENT: PERRL, no cervical lymphadenopathy. No masses palpable. Cardiovascular:  · The apical impulse is not displaced  · Heart  Sounds:RRR, S4  · Jugular venous pulsation Normal  · The carotid upstroke is NL  · Peripheral pulses are symmetrical and full  Respiratory: Good respiratory effort. On auscultation: clear to auscultation bilaterally  Abdomen:  · No masses or tenderness  · Bowel sounds present  Extremities:  ·  No Cyanosis or Clubbing  ·  Lower extremity edema: No  Skin: Warm and dry    Cardiac data:    EKG:   Sinus with 1st AVB  -Left axis -anterior fascicular block. -Voltage criteria for LVH   -LBBB appearance      Labs:     CBC: No results for input(s): WBC, HGB, HCT, PLT in the last 72 hours. BMP: No results for input(s): NA, K, CO2, BUN, CREATININE, LABGLOM, GLUCOSE in the last 72 hours. PT/INR: No results for input(s): PROTIME, INR in the last 72 hours. FASTING LIPID PANEL:  Lab Results   Component Value Date    HDL 41 06/06/2019    TRIG 132 06/06/2019     LIVER PROFILE:No results for input(s): AST, ALT, LABALBU in the last 72 hours.     IMPRESSION:    Sinus but borderline bradycardia as well as marked 1st AVB  CAD, HX CABG (6/7/2000) LIMA TO LAD, SVG TO D2, STABLE  MILD LV SYSTOLIC DYSFUNCTION, NO CLINICAL CHF  PAF- in NSR  HTN  HLP        RECOMMENDATIONS:  - on AC with xarelto, no recent falls  - decrease lopressor 12.5 mg bi  - decrease amio 100 mg qd  - continue other meds  - RTC in 3 months to repeat ECG and monitor for hopeful improvement in TX interval and bradycardia. Thank you for allowing me to participate in the care of this patient, please do not hesitate to call if you have any questions. Jody Kincaid DO, [facility] [place], [place], [facility]  [facility] [place]  [phone]

## 2019-06-14 ENCOUNTER — OFFICE VISIT (OUTPATIENT)
Dept: INTERNAL MEDICINE | Age: 84
End: 2019-06-14
Payer: MEDICARE

## 2019-06-14 VITALS
HEIGHT: 70 IN | RESPIRATION RATE: 16 BRPM | HEART RATE: 80 BPM | SYSTOLIC BLOOD PRESSURE: 120 MMHG | BODY MASS INDEX: 30.84 KG/M2 | WEIGHT: 215.4 LBS | DIASTOLIC BLOOD PRESSURE: 60 MMHG

## 2019-06-14 DIAGNOSIS — I25.10 CORONARY ARTERY DISEASE INVOLVING NATIVE CORONARY ARTERY OF NATIVE HEART WITHOUT ANGINA PECTORIS: ICD-10-CM

## 2019-06-14 DIAGNOSIS — I48.0 PAROXYSMAL ATRIAL FIBRILLATION (HCC): ICD-10-CM

## 2019-06-14 DIAGNOSIS — E03.4 HYPOTHYROIDISM DUE TO ACQUIRED ATROPHY OF THYROID: ICD-10-CM

## 2019-06-14 DIAGNOSIS — Z00.00 ROUTINE GENERAL MEDICAL EXAMINATION AT A HEALTH CARE FACILITY: Primary | ICD-10-CM

## 2019-06-14 DIAGNOSIS — M15.9 PRIMARY OSTEOARTHRITIS INVOLVING MULTIPLE JOINTS: ICD-10-CM

## 2019-06-14 DIAGNOSIS — Z13.6 SCREENING FOR CARDIOVASCULAR CONDITION: ICD-10-CM

## 2019-06-14 DIAGNOSIS — E78.2 MIXED HYPERLIPIDEMIA: ICD-10-CM

## 2019-06-14 DIAGNOSIS — F02.80 DEMENTIA ASSOCIATED WITH OTHER UNDERLYING DISEASE WITHOUT BEHAVIORAL DISTURBANCE (HCC): ICD-10-CM

## 2019-06-14 DIAGNOSIS — F41.9 ANXIETY: ICD-10-CM

## 2019-06-14 DIAGNOSIS — I50.22 CHRONIC SYSTOLIC CHF (CONGESTIVE HEART FAILURE) (HCC): ICD-10-CM

## 2019-06-14 DIAGNOSIS — Z91.81 AT HIGH RISK FOR FALLS: ICD-10-CM

## 2019-06-14 DIAGNOSIS — I10 ESSENTIAL HYPERTENSION: ICD-10-CM

## 2019-06-14 DIAGNOSIS — E66.9 CLASS 1 OBESITY WITH SERIOUS COMORBIDITY AND BODY MASS INDEX (BMI) OF 30.0 TO 30.9 IN ADULT, UNSPECIFIED OBESITY TYPE: ICD-10-CM

## 2019-06-14 PROCEDURE — 4040F PNEUMOC VAC/ADMIN/RCVD: CPT | Performed by: INTERNAL MEDICINE

## 2019-06-14 PROCEDURE — 1036F TOBACCO NON-USER: CPT | Performed by: INTERNAL MEDICINE

## 2019-06-14 PROCEDURE — G0439 PPPS, SUBSEQ VISIT: HCPCS | Performed by: INTERNAL MEDICINE

## 2019-06-14 PROCEDURE — 99213 OFFICE O/P EST LOW 20 MIN: CPT | Performed by: INTERNAL MEDICINE

## 2019-06-14 PROCEDURE — G0446 INTENS BEHAVE THER CARDIO DX: HCPCS | Performed by: INTERNAL MEDICINE

## 2019-06-14 PROCEDURE — G8427 DOCREV CUR MEDS BY ELIG CLIN: HCPCS | Performed by: INTERNAL MEDICINE

## 2019-06-14 PROCEDURE — G8598 ASA/ANTIPLAT THER USED: HCPCS | Performed by: INTERNAL MEDICINE

## 2019-06-14 PROCEDURE — 1123F ACP DISCUSS/DSCN MKR DOCD: CPT | Performed by: INTERNAL MEDICINE

## 2019-06-14 PROCEDURE — G8417 CALC BMI ABV UP PARAM F/U: HCPCS | Performed by: INTERNAL MEDICINE

## 2019-06-14 RX ORDER — AMIODARONE HYDROCHLORIDE 100 MG/1
100 TABLET ORAL DAILY
COMMUNITY
End: 2020-04-14

## 2019-06-14 RX ORDER — ALPRAZOLAM 0.25 MG/1
0.25 TABLET ORAL NIGHTLY PRN
COMMUNITY
End: 2019-10-24 | Stop reason: SDUPTHER

## 2019-06-14 ASSESSMENT — PATIENT HEALTH QUESTIONNAIRE - PHQ9
SUM OF ALL RESPONSES TO PHQ QUESTIONS 1-9: 0
SUM OF ALL RESPONSES TO PHQ QUESTIONS 1-9: 0

## 2019-06-14 ASSESSMENT — LIFESTYLE VARIABLES: HOW OFTEN DO YOU HAVE A DRINK CONTAINING ALCOHOL: 0

## 2019-06-14 ASSESSMENT — ANXIETY QUESTIONNAIRES: GAD7 TOTAL SCORE: 0

## 2019-06-14 NOTE — PATIENT INSTRUCTIONS
Personalized Preventive Plan for Haylee Cano - 6/14/2019  Medicare offers a range of preventive health benefits. Some of the tests and screenings are paid in full while other may be subject to a deductible, co-insurance, and/or copay. Some of these benefits include a comprehensive review of your medical history including lifestyle, illnesses that may run in your family, and various assessments and screenings as appropriate. After reviewing your medical record and screening and assessments performed today your provider may have ordered immunizations, labs, imaging, and/or referrals for you. A list of these orders (if applicable) as well as your Preventive Care list are included within your After Visit Summary for your review. Other Preventive Recommendations:    · A preventive eye exam performed by an eye specialist is recommended every 1-2 years to screen for glaucoma; cataracts, macular degeneration, and other eye disorders. · A preventive dental visit is recommended every 6 months. · Try to get at least 150 minutes of exercise per week or 10,000 steps per day on a pedometer . · Order or download the FREE \"Exercise & Physical Activity: Your Everyday Guide\" from [employer]. Call 0-610.397.6688 or search [employer] online. · You need 2048-8467 mg of calcium and 0621-1128 IU of vitamin D per day. It is possible to meet your calcium requirement with diet alone, but a vitamin D supplement is usually necessary to meet this goal.  · When exposed to the sun, use a sunscreen that protects against both UVA and UVB radiation with an SPF of 30 or greater. Reapply every 2 to 3 hours or after sweating, drying off with a towel, or swimming. · Always wear a seat belt when traveling in a car. Always wear a helmet when riding a bicycle or motorcycle.   Patient Education        Well Visit, Over 72: Care Instructions  Your Care Instructions    Physical exams can help you stay healthy. Your doctor has checked your overall health and may have suggested ways to take good care of yourself. He or she also may have recommended tests. At home, you can help prevent illness with healthy eating, regular exercise, and other steps. Follow-up care is a key part of your treatment and safety. Be sure to make and go to all appointments, and call your doctor if you are having problems. It's also a good idea to know your test results and keep a list of the medicines you take. How can you care for yourself at home? Reach and stay at a healthy weight. This will lower your risk for many problems, such as obesity, diabetes, heart disease, and high blood pressure. Get at least 30 minutes of exercise on most days of the week. Walking is a good choice. You also may want to do other activities, such as running, swimming, cycling, or playing tennis or team sports. Do not smoke. Smoking can make health problems worse. If you need help quitting, talk to your doctor about stop-smoking programs and medicines. These can increase your chances of quitting for good. Protect your skin from too much sun. When you're outdoors from 10 a.m. to 4 p.m., stay in the shade or cover up with clothing and a hat with a wide brim. Wear sunglasses that block UV rays. Even when it's cloudy, put broad-spectrum sunscreen (SPF 30 or higher) on any exposed skin. See a dentist one or two times a year for checkups and to have your teeth cleaned. Wear a seat belt in the car. Limit alcohol to 2 drinks a day for men and 1 drink a day for women. Too much alcohol can cause health problems. Follow your doctor's advice about when to have certain tests. These tests can spot problems early. For men and women  Cholesterol. Your doctor will tell you how often to have this done based on your overall health and other things that can increase your risk for heart attack and stroke. Blood pressure.  Have your blood pressure checked during a routine doctor visit. Your doctor will tell you how often to check your blood pressure based on your age, your blood pressure results, and other factors. Diabetes. Ask your doctor whether you should have tests for diabetes. Vision. Experts recommend that you have yearly exams for glaucoma and other age-related eye problems. Hearing. Tell your doctor if you notice any change in your hearing. You can have tests to find out how well you hear. Colon cancer tests. Keep having colon cancer tests as your doctor recommends. You can have one of several types of tests. Heart attack and stroke risk. At least every 4 to 6 years, you should have your risk for heart attack and stroke assessed. Your doctor uses factors such as your age, blood pressure, cholesterol, and whether you smoke or have diabetes to show what your risk for a heart attack or stroke is over the next 10 years. Osteoporosis. Talk to your doctor about whether you should have a bone density test to find out whether you have thinning bones. Also ask your doctor about whether you should take calcium and vitamin D supplements. For women  Pap test and pelvic exam. You may no longer need a Pap test. Talk with your doctor about whether to stop or continue to have Pap tests. Breast exam and mammogram. Ask how often you should have a mammogram, which is an X-ray of your breasts. A mammogram can spot breast cancer before it can be felt and when it is easiest to treat. Thyroid disease. Talk to your doctor about whether to have your thyroid checked as part of a regular physical exam. Women have an increased chance of a thyroid problem. For men  Prostate exam. Talk to your doctor about whether you should have a blood test (called a PSA test) for prostate cancer. Experts recommend that you discuss the benefits and risks of the test with your doctor before you decide whether to have this test. Some experts say that men ages 79 and older no longer need testing.   Abdominal aortic aneurysm. Ask your doctor whether you should have a test to check for an aneurysm. You may need a test if you ever smoked or if your parent, brother, sister, or child has had an aneurysm. When should you call for help? Watch closely for changes in your health, and be sure to contact your doctor if you have any problems or symptoms that concern you. Where can you learn more? Go to https://JumpStart Wireless CorporationpeJDCPhosphate.[employer]. org and sign in to your SnapRetail account. Enter B737 in the MomentCam box to learn more about \"Well Visit, Over 65: Care Instructions. \"     If you do not have an account, please click on the \"Sign Up Now\" link. Current as of: December 13, 2018  Content Version: 12.0  © 4462-3238 [employer]. Care instructions adapted under license by [facility] . If you have questions about a medical condition or this instruction, always ask your healthcare professional. [place] [address] any warranty or liability for your use of this information. Patient Education        Eating Healthy Foods: Care Instructions  Your Care Instructions    Eating healthy foods can help lower your risk for disease. Healthy food gives you energy and keeps your heart strong, your brain active, your muscles working, and your bones strong. A healthy diet includes a variety of foods from the basic food groups: grains, vegetables, fruits, milk and milk products, and meat and beans. Some people may eat more of their favorite foods from only one food group and, as a result, miss getting the nutrients they need. So, it is important to pay attention not only to what you eat but also to what you are missing from your diet. You can eat a healthy, balanced diet by making a few small changes. Follow-up care is a key part of your treatment and safety. Be sure to make and go to all appointments, and call your doctor if you are having problems.  It's also a good idea to know your test results and keep a list of the medicines you take. How can you care for yourself at home? Look at what you eat  Keep a food diary for a week or two and record everything you eat or drink. Track the number of servings you eat from each food group. For a balanced diet every day, eat a variety of:  6 or more ounce-equivalents of grains, such as cereals, breads, crackers, rice, or pasta, every day. An ounce-equivalent is 1 slice of bread, 1 cup of ready-to-eat cereal, or ½ cup of cooked rice, cooked pasta, or cooked cereal.  2½ cups of vegetables, especially:  Dark-green vegetables such as broccoli and spinach. Orange vegetables such as carrots and sweet potatoes. Dry beans (such as casillas and kidney beans) and peas (such as lentils). 2 cups of fresh, frozen, or canned fruit. A small apple or 1 banana or orange equals 1 cup.  3 cups of nonfat or low-fat milk, yogurt, or other milk products. 5½ ounces of meat and beans, such as chicken, fish, lean meat, beans, nuts, and seeds. One egg, 1 tablespoon of peanut butter, ½ ounce nuts or seeds, or ¼ cup of cooked beans equals 1 ounce of meat. Learn how to read food labels for serving sizes and ingredients. Fast-food and convenience-food meals often contain few or no fruits or vegetables. Make sure you eat some fruits and vegetables to make the meal more nutritious. Look at your food diary. For each food group, add up what you have eaten and then divide the total by the number of days. This will give you an idea of how much you are eating from each food group. See if you can find some ways to change your diet to make it more healthy. Start small  Do not try to make dramatic changes to your diet all at once. You might feel that you are missing out on your favorite foods and then be more likely to fail. Start slowly, and gradually change your habits. Try some of the following:  Use whole wheat bread instead of white bread. Use nonfat or low-fat milk instead of whole milk.   Eat brown rice instead of white rice, and eat whole wheat pasta instead of white-flour pasta. Try low-fat cheeses and low-fat yogurt. Add more fruits and vegetables to meals and have them for snacks. Add lettuce, tomato, cucumber, and onion to sandwiches. Add fruit to yogurt and cereal.  Enjoy food  You can still eat your favorite foods. You just may need to eat less of them. If your favorite foods are high in fat, salt, and sugar, limit how often you eat them, but do not cut them out entirely. Eat a wide variety of foods. Make healthy choices when eating out  The type of restaurant you choose can help you make healthy choices. Even fast-food chains are now offering more low-fat or healthier choices on the menu. Choose smaller portions, or take half of your meal home. When eating out, try:  A veggie pizza with a whole wheat crust or grilled chicken (instead of sausage or pepperoni). Pasta with roasted vegetables, grilled chicken, or marinara sauce instead of cream sauce. A vegetable wrap or grilled chicken wrap. Broiled or poached food instead of fried or breaded items. Make healthy choices easy  Buy packaged, prewashed, ready-to-eat fresh vegetables and fruits, such as baby carrots, salad mixes, and chopped or shredded broccoli and cauliflower. Buy packaged, presliced fruits, such as melon or pineapple. Choose 100% fruit or vegetable juice instead of soda. Limit juice intake to 4 to 6 oz (½ to ¾ cup) a day. Blend low-fat yogurt, fruit juice, and canned or frozen fruit to make a smoothie for breakfast or a snack. Where can you learn more? Go to https://Granularpepiceweb.[employer]. org and sign in to your IIIMOBI account. Enter N248 in the LapSpace box to learn more about \"Eating Healthy Foods: Care Instructions. \"     If you do not have an account, please click on the \"Sign Up Now\" link. Current as of: November 7, 2018  Content Version: 12.0  © 1747-8155 Healthwise, Incorporated.  Care instructions adapted under license by [facility] ([facility]). If you have questions about a medical condition or this instruction, always ask your healthcare professional. [address] any warranty or liability for your use of this information. Patient Education        Walking for Exercise: Care Instructions  Your Care Instructions    Walking is one of the easiest ways to get the exercise you need for good health. A brisk, 30-minute walk each day can help you feel better and have more energy. It can help you lower your risk of disease. Walking can help you keep your bones strong and your heart healthy. Check with your doctor before you start a walking plan if you have heart problems, other health issues, or you have not been active in a long time. Follow your doctor's instructions for safe levels of exercise. Follow-up care is a key part of your treatment and safety. Be sure to make and go to all appointments, and call your doctor if you are having problems. It's also a good idea to know your test results and keep a list of the medicines you take. How can you care for yourself at home? Getting started  Start slowly and set a short-term goal. For example, walk for 5 or 10 minutes every day. Bit by bit, increase the amount you walk every day. Try for at least 30 minutes on most days of the week. You also may want to swim, bike, or do other activities. If finding enough time is a problem, it is fine to be active in blocks of 10 minutes or more throughout your day and week. To get the heart-healthy benefits of walking, you need to walk briskly enough to increase your heart rate and breathing, but not so fast that you cannot talk comfortably. Wear comfortable shoes that fit well and provide good support for your feet and ankles. Staying with your plan  After you've made walking a habit, set a longer-term goal. You may want to set a goal of walking briskly for longer or walking farther. 2018  Content Version: 12.0  © 0111-1517 Healthwise, [employer]. Care instructions adapted under license by [facility] ([facility]). If you have questions about a medical condition or this instruction, always ask your healthcare professional. [address] any warranty or liability for your use of this information. Patient Education        A Healthy Heart: Care Instructions  Your Care Instructions    Heart disease occurs when a substance called plaque builds up in the vessels that supply oxygen-rich blood to your heart. This can narrow the blood vessels and reduce blood flow. A heart attack happens when blood flow is completely blocked. A high-fat diet, smoking, and other factors increase the risk of heart disease. Your doctor has found that you have a chance of having heart disease. You can do lots of things to keep your heart healthy. It may not be easy, but you can change your diet, exercise more, and quit smoking. These steps really work to lower your chance of heart disease. Follow-up care is a key part of your treatment and safety. Be sure to make and go to all appointments, and call your doctor if you are having problems. It's also a good idea to know your test results and keep a list of the medicines you take. How can you care for yourself at home? Diet    Use less salt when you cook and eat. This helps lower your blood pressure. Taste food before salting. Add only a little salt when you think you need it. With time, your taste buds will adjust to less salt.     Eat fewer snack items, fast foods, canned soups, and other high-salt, high-fat, processed foods.     Read food labels and try to avoid saturated and trans fats. They increase your risk of heart disease by raising cholesterol levels.     Limit the amount of solid fat-butter, margarine, and shortening-you eat. Use olive, peanut, or canola oil when you cook.  Bake, broil, and steam foods instead of frying them.     Eating fish can lower your risk for heart disease. Eat at least 2 servings of fish a week. [place], mackerel, herring, sardines, and chunk light tuna are very good choices. These fish contain omega-3 fatty acids.     Eat a variety of fruit and vegetables every day. Dark green, deep orange, red, or yellow fruits and vegetables are especially good for you. Examples include spinach, carrots, peaches, and berries.     Foods high in fiber can reduce your cholesterol and provide important vitamins and minerals. High-fiber foods include whole-grain cereals and breads, oatmeal, beans, brown rice, citrus fruits, and apples.     Limit drinks and foods with added sugar. These include candy, desserts, and soda pop.    Lifestyle changes    If your doctor recommends it, get more exercise. Walking is a good choice. Bit by bit, increase the amount you walk every day. Try for at least 30 minutes on most days of the week. You also may want to swim, bike, or do other activities.     Do not smoke. If you need help quitting, talk to your doctor about stop-smoking programs and medicines. These can increase your chances of quitting for good. Quitting smoking may be the most important step you can take to protect your heart. It is never too late to quit. You will get health benefits right away.     Limit alcohol to 2 drinks a day for men and 1 drink a day for women. Too much alcohol can cause health problems. Medicines    Take your medicines exactly as prescribed. Call your doctor if you think you are having a problem with your medicine.     If your doctor recommends aspirin, take the amount directed each day. Make sure you take aspirin and not another kind of pain reliever, such as acetaminophen (Tylenol). If you take ibuprofen (such as Advil or Motrin) for other problems, take aspirin at least 2 hours before taking ibuprofen. When should you call for help? Call 911 if you have symptoms of a heart attack.  These may include:    Chest pain or pressure, or a strange feeling in the chest.     Sweating.     Shortness of breath.     Pain, pressure, or a strange feeling in the back, neck, jaw, or upper belly or in one or both shoulders or arms.     Lightheadedness or sudden weakness.     A fast or irregular heartbeat.    After you call 911, the  may tell you to chew 1 adult-strength or 2 to 4 low-dose aspirin. Wait for an ambulance. Do not try to drive yourself.   Watch closely for changes in your health, and be sure to contact your doctor if you have any problems. Where can you learn more? Go to https://Nomipepiceweb.farmhopping. org and sign in to your Serus account. Enter L951 in the Compology box to learn more about \"A Healthy Heart: Care Instructions. \"     If you do not have an account, please click on the \"Sign Up Now\" link. Current as of: July 22, 2018  Content Version: 12.0  © 3788-8318 Healthwise, [employer]. Care instructions adapted under license by [facility] ([facility]). If you have questions about a medical condition or this instruction, always ask your healthcare professional. [place] [address] any warranty or liability for your use of this information. Patient Education        Preventing Falls: Care Instructions  Your Care Instructions    Getting around your home safely can be a challenge if you have injuries or health problems that make it easy for you to fall. Loose rugs and furniture in walkways are among the dangers for many older people who have problems walking or who have poor eyesight. People who have conditions such as arthritis, osteoporosis, or dementia also have to be careful not to fall. You can make your home safer with a few simple measures. Follow-up care is a key part of your treatment and safety. Be sure to make and go to all appointments, and call your doctor if you are having problems. It's also a good idea to know your test results and keep a list of the medicines you take.   How can you care for yourself at home? Taking care of yourself  You may get dizzy if you do not drink enough water. To prevent dehydration, drink plenty of fluids, enough so that your urine is light yellow or clear like water. Choose water and other caffeine-free clear liquids. If you have kidney, heart, or liver disease and have to limit fluids, talk with your doctor before you increase the amount of fluids you drink. Exercise regularly to improve your strength, muscle tone, and balance. Walk if you can. Swimming may be a good choice if you cannot walk easily. Have your vision and hearing checked each year or any time you notice a change. If you have trouble seeing and hearing, you might not be able to avoid objects and could lose your balance. Know the side effects of the medicines you take. Ask your doctor or pharmacist whether the medicines you take can affect your balance. Sleeping pills or sedatives can affect your balance. Limit the amount of alcohol you drink. Alcohol can impair your balance and other senses. Ask your doctor whether calluses or corns on your feet need to be removed. If you wear loose-fitting shoes because of calluses or corns, you can lose your balance and fall. Talk to your doctor if you have numbness in your feet. Preventing falls at home  Remove raised doorway thresholds, throw rugs, and clutter. Repair loose carpet or raised areas in the floor. Move furniture and electrical cords to keep them out of walking paths. Use nonskid floor wax, and wipe up spills right away, especially on ceramic tile floors. If you use a walker or cane, put rubber tips on it. If you use crutches, clean the bottoms of them regularly with an abrasive pad, such as steel wool. Keep your house well lit, especially stairways, porches, and outside walkways. Use night-lights in areas such as hallways and bathrooms.  Add extra light switches or use remote switches (such as switches that go on or off when you clap questions about a medical condition or this instruction, always ask your healthcare professional. [address] any warranty or liability for your use of this information. Patient Education        Preventing Outdoor Falls: Care Instructions  Your Care Instructions    Worries about falls don't need to keep you indoors. Outdoor activities like walking have big benefits for your health. You will need to watch your step and learn a few safety measures. If you are worried about having a fall outdoors, ask your doctor about exercises, classes, or physical therapy that may help. You can learn ways to gain strength, flexibility, and balance. Ask if it might help to use a cane or walker. You can make your time outdoors safer with a few simple measures. Follow-up care is a key part of your treatment and safety. Be sure to make and go to all appointments, and call your doctor if you are having problems. It's also a good idea to know your test results and keep a list of the medicines you take. How can you prevent falls outdoors? Wear shoes with firm soles and low heels. If you have to walk on an icy surface, use grippers that can be worn over your shoes in bad weather. Be extra careful if weather is bad. Walk on the grass when the sidewalks are slick. If you live in a place that gets snow and ice in the winter, sprinkle salt on slippery stairs and sidewalks. Be careful getting on or off buses and trains or getting in and out of cars. If handrails are available, use them. Be careful when you cross the street. Look for crosswalks or places where curb cuts or ramps are present. Try not to hurry, especially if you are carrying something. Be cautious in parking lots or garages. There may be curbs or changes in pavement, or the height of the pavement may vary. Make sure to wear the correct eyeglasses, if you need them.  Reading glasses or bifocals can make it harder to see hazards that might be in your way. If you are walking outdoors for exercise, try to: Walk in well-lighted, well-maintained areas. These include high school or college tracks, shopping malls, and public spaces. Walk with a partner. Watch out for cracked sidewalks, curbs, changes in the height of the pavement, exposed tree roots, and debris such as fallen leaves or branches. Where can you learn more? Go to https://chpepiceweb.[employer]. org and sign in to your Biothera account. Enter L599 in the Needle HR box to learn more about \"Preventing Outdoor Falls: Care Instructions. \"     If you do not have an account, please click on the \"Sign Up Now\" link. Current as of: November 7, 2018  Content Version: 12.0  © 4092-5819 [employer]. Care instructions adapted under license by [facility] ([facility]). If you have questions about a medical condition or this instruction, always ask your healthcare professional. [place] [address] any warranty or liability for your use of this information. Patient Education        How to Get Up Safely After a Fall: Care Instructions  Your Care Instructions    If you have injuries, health problems, or other reasons that may make it easy for you to fall at home, it is a good idea to learn how to get up safely after a fall. Learning how to get up correctly can help you avoid making an injury worse. Also, knowing what to do if you cannot get up can help you stay safe until help arrives. Follow-up care is a key part of your treatment and safety. Be sure to make and go to all appointments, and call your doctor if you are having problems. It's also a good idea to know your test results and keep a list of the medicines you take. How can you care for yourself after a fall? If you think you can get up  First lie still for a few minutes and think about how you feel.  If your body feels okay and you think you can get up safely, follow the rest of the steps below:  Look for a chair or other piece of furniture that is close to you. Roll onto your side and rest. Roll by turning your head in the direction you want to roll, move your shoulder and arm, then hip and leg in the same direction. Lie still for a moment to let your blood pressure adjust.  Slowly push your upper body up, lift your head, and take a moment to rest.  Slowly get up on your hands and knees, and crawl to the chair or other stable piece of furniture. Put your hands on the chair. Move one foot forward, and place it flat on the floor. Your other leg should be bent with the knee on the floor. Rise slowly, turn your body, and sit in the chair. Stay seated for a bit and think about how you feel. Call for help. Even if you feel okay, let someone know what happened to you. You might not know that you have a serious injury. If you cannot get up  If you think you are injured after a fall or you cannot get up, try not to panic. Call out for help. If you have a phone within reach or you have an emergency call device, use it to call for help. If you do not have a phone within reach, try to slide yourself toward it. If you cannot get to the phone, try to slide toward a door or window or a place where you think you can be heard. [place] or use an object to make noise so someone might hear you. If you can reach something that you can use for a pillow, place it under your head. Try to stay warm by covering yourself with a blanket or clothing while you wait for help. When should you call for help? Call 911 anytime you think you may need emergency care.  For example, call if:    You passed out (lost consciousness).     You cannot get up after a fall.     You have severe pain.    Call your doctor now or seek immediate medical care if:    You have new or worse pain.     You are dizzy or lightheaded.     You hit your head.    Watch closely for changes in your health, and be sure to contact your doctor if:    You do not get better as expected. Where can you learn more? Go to [email or web address].[email or web address] org and sign in to your [employer] account. Enter V395 in the Base[employer] box to learn more about \"How to Get Up Safely After a Fall: Care Instructions. \"     If you do not have an account, please click on the \"Sign Up Now\" link. Current as of: November 7, 2018  Content Version: 12.0  © 8659-2207 [employer]. Care instructions adapted under license by [facility] ([facility]). If you have questions about a medical condition or this instruction, always ask your healthcare professional. [address] any warranty or liability for your use of this information.

## 2019-06-14 NOTE — PROGRESS NOTES
DR. Heller [id number] PHYSICAL    Name: Ernesto Velazquez Date: 2019   MRN: [id number] Sex: Male   Age: 80 y.o. Ethnicity: Non-/Non    : 1934 Race: White       CHIEF COMPLAINT: Michael Squires is here for   Chief Complaint   Patient presents with    Medicare AWV    Hypertension     labs 3 month    Hyperlipidemia    Hypothyroidism       He is also here for ongoing evaluation and management of his hypertension, hyperlipidemia, hypothyroidism, coronary artery disease, congestive heart failure, paroxysmal atrial fibrillation, paroxysmal supraventricular tachycardia, anxiety, osteoarthritis, dementia, and being overweight. He saw Dr. Yvette Rob again in April for a right upper lobe lung mass which has now completely resolved. Dr. Yvette Rob will see him again in one year. He takes Norvasc, Imdur, Lopressor, amiodarone, lisinopril, Xarelto, and aspirin for hypertension, coronary artery disease, congestive heart failure, and paroxysmal atrial fibrillation. He sees the cardiologists here for those conditions as well. He saw Dr. Chandu Dugan yesterday, who felt he was doing well. He denies any chest pain, dizziness, or palpitations. He sees Dr. Renny Borrego for benign prostatic hypertrophy, for which he is on Flomax. He is on simvastatin for hyperlipidemia. He denies any muscle aches from the simvastatin. He takes Synthroid for hypothyroidism. He has not had any excessive fatigue or temperature intolerance. He uses Xanax for anxiety, which has been stable. He takes glucosamine and chondroitin sulfate for arthritis, which has not bothered him recently. He saw Dr. Pal Balbuena for his dementia in May. Otherwise he seems to be doing fairly well and denies any other complaints.       Screenings for behavioral, psychosocial and functional/safety risks, and cognitive dysfunction are all negative except as indicated below.  These results, as well as other patient data from the Health Risk Assessment form, are documented in Flowsheets linked to this Encounter. ALLERGIES:  Allergies   Allergen Reactions    Pcn [Penicillins] Swelling     As a child  Pt tolerated ceftriaxone , and keflex with no intolerance    Sulfa Antibiotics     Cefdinir Other (See Comments)     Pt tolerated ceftriaxone , and keflex with no intolerance       MEDICATIONS:   Outpatient Medications Marked as Taking for the 6/14/19 encounter (Office Visit) with Ervin Lugo, DO   Medication Sig Dispense Refill    ALPRAZolam (XANAX) 0.25 MG tablet Take 0.25 mg by mouth nightly as needed for Sleep.  Give 1/2 tablet by mouth in am  And give whole tablet by mouth at bedtime      amiodarone (PACERONE) 100 MG tablet Take 100 mg by mouth daily      metoprolol tartrate (LOPRESSOR) 25 MG tablet Take 0.5 tablets by mouth 2 times daily 90 tablet 1    levothyroxine (SYNTHROID) 112 MCG tablet Take 1 tablet by mouth Daily 30 tablet 11    nitrofurantoin, macrocrystal-monohydrate, (MACROBID) 100 MG capsule Take 1 capsule by mouth daily 90 capsule 3    polyvinyl alcohol (ARTIFICIAL TEARS) 1.4 % ophthalmic solution Place 1 drop into both eyes every 6 hours as needed      Multiple Vitamins-Minerals (PRESERVISION AREDS PO) Take by mouth daily      isosorbide mononitrate (IMDUR) 30 MG extended release tablet Take 1 tablet by mouth daily 30 tablet 3    lisinopril (PRINIVIL;ZESTRIL) 40 MG tablet Take 1 tablet by mouth daily 30 tablet 3    amLODIPine (NORVASC) 10 MG tablet Take 1 tablet by mouth daily 30 tablet 3    rivaroxaban (XARELTO) 10 MG TABS tablet Take 2 tablets by mouth daily (with breakfast) 30 tablet 2    Multiple Vitamins-Minerals (MULTIVITAMIN ADULT PO) Take 1 tablet by mouth daily      potassium chloride (KLOR-CON M) 20 MEQ extended release tablet Take 20 mEq by mouth daily      tamsulosin (FLOMAX) 0.4 MG capsule Take 1 capsule by mouth 2 times daily 30 capsule     fluticasone (FLONASE) 50 MCG/ACT nasal spray 1 spray by Nasal route daily 1 Bottle 0    simvastatin (ZOCOR) 20 MG tablet TAKE 1 TABLET BY MOUTH EVERY OTHER DAY  30 tablet 11    acetaminophen (TYLENOL) 500 MG tablet Take 500 mg by mouth every 6 hours as needed for Pain      Coenzyme Q-10 100 MG CAPS Take 100 mg by mouth every other day.  tolnaftate (TINACTIN) 1 % external solution Apply topically nightly to toenails per Dr. Caitlin Cheng.  aspirin 81 MG tablet Take 81 mg by mouth daily.  GLUCOSAMINE-CHONDROITIN PO Take 3 tablets by mouth Daily.           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 LIMA TO LAD, SVG TO DIAGONAL2    Dementia associated with 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 behavioral disturbance 9/21/2018    Dermatophytosis of nail 1/10/2014    Diverticulosis     Elevated PSA     Hemorrhoids     History of measles childhood    History of mumps childhood    Hyperlipidemia     Hypertension     Hypothyroidism 9/4/2018    Mass of upper lobe of right lung 2/21/2018    Occult blood positive stool     refuses colonoscopy    Osteoarthritis     Overweight     Paroxysmal atrial fibrillation ([address] 11/7/2013    Pneumonia due to organism     Pseudophakia, left eye     PSVT (paroxysmal supraventricular tachycardia) ([address]     Recurrent UTI 3/13/2018    Retinal detachment     left, history of     Seborrheic dermatitis     Sepsis due to urinary tract infection ([address] 8/15/2018    Urinary tract infection without hematuria 3/28/2018       Past Surgical History:   Procedure Laterality Date    ABSCESS DRAINAGE  3567-2175    multiple   [address] Left 5/7/2013 General (Internal Medicine)  Sunday DO Maryann as PCP [facility]  Daisey Prader, MD as Consulting Physician (Urology)  Luis Diaz MD as Consulting Physician (Pulmonology)  Saira Paulino DO as Consulting Physician (Cardiology)  Sourav Garcia MD as Consulting Physician (Neurology)  Jacqui Montague MD as Consulting Physician (Ophthalmology)  Giulia Ely DC (Chiropractic Medicine)    REVIEW OF SYSTEMS:     General: negative for - chills, fever or night sweats  Skin: negative for - pruritus or rash  Head: Negative for: headache or recent history of head trauma  Ear, Nose, Throat: negative for - epistaxis, nasal congestion, nasal discharge, sore throat, tinnitus or vertigo  Cardiovascular: negative for - chest pain, dyspnea on exertion, orthopnea, palpitations, paroxysmal nocturnal dyspnea or shortness of breath  Respiratory: negative for - cough, hemoptysis or wheezing  Gastrointestinal: negative for - abdominal pain, blood in stools, constipation, diarrhea, hematemesis, melena or nausea/vomiting  Genitourinary: negative for - dysuria, hematuria, incontinence, nocturia or urinary frequency/urgency  Musculoskeletal: positive for - joint pain  negative for - muscle pain or muscular weakness  Neurologic: positive for - memory loss  negative for - gait disturbance, impaired coordination/balance, numbness/tingling, seizures or tremors  Psychiatric: positive for - anxiety  negative for - depression     PHYSICAL EXAMINATION:    Wt Readings from Last 3 Encounters:   06/14/19 215 lb 6.4 oz (97.7 kg)   06/13/19 214 lb (97.1 kg)   05/07/19 218 lb 0.6 oz (98.9 kg)       Body mass index is 30.91 kg/m². Vitals:    06/14/19 1323   BP: 120/60   Site: Right Upper Arm   Position: Sitting   Cuff Size: Medium Adult   Pulse: 80   Resp: 16   Weight: 215 lb 6.4 oz (97.7 kg)   Height: 5' 10\" (1.778 m)     Body mass index is 30.91 kg/m². General: This is a 80 y.o.   male who is alert and oriented to person, place and questionably oriented to time. He appears to be his stated age and does not appear to be in any acute distress. Skin: Skin color, texture, turgor normal. No rashes or lesions. HEENT/Neck: Head: Normal, normocephalic, atraumatic. Eye: Normal external eye, conjunctiva, lids cornea, HAROON. Ears: Normal TM's bilaterally. Normal auditory canals and external ears. Non-tender. Nose: Normal external nose, mucus membranes and septum. Pharynx: Dental Hygiene adequate. Normal buccal mucosa. Normal pharynx. Neck / Thyroid: Supple, no masses, nodes, nodules or enlargement. Chest: Rises and falls symmetrically. No use of accessory muscles. No retractions. Breasts: Breasts normal to inspection and palpation. Axillae negative. No gynecomastia. Lungs: Normal - CTA without rales, rhonchi, or wheezing. Heart: regular rate and rhythm, S1, S2 normal, no murmur, click, rub or gallop No S3 or S4. Abdomen: Obese soft, non-distended, non-tender, normal active bowel sounds, no masses palpated and no hepatosplenomegaly  Extremities: Strength is 4/5 bilaterally. Radial pulses are +2/4 bilaterally. Dorsalis pedis pulses are +2/4 bilaterally. There is no clubbing, cyanosis, or edema in any of the extremities. Neurologic: Deep tendon reflexes are 2+/4+ bilaterally. cranial nerves II-XII are grossly intact  Osteopathic Structural Examination: Patient was examined in the seated and standing positions. There was full range of motion in the cervical, thoracic, and lumbar spines. No scoliosis. No change in thoracic kyphosis or lumbar lordosis. No increased paravertebral muscle tenderness. QUESTIONNAIRE REVIEW:    The following problems were reviewed today and where indicated follow up appointments were made and/or referrals ordered.         Positive Risk Factor Screenings with Interventions:     Fall Risk:  Timed Up and Go Test > 12 seconds?: no  2 or more falls in past year?: no  Fall with injury in past year?: (!) yes  Fall Risk Assessment[de-identified] (!) Positive Screening for Falls  Fall Risk Interventions:    · Home safety tips provided    Health Habits/Nutrition:  Health Habits/Nutrition  Do you exercise for at least 20 minutes 2-3 times per week?: Yes  Have you lost any weight without trying in the past 3 months?: No  Do you eat fewer than 2 meals per day?: No  Have you seen a dentist within the past year?: Yes  Body mass index is 30.91 kg/m². Health Habits/Nutrition Interventions:  · Nutritional issues:  educational materials for healthy, well-balanced diet provided    Safety:  Safety  Do you have working smoke detectors?: Yes  Have all throw rugs been removed or fastened?: Yes  Do you have non-slip mats in all bathtubs?: (!) No  Do all of your stairways have a railing or banister?: Yes  Are your doorways, halls and stairs free of clutter?: Yes  Safety Interventions:  · Home safety tips provided    ADL:  ADLs  In the past 7 days, did you need help from others to perform any of the following everyday activities? Eating, dressing, grooming, bathing, toileting, or walking/balance?: None  In the past 7 days, did you need help from others to take care of any of the following?  Laundry, housekeeping, banking/finances, shopping, telephone use, food preparation, transportation, or taking medications?: (!) Laundry, Housekeeping, Banking/Finances, Shopping, Food Preparation, Transportation, Taking Medications(Gets assistance with these from family and from [facility] assisted living)  ADL Interventions:  · Patient declines any further evaluation/treatment for this issue      Personalized Preventive Plan:     Current Health Maintenance Status  Immunization History   Administered Date(s) Administered    Influenza Virus Vaccine 11/07/2005    Influenza, High Dose (Fluzone 65 yrs and older) 10/30/2018    Pneumococcal 13-valent Conjugate ([id number]) 10/19/2015    Pneumococcal Polysaccharide ([id number]) 11/13/2001, 09/09/2014    Zoster

## 2019-06-17 ENCOUNTER — TELEPHONE (OUTPATIENT)
Dept: INTERNAL MEDICINE | Age: 84
End: 2019-06-17

## 2019-06-25 ENCOUNTER — HOSPITAL ENCOUNTER (OUTPATIENT)
Age: 84
Setting detail: SPECIMEN
Discharge: HOME | End: 2019-06-25
Payer: MEDICARE

## 2019-06-25 PROCEDURE — 87086 URINE CULTURE/COLONY COUNT: CPT

## 2019-06-25 PROCEDURE — 81001 URINALYSIS AUTO W/SCOPE: CPT

## 2019-06-26 ENCOUNTER — OFFICE VISIT (OUTPATIENT)
Dept: UROLOGY | Age: 84
End: 2019-06-26
Payer: MEDICARE

## 2019-06-26 VITALS
WEIGHT: 214.8 LBS | DIASTOLIC BLOOD PRESSURE: 70 MMHG | BODY MASS INDEX: 30.75 KG/M2 | HEIGHT: 70 IN | HEART RATE: 67 BPM | SYSTOLIC BLOOD PRESSURE: 120 MMHG

## 2019-06-26 DIAGNOSIS — R39.9 LOWER URINARY TRACT SYMPTOMS (LUTS): Primary | ICD-10-CM

## 2019-06-26 DIAGNOSIS — R33.9 URINARY RETENTION: ICD-10-CM

## 2019-06-26 DIAGNOSIS — R97.20 ELEVATED PSA: ICD-10-CM

## 2019-06-26 DIAGNOSIS — N40.1 BENIGN NON-NODULAR PROSTATIC HYPERPLASIA WITH LOWER URINARY TRACT SYMPTOMS: ICD-10-CM

## 2019-06-26 DIAGNOSIS — R33.9 INCOMPLETE BLADDER EMPTYING: ICD-10-CM

## 2019-06-26 DIAGNOSIS — N39.0 RECURRENT UTI: ICD-10-CM

## 2019-06-26 LAB
-: ABNORMAL
AMORPHOUS: ABNORMAL
BACTERIA: ABNORMAL
BILIRUBIN URINE: NEGATIVE
CASTS UA: ABNORMAL /LPF (ref 0–2)
COLOR: ABNORMAL
COMMENT UA: ABNORMAL
CRYSTALS, UA: ABNORMAL /HPF
EPITHELIAL CELLS UA: ABNORMAL /HPF (ref 0–5)
GLUCOSE URINE: NEGATIVE
KETONES, URINE: NEGATIVE
LEUKOCYTE ESTERASE, URINE: ABNORMAL
MUCUS: ABNORMAL
NITRITE, URINE: NEGATIVE
OTHER OBSERVATIONS UA: ABNORMAL
PH UA: 5 (ref 5–6)
PROTEIN UA: ABNORMAL
RBC UA: >50 /HPF (ref 0–4)
RENAL EPITHELIAL, UA: ABNORMAL /HPF
SPECIFIC GRAVITY UA: 1.03 (ref 1.01–1.02)
TRICHOMONAS: ABNORMAL
TURBIDITY: ABNORMAL
URINE HGB: ABNORMAL
UROBILINOGEN, URINE: NORMAL
WBC UA: >50 /HPF (ref 0–4)
YEAST: ABNORMAL

## 2019-06-26 PROCEDURE — 99214 OFFICE O/P EST MOD 30 MIN: CPT | Performed by: UROLOGY

## 2019-06-26 PROCEDURE — G8417 CALC BMI ABV UP PARAM F/U: HCPCS | Performed by: UROLOGY

## 2019-06-26 PROCEDURE — 4040F PNEUMOC VAC/ADMIN/RCVD: CPT | Performed by: UROLOGY

## 2019-06-26 PROCEDURE — 1123F ACP DISCUSS/DSCN MKR DOCD: CPT | Performed by: UROLOGY

## 2019-06-26 PROCEDURE — G8598 ASA/ANTIPLAT THER USED: HCPCS | Performed by: UROLOGY

## 2019-06-26 PROCEDURE — G8428 CUR MEDS NOT DOCUMENT: HCPCS | Performed by: UROLOGY

## 2019-06-26 PROCEDURE — 1036F TOBACCO NON-USER: CPT | Performed by: UROLOGY

## 2019-06-26 RX ORDER — NITROFURANTOIN 25; 75 MG/1; MG/1
100 CAPSULE ORAL DAILY
Qty: 90 CAPSULE | Refills: 3 | Status: SHIPPED | OUTPATIENT
Start: 2019-06-26 | End: 2020-01-08 | Stop reason: SDUPTHER

## 2019-06-26 RX ORDER — TAMSULOSIN HYDROCHLORIDE 0.4 MG/1
0.4 CAPSULE ORAL 2 TIMES DAILY
Qty: 180 CAPSULE | Refills: 0 | Status: SHIPPED | OUTPATIENT
Start: 2019-06-26 | End: 2019-12-23 | Stop reason: SDUPTHER

## 2019-06-26 NOTE — PROGRESS NOTES
Ema Knowles MD   Urology Clinic follow up      Patient:  Audrey Barrientos  YOB: 1934  Date: 6/26/2019    HISTORY OF PRESENT ILLNESS:   The patient is a 80 y.o. male who presents today for evaluation of the following problem(s): hematuria, BPH, retention  Overall the problem(s) : show no change. Associated Symptoms: No dysuria, gross hematuria. Pain Severity:      Summary of old records: Prostate:  >50 grams, hypervascular, obstructive  Bladder: No tumor noted. High median bar, multiple diverticula, cellules  Neg hematuria work up in the past  On flomax     prev E. Coli UTI that was treated, urinary retention of unknown amount, sanchez placed   [place] that he was emptying well prior to urgent care visit  Poor ECOG  (Patient's old records, notes and chart reviewed and summarized above.)        Today:  He states no infection or pain in bladder over past 6 months  He says he is happy with his urination  flomax BID  No gross hematuria      Last several PSA's:  Lab Results   Component Value Date    PSA 6.63 (H) 12/28/2016    PSA 4.73 (H) 12/30/2015    PSA 6.28 (H) 01/06/2015       Last total testosterone:  No results found for: TESTOSTERONE    Urinalysis today:  No results found for this visit on 06/26/19.       Last BUN and creatinine:  Lab Results   Component Value Date    BUN 14 06/06/2019     Lab Results   Component Value Date    CREATININE 1.09 06/06/2019       Imaging Reviewed during this Office Visit:   (results were independently reviewed by physician and radiology report verified)    PAST MEDICAL, FAMILY AND SOCIAL HISTORY:  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 LIMA TO LAD, SVG TO DIAGONAL2    Dementia associated with 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 behavioral disturbance 9/21/2018    Dermatophytosis of nail 1/10/2014    Diverticulosis     Elevated PSA     Hemorrhoids     History of measles childhood    History of mumps childhood    Hyperlipidemia     Hypertension     Hypothyroidism 9/4/2018    Mass of upper lobe of right lung 2/21/2018    Occult blood positive stool     refuses colonoscopy    Osteoarthritis     Overweight     Paroxysmal atrial fibrillation ([address] 11/7/2013    Pneumonia due to organism     Pseudophakia, left eye     PSVT (paroxysmal supraventricular tachycardia) ([address]     Recurrent UTI 3/13/2018    Retinal detachment     left, history of     Seborrheic dermatitis     Sepsis due to urinary tract infection ([address] 8/15/2018    Urinary tract infection without hematuria 3/28/2018     Past Surgical History:   Procedure Laterality Date    ABSCESS DRAINAGE  5502-2270    multiple    APPENDECTOMY  [address] and [facility] Left 5/7/2013    CORONARY ARTERY BYPASS GRAFT      RETINAL LASER Left 12/22/2011    detached retina    SKIN TAG REMOVAL  Nov 1999    TONSILLECTOMY       Family History   Problem Relation Age of Onset    Diabetes Mother     Osteoporosis Mother     Other Father         complications of prostate surgery (bleeding)    Stroke Sister     Other Sister         respiratory failure     Outpatient Medications Marked as Taking for the 6/26/19 encounter (Office Visit) with Tom Silva MD   Medication Sig Dispense Refill    nitrofurantoin, macrocrystal-monohydrate, (MACROBID) 100 MG capsule Take 1 capsule by mouth daily 90 capsule 3    tamsulosin (FLOMAX) 0.4 MG capsule Take 1 capsule by mouth 2 times daily 180 capsule 0    ALPRAZolam (XANAX) 0.25 MG tablet Take 0.25 mg by mouth nightly as needed for Sleep.  Give 1/2 tablet by mouth in am  And give whole tablet by mouth at negative  Respiratory: negative  Cardiovascular: negative  Gastrointestinal: negative  Musculoskeletal: negative  Genitourinary: negative except for what is in HPI  Skin: negative   Neurological: negative  Hematological/Lymphatic: negative  Psychological: negative    Physical Exam:    This a 80 y.o. male   Vitals:    06/26/19 1050   BP: 120/70   Pulse: 67     Constitutional: Patient in no acute distress; Neuro: alert and oriented to person place and time. Psych: Mood and affect normal.  Skin: Normal        Assessment and Plan      1. Lower urinary tract symptoms (LUTS)    2. Elevated PSA- due to enlarged benign prostate    3. Benign non-nodular prostatic hyperplasia with lower urinary tract symptoms     4. Recurrent UTI    5. Incomplete bladder emptying    6. Urinary retention           Plan:       BPH- stable overall, no symptomatic UTI's; Content  Flomax BID for LUTS  Recurrent UTIs-  Continue macrobid daily  Return in about 6 months (around 12/26/2019).            Freddy Torres MD  [facility] Urology

## 2019-06-27 LAB
CULTURE: NORMAL
Lab: NORMAL
SPECIMEN DESCRIPTION: NORMAL

## 2019-09-23 ENCOUNTER — OFFICE VISIT (OUTPATIENT)
Dept: INTERNAL MEDICINE | Age: 84
End: 2019-09-23
Payer: MEDICARE

## 2019-09-23 VITALS
DIASTOLIC BLOOD PRESSURE: 60 MMHG | HEIGHT: 71 IN | HEART RATE: 80 BPM | SYSTOLIC BLOOD PRESSURE: 118 MMHG | BODY MASS INDEX: 30.66 KG/M2 | WEIGHT: 219 LBS | RESPIRATION RATE: 16 BRPM

## 2019-09-23 DIAGNOSIS — E78.2 MIXED HYPERLIPIDEMIA: ICD-10-CM

## 2019-09-23 DIAGNOSIS — F41.9 ANXIETY: ICD-10-CM

## 2019-09-23 DIAGNOSIS — I10 ESSENTIAL HYPERTENSION: Primary | ICD-10-CM

## 2019-09-23 DIAGNOSIS — F02.80 DEMENTIA ASSOCIATED WITH OTHER UNDERLYING DISEASE WITHOUT BEHAVIORAL DISTURBANCE (HCC): ICD-10-CM

## 2019-09-23 DIAGNOSIS — I25.10 CORONARY ARTERY DISEASE INVOLVING NATIVE CORONARY ARTERY OF NATIVE HEART WITHOUT ANGINA PECTORIS: ICD-10-CM

## 2019-09-23 DIAGNOSIS — I50.22 CHRONIC SYSTOLIC CHF (CONGESTIVE HEART FAILURE) (HCC): ICD-10-CM

## 2019-09-23 DIAGNOSIS — M15.9 PRIMARY OSTEOARTHRITIS INVOLVING MULTIPLE JOINTS: ICD-10-CM

## 2019-09-23 DIAGNOSIS — E03.4 HYPOTHYROIDISM DUE TO ACQUIRED ATROPHY OF THYROID: ICD-10-CM

## 2019-09-23 DIAGNOSIS — J30.89 NON-SEASONAL ALLERGIC RHINITIS, UNSPECIFIED TRIGGER: ICD-10-CM

## 2019-09-23 DIAGNOSIS — I48.0 PAROXYSMAL ATRIAL FIBRILLATION (HCC): ICD-10-CM

## 2019-09-23 DIAGNOSIS — E66.9 CLASS 1 OBESITY WITH SERIOUS COMORBIDITY AND BODY MASS INDEX (BMI) OF 30.0 TO 30.9 IN ADULT, UNSPECIFIED OBESITY TYPE: ICD-10-CM

## 2019-09-23 PROCEDURE — 1123F ACP DISCUSS/DSCN MKR DOCD: CPT | Performed by: INTERNAL MEDICINE

## 2019-09-23 PROCEDURE — 0518F FALL PLAN OF CARE DOCD: CPT | Performed by: INTERNAL MEDICINE

## 2019-09-23 PROCEDURE — G8417 CALC BMI ABV UP PARAM F/U: HCPCS | Performed by: INTERNAL MEDICINE

## 2019-09-23 PROCEDURE — 99214 OFFICE O/P EST MOD 30 MIN: CPT | Performed by: INTERNAL MEDICINE

## 2019-09-23 PROCEDURE — G8427 DOCREV CUR MEDS BY ELIG CLIN: HCPCS | Performed by: INTERNAL MEDICINE

## 2019-09-23 PROCEDURE — G8598 ASA/ANTIPLAT THER USED: HCPCS | Performed by: INTERNAL MEDICINE

## 2019-09-23 PROCEDURE — 4040F PNEUMOC VAC/ADMIN/RCVD: CPT | Performed by: INTERNAL MEDICINE

## 2019-09-23 PROCEDURE — 1036F TOBACCO NON-USER: CPT | Performed by: INTERNAL MEDICINE

## 2019-09-23 PROCEDURE — 3288F FALL RISK ASSESSMENT DOCD: CPT | Performed by: INTERNAL MEDICINE

## 2019-09-23 NOTE — PROGRESS NOTES
DR. Haseeb Suero - PROGRESS NOTE    CHIEF COMPLAINT/HISTORY OF CHIEF COMPLAINT: This 80 y.o.  male comes in today for ongoing evaluation and management of his hypertension, hyperlipidemia, hypothyroidism, coronary artery disease, congestive heart failure, paroxysmal atrial fibrillation, paroxysmal supraventricular tachycardia, anxiety, osteoarthritis, dementia, and being overweight. He is having increased problems with a runny nose. The Flonase does not seem to be helping very well. He is on Norvasc, Imdur, Lopressor, amiodarone, lisinopril, Xarelto, and aspirin for hypertension, coronary artery disease, congestive heart failure, and paroxysmal atrial fibrillation. He sees the cardiologists here for those conditions too. He denies any chest pain, dizziness, or palpitations. He sees Dr. Cathryn George for benign prostatic hypertrophy, for which he is on Flomax. He takes simvastatin for hyperlipidemia. He denies any muscle aches from the simvastatin. He takes Synthroid for hypothyroidism. He has not had any fatigue or temperature intolerance. He uses Xanax for anxiety, which has been stable. He uses glucosamine and chondroitin sulfate for arthritis, which has not bothered him lately. He sees Dr. Doris Thomas for his dementia.  Otherwise he seems to be doing fairly well and denies any other complaints.       ALLERGIES/INTOLERANCES:   Allergies   Allergen Reactions    Pcn [Penicillins] Swelling     As a child  Pt tolerated ceftriaxone , and keflex with no intolerance    Sulfa Antibiotics     Cefdinir Other (See Comments)     Pt tolerated ceftriaxone , and keflex with no intolerance       MEDICATIONS:   Outpatient Medications Marked as Taking for the 9/23/19 encounter (Office Visit) with Laura Martínez, DO   Medication Sig Dispense Refill    nitrofurantoin, macrocrystal-monohydrate, (MACROBID) 100 MG capsule Take 1 capsule by mouth daily 90 capsule 3    tamsulosin (FLOMAX) 0.4 MG capsule Take 1 capsule by night sweats  Skin: negative for - pruritus or rash  Head: Negative for: headache or recent history of head trauma  Ear, Nose, Throat: positive for - nasal discharge  negative for - epistaxis, nasal congestion, sore throat, tinnitus or vertigo  Cardiovascular: negative for - chest pain, dyspnea on exertion or shortness of breath  Respiratory: negative for - cough, hemoptysis or wheezing  Gastrointestinal: negative for - constipation, diarrhea or nausea/vomiting  Genitourinary: negative for - dysuria, hematuria or nocturia  Musculoskeletal: positive for - joint pain  negative for - muscle pain or muscular weakness  Neurologic: positive for - memory loss  negative for - gait disturbance, numbness/tingling, seizures or tremors  Psychiatric: positive for - anxiety  negative for - depression     PHYSICAL EXAMINATION:    Wt Readings from Last 2 Encounters:   09/23/19 219 lb (99.3 kg)   06/26/19 214 lb 12.8 oz (97.4 kg)       Vitals:    09/23/19 1301   BP: 118/60   Site: Right Upper Arm   Position: Sitting   Cuff Size: Medium Adult   Pulse: 80   Resp: 16   Weight: 219 lb (99.3 kg)   Height: 5' 11\" (1.803 m)     Body mass index is 30.54 kg/m². General: This is a 80 y.o.  male who is alert and oriented to person, place and time. He appears to be his stated age and does not appear to be in any acute distress. Skin: Skin color, texture, turgor normal. No rashes or lesions. HEENT/Neck: Head: Normal, normocephalic, atraumatic. Eye: Normal external eye, conjunctiva, lids cornea, HAROON. Ears: Normal TM's bilaterally. Normal auditory canals and external ears. Non-tender. Nose: Normal external nose, mucus membranes and septum. Pharynx: Dental Hygiene adequate. Normal buccal mucosa. Normal pharynx. Neck / Thyroid: Supple, no masses, nodes, nodules or enlargement. Lungs: Normal - CTA without rales, rhonchi, or wheezing. Heart: regular rate and rhythm, S1, S2 normal, with a grade 2/6 systolic murmur.  No click, rub or gallop No S3 or S4. Abdomen: Obese, soft, non-distended, non-tender, normal active bowel sounds, no masses palpated and no hepatosplenomegaly  Extremities: There is no clubbing, cyanosis, or edema in any of the extremities. Neurologic:  cranial nerves II-XII are grossly intact  Osteopathic Structural Examination: Patient was examined in the seated and standing positions. There was full range of motion in the cervical, thoracic, and lumbar spines. No scoliosis. No change in thoracic kyphosis or lumbar lordosis. No increased paravertebral muscle tenderness. ASSESSMENT/PLAN:    1. Essential hypertension, controlled  - He will continue to take his antihypertensive medication     2. Non-seasonal allergic rhinitis, unspecified trigger, worse  - We will increase his Flonase to 2 sprays to each nostril daily    3. Mixed hyperlipidemia, controlled  - He will continue to take Zocor    4. Hypothyroidism due to acquired atrophy of thyroid, stable  - He will continue to take Synthroid    5. Coronary artery disease involving native coronary artery of native heart without angina pectoris, stable  6. Chronic systolic CHF (congestive heart failure) ([address] stable  7. Paroxysmal atrial fibrillation ([address] stable  - He will continue to see the cardiologists    8. Primary osteoarthritis involving multiple joints, stable  - We will continue to monitor     9. Dementia associated with 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 behavioral disturbance, stable  - He will continue to see Dr. Codey Morton    10. Anxiety, stable  - We will continue to monitor     11. Class 1 obesity with serious comorbidity and body mass index (BMI) of 30.0 to 30.9 in adult, unspecified obesity type, stable  - We discussed weight loss  - He will continue to watch his diet and exercise       No orders of the defined types were placed in this encounter.       Requested Prescriptions      No prescriptions requested or ordered in this encounter       He seems to be doing

## 2019-09-23 NOTE — PATIENT INSTRUCTIONS
Patient Education        Eating Healthy Foods: Care Instructions  Your Care Instructions    Eating healthy foods can help lower your risk for disease. Healthy food gives you energy and keeps your heart strong, your brain active, your muscles working, and your bones strong. A healthy diet includes a variety of foods from the basic food groups: grains, vegetables, fruits, milk and milk products, and meat and beans. Some people may eat more of their favorite foods from only one food group and, as a result, miss getting the nutrients they need. So, it is important to pay attention not only to what you eat but also to what you are missing from your diet. You can eat a healthy, balanced diet by making a few small changes. Follow-up care is a key part of your treatment and safety. Be sure to make and go to all appointments, and call your doctor if you are having problems. It's also a good idea to know your test results and keep a list of the medicines you take. How can you care for yourself at home? Look at what you eat  · Keep a food diary for a week or two and record everything you eat or drink. Track the number of servings you eat from each food group. · For a balanced diet every day, eat a variety of:  ? 6 or more ounce-equivalents of grains, such as cereals, breads, crackers, rice, or pasta, every day. An ounce-equivalent is 1 slice of bread, 1 cup of ready-to-eat cereal, or ½ cup of cooked rice, cooked pasta, or cooked cereal.  ? 2½ cups of vegetables, especially:  § Dark-green vegetables such as broccoli and spinach. § Orange vegetables such as carrots and sweet potatoes. § Dry beans (such as casillas and kidney beans) and peas (such as lentils). ? 2 cups of fresh, frozen, or canned fruit. A small apple or 1 banana or orange equals 1 cup. ? 3 cups of nonfat or low-fat milk, yogurt, or other milk products. ? 5½ ounces of meat and beans, such as chicken, fish, lean meat, beans, nuts, and seeds.  One egg, 1 appointments, and call your doctor if you are having problems. It's also a good idea to know your test results and keep a list of the medicines you take. How can you care for yourself at home? Getting started  · Start slowly and set a short-term goal. For example, walk for 5 or 10 minutes every day. · Bit by bit, increase the amount you walk every day. Try for at least 30 minutes on most days of the week. You also may want to swim, bike, or do other activities. · If finding enough time is a problem, it is fine to be active in blocks of 10 minutes or more throughout your day and week. · To get the heart-healthy benefits of walking, you need to walk briskly enough to increase your heart rate and breathing, but not so fast that you cannot talk comfortably. · Wear comfortable shoes that fit well and provide good support for your feet and ankles. Staying with your plan  · After you've made walking a habit, set a longer-term goal. You may want to set a goal of walking briskly for longer or walking farther. Experts say to do 2½ hours of moderate activity a week. A faster heartbeat is what defines moderate-level activity. · To stay motivated, walk with friends, coworkers, or pets. · Use a phone gordon or pedometer to track your steps each day. Set a goal to increase your steps. Once you get there, set a higher goal. Aim for 10,000 steps a day. · If the weather keeps you from walking outside, go for walks at the mall with a friend. Local schools and churches may have indoor gyms where you can walk. Fitting a walk into your workday  · Park several blocks away from work, or get off the bus a few stops early. · Use the stairs instead of the elevator, at least for a few floors. · Suggest holding meetings with colleagues during a walk inside or outside the building. · Use the restroom that is the farthest from your desk or workstation. · Use your morning and afternoon breaks to take quick 15-minute walks.   Staying

## 2019-09-26 ENCOUNTER — OFFICE VISIT (OUTPATIENT)
Dept: CARDIOLOGY | Age: 84
End: 2019-09-26
Payer: MEDICARE

## 2019-09-26 ENCOUNTER — OUTSIDE SERVICES (OUTPATIENT)
Dept: INTERNAL MEDICINE | Age: 84
End: 2019-09-26
Payer: MEDICARE

## 2019-09-26 ENCOUNTER — TELEPHONE (OUTPATIENT)
Dept: INTERNAL MEDICINE | Age: 84
End: 2019-09-26

## 2019-09-26 VITALS
HEART RATE: 67 BPM | OXYGEN SATURATION: 96 % | TEMPERATURE: 97.7 F | DIASTOLIC BLOOD PRESSURE: 62 MMHG | SYSTOLIC BLOOD PRESSURE: 105 MMHG | RESPIRATION RATE: 16 BRPM

## 2019-09-26 VITALS
RESPIRATION RATE: 16 BRPM | WEIGHT: 213.4 LBS | HEART RATE: 72 BPM | DIASTOLIC BLOOD PRESSURE: 60 MMHG | SYSTOLIC BLOOD PRESSURE: 118 MMHG | BODY MASS INDEX: 29.88 KG/M2 | HEIGHT: 71 IN

## 2019-09-26 DIAGNOSIS — I48.0 PAROXYSMAL ATRIAL FIBRILLATION (HCC): Primary | ICD-10-CM

## 2019-09-26 DIAGNOSIS — L60.0 INGROWN TOENAIL OF LEFT FOOT: Primary | ICD-10-CM

## 2019-09-26 PROCEDURE — G8598 ASA/ANTIPLAT THER USED: HCPCS | Performed by: INTERNAL MEDICINE

## 2019-09-26 PROCEDURE — 0518F FALL PLAN OF CARE DOCD: CPT | Performed by: INTERNAL MEDICINE

## 2019-09-26 PROCEDURE — 4040F PNEUMOC VAC/ADMIN/RCVD: CPT | Performed by: INTERNAL MEDICINE

## 2019-09-26 PROCEDURE — 93000 ELECTROCARDIOGRAM COMPLETE: CPT | Performed by: INTERNAL MEDICINE

## 2019-09-26 PROCEDURE — 99214 OFFICE O/P EST MOD 30 MIN: CPT | Performed by: INTERNAL MEDICINE

## 2019-09-26 PROCEDURE — G8417 CALC BMI ABV UP PARAM F/U: HCPCS | Performed by: INTERNAL MEDICINE

## 2019-09-26 PROCEDURE — 1123F ACP DISCUSS/DSCN MKR DOCD: CPT | Performed by: INTERNAL MEDICINE

## 2019-09-26 PROCEDURE — 3288F FALL RISK ASSESSMENT DOCD: CPT | Performed by: INTERNAL MEDICINE

## 2019-09-26 PROCEDURE — 1036F TOBACCO NON-USER: CPT | Performed by: INTERNAL MEDICINE

## 2019-09-26 PROCEDURE — G8427 DOCREV CUR MEDS BY ELIG CLIN: HCPCS | Performed by: INTERNAL MEDICINE

## 2019-09-26 RX ORDER — DOXYCYCLINE HYCLATE 100 MG/1
100 CAPSULE ORAL 2 TIMES DAILY
Qty: 20 CAPSULE | Refills: 0
Start: 2019-09-26 | End: 2019-10-06

## 2019-09-26 ASSESSMENT — ENCOUNTER SYMPTOMS
RHINORRHEA: 0
DIARRHEA: 0
NAUSEA: 0
VOMITING: 0
COUGH: 0
WHEEZING: 0

## 2019-09-26 NOTE — PROGRESS NOTES
Historical Provider, MD   GLUCOSAMINE-CHONDROITIN PO Take 3 tablets by mouth Daily. Historical Provider, MD       Allergies:  Pcn [penicillins]; Sulfa antibiotics; and Cefdinir    Social History:   reports that he has never smoked. He has never used smokeless tobacco. He reports that he does not drink alcohol or use drugs. REVIEW OF SYSTEMS:  CONSTITUTIONAL:NEGATIVE  HEENT:NEG  Cardiovascular: No chest pain, Yes dyspnea on exertion, No palpitations. Lower extremity edema: No  RESPIRATORY: MONTIEL  GASTROINTESTINAL:  negative  GENITOURINARY:  negative  INTEGUMENT:  negative  MUSCULOSKELETAL:  positive for  pain  NEUROLOGICAL:  negative    PHYSICAL EXAM:      /60 (Site: Right Upper Arm, Position: Sitting, Cuff Size: Large Adult)   Pulse 72   Resp 16   Ht 5' 11\" (1.803 m)   Wt 213 lb 6.4 oz (96.8 kg)   BMI 29.76 kg/m²    HEENT: PERRL, no cervical lymphadenopathy. No masses palpable. Cardiovascular:  · The apical impulse is not displaced  · Heart  Sounds:RRR, S4  · Jugular venous pulsation Normal  · The carotid upstroke is NL  · Peripheral pulses are symmetrical and full  Respiratory: Good respiratory effort. On auscultation: clear to auscultation bilaterally  Abdomen:  · No masses or tenderness  · Bowel sounds present  Extremities:  ·  No Cyanosis or Clubbing  ·  Lower extremity edema: Yes, TRACE  Skin: Warm and dry    Cardiac data:    EKG: Sinus  Rhythm  -First degree A-V block   Brayan = 304  -Left axis -anterior fascicular block.    -Poor R-wave progression -nonspecific -consider old anterior infarct.    -  Nonspecific T-abnormality. ABNORMAL     Labs:     CBC: No results for input(s): WBC, HGB, HCT, PLT in the last 72 hours. BMP: No results for input(s): NA, K, CO2, BUN, CREATININE, LABGLOM, GLUCOSE in the last 72 hours. PT/INR: No results for input(s): PROTIME, INR in the last 72 hours.   FASTING LIPID PANEL:  Lab Results   Component Value Date    HDL 41 06/06/2019    TRIG 132 06/06/2019     LIVER

## 2019-09-30 ENCOUNTER — PROCEDURE VISIT (OUTPATIENT)
Dept: PODIATRY | Age: 84
End: 2019-09-30
Payer: MEDICARE

## 2019-09-30 VITALS
HEIGHT: 71 IN | SYSTOLIC BLOOD PRESSURE: 114 MMHG | BODY MASS INDEX: 30.1 KG/M2 | HEART RATE: 64 BPM | WEIGHT: 215 LBS | DIASTOLIC BLOOD PRESSURE: 68 MMHG

## 2019-09-30 DIAGNOSIS — L60.0 OC (ONYCHOCRYPTOSIS): Primary | ICD-10-CM

## 2019-09-30 DIAGNOSIS — L03.032 PARONYCHIA, TOE, LEFT: ICD-10-CM

## 2019-09-30 PROCEDURE — 11750 EXCISION NAIL&NAIL MATRIX: CPT | Performed by: PODIATRIST

## 2019-09-30 PROCEDURE — 99999 PR OFFICE/OUTPT VISIT,PROCEDURE ONLY: CPT | Performed by: PODIATRIST

## 2019-09-30 NOTE — PATIENT INSTRUCTIONS
Patient Instructions: Ingrown Toe Nail Removal    1. Antibiotic ointment was applied to the toe immediately after the procedure. The ointment is soothing and helps the toe to heal faster. You should apply the antibiotic ointment twice daily until the wound is completely healed. 2. Leave your bandage clean and dry for the remainder of the day. Beginning tomorrow you may remove bandage and shower. Following your shower, soak the toe in warm water and epsom salts for 10 min - perform the epsom salt soaks twice daily for 1 week. After that time perform the soaking once per day for the second week. Gently dry the area and apply antibiotic ointment. Avoid baths and swimming pools for the next 2 weeks. 3. Your bandage will help to pad and protect the wound, while absorbing drainage from the wound. The toe may drain clear fluid for up to 2 weeks (this is normal). You can replace the bandage if blood or fluid soaks the bandage. 4. You may experience some pain after the procedure. If you experience discomfort, elevate and ice your foot. You may take over the counter Tylenol (Acetaminophen) two 325-mg tablets every 4 hours as needed. 5. You should wear loose-fitting shoes or sneakers for the first 2 weeks after the procedure. You may perform activity to tolerance. 6. If you notice any signs of infection including: increased temperature/swelling/redness, thick yellow drainage, fever/chills/nausea/vomiting, please contact the office as soon as possible.

## 2019-09-30 NOTE — PROGRESS NOTES
Subjective:  Leann Barerto is a 80 y.o. male who presents to the office today complaining of an ingrown nail. Symptoms beganweek(s) ago. Patient relates pain is Present. Pain is rated 2 out of 10 and is described as mild. Treatments prior to today's visit include: none. Currently denies F/C/N/V. Allergies   Allergen Reactions    Pcn [Penicillins] Swelling     As a child  Pt tolerated ceftriaxone , and keflex with no intolerance    Sulfa Antibiotics     Cefdinir Other (See Comments)     Pt tolerated ceftriaxone , and keflex with no intolerance       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 LIMA TO LAD, SVG TO DIAGONAL2    Dementia associated with 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 behavioral disturbance 9/21/2018    Dermatophytosis of nail 1/10/2014    Diverticulosis     Elevated PSA     Hemorrhoids     History of measles childhood    History of mumps childhood    Hyperlipidemia     Hypertension     Hypothyroidism 9/4/2018    Mass of upper lobe of right lung 2/21/2018    Occult blood positive stool     refuses colonoscopy    Osteoarthritis     Overweight     Paroxysmal atrial fibrillation ([address] 11/7/2013    Pneumonia due to organism     Pseudophakia, left eye     PSVT (paroxysmal supraventricular tachycardia) ([address]     Recurrent UTI 3/13/2018    Retinal detachment     left, history of     Seborrheic dermatitis     Sepsis due to urinary tract infection ([address] 8/15/2018    Urinary tract infection without hematuria 3/28/2018       Prior to Admission medications    Medication Sig Start Date End Date Taking?  Authorizing NENITA Cerda DO   acetaminophen (TYLENOL) 500 MG tablet Take 500 mg by mouth every 6 hours as needed for Pain   Yes Historical Provider, MD   Coenzyme Q-10 100 MG CAPS Take 100 mg by mouth every other day. Yes Historical Provider, MD   tolnaftate (TINACTIN) 1 % external solution Apply topically nightly to toenails per Dr. Shaun Stevens. Yes Historical Provider, MD   aspirin 81 MG tablet Take 81 mg by mouth daily. Yes Historical Provider, MD   GLUCOSAMINE-CHONDROITIN PO Take 3 tablets by mouth Daily. Yes Historical Provider, MD   tamsulosin (FLOMAX) 0.4 MG capsule Take 1 capsule by mouth 2 times daily 6/26/19 9/24/19  Gabino Major MD     Review of Systems: All 12 systems reviewed and pertinent positives noted above. Objective:  Patient is alert and oriented. Vascular: DP and PT pulses palpable 2/4, bilateral.  CFT <3 seconds, bilateral.  Hair growth present to the level of the digits, bilateral.  Edema present, bilateral.  Varicosities present, bilateral. Erythema absent, bilateral. Distal Rubor absent bilateral.  Temperature within normal limits bilateral. Hyperpigmentation present bilateral. Atrophic skin yes. Neuro: Protective sensation is intact. Reflexes WNL. Musculoskeletal:  Pain present upon palpation of left, hallux. Muscle strength 5/5, Bilateral. within normal limits medial longitudinal arch, Bilateral. ROM WNL. Integument: Onychocryptosis present left, lateral, medial, hallux. Granuloma is present left, lateral, medial, hallux. Paronychia changes with drainage and crust are present left, lateral, medial, hallux. Skin color, texture, turgor normal. No rashes or lesions. .    Assessment:   Diagnosis Orders   1. OC (onychocryptosis)  IA REMOVAL OF NAIL BED   2. Paronychia, toe, left  IA REMOVAL OF NAIL BED         Plan:  Patient was educated on all treatment options.   Risks and complications were discussed with the patient and they opted for a phenol matrixectomy nail procedure on the left, lateral, medial, hallux. Verbal and signed consent took place. A total of 3cc 1% lidocaine plain was used to anesthetize the left, lateral, medial, hallux. It was then prepped and draped in the usual sterile manner. Anesthesia was checked and was adequate. The left whole nail removed due to deformity then L lateral, medial, hallux nail border and matrix was removed. No remaining nail spicules. Three consecutive 30 seconds applications of 86% phenol were then applied to the nail matrix with an applicator. Rinsed with normal saline. A dry sterile dressing of silvadene or antibiotic ointment with telfa and coban took place. Patient will leave dry and intact for 24 hours then start soaking bid in warm soapy water or epsom salts then apply the ointment and a band aid for the first week then continue once per day for the second week. Patient will use OTC anti-inflammatory or tylenol PRN for pain. Post nail procedure instructions were given. Any signs of infections and patient should be see back in the office immediately.

## 2019-10-24 ENCOUNTER — OUTSIDE SERVICES (OUTPATIENT)
Dept: PODIATRY | Age: 84
End: 2019-10-24
Payer: MEDICARE

## 2019-10-24 VITALS
RESPIRATION RATE: 16 BRPM | DIASTOLIC BLOOD PRESSURE: 65 MMHG | HEART RATE: 68 BPM | TEMPERATURE: 97.1 F | SYSTOLIC BLOOD PRESSURE: 103 MMHG

## 2019-10-24 DIAGNOSIS — M79.675 PAIN IN TOES OF BOTH FEET: ICD-10-CM

## 2019-10-24 DIAGNOSIS — F41.9 ANXIETY: Primary | ICD-10-CM

## 2019-10-24 DIAGNOSIS — B35.1 DERMATOPHYTOSIS OF NAIL: Primary | ICD-10-CM

## 2019-10-24 DIAGNOSIS — M79.674 PAIN IN TOES OF BOTH FEET: ICD-10-CM

## 2019-10-24 PROCEDURE — 11720 DEBRIDE NAIL 1-5: CPT | Performed by: PODIATRIST

## 2019-10-25 RX ORDER — ALPRAZOLAM 0.25 MG/1
TABLET ORAL
Qty: 45 TABLET | Refills: 0 | Status: SHIPPED | OUTPATIENT
Start: 2019-10-25 | End: 2019-11-25 | Stop reason: SDUPTHER

## 2019-10-25 RX ORDER — ALPRAZOLAM 0.25 MG/1
0.25 TABLET ORAL
Qty: 45 TABLET | Refills: 3 | OUTPATIENT
Start: 2019-10-25 | End: 2019-11-24

## 2019-11-18 ENCOUNTER — TELEPHONE (OUTPATIENT)
Dept: INTERNAL MEDICINE | Age: 84
End: 2019-11-18

## 2019-11-19 ENCOUNTER — HOSPITAL ENCOUNTER (OUTPATIENT)
Age: 84
Setting detail: SPECIMEN
Discharge: HOME | End: 2019-11-19
Payer: MEDICARE

## 2019-11-19 PROCEDURE — 81001 URINALYSIS AUTO W/SCOPE: CPT

## 2019-11-25 ENCOUNTER — TELEPHONE (OUTPATIENT)
Dept: INTERNAL MEDICINE | Age: 84
End: 2019-11-25

## 2019-11-25 DIAGNOSIS — F41.9 ANXIETY: ICD-10-CM

## 2019-11-25 RX ORDER — ALPRAZOLAM 0.25 MG/1
TABLET ORAL
Qty: 45 TABLET | Refills: 0 | Status: SHIPPED | OUTPATIENT
Start: 2019-11-25 | End: 2019-12-26

## 2019-11-26 ENCOUNTER — OUTSIDE SERVICES (OUTPATIENT)
Dept: INTERNAL MEDICINE | Age: 84
End: 2019-11-26

## 2019-12-06 ENCOUNTER — OFFICE VISIT (OUTPATIENT)
Dept: OPHTHALMOLOGY | Age: 84
End: 2019-12-06
Payer: MEDICARE

## 2019-12-06 DIAGNOSIS — H35.3132 INTERMEDIATE STAGE NONEXUDATIVE AGE-RELATED MACULAR DEGENERATION OF BOTH EYES: Primary | ICD-10-CM

## 2019-12-06 DIAGNOSIS — H31.002 RETINAL SCAR OF LEFT EYE: ICD-10-CM

## 2019-12-06 DIAGNOSIS — Z96.1 PSEUDOPHAKIA OF LEFT EYE: ICD-10-CM

## 2019-12-06 DIAGNOSIS — D31.31 CHOROIDAL NEVUS OF RIGHT EYE: ICD-10-CM

## 2019-12-06 DIAGNOSIS — H25.811 COMBINED FORMS OF AGE-RELATED CATARACT OF RIGHT EYE: ICD-10-CM

## 2019-12-06 DIAGNOSIS — F02.80 DEMENTIA ASSOCIATED WITH OTHER UNDERLYING DISEASE WITHOUT BEHAVIORAL DISTURBANCE (HCC): ICD-10-CM

## 2019-12-06 PROCEDURE — 99213 OFFICE O/P EST LOW 20 MIN: CPT | Performed by: OPHTHALMOLOGY

## 2019-12-06 PROCEDURE — 92134 CPTRZ OPH DX IMG PST SGM RTA: CPT | Performed by: OPHTHALMOLOGY

## 2019-12-06 RX ORDER — PHENYLEPHRINE HCL 2.5 %
1 DROPS OPHTHALMIC (EYE) ONCE
Status: COMPLETED | OUTPATIENT
Start: 2019-12-06 | End: 2019-12-06

## 2019-12-06 RX ORDER — TROPICAMIDE 10 MG/ML
1 SOLUTION/ DROPS OPHTHALMIC ONCE
Status: COMPLETED | OUTPATIENT
Start: 2019-12-06 | End: 2019-12-06

## 2019-12-06 RX ORDER — PHENYLEPHRINE HYDROCHLORIDE 100 MG/ML
1 SOLUTION/ DROPS OPHTHALMIC ONCE
Status: COMPLETED | OUTPATIENT
Start: 2019-12-06 | End: 2019-12-06

## 2019-12-06 RX ADMIN — Medication 1 DROP: at 14:44

## 2019-12-06 RX ADMIN — TROPICAMIDE 1 DROP: 10 SOLUTION/ DROPS OPHTHALMIC at 14:45

## 2019-12-06 RX ADMIN — PHENYLEPHRINE HYDROCHLORIDE 1 DROP: 100 SOLUTION/ DROPS OPHTHALMIC at 14:45

## 2019-12-06 ASSESSMENT — TONOMETRY
OS_IOP_MMHG: 13
OD_IOP_MMHG: 16
IOP_METHOD: NON-CONTACT AIR PUFF

## 2019-12-06 ASSESSMENT — VISUAL ACUITY
CORRECTION_TYPE: GLASSES
OS_CC: 20/25
OD_PH_CC: 20/30
OS_PH_CC: 20/25
METHOD: SNELLEN - LINEAR

## 2019-12-06 ASSESSMENT — ENCOUNTER SYMPTOMS
EYES NEGATIVE: 0
GASTROINTESTINAL NEGATIVE: 0
ALLERGIC/IMMUNOLOGIC NEGATIVE: 0
RESPIRATORY NEGATIVE: 0

## 2019-12-06 ASSESSMENT — SLIT LAMP EXAM - LIDS
COMMENTS: MILD BLEPHARITIS. MILD MGD
COMMENTS: MILD BLEPHARITIS. MILD MGD

## 2019-12-17 ENCOUNTER — TELEPHONE (OUTPATIENT)
Dept: INTERNAL MEDICINE | Age: 84
End: 2019-12-17

## 2019-12-17 ENCOUNTER — HOSPITAL ENCOUNTER (OUTPATIENT)
Age: 84
Discharge: HOME | End: 2019-12-17
Payer: MEDICARE

## 2019-12-17 ENCOUNTER — OFFICE VISIT (OUTPATIENT)
Dept: NEUROLOGY | Age: 84
End: 2019-12-17
Payer: MEDICARE

## 2019-12-17 VITALS
WEIGHT: 218 LBS | SYSTOLIC BLOOD PRESSURE: 120 MMHG | HEART RATE: 79 BPM | BODY MASS INDEX: 30.52 KG/M2 | DIASTOLIC BLOOD PRESSURE: 66 MMHG | HEIGHT: 71 IN | OXYGEN SATURATION: 81 %

## 2019-12-17 DIAGNOSIS — N40.1 BENIGN PROSTATIC HYPERPLASIA WITH INCOMPLETE BLADDER EMPTYING: ICD-10-CM

## 2019-12-17 DIAGNOSIS — I48.0 PAROXYSMAL ATRIAL FIBRILLATION (HCC): ICD-10-CM

## 2019-12-17 DIAGNOSIS — M15.9 PRIMARY OSTEOARTHRITIS INVOLVING MULTIPLE JOINTS: ICD-10-CM

## 2019-12-17 DIAGNOSIS — E66.9 CLASS 1 OBESITY WITH SERIOUS COMORBIDITY AND BODY MASS INDEX (BMI) OF 30.0 TO 30.9 IN ADULT, UNSPECIFIED OBESITY TYPE: ICD-10-CM

## 2019-12-17 DIAGNOSIS — I10 ESSENTIAL HYPERTENSION: ICD-10-CM

## 2019-12-17 DIAGNOSIS — I25.10 CORONARY ARTERY DISEASE INVOLVING NATIVE CORONARY ARTERY OF NATIVE HEART WITHOUT ANGINA PECTORIS: ICD-10-CM

## 2019-12-17 DIAGNOSIS — I50.22 CHRONIC SYSTOLIC CHF (CONGESTIVE HEART FAILURE) (HCC): ICD-10-CM

## 2019-12-17 DIAGNOSIS — E78.2 MIXED HYPERLIPIDEMIA: ICD-10-CM

## 2019-12-17 DIAGNOSIS — E03.4 HYPOTHYROIDISM DUE TO ACQUIRED ATROPHY OF THYROID: ICD-10-CM

## 2019-12-17 DIAGNOSIS — F02.80 DEMENTIA ASSOCIATED WITH OTHER UNDERLYING DISEASE WITHOUT BEHAVIORAL DISTURBANCE (HCC): Primary | ICD-10-CM

## 2019-12-17 DIAGNOSIS — R39.14 BENIGN PROSTATIC HYPERPLASIA WITH INCOMPLETE BLADDER EMPTYING: ICD-10-CM

## 2019-12-17 DIAGNOSIS — F41.9 ANXIETY: ICD-10-CM

## 2019-12-17 PROCEDURE — 1123F ACP DISCUSS/DSCN MKR DOCD: CPT | Performed by: PSYCHIATRY & NEUROLOGY

## 2019-12-17 PROCEDURE — 1036F TOBACCO NON-USER: CPT | Performed by: PSYCHIATRY & NEUROLOGY

## 2019-12-17 PROCEDURE — 99215 OFFICE O/P EST HI 40 MIN: CPT | Performed by: PSYCHIATRY & NEUROLOGY

## 2019-12-17 PROCEDURE — 0518F FALL PLAN OF CARE DOCD: CPT | Performed by: PSYCHIATRY & NEUROLOGY

## 2019-12-17 PROCEDURE — 4040F PNEUMOC VAC/ADMIN/RCVD: CPT | Performed by: PSYCHIATRY & NEUROLOGY

## 2019-12-17 PROCEDURE — 87086 URINE CULTURE/COLONY COUNT: CPT

## 2019-12-17 PROCEDURE — G8417 CALC BMI ABV UP PARAM F/U: HCPCS | Performed by: PSYCHIATRY & NEUROLOGY

## 2019-12-17 PROCEDURE — 3288F FALL RISK ASSESSMENT DOCD: CPT | Performed by: PSYCHIATRY & NEUROLOGY

## 2019-12-17 PROCEDURE — G8598 ASA/ANTIPLAT THER USED: HCPCS | Performed by: PSYCHIATRY & NEUROLOGY

## 2019-12-17 PROCEDURE — G8427 DOCREV CUR MEDS BY ELIG CLIN: HCPCS | Performed by: PSYCHIATRY & NEUROLOGY

## 2019-12-17 PROCEDURE — G8484 FLU IMMUNIZE NO ADMIN: HCPCS | Performed by: PSYCHIATRY & NEUROLOGY

## 2019-12-17 PROCEDURE — 81001 URINALYSIS AUTO W/SCOPE: CPT

## 2019-12-17 ASSESSMENT — ENCOUNTER SYMPTOMS
CHEST TIGHTNESS: 0
EYE REDNESS: 0
BACK PAIN: 1
EYE DISCHARGE: 0
SORE THROAT: 0
COLOR CHANGE: 0
WHEEZING: 0
BOWEL INCONTINENCE: 0
DIARRHEA: 0
NAUSEA: 0
CHOKING: 0
BLOOD IN STOOL: 0
VOICE CHANGE: 0
TROUBLE SWALLOWING: 0
PHOTOPHOBIA: 0
APNEA: 0
SINUS PRESSURE: 0
VISUAL CHANGE: 0
COUGH: 0
ABDOMINAL DISTENTION: 0
SHORTNESS OF BREATH: 0
VOMITING: 0
EYE ITCHING: 0
ABDOMINAL PAIN: 0
EYE PAIN: 0
CONSTIPATION: 0
FACIAL SWELLING: 0

## 2019-12-18 ENCOUNTER — TELEPHONE (OUTPATIENT)
Dept: INTERNAL MEDICINE | Age: 84
End: 2019-12-18

## 2019-12-18 LAB
-: ABNORMAL
AMORPHOUS: ABNORMAL
BACTERIA: ABNORMAL
BILIRUBIN URINE: NEGATIVE
CASTS UA: ABNORMAL /LPF (ref 0–2)
COLOR: NORMAL
COMMENT UA: NORMAL
CRYSTALS, UA: ABNORMAL /HPF
EPITHELIAL CELLS UA: ABNORMAL /HPF (ref 0–5)
GLUCOSE URINE: NEGATIVE
KETONES, URINE: NEGATIVE
LEUKOCYTE ESTERASE, URINE: NEGATIVE
MUCUS: ABNORMAL
NITRITE, URINE: NEGATIVE
OTHER OBSERVATIONS UA: ABNORMAL
PH UA: 5 (ref 5–6)
PROTEIN UA: NEGATIVE
RBC UA: ABNORMAL /HPF (ref 0–4)
RENAL EPITHELIAL, UA: ABNORMAL /HPF
SPECIFIC GRAVITY UA: 1.02 (ref 1.01–1.02)
TRICHOMONAS: ABNORMAL
TURBIDITY: NORMAL
URINE HGB: NEGATIVE
UROBILINOGEN, URINE: NORMAL
WBC UA: ABNORMAL /HPF (ref 0–4)
YEAST: ABNORMAL

## 2019-12-19 LAB
CULTURE: NO GROWTH
Lab: NORMAL
SPECIMEN DESCRIPTION: NORMAL

## 2019-12-23 ENCOUNTER — OFFICE VISIT (OUTPATIENT)
Dept: INTERNAL MEDICINE | Age: 84
End: 2019-12-23
Payer: MEDICARE

## 2019-12-23 VITALS
WEIGHT: 218.4 LBS | SYSTOLIC BLOOD PRESSURE: 128 MMHG | OXYGEN SATURATION: 96 % | HEIGHT: 71 IN | HEART RATE: 67 BPM | DIASTOLIC BLOOD PRESSURE: 62 MMHG | BODY MASS INDEX: 30.57 KG/M2

## 2019-12-23 DIAGNOSIS — I10 ESSENTIAL HYPERTENSION: Primary | ICD-10-CM

## 2019-12-23 DIAGNOSIS — F41.9 ANXIETY: ICD-10-CM

## 2019-12-23 DIAGNOSIS — I50.22 CHRONIC SYSTOLIC CHF (CONGESTIVE HEART FAILURE) (HCC): ICD-10-CM

## 2019-12-23 DIAGNOSIS — E66.9 CLASS 1 OBESITY WITH SERIOUS COMORBIDITY AND BODY MASS INDEX (BMI) OF 30.0 TO 30.9 IN ADULT, UNSPECIFIED OBESITY TYPE: ICD-10-CM

## 2019-12-23 DIAGNOSIS — E03.4 HYPOTHYROIDISM DUE TO ACQUIRED ATROPHY OF THYROID: ICD-10-CM

## 2019-12-23 DIAGNOSIS — E78.2 MIXED HYPERLIPIDEMIA: ICD-10-CM

## 2019-12-23 DIAGNOSIS — I48.0 PAROXYSMAL ATRIAL FIBRILLATION (HCC): ICD-10-CM

## 2019-12-23 DIAGNOSIS — F02.80 DEMENTIA ASSOCIATED WITH OTHER UNDERLYING DISEASE WITHOUT BEHAVIORAL DISTURBANCE (HCC): ICD-10-CM

## 2019-12-23 DIAGNOSIS — I25.10 CORONARY ARTERY DISEASE INVOLVING NATIVE CORONARY ARTERY OF NATIVE HEART WITHOUT ANGINA PECTORIS: ICD-10-CM

## 2019-12-23 DIAGNOSIS — M15.9 PRIMARY OSTEOARTHRITIS INVOLVING MULTIPLE JOINTS: ICD-10-CM

## 2019-12-23 PROCEDURE — 4040F PNEUMOC VAC/ADMIN/RCVD: CPT | Performed by: INTERNAL MEDICINE

## 2019-12-23 PROCEDURE — G8598 ASA/ANTIPLAT THER USED: HCPCS | Performed by: INTERNAL MEDICINE

## 2019-12-23 PROCEDURE — 3288F FALL RISK ASSESSMENT DOCD: CPT | Performed by: INTERNAL MEDICINE

## 2019-12-23 PROCEDURE — G8427 DOCREV CUR MEDS BY ELIG CLIN: HCPCS | Performed by: INTERNAL MEDICINE

## 2019-12-23 PROCEDURE — 1036F TOBACCO NON-USER: CPT | Performed by: INTERNAL MEDICINE

## 2019-12-23 PROCEDURE — 0518F FALL PLAN OF CARE DOCD: CPT | Performed by: INTERNAL MEDICINE

## 2019-12-23 PROCEDURE — G8417 CALC BMI ABV UP PARAM F/U: HCPCS | Performed by: INTERNAL MEDICINE

## 2019-12-23 PROCEDURE — 1123F ACP DISCUSS/DSCN MKR DOCD: CPT | Performed by: INTERNAL MEDICINE

## 2019-12-23 PROCEDURE — G8482 FLU IMMUNIZE ORDER/ADMIN: HCPCS | Performed by: INTERNAL MEDICINE

## 2019-12-23 PROCEDURE — 99214 OFFICE O/P EST MOD 30 MIN: CPT | Performed by: INTERNAL MEDICINE

## 2019-12-23 RX ORDER — TAMSULOSIN HYDROCHLORIDE 0.4 MG/1
0.4 CAPSULE ORAL 2 TIMES DAILY
COMMUNITY
End: 2020-01-08 | Stop reason: SDUPTHER

## 2019-12-24 DIAGNOSIS — F41.9 ANXIETY: ICD-10-CM

## 2019-12-26 RX ORDER — ALPRAZOLAM 0.25 MG/1
TABLET ORAL
Qty: 45 TABLET | Refills: 2 | Status: SHIPPED | OUTPATIENT
Start: 2019-12-26 | End: 2020-01-27 | Stop reason: SDUPTHER

## 2020-01-08 ENCOUNTER — OFFICE VISIT (OUTPATIENT)
Dept: UROLOGY | Age: 85
End: 2020-01-08
Payer: MEDICARE

## 2020-01-08 VITALS
DIASTOLIC BLOOD PRESSURE: 78 MMHG | BODY MASS INDEX: 29.99 KG/M2 | SYSTOLIC BLOOD PRESSURE: 122 MMHG | OXYGEN SATURATION: 97 % | HEIGHT: 71 IN | WEIGHT: 214.2 LBS | HEART RATE: 68 BPM

## 2020-01-08 PROCEDURE — G8427 DOCREV CUR MEDS BY ELIG CLIN: HCPCS | Performed by: UROLOGY

## 2020-01-08 PROCEDURE — 1036F TOBACCO NON-USER: CPT | Performed by: UROLOGY

## 2020-01-08 PROCEDURE — 0518F FALL PLAN OF CARE DOCD: CPT | Performed by: UROLOGY

## 2020-01-08 PROCEDURE — G8417 CALC BMI ABV UP PARAM F/U: HCPCS | Performed by: UROLOGY

## 2020-01-08 PROCEDURE — G8482 FLU IMMUNIZE ORDER/ADMIN: HCPCS | Performed by: UROLOGY

## 2020-01-08 PROCEDURE — 1123F ACP DISCUSS/DSCN MKR DOCD: CPT | Performed by: UROLOGY

## 2020-01-08 PROCEDURE — 3288F FALL RISK ASSESSMENT DOCD: CPT | Performed by: UROLOGY

## 2020-01-08 PROCEDURE — 99214 OFFICE O/P EST MOD 30 MIN: CPT | Performed by: UROLOGY

## 2020-01-08 PROCEDURE — 4040F PNEUMOC VAC/ADMIN/RCVD: CPT | Performed by: UROLOGY

## 2020-01-08 PROCEDURE — 99213 OFFICE O/P EST LOW 20 MIN: CPT | Performed by: UROLOGY

## 2020-01-08 RX ORDER — TAMSULOSIN HYDROCHLORIDE 0.4 MG/1
0.4 CAPSULE ORAL 2 TIMES DAILY
Qty: 180 CAPSULE | Refills: 3 | Status: SHIPPED | OUTPATIENT
Start: 2020-01-08 | End: 2020-10-30 | Stop reason: SDUPTHER

## 2020-01-08 RX ORDER — NITROFURANTOIN 25; 75 MG/1; MG/1
100 CAPSULE ORAL DAILY
Qty: 90 CAPSULE | Refills: 3 | Status: SHIPPED | OUTPATIENT
Start: 2020-01-08 | End: 2020-05-21 | Stop reason: SDUPTHER

## 2020-01-08 NOTE — PROGRESS NOTES
Reymundo Jeffery MD   Urology Clinic follow up      Patient:  Levi Carter  YOB: 1934  Date: 1/8/2020    HISTORY OF PRESENT ILLNESS:   The patient is a 80 y.o. male who presents today for evaluation of the following problem(s): hematuria, BPH, retention  Overall the problem(s) : show no change. Associated Symptoms: No dysuria, gross hematuria. Pain Severity: Pain Score:   0 - No pain    Summary of old records: Prostate:  >50 grams, hypervascular, obstructive  Bladder: No tumor noted. High median bar, multiple diverticula, cellules  Neg hematuria work up in the past  On flomax     prev E. Coli UTI that was treated, urinary retention of unknown amount, sanchez placed   [place] that he was emptying well prior to urgent care visit  Poor ECOG  (Patient's old records, notes and chart reviewed and summarized above.)        Today:  He states no infection or pain in bladder over past 6 months  He says he is happy with his urination  flomax BID  No gross hematuria      Last several PSA's:  Lab Results   Component Value Date    PSA 6.63 (H) 12/28/2016    PSA 4.73 (H) 12/30/2015    PSA 6.28 (H) 01/06/2015       Last total testosterone:  No results found for: TESTOSTERONE    Urinalysis today:  No results found for this visit on 01/08/20.       Last BUN and creatinine:  Lab Results   Component Value Date    BUN 14 06/06/2019     Lab Results   Component Value Date    CREATININE 1.09 06/06/2019       Imaging Reviewed during this Office Visit:   (results were independently reviewed by physician and radiology report verified)    PAST MEDICAL, FAMILY AND SOCIAL HISTORY:  Past Medical History:   Diagnosis Date    Acute bronchitis     by history    MARITA (acute kidney injury) ([address] 3/13/2018    Allergic rhinitis     Anxiety     Bradycardia 8/19/2018    Cataract, right     Choroidal nevus of right eye     Chronic systolic CHF (congestive heart failure) ([address] 3/13/2018    Coronary artery disease involving native

## 2020-01-23 ENCOUNTER — OUTSIDE SERVICES (OUTPATIENT)
Dept: PODIATRY | Age: 85
End: 2020-01-23
Payer: MEDICARE

## 2020-01-23 VITALS
SYSTOLIC BLOOD PRESSURE: 109 MMHG | HEART RATE: 72 BPM | TEMPERATURE: 98 F | DIASTOLIC BLOOD PRESSURE: 60 MMHG | RESPIRATION RATE: 16 BRPM

## 2020-01-23 PROCEDURE — 11720 DEBRIDE NAIL 1-5: CPT | Performed by: PODIATRIST

## 2020-01-23 NOTE — PROGRESS NOTES
Subjective:  Patient seen at [address] [place] [address] today for routine foot care. Complains of pain in both big toes. Allergies   Allergen Reactions    Pcn [Penicillins] Swelling     As a child  Pt tolerated ceftriaxone , and keflex with no intolerance    Sulfa Antibiotics     Cefdinir Other (See Comments)     Pt tolerated ceftriaxone , and keflex with no intolerance       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 LIMA TO LAD, SVG TO DIAGONAL2    Dementia associated with 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 behavioral disturbance ([address] 9/21/2018    Dermatophytosis of nail 1/10/2014    Diverticulosis     Elevated PSA     Hemorrhoids     History of measles childhood    History of mumps childhood    Hyperlipidemia     Hypertension     Hypothyroidism 9/4/2018    Mass of upper lobe of right lung 2/21/2018    Occult blood positive stool     refuses colonoscopy    Osteoarthritis     Overweight     Paroxysmal atrial fibrillation ([address] 11/7/2013    Pneumonia due to organism     Pseudophakia, left eye     PSVT (paroxysmal supraventricular tachycardia) ([address]     Recurrent UTI 3/13/2018    Retinal detachment     left, history of     Seborrheic dermatitis     Sepsis due to urinary tract infection ([address] 8/15/2018    Urinary tract infection without hematuria 3/28/2018       Prior to Admission medications    Medication Sig Start Date End Date Taking? Authorizing Provider   Coenzyme Q10 (CO Q 10 PO) Take  by mouth daily. Yes Historical Provider, MD   POTASSIUM CHLORIDE by Does not apply route daily.    Yes Historical Provider, MD   metoprolol (TOPROL-XL) 25 MG XL tablet Take 25 mg by mouth 2 times daily. Yes Historical Provider, MD   lisinopril (PRINIVIL;ZESTRIL) 10 MG tablet Take 10 mg by mouth daily. Yes Historical Provider, MD   aspirin 81 MG tablet Take 81 mg by mouth daily. Yes Historical Provider, MD   ALPRAZolam Gilbert Damaso) 0.25 MG tablet Take 0.25 mg by mouth See Admin Instructions. 1/2 TABLET PO EVERY 8 HOURS PRN   Yes Historical Provider, MD    Extract (NEUTROGENA T/GEL EX) Apply  topically. Yes Historical Provider, MD   fluticasone (VERAMYST) 27.5 MCG/SPRAY nasal spray 2 sprays by Nasal route daily. Yes Historical Provider, MD   propafenone (RYTHMOL) 225 MG tablet Take 225 mg by mouth 2 times daily. Yes Historical Provider, MD   GLUCOSAMINE-CHONDROITIN PO Take 3 tablets by mouth Daily. Yes Historical Provider, MD   Multiple Vitamins-Minerals (MULTIVITAMIN PO) Take 1 tablet by mouth daily. Yes Historical Provider, MD   Multiple Vitamins-Minerals (TOTAL FORMULA 3) TABS Take 1 tablet by mouth daily. Yes Historical Provider, MD   simvastatin (ZOCOR) 20 MG tablet Take 20 mg by mouth See Admin Instructions. ONE TABLET EVERY OTHER DAY    Historical Provider, MD       Social History     Tobacco Use    Smoking status: Never Smoker    Smokeless tobacco: Never Used    Tobacco comment: never smoked iwona [facility],2/27/2018   Substance Use Topics    Alcohol use: No     Alcohol/week: 0.0 standard drinks     Review of Systems: All 12 systems reviewed and pertinent positives noted above. Objective:  Vascular: DP and PT pulses palpable 2/4, bilateral.  CFT <3 seconds, bilateral.  Hair growth present to the level of the digits, bilateral.  Edema present, bilateral.  Varicosities absent, bilateral. Erythema absent, bilateral. Distal Rubor absent bilateral.  Temperature decreased bilateral. Hyperpigmentation absent bilateral. Atrophic skin no.     Neurological: Sensation intact to light touch to level of digits, bilateral.  Protective sensation intact 10/10 sites via 5.07/10g [place]-Yamila Monofilament, bilateral.  negative Tinel's, bilateral.  negative Valleix sign, bilateral.  Vibratory intact bilateral.  Reflexes Decreased bilateral.  Paresthesias negative. Dysthesias negative. Sharp/dull intact bilateral.    Musculoskeletal: pain present on palpation L/R hallux nail. Integument:  Nails left 1, 2 and right 1 thickened, dystrophic and crumbly, discolored with subungual debris. Hyperkeratotic tissue is absent. Assessment:   Diagnosis Orders   1. Dermatophytosis of nail     2. Pain in toes of both feet         Plan:  All Nails as mentioned above debrided in length and thickness. Patient advised OTC methods for treatment of the mycotic nails. Patient will follow up in 10 weeks.

## 2020-01-23 NOTE — PROGRESS NOTES
Foot Care Worksheet  PCP: Mary Guillermo DO  Last visit: 12/23/2019    Nail description:  Thick , Yellow , Crumbly , Marked limitation of ambulation     Pain resulting from thickened and dystrophy of nail plate Yes    Nails involved  Right   1  (T5-T9)  Left     1, 2  (TA-T4)    Q7 1 Class A Finding - Non traumatic amputation of foot No    Q8 2 Class B Findings - Absent DP pulse No, Absent PT pulse No, Advanced tropic changes (3 required) No    Decrease hair growth Yes, Nail changes/thickening Yes, Pigmented changes/discoloration Yes, Skin texture (thin, shiny) No, Skin color (rubor/redness) No    Q9 1 Class B and 2 Class C Findings  Claudication No, Temperature change No, Paresthesia No, Burning No, Edema Yes

## 2020-01-27 RX ORDER — ALPRAZOLAM 0.25 MG/1
TABLET ORAL
Qty: 45 TABLET | Refills: 2 | Status: CANCELLED | OUTPATIENT
Start: 2020-01-27 | End: 2020-02-26

## 2020-01-27 RX ORDER — ALPRAZOLAM 0.25 MG/1
TABLET ORAL
Qty: 45 TABLET | Refills: 2 | Status: SHIPPED | OUTPATIENT
Start: 2020-01-27 | End: 2020-03-25 | Stop reason: SDUPTHER

## 2020-01-27 NOTE — TELEPHONE ENCOUNTER
[facility] Douglas called requesting a refill of the below medication which has been pended for you:     Requested Prescriptions     Pending Prescriptions Disp Refills    ALPRAZolam (XANAX) 0.25 MG tablet 45 tablet 2       Last Appointment Date: 12/23/2019  Next Appointment Date: 3/23/2020    Allergies   Allergen Reactions    Pcn [Penicillins] Swelling     As a child  Pt tolerated ceftriaxone , and keflex with no intolerance    Sulfa Antibiotics     Cefdinir Other (See Comments)     Pt tolerated ceftriaxone , and keflex with no intolerance

## 2020-01-28 NOTE — TELEPHONE ENCOUNTER
[place] checked today    Controlled Substance Monitoring:    Acute and Chronic Pain Monitoring:   RX Monitoring 1/27/2020   Attestation -   Periodic Controlled Substance Monitoring No signs of potential drug abuse or diversion identified.

## 2020-01-30 ENCOUNTER — OFFICE VISIT (OUTPATIENT)
Dept: INTERNAL MEDICINE | Age: 85
End: 2020-01-30
Payer: MEDICARE

## 2020-01-30 ENCOUNTER — OFFICE VISIT (OUTPATIENT)
Dept: PODIATRY | Age: 85
End: 2020-01-30
Payer: MEDICARE

## 2020-01-30 VITALS
BODY MASS INDEX: 29.82 KG/M2 | DIASTOLIC BLOOD PRESSURE: 60 MMHG | WEIGHT: 213 LBS | HEART RATE: 80 BPM | RESPIRATION RATE: 16 BRPM | HEIGHT: 71 IN | TEMPERATURE: 97.3 F | SYSTOLIC BLOOD PRESSURE: 110 MMHG

## 2020-01-30 VITALS
BODY MASS INDEX: 29.82 KG/M2 | WEIGHT: 213 LBS | HEART RATE: 80 BPM | DIASTOLIC BLOOD PRESSURE: 60 MMHG | SYSTOLIC BLOOD PRESSURE: 110 MMHG | HEIGHT: 71 IN

## 2020-01-30 PROCEDURE — 99213 OFFICE O/P EST LOW 20 MIN: CPT | Performed by: INTERNAL MEDICINE

## 2020-01-30 PROCEDURE — G8427 DOCREV CUR MEDS BY ELIG CLIN: HCPCS | Performed by: INTERNAL MEDICINE

## 2020-01-30 PROCEDURE — 99213 OFFICE O/P EST LOW 20 MIN: CPT | Performed by: PODIATRIST

## 2020-01-30 PROCEDURE — 0518F FALL PLAN OF CARE DOCD: CPT | Performed by: PODIATRIST

## 2020-01-30 PROCEDURE — 99214 OFFICE O/P EST MOD 30 MIN: CPT | Performed by: INTERNAL MEDICINE

## 2020-01-30 PROCEDURE — 3288F FALL RISK ASSESSMENT DOCD: CPT | Performed by: INTERNAL MEDICINE

## 2020-01-30 PROCEDURE — 97597 DBRDMT OPN WND 1ST 20 CM/<: CPT | Performed by: PODIATRIST

## 2020-01-30 PROCEDURE — G8482 FLU IMMUNIZE ORDER/ADMIN: HCPCS | Performed by: PODIATRIST

## 2020-01-30 PROCEDURE — G8417 CALC BMI ABV UP PARAM F/U: HCPCS | Performed by: PODIATRIST

## 2020-01-30 PROCEDURE — 1036F TOBACCO NON-USER: CPT | Performed by: INTERNAL MEDICINE

## 2020-01-30 PROCEDURE — 1036F TOBACCO NON-USER: CPT | Performed by: PODIATRIST

## 2020-01-30 PROCEDURE — 1123F ACP DISCUSS/DSCN MKR DOCD: CPT | Performed by: INTERNAL MEDICINE

## 2020-01-30 PROCEDURE — 0518F FALL PLAN OF CARE DOCD: CPT | Performed by: INTERNAL MEDICINE

## 2020-01-30 PROCEDURE — G8417 CALC BMI ABV UP PARAM F/U: HCPCS | Performed by: INTERNAL MEDICINE

## 2020-01-30 PROCEDURE — 1123F ACP DISCUSS/DSCN MKR DOCD: CPT | Performed by: PODIATRIST

## 2020-01-30 PROCEDURE — G8482 FLU IMMUNIZE ORDER/ADMIN: HCPCS | Performed by: INTERNAL MEDICINE

## 2020-01-30 PROCEDURE — 4040F PNEUMOC VAC/ADMIN/RCVD: CPT | Performed by: PODIATRIST

## 2020-01-30 PROCEDURE — G8427 DOCREV CUR MEDS BY ELIG CLIN: HCPCS | Performed by: PODIATRIST

## 2020-01-30 PROCEDURE — 3288F FALL RISK ASSESSMENT DOCD: CPT | Performed by: PODIATRIST

## 2020-01-30 PROCEDURE — 99213 OFFICE O/P EST LOW 20 MIN: CPT

## 2020-01-30 PROCEDURE — 4040F PNEUMOC VAC/ADMIN/RCVD: CPT | Performed by: INTERNAL MEDICINE

## 2020-01-30 RX ORDER — CLINDAMYCIN HYDROCHLORIDE 300 MG/1
300 CAPSULE ORAL 4 TIMES DAILY
Qty: 56 CAPSULE | Refills: 0 | Status: SHIPPED | OUTPATIENT
Start: 2020-01-30 | End: 2020-02-13

## 2020-01-30 NOTE — PROGRESS NOTES
Take 1 tablet by mouth daily 30 tablet 3    rivaroxaban (XARELTO) 10 MG TABS tablet Take 2 tablets by mouth daily (with breakfast) 30 tablet 2    Multiple Vitamins-Minerals (MULTIVITAMIN ADULT PO) Take 1 tablet by mouth daily      potassium chloride (KLOR-CON M) 20 MEQ extended release tablet Take 20 mEq by mouth daily      fluticasone (FLONASE) 50 MCG/ACT nasal spray 1 spray by Nasal route daily 1 Bottle 0    simvastatin (ZOCOR) 20 MG tablet TAKE 1 TABLET BY MOUTH EVERY OTHER DAY  30 tablet 11    acetaminophen (TYLENOL) 500 MG tablet Take 500 mg by mouth every 6 hours as needed for Pain      Coenzyme Q-10 100 MG CAPS Take 100 mg by mouth every other day.  tolnaftate (TINACTIN) 1 % external solution Apply topically nightly to toenails per Dr. Drew Juan.  aspirin 81 MG tablet Take 81 mg by mouth daily.  GLUCOSAMINE-CHONDROITIN PO Take 3 tablets by mouth Daily. IMMUNIZATIONS: Reviewed for influenza and pneumococcal status as indicated in electronic record. REVIEW OF SYSTEMS:     Please see history of chief complaint above; otherwise no new problems with respect to General, HEENT, Cardiovascular, Respiratory, Gastrointestinal, Genitourinary, Endocrinologic, Musculoskeletal, or Neuropsychiatric complaints. PHYSICAL EXAMINATION:    Wt Readings from Last 2 Encounters:   01/30/20 213 lb (96.6 kg)   01/08/20 214 lb 3.2 oz (97.2 kg)       Vitals:    01/30/20 1015   BP: 110/60   Site: Right Upper Arm   Position: Sitting   Cuff Size: Medium Adult   Pulse: 80   Resp: 16   Temp: 97.3 °F (36.3 °C)   Weight: 213 lb (96.6 kg)   Height: 5' 11\" (1.803 m)     Body mass index is 29.71 kg/m². General: This is a 80 y.o.  male who is alert and oriented to person, place and questionably oriented to time. He appears to be his stated age and does not appear to be in any acute distress. Skin: Skin color, texture, turgor normal. No rashes. Left great toe wound as outlined below.   HEENT/Neck:

## 2020-01-30 NOTE — PROGRESS NOTES
Subjective:  Patient seen at [address] [place] [address] park today for issue with L big toe. Pt had no idea what started it, noticed blood in sock a couple days ago and toe looked different this am.  Pt referred up by his PCP. Po abx was started today. No other tx tried. No pain. No n/v/f/c/d. Pt poor historian and no hx of trauma.     Allergies   Allergen Reactions    Pcn [Penicillins] Swelling     As a child  Pt tolerated ceftriaxone , and keflex with no intolerance    Sulfa Antibiotics     Cefdinir Other (See Comments)     Pt tolerated ceftriaxone , and keflex with no intolerance       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 LIMA TO LAD, SVG TO DIAGONAL2    Dementia associated with 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 behavioral disturbance ([address] 9/21/2018    Dermatophytosis of nail 1/10/2014    Diverticulosis     Elevated PSA     Hemorrhoids     History of measles childhood    History of mumps childhood    Hyperlipidemia     Hypertension     Hypothyroidism 9/4/2018    Mass of upper lobe of right lung 2/21/2018    Occult blood positive stool     refuses colonoscopy    Osteoarthritis     Overweight     Paroxysmal atrial fibrillation ([address] 11/7/2013    Pneumonia due to organism     Pseudophakia, left eye     PSVT (paroxysmal supraventricular tachycardia) ([address]     Recurrent UTI 3/13/2018    Retinal detachment     left, history of     Seborrheic dermatitis     Sepsis due to urinary tract infection ([address] 8/15/2018    Urinary tract infection without hematuria 3/28/2018       Prior to Admission medications    Medication Sig Start Date End Date absent bilateral.  Temperature decreased bilateral. Hyperpigmentation absent bilateral. Atrophic skin no. Neurological: Sensation intact to light touch to level of digits, bilateral.  Protective sensation intact 10/10 sites via 5.07/10g [place]-Yamila Monofilament, bilateral.  negative Tinel's, bilateral.  negative Valleix sign, bilateral.  Vibratory intact bilateral.  Reflexes Decreased bilateral.  Paresthesias negative. Dysthesias negative. Sharp/dull intact bilateral.    Musculoskeletal: Muscle strength 5/5, bilateral.  Pain absent upon palpation bilateral. Normal medial longitudinal arch, bilateral.  Ankle ROM within normal limits,bilateral.  1st MPJ ROM within normal limits, bilateral.  Dorsally contracted digits absent. No other foot deformities. Integument:  Blood blister with underlying wound distal medial L hallux nail, wound 1.0x0.4x0.1cm, very superficial, bruising proximal medial nal border, appears superficial nail truamtically avulsed. no abscess under the blister area. Proximal erythema and edema are noted back to IPJ level. Nails left 1, 2 and right 1 thickened, dystrophic and crumbly, discolored with subungual debris. Hyperkeratotic tissue is absent. Assessment:   Diagnosis Orders   1. Open wound of toe, initial encounter     2. Cellulitis of toe of left foot       Plan: Active wound management took place 100% non excisional with the use of  tissue nippers. All non viable tissue (including epidermis and/or dermis) and bio burden was removed to promote healing. Bleeding was present. Please see chart for exact measurements, if not documented then size was less than 20 sq cm. Orders Placed This Encounter   Medications    mupirocin (BACTROBAN) 2 % ointment     Sig: Apply topically 2 times daily. Dispense:  15 g     Refill:  1     Appropriate offloading advised  Take Rx po abx as given per PCP  Pt should be seen back sooner if any worsening signs of infx arise.   Patient will follow up in 1 weeks.

## 2020-02-06 ENCOUNTER — OFFICE VISIT (OUTPATIENT)
Dept: PODIATRY | Age: 85
End: 2020-02-06
Payer: MEDICARE

## 2020-02-06 VITALS
HEIGHT: 71 IN | RESPIRATION RATE: 20 BRPM | DIASTOLIC BLOOD PRESSURE: 64 MMHG | WEIGHT: 214.6 LBS | BODY MASS INDEX: 30.04 KG/M2 | SYSTOLIC BLOOD PRESSURE: 122 MMHG | TEMPERATURE: 97 F | HEART RATE: 64 BPM

## 2020-02-06 PROCEDURE — 99999 PR OFFICE/OUTPT VISIT,PROCEDURE ONLY: CPT | Performed by: PODIATRIST

## 2020-02-06 PROCEDURE — 11042 DBRDMT SUBQ TIS 1ST 20SQCM/<: CPT | Performed by: PODIATRIST

## 2020-02-06 PROCEDURE — A9283 FOOT PRESS OFF LOAD SUPP DEV: HCPCS | Performed by: PODIATRIST

## 2020-02-06 PROCEDURE — 99214 OFFICE O/P EST MOD 30 MIN: CPT

## 2020-02-06 NOTE — PROGRESS NOTES
bilateral. Atrophic skin no. Neurological: Sensation intact to light touch to level of digits, bilateral.  Protective sensation intact 10/10 sites via 5.07/10g [place]-Yamila Monofilament, bilateral.  negative Tinel's, bilateral.  negative Valleix sign, bilateral.  Vibratory intact bilateral.  Reflexes Decreased bilateral.  Paresthesias negative. Dysthesias negative. Sharp/dull intact bilateral.    Musculoskeletal: Muscle strength 5/5, bilateral.  Pain absent upon palpation bilateral. Normal medial longitudinal arch, bilateral.  Ankle ROM within normal limits,bilateral.  1st MPJ ROM within normal limits, bilateral.  Dorsally contracted digits absent. No other foot deformities. Integument:  Ulcer distal medial L hallux,  ulcer shows eschar, deeper down into sub q tissue, upon debridement shows healthy red granular base, no probing no undermining no malodor. Erythema and edema are improved from last week. proximal medial nail border is settled down a lot from last week. Nails left 1, 2 and right 1 thickened, dystrophic and crumbly, discolored with subungual debris. Hyperkeratotic tissue is absent.    Wound 01/30/20 Toe (Comment  which one) Left left great toe (Active)   Dressing/Treatment Antibacterial ointment 1/30/2020 11:46 AM   Wound Cleansed Rinsed/Irrigated with saline 1/30/2020 11:46 AM   Wound Length (cm) 3 cm 2/6/2020 10:44 AM   Wound Width (cm) 1.9 cm 2/6/2020 10:44 AM   Wound Depth (cm) 0.1 cm 2/6/2020 10:44 AM   Wound Surface Area (cm^2) 5.7 cm^2 2/6/2020 10:44 AM   Wound Volume (cm^3) 0.57 cm^3 2/6/2020 10:44 AM   Post-Procedure Length (cm) 2.9 cm 1/30/2020 11:46 AM   Post-Procedure Width (cm) 2.3 cm 1/30/2020 11:46 AM   Post-Procedure Depth (cm) 0.1 cm 1/30/2020 11:46 AM   Post-Procedure Surface Area (cm^2) 6.67 cm^2 1/30/2020 11:46 AM   Post-Procedure Volume (cm^3) 0.67 cm^3 1/30/2020 11:46 AM   Wound Assessment Painful;Red;Swelling 2/6/2020 10:44 AM   Drainage Amount Small 2/6/2020 10:44 AM   Drainage Description Serosanguinous 2/6/2020 10:44 AM   Odor None 2/6/2020 10:44 AM   Alia-wound Assessment Painful;Red;Swelling 2/6/2020 10:44 AM   Red%Wound Bed 50 2/6/2020 10:44 AM   Black%Wound Bed 50 2/6/2020 10:44 AM   Number of days: 6           Assessment:   Diagnosis Orders   1. Skin ulcer of toe of left foot with fat layer exposed ([facility]     2. PVD (peripheral vascular disease) ([facility]       Plan:  Sharp excisional debridement took place of the ulceration, sub-cutaneous in nature,  tissue nippers were used for debridement. All non viable and necrotic tissue was removed to promote healing. Bleeding was present. See wound assessment note for post debridement measurements. Due to level of pain/deformity/condition, it is in my medical opinion that patient will benefit from and is medically necessary for offloading device at this time. Patient was dispensed a surgical shoe to wear 100% of the time WB. Patient was educated on appropriate use of the device and the need to be compliant with offloading therapy. Patient understands that non compliance with the device will be detrimental to the healing of the condition/ulceration. Patient needs the device to help support the foot and reduce pain. This device is medically necessary due to above listed diagnosis. Take Rx po abx as given per PCP, still has 1 week left of course  Dressing: silver alginate. Pt needs dressing over top, nursing home orders had wrote leave open to air, this was NOT the order given by this office. This was stressed in the orders today  Pt should be seen back sooner if any worsening signs of infx arise. Patient will follow up in 1 weeks.

## 2020-02-13 ENCOUNTER — OFFICE VISIT (OUTPATIENT)
Dept: PODIATRY | Age: 85
End: 2020-02-13
Payer: MEDICARE

## 2020-02-13 VITALS
DIASTOLIC BLOOD PRESSURE: 60 MMHG | SYSTOLIC BLOOD PRESSURE: 120 MMHG | HEART RATE: 80 BPM | HEIGHT: 71 IN | BODY MASS INDEX: 29.96 KG/M2 | WEIGHT: 214 LBS

## 2020-02-13 PROCEDURE — 99213 OFFICE O/P EST LOW 20 MIN: CPT

## 2020-02-13 PROCEDURE — 99999 PR OFFICE/OUTPT VISIT,PROCEDURE ONLY: CPT | Performed by: PODIATRIST

## 2020-02-13 PROCEDURE — 97597 DBRDMT OPN WND 1ST 20 CM/<: CPT | Performed by: PODIATRIST

## 2020-02-13 NOTE — PATIENT INSTRUCTIONS
Continue with silver alginate to left great toe wound, keep toe covered with a dry dressing, ok to remove for showering and bathing.   Do not soak toe wound  Continue with surgical shoe to left foot during weight bearing  Follow up with Dr Srinivasa Petersen in 1 week

## 2020-02-13 NOTE — PROGRESS NOTES
Does not apply route daily. Yes Historical Provider, MD   metoprolol (TOPROL-XL) 25 MG XL tablet Take 25 mg by mouth 2 times daily. Yes Historical Provider, MD   lisinopril (PRINIVIL;ZESTRIL) 10 MG tablet Take 10 mg by mouth daily. Yes Historical Provider, MD   aspirin 81 MG tablet Take 81 mg by mouth daily. Yes Historical Provider, MD   ALPRAZolam Ulysess Miracle) 0.25 MG tablet Take 0.25 mg by mouth See Admin Instructions. 1/2 TABLET PO EVERY 8 HOURS PRN   Yes Historical Provider, MD    Extract (NEUTROGENA T/GEL EX) Apply  topically. Yes Historical Provider, MD   fluticasone (VERAMYST) 27.5 MCG/SPRAY nasal spray 2 sprays by Nasal route daily. Yes Historical Provider, MD   propafenone (RYTHMOL) 225 MG tablet Take 225 mg by mouth 2 times daily. Yes Historical Provider, MD   GLUCOSAMINE-CHONDROITIN PO Take 3 tablets by mouth Daily. Yes Historical Provider, MD   Multiple Vitamins-Minerals (MULTIVITAMIN PO) Take 1 tablet by mouth daily. Yes Historical Provider, MD   Multiple Vitamins-Minerals (TOTAL FORMULA 3) TABS Take 1 tablet by mouth daily. Yes Historical Provider, MD   simvastatin (ZOCOR) 20 MG tablet Take 20 mg by mouth See Admin Instructions. ONE TABLET EVERY OTHER DAY    Historical Provider, MD       Social History     Tobacco Use    Smoking status: Never Smoker    Smokeless tobacco: Never Used    Tobacco comment: never smoked iwona [facility],2/27/2018   Substance Use Topics    Alcohol use: No     Alcohol/week: 0.0 standard drinks     Review of Systems: All 12 systems reviewed and pertinent positives noted above. Objective:  Vascular: DP and PT pulses palpable 2/4, bilateral.  CFT <3 seconds, bilateral.  Hair growth present to the level of the digits, bilateral.  Edema present, bilateral.  Varicosities absent, bilateral. Erythema present,L hallux. Distal Rubor absent bilateral.  Temperature decreased bilateral. Hyperpigmentation absent bilateral. Atrophic skin no.     Neurological: Sensation Odor None 2/13/2020  9:51 AM   Alia-wound Assessment Painful;Red;Swelling 2/6/2020 10:44 AM   Red%Wound Bed 100 2/13/2020  9:51 AM   Black%Wound Bed 50 2/6/2020 10:44 AM   Number of days: 13       Assessment:   Diagnosis Orders   1. Skin ulcer of toe of left foot with fat layer exposed ([address]  OR DEBRIDEMENT OPEN WOUND 20 SQ CM<   2. PVD (peripheral vascular disease) ([facility])  OR DEBRIDEMENT OPEN WOUND 20 SQ CM<     Plan: Active wound management took place 100% non excisional with the use of  tissue nippers. All non viable tissue (including epidermis and/or dermis) and bio burden was removed to promote healing. Bleeding was present. Please see chart for exact measurements, if not documented then size was less than 20 sq cm.  continue surgical shoe to wear 100% of the time WB. Patient was educated on appropriate use of the device and the need to be compliant with offloading therapy. Patient understands that non compliance with the device will be detrimental to the healing of the condition/ulceration. Dressing: silver alginate. Pt should be seen back sooner if any worsening signs of infx arise. Patient will follow up in 1 week in office.

## 2020-02-19 ENCOUNTER — OFFICE VISIT (OUTPATIENT)
Dept: PODIATRY | Age: 85
End: 2020-02-19
Payer: MEDICARE

## 2020-02-19 VITALS
BODY MASS INDEX: 29.71 KG/M2 | HEART RATE: 78 BPM | WEIGHT: 212.2 LBS | HEIGHT: 71 IN | SYSTOLIC BLOOD PRESSURE: 118 MMHG | DIASTOLIC BLOOD PRESSURE: 68 MMHG

## 2020-02-19 PROCEDURE — 99213 OFFICE O/P EST LOW 20 MIN: CPT

## 2020-02-19 PROCEDURE — 99999 PR OFFICE/OUTPT VISIT,PROCEDURE ONLY: CPT | Performed by: PODIATRIST

## 2020-02-19 PROCEDURE — 97597 DBRDMT OPN WND 1ST 20 CM/<: CPT | Performed by: PODIATRIST

## 2020-02-19 NOTE — PROGRESS NOTES
Odor None 2/19/2020  9:45 AM   Alia-wound Assessment Painful;Red;Swelling 2/6/2020 10:44 AM   Red%Wound Bed 100 2/19/2020  9:45 AM   Black%Wound Bed 50 2/6/2020 10:44 AM   Number of days: 19         Assessment:   Diagnosis Orders   1. Skin ulcer of toe of left foot with fat layer exposed ([address]  MT DEBRIDEMENT OPEN WOUND 20 SQ CM<   2. PVD (peripheral vascular disease) ([facility])  MT DEBRIDEMENT OPEN WOUND 20 SQ CM<     Plan: Active wound management took place 100% non excisional with the use of  tissue nippers. All non viable tissue (including epidermis and/or dermis) and bio burden was removed to promote healing. Bleeding was present. Please see chart for exact measurements, if not documented then size was less than 20 sq cm.  continue surgical shoe to wear 100% of the time WB. Patient was educated on appropriate use of the device and the need to be compliant with offloading therapy. Patient understands that non compliance with the device will be detrimental to the healing of the condition/ulceration. Dressing: collagen qd  Pt should be seen back sooner if any worsening signs of infx arise. Patient will follow up in 1 week in office.

## 2020-02-26 ENCOUNTER — OFFICE VISIT (OUTPATIENT)
Dept: PODIATRY | Age: 85
End: 2020-02-26
Payer: MEDICARE

## 2020-02-26 VITALS
BODY MASS INDEX: 29.68 KG/M2 | HEART RATE: 72 BPM | HEIGHT: 71 IN | WEIGHT: 212 LBS | SYSTOLIC BLOOD PRESSURE: 122 MMHG | DIASTOLIC BLOOD PRESSURE: 74 MMHG

## 2020-02-26 PROCEDURE — 99213 OFFICE O/P EST LOW 20 MIN: CPT | Performed by: PODIATRIST

## 2020-02-26 PROCEDURE — 99214 OFFICE O/P EST MOD 30 MIN: CPT

## 2020-02-26 PROCEDURE — 97597 DBRDMT OPN WND 1ST 20 CM/<: CPT | Performed by: PODIATRIST

## 2020-02-26 PROCEDURE — 99999 PR OFFICE/OUTPT VISIT,PROCEDURE ONLY: CPT | Performed by: PODIATRIST

## 2020-02-26 NOTE — PATIENT INSTRUCTIONS
Continue with collagen dressing to left great toe wound, change dressing daily, keep covered with a bandage  Continue with surgical shoe  May remove dressing showering/bathing  Follow up in 1 week with Dr Jolanta Mena

## 2020-02-26 NOTE — PROGRESS NOTES
Subjective:  Patient seen at [facility] today for ulcer L great toe. Pt has done well with sx shoe. Help of nurses getting dressing changed at [facility]. No pain. No signs of infx seen at home. No n/v/f/c/d. Allergies   Allergen Reactions    Pcn [Penicillins] Swelling     As a child  Pt tolerated ceftriaxone , and keflex with no intolerance    Sulfa Antibiotics     Cefdinir Other (See Comments)     Pt tolerated ceftriaxone , and keflex with no intolerance       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 LIMA TO LAD, SVG TO DIAGONAL2    Dementia associated with 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 behavioral disturbance ([address] 9/21/2018    Dermatophytosis of nail 1/10/2014    Diverticulosis     Elevated PSA     Hemorrhoids     History of measles childhood    History of mumps childhood    Hyperlipidemia     Hypertension     Hypothyroidism 9/4/2018    Mass of upper lobe of right lung 2/21/2018    Occult blood positive stool     refuses colonoscopy    Osteoarthritis     Overweight     Paroxysmal atrial fibrillation ([address] 11/7/2013    Pneumonia due to organism     Pseudophakia, left eye     PSVT (paroxysmal supraventricular tachycardia) ([address]     Recurrent UTI 3/13/2018    Retinal detachment     left, history of     Seborrheic dermatitis     Sepsis due to urinary tract infection ([address] 8/15/2018    Urinary tract infection without hematuria 3/28/2018       Prior to Admission medications    Medication Sig Start Date End Date Taking? Authorizing Provider   Coenzyme Q10 (CO Q 10 PO) Take  by mouth daily.    Yes Historical Provider, MD   POTASSIUM CHLORIDE by Does not apply route daily. Yes Historical Provider, MD   metoprolol (TOPROL-XL) 25 MG XL tablet Take 25 mg by mouth 2 times daily. Yes Historical Provider, MD   lisinopril (PRINIVIL;ZESTRIL) 10 MG tablet Take 10 mg by mouth daily. Yes Historical Provider, MD   aspirin 81 MG tablet Take 81 mg by mouth daily. Yes Historical Provider, MD   ALPRAZolam Hermila Cielo) 0.25 MG tablet Take 0.25 mg by mouth See Admin Instructions. 1/2 TABLET PO EVERY 8 HOURS PRN   Yes Historical Provider, MD    Extract (NEUTROGENA T/GEL EX) Apply  topically. Yes Historical Provider, MD   fluticasone (VERAMYST) 27.5 MCG/SPRAY nasal spray 2 sprays by Nasal route daily. Yes Historical Provider, MD   propafenone (RYTHMOL) 225 MG tablet Take 225 mg by mouth 2 times daily. Yes Historical Provider, MD   GLUCOSAMINE-CHONDROITIN PO Take 3 tablets by mouth Daily. Yes Historical Provider, MD   Multiple Vitamins-Minerals (MULTIVITAMIN PO) Take 1 tablet by mouth daily. Yes Historical Provider, MD   Multiple Vitamins-Minerals (TOTAL FORMULA 3) TABS Take 1 tablet by mouth daily. Yes Historical Provider, MD   simvastatin (ZOCOR) 20 MG tablet Take 20 mg by mouth See Admin Instructions. ONE TABLET EVERY OTHER DAY    Historical Provider, MD       Social History     Tobacco Use    Smoking status: Never Smoker    Smokeless tobacco: Never Used    Tobacco comment: never smoked [facility],2/27/2018   Substance Use Topics    Alcohol use: No     Alcohol/week: 0.0 standard drinks     ROS: All 14 ROS systems reviewed and pertinent positives noted above, all others negative. Objective:  Vascular: DP and PT pulses palpable 2/4, bilateral.  CFT <3 seconds, bilateral.  Hair growth present to the level of the digits, bilateral.  Edema present, bilateral.  Varicosities absent, bilateral. Erythema present,L hallux.  Distal Rubor absent bilateral.  Temperature decreased bilateral. Hyperpigmentation absent bilateral. Atrophic skin no. Neurological: Sensation intact to light touch to level of digits, bilateral.  Protective sensation intact 10/10 sites via 5.07/10g [place]-Yamila Monofilament, bilateral.  negative Tinel's, bilateral.  negative Valleix sign, bilateral.  Vibratory intact bilateral.  Reflexes Decreased bilateral.  Paresthesias negative. Dysthesias negative. Sharp/dull intact bilateral.    Musculoskeletal: Muscle strength 5/5, bilateral.  Pain absent upon palpation bilateral. Normal medial longitudinal arch, bilateral.  Ankle ROM within normal limits,bilateral.  1st MPJ ROM within normal limits, bilateral.  Dorsally contracted digits absent. No other foot deformities. Integument:  Ulcer distal medial L hallux,  ulcer shows fibrin, upon debridement shows healthy red granular base on edges mainly, no probing no undermining no malodor. no freddie ulcer erythema and edema. Nails left 1, 2 and right 1 thickened, dystrophic and crumbly, discolored with subungual debris. Hyperkeratotic tissue is absent.   Wound 01/30/20 Toe (Comment  which one) Left left great toe (Active)   Dressing/Treatment Collagen 2/26/2020 10:45 AM   Wound Cleansed Rinsed/Irrigated with saline 1/30/2020 11:46 AM   Wound Length (cm) 1 cm 2/26/2020 10:45 AM   Wound Width (cm) 0.4 cm 2/26/2020 10:45 AM   Wound Depth (cm) 0.1 cm 2/26/2020 10:45 AM   Wound Surface Area (cm^2) 0.4 cm^2 2/26/2020 10:45 AM   Change in Wound Size % (l*w) 92.98 2/26/2020 10:45 AM   Wound Volume (cm^3) 0.04 cm^3 2/26/2020 10:45 AM   Wound Healing % 93 2/26/2020 10:45 AM   Post-Procedure Length (cm) 3 cm 2/6/2020 10:57 AM   Post-Procedure Width (cm) 1.9 cm 2/6/2020 10:57 AM   Post-Procedure Depth (cm) 0.2 cm 2/6/2020 10:57 AM   Post-Procedure Surface Area (cm^2) 5.7 cm^2 2/6/2020 10:57 AM   Post-Procedure Volume (cm^3) 1.14 cm^3 2/6/2020 10:57 AM   Wound Assessment Painful;Red;Swelling 2/6/2020 10:44 AM   Drainage Amount None 2/26/2020 10:45 AM   Drainage Description Serosanguinous 2/19/2020  9:45 AM   Odor None 2/26/2020 10:45 AM   Alia-wound Assessment Dry 2/26/2020 10:45 AM   Red%Wound Bed 100 2/26/2020 10:45 AM   Black%Wound Bed 50 2/6/2020 10:44 AM   Number of days: 26         Assessment:   Diagnosis Orders   1. Skin ulcer of toe of left foot with fat layer exposed ([facility]     2. PVD (peripheral vascular disease) ([facility] [facility]       Plan: Active wound management took place 100% non excisional with the use of  tissue nippers. All non viable tissue (including epidermis and/or dermis) and bio burden was removed to promote healing. Bleeding was present. Please see chart for exact measurements, if not documented then size was less than 20 sq cm.  continue surgical shoe to wear 100% of the time WB. Patient was educated on appropriate use of the device and the need to be compliant with offloading therapy. Patient understands that non compliance with the device will be detrimental to the healing of the condition/ulceration. Dressing: collagen qd  Patient will follow up in 1 week in office.

## 2020-03-04 ENCOUNTER — OFFICE VISIT (OUTPATIENT)
Dept: PODIATRY | Age: 85
End: 2020-03-04
Payer: MEDICARE

## 2020-03-04 VITALS
BODY MASS INDEX: 29.12 KG/M2 | WEIGHT: 208 LBS | DIASTOLIC BLOOD PRESSURE: 62 MMHG | HEIGHT: 71 IN | SYSTOLIC BLOOD PRESSURE: 115 MMHG | HEART RATE: 64 BPM

## 2020-03-04 PROCEDURE — 97597 DBRDMT OPN WND 1ST 20 CM/<: CPT | Performed by: PODIATRIST

## 2020-03-04 PROCEDURE — 99999 PR OFFICE/OUTPT VISIT,PROCEDURE ONLY: CPT | Performed by: PODIATRIST

## 2020-03-04 PROCEDURE — 99213 OFFICE O/P EST LOW 20 MIN: CPT

## 2020-03-04 NOTE — PROGRESS NOTES
Subjective:  Patient seen at [facility] today for ulcer L great toe. No pain. No signs of infx seen at home. No n/v/f/c/d. Allergies   Allergen Reactions    Pcn [Penicillins] Swelling     As a child  Pt tolerated ceftriaxone , and keflex with no intolerance    Sulfa Antibiotics     Cefdinir Other (See Comments)     Pt tolerated ceftriaxone , and keflex with no intolerance       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 LIMA TO LAD, SVG TO DIAGONAL2    Dementia associated with 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 behavioral disturbance ([address] 9/21/2018    Dermatophytosis of nail 1/10/2014    Diverticulosis     Elevated PSA     Hemorrhoids     History of measles childhood    History of mumps childhood    Hyperlipidemia     Hypertension     Hypothyroidism 9/4/2018    Mass of upper lobe of right lung 2/21/2018    Occult blood positive stool     refuses colonoscopy    Osteoarthritis     Overweight     Paroxysmal atrial fibrillation ([address] 11/7/2013    Pneumonia due to organism     Pseudophakia, left eye     PSVT (paroxysmal supraventricular tachycardia) ([address]     Recurrent UTI 3/13/2018    Retinal detachment     left, history of     Seborrheic dermatitis     Sepsis due to urinary tract infection ([address] 8/15/2018    Urinary tract infection without hematuria 3/28/2018       Prior to Admission medications    Medication Sig Start Date End Date Taking? Authorizing Provider   Coenzyme Q10 (CO Q 10 PO) Take  by mouth daily. Yes Historical Provider, MD   POTASSIUM CHLORIDE by Does not apply route daily.    Yes Historical 2/26/2020 10:45 AM   Red%Wound Bed 100 3/4/2020 10:43 AM   Black%Wound Bed 50 2/6/2020 10:44 AM   Number of days: 33         Assessment:   Diagnosis Orders   1. Skin ulcer of toe of left foot with fat layer exposed ([facility] [address]     2. PVD (peripheral vascular disease) ([facility] [address]       Plan: Active wound management took place 100% non excisional with the use of  tissue nippers. All non viable tissue (including epidermis and/or dermis) and bio burden was removed to promote healing. Bleeding was present. Please see chart for exact measurements, if not documented then size was less than 20 sq cm.  continue surgical shoe to wear 100% of the time WB. Patient was educated on appropriate use of the device and the need to be compliant with offloading therapy. Patient understands that non compliance with the device will be detrimental to the healing of the condition/ulceration. Dressing: collagen qd  Patient will follow up in 1 week in office.

## 2020-03-11 ENCOUNTER — OFFICE VISIT (OUTPATIENT)
Dept: PODIATRY | Age: 85
End: 2020-03-11
Payer: MEDICARE

## 2020-03-11 VITALS
BODY MASS INDEX: 29.54 KG/M2 | HEART RATE: 64 BPM | WEIGHT: 211 LBS | DIASTOLIC BLOOD PRESSURE: 62 MMHG | HEIGHT: 71 IN | SYSTOLIC BLOOD PRESSURE: 122 MMHG

## 2020-03-11 PROCEDURE — 99999 PR OFFICE/OUTPT VISIT,PROCEDURE ONLY: CPT | Performed by: PODIATRIST

## 2020-03-11 PROCEDURE — 97597 DBRDMT OPN WND 1ST 20 CM/<: CPT | Performed by: PODIATRIST

## 2020-03-11 PROCEDURE — 99214 OFFICE O/P EST MOD 30 MIN: CPT

## 2020-03-11 NOTE — PROGRESS NOTES
Subjective:  Patient seen at [facility] today for ulcer L great toe. Pt has offloaded as advised. No pain. No signs of infx noted. No n/v/f/c/d. Allergies   Allergen Reactions    Pcn [Penicillins] Swelling     As a child  Pt tolerated ceftriaxone , and keflex with no intolerance    Sulfa Antibiotics     Cefdinir Other (See Comments)     Pt tolerated ceftriaxone , and keflex with no intolerance       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 LIMA TO LAD, SVG TO DIAGONAL2    Dementia associated with 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 behavioral disturbance ([address] 9/21/2018    Dermatophytosis of nail 1/10/2014    Diverticulosis     Elevated PSA     Hemorrhoids     History of measles childhood    History of mumps childhood    Hyperlipidemia     Hypertension     Hypothyroidism 9/4/2018    Mass of upper lobe of right lung 2/21/2018    Occult blood positive stool     refuses colonoscopy    Osteoarthritis     Overweight     Paroxysmal atrial fibrillation ([address] 11/7/2013    Pneumonia due to organism     Pseudophakia, left eye     PSVT (paroxysmal supraventricular tachycardia) ([address]     Recurrent UTI 3/13/2018    Retinal detachment     left, history of     Seborrheic dermatitis     Sepsis due to urinary tract infection ([address] 8/15/2018    Urinary tract infection without hematuria 3/28/2018       Prior to Admission medications    Medication Sig Start Date End Date Taking? Authorizing Provider   Coenzyme Q10 (CO Q 10 PO) Take  by mouth daily.    Yes Historical Provider, MD   POTASSIUM CHLORIDE by Does not apply route daily. Yes Historical Provider, MD   metoprolol (TOPROL-XL) 25 MG XL tablet Take 25 mg by mouth 2 times daily. Yes Historical Provider, MD   lisinopril (PRINIVIL;ZESTRIL) 10 MG tablet Take 10 mg by mouth daily. Yes Historical Provider, MD   aspirin 81 MG tablet Take 81 mg by mouth daily. Yes Historical Provider, MD   ALPRAZolam Jane Proper) 0.25 MG tablet Take 0.25 mg by mouth See Admin Instructions. 1/2 TABLET PO EVERY 8 HOURS PRN   Yes Historical Provider, MD    Extract (NEUTROGENA T/GEL EX) Apply  topically. Yes Historical Provider, MD   fluticasone (VERAMYST) 27.5 MCG/SPRAY nasal spray 2 sprays by Nasal route daily. Yes Historical Provider, MD   propafenone (RYTHMOL) 225 MG tablet Take 225 mg by mouth 2 times daily. Yes Historical Provider, MD   GLUCOSAMINE-CHONDROITIN PO Take 3 tablets by mouth Daily. Yes Historical Provider, MD   Multiple Vitamins-Minerals (MULTIVITAMIN PO) Take 1 tablet by mouth daily. Yes Historical Provider, MD   Multiple Vitamins-Minerals (TOTAL FORMULA 3) TABS Take 1 tablet by mouth daily. Yes Historical Provider, MD   simvastatin (ZOCOR) 20 MG tablet Take 20 mg by mouth See Admin Instructions. ONE TABLET EVERY OTHER DAY    Historical Provider, MD       Social History     Tobacco Use    Smoking status: Never Smoker    Smokeless tobacco: Never Used    Tobacco comment: never smoked i[facility]ona [facility],2/27/2018   Substance Use Topics    Alcohol use: No     Alcohol/week: 0.0 standard drinks     ROS: All 14 ROS systems reviewed and pertinent positives noted above, all others negative. Objective:  Vascular: DP and PT pulses palpable 2/4, bilateral.  CFT <3 seconds, bilateral.  Hair growth present to the level of the digits, bilateral.  Edema present, bilateral.  Varicosities absent, bilateral. Erythema present,L hallux. Distal Rubor absent bilateral.  Temperature decreased bilateral. Hyperpigmentation absent bilateral. Atrophic skin no.     Neurological: Sensation intact to light touch to level of digits, bilateral.  Protective sensation intact 10/10 sites via 5.07/10g [place]-Yamila Monofilament, bilateral.  negative Tinel's, bilateral.  negative Valleix sign, bilateral.  Vibratory intact bilateral.  Reflexes Decreased bilateral.  Paresthesias negative. Dysthesias negative. Sharp/dull intact bilateral.    Musculoskeletal: Muscle strength 5/5, bilateral.  Pain absent upon palpation bilateral. Normal medial longitudinal arch, bilateral.  Ankle ROM within normal limits,bilateral.  1st MPJ ROM within normal limits, bilateral.  Dorsally contracted digits absent. No other foot deformities. Integument:  Ulcer distal medial L hallux,  ulcer shows fibrin and crust overlying, upon debridement shows healthy red granular base, no probing no undermining no malodor. no freddie ulcer erythema and edema. Nails left 1, 2 and right 1 thickened, dystrophic and crumbly, discolored with subungual debris.    Hyperkeratotic tissue is absent  Wound 01/30/20 Toe (Comment  which one) Left left great toe (Active)   Dressing/Treatment Collagen 3/11/2020 10:58 AM   Wound Cleansed Rinsed/Irrigated with saline 1/30/2020 11:46 AM   Wound Length (cm) 0.5 cm 3/11/2020 10:58 AM   Wound Width (cm) 0.3 cm 3/11/2020 10:58 AM   Wound Depth (cm) 0.1 cm 3/11/2020 10:58 AM   Wound Surface Area (cm^2) 0.15 cm^2 3/11/2020 10:58 AM   Change in Wound Size % (l*w) 97.37 3/11/2020 10:58 AM   Wound Volume (cm^3) 0.02 cm^3 3/11/2020 10:58 AM   Wound Healing % 96 3/11/2020 10:58 AM   Post-Procedure Length (cm) 3 cm 2/6/2020 10:57 AM   Post-Procedure Width (cm) 1.9 cm 2/6/2020 10:57 AM   Post-Procedure Depth (cm) 0.2 cm 2/6/2020 10:57 AM   Post-Procedure Surface Area (cm^2) 5.7 cm^2 2/6/2020 10:57 AM   Post-Procedure Volume (cm^3) 1.14 cm^3 2/6/2020 10:57 AM   Wound Assessment Painful;Red;Swelling 2/6/2020 10:44 AM   Drainage Amount Scant 3/11/2020 10:58 AM   Drainage Description Serosanguinous 3/11/2020 10:58 AM   Odor None

## 2020-03-12 ENCOUNTER — TELEPHONE (OUTPATIENT)
Dept: INTERNAL MEDICINE | Age: 85
End: 2020-03-12

## 2020-03-13 ENCOUNTER — TELEPHONE (OUTPATIENT)
Dept: INTERNAL MEDICINE | Age: 85
End: 2020-03-13

## 2020-03-18 ENCOUNTER — TELEPHONE (OUTPATIENT)
Dept: PODIATRY | Age: 85
End: 2020-03-18

## 2020-03-19 ENCOUNTER — OFFICE VISIT (OUTPATIENT)
Dept: PODIATRY | Age: 85
End: 2020-03-19
Payer: MEDICARE

## 2020-03-19 VITALS
SYSTOLIC BLOOD PRESSURE: 115 MMHG | HEART RATE: 72 BPM | WEIGHT: 203 LBS | DIASTOLIC BLOOD PRESSURE: 60 MMHG | TEMPERATURE: 97.2 F | BODY MASS INDEX: 28.42 KG/M2 | HEIGHT: 71 IN

## 2020-03-19 PROCEDURE — 3288F FALL RISK ASSESSMENT DOCD: CPT | Performed by: PODIATRIST

## 2020-03-19 PROCEDURE — 1123F ACP DISCUSS/DSCN MKR DOCD: CPT | Performed by: PODIATRIST

## 2020-03-19 PROCEDURE — G8427 DOCREV CUR MEDS BY ELIG CLIN: HCPCS | Performed by: PODIATRIST

## 2020-03-19 PROCEDURE — 99213 OFFICE O/P EST LOW 20 MIN: CPT | Performed by: PODIATRIST

## 2020-03-19 PROCEDURE — 1036F TOBACCO NON-USER: CPT | Performed by: PODIATRIST

## 2020-03-19 PROCEDURE — 0518F FALL PLAN OF CARE DOCD: CPT | Performed by: PODIATRIST

## 2020-03-19 PROCEDURE — 99213 OFFICE O/P EST LOW 20 MIN: CPT

## 2020-03-19 PROCEDURE — G8417 CALC BMI ABV UP PARAM F/U: HCPCS | Performed by: PODIATRIST

## 2020-03-19 PROCEDURE — G8482 FLU IMMUNIZE ORDER/ADMIN: HCPCS | Performed by: PODIATRIST

## 2020-03-19 PROCEDURE — 4040F PNEUMOC VAC/ADMIN/RCVD: CPT | Performed by: PODIATRIST

## 2020-03-25 RX ORDER — ALPRAZOLAM 0.25 MG/1
TABLET ORAL
Qty: 45 TABLET | Refills: 2 | Status: SHIPPED | OUTPATIENT
Start: 2020-03-25 | End: 2020-06-29 | Stop reason: SDUPTHER

## 2020-03-25 NOTE — TELEPHONE ENCOUNTER
Deep [facility]ui called requesting a refill of the below medication which has been pended for you:     Requested Prescriptions     Pending Prescriptions Disp Refills    ALPRAZolam (XANAX) 0.25 MG tablet 45 tablet 2     Sig: TAKE 1/2 OF A TABLET BY MOUTH DAILY, IN THE MORNING. TAKE 1 TABLET BYMOUTH DAILY, AT BEDTIME.        Last Appointment Date: 1/30/2020  Next Appointment Date: 4/24/2020    Allergies   Allergen Reactions    Pcn [Penicillins] Swelling     As a child  Pt tolerated ceftriaxone , and keflex with no intolerance    Sulfa Antibiotics     Cefdinir Other (See Comments)     Pt tolerated ceftriaxone , and keflex with no intolerance

## 2020-04-06 ENCOUNTER — TELEPHONE (OUTPATIENT)
Dept: INTERNAL MEDICINE | Age: 85
End: 2020-04-06

## 2020-04-06 ENCOUNTER — OUTSIDE SERVICES (OUTPATIENT)
Dept: INTERNAL MEDICINE | Age: 85
End: 2020-04-06
Payer: MEDICARE

## 2020-04-06 PROCEDURE — 99213 OFFICE O/P EST LOW 20 MIN: CPT | Performed by: NURSE PRACTITIONER

## 2020-04-06 ASSESSMENT — ENCOUNTER SYMPTOMS
ABDOMINAL PAIN: 1
DIARRHEA: 1
NAUSEA: 0

## 2020-04-07 ENCOUNTER — TELEPHONE (OUTPATIENT)
Dept: INTERNAL MEDICINE | Age: 85
End: 2020-04-07

## 2020-04-07 RX ORDER — SIMETHICONE 20 MG/.3ML
40 EMULSION ORAL 4 TIMES DAILY PRN
Qty: 60 ML | Refills: 3 | Status: SHIPPED | OUTPATIENT
Start: 2020-04-07 | End: 2022-10-04

## 2020-04-07 RX ORDER — DIAPER,BRIEF,INFANT-TODD,DISP
EACH MISCELLANEOUS 2 TIMES DAILY
Qty: 1 TUBE | Refills: 0 | Status: SHIPPED | OUTPATIENT
Start: 2020-04-07 | End: 2020-04-14 | Stop reason: CLARIF

## 2020-04-07 RX ORDER — MINERAL OIL
OIL (ML) MISCELLANEOUS
COMMUNITY

## 2020-04-07 RX ORDER — DIAPER,BRIEF,INFANT-TODD,DISP
EACH MISCELLANEOUS
COMMUNITY
End: 2020-04-07 | Stop reason: SDUPTHER

## 2020-04-07 RX ORDER — ACETAMINOPHEN 325 MG/1
650 TABLET ORAL EVERY 4 HOURS PRN
COMMUNITY

## 2020-04-07 RX ORDER — GUAIFENESIN AND DEXTROMETHORPHAN HYDROBROMIDE 100; 10 MG/5ML; MG/5ML
5 SOLUTION ORAL EVERY 6 HOURS PRN
COMMUNITY

## 2020-04-08 ENCOUNTER — OUTSIDE SERVICES (OUTPATIENT)
Dept: INTERNAL MEDICINE | Age: 85
End: 2020-04-08
Payer: MEDICARE

## 2020-04-08 ENCOUNTER — HOSPITAL ENCOUNTER (OUTPATIENT)
Age: 85
Setting detail: SPECIMEN
Discharge: HOME | End: 2020-04-08
Payer: MEDICARE

## 2020-04-08 LAB
-: ABNORMAL
AMORPHOUS: ABNORMAL
BACTERIA: ABNORMAL
BILIRUBIN URINE: NEGATIVE
CASTS UA: ABNORMAL /LPF (ref 0–2)
COLOR: ABNORMAL
COMMENT UA: ABNORMAL
CRYSTALS, UA: ABNORMAL /HPF
EPITHELIAL CELLS UA: ABNORMAL /HPF (ref 0–5)
GLUCOSE URINE: NEGATIVE
KETONES, URINE: ABNORMAL
LEUKOCYTE ESTERASE, URINE: ABNORMAL
MUCUS: ABNORMAL
NITRITE, URINE: NEGATIVE
OTHER OBSERVATIONS UA: ABNORMAL
PH UA: 5.5 (ref 5–6)
PROTEIN UA: NEGATIVE
RBC UA: ABNORMAL /HPF (ref 0–4)
RENAL EPITHELIAL, UA: ABNORMAL /HPF
SPECIFIC GRAVITY UA: 1.03 (ref 1.01–1.02)
TRICHOMONAS: ABNORMAL
TURBIDITY: ABNORMAL
URINE HGB: ABNORMAL
UROBILINOGEN, URINE: NORMAL
WBC UA: ABNORMAL /HPF (ref 0–4)
YEAST: ABNORMAL

## 2020-04-08 PROCEDURE — 81001 URINALYSIS AUTO W/SCOPE: CPT

## 2020-04-08 PROCEDURE — 99213 OFFICE O/P EST LOW 20 MIN: CPT | Performed by: NURSE PRACTITIONER

## 2020-04-08 PROCEDURE — 87086 URINE CULTURE/COLONY COUNT: CPT

## 2020-04-08 NOTE — PROGRESS NOTES
[facility]      Merrick Butt is a 80 y.o. male resident of [facility] who presents today for medical conditions/complaints as noted below. HPI:     HPI    Patient presents via virtual visit with Anthony Hernandez from [facility]. He has been having some abdominal pain and intermittent diarrhea/constipation for about 6 days now. He was evaluated earlier this week with concern for bowel obstruction. CXR showed nonobstructive bowel gas pattern. Patient was started on simethicone PRN, but had very little relief with this. As he was complaining of primarily of lower abdominal pain and patient had not urinated within the last several hours, a request was made to straight cath the patient, which was approved. Over 1000 mL of urine was obtained from straight cath. Patient is currently on flomax BID for his BPH, but has not had any recent issues with urinary retention aside from the issue today. No reports of dysuria, but RN reported considerable odor from urine, so I did place an order for UA C&S. Patient did report considerable relief following straight cath but still had some residual abdominal pain. Rectal exam by RN at [facility] was completed, which did reveal a considerable amount of stool. At that time I gave a verbal order for a fleet enema, given that patient had failed more conservative laxative treatments. I also advised that a rectal tube could be placed if patient continued to have significant gas pains.      Current Outpatient Medications   Medication Sig Dispense Refill    acetaminophen (TYLENOL) 325 MG tablet Take 650 mg by mouth every 4 hours as needed for Pain      Mineral Oil OIL Use as directed on bottle for occasional constipation      Dextromethorphan-guaiFENesin  MG/5ML SYRP Take 5 mLs by mouth every 6 hours as needed for Cough      simethicone (MYLICON) 40 IE/6.2YX drops Take 0.6 mLs by mouth 4 times daily as needed (gas) 60 mL 3    hydrocortisone (PREPARATION H) 1 % cream Apply topically 2 times daily to affected area as needed for hemorrhoids 1 Tube 0    ALPRAZolam (XANAX) 0.25 MG tablet TAKE 1/2 OF A TABLET BY MOUTH DAILY, IN THE MORNING. TAKE 1 TABLET BYMOUTH DAILY, AT BEDTIME. 45 tablet 2    tamsulosin (FLOMAX) 0.4 MG capsule Take 1 capsule by mouth 2 times daily 180 capsule 3    nitrofurantoin, macrocrystal-monohydrate, (MACROBID) 100 MG capsule Take 1 capsule by mouth daily 90 capsule 3    amiodarone (PACERONE) 100 MG tablet Take 100 mg by mouth daily      metoprolol tartrate (LOPRESSOR) 25 MG tablet Take 0.5 tablets by mouth 2 times daily 90 tablet 1    levothyroxine (SYNTHROID) 112 MCG tablet Take 1 tablet by mouth Daily 30 tablet 11    polyvinyl alcohol (ARTIFICIAL TEARS) 1.4 % ophthalmic solution Place 1 drop into both eyes every 6 hours as needed      Multiple Vitamins-Minerals (PRESERVISION AREDS PO) Take 1 capsule by mouth daily       isosorbide mononitrate (IMDUR) 30 MG extended release tablet Take 1 tablet by mouth daily 30 tablet 3    lisinopril (PRINIVIL;ZESTRIL) 40 MG tablet Take 1 tablet by mouth daily 30 tablet 3    amLODIPine (NORVASC) 10 MG tablet Take 1 tablet by mouth daily 30 tablet 3    rivaroxaban (XARELTO) 10 MG TABS tablet Take 2 tablets by mouth daily (with breakfast) 30 tablet 2    Multiple Vitamins-Minerals (MULTIVITAMIN ADULT PO) Take 1 tablet by mouth daily      potassium chloride (KLOR-CON M) 20 MEQ extended release tablet Take 20 mEq by mouth daily      fluticasone (FLONASE) 50 MCG/ACT nasal spray 1 spray by Nasal route daily 1 Bottle 0    simvastatin (ZOCOR) 20 MG tablet TAKE 1 TABLET BY MOUTH EVERY OTHER DAY  30 tablet 11    Coenzyme Q-10 100 MG CAPS Take 100 mg by mouth every other day.  tolnaftate (TINACTIN) 1 % external solution Apply topically nightly to toenails per Dr. Donny Spurling.  aspirin 81 MG tablet Take 81 mg by mouth daily.  GLUCOSAMINE-CHONDROITIN PO Take 3 tablets by mouth Daily.        No current facility-administered medications for this visit. Allergies   Allergen Reactions    Pcn [Penicillins] Swelling     As a child  Pt tolerated ceftriaxone , and keflex with no intolerance    Sulfa Antibiotics     Cefdinir Other (See Comments)     Pt tolerated ceftriaxone , and keflex with no intolerance       Health Maintenance   Topic Date Due    DTaP/Tdap/Td vaccine (1 - Tdap) 12/31/1953    PSA counseling  12/28/2017    Lipid screen  06/06/2020    TSH testing  06/06/2020    Potassium monitoring  06/06/2020    Creatinine monitoring  06/06/2020    Annual Wellness Visit (AWV)  06/14/2020    Flu vaccine  Completed    Pneumococcal 65+ years Vaccine  Completed    Hepatitis A vaccine  Aged Out    Hepatitis B vaccine  Aged Out    Hib vaccine  Aged Out    Meningococcal (ACWY) vaccine  Aged Out       Subjective:      Review of Systems   Constitutional: Negative for chills and fever. HENT: Negative for congestion and rhinorrhea. Respiratory: Negative for cough and wheezing. Gastrointestinal: Positive for abdominal pain. Negative for diarrhea, nausea and vomiting. Genitourinary: Positive for decreased urine volume. Negative for dysuria. Neurological: Negative for dizziness and headaches. Objective: There were no vitals filed for this visit. Physical Exam  Constitutional:       General: He is not in acute distress. Appearance: Normal appearance. HENT:      Head: Normocephalic and atraumatic. Right Ear: External ear normal.      Left Ear: External ear normal.      Nose: No rhinorrhea. Mouth/Throat:      Lips: No lesions. Eyes:      Conjunctiva/sclera: Conjunctivae normal.   Neck:      Musculoskeletal: Normal range of motion. Vascular: No JVD. Pulmonary:      Effort: Pulmonary effort is normal. No accessory muscle usage or respiratory distress.    Abdominal:      Comments: Mild tenderness to palpation lower abdomen when palpated by RN at [facility], improved from previous   Skin:     Coloration: Skin is not cyanotic or jaundiced. Neurological:      Mental Status: He is alert. Mental status is at baseline. Psychiatric:         Attention and Perception: Attention and perception normal.         Mood and Affect: Mood and affect normal.         Speech: Speech normal.         Behavior: Behavior is cooperative. Thought Content: Thought content normal.         Assessment/Plan:        1. Lower abdominal pain  2. Urinary retention  - Symptoms improved considerably following straight cath. Provided PRN straight cath order if his urine output decreases, will consult with urology if he has any recurrence of urinary retention. Continue BID flomax. Obtain UA C&S. CXR negative for bowel obstruction, but patient continues to have constipation despite treatment with laxatives. Verbal order given for fleet enema, can proceed with rectal tube if patient continues to have considerable gas pain. Return if symptoms worsen or fail to improve. No orders of the defined types were placed in this encounter. No orders of the defined types were placed in this encounter. Electronically signed by ADALID Nguyen on 4/9/2020 at 7:39 AM     Jacob Braun is a 80 y.o. male being evaluated by a Virtual Visit (video visit) encounter to address concerns as mentioned above. A caregiver was present when appropriate. Due to this being a TeleHealth encounter ([facility]), evaluation of the following organ systems was limited: Vitals/Constitutional/EENT/Resp/CV/GI//MS/Neuro/Skin/Heme-Lymph-Imm.   Pursuant to the emergency declaration under the [facility], [address] [place] [address] authority and the [employer] Act, this Virtual Visit was conducted with patient's (and/or legal guardian's) consent, to reduce the patient's risk of exposure to COVID-19 and provide necessary medical care. The patient (and/or legal guardian) has also been advised to contact this office for worsening conditions or problems, and seek emergency medical treatment and/or call 911 if deemed necessary. Services were provided through a video synchronous discussion virtually to substitute for in-person clinic visit. Patient and provider were located at their individual homes. --DAYANARA Pitts - CNP on 4/9/2020 at 9:50 AM    An electronic signature was used to authenticate this note.

## 2020-04-09 ENCOUNTER — TELEPHONE (OUTPATIENT)
Dept: INTERNAL MEDICINE | Age: 85
End: 2020-04-09

## 2020-04-09 LAB
CULTURE: NO GROWTH
Lab: NORMAL
SPECIMEN DESCRIPTION: NORMAL

## 2020-04-09 ASSESSMENT — ENCOUNTER SYMPTOMS
COUGH: 0
ABDOMINAL PAIN: 1
WHEEZING: 0
DIARRHEA: 0
RHINORRHEA: 0
VOMITING: 0
NAUSEA: 0

## 2020-04-09 NOTE — TELEPHONE ENCOUNTER
Please fax to [facility] - can start sanchez cath as patient is currently unable to urinate without catheterization. Please contact urology regarding his worsening symptoms.     Electronically signed by ADALID Browne on 4/9/2020 at 10:21 AM

## 2020-04-13 ENCOUNTER — TELEPHONE (OUTPATIENT)
Dept: INTERNAL MEDICINE | Age: 85
End: 2020-04-13

## 2020-04-14 ENCOUNTER — HOSPITAL ENCOUNTER (OUTPATIENT)
Dept: GENERAL RADIOLOGY | Age: 85
Discharge: HOME | End: 2020-04-16
Payer: MEDICARE

## 2020-04-14 ENCOUNTER — TELEPHONE (OUTPATIENT)
Dept: INTERNAL MEDICINE | Age: 85
End: 2020-04-14

## 2020-04-14 ENCOUNTER — OUTSIDE SERVICES (OUTPATIENT)
Dept: INTERNAL MEDICINE | Age: 85
End: 2020-04-14
Payer: MEDICARE

## 2020-04-14 ENCOUNTER — HOSPITAL ENCOUNTER (OUTPATIENT)
Dept: CT IMAGING | Age: 85
Discharge: HOME | End: 2020-04-16
Payer: MEDICARE

## 2020-04-14 VITALS
SYSTOLIC BLOOD PRESSURE: 138 MMHG | RESPIRATION RATE: 18 BRPM | HEART RATE: 72 BPM | DIASTOLIC BLOOD PRESSURE: 76 MMHG | OXYGEN SATURATION: 98 % | TEMPERATURE: 97.6 F

## 2020-04-14 PROCEDURE — 74176 CT ABD & PELVIS W/O CONTRAST: CPT

## 2020-04-14 PROCEDURE — 74019 RADEX ABDOMEN 2 VIEWS: CPT

## 2020-04-14 RX ORDER — DIAPER,BRIEF,INFANT-TODD,DISP
EACH MISCELLANEOUS
COMMUNITY

## 2020-04-14 RX ORDER — AMIODARONE HYDROCHLORIDE 200 MG/1
100 TABLET ORAL DAILY
COMMUNITY

## 2020-04-14 NOTE — PROGRESS NOTES
with no intolerance       Health Maintenance   Topic Date Due    DTaP/Tdap/Td vaccine (1 - Tdap) 12/31/1953    PSA counseling  12/28/2017    Lipid screen  06/06/2020    TSH testing  06/06/2020    Potassium monitoring  06/06/2020    Creatinine monitoring  06/06/2020    Annual Wellness Visit (AWV)  06/14/2020    Flu vaccine  Completed    Pneumococcal 65+ years Vaccine  Completed    Hepatitis A vaccine  Aged Out    Hepatitis B vaccine  Aged Out    Hib vaccine  Aged Out    Meningococcal (ACWY) vaccine  Aged Out       Subjective:      Review of Systems   Constitutional: Negative for chills and fever. HENT: Negative for congestion and rhinorrhea. Respiratory: Negative for cough and wheezing. Gastrointestinal: Positive for abdominal pain, constipation and diarrhea. Negative for nausea and vomiting. Genitourinary: Positive for decreased urine volume and hematuria. Neurological: Negative for dizziness and headaches. Objective:     Vitals:    04/14/20 0750   BP: 138/76   Pulse: 72   Resp: 18   Temp: 97.6 °F (36.4 °C)   SpO2: 98%     Physical Exam  Vitals signs and nursing note reviewed. Constitutional:       General: He is not in acute distress. Appearance: He is well-developed. Cardiovascular:      Rate and Rhythm: Normal rate and regular rhythm. Pulmonary:      Effort: Pulmonary effort is normal.      Breath sounds: Normal breath sounds. Abdominal:      General: Bowel sounds are normal.      Palpations: Abdomen is soft. Comments: Tenderness to palpation lower abdomen near bladder   Lymphadenopathy:      Cervical: No cervical adenopathy. Neurological:      Mental Status: He is alert. Psychiatric:         Behavior: Behavior normal.         Assessment/Plan:        1. Hematuria, unspecified type  2. Hematochezia  3. Lower abdominal pain  4. Urinary retention  5. Diarrhea, unspecified type  - Obtain CT, CBC, CMP.  Will contact urology regarding his urinary retention as he is

## 2020-04-15 ENCOUNTER — HOSPITAL ENCOUNTER (OUTPATIENT)
Age: 85
Setting detail: SPECIMEN
Discharge: HOME | End: 2020-04-15
Payer: MEDICARE

## 2020-04-15 LAB
-: ABNORMAL
ABSOLUTE EOS #: 0.26 K/UL (ref 0–0.44)
ABSOLUTE IMMATURE GRANULOCYTE: 0.06 K/UL (ref 0–0.3)
ABSOLUTE LYMPH #: 0.9 K/UL (ref 1.1–3.7)
ABSOLUTE MONO #: 0.36 K/UL (ref 0.1–1.2)
ALBUMIN SERPL-MCNC: 3.6 G/DL (ref 3.5–5.2)
ALBUMIN/GLOBULIN RATIO: 1.3 (ref 1–2.5)
ALP BLD-CCNC: 78 U/L (ref 40–129)
ALT SERPL-CCNC: 17 U/L (ref 5–41)
AMORPHOUS: ABNORMAL
ANION GAP SERPL CALCULATED.3IONS-SCNC: 11 MMOL/L (ref 9–17)
AST SERPL-CCNC: 17 U/L
BACTERIA: ABNORMAL
BASOPHILS # BLD: 1 % (ref 0–2)
BASOPHILS ABSOLUTE: 0.04 K/UL (ref 0–0.2)
BILIRUB SERPL-MCNC: 0.66 MG/DL (ref 0.3–1.2)
BILIRUBIN URINE: NEGATIVE
BUN BLDV-MCNC: 15 MG/DL (ref 8–23)
BUN/CREAT BLD: 16 (ref 9–20)
CALCIUM SERPL-MCNC: 8.7 MG/DL (ref 8.6–10.4)
CASTS UA: ABNORMAL /LPF (ref 0–2)
CHLORIDE BLD-SCNC: 102 MMOL/L (ref 98–107)
CO2: 25 MMOL/L (ref 20–31)
COLOR: ABNORMAL
COMMENT UA: ABNORMAL
CREAT SERPL-MCNC: 0.93 MG/DL (ref 0.7–1.2)
CRYSTALS, UA: ABNORMAL /HPF
DIFFERENTIAL TYPE: ABNORMAL
EOSINOPHILS RELATIVE PERCENT: 4 % (ref 1–4)
EPITHELIAL CELLS UA: ABNORMAL /HPF (ref 0–5)
GFR AFRICAN AMERICAN: >60 ML/MIN
GFR NON-AFRICAN AMERICAN: >60 ML/MIN
GFR SERPL CREATININE-BSD FRML MDRD: ABNORMAL ML/MIN/{1.73_M2}
GFR SERPL CREATININE-BSD FRML MDRD: ABNORMAL ML/MIN/{1.73_M2}
GLUCOSE BLD-MCNC: 102 MG/DL (ref 70–99)
GLUCOSE URINE: NEGATIVE
HCT VFR BLD CALC: 41.4 % (ref 40.7–50.3)
HEMOGLOBIN: 13.6 G/DL (ref 13–17)
IMMATURE GRANULOCYTES: 1 %
KETONES, URINE: NEGATIVE
LEUKOCYTE ESTERASE, URINE: ABNORMAL
LYMPHOCYTES # BLD: 15 % (ref 24–43)
MCH RBC QN AUTO: 28.9 PG (ref 25.2–33.5)
MCHC RBC AUTO-ENTMCNC: 32.9 G/DL (ref 25.2–33.5)
MCV RBC AUTO: 88.1 FL (ref 82.6–102.9)
MONOCYTES # BLD: 6 % (ref 3–12)
MUCUS: ABNORMAL
NITRITE, URINE: NEGATIVE
NRBC AUTOMATED: 0 PER 100 WBC
OTHER OBSERVATIONS UA: ABNORMAL
PDW BLD-RTO: 14.3 % (ref 11.8–14.4)
PH UA: 5 (ref 5–6)
PLATELET # BLD: 211 K/UL (ref 138–453)
PLATELET ESTIMATE: ABNORMAL
PMV BLD AUTO: 11.2 FL (ref 8.1–13.5)
POTASSIUM SERPL-SCNC: 4.1 MMOL/L (ref 3.7–5.3)
PROTEIN UA: NEGATIVE
RBC # BLD: 4.7 M/UL (ref 4.21–5.77)
RBC # BLD: ABNORMAL 10*6/UL
RBC UA: ABNORMAL /HPF (ref 0–4)
RENAL EPITHELIAL, UA: ABNORMAL /HPF
SEG NEUTROPHILS: 74 % (ref 36–65)
SEGMENTED NEUTROPHILS ABSOLUTE COUNT: 4.54 K/UL (ref 1.5–8.1)
SODIUM BLD-SCNC: 138 MMOL/L (ref 135–144)
SPECIFIC GRAVITY UA: 1.02 (ref 1.01–1.02)
TOTAL PROTEIN: 6.3 G/DL (ref 6.4–8.3)
TRICHOMONAS: ABNORMAL
TURBIDITY: ABNORMAL
URINE HGB: ABNORMAL
UROBILINOGEN, URINE: NORMAL
WBC # BLD: 6.2 K/UL (ref 3.5–11.3)
WBC # BLD: ABNORMAL 10*3/UL
WBC UA: ABNORMAL /HPF (ref 0–4)
YEAST: ABNORMAL

## 2020-04-15 PROCEDURE — 87186 SC STD MICRODIL/AGAR DIL: CPT

## 2020-04-15 PROCEDURE — 87088 URINE BACTERIA CULTURE: CPT

## 2020-04-15 PROCEDURE — 85025 COMPLETE CBC W/AUTO DIFF WBC: CPT

## 2020-04-15 PROCEDURE — 80053 COMPREHEN METABOLIC PANEL: CPT

## 2020-04-15 PROCEDURE — 81001 URINALYSIS AUTO W/SCOPE: CPT

## 2020-04-15 PROCEDURE — 87086 URINE CULTURE/COLONY COUNT: CPT

## 2020-04-15 ASSESSMENT — ENCOUNTER SYMPTOMS
RHINORRHEA: 0
NAUSEA: 0
DIARRHEA: 1
COUGH: 0
CONSTIPATION: 1
VOMITING: 0
WHEEZING: 0
ABDOMINAL PAIN: 1

## 2020-04-18 LAB
CULTURE: ABNORMAL
Lab: ABNORMAL
SPECIMEN DESCRIPTION: ABNORMAL

## 2020-04-20 ENCOUNTER — TELEPHONE (OUTPATIENT)
Dept: INTERNAL MEDICINE | Age: 85
End: 2020-04-20

## 2020-04-20 RX ORDER — CEPHALEXIN 500 MG/1
500 CAPSULE ORAL 3 TIMES DAILY
Qty: 30 CAPSULE | Refills: 0 | Status: SHIPPED | OUTPATIENT
Start: 2020-04-20 | End: 2020-04-30

## 2020-04-20 NOTE — TELEPHONE ENCOUNTER
Cephalexin 500 mg TID x 10 days sent per urology recommendation. Patient does have allergy to cephaloposrins listed, but he appears to have tolerated cephalexin in 2018, please verify this before patient starts medication, thanks. Estimated Creatinine Clearance: 67 mL/min (based on SCr of 0.93 mg/dL).

## 2020-04-29 ENCOUNTER — VIRTUAL VISIT (OUTPATIENT)
Dept: UROLOGY | Age: 85
End: 2020-04-29
Payer: MEDICARE

## 2020-04-29 PROCEDURE — 99442 PR PHYS/QHP TELEPHONE EVALUATION 11-20 MIN: CPT | Performed by: UROLOGY

## 2020-05-07 ENCOUNTER — TELEPHONE (OUTPATIENT)
Dept: INTERNAL MEDICINE | Age: 85
End: 2020-05-07

## 2020-05-20 ENCOUNTER — TELEPHONE (OUTPATIENT)
Dept: INTERNAL MEDICINE | Age: 85
End: 2020-05-20

## 2020-05-20 ENCOUNTER — HOSPITAL ENCOUNTER (OUTPATIENT)
Age: 85
Setting detail: SPECIMEN
Discharge: HOME | End: 2020-05-20
Payer: MEDICARE

## 2020-05-20 LAB
-: ABNORMAL
AMORPHOUS: ABNORMAL
BACTERIA: ABNORMAL
BILIRUBIN URINE: NEGATIVE
CASTS UA: ABNORMAL /LPF (ref 0–2)
COLOR: ABNORMAL
COMMENT UA: ABNORMAL
CRYSTALS, UA: ABNORMAL /HPF
EPITHELIAL CELLS UA: ABNORMAL /HPF (ref 0–5)
GLUCOSE URINE: NEGATIVE
KETONES, URINE: NEGATIVE
LEUKOCYTE ESTERASE, URINE: ABNORMAL
MUCUS: ABNORMAL
NITRITE, URINE: POSITIVE
OTHER OBSERVATIONS UA: ABNORMAL
PH UA: 7 (ref 5–6)
PROTEIN UA: ABNORMAL
RBC UA: ABNORMAL /HPF (ref 0–4)
RENAL EPITHELIAL, UA: ABNORMAL /HPF
SPECIFIC GRAVITY UA: 1.01 (ref 1.01–1.02)
TRICHOMONAS: ABNORMAL
TURBIDITY: ABNORMAL
URINE HGB: NEGATIVE
UROBILINOGEN, URINE: NORMAL
WBC UA: >100 /HPF (ref 0–4)
YEAST: ABNORMAL

## 2020-05-20 PROCEDURE — 81001 URINALYSIS AUTO W/SCOPE: CPT

## 2020-05-20 PROCEDURE — 87086 URINE CULTURE/COLONY COUNT: CPT

## 2020-05-20 PROCEDURE — 87077 CULTURE AEROBIC IDENTIFY: CPT

## 2020-05-20 PROCEDURE — 87186 SC STD MICRODIL/AGAR DIL: CPT

## 2020-05-21 ENCOUNTER — TELEPHONE (OUTPATIENT)
Dept: INTERNAL MEDICINE | Age: 85
End: 2020-05-21

## 2020-05-21 RX ORDER — NITROFURANTOIN 25; 75 MG/1; MG/1
100 CAPSULE ORAL 2 TIMES DAILY
Qty: 20 CAPSULE | Refills: 0 | Status: SHIPPED | OUTPATIENT
Start: 2020-05-21 | End: 2020-05-31

## 2020-05-22 LAB
CULTURE: ABNORMAL
Lab: ABNORMAL
SPECIMEN DESCRIPTION: ABNORMAL

## 2020-06-16 ENCOUNTER — OFFICE VISIT (OUTPATIENT)
Dept: PULMONOLOGY | Age: 85
End: 2020-06-16
Payer: MEDICARE

## 2020-06-16 VITALS
WEIGHT: 189.6 LBS | SYSTOLIC BLOOD PRESSURE: 108 MMHG | BODY MASS INDEX: 26.54 KG/M2 | OXYGEN SATURATION: 96 % | HEIGHT: 71 IN | TEMPERATURE: 97.8 F | DIASTOLIC BLOOD PRESSURE: 56 MMHG | HEART RATE: 71 BPM

## 2020-06-16 PROCEDURE — 1036F TOBACCO NON-USER: CPT | Performed by: INTERNAL MEDICINE

## 2020-06-16 PROCEDURE — 4040F PNEUMOC VAC/ADMIN/RCVD: CPT | Performed by: INTERNAL MEDICINE

## 2020-06-16 PROCEDURE — 0518F FALL PLAN OF CARE DOCD: CPT | Performed by: INTERNAL MEDICINE

## 2020-06-16 PROCEDURE — 1123F ACP DISCUSS/DSCN MKR DOCD: CPT | Performed by: INTERNAL MEDICINE

## 2020-06-16 PROCEDURE — G8417 CALC BMI ABV UP PARAM F/U: HCPCS | Performed by: INTERNAL MEDICINE

## 2020-06-16 PROCEDURE — 3288F FALL RISK ASSESSMENT DOCD: CPT | Performed by: INTERNAL MEDICINE

## 2020-06-16 PROCEDURE — G8427 DOCREV CUR MEDS BY ELIG CLIN: HCPCS | Performed by: INTERNAL MEDICINE

## 2020-06-16 PROCEDURE — 99213 OFFICE O/P EST LOW 20 MIN: CPT | Performed by: INTERNAL MEDICINE

## 2020-06-16 PROCEDURE — 99213 OFFICE O/P EST LOW 20 MIN: CPT

## 2020-06-16 RX ORDER — ALPRAZOLAM 0.25 MG/1
TABLET ORAL
COMMUNITY
Start: 2020-05-26 | End: 2020-09-18

## 2020-06-16 NOTE — PROGRESS NOTES
Indicated   [] Refused  [] Contraindicated       Influenza Vaccine   [x] Up to date    [] Indicated   [] Refused  [] Contraindicated            PAST MEDICAL HISTORY:       Diagnosis Date    Acute bronchitis     by history    MARITA (acute kidney injury) ([address] 3/13/2018    Allergic rhinitis     Anxiety     Bradycardia 8/19/2018    Cataract, right     Choroidal nevus of right eye     Chronic systolic CHF (congestive heart failure) ([address] 3/13/2018    Coronary artery disease involving native coronary artery of native heart 10/20/2016    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 LIMA TO LAD, SVG TO DIAGONAL2    Dementia associated with 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 behavioral disturbance ([address] 9/21/2018    Dermatophytosis of nail 1/10/2014    Diverticulosis     Elevated PSA     Hemorrhoids     History of measles childhood    History of mumps childhood    Hyperlipidemia     Hypertension     Hypothyroidism 9/4/2018    Mass of upper lobe of right lung 2/21/2018    Occult blood positive stool     refuses colonoscopy    Osteoarthritis     Overweight     Paroxysmal atrial fibrillation ([address] 11/7/2013    Pneumonia due to organism     Pseudophakia, left eye     PSVT (paroxysmal supraventricular tachycardia) ([facility])     Recurrent UTI 3/13/2018    Retinal detachment     left, history of     Seborrheic dermatitis     Sepsis due to urinary tract infection ([address] 8/15/2018    Urinary tract infection without hematuria 3/28/2018         Family History:       Problem Relation Age of Onset    Diabetes Mother     Osteoporosis Mother     Other Father         complications of prostate surgery (bleeding)    Stroke Sister     Other Sister         respiratory failure       SURGICAL HISTORY:   Past Surgical History:   Procedure Laterality Date    ABSCESS DRAINAGE  8911-6720    multiple    APPENDECTOMY  1940 and 1955    CATARACT REMOVAL Left 5/7/2013    CORONARY ARTERY BYPASS GRAFT      RETINAL LASER Left 12/22/2011    detached retina    SKIN TAG REMOVAL  Nov 1999    TONSILLECTOMY             SOCIAL AND OCCUPATIONAL HEALTH:      There  No  history of TB or TB exposure. There  no asbestos or silica dust exposure. The patient reports  no coal, foundry, quarry or [employer] exposure. Travel history reveals [place] . There  no history of recreational or IV drug use. There  no hot tube exposure. Pets  No     Occupational history  and  . TOBACCO:   reports that he has never smoked. He has never used smokeless tobacco.  ETOH:   reports no history of alcohol use. ALLERGIES:      Allergies   Allergen Reactions    Pcn [Penicillins] Swelling     As a child  Pt tolerated ceftriaxone , and keflex with no intolerance    Sulfa Antibiotics     Cefdinir Other (See Comments)     Pt tolerated ceftriaxone , and keflex with no intolerance         Home Meds:   Prior to Admission medications    Medication Sig Start Date End Date Taking?  Authorizing Provider   ALPRAZolam Nadeem Jin) 0.25 MG tablet  5/26/20  Yes Historical Provider, MD   hydrocortisone (PREPARATION H) 1 % cream Apply topically to affected area as directed prn (Preparation H)   Yes Historical Provider, MD   Compression Stockings MISC by Does not apply route DAVID hose- on in am, off in pm   Yes Historical Provider, MD   amiodarone (CORDARONE) 200 MG tablet Take 100 mg by mouth daily   Yes Historical Provider, MD   rivaroxaban (XARELTO) 20 MG TABS tablet Take 20 mg by mouth every morning   Yes Historical Provider, MD   tamsulosin (FLOMAX) 0.4 MG capsule Take 1 capsule by mouth 2 times daily 1/8/20 6/16/20 Yes Joann Maher MD   metoprolol tartrate (LOPRESSOR) 25 MG tablet Take 0.5 tablets by mouth 2 times daily 6/13/19  Yes Obie Becerra DO   levothyroxine (SYNTHROID) 112 MCG tablet Take 1 tablet by mouth Daily 4/15/19  Yes Guillermo Cerda,    polyvinyl alcohol (ARTIFICIAL TEARS) 1.4 % ophthalmic solution Place 1 drop into both eyes every 6 hours as needed   Yes Historical Provider, MD   Multiple Vitamins-Minerals (PRESERVISION AREDS PO) Take 1 capsule by mouth daily    Yes Historical Provider, MD   isosorbide mononitrate (IMDUR) 30 MG extended release tablet Take 1 tablet by mouth daily 8/22/18  Yes Leonora Bloom MD   lisinopril (PRINIVIL;ZESTRIL) 40 MG tablet Take 1 tablet by mouth daily 8/22/18  Yes Leonora Bloom MD   amLODIPine (NORVASC) 10 MG tablet Take 1 tablet by mouth daily 8/22/18  Yes Leonora Bloom MD   Multiple Vitamins-Minerals (MULTIVITAMIN ADULT PO) Take 1 tablet by mouth daily   Yes Historical Provider, MD   potassium chloride (KLOR-CON M) 20 MEQ extended release tablet Take 20 mEq by mouth daily   Yes Historical Provider, MD   fluticasone (FLONASE) 50 MCG/ACT nasal spray 1 spray by Nasal route daily 2/1/18  Yes Ashby Cabot, APRN - CNP   simvastatin (ZOCOR) 20 MG tablet TAKE 1 TABLET BY MOUTH EVERY OTHER DAY  3/7/17  Yes Guillermo Cerda DO   Coenzyme Q-10 100 MG CAPS Take 100 mg by mouth every other day. Yes Historical Provider, MD   tolnaftate (TINACTIN) 1 % external solution Apply topically nightly to toenails per Dr. Zoltan Huitron. Yes Historical Provider, MD   aspirin 81 MG tablet Take 81 mg by mouth daily.    Yes Historical Provider, MD   GLUCOSAMINE-CHONDROITIN PO Take 3 capsules by mouth daily at 1800 (500-400 mg)   Yes Historical Provider, MD   Aspirin Buf,CaCarb-MgCarb-MgO, 81 MG TABS Take by mouth    Historical Provider, MD   acetaminophen (TYLENOL) 325 MG tablet Take 650 mg by mouth every 4 hours as needed for Pain    Historical Provider, MD   Mineral Oil OIL Use as directed on bottle for occasional constipation    Historical Provider, MD   Dextromethorphan-guaiFENesin  MG/5ML SYRP Take 5 mLs by mouth every 6 hours as needed for Cough (Robafen DM)    Historical Provider, MD   simethicone (MYLICON) 40 JV/1.0XG drops Take 0.6 mLs by mouth 4 times daily as needed (gas)  Patient not taking: Reported on 6/16/2020 4/7/20 Olesya Leblanc, DAYANARA - CNP            Review of Systems -  General ROS: negative for - chills, fatigue, fever or weight loss  ENT ROS: negative for - headaches, oral lesions or sore throat  Cardiovascular ROS: no chest pain , orthopnea or pnd   Gastrointestinal ROS: no abdominal pain, change in bowel habits, or black or bloody stools  Skin - no rash   Neuro - no blurry vision , no loc . No focal weakness   msk - no jt tenderness or swelling    Vascular - no claudication , rest completed and negative   Lymphatic - complete and negative   Hematology - oncology - complete and negative   Allergy immunology - complete and negative    no burning or hematuria    Vitals:  BP (!) 108/56   Pulse 71   Temp 97.8 °F (36.6 °C)   Ht 5' 11\" (1.803 m)   Wt 189 lb 9.6 oz (86 kg)   SpO2 96%   BMI 26.44 kg/m²     PHYSICAL EXAM:  Head and neck atraumatic, normocephalic    Lymph nodes-no cervical, supraclavicular lymphadenopathy    Neck-no JVP elevation    Lungs - Ventilating all lobes without any rales, rhonchi, wheezes or dullness. No bronchial breath sounds. Chest expansion equal bilaterally    CVS- S1, S2 regular. No S3 no S4, no murmurs    Abdomen-nontender, nondistended. Bowel sounds are present. No organomegaly    Lower extremity-no edema    Upper extremity-no edema    Neurological-grossly normal cranial nerves. No overt motor deficit     4/19/19  The previously described area of ground-glass attenuation on the prior study   is now resolved within the right upper lobe.  No masses seen within the right   upper lobe. *Stable 4 mm nodule right middle lobe.  Given the over 1 year stability, a   benign process is favored. *Cholelithiasis. *Evidence of old granulomas disease. *Small hiatal hernia. IMPRESSION:     Diagnosis Orders   1.  Lung nodule seen on imaging study 4 mm           :                PLAN:      Patient previously had a right upper lobe lung mass which on follow-up CT scan in April 19,

## 2020-06-29 ENCOUNTER — TELEPHONE (OUTPATIENT)
Dept: INTERNAL MEDICINE | Age: 85
End: 2020-06-29

## 2020-06-29 RX ORDER — ALPRAZOLAM 0.25 MG/1
TABLET ORAL
Qty: 45 TABLET | Refills: 2 | Status: SHIPPED | OUTPATIENT
Start: 2020-06-29 | End: 2020-07-29

## 2020-07-14 ENCOUNTER — TELEPHONE (OUTPATIENT)
Dept: INTERNAL MEDICINE | Age: 85
End: 2020-07-14

## 2020-08-03 NOTE — ED PROVIDER NOTES
eye     Diverticulosis     Elevated PSA     Hemorrhoids     History of measles childhood    History of mumps childhood    Hyperlipidemia     Hypertension     Occult blood positive stool     refuses colonoscopy    Osteoarthritis     Overweight(278.02)     Palpitations     Pseudophakia, left eye     PSVT (paroxysmal supraventricular tachycardia) (HCC)     Retinal detachment     left, history of     Seborrheic dermatitis          SURGICAL HISTORY       Past Surgical History:   Procedure Laterality Date    ABSCESS DRAINAGE  [id number]    multiple    APPENDECTOMY  1940 and 1955    CATARACT REMOVAL Left 5/7/2013    CORONARY ARTERY BYPASS GRAFT      RETINAL LASER Left 12/22/2011    detached retina    SKIN TAG REMOVAL  Nov 1999    TONSILLECTOMY           CURRENT MEDICATIONS       Previous Medications    ACETAMINOPHEN (TYLENOL) 500 MG TABLET    Take 500 mg by mouth every 6 hours as needed for Pain    ALPRAZOLAM (XANAX) 0.25 MG TABLET    Take 1/2 tablet by mouth in the morning and 1 tablet at bedtime. AMIODARONE (CORDARONE) 200 MG TABLET    Take 1 tablet by mouth daily    ASPIRIN 81 MG TABLET    Take 81 mg by mouth daily. COENZYME Q-10 100 MG CAPS    Take 100 mg by mouth every other day. FLUTICASONE (FLONASE) 50 MCG/ACT NASAL SPRAY    1 spray by Nasal route daily    GLUCOSAMINE-CHONDROITIN PO    Take 3 tablets by mouth Daily.     LISINOPRIL (PRINIVIL;ZESTRIL) 10 MG TABLET    Take 1 tablet by mouth daily    METOPROLOL SUCCINATE (TOPROL XL) 25 MG EXTENDED RELEASE TABLET    TAKE 1 TABLET BY MOUTH 2 TIMES DAILY    MULTIPLE VITAMINS-MINERALS (PRESERVISION AREDS 2 PO)    Take 1 capsule by mouth daily    NITROFURANTOIN, MACROCRYSTAL-MONOHYDRATE, (MACROBID) 100 MG CAPSULE    Take 1 capsule by mouth 2 times daily    RIVAROXABAN (XARELTO) 10 MG TABS TABLET    Take 2 tablets by mouth daily    SIMVASTATIN (ZOCOR) 20 MG TABLET    TAKE 1 TABLET BY MOUTH EVERY OTHER DAY     TOLNAFTATE (TINACTIN) 1 % EXTERNAL 33703  [phone]    Schedule an appointment as soon as possible for a visit in 2 days        DISCHARGE MEDICATIONS:  New Prescriptions    No medications on file       (Please note that portions of this note were completed with a voice recognition program.  Efforts were made to edit the dictations but occasionally words are mis-transcribed.)    Jasen Lopes D.O., F.A.C.E.P.   Attending Emergency Physician         Jasen Lopes,   02/26/18 [id number] Improved

## 2020-09-17 ENCOUNTER — OFFICE VISIT (OUTPATIENT)
Dept: CARDIOLOGY | Age: 85
End: 2020-09-17
Payer: MEDICARE

## 2020-09-17 ENCOUNTER — HOSPITAL ENCOUNTER (OUTPATIENT)
Dept: GENERAL RADIOLOGY | Age: 85
Discharge: HOME | End: 2020-09-19
Payer: MEDICARE

## 2020-09-17 VITALS
SYSTOLIC BLOOD PRESSURE: 113 MMHG | BODY MASS INDEX: 26.6 KG/M2 | HEART RATE: 62 BPM | DIASTOLIC BLOOD PRESSURE: 66 MMHG | WEIGHT: 190 LBS | HEIGHT: 71 IN

## 2020-09-17 PROCEDURE — G8427 DOCREV CUR MEDS BY ELIG CLIN: HCPCS | Performed by: INTERNAL MEDICINE

## 2020-09-17 PROCEDURE — 99214 OFFICE O/P EST MOD 30 MIN: CPT | Performed by: INTERNAL MEDICINE

## 2020-09-17 PROCEDURE — 1036F TOBACCO NON-USER: CPT | Performed by: INTERNAL MEDICINE

## 2020-09-17 PROCEDURE — 93005 ELECTROCARDIOGRAM TRACING: CPT | Performed by: INTERNAL MEDICINE

## 2020-09-17 PROCEDURE — 0518F FALL PLAN OF CARE DOCD: CPT | Performed by: INTERNAL MEDICINE

## 2020-09-17 PROCEDURE — 1123F ACP DISCUSS/DSCN MKR DOCD: CPT | Performed by: INTERNAL MEDICINE

## 2020-09-17 PROCEDURE — 71045 X-RAY EXAM CHEST 1 VIEW: CPT

## 2020-09-17 PROCEDURE — 4040F PNEUMOC VAC/ADMIN/RCVD: CPT | Performed by: INTERNAL MEDICINE

## 2020-09-17 PROCEDURE — 3288F FALL RISK ASSESSMENT DOCD: CPT | Performed by: INTERNAL MEDICINE

## 2020-09-17 PROCEDURE — 93010 ELECTROCARDIOGRAM REPORT: CPT | Performed by: INTERNAL MEDICINE

## 2020-09-17 PROCEDURE — G8417 CALC BMI ABV UP PARAM F/U: HCPCS | Performed by: INTERNAL MEDICINE

## 2020-09-17 RX ORDER — NITROFURANTOIN 25; 75 MG/1; MG/1
100 CAPSULE ORAL DAILY
COMMUNITY
Start: 2020-08-29 | End: 2020-10-30 | Stop reason: SDUPTHER

## 2020-09-17 NOTE — PROGRESS NOTES
Today's Date: 9/17/2020  Patient Name: Troy Vasquez  Patient's age: 80 y. o., 1934       HPI:    The patient is a 80 y.o.  male is in the office for f/u, he is currently residing in a nursing home, has been doing well cardiac wise, no new cardiac complaints. He denies angina, chest pressure, dyspnea with exertion, PND/Orthopnea. No recent decline in his functional capacity. Has been maintaining normal sinus rhythm. 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 Past Surgical History:   has a past surgical history that includes Coronary artery bypass graft; Appendectomy (1940 and 1955); Tonsillectomy; retinal laser (Left, 12/22/2011); Cataract removal (Left, 5/7/2013); Skin tag removal (Nov 1999); and Abscess Drainage (2162-3319). Home Medications:    Prior to Admission medications    Medication Sig Start Date End Date Taking?  Authorizing Provider   nitrofurantoin, macrocrystal-monohydrate, (MACROBID) 100 MG capsule Take 100 mg by mouth daily  8/29/20  Yes Historical Provider, MD ALPRAZolam (XANAX) 0.25 MG tablet Takes 1/2 tab in morning and one tab at bedtime 5/26/20  Yes Historical Provider, MD   hydrocortisone (PREPARATION H) 1 % cream Apply topically to affected area as directed prn (Preparation H)   Yes Historical Provider, MD   Compression Stockings MISC by Does not apply route DAVID hose- on in am, off in pm   Yes Historical Provider, MD   amiodarone (CORDARONE) 200 MG tablet Take 100 mg by mouth daily   Yes Historical Provider, MD   rivaroxaban (XARELTO) 20 MG TABS tablet Take 20 mg by mouth every morning   Yes Historical Provider, MD   acetaminophen (TYLENOL) 325 MG tablet Take 650 mg by mouth every 4 hours as needed for Pain   Yes Historical Provider, MD   Mineral Oil OIL Use as directed on bottle for occasional constipation   Yes Historical Provider, MD   Dextromethorphan-guaiFENesin  MG/5ML SYRP Take 5 mLs by mouth every 6 hours as needed for Cough (Robafen DM)   Yes Historical Provider, MD   simethicone (MYLICON) 40 TM/4.7ZI drops Take 0.6 mLs by mouth 4 times daily as needed (gas) 4/7/20  Yes DAYANARA Reddy - CNP   tamsulosin (FLOMAX) 0.4 MG capsule Take 1 capsule by mouth 2 times daily 1/8/20 9/17/20 Yes Bouchra An MD   metoprolol tartrate (LOPRESSOR) 25 MG tablet Take 0.5 tablets by mouth 2 times daily 6/13/19  Yes Obie Becerra,    levothyroxine (SYNTHROID) 112 MCG tablet Take 1 tablet by mouth Daily 4/15/19  Yes Guillermo Cerda, DO   polyvinyl alcohol (ARTIFICIAL TEARS) 1.4 % ophthalmic solution Place 1 drop into both eyes every 6 hours as needed   Yes Historical Provider, MD   Multiple Vitamins-Minerals (PRESERVISION AREDS PO) Take 1 capsule by mouth daily    Yes Historical Provider, MD   isosorbide mononitrate (IMDUR) 30 MG extended release tablet Take 1 tablet by mouth daily 8/22/18  Yes Ann Morales MD   lisinopril (PRINIVIL;ZESTRIL) 40 MG tablet Take 1 tablet by mouth daily 8/22/18  Yes Ann Morales MD   amLODIPine (NORVASC) 10 MG tablet Take 1 tablet by mouth daily 8/22/18  Yes Ashanti Zimmerman MD   Multiple Vitamins-Minerals (MULTIVITAMIN ADULT PO) Take 1 tablet by mouth daily   Yes Historical Provider, MD   potassium chloride (KLOR-CON M) 20 MEQ extended release tablet Take 20 mEq by mouth daily   Yes Historical Provider, MD   fluticasone (FLONASE) 50 MCG/ACT nasal spray 1 spray by Nasal route daily 2/1/18  Yes Hugo William, APRN - CNP   simvastatin (ZOCOR) 20 MG tablet TAKE 1 TABLET BY MOUTH EVERY OTHER DAY  3/7/17  Yes Guillermo Cerda DO   Coenzyme Q-10 100 MG CAPS Take 100 mg by mouth every other day. Yes Historical Provider, MD   tolnaftate (TINACTIN) 1 % external solution Apply topically nightly to toenails per Dr. Radha Putnam. Yes Historical Provider, MD   aspirin 81 MG tablet Take 81 mg by mouth daily. Yes Historical Provider, MD   GLUCOSAMINE-CHONDROITIN PO Take 3 capsules by mouth daily at 1800 (500-400 mg)   Yes Historical Provider, MD       Allergies:  Pcn [penicillins]; Sulfa antibiotics; and Cefdinir    Social History:   reports that he has never smoked. He has never used smokeless tobacco. He reports that he does not drink alcohol or use drugs. REVIEW OF SYSTEMS:  CONSTITUTIONAL:NEGATIVE  HEENT:NEG  Cardiovascular: No chest pain, no dyspnea on exertion, No palpitations. Lower extremity edema: No  RESPIRATORY: MONTIEL  GASTROINTESTINAL:  negative  GENITOURINARY:  negative  INTEGUMENT:  negative  MUSCULOSKELETAL:  positive for  pain  NEUROLOGICAL:  negative    PHYSICAL EXAM:      /66   Pulse 62   Ht 5' 11\" (1.803 m)   Wt 190 lb (86.2 kg)   BMI 26.50 kg/m²    HEENT: PERRL, no cervical lymphadenopathy. No masses palpable. Cardiovascular:  · The apical impulse is not displaced  · Heart  Sounds:RRR, grade 2 x 6 ejection systolic murmur in the aortic area  · Jugular venous pulsation Normal  · The carotid upstroke is NL  · Peripheral pulses are symmetrical and full  Respiratory: Good respiratory effort.  On auscultation: clear to auscultation bilaterally no wheezing or crackles  Abdomen:  · No masses or tenderness  · Bowel sounds present  Extremities:  ·  No Cyanosis or Clubbing  ·  Lower extremity edema: No  Skin: Warm and dry    Cardiac data:    EKG: Sinus rhythm, marked first degree AV block, LAFB       Echo 02/2018  Biatrial enlargement. 2.  Concentric left ventricular hypertrophy. 3.  Normal wall motion and systolic function. Estimated ejection fraction  of 50%. 4.  Anteroapical wall hypokinesis. 5.  No significant valvular abnormalities. 6.  Mild pulmonary hypertension of 42 mmHg systolic. 7.  No pericardial effusion. Labs:     CBC: No results for input(s): WBC, HGB, HCT, PLT in the last 72 hours. BMP: No results for input(s): NA, K, CO2, BUN, CREATININE, LABGLOM, GLUCOSE in the last 72 hours. PT/INR: No results for input(s): PROTIME, INR in the last 72 hours.   FASTING LIPID PANEL:  Lab Results   Component Value Date    HDL 41 06/06/2019    TRIG 132 06/06/2019       IMPRESSION:    · CAD s/p CABG (6/7/2000) LIMA TO LAD, SVG TO D2, STABLE  · MILD LV SYSTOLIC DYSFUNCTION, l;AST lvef 50% IN 2018, NO CLINICAL CHF  · PAF- in NSR, on A/C with NOAC  · HTN  · HLP        Patient Active Problem List   Diagnosis    Hyperlipidemia    Osteoarthritis    Allergic rhinitis    Diverticulosis    Anxiety    Atrial fibrillation ([address]    Coronary artery disease involving native coronary artery of native heart    Mass of upper lobe of right lung    Chronic systolic CHF (congestive heart failure) (HCC)    Benign prostatic hyperplasia with incomplete bladder emptying    Hypothyroidism    Hypertension    Dementia associated with 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 behavioral disturbance ([address]    Intermediate stage nonexudative age-related macular degeneration of both eyes    Combined forms of age-related cataract of right eye    Pseudophakia of left eye    Class 1 obesity with serious comorbidity and body mass index (BMI) of 30.0 to 30.9 in adult       RECOMMENDATIONS:  Continue aspirin, low-dose beta-blocker and Zocor    On amiodarone 100 mg daily and Xarelto for paroxysmal atrial fibrillation    TSH with reflex and pulmonary function test for amiodarone monitoring     Lipid panel in 2019 within normal limits. Will repeat before next visit    Patient counseled regarding symptoms of ACS/CHF in which he will need to seek emergent medical attention otherwise we will see him back in 9 months for routine follow-up.     [address] [facility] [place] [facility] Cardiology Consult           [phone]

## 2020-09-17 NOTE — TELEPHONE ENCOUNTER
Pharmacy requesting a refill of the below medication which has been pended for you:     Requested Prescriptions     Pending Prescriptions Disp Refills    ALPRAZolam (XANAX) 0.25 MG tablet [Pharmacy Med Name: ALPRAZOLAM 0.25 MG TABLET] 45 tablet 0     Sig: TAKE 1/2 OFA TABLET BY MOUTH DAILY, IN THE MORNING TAKE 1 TABLET BY MOUTH DAILY, AT BEDTIME.        Last Appointment Date: 1/30/2020  Next Appointment Date: Visit date not found    Allergies   Allergen Reactions    Pcn [Penicillins] Swelling     As a child  Pt tolerated ceftriaxone , and keflex with no intolerance    Sulfa Antibiotics     Cefdinir Other (See Comments)     Pt tolerated ceftriaxone , and keflex with no intolerance

## 2020-09-18 RX ORDER — ALPRAZOLAM 0.25 MG/1
TABLET ORAL
Qty: 45 TABLET | Refills: 0 | Status: SHIPPED | OUTPATIENT
Start: 2020-09-18 | End: 2020-10-18

## 2020-10-15 ENCOUNTER — HOSPITAL ENCOUNTER (OUTPATIENT)
Age: 85
Setting detail: SPECIMEN
Discharge: HOME | End: 2020-10-15
Payer: MEDICARE

## 2020-10-15 LAB — TSH SERPL DL<=0.05 MIU/L-ACNC: 0.76 MIU/L (ref 0.3–5)

## 2020-10-15 PROCEDURE — 84443 ASSAY THYROID STIM HORMONE: CPT

## 2020-10-30 ENCOUNTER — OFFICE VISIT (OUTPATIENT)
Dept: UROLOGY | Age: 85
End: 2020-10-30
Payer: MEDICARE

## 2020-10-30 VITALS
WEIGHT: 190 LBS | HEART RATE: 67 BPM | HEIGHT: 71 IN | DIASTOLIC BLOOD PRESSURE: 68 MMHG | SYSTOLIC BLOOD PRESSURE: 126 MMHG | BODY MASS INDEX: 26.6 KG/M2

## 2020-10-30 PROCEDURE — 0518F FALL PLAN OF CARE DOCD: CPT | Performed by: UROLOGY

## 2020-10-30 PROCEDURE — 3288F FALL RISK ASSESSMENT DOCD: CPT | Performed by: UROLOGY

## 2020-10-30 PROCEDURE — G8427 DOCREV CUR MEDS BY ELIG CLIN: HCPCS | Performed by: UROLOGY

## 2020-10-30 PROCEDURE — 1123F ACP DISCUSS/DSCN MKR DOCD: CPT | Performed by: UROLOGY

## 2020-10-30 PROCEDURE — 99214 OFFICE O/P EST MOD 30 MIN: CPT | Performed by: UROLOGY

## 2020-10-30 PROCEDURE — 1036F TOBACCO NON-USER: CPT | Performed by: UROLOGY

## 2020-10-30 PROCEDURE — 99214 OFFICE O/P EST MOD 30 MIN: CPT

## 2020-10-30 PROCEDURE — G8482 FLU IMMUNIZE ORDER/ADMIN: HCPCS | Performed by: UROLOGY

## 2020-10-30 PROCEDURE — G8417 CALC BMI ABV UP PARAM F/U: HCPCS | Performed by: UROLOGY

## 2020-10-30 PROCEDURE — 4040F PNEUMOC VAC/ADMIN/RCVD: CPT | Performed by: UROLOGY

## 2020-10-30 RX ORDER — NITROFURANTOIN 25; 75 MG/1; MG/1
100 CAPSULE ORAL DAILY
Qty: 90 CAPSULE | Refills: 3 | Status: SHIPPED | OUTPATIENT
Start: 2020-10-30 | End: 2021-01-28

## 2020-10-30 RX ORDER — TAMSULOSIN HYDROCHLORIDE 0.4 MG/1
0.4 CAPSULE ORAL 2 TIMES DAILY
Qty: 180 CAPSULE | Refills: 3 | Status: SHIPPED | OUTPATIENT
Start: 2020-10-30 | End: 2021-12-15 | Stop reason: SDUPTHER

## 2020-10-30 NOTE — PROGRESS NOTES
Zechariah Gregory MD   Urology Clinic follow up      Patient:  Tonya Gilford  YOB: 1934  Date: 10/30/2020    HISTORY OF PRESENT ILLNESS:   The patient is a 80 y.o. male who presents today for evaluation of the following problem(s): hematuria, BPH, retention, rec UTIs  Overall the problem(s) : improved  Associated Symptoms: No dysuria, gross hematuria. Pain Severity:      Summary of old records: Prostate:  >50 grams, hypervascular, obstructive  Bladder: No tumor noted. High median bar, multiple diverticula, cellules  Neg hematuria work up in the past  On flomax  Poor ECOG  (Patient's old records, notes and chart reviewed and summarized above.)      Today:  Follow up 6 months  He states no infection or pain in bladder over past 6 months  flomax BID. Reports weak stream with flomax. No aggravating factors. No symptomatic UTIs  No gross hematuria. Denies pelvic pain. Last several PSA's:  Lab Results   Component Value Date    PSA 6.63 (H) 12/28/2016    PSA 4.73 (H) 12/30/2015    PSA 6.28 (H) 01/06/2015       Last total testosterone:  No results found for: TESTOSTERONE    Urinalysis today:  No results found for this visit on 10/30/20.       Last BUN and creatinine:  Lab Results   Component Value Date    BUN 15 04/15/2020     Lab Results   Component Value Date    CREATININE 0.93 04/15/2020       Imaging Reviewed during this Office Visit:   (results were independently reviewed by physician and radiology report verified)    PAST MEDICAL, FAMILY AND SOCIAL HISTORY:  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 LIMA TO LEANN, SVG TO [facility] (CORDARONE) 200 MG tablet Take 100 mg by mouth daily      rivaroxaban (XARELTO) 20 MG TABS tablet Take 20 mg by mouth every morning      acetaminophen (TYLENOL) 325 MG tablet Take 650 mg by mouth every 4 hours as needed for Pain      Mineral Oil OIL Use as directed on bottle for occasional constipation      Dextromethorphan-guaiFENesin  MG/5ML SYRP Take 5 mLs by mouth every 6 hours as needed for Cough (Robafen DM)      simethicone (MYLICON) 40 FO/2.3YD drops Take 0.6 mLs by mouth 4 times daily as needed (gas) 60 mL 3    metoprolol tartrate (LOPRESSOR) 25 MG tablet Take 0.5 tablets by mouth 2 times daily 90 tablet 1    levothyroxine (SYNTHROID) 112 MCG tablet Take 1 tablet by mouth Daily 30 tablet 11    polyvinyl alcohol (ARTIFICIAL TEARS) 1.4 % ophthalmic solution Place 1 drop into both eyes every 6 hours as needed      Multiple Vitamins-Minerals (PRESERVISION AREDS PO) Take 1 capsule by mouth daily       isosorbide mononitrate (IMDUR) 30 MG extended release tablet Take 1 tablet by mouth daily 30 tablet 3    lisinopril (PRINIVIL;ZESTRIL) 40 MG tablet Take 1 tablet by mouth daily 30 tablet 3    amLODIPine (NORVASC) 10 MG tablet Take 1 tablet by mouth daily 30 tablet 3    Multiple Vitamins-Minerals (MULTIVITAMIN ADULT PO) Take 1 tablet by mouth daily      potassium chloride (KLOR-CON M) 20 MEQ extended release tablet Take 20 mEq by mouth daily      fluticasone (FLONASE) 50 MCG/ACT nasal spray 1 spray by Nasal route daily 1 Bottle 0    simvastatin (ZOCOR) 20 MG tablet TAKE 1 TABLET BY MOUTH EVERY OTHER DAY  30 tablet 11    Coenzyme Q-10 100 MG CAPS Take 100 mg by mouth every other day.  tolnaftate (TINACTIN) 1 % external solution Apply topically nightly to toenails per Dr. Karla Avalos.  aspirin 81 MG tablet Take 81 mg by mouth daily.       GLUCOSAMINE-CHONDROITIN PO Take 3 capsules by mouth daily at 1800 (500-400 mg)         Pcn [penicillins]; Sulfa antibiotics; and Cefdinir  Social History

## 2020-11-11 RX ORDER — ALPRAZOLAM 0.25 MG/1
TABLET ORAL
Qty: 45 TABLET | Refills: 0 | Status: SHIPPED | OUTPATIENT
Start: 2020-11-11 | End: 2020-12-11

## 2020-11-12 NOTE — TELEPHONE ENCOUNTER
[place] checked today    Controlled Substance Monitoring:    Acute and Chronic Pain Monitoring:   RX Monitoring 11/11/2020   Attestation -   Periodic Controlled Substance Monitoring No signs of potential drug abuse or diversion identified.

## 2020-11-27 ENCOUNTER — TELEPHONE (OUTPATIENT)
Dept: INTERNAL MEDICINE | Age: 85
End: 2020-11-27

## 2020-11-27 RX ORDER — ASCORBIC ACID 500 MG
500 TABLET ORAL 2 TIMES DAILY
Qty: 30 TABLET | Refills: 3 | Status: SHIPPED | OUTPATIENT
Start: 2020-11-27 | End: 2021-01-18 | Stop reason: ALTCHOICE

## 2020-11-27 RX ORDER — MELATONIN
1000 DAILY
Qty: 30 TABLET | Refills: 5 | Status: SHIPPED | OUTPATIENT
Start: 2020-11-27 | End: 2021-01-18 | Stop reason: ALTCHOICE

## 2020-11-27 RX ORDER — ALBUTEROL SULFATE 2.5 MG/3ML
2.5 SOLUTION RESPIRATORY (INHALATION) EVERY 6 HOURS PRN
Qty: 120 EACH | Refills: 3 | Status: SHIPPED | OUTPATIENT
Start: 2020-11-27 | End: 2021-01-18 | Stop reason: ALTCHOICE

## 2020-12-01 ENCOUNTER — OUTSIDE SERVICES (OUTPATIENT)
Dept: INTERNAL MEDICINE | Age: 85
End: 2020-12-01
Payer: MEDICARE

## 2020-12-01 VITALS
DIASTOLIC BLOOD PRESSURE: 47 MMHG | HEART RATE: 70 BPM | OXYGEN SATURATION: 96 % | SYSTOLIC BLOOD PRESSURE: 111 MMHG | TEMPERATURE: 95.8 F | RESPIRATION RATE: 14 BRPM

## 2020-12-01 NOTE — PROGRESS NOTES
[facility]      Nirav Manuel is a 80 y.o. male resident of [facility] who presents today for medical conditions/complaints as noted below. HPI:     HPI   Patient presents via virtual visit with assisted living staff member for evaluation and management of medical conditions as noted below. Patient was diagnosed with COVID-19 11/27/20. Started on PRN albuterol, vitamin D, vitamin C, and PRN supplemental oxygen. Has been stable since diagnosis. Does report some general malaise. Denies cough or dyspnea. Oxygen saturation stable, 96% RA today. Current Outpatient Medications   Medication Sig Dispense Refill    albuterol (PROVENTIL) (2.5 MG/3ML) 0.083% nebulizer solution Take 3 mLs by nebulization every 6 hours as needed for Wheezing 120 each 3    vitamin D3 (CHOLECALCIFEROL) 25 MCG (1000 UT) TABS tablet Take 1 tablet by mouth daily 30 tablet 5    vitamin C (ASCORBIC ACID) 500 MG tablet Take 1 tablet by mouth 2 times daily 30 tablet 3    ALPRAZolam (XANAX) 0.25 MG tablet TAKE 1/2 OF A TABLET BY MOUTH DAILY, IN THE MORNING TAKE 1 TABLET BY MOUTH DAILY, AT BEDTIME.  45 tablet 0    tamsulosin (FLOMAX) 0.4 MG capsule Take 1 capsule by mouth 2 times daily 180 capsule 3    nitrofurantoin, macrocrystal-monohydrate, (MACROBID) 100 MG capsule Take 1 capsule by mouth daily 90 capsule 3    hydrocortisone (PREPARATION H) 1 % cream Apply topically to affected area as directed prn (Preparation H)      Compression Stockings MISC by Does not apply route DAVID hose- on in am, off in pm      amiodarone (CORDARONE) 200 MG tablet Take 100 mg by mouth daily      rivaroxaban (XARELTO) 20 MG TABS tablet Take 20 mg by mouth every morning      acetaminophen (TYLENOL) 325 MG tablet Take 650 mg by mouth every 4 hours as needed for Pain      Mineral Oil OIL Use as directed on bottle for occasional constipation      Dextromethorphan-guaiFENesin  MG/5ML SYRP Take 5 mLs by mouth every 6 hours as needed for Cough (Robafen DM)      simethicone (MYLICON) 40 BY/8.6FE drops Take 0.6 mLs by mouth 4 times daily as needed (gas) 60 mL 3    metoprolol tartrate (LOPRESSOR) 25 MG tablet Take 0.5 tablets by mouth 2 times daily 90 tablet 1    levothyroxine (SYNTHROID) 112 MCG tablet Take 1 tablet by mouth Daily 30 tablet 11    polyvinyl alcohol (ARTIFICIAL TEARS) 1.4 % ophthalmic solution Place 1 drop into both eyes every 6 hours as needed      Multiple Vitamins-Minerals (PRESERVISION AREDS PO) Take 1 capsule by mouth daily       isosorbide mononitrate (IMDUR) 30 MG extended release tablet Take 1 tablet by mouth daily 30 tablet 3    lisinopril (PRINIVIL;ZESTRIL) 40 MG tablet Take 1 tablet by mouth daily 30 tablet 3    amLODIPine (NORVASC) 10 MG tablet Take 1 tablet by mouth daily 30 tablet 3    Multiple Vitamins-Minerals (MULTIVITAMIN ADULT PO) Take 1 tablet by mouth daily      potassium chloride (KLOR-CON M) 20 MEQ extended release tablet Take 20 mEq by mouth daily      fluticasone (FLONASE) 50 MCG/ACT nasal spray 1 spray by Nasal route daily 1 Bottle 0    simvastatin (ZOCOR) 20 MG tablet TAKE 1 TABLET BY MOUTH EVERY OTHER DAY  30 tablet 11    Coenzyme Q-10 100 MG CAPS Take 100 mg by mouth every other day.  tolnaftate (TINACTIN) 1 % external solution Apply topically nightly to toenails per Dr. Yanira Bauman.  aspirin 81 MG tablet Take 81 mg by mouth daily.  GLUCOSAMINE-CHONDROITIN PO Take 3 capsules by mouth daily at 1800 (500-400 mg)       No current facility-administered medications for this visit.       Allergies   Allergen Reactions    Pcn [Penicillins] Swelling     As a child  Pt tolerated ceftriaxone , and keflex with no intolerance    Sulfa Antibiotics     Cefdinir Other (See Comments)     Pt tolerated ceftriaxone , and keflex with no intolerance       Health Maintenance   Topic Date Due    DTaP/Tdap/Td vaccine (1 - Tdap) 12/31/1953    PSA counseling  12/28/2017    Annual Wellness Visit (AWV) 05/29/2019    Lipid screen  06/06/2020    Potassium monitoring  04/15/2021    Creatinine monitoring  04/15/2021    TSH testing  10/15/2021    Flu vaccine  Completed    Pneumococcal 65+ years Vaccine  Completed    Hepatitis A vaccine  Aged Out    Hepatitis B vaccine  Aged Out    Hib vaccine  Aged Out    Meningococcal (ACWY) vaccine  Aged Out       Subjective:      Review of Systems   Constitutional: Positive for fatigue. Negative for chills and fever. HENT: Negative for congestion and rhinorrhea. Respiratory: Negative for cough and wheezing. Gastrointestinal: Negative for diarrhea, nausea and vomiting. Neurological: Negative for dizziness and headaches. Due to patient's clinical status, ROS of symptoms was completed by discussion with long term care facility staff, as well as with input from patient. Objective:     Vitals:    12/01/20 1246   BP: (!) 111/47   Pulse: 70   Resp: 14   Temp: 95.8 °F (35.4 °C)   SpO2: 96%     Physical Exam  Constitutional:       General: He is not in acute distress. Appearance: Normal appearance. HENT:      Head: Normocephalic and atraumatic. Right Ear: External ear normal.      Left Ear: External ear normal.      Nose: No rhinorrhea. Mouth/Throat:      Lips: No lesions. Eyes:      Conjunctiva/sclera: Conjunctivae normal.   Neck:      Musculoskeletal: Normal range of motion. Vascular: No JVD. Pulmonary:      Effort: Pulmonary effort is normal. No accessory muscle usage or respiratory distress. Skin:     Coloration: Skin is not cyanotic or jaundiced. Neurological:      Mental Status: He is alert. Mental status is at baseline. Psychiatric:         Attention and Perception: Attention and perception normal.         Mood and Affect: Mood and affect normal.         Speech: Speech normal.         Behavior: Behavior is cooperative. Thought Content: Thought content normal.         Assessment/Plan:        1. COVID-19  - Stable. Continue current treatment plan. Notify office if any worsening of symptoms. No follow-ups on file. No orders of the defined types were placed in this encounter. No orders of the defined types were placed in this encounter. Electronically signed by ADALID De La Cruz on 12/2/2020 at 8:35 AM       Toya Rm is a 80 y.o. male being evaluated by a Virtual Visit (video visit) encounter to address concerns as mentioned above. A caregiver was present when appropriate. Due to this being a TeleHealth encounter (During [facility]-28 public health emergency), evaluation of the following organ systems was limited: Vitals/Constitutional/EENT/Resp/CV/GI//MS/Neuro/Skin/Heme-Lymph-Imm. Pursuant to the emergency declaration under the [address] [place] [address], [address] [place] [address] authority and the [employer] and [employer] Act, this Virtual Visit was conducted with patient's (and/or legal guardian's) consent, to reduce the patient's risk of exposure to COVID-19 and provide necessary medical care. The patient (and/or legal guardian) has also been advised to contact this office for worsening conditions or problems, and seek emergency medical treatment and/or call 911 if deemed necessary. Patient identification was verified at the start of the visit: Yes    Total time spent for this encounter: Not billed by time    Services were provided through a video synchronous discussion virtually to substitute for in-person clinic visit. Patient and provider were located at their individual homes. --DAYANARA De La Cruz CNP on 12/2/2020 at 8:35 AM    An electronic signature was used to authenticate this note.

## 2020-12-02 ENCOUNTER — TELEPHONE (OUTPATIENT)
Dept: INTERNAL MEDICINE | Age: 85
End: 2020-12-02

## 2020-12-02 ASSESSMENT — ENCOUNTER SYMPTOMS
VOMITING: 0
DIARRHEA: 0
NAUSEA: 0
RHINORRHEA: 0
COUGH: 0
WHEEZING: 0

## 2020-12-07 ENCOUNTER — TELEPHONE (OUTPATIENT)
Dept: INTERNAL MEDICINE | Age: 85
End: 2020-12-07

## 2021-01-04 DIAGNOSIS — F41.9 ANXIETY: Primary | ICD-10-CM

## 2021-01-04 RX ORDER — ALPRAZOLAM 0.25 MG/1
TABLET ORAL
Qty: 45 TABLET | Refills: 2 | Status: SHIPPED | OUTPATIENT
Start: 2021-01-04 | End: 2021-04-01 | Stop reason: SDUPTHER

## 2021-01-04 NOTE — TELEPHONE ENCOUNTER
Alena López called requesting a refill of the below medication which has been pended for you:     Requested Prescriptions     Pending Prescriptions Disp Refills    ALPRAZolam (XANAX) 0.25 MG tablet [Pharmacy Med Name: ALPRAZOLAM 0.25 MG TABLET] 45 tablet 2     Sig: TAKE 1/2 OF A TABLET BY MOUTH DAILY, IN THE MORNING TAKE 1 TABLET BY MOUTH DAILY, AT BEDTIME.        Last Appointment Date: 1/30/2020  Next Appointment Date: Visit date not found    Allergies   Allergen Reactions    Pcn [Penicillins] Swelling     As a child  Pt tolerated ceftriaxone , and keflex with no intolerance    Sulfa Antibiotics     Cefdinir Other (See Comments)     Pt tolerated ceftriaxone , and keflex with no intolerance

## 2021-01-06 ENCOUNTER — VIRTUAL VISIT (OUTPATIENT)
Dept: INTERNAL MEDICINE | Age: 86
End: 2021-01-06
Payer: MEDICARE

## 2021-01-06 VITALS — WEIGHT: 190 LBS | BODY MASS INDEX: 27.2 KG/M2 | HEIGHT: 70 IN

## 2021-01-06 DIAGNOSIS — E78.2 MIXED HYPERLIPIDEMIA: ICD-10-CM

## 2021-01-06 DIAGNOSIS — N40.1 BENIGN PROSTATIC HYPERPLASIA WITH URINARY RETENTION: ICD-10-CM

## 2021-01-06 DIAGNOSIS — E03.4 HYPOTHYROIDISM DUE TO ACQUIRED ATROPHY OF THYROID: ICD-10-CM

## 2021-01-06 DIAGNOSIS — F02.80 DEMENTIA ASSOCIATED WITH OTHER UNDERLYING DISEASE WITHOUT BEHAVIORAL DISTURBANCE (HCC): ICD-10-CM

## 2021-01-06 DIAGNOSIS — Z00.00 ROUTINE GENERAL MEDICAL EXAMINATION AT A HEALTH CARE FACILITY: Primary | ICD-10-CM

## 2021-01-06 DIAGNOSIS — I10 ESSENTIAL HYPERTENSION: ICD-10-CM

## 2021-01-06 DIAGNOSIS — Z13.6 SCREENING FOR CARDIOVASCULAR CONDITION: ICD-10-CM

## 2021-01-06 DIAGNOSIS — I50.22 CHRONIC SYSTOLIC CHF (CONGESTIVE HEART FAILURE) (HCC): ICD-10-CM

## 2021-01-06 DIAGNOSIS — I48.0 PAROXYSMAL ATRIAL FIBRILLATION (HCC): ICD-10-CM

## 2021-01-06 DIAGNOSIS — F41.9 ANXIETY: ICD-10-CM

## 2021-01-06 DIAGNOSIS — M15.9 PRIMARY OSTEOARTHRITIS INVOLVING MULTIPLE JOINTS: ICD-10-CM

## 2021-01-06 DIAGNOSIS — R97.20 ELEVATED PSA: ICD-10-CM

## 2021-01-06 DIAGNOSIS — R33.8 BENIGN PROSTATIC HYPERPLASIA WITH URINARY RETENTION: ICD-10-CM

## 2021-01-06 DIAGNOSIS — I25.10 CORONARY ARTERY DISEASE INVOLVING NATIVE CORONARY ARTERY OF NATIVE HEART WITHOUT ANGINA PECTORIS: ICD-10-CM

## 2021-01-06 DIAGNOSIS — E66.3 OVERWEIGHT: ICD-10-CM

## 2021-01-06 PROCEDURE — 99211 OFF/OP EST MAY X REQ PHY/QHP: CPT

## 2021-01-06 PROCEDURE — G0446 INTENS BEHAVE THER CARDIO DX: HCPCS | Performed by: INTERNAL MEDICINE

## 2021-01-06 PROCEDURE — 1123F ACP DISCUSS/DSCN MKR DOCD: CPT | Performed by: INTERNAL MEDICINE

## 2021-01-06 PROCEDURE — 4040F PNEUMOC VAC/ADMIN/RCVD: CPT | Performed by: INTERNAL MEDICINE

## 2021-01-06 PROCEDURE — G0439 PPPS, SUBSEQ VISIT: HCPCS | Performed by: INTERNAL MEDICINE

## 2021-01-06 ASSESSMENT — PATIENT HEALTH QUESTIONNAIRE - PHQ9
1. LITTLE INTEREST OR PLEASURE IN DOING THINGS: 0
SUM OF ALL RESPONSES TO PHQ QUESTIONS 1-9: 0
SUM OF ALL RESPONSES TO PHQ QUESTIONS 1-9: 0

## 2021-01-06 NOTE — PATIENT INSTRUCTIONS
Advance Directives: Care Instructions  Overview  An advance directive is a legal way to state your wishes at the end of your life. It tells your family and your doctor what to do if you can't say what you want. There are two main types of advance directives. You can change them any time your wishes change. Living will. This form tells your family and your doctor your wishes about life support and other treatment. The form is also called a declaration. Medical power of . This form lets you name a person to make treatment decisions for you when you can't speak for yourself. This person is called a health care agent (health care proxy, health care surrogate). The form is also called a durable power of  for health care. If you do not have an advance directive, decisions about your medical care may be made by a family member, or by a doctor or a  who doesn't know you. It may help to think of an advance directive as a gift to the people who care for you. If you have one, they won't have to make tough decisions by themselves. Follow-up care is a key part of your treatment and safety. Be sure to make and go to all appointments, and call your doctor if you are having problems. It's also a good idea to know your test results and keep a list of the medicines you take. What should you include in an advance directive? Many states have a unique advance directive form. (It may ask you to address specific issues.) Or you might use a universal form that's approved by many states. If your form doesn't tell you what to address, it may be hard to know what to include in your advance directive. Use the questions below to help you get started. · Who do you want to make decisions about your medical care if you are not able to? · What life-support measures do you want if you have a serious illness that gets worse over time or can't be cured? · What are you most afraid of that might happen? (Maybe you're afraid of having pain, losing your independence, or being kept alive by machines.)  · Where would you prefer to die? (Your home? A hospital? A nursing home?)  · Do you want to donate your organs when you die? · Do you want certain Mormon practices performed before you die? When should you call for help? Be sure to contact your doctor if you have any questions. Where can you learn more? Go to https://[email or web address] [email or web address] org and sign in to your Spectralmind account. Enter R264 in the BringIt box to learn more about \"Advance Directives: Care Instructions. \"     If you do not have an account, please click on the \"Sign Up Now\" link. Current as of: December 9, 2019               Content Version: 12.6  © 9039-1265 LiveOnDemand. Care instructions adapted under license by [facility] ([facility]). If you have questions about a medical condition or this instruction, always ask your healthcare professional. [place] [address] any warranty or liability for your use of this information. Learning About Medical Power of   What is a medical power of ? A medical power of , also called a durable power of  for health care, is one type of the legal forms called advance directives. It lets you name the person you want to make treatment decisions for you if you can't speak or decide for yourself. The person you choose is called your health care agent. This person is also called a health care proxy or health care surrogate. A medical power of  may be called something else in your state. How do you choose a health care agent? Choose your health care agent carefully. This person may or may not be a family member. Talk to the person before you make your final decision. Make sure he or she is comfortable with this responsibility.   It's a good idea to choose someone who: · Is at least 25years old. · Knows you well and understands what makes life meaningful for you. · Understands your Quaker and moral values. · Will do what you want, not what he or she wants. · Will be able to make difficult choices at a stressful time. · Will be able to refuse or stop treatment, if that is what you would want, even if you could die. · Will be firm and confident with health professionals if needed. · Will ask questions to get needed information. · Lives near you or agrees to travel to you if needed. Your family may help you make medical decisions while you can still be part of that process. But it's important to choose one person to be your health care agent in case you aren't able to make decisions for yourself. If you don't fill out the legal form and name a health care agent, the decisions your family can make may be limited. A health care agent may be called something else in your state. Who will make decisions for you if you don't have a health care agent? If you don't have a health care agent or a living will, you may not get the care you want. Decisions may be made by family members who disagree about your medical care. Or decisions may be made by a medical professional who doesn't know you well. In some cases, a  makes the decisions. When you name a health care agent, it is very clear who has the power to make health decisions for you. How do you name a health care agent? You name your health care agent on a legal form. This form is usually called a medical power of . Ask your hospital, state bar association, or office on aging where to find these forms. You must sign the form to make it legal. Some states require you to get the form notarized. This means that a person called a  watches you sign the form and then he or she signs the form. Some states also require that two or more witnesses sign the form. Be sure to tell your family members and doctors who your health care agent is. Where can you learn more? Go to https://chpepiceweb.healthSystel Global Holdings. org and sign in to your [employer]s account. Enter [id number] in the [facility] box to learn more about \"Learning About [address]. \"     If you do not have an account, please click on the \"Sign Up Now\" link. Current as of: December 9, 2019               Content Version: 12.6  © 7899-9816 [email or web address]. Care instructions adapted under license by [facility] ([facility]). If you have questions about a medical condition or this instruction, always ask your healthcare professional. [address] any warranty or liability for your use of this information. Learning About Living Farhad Blount  What is a living will? A living will, also called a declaration, is a legal form. It tells your family and your doctor your wishes when you can't speak for yourself. It's used by the health professionals who will treat you as you near the end of your life or if you get seriously hurt or ill. If you put your wishes in writing, your loved ones and others will know what kind of care you want. They won't need to guess. This can ease your mind and be helpful to others. And you can change or cancel your living will at any time. A living will is not the same as an estate or property will. An estate will explains what you want to happen with your money and property after you die. How do you use it? A living will is used to describe the kinds of treatment or life support you want as you near the end of your life or if you get seriously hurt or ill. Keep these facts in mind about living chong. · Your living will is used only if you can't speak or make decisions for yourself. Most often, one or more doctors must certify that you can't speak or decide for yourself before your living will takes effect. · Do you know enough about life support methods that might be used? If not, talk to your doctor so you know what might be done if you can't breathe on your own, your heart stops, or you can't swallow. · What things would you still want to be able to do after you receive life-support methods? Would you want to be able to walk? To speak? To eat on your own? To live without the help of machines? · Do you want certain Christian practices performed if you become very ill? · If you have a choice, where do you want to be cared for? In your home? At a hospital or nursing home? · If you have a choice at the end of your life, where would you prefer to die? At home? In a hospital or nursing home? Somewhere else? · Would you prefer to be buried or cremated? · Do you want your organs to be donated after you die? What should you do with your living will? · Make sure that your family members and your health care agent have copies of your living will (also called a declaration). · Give your doctor a copy of your living will. Ask him or her to keep it as part of your medical record. If you have more than one doctor, make sure that each one has a copy. · Put a copy of your living will where it can be easily found. For example, some people may put a copy on their refrigerator door. If you are using a digital copy, be sure your doctor, family members, and health care agent know how to find and access it. Where can you learn more? Go to https://TheVegibox.comimelda.Storify. org and sign in to your DreamBox Learning account. Enter L700 in the Autopilot (formerly Bislr) box to learn more about \"Learning About Living [place]. \"     If you do not have an account, please click on the \"Sign Up Now\" link. Current as of: December 9, 2019               Content Version: 12.6  © 9837-9815 DormNoise, Incorporated. Care instructions adapted under license by [facility] ([facility]). If you have questions about a medical condition or this instruction, always ask your healthcare professional. [place] [address] any warranty or liability for your use of this information. Personalized Preventive Plan for Tri Murry - 1/6/2021  Medicare offers a range of preventive health benefits. Some of the tests and screenings are paid in full while other may be subject to a deductible, co-insurance, and/or copay. Some of these benefits include a comprehensive review of your medical history including lifestyle, illnesses that may run in your family, and various assessments and screenings as appropriate. After reviewing your medical record and screening and assessments performed today your provider may have ordered immunizations, labs, imaging, and/or referrals for you. A list of these orders (if applicable) as well as your Preventive Care list are included within your After Visit Summary for your review. Other Preventive Recommendations:    · A preventive eye exam performed by an eye specialist is recommended every 1-2 years to screen for glaucoma; cataracts, macular degeneration, and other eye disorders. · A preventive dental visit is recommended every 6 months. · Try to get at least 150 minutes of exercise per week or 10,000 steps per day on a pedometer . · Order or download the FREE \"Exercise & Physical Activity: Your Everyday Guide\" from [employer]. Call 2-907.321.8860 or search [employer] online. · You need 7341-0804 mg of calcium and 1617-3541 IU of vitamin D per day.  It is possible to meet your calcium requirement with diet alone, but a vitamin D supplement is usually necessary to meet this goal. · When exposed to the sun, use a sunscreen that protects against both UVA and UVB radiation with an SPF of 30 or greater. Reapply every 2 to 3 hours or after sweating, drying off with a towel, or swimming. · Always wear a seat belt when traveling in a car. Always wear a helmet when riding a bicycle or motorcycle. Patient Education        Well Visit, Over 72: Care Instructions  Your Care Instructions     Physical exams can help you stay healthy. Your doctor has checked your overall health and may have suggested ways to take good care of yourself. He or she also may have recommended tests. At home, you can help prevent illness with healthy eating, regular exercise, and other steps. Follow-up care is a key part of your treatment and safety. Be sure to make and go to all appointments, and call your doctor if you are having problems. It's also a good idea to know your test results and keep a list of the medicines you take. How can you care for yourself at home? Reach and stay at a healthy weight. This will lower your risk for many problems, such as obesity, diabetes, heart disease, and high blood pressure. Get at least 30 minutes of exercise on most days of the week. Walking is a good choice. You also may want to do other activities, such as running, swimming, cycling, or playing tennis or team sports. Do not smoke. Smoking can make health problems worse. If you need help quitting, talk to your doctor about stop-smoking programs and medicines. These can increase your chances of quitting for good. Protect your skin from too much sun. When you're outdoors from 10 a.m. to 4 p.m., stay in the shade or cover up with clothing and a hat with a wide brim. Wear sunglasses that block UV rays. Even when it's cloudy, put broad-spectrum sunscreen (SPF 30 or higher) on any exposed skin. See a dentist one or two times a year for checkups and to have your teeth cleaned. Wear a seat belt in the car. Follow your doctor's advice about when to have certain tests. These tests can spot problems early. For men and women  Cholesterol. Your doctor will tell you how often to have this done based on your overall health and other things that can increase your risk for heart attack and stroke. Blood pressure. Have your blood pressure checked during a routine doctor visit. Your doctor will tell you how often to check your blood pressure based on your age, your blood pressure results, and other factors. Diabetes. Ask your doctor whether you should have tests for diabetes. Vision. Experts recommend that you have yearly exams for glaucoma and other age-related eye problems. Hearing. Tell your doctor if you notice any change in your hearing. You can have tests to find out how well you hear. Colon cancer tests. Keep having colon cancer tests as your doctor recommends. You can have one of several types of tests. Heart attack and stroke risk. At least every 4 to 6 years, you should have your risk for heart attack and stroke assessed. Your doctor uses factors such as your age, blood pressure, cholesterol, and whether you smoke or have diabetes to show what your risk for a heart attack or stroke is over the next 10 years. Osteoporosis. Talk to your doctor about whether you should have a bone density test to find out whether you have thinning bones. Ask your doctor if you need to take a calcium plus vitamin D supplement. You may be able to get enough calcium and vitamin D through your diet. For women  Pap test and pelvic exam. You may no longer need a Pap test. Talk with your doctor about whether to stop or continue to have Pap tests. Breast exam and mammogram. Ask how often you should have a mammogram, which is an X-ray of your breasts. A mammogram can spot breast cancer before it can be felt and when it is easiest to treat. Thyroid disease. Talk to your doctor about whether to have your thyroid checked as part of a regular physical exam. Women have an increased chance of a thyroid problem. For men  Prostate exam. Talk to your doctor about whether you should have a blood test (called a PSA test) for prostate cancer. Experts recommend that you discuss the benefits and risks of the test with your doctor before you decide whether to have this test. Some experts say that men ages 79 and older no longer need testing. Abdominal aortic aneurysm. Ask your doctor whether you should have a test to check for an aneurysm. You may need a test if you ever smoked or if your parent, brother, sister, or child has had an aneurysm. When should you call for help? Watch closely for changes in your health, and be sure to contact your doctor if you have any problems or symptoms that concern you. Where can you learn more? Go to https://Extreme Wireless CommunicationpeKinDex Therapeutics.Hugo & Debra Natural. org and sign in to your Nanameue account. Enter F440 in the [facility] box to learn more about \"Well Visit, Over 65: Care Instructions. \"     If you do not have an account, please click on the \"Sign Up Now\" link. Current as of: May 27, 2020               Content Version: 12.6  © 2006-2020 Academia.edu, Incorporated. Care instructions adapted under license by [facility] ([facility]). If you have questions about a medical condition or this instruction, always ask your healthcare professional. [place] [address] any warranty or liability for your use of this information. Patient Education        Eating Healthy Foods: Care Instructions  Your Care Instructions     Eating healthy foods can help lower your risk for disease. Healthy food gives you energy and keeps your heart strong, your brain active, your muscles working, and your bones strong. A healthy diet includes a variety of foods from the basic food groups: grains, vegetables, fruits, milk and milk products, and meat and beans. Some people may eat more of their favorite foods from only one food group and, as a result, miss getting the nutrients they need. So, it is important to pay attention not only to what you eat but also to what you are missing from your diet. You can eat a healthy, balanced diet by making a few small changes. Follow-up care is a key part of your treatment and safety. Be sure to make and go to all appointments, and call your doctor if you are having problems. It's also a good idea to know your test results and keep a list of the medicines you take. How can you care for yourself at home? Look at what you eat  Keep a food diary for a week or two and record everything you eat or drink. Track the number of servings you eat from each food group. For a balanced diet every day, eat a variety of:  6 or more ounce-equivalents of grains, such as cereals, breads, crackers, rice, or pasta, every day. An ounce-equivalent is 1 slice of bread, 1 cup of ready-to-eat cereal, or ½ cup of cooked rice, cooked pasta, or cooked cereal.  2½ cups of vegetables, especially:  Dark-green vegetables such as broccoli and spinach. Orange vegetables such as carrots and sweet potatoes. Dry beans (such as casillas and kidney beans) and peas (such as lentils). 2 cups of fresh, frozen, or canned fruit. A small apple or 1 banana or orange equals 1 cup.  3 cups of nonfat or low-fat milk, yogurt, or other milk products. 5½ ounces of meat and beans, such as chicken, fish, lean meat, beans, nuts, and seeds. One egg, 1 tablespoon of peanut butter, ½ ounce nuts or seeds, or ¼ cup of cooked beans equals 1 ounce of meat. Learn how to read food labels for serving sizes and ingredients. Fast-food and convenience-food meals often contain few or no fruits or vegetables. Make sure you eat some fruits and vegetables to make the meal more nutritious. Look at your food diary. For each food group, add up what you have eaten and then divide the total by the number of days. This will give you an idea of how much you are eating from each food group. See if you can find some ways to change your diet to make it more healthy. Start small  Do not try to make dramatic changes to your diet all at once. You might feel that you are missing out on your favorite foods and then be more likely to fail. Start slowly, and gradually change your habits. Try some of the following:  Use whole wheat bread instead of white bread. Use nonfat or low-fat milk instead of whole milk. Eat brown rice instead of white rice, and eat whole wheat pasta instead of white-flour pasta. Try low-fat cheeses and low-fat yogurt. Add more fruits and vegetables to meals and have them for snacks. Add lettuce, tomato, cucumber, and onion to sandwiches. Add fruit to yogurt and cereal.  Enjoy food  You can still eat your favorite foods. You just may need to eat less of them. If your favorite foods are high in fat, salt, and sugar, limit how often you eat them, but do not cut them out entirely. Eat a wide variety of foods. Make healthy choices when eating out  The type of restaurant you choose can help you make healthy choices. Even fast-food chains are now offering more low-fat or healthier choices on the menu. Choose smaller portions, or take half of your meal home. When eating out, try:  A veggie pizza with a whole wheat crust or grilled chicken (instead of sausage or pepperoni). Pasta with roasted vegetables, grilled chicken, or marinara sauce instead of cream sauce. A vegetable wrap or grilled chicken wrap. Broiled or poached food instead of fried or breaded items. Make healthy choices easy  Buy packaged, prewashed, ready-to-eat fresh vegetables and fruits, such as baby carrots, salad mixes, and chopped or shredded broccoli and cauliflower. Buy packaged, presliced fruits, such as melon or pineapple. Choose 100% fruit or vegetable juice instead of soda. Limit juice intake to 4 to 6 oz (½ to ¾ cup) a day. Blend low-fat yogurt, fruit juice, and canned or frozen fruit to make a smoothie for breakfast or a snack. Where can you learn more? Go to https://chpepiceweb.Extended Systems. org and sign in to your Wear My Tags account. Enter E901 in the amBX box to learn more about \"Eating Healthy Foods: Care Instructions. \"     If you do not have an account, please click on the \"Sign Up Now\" link. Current as of: August 22, 2019               Content Version: 12.6  © 9262-9318 TensorComm. Care instructions adapted under license by [facility] ([facility]). If you have questions about a medical condition or this instruction, always ask your healthcare professional. [place] [address] any warranty or liability for your use of this information. Patient Education        Learning About Physical Activity  What is physical activity? Physical activity is any kind of activity that gets your body moving. The types of physical activity that can help you get fit and stay healthy include:  Aerobic or \"cardio\" activities that make your heart beat faster and make you breathe harder, such as brisk walking, riding a bike, or running. Aerobic activities strengthen your heart and lungs and build up your endurance. Strength training activities that make your muscles work against, or \"resist,\" something, such as lifting weights or doing push-ups. These activities help tone and strengthen your muscles. Stretches that allow you to move your joints and muscles through their full range of motion. Stretching helps you be more flexible and avoid injury. What are the benefits of physical activity? Being active is one of the best things you can do for your health. It helps you to:  Feel stronger and have more energy to do all the things you like to do. Focus better at school or work. Feel, think, and sleep better. Reach and stay at a healthy weight. Lose fat and build lean muscle. Lower your risk for serious health problems. Keep your bones, muscles, and joints strong. How can you make physical activity part of your life? Get at least 30 minutes of exercise on most days of the week. Walking is a good choice. You also may want to do other activities, such as running, swimming, cycling, or playing tennis or team sports. Pick activities that you likeones that make your heart beat faster, your muscles stronger, and your muscles and joints more flexible. If you find more than one thing you like doing, do them all. You don't have to do the same thing every day. Get your heart pumping every day. Any activity that makes your heart beat faster and keeps it at that rate for a while counts. Here are some great ways to get your heart beating faster:  Go for a brisk walk, run, or bike ride. Go for a hike or swim. Go in-line skating. Play a game of touch football, basketball, or soccer. Ride a bike. Play tennis or racquetball. Climb stairs. Even some household chores can be aerobicjust do them at a faster pace. Vacuuming, raking or mowing the lawn, sweeping the garage, and washing and waxing the car all can help get your heart rate up. Strengthen your muscles during the week. You don't have to lift heavy weights or grow big, bulky muscles to get stronger. Doing a few simple activities that make your muscles work against, or \"resist,\" something can help you get stronger. For example, you can:  Do push-ups or sit-ups, which use your own body weight as resistance. Lift weights or dumbbells or use stretch bands at home or in a gym or community center. Stretch your muscles often. Stretching will help you as you become more active. It can help you stay flexible, loosen tight muscles, and avoid injury. It can also help improve your balance and posture and can be a great way to relax. Be sure to stretch the muscles you'll be using when you work out. It's best to warm your muscles slightly before you stretch them. Walk or do some other light aerobic activity for a few minutes, and then start stretching. When you stretch your muscles:  Do it slowly. Stretching is not about going fast or making sudden movements. Don't push or bounce during a stretch. Hold each stretch for at least 15 to 30 seconds, if you can. You should feel a stretch in the muscle, but not pain. Breathe out as you do the stretch. Then breathe in as you hold the stretch. Don't hold your breath. If you're worried about how more activity might affect your health, have a checkup before you start. Follow any special advice your doctor gives you for getting a smart start. Where can you learn more? Go to https://DocbookMD.[employer]. org and sign in to your [employer] account. Enter C590 in the AddressHealth box to learn more about \"Learning About Physical Activity. \"     If you do not have an account, please click on the \"Sign Up Now\" link. Current as of: January 16, 2020               Content Version: 12.6  © 4689-3405 [employer]. Care instructions adapted under license by [facility] ([facility]). If you have questions about a medical condition or this instruction, always ask your healthcare professional. [place] [address] any warranty or liability for your use of this information.          Patient Education        A Healthy Heart: Care Instructions  Your Care Instructions Coronary artery disease, also called heart disease, occurs when a substance called plaque builds up in the vessels that supply oxygen-rich blood to your heart muscle. This can narrow the blood vessels and reduce blood flow. A heart attack happens when blood flow is completely blocked. A high-fat diet, smoking, and other factors increase the risk of heart disease. Your doctor has found that you have a chance of having heart disease. You can do lots of things to keep your heart healthy. It may not be easy, but you can change your diet, exercise more, and quit smoking. These steps really work to lower your chance of heart disease. Follow-up care is a key part of your treatment and safety. Be sure to make and go to all appointments, and call your doctor if you are having problems. It's also a good idea to know your test results and keep a list of the medicines you take. How can you care for yourself at home? Diet    Use less salt when you cook and eat. This helps lower your blood pressure. Taste food before salting. Add only a little salt when you think you need it. With time, your taste buds will adjust to less salt.     Eat fewer snack items, fast foods, canned soups, and other high-salt, high-fat, processed foods.     Read food labels and try to avoid saturated and trans fats. They increase your risk of heart disease by raising cholesterol levels.     Limit the amount of solid fat-butter, margarine, and shortening-you eat. Use olive, peanut, or canola oil when you cook. Bake, broil, and steam foods instead of frying them.     Eat a variety of fruit and vegetables every day. Dark green, deep orange, red, or yellow fruits and vegetables are especially good for you. Examples include spinach, carrots, peaches, and berries.   Foods high in fiber can reduce your cholesterol and provide important vitamins and minerals. High-fiber foods include whole-grain cereals and breads, oatmeal, beans, brown rice, citrus fruits, and apples.     Eat lean proteins. Heart-healthy proteins include seafood, lean meats and poultry, eggs, beans, peas, nuts, seeds, and soy products.     Limit drinks and foods with added sugar. These include candy, desserts, and soda pop. Lifestyle changes    If your doctor recommends it, get more exercise. Walking is a good choice. Bit by bit, increase the amount you walk every day. Try for at least 30 minutes on most days of the week. You also may want to swim, bike, or do other activities.     Do not smoke. If you need help quitting, talk to your doctor about stop-smoking programs and medicines. These can increase your chances of quitting for good. Quitting smoking may be the most important step you can take to protect your heart. It is never too late to quit.     Limit alcohol to 2 drinks a day for men and 1 drink a day for women. Too much alcohol can cause health problems.     Manage other health problems such as diabetes, high blood pressure, and high cholesterol. If you think you may have a problem with alcohol or drug use, talk to your doctor. Medicines    Take your medicines exactly as prescribed. Call your doctor if you think you are having a problem with your medicine.     If your doctor recommends aspirin, take the amount directed each day. Make sure you take aspirin and not another kind of pain reliever, such as acetaminophen (Tylenol). When should you call for help? Call 911 if you have symptoms of a heart attack. These may include:    Chest pain or pressure, or a strange feeling in the chest.     Sweating.     Shortness of breath.     Pain, pressure, or a strange feeling in the back, neck, jaw, or upper belly or in one or both shoulders or arms.     Lightheadedness or sudden weakness.

## 2021-01-06 NOTE — PROGRESS NOTES
DR. Brittany Rodriguez ([employer])    Name: Dmitri Carney Date: 2021   MRN: [id number] Sex: Male   Age: 80 y.o. Ethnicity: Non-/Non    : 1934 Race: White       CHIEF COMPLAINT: Kelly David presents via telehealth for   Chief Complaint   Patient presents with   [facility]       He is being seen for ongoing evaluation and management of his hypertension, hyperlipidemia, hypothyroidism, coronary artery disease, congestive heart failure, paroxysmal atrial fibrillation, paroxysmal supraventricular tachycardia, anxiety, osteoarthritis, dementia, and being overweight. He is on Norvasc, Imdur, Lopressor, amiodarone, lisinopril, Xarelto, and aspirin for hypertension, coronary artery disease, congestive heart failure, and paroxysmal atrial fibrillation. He sees the cardiologists here for those conditions as well. His last visit was with Dr. Boris Orta in 2020. He denies any chest pain, dizziness, or palpitations. He sees Dr. Samy verma for benign prostatic hypertrophy, for which he is on Flomax. He last saw him in 2020. He takes simvastatin for hyperlipidemia. He has not had any muscle aches from the simvastatin. He is on Synthroid for hypothyroidism. He denies any fatigue or temperature intolerance. He uses Xanax for anxiety, which has been stable. He uses glucosamine and chondroitin sulfate for arthritis, which has not bothered him lately. He sees Dr. Riley for his dementia. He will see him again next week. He saw Dr. Dez Rodriges in  for a lung nodule seen on CT scan, which has resolved, so Dr. Dez Rodriges told him he did not need any further follow up with him. Otherwise he seems to be doing fairly well and denies any other complaints. He was accompanied on the telehealth system by Irlanda Patrick,  at [facility]. Screenings for behavioral, psychosocial and functional/safety risks, and cognitive dysfunction are all negative except as indicated below. These results, as well as other patient data from the [address] form, are documented in Flowsheets linked to this Encounter. ALLERGIES:  Allergies   Allergen Reactions    Pcn [Penicillins] Swelling     As a child  Pt tolerated ceftriaxone , and keflex with no intolerance    Sulfa Antibiotics     Cefdinir Other (See Comments)     Pt tolerated ceftriaxone , and keflex with no intolerance       MEDICATIONS:   Outpatient Medications Marked as Taking for the 1/6/21 encounter (Virtual Visit) with Stacia Olsen, DO   Medication Sig Dispense Refill    psyllium (KONSYL) 28.3 % PACK Take 1 packet by mouth 2 times daily      ALPRAZolam (XANAX) 0.25 MG tablet TAKE 1/2 OF A TABLET BY MOUTH DAILY, IN THE MORNING TAKE 1 TABLET BY MOUTH DAILY, AT BEDTIME.  45 tablet 2    albuterol (PROVENTIL) (2.5 MG/3ML) 0.083% nebulizer solution Take 3 mLs by nebulization every 6 hours as needed for Wheezing 120 each 3    vitamin D3 (CHOLECALCIFEROL) 25 MCG (1000 UT) TABS tablet Take 1 tablet by mouth daily 30 tablet 5    vitamin C (ASCORBIC ACID) 500 MG tablet Take 1 tablet by mouth 2 times daily 30 tablet 3    tamsulosin (FLOMAX) 0.4 MG capsule Take 1 capsule by mouth 2 times daily 180 capsule 3    nitrofurantoin, macrocrystal-monohydrate, (MACROBID) 100 MG capsule Take 1 capsule by mouth daily 90 capsule 3    hydrocortisone (PREPARATION H) 1 % cream Apply topically to affected area as directed prn (Preparation H)      Compression Stockings MISC by Does not apply route DAVID hose- on in am, off in pm (Currently doing prn now)      amiodarone (CORDARONE) 200 MG tablet Take 100 mg by mouth daily      rivaroxaban (XARELTO) 20 MG TABS tablet Take 20 mg by mouth every morning      acetaminophen (TYLENOL) 325 MG tablet Take 650 mg by mouth every 4 hours as needed for Pain  Mineral Oil OIL Use as directed on bottle for occasional constipation      Dextromethorphan-guaiFENesin  MG/5ML SYRP Take 5 mLs by mouth every 6 hours as needed for Cough (Robafen DM)      simethicone (MYLICON) 40 LY/9.9GR drops Take 0.6 mLs by mouth 4 times daily as needed (gas) 60 mL 3    metoprolol tartrate (LOPRESSOR) 25 MG tablet Take 0.5 tablets by mouth 2 times daily 90 tablet 1    levothyroxine (SYNTHROID) 112 MCG tablet Take 1 tablet by mouth Daily 30 tablet 11    polyvinyl alcohol (ARTIFICIAL TEARS) 1.4 % ophthalmic solution Place 1 drop into both eyes every 6 hours as needed      Multiple Vitamins-Minerals (PRESERVISION AREDS PO) Take 1 capsule by mouth daily       isosorbide mononitrate (IMDUR) 30 MG extended release tablet Take 1 tablet by mouth daily 30 tablet 3    lisinopril (PRINIVIL;ZESTRIL) 40 MG tablet Take 1 tablet by mouth daily 30 tablet 3    amLODIPine (NORVASC) 10 MG tablet Take 1 tablet by mouth daily 30 tablet 3    Multiple Vitamins-Minerals (MULTIVITAMIN ADULT PO) Take 1 tablet by mouth daily      potassium chloride (KLOR-CON M) 20 MEQ extended release tablet Take 20 mEq by mouth daily      fluticasone (FLONASE) 50 MCG/ACT nasal spray 1 spray by Nasal route daily 1 Bottle 0    simvastatin (ZOCOR) 20 MG tablet TAKE 1 TABLET BY MOUTH EVERY OTHER DAY  30 tablet 11    Coenzyme Q-10 100 MG CAPS Take 100 mg by mouth every other day.  tolnaftate (TINACTIN) 1 % external solution Apply topically nightly to toenails per Dr. Luh Riggins.  aspirin 81 MG tablet Take 81 mg by mouth daily.       GLUCOSAMINE-CHONDROITIN PO Take 3 capsules by mouth daily at 1800 (500-400 mg)          Past Medical History:   Diagnosis Date    Acute bronchitis     by history    MARITA (acute kidney injury) ([facility] [address] 3/13/2018    Allergic rhinitis     Anxiety     Bradycardia 8/19/2018    Cataract, right     Choroidal nevus of right eye  Tobacco comment: never smoked iwona rrt,2/27/2018   Substance and Sexual Activity    Alcohol use: No     Alcohol/week: 0.0 standard drinks    Drug use: No    Sexual activity: Not on file   Lifestyle    Physical activity     Days per week: Not on file     Minutes per session: Not on file    Stress: Not on file   Relationships    Social connections     Talks on phone: Not on file     Gets together: Not on file     Attends [employer] service: Not on file     Active member of club or organization: Not on file     Attends meetings of clubs or organizations: Not on file     Relationship status: Not on file    Intimate partner violence     Fear of current or ex partner: Not on file     Emotionally abused: Not on file     Physically abused: Not on file     Forced sexual activity: Not on file   Other Topics Concern    Not on file   Social History Narrative    Not on file       Family History   Problem Relation Age of Onset    Diabetes Mother     Osteoporosis Mother     Other Father         complications of prostate surgery (bleeding)    Stroke Sister     Other Sister         respiratory failure         CareTeam (Including outside providers/suppliers regularly involved in providing care):   Patient Care Team:  Nba Acosta DO as PCP - General (Internal Medicine)  Nba Acosta DO [facility] PCP [facility]  Kalen Cedeno MD as Consulting Physician (Urology)  Ivory Garrett MD as Consulting Physician (Pulmonology)  Mitesh Clemente DO as Consulting Physician (Cardiology)  La Xavier MD as Consulting Physician (Neurology)  Mena Meza DPM as Consulting Physician (Podiatry)    REVIEW OF SYSTEMS:     General: negative for - chills, fever or night sweats  Skin: negative for - pruritus or rash  Head: Negative for: headache or recent history of head trauma  Ear, Nose, Throat: negative for - epistaxis, nasal congestion, nasal discharge, sore throat, tinnitus or vertigo Cardiovascular: negative for - chest pain, dyspnea on exertion, orthopnea, palpitations, paroxysmal nocturnal dyspnea or shortness of breath  Respiratory: negative for - cough, hemoptysis or wheezing  Gastrointestinal: negative for - abdominal pain, blood in stools, constipation, diarrhea, hematemesis, melena or nausea/vomiting  Genitourinary: negative for - dysuria, hematuria, incontinence, nocturia or urinary frequency/urgency  Musculoskeletal: positive for - joint pain  negative for - muscle pain or muscular weakness  Neurologic: positive for - memory loss  negative for - gait disturbance, impaired coordination/balance, numbness/tingling, seizures or tremors  Psychiatric: positive for - anxiety  negative for - depression     PHYSICAL EXAMINATION:    Wt Readings from Last 3 Encounters:   01/06/21 190 lb (86.2 kg)   10/30/20 190 lb (86.2 kg)   09/17/20 190 lb (86.2 kg)       Patient-Reported Vitals 1/6/2021   Patient-Reported Weight 190 lb   Patient-Reported Height 5' 10\"   Patient-Reported Systolic 720   Patient-Reported Diastolic 79   Patient-Reported Pulse 83   BP was taken on electronic monitoring device with digital readout. BP verified by Tita Mooney LPN at [facility]. Body mass index is 27.26 kg/m². Based upon direct observation of the patient, evaluation of cognition reveals remote memory intact, recent memory impaired. General: This is a 80 y.o.  male who is alert and oriented to person and place and questionably oriented to time. He appears to be his stated age and does not appear to be in any acute distress. He was able to follow commands. Affect appropriate for the situation. There were no hallucinations. Skin: Skin color, texture, turgor appears normal. No apparent rashes or lesions. HEENT/Neck: Head: Normal, normocephalic, atraumatic  Eye: Normal appearing external eye, conjunctiva, lids cornea. EOM appear intact bilaterally. Ears: Normal appearing external ears. Nose: Normal appearing external nose. No discharge. Pharynx: Mucous membranes appear moist.  Neck: No masses visualized. Pulmonary/Chest: Chest rises and falls symmetrically with inspiration and expiration. No accessory muscle use noted. Normal respiratory effort. No signs of difficulty breathing or respiratory distress visualized. Abdomen: Non-obese  Musculoskeletal: Normal appearing range of motion of neck. Extremities: Within the limitations of TeleHealth examination there does not appear to be any clubbing, cyanosis, or edema in any of the extremities. Neurologic: No gaze palsy. No facial asymmetry (Cranial Nerve 7 motor function). Exam limited due to video visit. Osteopathic Structural Examination: Unable to perform due to limitations of Telehealth. QUESTIONNAIRE REVIEW:    The following problems were reviewed today and where indicated follow up appointments were made and/or referrals ordered. Positive Risk Factor Screenings with Interventions:      Cognitive: Words recalled: 0 Words Recalled  Clock Drawing Test (CDT) Score: Normal  Total Score Interpretation: Positive Mini-Cog  Did the patient refuse to take the cognition test?: No  Cognitive Impairment Interventions:  · Patient follows with Dr. Trista Vazquez on a regular basis       General Health and ACP:  General  In general, how would you say your health is?: Good  In the past 7 days, have you experienced any of the following?  New or Increased Pain, New or Increased Fatigue, Loneliness, Social Isolation, Stress or Anger?: None of These  Do you get the social and emotional support that you need?: Yes  Do you have a Living Will?: (!) No  Advance Directives     Power of  Living Will ACP-Advance Directive ACP-Power of     Not on File Filed on 10/18/18 Filed Not on File      General Health Risk Interventions:  · No Living Will: Additional information provided on the After Visit Summary    Health Habits/Nutrition: Health Habits/Nutrition  Do you exercise for at least 20 minutes 2-3 times per week?: Yes  Have you lost any weight without trying in the past 3 months?: No  Do you eat fewer than 2 meals per day?: No  Have you seen a dentist within the past year?: (!) No  Body mass index: (!) 27.26  Health Habits/Nutrition Interventions:  · Dental exam overdue:  patient encouraged to make appointment with his/her dentist      ADL:  ADLs  In the past 7 days, did you need help from others to perform any of the following everyday activities? Eating, dressing, grooming, bathing, toileting, or walking/balance?: (!) Bathing  In the past 7 days, did you need help from others to take care of any of the following? Laundry, housekeeping, banking/finances, shopping, telephone use, food preparation, transportation, or taking medications?: [employer], Housekeeping, Banking/Finances, Shopping, Food Preparation, Transportation, Taking Medications  ADL Interventions:  · Patient declines any further evaluation/treatment for this issue  · Lives at [facility].  They assist with these activities      Personalized Preventive Plan:     Current Health Maintenance Status  Immunization History   Administered Date(s) Administered    Influenza Virus Vaccine 11/07/2005    Influenza, High Dose (Fluzone 65 yrs and older) 10/30/2018, 10/15/2020    Influenza, Triv, inactivated, subunit, adjuvanted, IM (Fluad 65 yrs and older) 10/19/2019    Pneumococcal Conjugate 13-valent ([place]) 10/19/2015    Pneumococcal Polysaccharide ([id number]) 11/13/2001, 09/09/2014    Zoster Live (Zostavax) 10/05/2012       Health Maintenance   Topic Date Due    DTaP/Tdap/Td vaccine (1 - Tdap) 12/31/1953    PSA counseling  12/28/2017    Annual Wellness Visit (AWV)  05/29/2019    Lipid screen  06/06/2020    Potassium monitoring  04/15/2021    Creatinine monitoring  04/15/2021    TSH testing  10/15/2021    Flu vaccine  Completed - Cardiovascular Disease Risk Counseling: Assessed the patient's risk to develop cardiovascular disease and reviewed main risk factors. Reviewed steps to reduce disease risk including:   · Quitting tobacco use, reducing amount smoked, or not starting the habit  · Making healthy food choices  · Being physically active and gradualy increasing activity levels   · Reduce weight and determine a healthy BMI goal  · Monitor blood pressure and treat if higher than 140/90 mmHg  · Maintain blood total cholesterol levels under 5 mmol/l or 190 mg/dl  · Maintain LDL cholesterol levels under 3.0 mmol/l or 115 mg/dl   · Control blood glucose levels  · Consider taking aspirin (75 mg daily), once blood pressure is controlled   Provided a follow up plan. Time spent (minutes): 15 minutes  - NV Intens behave ther cardio dx, 15 minutes []      Orders Placed This Encounter   Procedures    CBC Auto Differential     Standing Status:   Future     Standing Expiration Date:   1/6/2022    Comprehensive Metabolic Panel, Fasting     Standing Status:   Future     Standing Expiration Date:   1/6/2022    Lipid Panel     Standing Status:   Future     Standing Expiration Date:   1/6/2022     Order Specific Question:   Is Patient Fasting?/# of Hours     Answer:   15    Urinalysis with Microscopic     Standing Status:   Future     Standing Expiration Date:   1/6/2022     Order Specific Question:   SPECIFY(EX-CATH,MIDSTREAM,CYSTO,ETC)? Answer:   midstream    TSH without Reflex     Standing Status:   Future     Standing Expiration Date:   1/6/2022    T4, Free     Standing Status:   Future     Standing Expiration Date:   1/6/2022    NV Intens behave ther cardio dx, 15 minutes []       Requested Prescriptions      No prescriptions requested or ordered in this encounter       Labs as ordered above. Return in about 6 months (around 7/6/2021). Pursuant to the emergency declaration under the [address] [facility] authority and the [employer] Act, this TeleHealth visit was conducted, with patient's consent, to reduce the patient's risk of exposure to COVID-19 and provide continuity of care for an established patient. Services were provided through a video synchronous discussion virtually (using [email or web address] me) to substitute for in-person clinic visit. The originating site was the patient's home and the distant site was the provider's office. Patient's identity was verified via name and date of birth.           Electronically signed by Emiliano Bush DO on 1/6/2021 at 10:17 AM  Internal Medicine

## 2021-01-15 ENCOUNTER — OFFICE VISIT (OUTPATIENT)
Dept: NEUROLOGY | Age: 86
End: 2021-01-15
Payer: MEDICARE

## 2021-01-15 VITALS
DIASTOLIC BLOOD PRESSURE: 66 MMHG | OXYGEN SATURATION: 97 % | WEIGHT: 172.6 LBS | HEIGHT: 70 IN | RESPIRATION RATE: 16 BRPM | TEMPERATURE: 97.3 F | HEART RATE: 74 BPM | BODY MASS INDEX: 24.71 KG/M2 | SYSTOLIC BLOOD PRESSURE: 104 MMHG

## 2021-01-15 DIAGNOSIS — F02.80 DEMENTIA ASSOCIATED WITH OTHER UNDERLYING DISEASE WITHOUT BEHAVIORAL DISTURBANCE (HCC): Primary | ICD-10-CM

## 2021-01-15 DIAGNOSIS — E03.4 HYPOTHYROIDISM DUE TO ACQUIRED ATROPHY OF THYROID: ICD-10-CM

## 2021-01-15 DIAGNOSIS — E66.09 CLASS 1 OBESITY DUE TO EXCESS CALORIES WITH SERIOUS COMORBIDITY AND BODY MASS INDEX (BMI) OF 30.0 TO 30.9 IN ADULT: ICD-10-CM

## 2021-01-15 DIAGNOSIS — E03.9 ACQUIRED HYPOTHYROIDISM: ICD-10-CM

## 2021-01-15 DIAGNOSIS — E78.2 MIXED HYPERLIPIDEMIA: ICD-10-CM

## 2021-01-15 DIAGNOSIS — F41.9 ANXIETY: ICD-10-CM

## 2021-01-15 DIAGNOSIS — I50.22 CHRONIC SYSTOLIC CHF (CONGESTIVE HEART FAILURE) (HCC): ICD-10-CM

## 2021-01-15 DIAGNOSIS — N40.1 BENIGN PROSTATIC HYPERPLASIA WITH INCOMPLETE BLADDER EMPTYING: ICD-10-CM

## 2021-01-15 DIAGNOSIS — R39.14 BENIGN PROSTATIC HYPERPLASIA WITH INCOMPLETE BLADDER EMPTYING: ICD-10-CM

## 2021-01-15 DIAGNOSIS — I10 ESSENTIAL HYPERTENSION: ICD-10-CM

## 2021-01-15 DIAGNOSIS — I25.10 CORONARY ARTERY DISEASE INVOLVING NATIVE CORONARY ARTERY OF NATIVE HEART WITHOUT ANGINA PECTORIS: ICD-10-CM

## 2021-01-15 DIAGNOSIS — I48.91 ATRIAL FIBRILLATION, UNSPECIFIED TYPE (HCC): ICD-10-CM

## 2021-01-15 PROCEDURE — 99215 OFFICE O/P EST HI 40 MIN: CPT | Performed by: PSYCHIATRY & NEUROLOGY

## 2021-01-15 PROCEDURE — 99214 OFFICE O/P EST MOD 30 MIN: CPT | Performed by: PSYCHIATRY & NEUROLOGY

## 2021-01-15 PROCEDURE — 1036F TOBACCO NON-USER: CPT | Performed by: PSYCHIATRY & NEUROLOGY

## 2021-01-15 PROCEDURE — 1123F ACP DISCUSS/DSCN MKR DOCD: CPT | Performed by: PSYCHIATRY & NEUROLOGY

## 2021-01-15 PROCEDURE — G8427 DOCREV CUR MEDS BY ELIG CLIN: HCPCS | Performed by: PSYCHIATRY & NEUROLOGY

## 2021-01-15 PROCEDURE — G8420 CALC BMI NORM PARAMETERS: HCPCS | Performed by: PSYCHIATRY & NEUROLOGY

## 2021-01-15 PROCEDURE — G8482 FLU IMMUNIZE ORDER/ADMIN: HCPCS | Performed by: PSYCHIATRY & NEUROLOGY

## 2021-01-15 PROCEDURE — 4040F PNEUMOC VAC/ADMIN/RCVD: CPT | Performed by: PSYCHIATRY & NEUROLOGY

## 2021-01-15 ASSESSMENT — ENCOUNTER SYMPTOMS
COUGH: 0
COLOR CHANGE: 0
BACK PAIN: 1
SHORTNESS OF BREATH: 0
BOWEL INCONTINENCE: 0
CHOKING: 0
CONSTIPATION: 0
SORE THROAT: 0
FACIAL SWELLING: 0
SINUS PRESSURE: 0
CHEST TIGHTNESS: 0
TROUBLE SWALLOWING: 0
EYE DISCHARGE: 0
EYE REDNESS: 0
ABDOMINAL DISTENTION: 0
VOICE CHANGE: 0
PHOTOPHOBIA: 0
ABDOMINAL PAIN: 0
VISUAL CHANGE: 0
EYE PAIN: 0
DIARRHEA: 0
APNEA: 0
BLOOD IN STOOL: 0
WHEEZING: 0
EYE ITCHING: 0
NAUSEA: 0
VOMITING: 0

## 2021-01-15 NOTE — PROGRESS NOTES
[facility]  Neurology  [address] [address]  Phone: [phone]   Fax: [phone]    SUBJECTIVE:     PATIENT ID:  Meghann Lovell is a  RIGHT  HANDED 80 y.o. male. Neurologic Problem  The patient's primary symptoms include clumsiness, focal sensory loss, a loss of balance and memory loss. The patient's pertinent negatives include no altered mental status, focal weakness, near-syncope, slurred speech, syncope, visual change or weakness. This is a chronic problem. Episode onset: MORE  THAN   2  YEARS. The neurological problem developed insidiously. The problem is unchanged. There was lower extremity, upper extremity, right-sided and left-sided focality noted. Associated symptoms include back pain. Pertinent negatives include no abdominal pain, auditory change, aura, bladder incontinence, bowel incontinence, chest pain, confusion, diaphoresis, dizziness, fatigue, fever, headaches, light-headedness, nausea, neck pain, palpitations, shortness of breath, vertigo or vomiting. Past treatments include nothing. The treatment provided no relief. His past medical history is significant for dementia. There is no history of a bleeding disorder, a clotting disorder, a CVA, head trauma, liver disease, mood changes or seizures. History obtained from  The patient       [address] [place] [address]       and other  available medical records were  Also  reviewed. The  Duration,  Quality,  Severity,  Location,  Timing,  Context,  Modifying  Factors   Of   The   Chief   Complaint       And  Present  Illness   Was   Reviewed   In   Chronological   Manner.                                             CURRENT PATIENT'S  MAIN  CONCERNS INCLUDE :                       1)    H/O   CHRONIC  MILD    MEMORY  PROBLEMS                                       FOR    3-4      YEARS                                 -   FAIRLY    STABLE                               2) PATIENT  NOT  CONCERNED   ABOUT  MEMORY  PROBLEMS   AND                             NOT  INTERESTED  IN MEDICATION  FOR  DEMENTIA                                  3)    PREVIOUS   H/O   FALL   WITH  MILD  HEAD INJURY  IN   SEPT. 2018                            HAD  CT  HEAD  AND  C  SPINE    ON   9/19/2018                                 SHOWED  NO  ACUTE  PATHOLOGY                                      -  NO   H/O     RECURRENCE OF  FALLING                              4)    PERIPHERAL  POLYNEUROPATHY                                      -    STABLE                             5)    H/O   CHRONIC  MILD     BALANCE PROBLEMS                                             -    STABLE                            HIGH  RISK  FOR  FALLS                              6)   H/O    ATRIAL  FIBRILLATION                                 -     ON    XARELTO                              7)   MULTIPLE  CARDIAC  CONDITIONS                                  BEING   FOLLOWED  BY  HIS  CARDIOLOGY                           8)   MULTIPLE  CO MORBID  MEDICAL  CONDITIONS                                  BEING  FOLLOWED  BY  HIS    PCP. 9)    HIGH  RISK  FOR  STROKE,  CEREBRAL ISCHEMIA                               10)     CT   HEAD  ,  CT  SPINE    CAROTID  DOPPLER,   EEG      IN    SEPT. 2018                                  SHOWED  NO  SIGNIFICANT    PATHOLOGIES                           11)   H/O  MILD  ANXIETY  -    ON  XANAX                                         -   STABLE                             12)    PATIENT  HAS  BEEN     A  RESIDENT   OF  SUPERVISED  LIVING  FACILITY                         13)      PATIENT  DENIES  ANY  NEW  NEUROLOGICAL   CONCERNS.                               PRECIPITATING  FACTORS: including  fever/infection, exertion/relaxation, position change, stress, weather       Change,   medications/alcohol, time of day/darkness/light  Are    Absent MODIFYING  FACTORS:  fever/infection, exertion/relaxation, position change, stress, weather change,     medications/alcohol, time of day/darkness/light  Are  absent         Patient   Indicates   The  Presence   And  The  Absence  Of  The  Following  Associated         And   Additional  Neurological    Symptoms:                                Balance  And coordination problems  present           Gait problems     absent            Headaches      absent              Migraines           absent           Memory problems        Present             Confusion        absent            Paresthesia numbness          absent           Seizures  And  Starring  Episodes           absent           Syncope,  Near  syncopal episodes         absent           Speech problems           absent             Swallowing  Problems      absent            Dizziness,  Light headedness           absent                        Vertigo        absent             Generalized   Weakness    absent              focal  Weakness     absent             Tremors         absent              Sleep  Problems     absent             History  Of   Recent   Head  Injury     absent             History  Of   Recent  TIA     absent             History  Of   Recent    Stroke     absent             Neck  Pain and  Neck muscle  Spasms  Absent               Radiating  down   And   Weakness           absent            Lower back   Pain  And     Spasms  Absent              Radiating    Down   And   Weakness          absent                H/O   FALLS        absent               History  Of   Visual  Symptoms    Absent                  Associated   Diplopia       absent                                Also   Additional   Symptoms   Present    As  Documented    In   The detailed      Review  Of  Systems   And    Please   Refer   To    Them for   Additional  Information.                   Any components  That are either  Unobtainable  Or  Limited  In   HPI, ROS  And/or PFSH   Are   Due       To   Patient's  Medical  Problems,  Clinical  Condition and/or lack of other  Alternate resources.           RECORDS   REVIEWED:    historical medical records       INFORMATION   REVIEWED:     MEDICAL   HISTORY,     SURGICAL   HISTORY,   MEDICATIONS   LIST,   ALLERGIES AND  DRUG  INTOLERANCES,     FAMILY   HISTORY,  SOCIAL  HISTORY,    PROBLEM  LIST   FOR  PATIENT  CARE   COORDINATION        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 LIMA TO LAD, SVG TO DIAGONAL2    Dementia associated with 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 behavioral disturbance ([address] 9/21/2018    Dermatophytosis of nail 1/10/2014    Diverticulosis     Elevated PSA     Hemorrhoids     History of measles childhood    History of mumps childhood    Hyperlipidemia     Hypertension     Hypothyroidism 9/4/2018    Mass of upper lobe of right lung 2/21/2018    Occult blood positive stool     refuses colonoscopy    Osteoarthritis     Overweight     Paroxysmal atrial fibrillation ([address] 11/7/2013    Pneumonia due to organism     Pseudophakia, left eye     PSVT (paroxysmal supraventricular tachycardia) ([address]     Recurrent UTI 3/13/2018    Retinal detachment     left, history of     Seborrheic dermatitis     Sepsis due to urinary tract infection ([address] 8/15/2018    Urinary tract infection without hematuria 3/28/2018         Past Surgical History:   Procedure Laterality Date    ABSCESS DRAINAGE  [id number]    multiple   [address] Left 5/7/2013    CORONARY ARTERY BYPASS GRAFT      RETINAL LASER Left 12/22/2011    detached retina    SKIN TAG REMOVAL  Nov 1999    TONSILLECTOMY           Current Outpatient Medications   Medication Sig Dispense Refill    psyllium (KONSYL) 28.3 % PACK Take 1 packet by mouth 2 times daily      ALPRAZolam (XANAX) 0.25 MG tablet TAKE 1/2 OF A TABLET BY MOUTH DAILY, IN THE MORNING TAKE 1 TABLET BY MOUTH DAILY, AT BEDTIME.  45 tablet 2    albuterol (PROVENTIL) (2.5 MG/3ML) 0.083% nebulizer solution Take 3 mLs by nebulization every 6 hours as needed for Wheezing 120 each 3    vitamin D3 (CHOLECALCIFEROL) 25 MCG (1000 UT) TABS tablet Take 1 tablet by mouth daily 30 tablet 5    vitamin C (ASCORBIC ACID) 500 MG tablet Take 1 tablet by mouth 2 times daily 30 tablet 3    tamsulosin (FLOMAX) 0.4 MG capsule Take 1 capsule by mouth 2 times daily 180 capsule 3    hydrocortisone (PREPARATION H) 1 % cream Apply topically to affected area as directed prn (Preparation H)      Compression Stockings MISC by Does not apply route DAVID hose- on in am, off in pm (Currently doing prn now)      amiodarone (CORDARONE) 200 MG tablet Take 100 mg by mouth daily      rivaroxaban (XARELTO) 20 MG TABS tablet Take 20 mg by mouth every morning      acetaminophen (TYLENOL) 325 MG tablet Take 650 mg by mouth every 4 hours as needed for Pain      Mineral Oil OIL Use as directed on bottle for occasional constipation      Dextromethorphan-guaiFENesin  MG/5ML SYRP Take 5 mLs by mouth every 6 hours as needed for Cough (Robafen DM)      simethicone (MYLICON) 40 HV/1.6NG drops Take 0.6 mLs by mouth 4 times daily as needed (gas) 60 mL 3    metoprolol tartrate (LOPRESSOR) 25 MG tablet Take 0.5 tablets by mouth 2 times daily 90 tablet 1    levothyroxine (SYNTHROID) 112 MCG tablet Take 1 tablet by mouth Daily 30 tablet 11    polyvinyl alcohol (ARTIFICIAL TEARS) 1.4 % ophthalmic solution Place 1 drop into both eyes every 6 hours as needed      Multiple Vitamins-Minerals (PRESERVISION AREDS PO) Take 1 capsule by mouth daily       isosorbide mononitrate (IMDUR) 30 MG extended release tablet Take 1 tablet by mouth daily 30 tablet 3    lisinopril (PRINIVIL;ZESTRIL) 40 MG tablet Take 1 tablet by mouth daily 30 tablet 3    amLODIPine (NORVASC) 10 MG tablet Take 1 tablet by mouth daily 30 tablet 3    Multiple Vitamins-Minerals (MULTIVITAMIN ADULT PO) Take 1 tablet by mouth daily      potassium chloride (KLOR-CON M) 20 MEQ extended release tablet Take 20 mEq by mouth daily      fluticasone (FLONASE) 50 MCG/ACT nasal spray 1 spray by Nasal route daily 1 Bottle 0    simvastatin (ZOCOR) 20 MG tablet TAKE 1 TABLET BY MOUTH EVERY OTHER DAY  30 tablet 11    Coenzyme Q-10 100 MG CAPS Take 100 mg by mouth every other day.  tolnaftate (TINACTIN) 1 % external solution Apply topically nightly to toenails per Dr. Morgan Quintero.  aspirin 81 MG tablet Take 81 mg by mouth daily.  GLUCOSAMINE-CHONDROITIN PO Take 3 capsules by mouth daily at 1800 (500-400 mg)      nitrofurantoin, macrocrystal-monohydrate, (MACROBID) 100 MG capsule Take 1 capsule by mouth daily 90 capsule 3     No current facility-administered medications for this visit.           Allergies   Allergen Reactions    Pcn [Penicillins] Swelling     As a child  Pt tolerated ceftriaxone , and keflex with no intolerance    Sulfa Antibiotics     Cefdinir Other (See Comments)     Pt tolerated ceftriaxone , and keflex with no intolerance         Family History   Problem Relation Age of Onset    Diabetes Mother     Osteoporosis Mother     Other Father         complications of prostate surgery (bleeding)    Stroke Sister     Other Sister         respiratory failure         Social History     Socioeconomic History    Marital status: Single     Spouse name: Not on file    Number of children: Not on file    Years of education: Not on file    Highest education level: Not on file   Occupational History    Not on file   Social Needs    Financial resource strain: Not on file    Food insecurity     Worry: Not on file     Inability: Not on file    Transportation needs     Medical: Not on file     Non-medical: Not on file   Tobacco Use    Smoking status: Never Smoker    Smokeless tobacco: Never Used    Tobacco comment: never smoked iwona rrt,2/27/2018   Substance and Sexual Activity    Alcohol use: No     Alcohol/week: 0.0 standard drinks    Drug use: No    Sexual activity: Not on file   Lifestyle    Physical activity     Days per week: Not on file     Minutes per session: Not on file    Stress: Not on file   Relationships    Social connections     Talks on phone: Not on file     Gets together: Not on file     Attends [employer] service: Not on file     Active member of club or organization: Not on file     Attends meetings of clubs or organizations: Not on file     Relationship status: Not on file    Intimate partner violence     Fear of current or ex partner: Not on file     Emotionally abused: Not on file     Physically abused: Not on file     Forced sexual activity: Not on file   Other Topics Concern    Not on file   Social History Narrative    Not on file       Vitals:    01/15/21 1011   BP: 104/66   Pulse: 74   Resp: 16   Temp: 97.3 °F (36.3 °C)   SpO2: 97%         Wt Readings from Last 3 Encounters:   01/15/21 172 lb 9.6 oz (78.3 kg)   01/06/21 190 lb (86.2 kg)   10/30/20 190 lb (86.2 kg)         BP Readings from Last 3 Encounters:   01/15/21 104/66   12/01/20 (!) 111/47   10/30/20 126/68       Hematology and Coagulation  Lab Results   Component Value Date    WBC 6.2 04/15/2020    RBC 4.70 04/15/2020    HGB 13.6 04/15/2020    HCT 41.4 04/15/2020    MCV 88.1 04/15/2020    MCH 28.9 04/15/2020    MCHC 32.9 04/15/2020    RDW 14.3 04/15/2020     04/15/2020    MPV 11.2 04/15/2020     Chemistries  Lab Results   Component Value Date     04/15/2020    K 4.1 04/15/2020     04/15/2020    CO2 25 04/15/2020    BUN 15 04/15/2020    CREATININE 0.93 04/15/2020    CALCIUM 8.7 04/15/2020    PROT 6.3 04/15/2020    LABALBU 3.6 04/15/2020    BILITOT 0.66 04/15/2020    ALKPHOS 78 04/15/2020    AST 17 04/15/2020    ALT 17 04/15/2020     Lab Results   Component Value Date    ALKPHOS 78 04/15/2020    ALT 17 04/15/2020    AST 17 04/15/2020    PROT 6.3 04/15/2020    BILITOT 0.66 04/15/2020    LABALBU 3.6 04/15/2020     Lab Results   Component Value Date    BUN 15 04/15/2020    CREATININE 0.93 04/15/2020     Lab Results   Component Value Date    CALCIUM 8.7 04/15/2020    MG 2.0 03/14/2018     Lab Results   Component Value Date    AST 17 04/15/2020    ALT 17 04/15/2020       Lab Results   Component Value Date    URICACID 4.7 07/13/2018     Lab Results   Component Value Date    CKTOTAL 110 02/12/2018       Review of Systems   Constitutional: Negative for appetite change, chills, diaphoresis, fatigue, fever and unexpected weight change. HENT: Negative for congestion, dental problem, drooling, ear discharge, ear pain, facial swelling, hearing loss, mouth sores, nosebleeds, postnasal drip, sinus pressure, sore throat, tinnitus, trouble swallowing and voice change. Eyes: Negative for photophobia, pain, discharge, redness, itching and visual disturbance. Respiratory: Negative for apnea, cough, choking, chest tightness, shortness of breath and wheezing. Cardiovascular: Negative for chest pain, palpitations, leg swelling and near-syncope. Gastrointestinal: Negative for abdominal distention, abdominal pain, blood in stool, bowel incontinence, constipation, diarrhea, nausea and vomiting. Endocrine: Negative for cold intolerance, heat intolerance, polydipsia, polyphagia and polyuria. Genitourinary: Negative for bladder incontinence. Musculoskeletal: Positive for back pain. Negative for arthralgias, gait problem, joint swelling, myalgias, neck pain and neck stiffness. Skin: Negative for color change, pallor, rash and wound.    Allergic/Immunologic: Negative for environmental allergies, food allergies and immunocompromised state. Neurological: Positive for loss of balance. Negative for dizziness, vertigo, tremors, focal weakness, seizures, syncope, facial asymmetry, speech difficulty, weakness, light-headedness, numbness and headaches. Hematological: Negative for adenopathy. Does not bruise/bleed easily. Psychiatric/Behavioral: Positive for decreased concentration and memory loss. Negative for agitation, behavioral problems, confusion, dysphoric mood, hallucinations, self-injury, sleep disturbance and suicidal ideas. The patient is not nervous/anxious and is not hyperactive. OBJECTIVE:    Physical Exam  Constitutional:       Appearance: He is well-developed. HENT:      Head: Normocephalic and atraumatic. No raccoon eyes or Rico's sign. Right Ear: External ear normal.      Left Ear: External ear normal.      Nose: Nose normal.   Eyes:      Conjunctiva/sclera: Conjunctivae normal.   Neck:      Musculoskeletal: Normal range of motion and neck supple. Normal range of motion. No neck rigidity or muscular tenderness. Thyroid: No thyroid mass or thyromegaly. Vascular: No carotid bruit. Trachea: No tracheal deviation. Meningeal: Brudzinski's sign and Kernig's sign absent. Cardiovascular:      Rate and Rhythm: Normal rate and regular rhythm. Pulmonary:      Effort: Pulmonary effort is normal.   Musculoskeletal:         General: No tenderness. Skin:     General: Skin is warm. Coloration: Skin is not pale. Findings: No erythema or rash. Nails: There is no clubbing. Psychiatric:         Attention and Perception: He is attentive. Mood and Affect: Mood is anxious. Mood is not depressed. Affect is not labile, blunt, angry or inappropriate. Behavior: Behavior is slowed. Behavior is not agitated, aggressive, withdrawn, hyperactive or combative. Behavior is cooperative. Thought Content:  Thought content Muscle  Tone  In upper  And  Lower  Extremities  Normal                No rigidity. No  Spasticity. Bradykinesia   Absent                   No  Asterixis. Sustention  Tremor , Resting  Tremor   absent                    No other  Abnormal  Movements noted           D) SENSORY :               light touch, pinprick, position  And  Vibration  DECREASED                            IN   GLOVE  AND  STOCKING  MANNER    E) REFLEXES:                   Deep  Tendon  Reflexes     DECREASED                    No pathological  Reflexes  Bilaterally.                                   F) COORDINATION  AND  GAIT :                                Station and  Gait    SLOW                                       Romberg's test   POSITIVE                          Ataxia negative      ASSESSMENT:    Patient Active Problem List   Diagnosis    Hyperlipidemia    Osteoarthritis    Allergic rhinitis    Diverticulosis    Anxiety    Atrial fibrillation ([address]    Coronary artery disease involving native coronary artery of native heart    Mass of upper lobe of right lung    Chronic systolic CHF (congestive heart failure) (HCC)    Benign prostatic hyperplasia with incomplete bladder emptying    Hypothyroidism    Hypertension    Dementia associated with 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 behavioral disturbance ([address]    Intermediate stage nonexudative age-related macular degeneration of both eyes    Combined forms of age-related cataract of right eye    Pseudophakia of left eye    Class 1 obesity with serious comorbidity and body mass index (BMI) of 30.0 to 30.9 in adult    Overweight           ULTRASOUND EVALUATION OF THE CAROTID ARTERIES       9/24/2018       COMPARISON:   None.       HISTORY:   ORDERING SYSTEM PROVIDED HISTORY: Dementia associated with other underlying   disease without behavioral disturbance       FINDINGS:       RIGHT:       The right common carotid artery demonstrates peak systolic velocity of 85   cm/sec.       The right internal carotid artery demonstrates the systolic velocity of 53   cm/sec.       The external carotid artery is patent.  The vertebral artery demonstrates   normal antegrade flow.       Minimal calcified plaque is seen at the right carotid bulb.  No significant   stenosis is seen.       ICA/CCA ratio of 0.6.           LEFT:       The left common carotid artery demonstrates peak systolic velocity of 77   cm/sec.       The left internal carotid artery demonstrates the systolic velocity of 49   cm/sec.       The external carotid artery is patent.  The vertebral artery demonstrates   normal antegrade flow.       Mild calcified plaque is seen within the left internal carotid artery without   significant stenosis.       ICA/CCA ratio of 0.6           Impression   The right internal carotid artery demonstrates 0-50% stenosis.       The left internal carotid artery demonstrates 0-50% stenosis.       Bilateral vertebral arteries are patent with flow in the normal direction       CT OF THE HEAD WITHOUT CONTRAST; CT OF THE CERVICAL SPINE WITHOUT CONTRAST   9/19/2018 2:00 pm       TECHNIQUE:   CT of the head was performed without the administration of intravenous   contrast. Dose modulation, iterative reconstruction, and/or weight based   adjustment of the mA/kV was utilized to reduce the radiation dose to as low   as reasonably achievable.; CT of the cervical spine was performed without the   administration of intravenous contrast. Multiplanar reformatted images are   provided for review.  Dose modulation, iterative reconstruction, and/or weight   based adjustment of the mA/kV was utilized to reduce the radiation dose to as   low as reasonably achievable.       COMPARISON:   None.       HISTORY:   ORDERING SYSTEM PROVIDED HISTORY: fall yesterday on blood thinners   TECHNOLOGIST PROVIDED HISTORY:       Ordering Physician Provided Reason for Exam: Patient sent from [facility] after fall yesterday. Nurse states was sitting on bench he thought it had a   back and leaned back and fell unsure of surface he fell onto. Patient denies   any injury. No redness, bruising, or abrasion noted to head.       FINDINGS:   CT HEAD:       BRAIN/VENTRICLES: There is no acute intracranial hemorrhage, mass effect or   midline shift.  No abnormal extra-axial fluid collection.  The gray-white   differentiation is maintained without evidence of an acute infarct.  There is   no evidence of hydrocephalus.       ORBITS: The visualized portion of the orbits demonstrate no acute abnormality.       SINUSES: The visualized paranasal sinuses and mastoid air cells demonstrate   no acute abnormality.       SOFT TISSUES/SKULL:  No acute abnormality of the visualized skull or soft   tissues.       CT CERVICAL SPINE:       BONE/ALIGNMENT:  No acute fracture, subluxation, compression deformity or   suspicious osseous lesions are identified.  AP alignment of the cervical   spine is maintained.       DEGENERATIVE CHANGES:  There is multilevel degenerative disc disease with   essentially complete loss of the disc space at C6-C7 and C7-T1.  There is   multilevel anterior spurring.  There are hypertrophic degenerative changes of   the facet and uncovertebral joints throughout the cervical spine as well as   the atlantoaxial joint.  These findings contribute to moderate neural   foraminal stenosis at C3-C4, moderate bilateral neural foraminal stenosis at   C4-C5, and mild to moderate right neural foraminal stenosis at C5-C6 and   C6-C7.  Disc osteophyte complexes at C4-C5, C5-C6 and C6-C7 cause mild   impingement of the ventral thecal sac at these levels.       SOFT TISSUES:  No prevertebral soft tissue thickening.  The incidentally   imaged paravertebral soft tissues have a normal noncontrast CT appearance.    No abnormal lymphadenopathy appreciated.  Thyroid has a normal noncontrast CT   appearance.  Visualized lung parenchyma Results  Of  The  Previous  Diagnostic tests were reviewed and questions answered. patient  understand the same. Medical  Decision  Making  Was  Made  Based on the   Complexity  Of  Above  Mentioned  Diagnoses    ,    Data reviewed   & diagnostic  Tests  Reviewed,  Risk  Of  Significant   Co morbidities and complicating   Factors. Medical  Decision  Was   High  Complexity  Due   To  The  Patient's  Multiple  Symptoms,      Advancing   Disease,  Complex  Treatment  Regimen,  Multiple medications and   Risk  Of   Side  Effects,      Difficulty  In  Medication  Management  And  Diagnostic  Challenges   In  View  Of  The  Associated       Co  Morbid  Conditions   And  Problems. * FALL   PRECAUTIONS. THESE  REVIEWED   AND  DISCUSSED      *   BE  CAREFUL  WITH  ACTIVITIES            *  SUPERVISED   CARE      *   ADEQUATE   FLUID  INTAKE   AND  ELECTROLYTE  BALANCE               * KEEP  DAIRY  OF   THE  NEUROLOGICAL  SYMPTOMS        RECORDING THE    DURATION  AND  FREQUENCY. *  AVOID    CONDITIONS  AND  FACTORS   THAT  MAKE                  NEUROLOGICAL  SYMPTOMS  WORSE. *  TO  MAINTAIN  REGULAR  SLEEP  WAKE  CYCLES.      *   TO  HAVE  ADEQUATE  REST  AND   SLEEP    HOURS.          *    AVOID  ANY USAGE OF                   TOBACCO,  EXCESSIVE  ALCOHOL  AND   ILLEGAL   SUBSTANCES          *   Compliance   With  Medications   And  Instructions    *  May   Use  Pill  Box,    If  Needed            *    Prophylactic  Use   Of     Vitamin   B   Complex,  Folic  Acid,    Vitamin  B12    Multivitamin,       Calcium  With  magnesium  And  Vit D    Supplementations   Over  The  Counter  Discussed           *  PATIENT  IS  ALSO   COUNSELED   TO  KEEP    ACTIVITIES:         A)   SIMPLE      B)  ORGANIZED      C)  WRITE   DOWN                       *  EVALUATIONS  AND  FOLLOW UP:                  * PHYSICAL  THERAPY   / OCCUPATIONAL THERAPY every day. Drink more water when you are thirsty. Eat at least 5 servings of fruits and vegetables every day. It may seem like a lot, but it is not hard to reach this goal. A serving or helping is 1 piece of fruit, 1 cup of vegetables, or 2 cups of leafy, raw vegetables. Have an apple or some carrot sticks as an afternoon snack instead of a candy bar. Try to have fruits and/or vegetables at every meal.  Make exercise part of your daily routine. You may want to start with simple activities, such as walking, bicycling, or slow swimming. Try to be active 30 to 60 minutes every day. You do not need to do all 30 to 60 minutes all at once. For example, you can exercise 3 times a day for 10 or 20 minutes. Moderate exercise is safe for most people, but it is always a good idea to talk to your doctor before starting an exercise program.  Keep moving. [place] the lawn, work in the garden, or [employer]. Take the stairs instead of the elevator at work. If you smoke, quit. People who smoke have an increased risk for heart attack, stroke, cancer, and other lung illnesses. Quitting is hard, but there are ways to boost your chance of quitting tobacco for good. Use nicotine gum, patches, or lozenges. Ask your doctor about stop-smoking programs and medicines. Keep trying. In addition to reducing your risk of diseases in the future, you will notice some benefits soon after you stop using tobacco. If you have shortness of breath or asthma symptoms, they will likely get better within a few weeks after you quit. Limit how much alcohol you drink. Moderate amounts of alcohol (up to 2 drinks a day for men, 1 drink a day for women) are okay. But drinking too much can lead to liver problems, high blood pressure, and other health problems. Family health  If you have a family, there are many things you can do together to improve your health. Eat meals together as a family as often as possible. Eat healthy foods.  This includes fruits, vegetables, lean meats and dairy, and whole grains. Include your family in your fitness plan. Most people think of activities such as jogging or tennis as the way to fitness, but there are many ways you and your family can be more active. Anything that makes you breathe hard and gets your heart pumping is exercise. Here are some tips:  Walk to do errands or to take your child to school or the bus. Go for a family bike ride after dinner instead of watching TV. Where can you learn more? Go to https://[employer].Dealflicks. org and sign in to your [facility] account. Enter T198 in the Decision Pace box to learn more about \"A Healthy Lifestyle: Care Instructions. \"     If you do not have an account, please click on the \"Sign Up Now\" link. Current as of: July 26, 2016  Content Version: 11.2  © 1093-9371 [employer]. Care instructions adapted under license by [facility] ([facility]). If you have questions about a medical condition or this instruction, always ask your healthcare professional. [address] any warranty or liability for your use of this information.

## 2021-01-18 ENCOUNTER — TELEPHONE (OUTPATIENT)
Dept: INTERNAL MEDICINE | Age: 86
End: 2021-01-18

## 2021-01-25 ENCOUNTER — TELEPHONE (OUTPATIENT)
Dept: INTERNAL MEDICINE | Age: 86
End: 2021-01-25

## 2021-01-25 ENCOUNTER — HOSPITAL ENCOUNTER (OUTPATIENT)
Age: 86
Setting detail: SPECIMEN
Discharge: HOME | End: 2021-01-25
Payer: MEDICARE

## 2021-01-25 LAB
-: ABNORMAL
AMORPHOUS: ABNORMAL
BACTERIA: ABNORMAL
BILIRUBIN URINE: NEGATIVE
CASTS UA: ABNORMAL /LPF (ref 0–2)
COLOR: ABNORMAL
COMMENT UA: ABNORMAL
CRYSTALS, UA: ABNORMAL /HPF
EPITHELIAL CELLS UA: ABNORMAL /HPF (ref 0–5)
GLUCOSE URINE: NEGATIVE
KETONES, URINE: NEGATIVE
LEUKOCYTE ESTERASE, URINE: NEGATIVE
MUCUS: ABNORMAL
NITRITE, URINE: NEGATIVE
OTHER OBSERVATIONS UA: ABNORMAL
PH UA: 5.5 (ref 5–6)
PROTEIN UA: NEGATIVE
RBC UA: ABNORMAL /HPF (ref 0–4)
RENAL EPITHELIAL, UA: ABNORMAL /HPF
SPECIFIC GRAVITY UA: 1.03 (ref 1.01–1.02)
TRICHOMONAS: ABNORMAL
TURBIDITY: ABNORMAL
URINE HGB: NEGATIVE
UROBILINOGEN, URINE: NORMAL
WBC UA: ABNORMAL /HPF (ref 0–4)
YEAST: ABNORMAL

## 2021-01-25 PROCEDURE — 87086 URINE CULTURE/COLONY COUNT: CPT

## 2021-01-25 PROCEDURE — 81001 URINALYSIS AUTO W/SCOPE: CPT

## 2021-01-25 PROCEDURE — 87077 CULTURE AEROBIC IDENTIFY: CPT

## 2021-01-25 PROCEDURE — 87186 SC STD MICRODIL/AGAR DIL: CPT

## 2021-01-25 PROCEDURE — 87184 SC STD DISK METHOD PER PLATE: CPT

## 2021-01-28 ENCOUNTER — TELEPHONE (OUTPATIENT)
Dept: INTERNAL MEDICINE | Age: 86
End: 2021-01-28

## 2021-01-28 ENCOUNTER — HOSPITAL ENCOUNTER (OUTPATIENT)
Dept: GENERAL RADIOLOGY | Age: 86
Discharge: HOME | End: 2021-01-30
Admitting: INTERNAL MEDICINE
Payer: MEDICARE

## 2021-01-28 LAB
CULTURE: ABNORMAL
Lab: ABNORMAL
SPECIMEN DESCRIPTION: ABNORMAL

## 2021-01-28 PROCEDURE — 71045 X-RAY EXAM CHEST 1 VIEW: CPT

## 2021-01-28 NOTE — TELEPHONE ENCOUNTER
Call [facility]. Now that he has PICC line he should get Rocephin 1 gram IV daily for 14 days. There may be a note of his chart that he is \"allergic\" to cephalosporins. This is not true. He has an intolerance for certain ones. He has had Rocephin before and has not had problems with it.

## 2021-01-28 NOTE — TELEPHONE ENCOUNTER
----- Message from Dima Mckeon sent at 1/28/2021  8:45 AM EST -----  Patient's nurse at [facility] notified by phone. Verbalized understanding. A PICC line is necessary for this. Will need to come here to get PICC line placed. They need a simple order of what medication to be given, per PICC line and for how long.

## 2021-01-28 NOTE — TELEPHONE ENCOUNTER
Gali Armendariz, nurse at [facility] notified of orders. Verbalized understanding. Orders also fax'd.

## 2021-02-11 NOTE — PROGRESS NOTES
Distance: 250ft  Stairs/Curb  Stairs?: No     Balance  Posture: Good  Sitting - Static: Good  Sitting - Dynamic: Good  Standing - Static: Good  Standing - Dynamic: Good;-        Assessment   Body structures, Functions, Activity limitations: Decreased functional mobility ; Decreased strength;Decreased endurance;Decreased balance;Decreased ADL status; Decreased sensation  Assessment: Pt required SBA for bed mobility and CGA for transfers and ambulation. Pt demonstrated mild unsteadiness during ambulation. Pending patients ability to meet PT goals, he can return to his assisted living with help PRN. Prognosis: Good  Decision Making: Medium Complexity  Patient Education: Pt was educated on the importance of mobility  Barriers to Learning: None  REQUIRES PT FOLLOW UP: Yes         Plan   Plan  Times per week: 5  Times per day: Daily  Current Treatment Recommendations: Strengthening, Transfer Training, Endurance Training, Gait Training, Balance Training, Functional Mobility Training, Cognitive/Perceptual Training, Stair training, Home Exercise Program, ADL/Self-care Training, Neuromuscular Re-education  Safety Devices  Type of devices: All fall risk precautions in place, Call light within reach, Left in bed, Patient at risk for falls, Nurse notified, Gait belt  Restraints  Initially in place: No    G-Code  PT G-Codes  Functional Assessment Tool Used: [facility]  Score: 17  Functional Limitation: Mobility: Walking and moving around  Mobility: Walking and Moving Around Current Status (): At least 40 percent but less than 60 percent impaired, limited or restricted  Mobility: Walking and Moving Around Goal Status ():  At least 1 percent but less than 20 percent impaired, limited or restricted    AM-PAC Score  AM-PAC Inpatient Mobility Raw Score : 17  AM-PAC Inpatient T-Scale Score : 42.13  Mobility Inpatient CMS 0-100% Score: 50.57  Mobility Inpatient CMS G-Code Modifier : CK          Goals  Short term negative...

## 2021-02-17 ENCOUNTER — TELEPHONE (OUTPATIENT)
Dept: INTERNAL MEDICINE | Age: 86
End: 2021-02-17

## 2021-02-26 ENCOUNTER — TELEPHONE (OUTPATIENT)
Dept: INTERNAL MEDICINE | Age: 86
End: 2021-02-26

## 2021-03-16 ENCOUNTER — HOSPITAL ENCOUNTER (OUTPATIENT)
Age: 86
Setting detail: SPECIMEN
Discharge: HOME | End: 2021-03-16
Payer: MEDICARE

## 2021-03-16 LAB
-: ABNORMAL
AMORPHOUS: ABNORMAL
BACTERIA: ABNORMAL
BILIRUBIN URINE: NEGATIVE
CASTS UA: ABNORMAL /LPF (ref 0–2)
CASTS UA: ABNORMAL /LPF (ref 0–2)
COLOR: ABNORMAL
COMMENT UA: ABNORMAL
CRYSTALS, UA: ABNORMAL /HPF
EPITHELIAL CELLS UA: ABNORMAL /HPF (ref 0–5)
GLUCOSE URINE: NEGATIVE
KETONES, URINE: NEGATIVE
LEUKOCYTE ESTERASE, URINE: NEGATIVE
MUCUS: ABNORMAL
NITRITE, URINE: NEGATIVE
OTHER OBSERVATIONS UA: ABNORMAL
PH UA: 5 (ref 5–6)
PROTEIN UA: NEGATIVE
RBC UA: ABNORMAL /HPF (ref 0–4)
RENAL EPITHELIAL, UA: ABNORMAL /HPF
SPECIFIC GRAVITY UA: 1.03 (ref 1.01–1.02)
TRICHOMONAS: ABNORMAL
TURBIDITY: ABNORMAL
URINE HGB: NEGATIVE
UROBILINOGEN, URINE: NORMAL
WBC UA: ABNORMAL /HPF (ref 0–4)
YEAST: ABNORMAL

## 2021-03-16 PROCEDURE — 87086 URINE CULTURE/COLONY COUNT: CPT

## 2021-03-16 PROCEDURE — 81001 URINALYSIS AUTO W/SCOPE: CPT

## 2021-03-17 LAB
CULTURE: NO GROWTH
Lab: NORMAL
SPECIMEN DESCRIPTION: NORMAL

## 2021-04-01 ENCOUNTER — TELEPHONE (OUTPATIENT)
Dept: INTERNAL MEDICINE | Age: 86
End: 2021-04-01

## 2021-04-01 DIAGNOSIS — F41.9 ANXIETY: Primary | ICD-10-CM

## 2021-04-01 DIAGNOSIS — F41.9 ANXIETY: ICD-10-CM

## 2021-04-01 RX ORDER — ALPRAZOLAM 0.25 MG/1
0.25 TABLET ORAL NIGHTLY
Qty: 30 TABLET | Refills: 0 | Status: SHIPPED | OUTPATIENT
Start: 2021-04-01 | End: 2021-05-01

## 2021-04-01 RX ORDER — ALPRAZOLAM 0.25 MG/1
TABLET ORAL
Qty: 45 TABLET | Refills: 0 | Status: SHIPPED | OUTPATIENT
Start: 2021-04-01 | End: 2021-04-28

## 2021-04-01 NOTE — TELEPHONE ENCOUNTER
Pharmacy requesting a refill of the below medication which has been pended for you:     Requested Prescriptions     Pending Prescriptions Disp Refills    ALPRAZolam (XANAX) 0.25 MG tablet [Pharmacy Med Name: ALPRAZOLAM 0.25 MG TABLET] 45 tablet 0     Sig: TAKE 1/2 OF A TABLET BY MOUTH DAILY, IN THE MORNING. TAKE 1 TABLET BY MOUTH DAILY, AT BEDTIME.        Last Appointment Date: 1/6/2021  Next Appointment Date: 7/6/2021    Allergies   Allergen Reactions    Pcn [Penicillins] Swelling     As a child  Pt tolerated ceftriaxone , and keflex with no intolerance    Sulfa Antibiotics     Cefdinir Other (See Comments)     Pt tolerated ceftriaxone , and keflex with no intolerance

## 2021-04-28 DIAGNOSIS — F41.9 ANXIETY: ICD-10-CM

## 2021-04-28 RX ORDER — ALPRAZOLAM 0.25 MG/1
TABLET ORAL
Qty: 45 TABLET | Refills: 0 | Status: SHIPPED | OUTPATIENT
Start: 2021-04-28 | End: 2021-05-26

## 2021-04-28 NOTE — TELEPHONE ENCOUNTER
Pharmacy requesting a refill of the below medication which has been pended for you:     Requested Prescriptions     Pending Prescriptions Disp Refills    ALPRAZolam (XANAX) 0.25 MG tablet [Pharmacy Med Name: ALPRAZOLAM 0.25 MG TABLET] 45 tablet 0     Sig: TAKE 1/2 OF A TABLET BY MOUTH DAILY, IN THE MORNING TAKE 1 TABLET BY MOUTH DAILY, AT BEDTIME.        Last Appointment Date: 1/6/2021  Next Appointment Date: 7/6/2021    Allergies   Allergen Reactions    Pcn [Penicillins] Swelling     As a child  Pt tolerated ceftriaxone , and keflex with no intolerance    Sulfa Antibiotics     Cefdinir Other (See Comments)     Pt tolerated ceftriaxone , and keflex with no intolerance

## 2021-04-28 NOTE — TELEPHONE ENCOUNTER
Last Appt:  1/6/2021  Next Appt:   7/6/2021  Med verified in [facility]

## 2021-04-30 ENCOUNTER — TELEPHONE (OUTPATIENT)
Dept: INTERNAL MEDICINE | Age: 86
End: 2021-04-30

## 2021-04-30 DIAGNOSIS — K62.5 RECTAL BLEEDING: Primary | ICD-10-CM

## 2021-05-05 ENCOUNTER — HOSPITAL ENCOUNTER (OUTPATIENT)
Age: 86
Setting detail: SPECIMEN
Discharge: HOME | End: 2021-05-05
Payer: MEDICARE

## 2021-05-05 LAB
ABSOLUTE EOS #: 0.22 K/UL (ref 0–0.44)
ABSOLUTE IMMATURE GRANULOCYTE: <0.03 K/UL (ref 0–0.3)
ABSOLUTE LYMPH #: 0.87 K/UL (ref 1.1–3.7)
ABSOLUTE MONO #: 0.3 K/UL (ref 0.1–1.2)
BASOPHILS # BLD: 1 % (ref 0–2)
BASOPHILS ABSOLUTE: 0.04 K/UL (ref 0–0.2)
DIFFERENTIAL TYPE: ABNORMAL
EOSINOPHILS RELATIVE PERCENT: 5 % (ref 1–4)
HCT VFR BLD CALC: 37 % (ref 40.7–50.3)
HEMOGLOBIN: 12.2 G/DL (ref 13–17)
IMMATURE GRANULOCYTES: 0 %
LYMPHOCYTES # BLD: 18 % (ref 24–43)
MCH RBC QN AUTO: 29.5 PG (ref 25.2–33.5)
MCHC RBC AUTO-ENTMCNC: 33 G/DL (ref 25.2–33.5)
MCV RBC AUTO: 89.4 FL (ref 82.6–102.9)
MONOCYTES # BLD: 6 % (ref 3–12)
NRBC AUTOMATED: 0 PER 100 WBC
PDW BLD-RTO: 14.6 % (ref 11.8–14.4)
PLATELET # BLD: 182 K/UL (ref 138–453)
PLATELET ESTIMATE: ABNORMAL
PMV BLD AUTO: 11.1 FL (ref 8.1–13.5)
RBC # BLD: 4.14 M/UL (ref 4.21–5.77)
RBC # BLD: ABNORMAL 10*6/UL
SEG NEUTROPHILS: 70 % (ref 36–65)
SEGMENTED NEUTROPHILS ABSOLUTE COUNT: 3.31 K/UL (ref 1.5–8.1)
WBC # BLD: 4.8 K/UL (ref 3.5–11.3)
WBC # BLD: ABNORMAL 10*3/UL

## 2021-05-05 PROCEDURE — 85025 COMPLETE CBC W/AUTO DIFF WBC: CPT

## 2021-05-13 ENCOUNTER — INITIAL CONSULT (OUTPATIENT)
Dept: SURGERY | Age: 86
End: 2021-05-13
Payer: MEDICARE

## 2021-05-13 ENCOUNTER — TELEPHONE (OUTPATIENT)
Dept: SURGERY | Age: 86
End: 2021-05-13

## 2021-05-13 VITALS
HEART RATE: 70 BPM | BODY MASS INDEX: 23.85 KG/M2 | WEIGHT: 166.6 LBS | DIASTOLIC BLOOD PRESSURE: 62 MMHG | HEIGHT: 70 IN | TEMPERATURE: 96.5 F | SYSTOLIC BLOOD PRESSURE: 134 MMHG

## 2021-05-13 DIAGNOSIS — K62.5 BLOOD PER RECTUM: ICD-10-CM

## 2021-05-13 DIAGNOSIS — R15.9 INCONTINENCE OF FECES, UNSPECIFIED FECAL INCONTINENCE TYPE: Primary | ICD-10-CM

## 2021-05-13 PROCEDURE — G8427 DOCREV CUR MEDS BY ELIG CLIN: HCPCS | Performed by: SURGERY

## 2021-05-13 PROCEDURE — 4040F PNEUMOC VAC/ADMIN/RCVD: CPT | Performed by: SURGERY

## 2021-05-13 PROCEDURE — 99203 OFFICE O/P NEW LOW 30 MIN: CPT | Performed by: SURGERY

## 2021-05-13 PROCEDURE — 1036F TOBACCO NON-USER: CPT | Performed by: SURGERY

## 2021-05-13 PROCEDURE — G8420 CALC BMI NORM PARAMETERS: HCPCS | Performed by: SURGERY

## 2021-05-13 PROCEDURE — 1123F ACP DISCUSS/DSCN MKR DOCD: CPT | Performed by: SURGERY

## 2021-05-13 PROCEDURE — 99215 OFFICE O/P EST HI 40 MIN: CPT | Performed by: SURGERY

## 2021-05-13 RX ORDER — NITROFURANTOIN 25; 75 MG/1; MG/1
CAPSULE ORAL
COMMUNITY
Start: 2021-04-26 | End: 2021-12-15 | Stop reason: SDUPTHER

## 2021-05-13 NOTE — PROGRESS NOTES
Diverticulosis     Elevated PSA     Hemorrhoids     History of measles childhood    History of mumps childhood    Hyperlipidemia     Hypertension     Hypothyroidism 9/4/2018    Mass of upper lobe of right lung 2/21/2018    Occult blood positive stool     refuses colonoscopy    Osteoarthritis     Overweight     Paroxysmal atrial fibrillation ([facility] [address] 11/7/2013    Pneumonia due to organism     Pseudophakia, left eye     PSVT (paroxysmal supraventricular tachycardia) ([facility] [address]     Recurrent UTI 3/13/2018    Retinal detachment     left, history of     Seborrheic dermatitis     Sepsis due to urinary tract infection ([facility] [address] 8/15/2018    Urinary tract infection without hematuria 3/28/2018       Past Surgical History:   Procedure Laterality Date    ABSCESS DRAINAGE  [id number]    multiple    APPENDECTOMY  1940 and [facility] Left 5/7/2013    CORONARY ARTERY BYPASS GRAFT      RETINAL LASER Left 12/22/2011    detached retina    SKIN TAG REMOVAL  Nov 1999    TONSILLECTOMY         Current Outpatient Medications   Medication Sig Dispense Refill    nitrofurantoin, macrocrystal-monohydrate, (MACROBID) 100 MG capsule       ALPRAZolam (XANAX) 0.25 MG tablet TAKE 1/2 OF A TABLET BY MOUTH DAILY, IN THE MORNING TAKE 1 TABLET BY MOUTH DAILY, AT BEDTIME.  45 tablet 0    psyllium (KONSYL) 28.3 % PACK Take 1 packet by mouth 2 times daily      tamsulosin (FLOMAX) 0.4 MG capsule Take 1 capsule by mouth 2 times daily 180 capsule 3    hydrocortisone (PREPARATION H) 1 % cream Apply topically to affected area as directed prn (Preparation H)      Compression Stockings MISC by Does not apply route DAVID hose- on in am, off in pm (Currently doing prn now)      amiodarone (CORDARONE) 200 MG tablet Take 100 mg by mouth daily      rivaroxaban (XARELTO) 20 MG TABS tablet Take 20 mg by mouth every morning      acetaminophen (TYLENOL) 325 MG tablet Take 650 mg by mouth every 4 hours as needed for Pain      Mineral Oil OIL Use as directed on bottle for occasional constipation      Dextromethorphan-guaiFENesin  MG/5ML SYRP Take 5 mLs by mouth every 6 hours as needed for Cough (Robafen DM)      simethicone (MYLICON) 40 XD/8.1FZ drops Take 0.6 mLs by mouth 4 times daily as needed (gas) 60 mL 3    metoprolol tartrate (LOPRESSOR) 25 MG tablet Take 0.5 tablets by mouth 2 times daily 90 tablet 1    levothyroxine (SYNTHROID) 112 MCG tablet Take 1 tablet by mouth Daily 30 tablet 11    polyvinyl alcohol (ARTIFICIAL TEARS) 1.4 % ophthalmic solution Place 1 drop into both eyes every 6 hours as needed      Multiple Vitamins-Minerals (PRESERVISION AREDS PO) Take 1 capsule by mouth daily       isosorbide mononitrate (IMDUR) 30 MG extended release tablet Take 1 tablet by mouth daily 30 tablet 3    lisinopril (PRINIVIL;ZESTRIL) 40 MG tablet Take 1 tablet by mouth daily 30 tablet 3    amLODIPine (NORVASC) 10 MG tablet Take 1 tablet by mouth daily 30 tablet 3    Multiple Vitamins-Minerals (MULTIVITAMIN ADULT PO) Take 1 tablet by mouth daily      potassium chloride (KLOR-CON M) 20 MEQ extended release tablet Take 20 mEq by mouth daily      fluticasone (FLONASE) 50 MCG/ACT nasal spray 1 spray by Nasal route daily 1 Bottle 0    simvastatin (ZOCOR) 20 MG tablet TAKE 1 TABLET BY MOUTH EVERY OTHER DAY  30 tablet 11    Coenzyme Q-10 100 MG CAPS Take 100 mg by mouth every other day.  tolnaftate (TINACTIN) 1 % external solution Apply topically nightly to toenails per Dr. Tulio Fisher.  aspirin 81 MG tablet Take 81 mg by mouth daily.  GLUCOSAMINE-CHONDROITIN PO Take 3 capsules by mouth daily at 1800 (500-400 mg)       No current facility-administered medications for this visit.         Allergies   Allergen Reactions    Pcn [Penicillins] Swelling     As a child  Pt tolerated ceftriaxone , and keflex with no intolerance    Sulfa Antibiotics     Cefdinir Other (See Comments)     Pt tolerated ceftriaxone , and keflex with no intolerance       Family History   Problem Relation Age of Onset    Diabetes Mother     Osteoporosis Mother     Other Father         complications of prostate surgery (bleeding)    Stroke Sister     Other Sister         respiratory failure       Social History     Socioeconomic History    Marital status: Single     Spouse name: Not on file    Number of children: Not on file    Years of education: Not on file    Highest education level: Not on file   Occupational History    Not on file   Social Needs    Financial resource strain: Not on file    Food insecurity     Worry: Not on file     Inability: Not on file    Transportation needs     Medical: Not on file     Non-medical: Not on file   Tobacco Use    Smoking status: Never Smoker    Smokeless tobacco: Never Used    Tobacco comment: never smoked iwona rrt,2/27/2018   Substance and Sexual Activity    Alcohol use: No     Alcohol/week: 0.0 standard drinks    Drug use: No    Sexual activity: Not on file   Lifestyle    Physical activity     Days per week: Not on file     Minutes per session: Not on file    Stress: Not on file   Relationships    Social connections     Talks on phone: Not on file     Gets together: Not on file     Attends Confucianist service: Not on file     Active member of club or organization: Not on file     Attends meetings of clubs or organizations: Not on file     Relationship status: Not on file    Intimate partner violence     Fear of current or ex partner: Not on file     Emotionally abused: Not on file     Physically abused: Not on file     Forced sexual activity: Not on file   Other Topics Concern    Not on file   Social History Narrative    Not on file       ROS:   Review of Systems - General ROS: negative  Psychological ROS: negative  Ophthalmic ROS: negative  ENT ROS: negative  Respiratory ROS: no cough, shortness of breath, or wheezing  Cardiovascular ROS: no chest pain or dyspnea on exertion  Gastrointestinal ROS: per HPI  Genito-Urinary ROS: no dysuria, trouble voiding, or hematuria  Musculoskeletal ROS: negative  Dermatological ROS: negative      Objective   Vitals:    05/13/21 1305   BP: 134/62   Pulse: 70   Temp: 96.5 °F (35.8 °C)     General:in no apparent distress and well developed and well nourished  Eyes: No gross abnormalities. Ears, Nose, Throat: hearing grossly normal bilaterally  Neck: neck supple and non tender without mass  Lungs: clear to auscultation without wheezes or rales   Heart: S1S2, no mumurs, RRR  Abdomen: Soft, nondistended, nontender, bowel sounds present  Extremity: negative  Neuro: CN II-XII grossly intact      Assessment     3  80year-old male with recent stool incontinence and blood per rectum      Plan     1. Based on patient's symptoms and the fact that it does not seem he is ever had any sort of colon cancer screening I am going to proceed with colonoscopy for further evaluation. Risks of the procedure including bleeding, infection, perforation, need for the surgery and anesthesia risk were discussed and consent is obtained. Bowel prep instructions provided and reviewed. Prescription sent to pharmacy. 2.  Obtain clearance to hold Xarelto and aspirin.     Electronically signed by Lon Turner DO on 5/13/2021 at 1:30 PM      (Please note that portions of this note were completed with a voice recognition program.  Efforts were made to edit the dictations but occasionally words are mis-transcribed.)

## 2021-05-13 NOTE — TELEPHONE ENCOUNTER
[address] [place] [address]           [id number]           Surgical/Procedure Planned: colonoscopy    Date & Location: 6/14/21 [facility]       Outpatient   Planned Length of OR: 30 min    Sedation: local mac        Estimated Cardiac Risk for Non-Cardiac Surgery/Procedure     MODERATE BUT NOT PROHIBITIVE      Medication Instructions - Clarification needed by this date:   -Insulin:  -Other medications:    ASA 81 mg  Hold __5_ Days  Xarelto 20mg  Hold _2__ Days    Resume medications:     Lovenox indicated: _____Yes __x___NO    Provider: Dr. Darius Clinton of Provider Giving Orders for Medication holds:  Bc Dennison MD [facility]  _____________________________________________

## 2021-05-13 NOTE — PROGRESS NOTES
[address] [place] [address]           [id number]           Surgical/Procedure Planned: colonoscopy    Date & Location: 6/14/21 [address]       Outpatient   Planned Length of OR: 30 min    Sedation: local mac        Estimated Cardiac Risk for Non-Cardiac Surgery/Procedure     Low______ Moderate______ High____x__    Medication Instructions - Clarification needed by this date:   -Insulin:  -Other medications:    ASA 81 mg  Hold ___ Days  Xarelto 20mg  Hold ___ Days    Resume medications:     Lovenox indicated: _____Yes _____NO    Provider: Dr. Kingsley Martinez of Provider Giving Orders for Medication holds:    _____________________________________________

## 2021-05-13 NOTE — PROGRESS NOTES
[facility] SPECIAL[facility]    Pre-Operative Evaluation/Consultation    Name:  Bret Barcenas                                         Age:  80 y.o. MRN:  [id number]       :  1934   Date:  2021         Sex: male    The primary encounter diagnosis was Incontinence of feces, unspecified fecal incontinence type. A diagnosis of Blood per rectum was also pertinent to this visit. Surgeon:  Dr. Meaghan Beck  Procedure (Planned):  colonoscopy  Date Scheduled surgery: 2021    Attending : No att. providers found    Primary Physician:   Cardiologist: Geraldine Turner    Type of Anesthesia Requested: Local with sedation    Patient Medical history:   Allergies   Allergen Reactions    Pcn [Penicillins] Swelling     As a child  Pt tolerated ceftriaxone , and keflex with no intolerance    Sulfa Antibiotics     Cefdinir Other (See Comments)     Pt tolerated ceftriaxone , and keflex with no intolerance     Patient Active Problem List   Diagnosis    Hyperlipidemia    Osteoarthritis    Allergic rhinitis    Diverticulosis    Anxiety    Atrial fibrillation ([address]    Coronary artery disease involving native coronary artery of native heart    Mass of upper lobe of right lung    Chronic systolic CHF (congestive heart failure) (HCC)    Benign prostatic hyperplasia with incomplete bladder emptying    Hypothyroidism    Hypertension    Dementia associated with 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 behavioral disturbance ([address]    Intermediate stage nonexudative age-related macular degeneration of both eyes    Combined forms of age-related cataract of right eye    Pseudophakia of left eye    Class 1 obesity with serious comorbidity and body mass index (BMI) of 30.0 to 30.9 in adult    Overweight     Past Medical History:   Diagnosis Date    Acute bronchitis     by history    MARITA (acute kidney injury) ([address] 3/13/2018    Allergic rhinitis     Anxiety     Bradycardia 2018    Cataract, right     Choroidal nevus of right eye     Chronic systolic CHF (congestive heart failure) ([address] 3/13/2018    Coronary artery disease involving native coronary artery of native heart 10/20/2016    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 LIMA TO LAD, SVG TO DIAGONAL2    Dementia associated with 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 behavioral disturbance ([address] 9/21/2018    Dermatophytosis of nail 1/10/2014    Diverticulosis     Elevated PSA     Hemorrhoids     History of measles childhood    History of mumps childhood    Hyperlipidemia     Hypertension     Hypothyroidism 9/4/2018    Mass of upper lobe of right lung 2/21/2018    Occult blood positive stool     refuses colonoscopy    Osteoarthritis     Overweight     Paroxysmal atrial fibrillation ([address] 11/7/2013    Pneumonia due to organism     Pseudophakia, left eye     PSVT (paroxysmal supraventricular tachycardia) ([address]     Recurrent UTI 3/13/2018    Retinal detachment     left, history of     Seborrheic dermatitis     Sepsis due to urinary tract infection ([address] 8/15/2018    Urinary tract infection without hematuria 3/28/2018     Past Surgical History:   Procedure Laterality Date    ABSCESS DRAINAGE  [id number]    multiple   [address] [facility],[address] [facility] Left 5/7/2013    CORONARY ARTERY BYPASS GRAFT      RETINAL LASER Left 12/22/2011    detached retina    SKIN TAG REMOVAL  Nov 1999    TONSILLECTOMY       Social History     Tobacco Use    Smoking status: Never Smoker    Smokeless tobacco: Never Used    Tobacco comment: never smoked syant rrt,2/27/2018   Substance Use Topics    Alcohol use: No     Alcohol/week: 0.0 standard drinks    Drug use: No     Medications:  Current Outpatient Medications   Medication Sig Dispense Refill    nitrofurantoin, macrocrystal-monohydrate, (MACROBID) 100 MG capsule       ALPRAZolam (XANAX) 0.25 MG tablet TAKE 1/2 OF A TABLET BY MOUTH DAILY, IN THE MORNING TAKE 1 TABLET BY MOUTH DAILY, AT BEDTIME.  45 tablet 0    psyllium (KONSYL) 28.3 % PACK Take 1 packet by mouth 2 times daily      tamsulosin (FLOMAX) 0.4 MG capsule Take 1 capsule by mouth 2 times daily 180 capsule 3    hydrocortisone (PREPARATION H) 1 % cream Apply topically to affected area as directed prn (Preparation H)      Compression Stockings MISC by Does not apply route DAVID hose- on in am, off in pm (Currently doing prn now)      amiodarone (CORDARONE) 200 MG tablet Take 100 mg by mouth daily      rivaroxaban (XARELTO) 20 MG TABS tablet Take 20 mg by mouth every morning      acetaminophen (TYLENOL) 325 MG tablet Take 650 mg by mouth every 4 hours as needed for Pain      Mineral Oil OIL Use as directed on bottle for occasional constipation      Dextromethorphan-guaiFENesin  MG/5ML SYRP Take 5 mLs by mouth every 6 hours as needed for Cough (Robafen DM)      simethicone (MYLICON) 40 UH/0.4FQ drops Take 0.6 mLs by mouth 4 times daily as needed (gas) 60 mL 3    metoprolol tartrate (LOPRESSOR) 25 MG tablet Take 0.5 tablets by mouth 2 times daily 90 tablet 1    levothyroxine (SYNTHROID) 112 MCG tablet Take 1 tablet by mouth Daily 30 tablet 11    polyvinyl alcohol (ARTIFICIAL TEARS) 1.4 % ophthalmic solution Place 1 drop into both eyes every 6 hours as needed      Multiple Vitamins-Minerals (PRESERVISION AREDS PO) Take 1 capsule by mouth daily       isosorbide mononitrate (IMDUR) 30 MG extended release tablet Take 1 tablet by mouth daily 30 tablet 3    lisinopril (PRINIVIL;ZESTRIL) 40 MG tablet Take 1 tablet by mouth daily 30 tablet 3    amLODIPine (NORVASC) 10 MG tablet Take 1 tablet by mouth daily 30 tablet 3    Multiple Vitamins-Minerals (MULTIVITAMIN ADULT PO) Take 1 tablet by mouth daily      potassium chloride (KLOR-CON M) 20 MEQ extended release tablet Take 20 mEq by mouth daily      fluticasone (FLONASE) 50 MCG/ACT nasal spray 1 spray by Nasal route daily 1 Bottle 0    simvastatin (ZOCOR) 20 MG tablet TAKE 1 TABLET BY MOUTH EVERY OTHER DAY  30 tablet 11    Coenzyme Q-10 100 MG CAPS Take 100 mg by mouth every other day.  tolnaftate (TINACTIN) 1 % external solution Apply topically nightly to toenails per Dr. Merna Fields.  aspirin 81 MG tablet Take 81 mg by mouth daily.  GLUCOSAMINE-CHONDROITIN PO Take 3 capsules by mouth daily at 1800 (500-400 mg)       No current facility-administered medications for this visit. Scheduled Meds:  Continuous Infusions:  PRN Meds:. Prior to Admission medications    Medication Sig Start Date End Date Taking?  Authorizing Provider   nitrofurantoin, macrocrystal-monohydrate, (MACROBID) 100 MG capsule  4/26/21  Yes Historical Provider, MD   ALPRAZolam (XANAX) 0.25 MG tablet TAKE 1/2 OF A TABLET BY MOUTH DAILY, IN THE MORNING TAKE 1 TABLET BY MOUTH DAILY, AT BEDTIME. 4/28/21 5/28/21 Yes Guillermo Cerda DO   psyllium (KONSYL) 28.3 % PACK Take 1 packet by mouth 2 times daily   Yes Historical Provider, MD   tamsulosin (FLOMAX) 0.4 MG capsule Take 1 capsule by mouth 2 times daily 10/30/20 5/13/21 Yes Jose Riley MD   hydrocortisone (PREPARATION H) 1 % cream Apply topically to affected area as directed prn (Preparation H)   Yes Historical Provider, MD   Compression Stockings MISC by Does not apply route DAVID hose- on in am, off in pm (Currently doing prn now)   Yes Historical Provider, MD   amiodarone (CORDARONE) 200 MG tablet Take 100 mg by mouth daily   Yes Historical Provider, MD   rivaroxaban (XARELTO) 20 MG TABS tablet Take 20 mg by mouth every morning   Yes Historical Provider, MD   acetaminophen (TYLENOL) 325 MG tablet Take 650 mg by mouth every 4 hours as needed for Pain   Yes Historical Provider, MD   Mineral Oil OIL Use as directed on bottle for occasional constipation   Yes Historical Provider, MD   Dextromethorphan-guaiFENesin  MG/5ML SYRP Take 5 mLs by mouth every 6 hours as needed for Cough (Robafen DM)   Yes Historical Provider, MD   simethicone (MYLICON) 40 GM/7.6NL drops Take 0.6 mLs by mouth 4 times daily as needed (gas) 4/7/20  Yes DAYANARA Gan CNP   metoprolol tartrate (LOPRESSOR) 25 MG tablet Take 0.5 tablets by mouth 2 times daily 6/13/19  Yes Obie Becerra DO   levothyroxine (SYNTHROID) 112 MCG tablet Take 1 tablet by mouth Daily 4/15/19  Yes Guillermo Cerda DO   polyvinyl alcohol (ARTIFICIAL TEARS) 1.4 % ophthalmic solution Place 1 drop into both eyes every 6 hours as needed   Yes Historical Provider, MD   Multiple Vitamins-Minerals (PRESERVISION AREDS PO) Take 1 capsule by mouth daily    Yes Historical Provider, MD   isosorbide mononitrate (IMDUR) 30 MG extended release tablet Take 1 tablet by mouth daily 8/22/18  Yes Kaela Vaughan MD   lisinopril (PRINIVIL;ZESTRIL) 40 MG tablet Take 1 tablet by mouth daily 8/22/18  Yes Kaela Vaughan MD   amLODIPine (NORVASC) 10 MG tablet Take 1 tablet by mouth daily 8/22/18  Yes Kaela Vaughan MD   Multiple Vitamins-Minerals (MULTIVITAMIN ADULT PO) Take 1 tablet by mouth daily   Yes Historical Provider, MD   potassium chloride (KLOR-CON M) 20 MEQ extended release tablet Take 20 mEq by mouth daily   Yes Historical Provider, MD   fluticasone (FLONASE) 50 MCG/ACT nasal spray 1 spray by Nasal route daily 2/1/18  Yes DAYANARA Rodriguez CNP   simvastatin (ZOCOR) 20 MG tablet TAKE 1 TABLET BY MOUTH EVERY OTHER DAY  3/7/17  Yes Guillermo Cerda DO   Coenzyme Q-10 100 MG CAPS Take 100 mg by mouth every other day. Yes Historical Provider, MD   tolnaftate (TINACTIN) 1 % external solution Apply topically nightly to toenails per Dr. Kirk Fregoso. Yes Historical Provider, MD   aspirin 81 MG tablet Take 81 mg by mouth daily.    Yes Historical Provider, MD   GLUCOSAMINE-CHONDROITIN PO Take 3 capsules by mouth daily at 1800 (500-400 mg)   Yes Historical Provider, MD     Vital Signs (Current)   Vitals:    05/13/21 1305   BP: 134/62   Site: Right Upper Arm   Position: Sitting   Cuff Size: Medium Adult   Pulse: 70   Temp: 96.5 °F (35.8 °C) TempSrc: Tympanic   Weight: 166 lb 9.6 oz (75.6 kg)   Height: 5' 10\" (1.778 m)       Weight:   Wt Readings from Last 1 Encounters:   05/13/21 166 lb 9.6 oz (75.6 kg)     Height:   Ht Readings from Last 1 Encounters:   05/13/21 5' 10\" (1.778 m)      BMI:  Body mass index is 23.9 kg/m². Estimated body mass index is 23.9 kg/m² as calculated from the following:    Height as of this encounter: 5' 10\" (1.778 m). Weight as of this encounter: 166 lb 9.6 oz (75.6 kg). body mass index is 23.9 kg/m². Cardiac Clearance: pending   Medical Clearance:pending   Appointment for surgery Clearance scheduled for: 5/20/21 0945     Preoperative Testing: none  Lab Results   Component Value Date    WBC 4.8 05/05/2021    RBC 4.14 05/05/2021    HGB 12.2 05/05/2021    HCT 37.0 05/05/2021    MCV 89.4 05/05/2021    RDW 14.6 05/05/2021     05/05/2021     CMP:   Lab Results   Component Value Date     04/15/2020    K 4.1 04/15/2020     04/15/2020    CO2 25 04/15/2020    BUN 15 04/15/2020    CREATININE 0.93 04/15/2020    GFRAA >60 04/15/2020    LABGLOM >60 04/15/2020    GLUCOSE 102 04/15/2020    PROT 6.3 04/15/2020    CALCIUM 8.7 04/15/2020    BILITOT 0.66 04/15/2020    ALKPHOS 78 04/15/2020    AST 17 04/15/2020    ALT 17 04/15/2020     POC Tests: No results for input(s): POCGLU, POCNA, POCK, POCCL, POCBUN, POCHEMO, POCHCT in the last 72 hours.   Coags    Lab Results   Component Value Date    PROTIME 11.9 03/13/2018    INR 1.1 03/13/2018       ABGs No results found for: PHART, PO2ART, JQF7GXU, PYG2VAI, BEART, G1RDDTNT   Type & Screen (If Applicable)  No results found for: Rochele Mace    Additional ordered pre-operative testing:  []CBC    []ABG      [] BMP   []URINALYSIS   []CMP    []HCG   []COAGS PT/INR  []T&C  []LFTs   []TYPE AND SCREEN    [] EKG  [] Chest X-Ray  [] Other Radiology    [] Sent to Hospitalist None  [x] Sent to Anesthesia for your review:    [] Additional Orders:      Comments:med hold sent to cardiology Requests: No Special requests    Signed:  Evans Harley LPN 1/87/3439 5:91 PM

## 2021-05-13 NOTE — PROGRESS NOTES
[address] [place] [address]           BCP:55/51/1362           Surgical/Procedure Planned: colonoscopy    Date & Location: 6/14/21 [facility]       Outpatient   Planned Length of OR: 30 min    Sedation: local mac        Estimated Cardiac Risk for Non-Cardiac Surgery/Procedure     Low______ Moderate______ High____x__    Medication Instructions - Clarification needed by this date:   -Insulin:  -Other medications:    ASA 81 mg  Hold ___ Days  Xarelto 20mg  Hold ___ Days    Resume medications:     Lovenox indicated: _____Yes _____NO    Provider: Dr. Brandon Joe of Provider Giving Orders for Medication holds:    _____________________________________________

## 2021-05-20 ENCOUNTER — OFFICE VISIT (OUTPATIENT)
Dept: CARDIOLOGY | Age: 86
End: 2021-05-20
Payer: MEDICARE

## 2021-05-20 VITALS
BODY MASS INDEX: 23.77 KG/M2 | DIASTOLIC BLOOD PRESSURE: 57 MMHG | HEIGHT: 70 IN | SYSTOLIC BLOOD PRESSURE: 90 MMHG | WEIGHT: 166 LBS | HEART RATE: 65 BPM

## 2021-05-20 DIAGNOSIS — I48.0 PAROXYSMAL ATRIAL FIBRILLATION (HCC): Primary | ICD-10-CM

## 2021-05-20 PROCEDURE — 1123F ACP DISCUSS/DSCN MKR DOCD: CPT | Performed by: INTERNAL MEDICINE

## 2021-05-20 PROCEDURE — 99214 OFFICE O/P EST MOD 30 MIN: CPT | Performed by: INTERNAL MEDICINE

## 2021-05-20 PROCEDURE — 1036F TOBACCO NON-USER: CPT | Performed by: INTERNAL MEDICINE

## 2021-05-20 PROCEDURE — G8427 DOCREV CUR MEDS BY ELIG CLIN: HCPCS | Performed by: INTERNAL MEDICINE

## 2021-05-20 PROCEDURE — 4040F PNEUMOC VAC/ADMIN/RCVD: CPT | Performed by: INTERNAL MEDICINE

## 2021-05-20 PROCEDURE — 93010 ELECTROCARDIOGRAM REPORT: CPT | Performed by: INTERNAL MEDICINE

## 2021-05-20 PROCEDURE — G8420 CALC BMI NORM PARAMETERS: HCPCS | Performed by: INTERNAL MEDICINE

## 2021-05-20 PROCEDURE — 93005 ELECTROCARDIOGRAM TRACING: CPT | Performed by: INTERNAL MEDICINE

## 2021-05-20 RX ORDER — AMLODIPINE BESYLATE 5 MG/1
5 TABLET ORAL DAILY
Qty: 30 TABLET | Refills: 3 | Status: SHIPPED | OUTPATIENT
Start: 2021-05-20

## 2021-05-20 NOTE — PROGRESS NOTES
(MACROBID) 100 MG capsule  4/26/21  Yes Historical Provider, MD   ALPRAZolam (XANAX) 0.25 MG tablet TAKE 1/2 OF A TABLET BY MOUTH DAILY, IN THE MORNING TAKE 1 TABLET BY MOUTH DAILY, AT BEDTIME. 4/28/21 5/28/21 Yes Guillermo Cerda DO   psyllium (KONSYL) 28.3 % PACK Take 1 packet by mouth 2 times daily   Yes Historical Provider, MD   tamsulosin (FLOMAX) 0.4 MG capsule Take 1 capsule by mouth 2 times daily 10/30/20 5/20/21 Yes Mandy Rogers MD   hydrocortisone (PREPARATION H) 1 % cream Apply topically to affected area as directed prn (Preparation H)   Yes Historical Provider, MD   Compression Stockings MISC by Does not apply route DAVID hose- on in am, off in pm (Currently doing prn now)   Yes Historical Provider, MD   amiodarone (CORDARONE) 200 MG tablet Take 100 mg by mouth daily   Yes Historical Provider, MD   rivaroxaban (XARELTO) 20 MG TABS tablet Take 20 mg by mouth every morning   Yes Historical Provider, MD   acetaminophen (TYLENOL) 325 MG tablet Take 650 mg by mouth every 4 hours as needed for Pain   Yes Historical Provider, MD   Mineral Oil OIL Use as directed on bottle for occasional constipation   Yes Historical Provider, MD   Dextromethorphan-guaiFENesin  MG/5ML SYRP Take 5 mLs by mouth every 6 hours as needed for Cough (Robafen DM)   Yes Historical Provider, MD   simethicone (MYLICON) 40 MI/4.1HN drops Take 0.6 mLs by mouth 4 times daily as needed (gas) 4/7/20  Yes Marni Mcintyre, APRN - CNP   metoprolol tartrate (LOPRESSOR) 25 MG tablet Take 0.5 tablets by mouth 2 times daily 6/13/19  Yes Obie Becerra DO   levothyroxine (SYNTHROID) 112 MCG tablet Take 1 tablet by mouth Daily 4/15/19  Yes Guillermo Cerda DO   polyvinyl alcohol (ARTIFICIAL TEARS) 1.4 % ophthalmic solution Place 1 drop into both eyes every 6 hours as needed   Yes Historical Provider, MD   Multiple Vitamins-Minerals (PRESERVISION AREDS PO) Take 1 capsule by mouth daily    Yes Historical Provider, MD   isosorbide mononitrate (IMDUR) 30 MG extended release tablet Take 1 tablet by mouth daily 8/22/18  Yes Marlena Barriga MD   lisinopril (PRINIVIL;ZESTRIL) 40 MG tablet Take 1 tablet by mouth daily 8/22/18  Yes Marlena Barriga MD   amLODIPine (NORVASC) 10 MG tablet Take 1 tablet by mouth daily 8/22/18  Yes Marlena Barriga MD   Multiple Vitamins-Minerals (MULTIVITAMIN ADULT PO) Take 1 tablet by mouth daily   Yes Historical Provider, MD   potassium chloride (KLOR-CON M) 20 MEQ extended release tablet Take 20 mEq by mouth daily   Yes Historical Provider, MD   fluticasone (FLONASE) 50 MCG/ACT nasal spray 1 spray by Nasal route daily 2/1/18  Yes DAYANARA Chatterjee - CNP   simvastatin (ZOCOR) 20 MG tablet TAKE 1 TABLET BY MOUTH EVERY OTHER DAY  3/7/17  Yes Guillermo Cerda DO   Coenzyme Q-10 100 MG CAPS Take 100 mg by mouth every other day. Yes Historical Provider, MD   tolnaftate (TINACTIN) 1 % external solution Apply topically nightly to toenails per Dr. Yuridia Clifton. Yes Historical Provider, MD   aspirin 81 MG tablet Take 81 mg by mouth daily. Yes Historical Provider, MD   GLUCOSAMINE-CHONDROITIN PO Take 3 capsules by mouth daily at 1800 (500-400 mg)   Yes Historical Provider, MD       Allergies:  Pcn [penicillins], Sulfa antibiotics, and Cefdinir    Social History:   reports that he has never smoked. He has never used smokeless tobacco. He reports that he does not drink alcohol and does not use drugs. REVIEW OF SYSTEMS:  CONSTITUTIONAL:NEGATIVE  HEENT:NEG  Cardiovascular: No chest pain, no dyspnea on exertion, No palpitations. Lower extremity edema: No  RESPIRATORY: MONTIEL  GASTROINTESTINAL:  negative  GENITOURINARY:  negative  INTEGUMENT:  negative  MUSCULOSKELETAL:  positive for  pain  NEUROLOGICAL:  negative    PHYSICAL EXAM:      BP (!) 90/57   Pulse 65   Ht 5' 10\" (1.778 m)   Wt 166 lb (75.3 kg)   BMI 23.82 kg/m²    HEENT: PERRL, no cervical lymphadenopathy. No masses palpable.   Cardiovascular:  · The apical impulse

## 2021-05-25 DIAGNOSIS — F41.9 ANXIETY: ICD-10-CM

## 2021-05-26 ENCOUNTER — TELEPHONE (OUTPATIENT)
Dept: INTERNAL MEDICINE | Age: 86
End: 2021-05-26

## 2021-05-26 RX ORDER — ALPRAZOLAM 0.25 MG/1
TABLET ORAL
Qty: 45 TABLET | Refills: 1 | Status: SHIPPED | OUTPATIENT
Start: 2021-05-26 | End: 2022-02-28 | Stop reason: SDUPTHER

## 2021-05-27 ENCOUNTER — HOSPITAL ENCOUNTER (OUTPATIENT)
Age: 86
Setting detail: SPECIMEN
Discharge: HOME | End: 2021-05-27
Payer: MEDICARE

## 2021-05-27 LAB
HCT VFR BLD CALC: 37.2 % (ref 40.7–50.3)
HEMOGLOBIN: 12.4 G/DL (ref 13–17)
MCH RBC QN AUTO: 30.2 PG (ref 25.2–33.5)
MCHC RBC AUTO-ENTMCNC: 33.3 G/DL (ref 25.2–33.5)
MCV RBC AUTO: 90.5 FL (ref 82.6–102.9)
NRBC AUTOMATED: 0 PER 100 WBC
PDW BLD-RTO: 14.6 % (ref 11.8–14.4)
PLATELET # BLD: 181 K/UL (ref 138–453)
PMV BLD AUTO: 11 FL (ref 8.1–13.5)
RBC # BLD: 4.11 M/UL (ref 4.21–5.77)
WBC # BLD: 6.1 K/UL (ref 3.5–11.3)

## 2021-05-27 PROCEDURE — 85027 COMPLETE CBC AUTOMATED: CPT

## 2021-06-07 DIAGNOSIS — Z01.818 PRE-OP TESTING: Primary | ICD-10-CM

## 2021-06-21 ENCOUNTER — OFFICE VISIT (OUTPATIENT)
Dept: PODIATRY | Age: 86
End: 2021-06-21
Payer: MEDICARE

## 2021-06-21 VITALS
HEART RATE: 80 BPM | WEIGHT: 169 LBS | BODY MASS INDEX: 24.25 KG/M2 | RESPIRATION RATE: 20 BRPM | SYSTOLIC BLOOD PRESSURE: 118 MMHG | DIASTOLIC BLOOD PRESSURE: 60 MMHG

## 2021-06-21 DIAGNOSIS — M79.674 PAIN IN TOES OF BOTH FEET: ICD-10-CM

## 2021-06-21 DIAGNOSIS — B35.1 DERMATOPHYTOSIS OF NAIL: Primary | ICD-10-CM

## 2021-06-21 DIAGNOSIS — M79.675 PAIN IN TOES OF BOTH FEET: ICD-10-CM

## 2021-06-21 PROCEDURE — 99999 PR OFFICE/OUTPT VISIT,PROCEDURE ONLY: CPT | Performed by: PODIATRIST

## 2021-06-21 PROCEDURE — 11720 DEBRIDE NAIL 1-5: CPT | Performed by: PODIATRIST

## 2021-06-21 NOTE — PROGRESS NOTES
Foot Care Worksheet  PCP: Meet Baldwin DO  Last visit: 01 / 06 / 2021    Nail description:  Thick , Yellow , Crumbly , Marked limitation of ambulation     Pain resulting from thickened and dystrophy of nail plate Yes    Nails involved  Right   1  (T5-T9)  Left     1, 2  (TA-T4)    Q7 1 Class A Finding - Non traumatic amputation of foot No    Q8 2 Class B Findings - Absent DP pulse No, Absent PT pulse No, Advanced tropic changes (3 required) No    Decrease hair growth Yes, Nail changes/thickening Yes, Pigmented changes/discoloration Yes, Skin texture (thin, shiny) No, Skin color (rubor/redness) No    Q9 1 Class B and 2 Class C Findings  Claudication No, Temperature change No, Paresthesia No, Burning No, Edema Yes

## 2021-06-21 NOTE — PROGRESS NOTES
Subjective:  Patient seen at [address] [place] [address] today for routine foot care. Complains of pain in both big toes. Allergies   Allergen Reactions    Pcn [Penicillins] Swelling     As a child  Pt tolerated ceftriaxone , and keflex with no intolerance    Sulfa Antibiotics     Cefdinir Other (See Comments)     Pt tolerated ceftriaxone , and keflex with no intolerance       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 LIMA TO LAD, SVG TO DIAGONAL2    Dementia associated with 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 behavioral disturbance ([address] 9/21/2018    Dermatophytosis of nail 1/10/2014    Diverticulosis     Elevated PSA     Hemorrhoids     History of measles childhood    History of mumps childhood    Hyperlipidemia     Hypertension     Hypothyroidism 9/4/2018    Mass of upper lobe of right lung 2/21/2018    Occult blood positive stool     refuses colonoscopy    Osteoarthritis     Overweight     Paroxysmal atrial fibrillation ([address] 11/7/2013    Pneumonia due to organism     Pseudophakia, left eye     PSVT (paroxysmal supraventricular tachycardia) ([address]     Recurrent UTI 3/13/2018    Retinal detachment     left, history of     Seborrheic dermatitis     Sepsis due to urinary tract infection ([address] 8/15/2018    Urinary tract infection without hematuria 3/28/2018       Prior to Admission medications    Medication Sig Start Date End Date Taking? Authorizing Provider   Coenzyme Q10 (CO Q 10 PO) Take  by mouth daily. Yes Historical Provider, MD   POTASSIUM CHLORIDE by Does not apply route daily.    Yes Historical Provider, MD   metoprolol sensation intact 10/10 sites via 5.07/10g [place] Monofilament, bilateral.  negative Tinel's, bilateral.  negative Valleix sign, bilateral.  Vibratory intact bilateral.  Reflexes Decreased bilateral.  Paresthesias negative. Dysthesias negative. Sharp/dull intact bilateral.    Musculoskeletal: pain present on palpation L/R hallux nail. Integument:  Nails left 1, 2 and right 1 thickened, dystrophic and crumbly, discolored with subungual debris. Hyperkeratotic tissue is absent. Assessment:   Diagnosis Orders   1. Dermatophytosis of nail     2. Pain in toes of both feet         Plan:  All Nails as mentioned above debrided in length and thickness. Patient advised OTC methods for treatment of the mycotic nails. Patient will follow up in 10 weeks.

## 2021-07-01 ENCOUNTER — HOSPITAL ENCOUNTER (OUTPATIENT)
Age: 86
Setting detail: SPECIMEN
Discharge: HOME | End: 2021-07-01
Payer: MEDICARE

## 2021-07-01 DIAGNOSIS — I10 ESSENTIAL HYPERTENSION: ICD-10-CM

## 2021-07-01 DIAGNOSIS — E03.4 HYPOTHYROIDISM DUE TO ACQUIRED ATROPHY OF THYROID: ICD-10-CM

## 2021-07-01 DIAGNOSIS — E78.2 MIXED HYPERLIPIDEMIA: ICD-10-CM

## 2021-07-01 LAB
-: ABNORMAL
ABSOLUTE EOS #: 0.23 K/UL (ref 0–0.44)
ABSOLUTE IMMATURE GRANULOCYTE: <0.03 K/UL (ref 0–0.3)
ABSOLUTE LYMPH #: 0.98 K/UL (ref 1.1–3.7)
ABSOLUTE MONO #: 0.31 K/UL (ref 0.1–1.2)
ALBUMIN SERPL-MCNC: 3.4 G/DL (ref 3.5–5.2)
ALBUMIN/GLOBULIN RATIO: 1.2 (ref 1–2.5)
ALP BLD-CCNC: 103 U/L (ref 40–129)
ALT SERPL-CCNC: 8 U/L (ref 5–41)
AMORPHOUS: ABNORMAL
ANION GAP SERPL CALCULATED.3IONS-SCNC: 6 MMOL/L (ref 9–17)
AST SERPL-CCNC: 17 U/L
BACTERIA: ABNORMAL
BASOPHILS # BLD: 1 % (ref 0–2)
BASOPHILS ABSOLUTE: 0.03 K/UL (ref 0–0.2)
BILIRUB SERPL-MCNC: 0.47 MG/DL (ref 0.3–1.2)
BILIRUBIN URINE: NEGATIVE
BUN BLDV-MCNC: 14 MG/DL (ref 8–23)
BUN/CREAT BLD: 12 (ref 9–20)
CALCIUM SERPL-MCNC: 8.6 MG/DL (ref 8.6–10.4)
CASTS UA: ABNORMAL /LPF (ref 0–2)
CHLORIDE BLD-SCNC: 103 MMOL/L (ref 98–107)
CHOLESTEROL/HDL RATIO: 3.3
CHOLESTEROL: 116 MG/DL
CO2: 28 MMOL/L (ref 20–31)
COLOR: ABNORMAL
COMMENT UA: ABNORMAL
CREAT SERPL-MCNC: 1.13 MG/DL (ref 0.7–1.2)
CRYSTALS, UA: ABNORMAL /HPF
DIFFERENTIAL TYPE: ABNORMAL
EOSINOPHILS RELATIVE PERCENT: 5 % (ref 1–4)
EPITHELIAL CELLS UA: ABNORMAL /HPF (ref 0–5)
GFR AFRICAN AMERICAN: >60 ML/MIN
GFR NON-AFRICAN AMERICAN: >60 ML/MIN
GFR SERPL CREATININE-BSD FRML MDRD: ABNORMAL ML/MIN/{1.73_M2}
GFR SERPL CREATININE-BSD FRML MDRD: ABNORMAL ML/MIN/{1.73_M2}
GLUCOSE FASTING: 93 MG/DL (ref 70–99)
GLUCOSE URINE: NEGATIVE
HCT VFR BLD CALC: 37.8 % (ref 40.7–50.3)
HDLC SERPL-MCNC: 35 MG/DL
HEMOGLOBIN: 12.5 G/DL (ref 13–17)
IMMATURE GRANULOCYTES: 1 %
KETONES, URINE: NEGATIVE
LDL CHOLESTEROL: 65 MG/DL (ref 0–130)
LEUKOCYTE ESTERASE, URINE: NEGATIVE
LYMPHOCYTES # BLD: 23 % (ref 24–43)
MCH RBC QN AUTO: 29.8 PG (ref 25.2–33.5)
MCHC RBC AUTO-ENTMCNC: 33.1 G/DL (ref 25.2–33.5)
MCV RBC AUTO: 90 FL (ref 82.6–102.9)
MONOCYTES # BLD: 7 % (ref 3–12)
MUCUS: ABNORMAL
NITRITE, URINE: NEGATIVE
NRBC AUTOMATED: 0 PER 100 WBC
OTHER OBSERVATIONS UA: ABNORMAL
PDW BLD-RTO: 14.5 % (ref 11.8–14.4)
PH UA: 5.5 (ref 5–6)
PLATELET # BLD: 184 K/UL (ref 138–453)
PLATELET ESTIMATE: ABNORMAL
PMV BLD AUTO: 10.6 FL (ref 8.1–13.5)
POTASSIUM SERPL-SCNC: 4.3 MMOL/L (ref 3.7–5.3)
PROTEIN UA: NEGATIVE
RBC # BLD: 4.2 M/UL (ref 4.21–5.77)
RBC # BLD: ABNORMAL 10*6/UL
RBC UA: ABNORMAL /HPF (ref 0–4)
RENAL EPITHELIAL, UA: ABNORMAL /HPF
SEG NEUTROPHILS: 63 % (ref 36–65)
SEGMENTED NEUTROPHILS ABSOLUTE COUNT: 2.73 K/UL (ref 1.5–8.1)
SODIUM BLD-SCNC: 137 MMOL/L (ref 135–144)
SPECIFIC GRAVITY UA: 1.02 (ref 1.01–1.02)
THYROXINE, FREE: 1.67 NG/DL (ref 0.93–1.7)
TOTAL PROTEIN: 6.3 G/DL (ref 6.4–8.3)
TRICHOMONAS: ABNORMAL
TRIGL SERPL-MCNC: 80 MG/DL
TSH SERPL DL<=0.05 MIU/L-ACNC: 0.81 MIU/L (ref 0.3–5)
TURBIDITY: ABNORMAL
URINE HGB: NEGATIVE
UROBILINOGEN, URINE: NORMAL
VLDLC SERPL CALC-MCNC: ABNORMAL MG/DL (ref 1–30)
WBC # BLD: 4.3 K/UL (ref 3.5–11.3)
WBC # BLD: ABNORMAL 10*3/UL
WBC UA: ABNORMAL /HPF (ref 0–4)
YEAST: ABNORMAL

## 2021-07-01 PROCEDURE — 81001 URINALYSIS AUTO W/SCOPE: CPT

## 2021-07-01 PROCEDURE — 80061 LIPID PANEL: CPT

## 2021-07-01 PROCEDURE — 84443 ASSAY THYROID STIM HORMONE: CPT

## 2021-07-01 PROCEDURE — 84439 ASSAY OF FREE THYROXINE: CPT

## 2021-07-01 PROCEDURE — 36415 COLL VENOUS BLD VENIPUNCTURE: CPT

## 2021-07-01 PROCEDURE — 80053 COMPREHEN METABOLIC PANEL: CPT

## 2021-07-01 PROCEDURE — 85025 COMPLETE CBC W/AUTO DIFF WBC: CPT

## 2021-07-06 ENCOUNTER — OFFICE VISIT (OUTPATIENT)
Dept: INTERNAL MEDICINE | Age: 86
End: 2021-07-06

## 2021-07-06 VITALS
DIASTOLIC BLOOD PRESSURE: 60 MMHG | RESPIRATION RATE: 16 BRPM | HEIGHT: 70 IN | WEIGHT: 171 LBS | SYSTOLIC BLOOD PRESSURE: 102 MMHG | BODY MASS INDEX: 24.48 KG/M2 | HEART RATE: 76 BPM

## 2021-07-06 DIAGNOSIS — N40.1 BENIGN PROSTATIC HYPERPLASIA WITH URINARY RETENTION: ICD-10-CM

## 2021-07-06 DIAGNOSIS — F02.80 DEMENTIA ASSOCIATED WITH OTHER UNDERLYING DISEASE WITHOUT BEHAVIORAL DISTURBANCE (HCC): ICD-10-CM

## 2021-07-06 DIAGNOSIS — R33.8 BENIGN PROSTATIC HYPERPLASIA WITH URINARY RETENTION: ICD-10-CM

## 2021-07-06 DIAGNOSIS — K62.89 RECTAL MASS: ICD-10-CM

## 2021-07-06 DIAGNOSIS — I10 ESSENTIAL HYPERTENSION: Primary | ICD-10-CM

## 2021-07-06 DIAGNOSIS — M15.9 PRIMARY OSTEOARTHRITIS INVOLVING MULTIPLE JOINTS: ICD-10-CM

## 2021-07-06 DIAGNOSIS — E03.4 HYPOTHYROIDISM DUE TO ACQUIRED ATROPHY OF THYROID: ICD-10-CM

## 2021-07-06 DIAGNOSIS — F41.9 ANXIETY: ICD-10-CM

## 2021-07-06 DIAGNOSIS — I25.10 CORONARY ARTERY DISEASE INVOLVING NATIVE CORONARY ARTERY OF NATIVE HEART WITHOUT ANGINA PECTORIS: ICD-10-CM

## 2021-07-06 DIAGNOSIS — E78.2 MIXED HYPERLIPIDEMIA: ICD-10-CM

## 2021-07-06 DIAGNOSIS — I50.22 CHRONIC SYSTOLIC CHF (CONGESTIVE HEART FAILURE) (HCC): ICD-10-CM

## 2021-07-06 DIAGNOSIS — I48.0 PAROXYSMAL ATRIAL FIBRILLATION (HCC): ICD-10-CM

## 2021-07-06 PROCEDURE — 99214 OFFICE O/P EST MOD 30 MIN: CPT | Performed by: INTERNAL MEDICINE

## 2021-07-06 NOTE — PATIENT INSTRUCTIONS
tablespoon of peanut butter, ½ ounce nuts or seeds, or ¼ cup of cooked beans equals 1 ounce of meat. · Learn how to read food labels for serving sizes and ingredients. Fast-food and convenience-food meals often contain few or no fruits or vegetables. Make sure you eat some fruits and vegetables to make the meal more nutritious. · Look at your food diary. For each food group, add up what you have eaten and then divide the total by the number of days. This will give you an idea of how much you are eating from each food group. See if you can find some ways to change your diet to make it more healthy. Start small  · Do not try to make dramatic changes to your diet all at once. You might feel that you are missing out on your favorite foods and then be more likely to fail. · Start slowly, and gradually change your habits. Try some of the following:  ? Use whole wheat bread instead of white bread. ? Use nonfat or low-fat milk instead of whole milk. ? Eat brown rice instead of white rice, and eat whole wheat pasta instead of white-flour pasta. ? Try low-fat cheeses and low-fat yogurt. ? Add more fruits and vegetables to meals and have them for snacks. ? Add lettuce, tomato, cucumber, and onion to sandwiches. ? Add fruit to yogurt and cereal.  Enjoy food  · You can still eat your favorite foods. You just may need to eat less of them. If your favorite foods are high in fat, salt, and sugar, limit how often you eat them, but do not cut them out entirely. · Eat a wide variety of foods. Make healthy choices when eating out  · The type of restaurant you choose can help you make healthy choices. Even fast-food chains are now offering more low-fat or healthier choices on the menu. · Choose smaller portions, or take half of your meal home. · When eating out, try:  ? A veggie pizza with a whole wheat crust or grilled chicken (instead of sausage or pepperoni).   ? Pasta with roasted vegetables, grilled chicken, or marinara sauce instead of cream sauce. ? A vegetable wrap or grilled chicken wrap. ? Broiled or poached food instead of fried or breaded items. Make healthy choices easy  · Buy packaged, prewashed, ready-to-eat fresh vegetables and fruits, such as baby carrots, salad mixes, and chopped or shredded broccoli and cauliflower. · Buy packaged, presliced fruits, such as melon or pineapple. · Choose 100% fruit or vegetable juice instead of soda. Limit juice intake to 4 to 6 oz (½ to ¾ cup) a day. · Blend low-fat yogurt, fruit juice, and canned or frozen fruit to make a smoothie for breakfast or a snack. Where can you learn more? Go to https://[employer]. org and sign in to your [employer] account. Enter A686 in the [employer] box to learn more about \"Eating Healthy Foods: Care Instructions. \"     If you do not have an account, please click on the \"Sign Up Now\" link. Current as of: December 17, 2020               Content Version: 12.9  © 0929-9080 [employer]. Care instructions adapted under license by [facility] [address] If you have questions about a medical condition or this instruction, always ask your healthcare professional. [address] any warranty or liability for your use of this information. Patient Education        Learning About Being Physically Active  What is physical activity? Being physically active means doing any kind of activity that gets your body moving. The types of physical activity that can help you get fit and stay healthy include:  · Aerobic or \"cardio\" activities. These make your heart beat faster and make you breathe harder, such as brisk walking, riding a bike, or running. They strengthen your heart and lungs and build up your endurance. · Strength training activities. These make your muscles work against, or \"resist,\" something. Examples include lifting weights or doing push-ups.  These activities help tone and strengthen your muscles and bones. · Stretches. These let you move your joints and muscles through their full range of motion. Stretching helps you be more flexible. What are the benefits of being active? Being active is one of the best things you can do for your health. It helps you to:  · Feel stronger and have more energy to do all the things you like to do. · Focus better at school or work. · Feel, think, and sleep better. · Reach and stay at a healthy weight. · Lose fat and build lean muscle. · Lower your risk for serious health problems, including diabetes, heart attack, high blood pressure, and some cancers. · Keep your heart, lungs, bones, muscles, and joints strong and healthy. How can you make being active part of your life? Start slowly. Make it your long-term goal to get at least 30 minutes of exercise on most days of the week. Walking is a good choice. You also may want to do other activities, such as running, swimming, cycling, or playing tennis or team sports. Pick activities that you likeones that make your heart beat faster, your muscles stronger, and your muscles and joints more flexible. If you find more than one thing you like doing, do them all. You don't have to do the same thing every day. Get your heart pumping every day. Any activity that makes your heart beat faster and keeps it at that rate for a while counts. Here are some great ways to get your heart beating faster:  · Go for a brisk walk, run, or bike ride. · Go for a hike or swim. · Go in-line skating. · Play a game of touch football, basketball, or soccer. · Ride a bike. · Play tennis or racquetball. · Climb stairs. Even some household chores can be aerobicjust do them at a faster pace. Vacuuming, raking or mowing the lawn, sweeping the garage, and washing and waxing the car all can help get your heart rate up. Strengthen your muscles during the week.  You don't have to lift heavy weights or grow big, bulky muscles to get stronger. Doing a few simple activities that make your muscles work against, or \"resist,\" something can help you get stronger. For example, you can:  · Do push-ups or sit-ups, which use your own body weight as resistance. · Lift weights or dumbbells or use stretch bands at home or in a gym or community center. Stretch your muscles often. Stretching will help you as you become more active. It can help you stay flexible, loosen tight muscles, and avoid injury. It can also help improve your balance and posture and can be a great way to relax. Be sure to stretch the muscles you'll be using when you work out. It's best to warm your muscles slightly before you stretch them. Walk or do some other light aerobic activity for a few minutes, and then start stretching. When you stretch your muscles:  · Do it slowly. Stretching is not about going fast or making sudden movements. · Don't push or bounce during a stretch. · Hold each stretch for at least 15 to 30 seconds, if you can. You should feel a stretch in the muscle, but not pain. · Breathe out as you do the stretch. Then breathe in as you hold the stretch. Don't hold your breath. If you're worried about how more activity might affect your health, have a checkup before you start. Follow any special advice your doctor gives you for getting a smart start. Where can you learn more? Go to https://chpejamesewmelissa.[facility]. org and sign in to your [employer] account. Enter P756 in the [employer] box to learn more about \"Learning About Being Physically Active. \"     If you do not have an account, please click on the \"Sign Up Now\" link. Current as of: September 10, 2020               Content Version: 12.9  © 2572-3813 [employer], [employer]. Care instructions adapted under license by [facility] ([facility]).  If you have questions about a medical condition or this instruction, always ask your healthcare professional. [address] any warranty or liability for your use of this information.

## 2021-07-06 NOTE — PROGRESS NOTES
Saud Barrera received counseling on the following healthy behaviors: nutrition and exercise  Reviewed prior labs and health maintenance  Continue current medications, diet and exercise. Discussed use, benefit, and side effects of prescribed medications. Barriers to medication compliance addressed. Patient given educational materials - see patient instructions  Was a self-tracking handout given in paper form or via [employer]? No: not indicated     Requested Prescriptions      No prescriptions requested or ordered in this encounter       All patient questions answered. Patient voiced understanding. Quality Measures    Body mass index is 24.54 kg/m². Normal. Weight control plan discussed: Healthy diet and regular exercise. BP: 102/60. Blood pressure is normal. Treatment plan: See main progress note    Fall Risk 1/6/2021 6/14/2019 4/19/2018 4/20/2017 4/21/2016 4/16/2015 4/17/2014   2 or more falls in past year? no no no no no no no   Fall with injury in past year? no yes no no no no no     The patient has a history of falls. I did not - not indicated , complete a risk assessment for falls. See progress note for plan, if risk assessment completed. Lab Results   Component Value Date    LDLCHOLESTEROL 65 07/01/2021    (goal LDL reduction with dx if diabetes is 50% LDL reduction)    PHQ Scores 1/6/2021 6/14/2019 4/19/2018 4/20/2017 4/21/2016 4/16/2015 4/17/2014   PHQ2 Score 0 0 1 0 0 0 0   PHQ9 Score 0 0 1 0 0 0 0     See progress note for plan, if depression exists. Interpretation of Total Score: Major depression if the answer to questions 1 or 2 and 5 or more of questions 1 to 9 are at least [facility] than half the days. \"  Other depressive syndrome if questions 1 or 2 and two, three, or four of questions 1 to 9 are at least [facility] than half the days\"   Depression Severity: 5-9 = Mild depression, 10-14 = Moderate depression, 15-19 = Moderately severe depression, 20-27 = Severe depression

## 2021-07-06 NOTE — PROGRESS NOTES
DR. Claudia Desai - PROGRESS NOTE    CHIEF COMPLAINT/HISTORY OF CHIEF COMPLAINT: This 80 y.o.  male comes in today for ongoing evaluation and management of his hypertension, hyperlipidemia, hypothyroidism, coronary artery disease, congestive heart failure, paroxysmal atrial fibrillation, paroxysmal supraventricular tachycardia, anxiety, osteoarthritis, dementia, and being overweight. He had a colonoscopy done by Dr. Margie Rivera in June. He took a biopsy of a rectal mass, which came back inconclusive for cancer with the recommendation that the entire mass be removed. He will be seeing Dr. Margie Rivera again tomorrow to discuss those results. He takes Norvasc, Imdur, Lopressor, amiodarone, lisinopril, Xarelto, and aspirin for hypertension, coronary artery disease, congestive heart failure, and paroxysmal atrial fibrillation. He sees the cardiologists here for those conditions as well. His last visit with them was in May. He has not had any chest pain, dizziness, or palpitations. He sees Dr. Sulma Fisher for benign prostatic hypertrophy, for which he is on Flomax. He takes simvastatin for hyperlipidemia. He denies any muscle aches from the simvastatin. He is on Synthroid for hypothyroidism. He has not had any fatigue or temperature intolerance. He uses Xanax for anxiety, which has been stable. He takes glucosamine and chondroitin sulfate for arthritis, which has not bothered him lately. He sees Dr. Riley for his dementia. His last visit with him was in January. Otherwise he seems to be doing fairly well and denies any other complaints.        ALLERGIES/INTOLERANCES:   Allergies   Allergen Reactions    Pcn [Penicillins] Swelling     As a child  Pt tolerated ceftriaxone , and keflex with no intolerance    Sulfa Antibiotics     Cefdinir Other (See Comments)     Pt tolerated ceftriaxone , and keflex with no intolerance       MEDICATIONS:   Outpatient Medications Marked as Taking for the 7/6/21 encounter (Office Visit) other day.  tolnaftate (TINACTIN) 1 % external solution Apply topically nightly to toenails per Dr. Bryant Langley.  aspirin 81 MG tablet Take 81 mg by mouth daily.  GLUCOSAMINE-CHONDROITIN PO Take 3 capsules by mouth daily at 1800 (500-400 mg)         IMMUNIZATIONS: Reviewed for influenza and pneumococcal status as indicated in electronic record. REVIEW OF SYSTEMS:     General: negative for - chills, fever or night sweats  Skin: negative for - pruritus or rash  Head: Negative for: headache or recent history of head trauma  Ear, Nose, Throat: negative for - epistaxis, nasal congestion, nasal discharge, sore throat, tinnitus or vertigo  Cardiovascular: negative for - chest pain, dyspnea on exertion or shortness of breath  Respiratory: negative for - cough, hemoptysis or wheezing  Gastrointestinal: negative for - constipation, diarrhea or nausea/vomiting  Genitourinary: negative for - dysuria, hematuria or nocturia  Musculoskeletal: positive for - joint pain  negative for - muscle pain or muscular weakness  Neurologic: negative for - gait disturbance, numbness/tingling, seizures or tremors  Psychiatric: positive for - anxiety  negative for - depression     PHYSICAL EXAMINATION:    Wt Readings from Last 2 Encounters:   07/06/21 171 lb (77.6 kg)   06/21/21 169 lb (76.7 kg)       Vitals:    07/06/21 0909   BP: 102/60   Site: Right Upper Arm   Position: Sitting   Cuff Size: Medium Adult   Pulse: 76   Resp: 16   Weight: 171 lb (77.6 kg)   Height: 5' 10\" (1.778 m)     Body mass index is 24.54 kg/m². General: This is a 80 y.o.  male who is alert and oriented to person and place but not time. He appears to be his stated age and does not appear to be in any acute distress. Skin: Skin color, texture, turgor normal. No rashes or lesions. HEENT/Neck: Head: Normal, normocephalic, atraumatic. Eye: Normal external eye, conjunctiva, lids cornea, HAROON. Ears: Normal TM's bilaterally.  Normal auditory canals and external ears. Non-tender. Nose: Normal external nose, mucus membranes and septum. Pharynx: Dental Hygiene adequate. Normal buccal mucosa. Normal pharynx. Neck / Thyroid: Supple, no masses, nodes, nodules or enlargement. Lungs: Normal - CTA without rales, rhonchi, or wheezing. Heart: regular rate and rhythm, S1, S2 normal, no murmur, click, rub or gallop No S3 or S4. Abdomen: Non-obese, soft, non-distended, non-tender, normal active bowel sounds, no masses palpated and no hepatosplenomegaly  Extremities: There is no clubbing, cyanosis, or edema in any of the extremities. Neurologic:  cranial nerves II-XII are grossly intact  Osteopathic Structural Examination: Patient was examined in the seated and standing positions. There was full range of motion in the cervical, thoracic, and lumbar spines. No scoliosis. No change in thoracic kyphosis or lumbar lordosis. No increased paravertebral muscle tenderness. ASSESSMENT/PLAN:    1. Essential hypertension, controlled  - He will continue to take his antihypertensive medication     2. Mixed hyperlipidemia, controlled  - He will continue to take Zocor    3. Hypothyroidism due to acquired atrophy of thyroid, stable  - He will continue to take Synthroid    4. Rectal mass, new  - We reviewed the colonoscopy reports  - He will see Dr. Sadaf Valdez again tomorrow to see what to do about the mass    5. Coronary artery disease involving native coronary artery of native heart without angina pectoris, stable  6. Chronic systolic CHF (congestive heart failure) ([address] stable  7. Paroxysmal atrial fibrillation ([address] stable  - He will continue to see the cardiologists  - We reviewed their most recent visit note     8. Dementia associated with 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 behavioral disturbance ([address] stable  - He will continue to see Dr. Brenda Aden  - We reviewed his most recent visit note     9. Anxiety, stable  - He will continue to take Xanax as needed    10.  Primary osteoarthritis involving multiple joints, stable  - We will continue to monitor     11. Benign prostatic hyperplasia with urinary retention, stable  - He will continue to take Flomax      No orders of the defined types were placed in this encounter. Requested Prescriptions      No prescriptions requested or ordered in this encounter       He seems to be doing fairly well with regards to his chronic health problems so we are not going to make any changes to his medications. Return in about 6 months (around 1/6/2022).         Electronically signed by Caio Oquendo DO on 7/6/2021 at 9:50 AM  Internal Medicine

## 2021-07-07 ENCOUNTER — OFFICE VISIT (OUTPATIENT)
Dept: SURGERY | Age: 86
End: 2021-07-07
Payer: MEDICARE

## 2021-07-07 VITALS
HEIGHT: 71 IN | WEIGHT: 170.8 LBS | BODY MASS INDEX: 23.91 KG/M2 | SYSTOLIC BLOOD PRESSURE: 159 MMHG | DIASTOLIC BLOOD PRESSURE: 75 MMHG | TEMPERATURE: 97.8 F | HEART RATE: 72 BPM

## 2021-07-07 DIAGNOSIS — K62.1 RECTAL POLYP: Primary | ICD-10-CM

## 2021-07-07 PROCEDURE — 3288F FALL RISK ASSESSMENT DOCD: CPT | Performed by: SURGERY

## 2021-07-07 PROCEDURE — 1123F ACP DISCUSS/DSCN MKR DOCD: CPT | Performed by: SURGERY

## 2021-07-07 PROCEDURE — 4040F PNEUMOC VAC/ADMIN/RCVD: CPT | Performed by: SURGERY

## 2021-07-07 PROCEDURE — 99213 OFFICE O/P EST LOW 20 MIN: CPT | Performed by: SURGERY

## 2021-07-07 PROCEDURE — G8428 CUR MEDS NOT DOCUMENT: HCPCS | Performed by: SURGERY

## 2021-07-07 PROCEDURE — G8420 CALC BMI NORM PARAMETERS: HCPCS | Performed by: SURGERY

## 2021-07-07 PROCEDURE — 1036F TOBACCO NON-USER: CPT | Performed by: SURGERY

## 2021-07-07 PROCEDURE — 0518F FALL PLAN OF CARE DOCD: CPT | Performed by: SURGERY

## 2021-07-07 RX ORDER — ALPRAZOLAM 0.25 MG/1
0.25 TABLET ORAL DAILY
COMMUNITY
End: 2021-07-21

## 2021-07-07 NOTE — PROGRESS NOTES
Mulu Du is a 80 y.o. male who presents today for follow-up after colonoscopy which showed a large rectal polyp/mass. Patient is here with a caregiver from his assisted living facility as well as his cousin who is his decision maker. Patient recently underwent colonoscopy approximately 2 weeks ago with 2 small polyps removed cecum and hepatic flexure. He also had a large low rectal mass which was concerning for cancer. Due to its size it was unable to be removed but biopsies were obtained. Fortunately the biopsies are showing an adenomatous polyp and or not showing any signs of malignancy. He comes in today to discuss these results further and to also discuss further recommendations. Denies any problems since procedure.     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 LIMA TO LAD, SVG TO DIAGONAL2    Dementia associated with 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 behavioral disturbance ([address] 9/21/2018    Dermatophytosis of nail 1/10/2014    Diverticulosis     Elevated PSA     Hemorrhoids     History of measles childhood    History of mumps childhood    Hyperlipidemia     Hypertension     Hypothyroidism 9/4/2018    Mass of upper lobe of right lung 2/21/2018    Occult blood positive stool     refuses colonoscopy    Osteoarthritis     Overweight     Paroxysmal atrial fibrillation ([address] 11/7/2013    Pneumonia due to organism     Pseudophakia, left eye     PSVT (paroxysmal supraventricular tachycardia) ([address]     Recurrent UTI 3/13/2018    Retinal detachment     left, history of     Seborrheic dermatitis     Sepsis (PRESERVISION AREDS PO) Take 1 capsule by mouth daily       isosorbide mononitrate (IMDUR) 30 MG extended release tablet Take 1 tablet by mouth daily 30 tablet 3    lisinopril (PRINIVIL;ZESTRIL) 40 MG tablet Take 1 tablet by mouth daily 30 tablet 3    Multiple Vitamins-Minerals (MULTIVITAMIN ADULT PO) Take 1 tablet by mouth daily      potassium chloride (KLOR-CON M) 20 MEQ extended release tablet Take 20 mEq by mouth daily      fluticasone (FLONASE) 50 MCG/ACT nasal spray 1 spray by Nasal route daily 1 Bottle 0    simvastatin (ZOCOR) 20 MG tablet TAKE 1 TABLET BY MOUTH EVERY OTHER DAY  30 tablet 11    Coenzyme Q-10 100 MG CAPS Take 100 mg by mouth every other day.  tolnaftate (TINACTIN) 1 % external solution Apply topically nightly to toenails per Dr. Cheryl Gallego.  aspirin 81 MG tablet Take 81 mg by mouth daily.  GLUCOSAMINE-CHONDROITIN PO Take 3 capsules by mouth daily at 1800 (500-400 mg)      tamsulosin (FLOMAX) 0.4 MG capsule Take 1 capsule by mouth 2 times daily 180 capsule 3     No current facility-administered medications for this visit.        Allergies   Allergen Reactions    Pcn [Penicillins] Swelling     As a child  Pt tolerated ceftriaxone , and keflex with no intolerance    Sulfa Antibiotics     Cefdinir Other (See Comments)     Pt tolerated ceftriaxone , and keflex with no intolerance       Family History   Problem Relation Age of Onset    Diabetes Mother     Osteoporosis Mother     Other Father         complications of prostate surgery (bleeding)    Stroke Sister     Other Sister         respiratory failure       Social History     Socioeconomic History    Marital status: Single     Spouse name: Not on file    Number of children: Not on file    Years of education: Not on file    Highest education level: Not on file   Occupational History    Not on file   Tobacco Use    Smoking status: Never Smoker    Smokeless tobacco: Never Used    Tobacco comment: never smoked iwona rrt,2/27/2018   Vaping Use    Vaping Use: Never used   Substance and Sexual Activity    Alcohol use: No     Alcohol/week: 0.0 standard drinks    Drug use: No    Sexual activity: Not on file   Other Topics Concern    Not on file   Social History Narrative    Not on file     Social Determinants of Health     Financial Resource Strain:     Difficulty of Paying Living Expenses:    Food Insecurity:     Worried About Running Out of Food in the Last Year:     Ran Out of Food in the Last Year:    Transportation Needs:     Lack of Transportation (Medical):  Lack of Transportation (Non-Medical):    Physical Activity:     Days of Exercise per Week:     Minutes of Exercise per Session:    Stress:     Feeling of Stress :    Social Connections:     Frequency of Communication with Friends and Family:     Frequency of Social Gatherings with Friends and Family:     Attends Adventism Services:     Active Member of Clubs or Organizations:     Attends Club or Organization Meetings:     Marital Status:    Intimate Partner Violence:     Fear of Current or Ex-Partner:     Emotionally Abused:     Physically Abused:     Sexually Abused:        ROS:   Review of Systems - General ROS: negative  Psychological ROS: negative  Ophthalmic ROS: negative  ENT ROS: negative  Respiratory ROS: no cough, shortness of breath, or wheezing  Cardiovascular ROS: no chest pain or dyspnea on exertion  Gastrointestinal ROS: no abdominal pain, change in bowel habits, or black or bloody stools  Genito-Urinary ROS: no dysuria, trouble voiding, or hematuria  Musculoskeletal ROS: negative  Dermatological ROS: negative      Objective   Vitals:    07/07/21 1139   BP: (!) 159/75   Pulse: 72   Temp: 97.8 °F (36.6 °C)     General:in no apparent distress and well developed and well nourished  Eyes: No gross abnormalities.   Ears, Nose, Throat: hearing grossly normal bilaterally  Neck: neck supple and non tender without mass  Lungs: clear to auscultation without wheezes or rales   Heart: S1S2, no mumurs, RRR  Abdomen: soft, nontender, no HSM, no guarding, no rebound, no masses  Extremity: negative  Neuro: CN II-XII grossly intact      Assessment     3  80year-old male with large rectal polyp      Plan     1. Had a discussion with the patient and his medical power of  today about the findings from pathology and we discussed different options for proceeding with excision of this area. I discussed with the patient surgical excision with more traditional surgery though I do not think this is his best option. I also discussed with him the potential for transanal excision which would be less invasive and still hopefully accomplish the same goal.  They would like to explore this route further. Unfortunately I do not have the capability to do that at this institution and I also think that this would be better handled by a colorectal specialist based on its location. I have made referral to colorectal specialist in [place] and we will ensure that this is something that they normally perform. If this is the case we will facilitate the referral for definitive management. If not at that point I would likely have to refer the patient over to [facility] for management.     Electronically signed by Blanca Jorge DO on 7/7/2021 at 12:05 PM      (Please note that portions of this note were completed with a voice recognition program.  Efforts were made to edit the dictations but occasionally words are mis-transcribed.)

## 2021-07-09 ENCOUNTER — TELEPHONE (OUTPATIENT)
Dept: SURGERY | Age: 86
End: 2021-07-09

## 2021-07-09 NOTE — PROGRESS NOTES
EXTENDED EEG Completed with [facility]. PCP: Bhavana Vaca DO    Ordering: Jhoana Riley Neurologist    Interpreting Physician: Lydia Coker Neurologist    Technician: Alexandra Hammer RN H/O breast augmentation    H/O circumcision    H/O plastic surgery  body contouring 2017  History of bunionectomy of left great toe    S/P plastic surgery  cranial facial - 2018

## 2021-07-09 NOTE — TELEPHONE ENCOUNTER
Confirmed with Dr Adina Soni office that she does perform the Transanal excision. Provided the referral contact information to [facility] [address]. Also confirmed that Dr Adina Soni office did receive the referral information.

## 2021-07-21 DIAGNOSIS — F41.9 ANXIETY: Primary | ICD-10-CM

## 2021-07-21 RX ORDER — ALPRAZOLAM 0.25 MG/1
TABLET ORAL
Qty: 45 TABLET | Refills: 0 | Status: SHIPPED | OUTPATIENT
Start: 2021-07-21 | End: 2021-11-01 | Stop reason: SDUPTHER

## 2021-08-04 ENCOUNTER — TELEPHONE (OUTPATIENT)
Dept: INTERNAL MEDICINE | Age: 86
End: 2021-08-04

## 2021-08-13 ENCOUNTER — HOSPITAL ENCOUNTER (OUTPATIENT)
Age: 86
Setting detail: SPECIMEN
Discharge: HOME | End: 2021-08-13
Payer: MEDICARE

## 2021-08-13 LAB
ALT SERPL-CCNC: 8 U/L (ref 5–41)
AST SERPL-CCNC: 15 U/L
TSH SERPL DL<=0.05 MIU/L-ACNC: 0.58 MIU/L (ref 0.3–5)

## 2021-08-13 PROCEDURE — 84443 ASSAY THYROID STIM HORMONE: CPT

## 2021-08-13 PROCEDURE — 84450 TRANSFERASE (AST) (SGOT): CPT

## 2021-08-13 PROCEDURE — 84460 ALANINE AMINO (ALT) (SGPT): CPT

## 2021-08-25 ENCOUNTER — HOSPITAL ENCOUNTER (OUTPATIENT)
Age: 86
Setting detail: SPECIMEN
Discharge: HOME | End: 2021-08-25
Payer: MEDICARE

## 2021-08-25 LAB
ALBUMIN SERPL-MCNC: 3.9 G/DL (ref 3.5–5.2)
ALBUMIN/GLOBULIN RATIO: 1.3 (ref 1–2.5)
ALP BLD-CCNC: 104 U/L (ref 40–129)
ALT SERPL-CCNC: 9 U/L (ref 5–41)
ANION GAP SERPL CALCULATED.3IONS-SCNC: 8 MMOL/L (ref 9–17)
AST SERPL-CCNC: 17 U/L
BILIRUB SERPL-MCNC: 0.46 MG/DL (ref 0.3–1.2)
BUN BLDV-MCNC: 13 MG/DL (ref 8–23)
BUN/CREAT BLD: 13 (ref 9–20)
CALCIUM SERPL-MCNC: 9.2 MG/DL (ref 8.6–10.4)
CHLORIDE BLD-SCNC: 104 MMOL/L (ref 98–107)
CO2: 27 MMOL/L (ref 20–31)
CREAT SERPL-MCNC: 1.01 MG/DL (ref 0.7–1.2)
GFR AFRICAN AMERICAN: >60 ML/MIN
GFR NON-AFRICAN AMERICAN: >60 ML/MIN
GFR SERPL CREATININE-BSD FRML MDRD: ABNORMAL ML/MIN/{1.73_M2}
GFR SERPL CREATININE-BSD FRML MDRD: ABNORMAL ML/MIN/{1.73_M2}
GLUCOSE BLD-MCNC: 102 MG/DL (ref 70–99)
HCT VFR BLD CALC: 40.9 % (ref 40.7–50.3)
HEMOGLOBIN: 13.8 G/DL (ref 13–17)
INR BLD: 1.6
MCH RBC QN AUTO: 29.9 PG (ref 25.2–33.5)
MCHC RBC AUTO-ENTMCNC: 33.7 G/DL (ref 25.2–33.5)
MCV RBC AUTO: 88.7 FL (ref 82.6–102.9)
NRBC AUTOMATED: 0 PER 100 WBC
PDW BLD-RTO: 14 % (ref 11.8–14.4)
PLATELET # BLD: 211 K/UL (ref 138–453)
PMV BLD AUTO: 11.3 FL (ref 8.1–13.5)
POTASSIUM SERPL-SCNC: 4.3 MMOL/L (ref 3.7–5.3)
PROTHROMBIN TIME: 18.7 SEC (ref 11.5–14.2)
RBC # BLD: 4.61 M/UL (ref 4.21–5.77)
SODIUM BLD-SCNC: 139 MMOL/L (ref 135–144)
TOTAL PROTEIN: 7 G/DL (ref 6.4–8.3)
WBC # BLD: 5.1 K/UL (ref 3.5–11.3)

## 2021-08-25 PROCEDURE — 85610 PROTHROMBIN TIME: CPT

## 2021-08-25 PROCEDURE — 85027 COMPLETE CBC AUTOMATED: CPT

## 2021-08-25 PROCEDURE — 80053 COMPREHEN METABOLIC PANEL: CPT

## 2021-09-02 ENCOUNTER — TELEPHONE (OUTPATIENT)
Dept: CARDIOLOGY | Age: 86
End: 2021-09-02

## 2021-09-02 NOTE — TELEPHONE ENCOUNTER
Please see scan attached. Dr. Arley Dunn requesting cardiac clearance for transanal excision, general anesthesia. Can pt hold ASA and Xarelto 5 days prior?

## 2021-09-04 NOTE — TELEPHONE ENCOUNTER
Cardiology Consultation  [facility] [address], [address] [place]ia [place] Pickering  [phone]      9/2/2021      Regarding:  Vita Saenz  1934      To Whom It May Concern,      Patient is Intermediate Cardiac Risk for planned surgery      Special instructions:  Patient is taking ASA  and can be held for 5 days prior to procedure, on Xarelto which can be held 2 days prior to procedure and resume as soon as surgical bleeding risk has resolved. Please continue other meds.         Thank you,    Krystin Hollins MD     [address] [facility]

## 2021-10-05 ENCOUNTER — HOSPITAL ENCOUNTER (OUTPATIENT)
Dept: LAB | Age: 86
Discharge: HOME | End: 2021-10-05
Payer: MEDICARE

## 2021-10-05 LAB
ABSOLUTE EOS #: 0.28 K/UL (ref 0–0.44)
ABSOLUTE IMMATURE GRANULOCYTE: 0.03 K/UL (ref 0–0.3)
ABSOLUTE LYMPH #: 0.98 K/UL (ref 1.1–3.7)
ABSOLUTE MONO #: 0.34 K/UL (ref 0.1–1.2)
ALBUMIN SERPL-MCNC: 3.7 G/DL (ref 3.5–5.2)
ALBUMIN/GLOBULIN RATIO: 1.2 (ref 1–2.5)
ALP BLD-CCNC: 105 U/L (ref 40–129)
ALT SERPL-CCNC: 9 U/L (ref 5–41)
ANION GAP SERPL CALCULATED.3IONS-SCNC: 10 MMOL/L (ref 9–17)
AST SERPL-CCNC: 18 U/L
BASOPHILS # BLD: 1 % (ref 0–2)
BASOPHILS ABSOLUTE: 0.04 K/UL (ref 0–0.2)
BILIRUB SERPL-MCNC: 0.5 MG/DL (ref 0.3–1.2)
BUN BLDV-MCNC: 11 MG/DL (ref 8–23)
BUN/CREAT BLD: 11 (ref 9–20)
CALCIUM SERPL-MCNC: 9.1 MG/DL (ref 8.6–10.4)
CHLORIDE BLD-SCNC: 102 MMOL/L (ref 98–107)
CO2: 26 MMOL/L (ref 20–31)
CREAT SERPL-MCNC: 1.01 MG/DL (ref 0.7–1.2)
DIFFERENTIAL TYPE: ABNORMAL
EOSINOPHILS RELATIVE PERCENT: 5 % (ref 1–4)
GFR AFRICAN AMERICAN: >60 ML/MIN
GFR NON-AFRICAN AMERICAN: >60 ML/MIN
GFR SERPL CREATININE-BSD FRML MDRD: ABNORMAL ML/MIN/{1.73_M2}
GFR SERPL CREATININE-BSD FRML MDRD: ABNORMAL ML/MIN/{1.73_M2}
GLUCOSE BLD-MCNC: 123 MG/DL (ref 70–99)
HCT VFR BLD CALC: 38.8 % (ref 40.7–50.3)
HEMOGLOBIN: 12.9 G/DL (ref 13–17)
IMMATURE GRANULOCYTES: 1 %
INR BLD: 2.1
LYMPHOCYTES # BLD: 17 % (ref 24–43)
MCH RBC QN AUTO: 29.4 PG (ref 25.2–33.5)
MCHC RBC AUTO-ENTMCNC: 33.2 G/DL (ref 25.2–33.5)
MCV RBC AUTO: 88.4 FL (ref 82.6–102.9)
MONOCYTES # BLD: 6 % (ref 3–12)
NRBC AUTOMATED: 0 PER 100 WBC
PDW BLD-RTO: 14.5 % (ref 11.8–14.4)
PLATELET # BLD: 199 K/UL (ref 138–453)
PLATELET ESTIMATE: ABNORMAL
PMV BLD AUTO: 10.5 FL (ref 8.1–13.5)
POTASSIUM SERPL-SCNC: 4.1 MMOL/L (ref 3.7–5.3)
PROTHROMBIN TIME: 22.3 SEC (ref 11.5–14.2)
RBC # BLD: 4.39 M/UL (ref 4.21–5.77)
RBC # BLD: ABNORMAL 10*6/UL
SEG NEUTROPHILS: 70 % (ref 36–65)
SEGMENTED NEUTROPHILS ABSOLUTE COUNT: 4.1 K/UL (ref 1.5–8.1)
SODIUM BLD-SCNC: 138 MMOL/L (ref 135–144)
TOTAL PROTEIN: 6.8 G/DL (ref 6.4–8.3)
WBC # BLD: 5.8 K/UL (ref 3.5–11.3)
WBC # BLD: ABNORMAL 10*3/UL

## 2021-10-05 PROCEDURE — 80053 COMPREHEN METABOLIC PANEL: CPT

## 2021-10-05 PROCEDURE — 85025 COMPLETE CBC W/AUTO DIFF WBC: CPT

## 2021-10-05 PROCEDURE — 36415 COLL VENOUS BLD VENIPUNCTURE: CPT

## 2021-10-05 PROCEDURE — 85610 PROTHROMBIN TIME: CPT

## 2021-10-20 ENCOUNTER — TELEPHONE (OUTPATIENT)
Dept: INTERNAL MEDICINE | Age: 86
End: 2021-10-20

## 2021-10-20 ENCOUNTER — HOSPITAL ENCOUNTER (OUTPATIENT)
Age: 86
Setting detail: SPECIMEN
Discharge: HOME | End: 2021-10-20
Payer: MEDICARE

## 2021-10-20 PROCEDURE — 87086 URINE CULTURE/COLONY COUNT: CPT

## 2021-10-20 PROCEDURE — 81001 URINALYSIS AUTO W/SCOPE: CPT

## 2021-10-21 LAB
-: ABNORMAL
AMORPHOUS: ABNORMAL
BACTERIA: ABNORMAL
BILIRUBIN URINE: NEGATIVE
CASTS UA: ABNORMAL /LPF (ref 0–2)
COLOR: ABNORMAL
COMMENT UA: ABNORMAL
CRYSTALS, UA: ABNORMAL /HPF
EPITHELIAL CELLS UA: ABNORMAL /HPF (ref 0–5)
GLUCOSE URINE: NEGATIVE
KETONES, URINE: ABNORMAL
LEUKOCYTE ESTERASE, URINE: ABNORMAL
MUCUS: ABNORMAL
NITRITE, URINE: NEGATIVE
OTHER OBSERVATIONS UA: ABNORMAL
PH UA: 5 (ref 5–6)
PROTEIN UA: ABNORMAL
RBC UA: ABNORMAL /HPF (ref 0–4)
RENAL EPITHELIAL, UA: ABNORMAL /HPF
SPECIFIC GRAVITY UA: 1.03 (ref 1.01–1.02)
TRICHOMONAS: ABNORMAL
TURBIDITY: ABNORMAL
URINE HGB: NEGATIVE
UROBILINOGEN, URINE: NORMAL
WBC UA: ABNORMAL /HPF (ref 0–4)
YEAST: ABNORMAL

## 2021-10-23 LAB
CULTURE: NORMAL
Lab: NORMAL
SPECIMEN DESCRIPTION: NORMAL

## 2021-10-25 ENCOUNTER — TELEPHONE (OUTPATIENT)
Dept: INTERNAL MEDICINE | Age: 86
End: 2021-10-25

## 2021-11-01 DIAGNOSIS — F41.9 ANXIETY: ICD-10-CM

## 2021-11-01 RX ORDER — ALPRAZOLAM 0.25 MG/1
0.25 TABLET ORAL NIGHTLY
Qty: 30 TABLET | Refills: 0 | Status: SHIPPED | OUTPATIENT
Start: 2021-11-01 | End: 2021-11-18 | Stop reason: ALTCHOICE

## 2021-11-03 ENCOUNTER — TELEPHONE (OUTPATIENT)
Dept: UROLOGY | Age: 86
End: 2021-11-03

## 2021-11-03 NOTE — TELEPHONE ENCOUNTER
Patient no showed appointment with Dr. Tom Regan on 11/3/2021 at 9:00 am. Writer called nurse at [facility] and rescheduled to 12/15/2021 at 9:20 am.

## 2021-11-11 ENCOUNTER — TELEPHONE (OUTPATIENT)
Dept: INTERNAL MEDICINE | Age: 86
End: 2021-11-11

## 2021-11-11 ENCOUNTER — HOSPITAL ENCOUNTER (OUTPATIENT)
Age: 86
Setting detail: SPECIMEN
Discharge: HOME | End: 2021-11-11
Payer: MEDICARE

## 2021-11-11 LAB
-: ABNORMAL
AMORPHOUS: ABNORMAL
BACTERIA: ABNORMAL
BILIRUBIN URINE: NEGATIVE
CASTS UA: ABNORMAL /LPF (ref 0–2)
COLOR: NORMAL
COMMENT UA: NORMAL
CRYSTALS, UA: ABNORMAL /HPF
EPITHELIAL CELLS UA: ABNORMAL /HPF (ref 0–5)
GLUCOSE URINE: NEGATIVE
KETONES, URINE: NEGATIVE
LEUKOCYTE ESTERASE, URINE: NEGATIVE
MUCUS: ABNORMAL
NITRITE, URINE: NEGATIVE
OTHER OBSERVATIONS UA: ABNORMAL
PH UA: 5.5 (ref 5–6)
PROTEIN UA: NEGATIVE
RBC UA: ABNORMAL /HPF (ref 0–4)
RENAL EPITHELIAL, UA: ABNORMAL /HPF
SPECIFIC GRAVITY UA: 1.02 (ref 1.01–1.02)
TRICHOMONAS: ABNORMAL
TURBIDITY: NORMAL
URINE HGB: NEGATIVE
UROBILINOGEN, URINE: NORMAL
WBC UA: ABNORMAL /HPF (ref 0–4)
YEAST: ABNORMAL

## 2021-11-11 PROCEDURE — 87086 URINE CULTURE/COLONY COUNT: CPT

## 2021-11-11 PROCEDURE — 81001 URINALYSIS AUTO W/SCOPE: CPT

## 2021-11-12 LAB
CULTURE: NO GROWTH
Lab: NORMAL
SPECIMEN DESCRIPTION: NORMAL

## 2021-11-18 ENCOUNTER — OFFICE VISIT (OUTPATIENT)
Dept: CARDIOLOGY | Age: 86
End: 2021-11-18
Payer: MEDICARE

## 2021-11-18 VITALS
SYSTOLIC BLOOD PRESSURE: 104 MMHG | DIASTOLIC BLOOD PRESSURE: 61 MMHG | HEIGHT: 71 IN | HEART RATE: 77 BPM | BODY MASS INDEX: 24.92 KG/M2 | WEIGHT: 178 LBS

## 2021-11-18 DIAGNOSIS — I48.0 PAROXYSMAL ATRIAL FIBRILLATION (HCC): Primary | ICD-10-CM

## 2021-11-18 PROCEDURE — 0518F FALL PLAN OF CARE DOCD: CPT | Performed by: INTERNAL MEDICINE

## 2021-11-18 PROCEDURE — 93005 ELECTROCARDIOGRAM TRACING: CPT | Performed by: INTERNAL MEDICINE

## 2021-11-18 PROCEDURE — 1036F TOBACCO NON-USER: CPT | Performed by: INTERNAL MEDICINE

## 2021-11-18 PROCEDURE — G8427 DOCREV CUR MEDS BY ELIG CLIN: HCPCS | Performed by: INTERNAL MEDICINE

## 2021-11-18 PROCEDURE — G8484 FLU IMMUNIZE NO ADMIN: HCPCS | Performed by: INTERNAL MEDICINE

## 2021-11-18 PROCEDURE — 4040F PNEUMOC VAC/ADMIN/RCVD: CPT | Performed by: INTERNAL MEDICINE

## 2021-11-18 PROCEDURE — 99214 OFFICE O/P EST MOD 30 MIN: CPT | Performed by: INTERNAL MEDICINE

## 2021-11-18 PROCEDURE — 3288F FALL RISK ASSESSMENT DOCD: CPT | Performed by: INTERNAL MEDICINE

## 2021-11-18 PROCEDURE — 1123F ACP DISCUSS/DSCN MKR DOCD: CPT | Performed by: INTERNAL MEDICINE

## 2021-11-18 PROCEDURE — 93010 ELECTROCARDIOGRAM REPORT: CPT | Performed by: INTERNAL MEDICINE

## 2021-11-18 PROCEDURE — G8420 CALC BMI NORM PARAMETERS: HCPCS | Performed by: INTERNAL MEDICINE

## 2021-11-18 RX ORDER — ALPRAZOLAM 0.25 MG/1
TABLET ORAL
COMMUNITY
End: 2021-12-01 | Stop reason: SDUPTHER

## 2021-11-18 NOTE — PROGRESS NOTES
Today's Date: 11/18/2021  Patient Name: Nancy Arce  Patient's age: 80 y. o., 1934       HPI:    The patient is a 80 y.o.  male is in the office for f/u, he is currently residing in a nursing home, has been doing well cardiac wise, no new complaints. He denies angina/chest pressure, dyspnea with exertion, PND/Orthopnea. No decline in his functional capacity according to the staff which accompanied him. Has been maintaining normal sinus rhythm. 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 Past Surgical History:   has a past surgical history that includes Coronary artery bypass graft; Appendectomy (1940 and 1955); Tonsillectomy; retinal laser (Left, 12/22/2011); Cataract removal (Left, 05/07/2013); Skin tag removal (11/01/1999); Abscess Drainage ([id number]); and Colonoscopy (06/14/2021). Home Medications:    Prior to Admission medications    Medication Sig Start Date End Date Taking?  Authorizing Provider   ALPRAZolam [place]) 0.25 MG tablet Take 1/2 in am and 1 tab at HS   Yes Historical Provider, MD   amLODIPine (NORVASC) 5 MG tablet Take 1 tablet by mouth daily 5/20/21  Yes Elicia Melgar MD   nitrofurantoin, macrocrystal-monohydrate, (MACROBID) 100 MG capsule  4/26/21  Yes Historical Provider, MD   psyllium (KONSYL) 28.3 % PACK Take 1 packet by mouth 2 times daily   Yes Historical Provider, MD   tamsulosin (FLOMAX) 0.4 MG capsule Take 1 capsule by mouth 2 times daily 10/30/20 11/18/21 Yes Daria Collins MD   hydrocortisone (PREPARATION H) 1 % cream Apply topically to affected area as directed prn (Preparation H)   Yes Historical Provider, MD   Compression Stockings MISC by Does not apply route DAVID hose- on in am, off in pm (Currently doing prn now)   Yes Historical Provider, MD   amiodarone (CORDARONE) 200 MG tablet Take 100 mg by mouth daily   Yes Historical Provider, MD   rivaroxaban (XARELTO) 20 MG TABS tablet Take 20 mg by mouth every morning   Yes Historical Provider, MD   acetaminophen (TYLENOL) 325 MG tablet Take 650 mg by mouth every 4 hours as needed for Pain   Yes Historical Provider, MD   Mineral Oil OIL Use as directed on bottle for occasional constipation   Yes Historical Provider, MD   Dextromethorphan-guaiFENesin  MG/5ML SYRP Take 5 mLs by mouth every 6 hours as needed for Cough (Robafen DM)   Yes Historical Provider, MD   simethicone (MYLICON) 40 NI/8.1NQ drops Take 0.6 mLs by mouth 4 times daily as needed (gas) 4/7/20  Yes DAYANARA Tom - CNP   metoprolol tartrate (LOPRESSOR) 25 MG tablet Take 0.5 tablets by mouth 2 times daily 6/13/19  Yes Obie Becerra, DO   levothyroxine (SYNTHROID) 112 MCG tablet Take 1 tablet by mouth Daily 4/15/19  Yes Guillermo Cerda, DO   polyvinyl alcohol (ARTIFICIAL TEARS) 1.4 % ophthalmic solution Place 1 drop into both eyes every 6 hours as needed   Yes Historical Provider, MD   Multiple Vitamins-Minerals (PRESERVISION AREDS PO) Take 1 capsule by mouth daily    Yes Historical Provider, MD   isosorbide mononitrate (IMDUR) 30 MG extended release tablet Take 1 tablet by mouth daily 8/22/18  Yes Emma Baez MD   lisinopril (PRINIVIL;ZESTRIL) 40 MG tablet Take 1 tablet by mouth daily 8/22/18  Yes Emma Baez MD   Multiple Vitamins-Minerals (MULTIVITAMIN ADULT PO) Take 1 tablet by mouth daily   Yes Historical Provider, MD   potassium chloride (KLOR-CON M) 20 MEQ extended release tablet Take 20 mEq by mouth daily   Yes Historical Provider, MD   fluticasone (FLONASE) 50 MCG/ACT nasal spray 1 spray by Nasal route daily 2/1/18  Yes DAYANARA Tolbert CNP   simvastatin (ZOCOR) 20 MG tablet TAKE 1 TABLET BY MOUTH EVERY OTHER DAY  3/7/17  Yes Guillermo Cerda DO   Coenzyme Q-10 100 MG CAPS Take 100 mg by mouth every other day. Yes Historical Provider, MD   tolnaftate (TINACTIN) 1 % external solution Apply topically nightly to toenails per Dr. Marin Flanagan. Yes Historical Provider, MD   aspirin 81 MG tablet Take 81 mg by mouth daily. Yes Historical Provider, MD   GLUCOSAMINE-CHONDROITIN PO Take 3 capsules by mouth daily at 1800 (500-400 mg)   Yes Historical Provider, MD       Allergies:  Pcn [penicillins], Sulfa antibiotics, and Cefdinir    Social History:   reports that he has never smoked. He has never used smokeless tobacco. He reports that he does not drink alcohol and does not use drugs. REVIEW OF SYSTEMS:  CONSTITUTIONAL:NEGATIVE  HEENT:NEG  Cardiovascular: No chest pain, no dyspnea on exertion, No palpitations. Lower extremity edema: No  RESPIRATORY: MONTIEL  GASTROINTESTINAL:  negative  GENITOURINARY:  negative  INTEGUMENT:  negative  MUSCULOSKELETAL:  positive for  pain  NEUROLOGICAL:  negative    PHYSICAL EXAM:      /61   Pulse 77   Ht 5' 11\" (1.803 m)   Wt 178 lb (80.7 kg)   BMI 24.83 kg/m²    HEENT: PERRL, no cervical lymphadenopathy. No masses palpable.   Cardiovascular:  · The apical impulse is not displaced  · Heart  Sounds:RRR, grade 2 x 6 ejection systolic murmur in the aortic area  · Jugular venous pulsation Normal  · The carotid upstroke is NL  · Peripheral pulses are symmetrical and full  Respiratory: Good respiratory effort. On auscultation: clear to auscultation bilaterally no wheezing or crackles  Abdomen:  · No masses or tenderness  · Bowel sounds present  Extremities:  ·  No Cyanosis or Clubbing  ·  Lower extremity edema: No  Skin: Warm and dry    Cardiac data:    EKG: Sinus rhythm, marked first degree AV block, LAFB     Echo 02/2018  Biatrial enlargement. 2.  Concentric left ventricular hypertrophy. 3.  Normal wall motion and systolic function. Estimated ejection fraction  of 50%. 4.  Anteroapical wall hypokinesis. 5.  No significant valvular abnormalities. 6.  Mild pulmonary hypertension of 42 mmHg systolic. 7.  No pericardial effusion. Labs:     CBC: No results for input(s): WBC, HGB, HCT, PLT in the last 72 hours. BMP: No results for input(s): NA, K, CO2, BUN, CREATININE, LABGLOM, GLUCOSE in the last 72 hours. PT/INR: No results for input(s): PROTIME, INR in the last 72 hours.   FASTING LIPID PANEL:  Lab Results   Component Value Date    HDL 35 07/01/2021    TRIG 80 07/01/2021       IMPRESSION:    · CAD s/p CABG (6/7/2000) LIMA TO LAD, SVG TO D2, STABLE  · MILD LV SYSTOLIC DYSFUNCTION, last LVEF 50% in 2018, NO CLINICAL CHF  · PAF- in NSR, on A/C with NOAC  · HTN  · HLP        Patient Active Problem List   Diagnosis    Hyperlipidemia    Osteoarthritis    Allergic rhinitis    Diverticulosis    Anxiety    Atrial fibrillation ([address]    Coronary artery disease involving native coronary artery of native heart    Mass of upper lobe of right lung    Chronic systolic CHF (congestive heart failure) ([address]    Benign prostatic hyperplasia with incomplete bladder emptying    Hypothyroidism    Hypertension    Dementia associated with 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 behavioral disturbance ([address]    Intermediate stage nonexudative age-related macular degeneration of both eyes    Combined forms of age-related cataract of right eye    Pseudophakia of left eye    Class 1 obesity with serious comorbidity and body mass index (BMI) of 30.0 to 30.9 in adult    Overweight       RECOMMENDATIONS:  Continue aspirin, low-dose beta-blocker and Zocor  On amiodarone 100 mg daily and Xarelto for paroxysmal atrial fibrillation  Recent lab work reviewed and within normal limits  Already following with pulmonology for lung nodule     Patient counseled regarding symptoms of ACS/CHF in which he will need to seek emergent medical attention otherwise we will see him back in 9 months for routine follow-up.       RTC 6 MONTHS      Carrillo Stout MD, [address] Cardiology Consult           [phone]

## 2021-12-01 ENCOUNTER — TELEPHONE (OUTPATIENT)
Dept: INTERNAL MEDICINE | Age: 86
End: 2021-12-01

## 2021-12-01 DIAGNOSIS — F41.9 ANXIETY: Primary | ICD-10-CM

## 2021-12-01 RX ORDER — ALPRAZOLAM 0.25 MG/1
TABLET ORAL
Qty: 45 TABLET | Refills: 0 | Status: SHIPPED | OUTPATIENT
Start: 2021-12-01 | End: 2021-12-27 | Stop reason: SDUPTHER

## 2021-12-01 NOTE — TELEPHONE ENCOUNTER
Debbie Pérez requesting a refill of the below medication which has been pended for you:     Requested Prescriptions     Pending Prescriptions Disp Refills    ALPRAZolam (XANAX) 0.25 MG tablet 45 tablet 0     Sig: Take by mouth.  Take 1/2 in am and 1 tab at [facility]       Last Appointment Date: 7/6/2021  Next Appointment Date: 1/11/2022    Allergies   Allergen Reactions    Pcn [Penicillins] Swelling     As a child  Pt tolerated ceftriaxone , and keflex with no intolerance    Sulfa Antibiotics     Cefdinir Other (See Comments)     Pt tolerated ceftriaxone , and keflex with no intolerance

## 2021-12-15 ENCOUNTER — OFFICE VISIT (OUTPATIENT)
Dept: UROLOGY | Age: 86
End: 2021-12-15
Payer: MEDICARE

## 2021-12-15 VITALS
BODY MASS INDEX: 24.92 KG/M2 | DIASTOLIC BLOOD PRESSURE: 56 MMHG | HEART RATE: 86 BPM | WEIGHT: 178 LBS | SYSTOLIC BLOOD PRESSURE: 116 MMHG | HEIGHT: 71 IN

## 2021-12-15 DIAGNOSIS — N39.0 RECURRENT UTI: ICD-10-CM

## 2021-12-15 DIAGNOSIS — R33.9 RETENTION OF URINE: ICD-10-CM

## 2021-12-15 DIAGNOSIS — N13.8 BPH WITH OBSTRUCTION/LOWER URINARY TRACT SYMPTOMS: Primary | ICD-10-CM

## 2021-12-15 DIAGNOSIS — N40.1 BPH WITH OBSTRUCTION/LOWER URINARY TRACT SYMPTOMS: Primary | ICD-10-CM

## 2021-12-15 DIAGNOSIS — R97.20 ELEVATED PSA: ICD-10-CM

## 2021-12-15 PROCEDURE — 1123F ACP DISCUSS/DSCN MKR DOCD: CPT | Performed by: UROLOGY

## 2021-12-15 PROCEDURE — G8427 DOCREV CUR MEDS BY ELIG CLIN: HCPCS | Performed by: UROLOGY

## 2021-12-15 PROCEDURE — 3288F FALL RISK ASSESSMENT DOCD: CPT | Performed by: UROLOGY

## 2021-12-15 PROCEDURE — 1036F TOBACCO NON-USER: CPT | Performed by: UROLOGY

## 2021-12-15 PROCEDURE — 4040F PNEUMOC VAC/ADMIN/RCVD: CPT | Performed by: UROLOGY

## 2021-12-15 PROCEDURE — G8420 CALC BMI NORM PARAMETERS: HCPCS | Performed by: UROLOGY

## 2021-12-15 PROCEDURE — 99212 OFFICE O/P EST SF 10 MIN: CPT | Performed by: UROLOGY

## 2021-12-15 PROCEDURE — 99213 OFFICE O/P EST LOW 20 MIN: CPT | Performed by: UROLOGY

## 2021-12-15 PROCEDURE — 0518F FALL PLAN OF CARE DOCD: CPT | Performed by: UROLOGY

## 2021-12-15 PROCEDURE — G8484 FLU IMMUNIZE NO ADMIN: HCPCS | Performed by: UROLOGY

## 2021-12-15 RX ORDER — NITROFURANTOIN 25; 75 MG/1; MG/1
100 CAPSULE ORAL DAILY
Qty: 90 CAPSULE | Refills: 3 | Status: SHIPPED | OUTPATIENT
Start: 2021-12-15 | End: 2022-03-15

## 2021-12-15 RX ORDER — TAMSULOSIN HYDROCHLORIDE 0.4 MG/1
0.4 CAPSULE ORAL 2 TIMES DAILY
Qty: 180 CAPSULE | Refills: 3 | Status: SHIPPED | OUTPATIENT
Start: 2021-12-15 | End: 2022-10-19

## 2021-12-15 NOTE — PROGRESS NOTES
Ofelia Gray MD   Urology Clinic follow up      Patient:  Celestino Cook  YOB: 1934  Date: 12/15/2021    HISTORY OF PRESENT ILLNESS:   The patient is a 80 y.o. male who presents today for evaluation of the following problem(s): hematuria, BPH, retention, rec UTIs  Overall the problem(s) : stable  Associated Symptoms: No dysuria, gross hematuria. Pain Severity:      Summary of old records: Prostate:  >50 grams, hypervascular, obstructive  Bladder: No tumor noted. High median bar, multiple diverticula, cellules  Neg hematuria work up in the past  On flomax  Poor ECOG  (Patient's old records, notes and chart reviewed and summarized above.)      Today:  Follow up 1 year  He states no infection or pain in bladder   flomax BID. LUTS stable. No aggravating factors. No symptomatic UTIs  No gross hematuria. Denies pelvic pain. Last several PSA's:  Lab Results   Component Value Date    PSA 6.63 (H) 12/28/2016    PSA 4.73 (H) 12/30/2015    PSA 6.28 (H) 01/06/2015       Last total testosterone:  No results found for: TESTOSTERONE    Urinalysis today:  No results found for this visit on 12/15/21.       Last BUN and creatinine:  Lab Results   Component Value Date    BUN 11 10/05/2021     Lab Results   Component Value Date    CREATININE 1.01 10/05/2021       Imaging Reviewed during this Office Visit:   (results were independently reviewed by physician and radiology report verified)    PAST MEDICAL, FAMILY AND SOCIAL HISTORY:  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 LIMA TO LAD, SVG TO DIAGONAL2    Dementia associated with 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 behavioral disturbance ([facility] 9/21/2018    Dermatophytosis of nail 1/10/2014    Diverticulosis     Elevated PSA     Hemorrhoids     History of measles childhood    History of mumps childhood    Hyperlipidemia     Hypertension     Hypothyroidism 9/4/2018    Mass of upper lobe of right lung 2/21/2018    Occult blood positive stool     refuses colonoscopy    Osteoarthritis     Overweight     Paroxysmal atrial fibrillation ([address] 11/7/2013    Pneumonia due to organism     Pseudophakia, left eye     PSVT (paroxysmal supraventricular tachycardia) ([facility]     Recurrent UTI 3/13/2018    Retinal detachment     left, history of     Seborrheic dermatitis     Sepsis due to urinary tract infection ([address] 8/15/2018    Urinary tract infection without hematuria 3/28/2018     Past Surgical History:   Procedure Laterality Date    ABSCESS DRAINAGE  [id number]    multiple    APPENDECTOMY  1940 and [facility] Left 05/07/2013    COLONOSCOPY  06/14/2021    Dr Neil Rivera Left 12/22/2011    detached retina    SKIN TAG REMOVAL  11/01/1999    TONSILLECTOMY       Family History   Problem Relation Age of Onset    Diabetes Mother     Osteoporosis Mother     Other Father         complications of prostate surgery (bleeding)    Stroke Sister     Other Sister         respiratory failure     Outpatient Medications Marked as Taking for the 12/15/21 encounter (Office Visit) with Jess Das MD   Medication Sig Dispense Refill    tamsulosin (FLOMAX) 0.4 MG capsule Take 1 capsule by mouth 2 times daily 180 capsule 3    nitrofurantoin, macrocrystal-monohydrate, (MACROBID) 100 MG capsule Take 1 capsule by mouth daily 90 capsule 3    ALPRAZolam (XANAX) 0.25 MG tablet Take 1/2 in am and 1 tab at HS 45 tablet 0    amLODIPine (NORVASC) 5 MG tablet Take 1 tablet by mouth daily 30 tablet 3    psyllium (KONSYL) 28.3 % PACK Take 1 packet by mouth 2 times daily      hydrocortisone (PREPARATION H) 1 % cream Apply topically to affected area as directed prn (Preparation H)      Compression Stockings MISC by Does not apply route DAVID hose- on in am, off in pm (Currently doing prn now)      amiodarone (CORDARONE) 200 MG tablet Take 100 mg by mouth daily      rivaroxaban (XARELTO) 20 MG TABS tablet Take 20 mg by mouth every morning      acetaminophen (TYLENOL) 325 MG tablet Take 650 mg by mouth every 4 hours as needed for Pain      Mineral Oil OIL Use as directed on bottle for occasional constipation      Dextromethorphan-guaiFENesin  MG/5ML SYRP Take 5 mLs by mouth every 6 hours as needed for Cough (Robafen DM)      simethicone (MYLICON) 40 CC/1.9KM drops Take 0.6 mLs by mouth 4 times daily as needed (gas) 60 mL 3    metoprolol tartrate (LOPRESSOR) 25 MG tablet Take 0.5 tablets by mouth 2 times daily 90 tablet 1    levothyroxine (SYNTHROID) 112 MCG tablet Take 1 tablet by mouth Daily 30 tablet 11    polyvinyl alcohol (ARTIFICIAL TEARS) 1.4 % ophthalmic solution Place 1 drop into both eyes every 6 hours as needed      Multiple Vitamins-Minerals (PRESERVISION AREDS PO) Take 1 capsule by mouth daily       isosorbide mononitrate (IMDUR) 30 MG extended release tablet Take 1 tablet by mouth daily 30 tablet 3    lisinopril (PRINIVIL;ZESTRIL) 40 MG tablet Take 1 tablet by mouth daily 30 tablet 3    Multiple Vitamins-Minerals (MULTIVITAMIN ADULT PO) Take 1 tablet by mouth daily      potassium chloride (KLOR-CON M) 20 MEQ extended release tablet Take 20 mEq by mouth daily      fluticasone (FLONASE) 50 MCG/ACT nasal spray 1 spray by Nasal route daily 1 Bottle 0    simvastatin (ZOCOR) 20 MG tablet TAKE 1 TABLET BY MOUTH EVERY OTHER DAY  30 tablet 11    Coenzyme Q-10 100 MG CAPS Take 100 mg by mouth every other day.  tolnaftate (TINACTIN) 1 % external solution Apply topically nightly to toenails per Dr. Mihai Russ.       aspirin 81 MG tablet Take 81 mg by mouth daily.  GLUCOSAMINE-CHONDROITIN PO Take 3 capsules by mouth daily at 1800 (500-400 mg)         Pcn [penicillins], Sulfa antibiotics, and Cefdinir  Social History     Tobacco Use   Smoking Status Never Smoker   Smokeless Tobacco Never Used   Tobacco Comment    never smoked iwona rrt,2/27/2018       Social History     Substance and Sexual Activity   Alcohol Use No    Alcohol/week: 0.0 standard drinks       REVIEW OF SYSTEMS:  Constitutional: negative  Eyes: negative  Respiratory: negative  Cardiovascular: negative  Gastrointestinal: negative  Musculoskeletal: negative  Genitourinary: negative except for what is in HPI  Skin: negative   Neurological: negative  Hematological/Lymphatic: negative  Psychological: negative    Physical Exam:    This a 80 y.o. male   Vitals:    12/15/21 0921   BP: (!) 116/56   Pulse: 86     Constitutional: Patient in no acute distress; Neuro: alert and oriented to person place and time. Psych: Mood and affect normal.  Skin: Normal        Assessment and Plan      1. BPH with obstruction/lower urinary tract symptoms    2. Recurrent UTI    3. Retention of urine    4. Elevated PSA           Plan:       No acute issues or changes since 1 year  BPH- stable overall, no symptomatic UTI's  Continue flomax  Recurrent UTIs- prophylactic macrobid daily, refilled  Meds refilled  Return in about 1 year (around 12/15/2022).            Jose Mcginnis MD  [facility] Urology

## 2021-12-27 ENCOUNTER — TELEPHONE (OUTPATIENT)
Dept: INTERNAL MEDICINE | Age: 86
End: 2021-12-27

## 2021-12-27 DIAGNOSIS — F41.9 ANXIETY: ICD-10-CM

## 2021-12-27 RX ORDER — ALPRAZOLAM 0.25 MG/1
TABLET ORAL
Qty: 45 TABLET | Refills: 5 | Status: SHIPPED | OUTPATIENT
Start: 2021-12-27 | End: 2022-01-26

## 2022-01-10 ENCOUNTER — TELEPHONE (OUTPATIENT)
Dept: INTERNAL MEDICINE | Age: 87
End: 2022-01-10

## 2022-01-19 ENCOUNTER — OFFICE VISIT (OUTPATIENT)
Dept: NEUROLOGY | Age: 87
End: 2022-01-19
Payer: MEDICARE

## 2022-01-19 VITALS
WEIGHT: 174 LBS | HEART RATE: 68 BPM | OXYGEN SATURATION: 96 % | SYSTOLIC BLOOD PRESSURE: 122 MMHG | BODY MASS INDEX: 24.36 KG/M2 | HEIGHT: 71 IN | DIASTOLIC BLOOD PRESSURE: 62 MMHG

## 2022-01-19 DIAGNOSIS — I10 PRIMARY HYPERTENSION: ICD-10-CM

## 2022-01-19 DIAGNOSIS — F41.9 ANXIETY: ICD-10-CM

## 2022-01-19 DIAGNOSIS — F02.80 DEMENTIA ASSOCIATED WITH OTHER UNDERLYING DISEASE WITHOUT BEHAVIORAL DISTURBANCE (HCC): Primary | ICD-10-CM

## 2022-01-19 DIAGNOSIS — I48.91 ATRIAL FIBRILLATION, UNSPECIFIED TYPE (HCC): ICD-10-CM

## 2022-01-19 DIAGNOSIS — Z91.81 H/O FALL: ICD-10-CM

## 2022-01-19 PROCEDURE — 4040F PNEUMOC VAC/ADMIN/RCVD: CPT | Performed by: PSYCHIATRY & NEUROLOGY

## 2022-01-19 PROCEDURE — 99214 OFFICE O/P EST MOD 30 MIN: CPT | Performed by: PSYCHIATRY & NEUROLOGY

## 2022-01-19 PROCEDURE — G8420 CALC BMI NORM PARAMETERS: HCPCS | Performed by: PSYCHIATRY & NEUROLOGY

## 2022-01-19 PROCEDURE — 1036F TOBACCO NON-USER: CPT | Performed by: PSYCHIATRY & NEUROLOGY

## 2022-01-19 PROCEDURE — G8427 DOCREV CUR MEDS BY ELIG CLIN: HCPCS | Performed by: PSYCHIATRY & NEUROLOGY

## 2022-01-19 PROCEDURE — 0518F FALL PLAN OF CARE DOCD: CPT | Performed by: PSYCHIATRY & NEUROLOGY

## 2022-01-19 PROCEDURE — G8484 FLU IMMUNIZE NO ADMIN: HCPCS | Performed by: PSYCHIATRY & NEUROLOGY

## 2022-01-19 PROCEDURE — 1123F ACP DISCUSS/DSCN MKR DOCD: CPT | Performed by: PSYCHIATRY & NEUROLOGY

## 2022-01-19 PROCEDURE — 3288F FALL RISK ASSESSMENT DOCD: CPT | Performed by: PSYCHIATRY & NEUROLOGY

## 2022-01-19 RX ORDER — ASPIRIN 81 MG/1
1 TABLET, COATED ORAL DAILY
COMMUNITY
Start: 2022-01-12 | End: 2022-01-19

## 2022-01-19 ASSESSMENT — ENCOUNTER SYMPTOMS
EYE PAIN: 0
VOICE CHANGE: 0
COLOR CHANGE: 0
DIARRHEA: 0
BACK PAIN: 1
WHEEZING: 0
EYE DISCHARGE: 0
SORE THROAT: 0
EYE REDNESS: 0
VISUAL CHANGE: 0
CHOKING: 0
SINUS PRESSURE: 0
BLOOD IN STOOL: 0
ABDOMINAL PAIN: 0
ABDOMINAL DISTENTION: 0
APNEA: 0
EYE ITCHING: 0
VOMITING: 0
FACIAL SWELLING: 0
SHORTNESS OF BREATH: 0
CHEST TIGHTNESS: 0
TROUBLE SWALLOWING: 0
COUGH: 0
CONSTIPATION: 0
PHOTOPHOBIA: 0
NAUSEA: 0
BOWEL INCONTINENCE: 0

## 2022-01-19 NOTE — PATIENT INSTRUCTIONS
* FALL   PRECAUTIONS. *  USE   WALKING  ASSISTANCE  DEVICES     QUAD  CANE         *   SUPERVISED    CARE        *   ADEQUATE   FLUID  INTAKE   AND  ELECTROLYTE  BALANCE             * KEEP  DAIRY  OF   THE  NEUROLOGICAL  SYMPTOMS          *  TO  MAINTAIN  REGULAR  SLEEP  WAKE  CYCLES. *   TO  HAVE  ADEQUATE  REST  AND   SLEEP    HOURS.          *    AVOID  USAGE OF   TOBACCO,  EXCESSIVE  ALCOHOL                AND   ILLEGAL   SUBSTANCES,  IF  ANY          *  Maintain   Healthy  Life Style    With   Periodic  Monitoring  Of         Any  Medical  Conditions  Including   Elevated  Blood  Pressure,  Lipid  Profile,       Blood  Sugar levels  And   Heart  Disease. *   Period   Screening  For  Cancers  Involving  Breast,  Colon,         Lungs  And  Other  Organs  As  Applicable,           In consultation   With  Your  Primary Care Providers. *  If   There is  Any  Significant  Worsening   Of  Current  Symptoms  And             Or  If    Any additional  New  Neurological  Symptoms  and          Significant  Concerns   Should  Call  911 or  Go  To  Emergency  Department            For  Further  Immediate  Evaluation.

## 2022-01-19 NOTE — PROGRESS NOTES
[facility]  Neurology  [address] [place] [address]  Phone: [phone]   Fax: [phone]    SUBJECTIVE:     PATIENT ID:  Rody Rubalcava is a  RIGHT  HANDED 80 y.o. male. Neurologic Problem  The patient's primary symptoms include clumsiness, focal sensory loss, a loss of balance and memory loss. The patient's pertinent negatives include no altered mental status, focal weakness, near-syncope, slurred speech, syncope, visual change or weakness. This is a chronic problem. Episode onset: MORE  THAN   2  YEARS. The neurological problem developed insidiously. The problem is unchanged. There was lower extremity, upper extremity, right-sided and left-sided focality noted. Associated symptoms include back pain. Pertinent negatives include no abdominal pain, auditory change, aura, bladder incontinence, bowel incontinence, chest pain, confusion, diaphoresis, dizziness, fatigue, fever, headaches, light-headedness, nausea, neck pain, palpitations, shortness of breath, vertigo or vomiting. Past treatments include nothing. The treatment provided no relief. His past medical history is significant for dementia. There is no history of a bleeding disorder, a clotting disorder, a CVA, head trauma, liver disease, mood changes or seizures. History obtained from  The patient       [place] [address]        and other  available medical records were  Also  reviewed. The  Duration,  Quality,  Severity,  Location,  Timing,  Context,  Modifying  Factors   Of   The   Chief   Complaint       And  Present  Illness   Was   Reviewed   In   Chronological   Manner.                                             CURRENT PATIENT'S  MAIN  CONCERNS INCLUDE :                       1)        H/O   CHRONIC   MILD      MEMORY  PROBLEMS  /   DEMENTIA                                       FOR    4      YEARS                                 -   FAIRLY    STABLE 2)  PATIENT  NOT  CONCERNED   ABOUT  MEMORY  PROBLEMS   AND                                REFUSED      MEDICATION  FOR  DEMENTIA                                  3)    PREVIOUS   H/O   FALL   WITH  MILD  HEAD INJURY  IN   SEPT. 2018                            HAD  CT  HEAD  AND  C  SPINE    ON   9/19/2018                                 SHOWED  NO  ACUTE  PATHOLOGY                                      -  NO   H/O     RECURRENCE OF  FALLING                              4)    PERIPHERAL  POLYNEUROPATHY                                      -    STABLE                             5)    H/O   CHRONIC  MILD     BALANCE PROBLEMS                                             -    STABLE                                               HIGH  RISK  FOR  FALLS                              6)   H/O     CHRONIC     ATRIAL  FIBRILLATION                                 -     ON    XARELTO                              7)         MULTIPLE  CARDIAC  CONDITIONS                                  BEING   FOLLOWED  BY  HIS  CARDIOLOGY                           8)   MULTIPLE  CO MORBID  MEDICAL  CONDITIONS                                  BEING  FOLLOWED  BY  HIS    PCP. 9)    HIGH  RISK  FOR  STROKE,  CEREBRAL ISCHEMIA                               10)     CT   HEAD  ,  CT  SPINE    CAROTID  DOPPLER,   EEG      IN    SEPT. 2018                                  SHOWED  NO  SIGNIFICANT    PATHOLOGIES                           11)   H/O  MILD  ANXIETY  -    ON  XANAX                                         -   STABLE                             12)    PATIENT  HAS  BEEN     A  RESIDENT   OF  SUPERVISED  LIVING  FACILITY                                                          13)      PATIENT  DENIES  ANY  NEW  NEUROLOGICAL   CONCERNS. PATIENT   NOT  INTERESTED  IN    ANY  MEDICATION   FOR                                      HIS   MEMORY  PROBLEMS. PRECIPITATING  FACTORS: including  fever/infection, exertion/relaxation, position change, stress, weather       Change,   medications/alcohol, time of day/darkness/light  Are    Absent                                                               MODIFYING  FACTORS:  fever/infection, exertion/relaxation, position change, stress, weather change,     medications/alcohol, time of day/darkness/light  Are  absent         Patient   Indicates   The  Presence   And  The  Absence  Of  The  Following  Associated         And   Additional  Neurological    Symptoms:                                Balance  And coordination problems  present           Gait problems     absent            Headaches      absent              Migraines           absent           Memory problems        Present             Confusion        absent            Paresthesia numbness          absent           Seizures  And  Starring  Episodes           absent           Syncope,  Near  syncopal episodes         absent           Speech problems           absent             Swallowing  Problems      absent            Dizziness,  Light headedness           absent                        Vertigo        absent             Generalized   Weakness    absent              focal  Weakness     absent             Tremors         absent              Sleep  Problems     absent             History  Of   Recent   Head  Injury     absent             History  Of   Recent  TIA     absent             History  Of   Recent    Stroke     absent             Neck  Pain and  Neck muscle  Spasms  Absent               Radiating  down   And   Weakness           absent            Lower back   Pain  And     Spasms  Absent              Radiating    Down   And   Weakness          absent                H/O   FALLS        absent               History  Of   Visual  Symptoms    Absent                  Associated   Diplopia       absent                                Also Additional   Symptoms   Present    As  Documented    In   The detailed      Review  Of  Systems   And    Please   Refer   To    Them for   Additional  Information. Any components  That are either  Unobtainable  Or  Limited  In   HPI, ROS  And/or PFSH   Are   Due       To   Patient's  Medical  Problems,  Clinical  Condition and/or lack of other  Alternate resources.           RECORDS   REVIEWED:    historical medical records       INFORMATION   REVIEWED:     MEDICAL   HISTORY,     SURGICAL   HISTORY,   MEDICATIONS   LIST,   ALLERGIES AND  DRUG  INTOLERANCES,     FAMILY   HISTORY,  SOCIAL  HISTORY,    PROBLEM  LIST   FOR  PATIENT  CARE   COORDINATION        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 LIM[place] TO LAD, SVG TO DIAGONAL2    Dementia associated with 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 behavioral disturbance ([address] 9/21/2018    Dermatophytosis of nail 1/10/2014    Diverticulosis     Elevated PSA     Hemorrhoids     History of measles childhood    History of mumps childhood    Hyperlipidemia     Hypertension     Hypothyroidism 9/4/2018    Mass of upper lobe of right lung 2/21/2018    Occult blood positive stool     refuses colonoscopy    Osteoarthritis     Overweight     Paroxysmal atrial fibrillation ([address] 11/7/2013    Pneumonia due to organism     Pseudophakia, left eye     PSVT (paroxysmal supraventricular tachycardia) ([address]     Recurrent UTI 3/13/2018    Retinal detachment     left, history of     Seborrheic dermatitis     Sepsis due to urinary tract infection ([address] 8/15/2018    Urinary tract infection without hematuria 3/28/2018         Past Surgical History:   Procedure Laterality Date    ABSCESS DRAINAGE  [id number]    multiple    APPENDECTOMY  1940 and 1955    CATARACT REMOVAL Left 05/07/2013    COLONOSCOPY  06/14/2021    Dr Radha Morse Left 12/22/2011    detached retina    SKIN TAG REMOVAL  11/01/1999    TONSILLECTOMY           Current Outpatient Medications   Medication Sig Dispense Refill    ALPRAZolam (XANAX) 0.25 MG tablet Take 1/2 in am and 1 tab at HS 45 tablet 5    tamsulosin (FLOMAX) 0.4 MG capsule Take 1 capsule by mouth 2 times daily 180 capsule 3    nitrofurantoin, macrocrystal-monohydrate, (MACROBID) 100 MG capsule Take 1 capsule by mouth daily 90 capsule 3    amLODIPine (NORVASC) 5 MG tablet Take 1 tablet by mouth daily 30 tablet 3    psyllium (KONSYL) 28.3 % PACK Take 1 packet by mouth 2 times daily      hydrocortisone (PREPARATION H) 1 % cream Apply topically to affected area as directed prn (Preparation H)      Compression Stockings MISC by Does not apply route DAVID hose- on in am, off in pm (Currently doing prn now)      amiodarone (CORDARONE) 200 MG tablet Take 100 mg by mouth daily      rivaroxaban (XARELTO) 20 MG TABS tablet Take 20 mg by mouth every morning      acetaminophen (TYLENOL) 325 MG tablet Take 650 mg by mouth every 4 hours as needed for Pain      Mineral Oil OIL Use as directed on bottle for occasional constipation      Dextromethorphan-guaiFENesin  MG/5ML SYRP Take 5 mLs by mouth every 6 hours as needed for Cough (Robafen DM)      simethicone (MYLICON) 40 WT/5.7NB drops Take 0.6 mLs by mouth 4 times daily as needed (gas) 60 mL 3    metoprolol tartrate (LOPRESSOR) 25 MG tablet Take 0.5 tablets by mouth 2 times daily 90 tablet 1    levothyroxine (SYNTHROID) 112 MCG tablet Take 1 tablet by mouth Daily 30 tablet 11    polyvinyl alcohol (ARTIFICIAL TEARS) 1.4 % ophthalmic solution Place 1 drop into both eyes every 6 hours as needed      Multiple Vitamins-Minerals (PRESERVISION AREDS PO) Take 1 capsule by mouth daily       isosorbide mononitrate (IMDUR) 30 MG extended release tablet Take 1 tablet by mouth daily 30 tablet 3    lisinopril (PRINIVIL;ZESTRIL) 40 MG tablet Take 1 tablet by mouth daily 30 tablet 3    Multiple Vitamins-Minerals (MULTIVITAMIN ADULT PO) Take 1 tablet by mouth daily      potassium chloride (KLOR-CON M) 20 MEQ extended release tablet Take 20 mEq by mouth daily      fluticasone (FLONASE) 50 MCG/ACT nasal spray 1 spray by Nasal route daily 1 Bottle 0    simvastatin (ZOCOR) 20 MG tablet TAKE 1 TABLET BY MOUTH EVERY OTHER DAY  30 tablet 11    Coenzyme Q-10 100 MG CAPS Take 100 mg by mouth every other day.  tolnaftate (TINACTIN) 1 % external solution Apply topically nightly to toenails per Dr. Mine Segundo.  aspirin 81 MG tablet Take 81 mg by mouth daily.  GLUCOSAMINE-CHONDROITIN PO Take 3 capsules by mouth daily at 1800 (500-400 mg)       No current facility-administered medications for this visit.          Allergies   Allergen Reactions    Pcn [Penicillins] Swelling     As a child  Pt tolerated ceftriaxone , and keflex with no intolerance    Sulfa Antibiotics     Cefdinir Other (See Comments)     Pt tolerated ceftriaxone , and keflex with no intolerance         Family History   Problem Relation Age of Onset    Diabetes Mother     Osteoporosis Mother     Other Father         complications of prostate surgery (bleeding)    Stroke Sister     Other Sister         respiratory failure         Social History     Socioeconomic History    Marital status: Single     Spouse name: Not on file    Number of children: Not on file    Years of education: Not on file    Highest education level: Not on file   Occupational History    Not on file   Tobacco Use    Smoking status: Never Smoker    Smokeless tobacco: Never Used    Tobacco comment: never smoked iwona rrt,2/27/2018   Vaping Use  Vaping Use: Never used   Substance and Sexual Activity    Alcohol use: No     Alcohol/week: 0.0 standard drinks    Drug use: No    Sexual activity: Not on file   Other Topics Concern    Not on file   Social History Narrative    Not on file     Social Determinants of Health     Financial Resource Strain:     Difficulty of Paying Living Expenses: Not on file   Food Insecurity:     Worried About Running Out of Food in the Last Year: Not on file    Trixie of Food in the Last Year: Not on file   Transportation Needs:     Lack of Transportation (Medical): Not on file    Lack of Transportation (Non-Medical):  Not on file   Physical Activity:     Days of Exercise per Week: Not on file    Minutes of Exercise per Session: Not on file   Stress:     Feeling of Stress : Not on file   Social Connections:     Frequency of Communication with Friends and Family: Not on file    Frequency of Social Gatherings with Friends and Family: Not on file    Attends [employer] Services: Not on file    Active Member of [address] [place] [address] or Organizations: Not on file    Attends Club or Organization Meetings: Not on file    Marital Status: Not on file   Intimate Partner Violence:     Fear of Current or Ex-Partner: Not on file    Emotionally Abused: Not on file    Physically Abused: Not on file    Sexually Abused: Not on file   Housing Stability:     Unable to Pay for Housing in the Last Year: Not on file    Number of [place] in the Last Year: Not on file    Unstable Housing in the Last Year: Not on file       Vitals:    01/19/22 1025   BP: 122/62   Pulse: 68   SpO2: 96%         Wt Readings from Last 3 Encounters:   01/19/22 174 lb (78.9 kg)   12/15/21 178 lb (80.7 kg)   11/18/21 178 lb (80.7 kg)         BP Readings from Last 3 Encounters:   01/19/22 122/62   12/15/21 (!) 116/56   11/18/21 104/61       Hematology and Coagulation  Lab Results   Component Value Date    WBC 5.8 10/05/2021    RBC 4.39 10/05/2021    HGB 12.9 10/05/2021    HCT 38.8 10/05/2021    MCV 88.4 10/05/2021    MCH 29.4 10/05/2021    MCHC 33.2 10/05/2021    RDW 14.5 10/05/2021     10/05/2021    MPV 10.5 10/05/2021     Chemistries  Lab Results   Component Value Date     10/05/2021    K 4.1 10/05/2021     10/05/2021    CO2 26 10/05/2021    BUN 11 10/05/2021    CREATININE 1.01 10/05/2021    CALCIUM 9.1 10/05/2021    PROT 6.8 10/05/2021    LABALBU 3.7 10/05/2021    BILITOT 0.50 10/05/2021    ALKPHOS 105 10/05/2021    AST 18 10/05/2021    ALT 9 10/05/2021     Lab Results   Component Value Date    ALKPHOS 105 10/05/2021    ALT 9 10/05/2021    AST 18 10/05/2021    PROT 6.8 10/05/2021    BILITOT 0.50 10/05/2021    LABALBU 3.7 10/05/2021     Lab Results   Component Value Date    BUN 11 10/05/2021    CREATININE 1.01 10/05/2021     Lab Results   Component Value Date    CALCIUM 9.1 10/05/2021    MG 2.0 03/14/2018     Lab Results   Component Value Date    AST 18 10/05/2021    ALT 9 10/05/2021       Lab Results   Component Value Date    URICACID 4.7 07/13/2018     Lab Results   Component Value Date    CKTOTAL 110 02/12/2018       Review of Systems   Constitutional: Negative for appetite change, chills, diaphoresis, fatigue, fever and unexpected weight change. HENT: Negative for congestion, dental problem, drooling, ear discharge, ear pain, facial swelling, hearing loss, mouth sores, nosebleeds, postnasal drip, sinus pressure, sore throat, tinnitus, trouble swallowing and voice change. Eyes: Negative for photophobia, pain, discharge, redness, itching and visual disturbance. Respiratory: Negative for apnea, cough, choking, chest tightness, shortness of breath and wheezing. Cardiovascular: Negative for chest pain, palpitations, leg swelling and near-syncope. Gastrointestinal: Negative for abdominal distention, abdominal pain, blood in stool, bowel incontinence, constipation, diarrhea, nausea and vomiting.    Endocrine: Negative for cold intolerance, heat intolerance, polydipsia, polyphagia and polyuria. Genitourinary: Negative for bladder incontinence. Musculoskeletal: Positive for back pain. Negative for arthralgias, gait problem, joint swelling, myalgias, neck pain and neck stiffness. Skin: Negative for color change, pallor, rash and wound. Allergic/Immunologic: Negative for environmental allergies, food allergies and immunocompromised state. Neurological: Positive for loss of balance. Negative for dizziness, vertigo, tremors, focal weakness, seizures, syncope, facial asymmetry, speech difficulty, weakness, light-headedness, numbness and headaches. Hematological: Negative for adenopathy. Does not bruise/bleed easily. Psychiatric/Behavioral: Positive for decreased concentration and memory loss. Negative for agitation, behavioral problems, confusion, dysphoric mood, hallucinations, self-injury, sleep disturbance and suicidal ideas. The patient is not nervous/anxious and is not hyperactive. OBJECTIVE:    Physical Exam  Constitutional:       Appearance: He is well-developed. HENT:      Head: Normocephalic and atraumatic. No raccoon eyes or Rico's sign. Right Ear: External ear normal.      Left Ear: External ear normal.      Nose: Nose normal.   Eyes:      Conjunctiva/sclera: Conjunctivae normal.   Neck:      Thyroid: No thyroid mass or thyromegaly. Vascular: No carotid bruit. Trachea: No tracheal deviation. Meningeal: Brudzinski's sign and Kernig's sign absent. Cardiovascular:      Rate and Rhythm: Normal rate and regular rhythm. Pulmonary:      Effort: Pulmonary effort is normal.   Musculoskeletal:         General: No tenderness. Cervical back: Normal range of motion and neck supple. No rigidity. No muscular tenderness. Normal range of motion. Skin:     General: Skin is warm. Coloration: Skin is not pale. Findings: No erythema or rash. Nails: There is no clubbing.    Psychiatric: Attention and Perception: He is attentive. Mood and Affect: Mood is anxious. Mood is not depressed. Affect is not labile, blunt, angry or inappropriate. Behavior: Behavior is slowed. Behavior is not agitated, aggressive, withdrawn, hyperactive or combative. Behavior is cooperative. Thought Content: Thought content is not paranoid or delusional. Thought content does not include homicidal or suicidal ideation. Thought content does not include homicidal or suicidal plan. Cognition and Memory: Cognition is impaired. Memory is impaired. He exhibits impaired recent memory and impaired remote memory. Judgment: Judgment is not impulsive or inappropriate. NEUROLOGICAL EXAMINATION :      A) MENTAL STATUS:                   Alert and  oriented  To  Person. No Aphasia. No  Dysarthria. Able   To  Follow   SIMPLE   commands without   Any  Difficulty. No right  To left confusion. SLOW  Speech  And language function. Insight and  Judgment ,Fund  Of  Knowledge DECREASED                Recent  And  Remote memory  DECREASED                Attention &Concentration are   DECREASED                                                 B) CRANIAL NERVES :             2 CN : Visual  Acuity  And  Visual fields  within normal limits                        Fundi  Could  Not  Be  Could  Not  Be  Evaluated. 3,4,6 CN : Both  Pupils are   Reactive and  Equal.                            Extraocular   Movements  Are  Intact. No  Nystagmus. No  ABDELRAHMAN. No  Afferent  Pupillary  Defect noted. 5 CN :  Normal  Facial sensations and Corneal  Reflexes           7 CN :  Normal  Facial  Symmetry  And  Strength. No facial  Weakness.            8 CN :  Hearing  Appears   DECREASED          9, 10 CN: Normal spontaneous, reflex palate movements         11 CN:   Normal  Shoulder shrug and Strength         12 CN :   Normal  Tongue movements and  Tongue  In midline                        No tongue   Fasciculations or atrophy         C) MOTOR  EXAM:                 Strength  In upper  And  Lower extremities   within normal limits                          Muscle  Tone  In upper  And  Lower  Extremities  Normal                No rigidity. No  Spasticity. Bradykinesia   Absent                   No  Asterixis. Sustention  Tremor , Resting  Tremor   absent                    No other  Abnormal  Movements noted             D) SENSORY :               light touch, pinprick, position  And  Vibration  DECREASED                       E) REFLEXES:                   Deep  Tendon  Reflexes     DECREASED                    No pathological  Reflexes  Bilaterally.                                   F) COORDINATION  AND  GAIT :                                Station and  Gait    SLOW                                       Romberg's test   POSITIVE                          Ataxia negative      ASSESSMENT:          Patient Active Problem List   Diagnosis    Hyperlipidemia    Osteoarthritis    Allergic rhinitis    Diverticulosis    Anxiety    Atrial fibrillation ([address]    Coronary artery disease involving native coronary artery of native heart    Mass of upper lobe of right lung    Chronic systolic CHF (congestive heart failure) (HCC)    Benign prostatic hyperplasia with incomplete bladder emptying    Hypothyroidism    Hypertension    Dementia associated with 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 behavioral disturbance ([address]    Intermediate stage nonexudative age-related macular degeneration of both eyes    Combined forms of age-related cataract of right eye    Pseudophakia of left eye    Class 1 obesity with serious comorbidity and body mass index (BMI) of 30.0 to 30.9 in adult    Overweight    H/O fall           ULTRASOUND EVALUATION OF THE CAROTID ARTERIES       9/24/2018     COMPARISON:   None.       HISTORY:   ORDERING SYSTEM PROVIDED HISTORY: Dementia associated with other underlying   disease without behavioral disturbance       FINDINGS:       RIGHT:       The right common carotid artery demonstrates peak systolic velocity of 85   cm/sec.       The right internal carotid artery demonstrates the systolic velocity of 53   cm/sec.       The external carotid artery is patent.  The vertebral artery demonstrates   normal antegrade flow.       Minimal calcified plaque is seen at the right carotid bulb.  No significant   stenosis is seen.       ICA/CCA ratio of 0.6.           LEFT:       The left common carotid artery demonstrates peak systolic velocity of 77   cm/sec.       The left internal carotid artery demonstrates the systolic velocity of 49   cm/sec.       The external carotid artery is patent.  The vertebral artery demonstrates   normal antegrade flow.       Mild calcified plaque is seen within the left internal carotid artery without   significant stenosis.       ICA/CCA ratio of 0.6           Impression   The right internal carotid artery demonstrates 0-50% stenosis.       The left internal carotid artery demonstrates 0-50% stenosis.       Bilateral vertebral arteries are patent with flow in the normal direction       CT OF THE HEAD WITHOUT CONTRAST; CT OF THE CERVICAL SPINE WITHOUT CONTRAST   9/19/2018 2:00 pm       TECHNIQUE:   CT of the head was performed without the administration of intravenous   contrast. Dose modulation, iterative reconstruction, and/or weight based   adjustment of the mA/kV was utilized to reduce the radiation dose to as low   as reasonably achievable.; CT of the cervical spine was performed without the   administration of intravenous contrast. Multiplanar reformatted images are   provided for review.  Dose modulation, iterative reconstruction, and/or weight   based adjustment of the mA/kV was utilized to reduce the radiation dose to as   low as reasonably achievable.       COMPARISON:   None.       HISTORY:   ORDERING SYSTEM PROVIDED HISTORY: fall yesterday on blood thinners   TECHNOLOGIST PROVIDED HISTORY:       Ordering Physician Provided Reason for Exam: Patient sent from [facility]   after fall yesterday. Nurse states was sitting on bench he thought it had a   back and leaned back and fell unsure of surface he fell onto. Patient denies   any injury.  No redness, bruising, or abrasion noted to head.       FINDINGS:   CT HEAD:       BRAIN/VENTRICLES: There is no acute intracranial hemorrhage, mass effect or   midline shift.  No abnormal extra-axial fluid collection.  The gray-white   differentiation is maintained without evidence of an acute infarct.  There is   no evidence of hydrocephalus.       ORBITS: The visualized portion of the orbits demonstrate no acute abnormality.       SINUSES: The visualized paranasal sinuses and mastoid air cells demonstrate   no acute abnormality.       SOFT TISSUES/SKULL:  No acute abnormality of the visualized skull or soft   tissues.       CT CERVICAL SPINE:       BONE/ALIGNMENT:  No acute fracture, subluxation, compression deformity or   suspicious osseous lesions are identified.  AP alignment of the cervical   spine is maintained.       DEGENERATIVE CHANGES:  There is multilevel degenerative disc disease with   essentially complete loss of the disc space at C6-C7 and C7-T1.  There is   multilevel anterior spurring.  There are hypertrophic degenerative changes of   the facet and uncovertebral joints throughout the cervical spine as well as   the atlantoaxial joint.  These findings contribute to moderate neural   foraminal stenosis at C3-C4, moderate bilateral neural foraminal stenosis at   C4-C5, and mild to moderate right neural foraminal stenosis at C5-C6 and   C6-C7.  Disc osteophyte complexes at C4-C5, C5-C6 and C6-C7 cause mild   impingement of the ventral thecal sac at these levels.       SOFT TISSUES:  No prevertebral soft tissue thickening.  The incidentally   imaged paravertebral soft tissues have a normal noncontrast CT appearance. No abnormal lymphadenopathy appreciated.  Thyroid has a normal noncontrast CT   appearance.  Visualized lung parenchyma is well aerated.           Impression   1. No CT evidence for acute intracranial process. 2.  Multilevel degenerative disc disease and hypertrophic degenerative   changes of the cervical spine, but no CT evidence for acute osseous   abnormality.                 VISITING DIAGNOSIS:      ICD-10-CM    1. Dementia associated with 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 behavioral disturbance ([facility] [address]  F02.80    2. Atrial fibrillation, unspecified type ([facility] [address]  I48.91    3. Primary hypertension  I10    4. Anxiety  F41.9    5. H/O fall  Z91.81               CONCERNS   &   INCREASED   RISK   FOR         * TIA,  CEREBRO  VASCULAR  ISCHEMIA, STROKE      *   COGNITIVE  &   MEMORY PROBLEMS  AND  DEMENTIAS        *   PERIPHERAL  NEUROPATHY,   NEUROPATHIC  PAIN      *     BALANCE PROBLMES   AND  FALL                VARIOUS  RISK   FACTORS   WERE  REVIEWED   AND   DISCUSSED. *  PATIENT   HAS  MULTIPLE   MEDICAL,          NEUROLOGICAL   PROBLEMS . PATIENT'S   MANAGEMENT  IS  CHALLENGING. PLAN:       Rayma Mitts  Of  The  Diagnoses,  The  Management & Treatment  Options           AND    Care  plan  Were        Reviewed and   Discussed   With  patient. * Goals  And  Expectations  Of  The  Therapy  Discussed   And  Reviewed. *   Benefits   And   Side  Effect  Profile  Of  Medication/s   Were   Discussed             * Need   For  Further   Follow up For  The  Various  Problems  Were discussed. * Results  Of  The  Previous  Diagnostic tests were reviewed and questions answered. patient  understand the same.              Medical  Decision  Making  Was  Made  Based on the   Complexity  Of  Above  Mentioned  Diagnoses    ,    Data reviewed   & diagnostic  Tests  Reviewed,  Risk  Of  Significant   Co morbidities and complicating   Factors. Medical  Decision  Was   MODERATE   Complexity  Due   To  The  Patient's  Multiple  Symptoms,      Advancing   Disease,  Complex  Treatment  Regimen,  Multiple medications and   Risk  Of   Side  Effects,      Difficulty  In  Medication  Management  And  Diagnostic  Challenges   In  View  Of  The  Associated       Co  Morbid  Conditions   And  Problems. * FALL   PRECAUTIONS. THESE  REVIEWED   AND  DISCUSSED      *   BE  CAREFUL  WITH  ACTIVITIES            *  SUPERVISED   CARE      *   ADEQUATE   FLUID  INTAKE   AND  ELECTROLYTE  BALANCE               * KEEP  DAIRY  OF   THE  NEUROLOGICAL  SYMPTOMS        RECORDING THE    DURATION  AND  FREQUENCY. *  AVOID    CONDITIONS  AND  FACTORS   THAT  MAKE                  NEUROLOGICAL  SYMPTOMS  WORSE. *  TO  MAINTAIN  REGULAR  SLEEP  WAKE  CYCLES.      *   TO  HAVE  ADEQUATE  REST  AND   SLEEP    HOURS.          *    AVOID  ANY USAGE OF                   TOBACCO,  EXCESSIVE  ALCOHOL  AND   ILLEGAL   SUBSTANCES          *   Compliance   With  Medications   And  Instructions    *  May   Use  Pill  Box,    If  Needed            *    Prophylactic  Use   Of     Vitamin   B   Complex,  Folic  Acid,    Vitamin  B12    Multivitamin,       Calcium  With  magnesium  And  Vit D    Supplementations   Over  The  Counter  Discussed           *  PATIENT  IS  ALSO   COUNSELED   TO  KEEP    ACTIVITIES:         A)   SIMPLE      B)  ORGANIZED      C)  WRITE   DOWN                       *  EVALUATIONS  AND  FOLLOW UP:                  * PHYSICAL  THERAPY   / OCCUPATIONAL THERAPY                                  * CARDIOLOGY                                     *     H/O   CHRONIC  MILD    MEMORY  PROBLEMS                                       FOR   4     YEARS           -      STABLE                           *     PATIENT  NOT  CONCERNED   ABOUT MEMORY  PROBLEMS   AND                             NOT  INTERESTED  IN MEDICATION  FOR  DEMENTIA                        *PATIENT   TO  FOLLOW  UP  WITH   PRIMARY  CARE            AND   OTHER  CONSULTANTS  AS  BEFORE. *  Maintain   Healthy  Life Style    With   Periodic  Monitoring  Of      Any  Medical  Conditions  Including   Elevated  Blood  Pressure,  Lipid  Profile,     Blood  Sugar levels  And   Heart  Disease. *   Period   Screening  For  Cancers  Involving  Breast,  Colon,    lungs  And  Other  Organs  As  Applicable,  In consultation   With  Your  Primary Care Providers. * Second  Neurological  Opinion  And  Evaluations  In  [facility] AND [facility]  Setting  If  Patient  Is  Interested. * Please   Contact   Neurology  Clinic   Early   If   Are  Any  New  Neurological                             Symptoms   And  Any neurological  Concerns. *  If  The  Patient remains  Neurologically  Stable   Return   To  [facility] Neurology Department   IN  6 - 12   MONTHS  TIME   FOR  FURTHER              FOLLOW UP.                 *  If   There is  Any  Significant  Worsening   Of  Current  Symptoms  And  Or  If patient  Develops       Any additional  New  Neurological  Symptoms  Or  Significant  Concerns   Should  Call  911 or      Go  To  Emergency  Department  For  Further  Immediate  Evaluation. *   The  Neurological   Findings,  Possible  Diagnosis,  Differential diagnoses   And  Options      For    Further   Investigations   And  management   Are  Discussed  Comprehensively. Medications   And  Prescription   Risks  And  Side effects  Are   Also  Discussed. The  Above  Were  Reviewed  With  patient and     questions  Answered  In  Detail.               More   Than   50% of face  To face Time   Was  Spent  On  Counseling   And   Coordination  Of  Care       Of   Patient's multiple Neurological  Problems   And   Comorbid  Medical   Conditions. Electronically signed by Dale Jaramillo MD.,  [facility]       Board Certified in  Neurology &  In  [facility] (ABPN)      DISCLAIMER:   Although every effort was made to ensure the accuracy of this  electronic transcription, some errors in transcription may have occurred. GENERAL PATIENT INSTRUCTIONS:     A Healthy Lifestyle: Care Instructions  Your Care Instructions  A healthy lifestyle can help you feel good, stay at a healthy weight, and have plenty of energy for both work and play. A healthy lifestyle is something you can share with your whole family. A healthy lifestyle also can lower your risk for serious health problems, such as high blood pressure, heart disease, and diabetes. You can follow a few steps listed below to improve your health and the health of your family. Follow-up care is a key part of your treatment and safety. Be sure to make and go to all appointments, and call your doctor if you are having problems. Its also a good idea to know your test results and keep a list of the medicines you take. How can you care for yourself at home? Do not eat too much sugar, fat, or fast foods. You can still have dessert and treats now and then. The goal is moderation. Start small to improve your eating habits. Pay attention to portion sizes, drink less juice and soda pop, and eat more fruits and vegetables. Eat a healthy amount of food. A 3-ounce serving of meat, for example, is about the size of a deck of cards. Fill the rest of your plate with vegetables and whole grains. Limit the amount of soda and sports drinks you have every day. Drink more water when you are thirsty. Eat at least 5 servings of fruits and vegetables every day.  It may seem like a lot, but it is not hard to reach this goal. A serving or helping is 1 piece of fruit, 1 cup of vegetables, or 2 cups of leafy, raw vegetables. Have an apple or some carrot sticks as an afternoon snack instead of a candy bar. Try to have fruits and/or vegetables at every meal.  Make exercise part of your daily routine. You may want to start with simple activities, such as walking, bicycling, or slow swimming. Try to be active 30 to 60 minutes every day. You do not need to do all 30 to 60 minutes all at once. For example, you can exercise 3 times a day for 10 or 20 minutes. Moderate exercise is safe for most people, but it is always a good idea to talk to your doctor before starting an exercise program.  Keep moving. Lorin Ogles the lawn, work in the garden, or Teramind. Take the stairs instead of the elevator at work. If you smoke, quit. People who smoke have an increased risk for heart attack, stroke, cancer, and other lung illnesses. Quitting is hard, but there are ways to boost your chance of quitting tobacco for good. Use nicotine gum, patches, or lozenges. Ask your doctor about stop-smoking programs and medicines. Keep trying. In addition to reducing your risk of diseases in the future, you will notice some benefits soon after you stop using tobacco. If you have shortness of breath or asthma symptoms, they will likely get better within a few weeks after you quit. Limit how much alcohol you drink. Moderate amounts of alcohol (up to 2 drinks a day for men, 1 drink a day for women) are okay. But drinking too much can lead to liver problems, high blood pressure, and other health problems. Family health  If you have a family, there are many things you can do together to improve your health. Eat meals together as a family as often as possible. Eat healthy foods. This includes fruits, vegetables, lean meats and dairy, and whole grains. Include your family in your fitness plan. Most people think of activities such as jogging or tennis as the way to fitness, but there are many ways you and your family can be more active. Anything that makes you breathe hard and gets your heart pumping is exercise. Here are some tips:  Walk to do errands or to take your child to school or the bus. Go for a family bike ride after dinner instead of watching TV. Where can you learn more? Go to https://[email or web address].[email or web address] org and sign in to your [employer] account. Enter W291 in the [employer] box to learn more about \"A Healthy Lifestyle: Care Instructions. \"     If you do not have an account, please click on the \"Sign Up Now\" link. Current as of: July 26, 2016  Content Version: 11.2  © 5136-8146 [employer]. Care instructions adapted under license by [facility] ([facility]). If you have questions about a medical condition or this instruction, always ask your healthcare professional. [address] any warranty or liability for your use of this information.

## 2022-02-16 ENCOUNTER — OFFICE VISIT (OUTPATIENT)
Dept: PODIATRY | Age: 87
End: 2022-02-16
Payer: MEDICARE

## 2022-02-16 VITALS
BODY MASS INDEX: 24.08 KG/M2 | DIASTOLIC BLOOD PRESSURE: 70 MMHG | WEIGHT: 172 LBS | HEIGHT: 71 IN | SYSTOLIC BLOOD PRESSURE: 122 MMHG | HEART RATE: 68 BPM

## 2022-02-16 DIAGNOSIS — B35.1 DERMATOPHYTOSIS OF NAIL: ICD-10-CM

## 2022-02-16 DIAGNOSIS — S90.221A SUBUNGUAL HEMATOMA OF TOE OF RIGHT FOOT, INITIAL ENCOUNTER: Primary | ICD-10-CM

## 2022-02-16 PROCEDURE — 99214 OFFICE O/P EST MOD 30 MIN: CPT | Performed by: PODIATRIST

## 2022-02-16 PROCEDURE — G8427 DOCREV CUR MEDS BY ELIG CLIN: HCPCS | Performed by: PODIATRIST

## 2022-02-16 PROCEDURE — G8420 CALC BMI NORM PARAMETERS: HCPCS | Performed by: PODIATRIST

## 2022-02-16 PROCEDURE — 4040F PNEUMOC VAC/ADMIN/RCVD: CPT | Performed by: PODIATRIST

## 2022-02-16 PROCEDURE — 1036F TOBACCO NON-USER: CPT | Performed by: PODIATRIST

## 2022-02-16 PROCEDURE — 99213 OFFICE O/P EST LOW 20 MIN: CPT | Performed by: PODIATRIST

## 2022-02-16 PROCEDURE — 3288F FALL RISK ASSESSMENT DOCD: CPT | Performed by: PODIATRIST

## 2022-02-16 PROCEDURE — G8484 FLU IMMUNIZE NO ADMIN: HCPCS | Performed by: PODIATRIST

## 2022-02-16 PROCEDURE — 1123F ACP DISCUSS/DSCN MKR DOCD: CPT | Performed by: PODIATRIST

## 2022-02-16 PROCEDURE — 0518F FALL PLAN OF CARE DOCD: CPT | Performed by: PODIATRIST

## 2022-02-16 NOTE — PROGRESS NOTES
Subjective:  Patient seen at [address] [place] [address] park today for discoloration right big toe. Patient is with caregiver from nursing home. Patient poor historian. Patient has no known trauma. No treatment tried. No nausea vomiting fever chills or diarrhea. Allergies   Allergen Reactions    Pcn [Penicillins] Swelling     As a child  Pt tolerated ceftriaxone , and keflex with no intolerance    Sulfa Antibiotics     Cefdinir Other (See Comments)     Pt tolerated ceftriaxone , and keflex with no intolerance       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 LIMA TO LAD, SVG TO DIAGONAL2    Dementia associated with 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 behavioral disturbance ([address] 9/21/2018    Dermatophytosis of nail 1/10/2014    Diverticulosis     Elevated PSA     Hemorrhoids     History of measles childhood    History of mumps childhood    Hyperlipidemia     Hypertension     Hypothyroidism 9/4/2018    Mass of upper lobe of right lung 2/21/2018    Occult blood positive stool     refuses colonoscopy    Osteoarthritis     Overweight     Paroxysmal atrial fibrillation ([address] 11/7/2013    Pneumonia due to organism     Pseudophakia, left eye     PSVT (paroxysmal supraventricular tachycardia) ([address]     Recurrent UTI 3/13/2018    Retinal detachment     left, history of     Seborrheic dermatitis     Sepsis due to urinary tract infection ([address] 8/15/2018    Urinary tract infection without hematuria 3/28/2018       Prior to Admission medications    Medication Sig Start Date End Date Taking?  Authorizing Provider   Coenzyme Q10 (CO Q 10 PO) Take  by mouth daily. Yes Historical Provider, MD   POTASSIUM CHLORIDE by Does not apply route daily. Yes Historical Provider, MD   metoprolol (TOPROL-XL) 25 MG XL tablet Take 25 mg by mouth 2 times daily. Yes Historical Provider, MD   lisinopril (PRINIVIL;ZESTRIL) 10 MG tablet Take 10 mg by mouth daily. Yes Historical Provider, MD   aspirin 81 MG tablet Take 81 mg by mouth daily. Yes Historical Provider, MD   ALPRAZolam Gayl Gash) 0.25 MG tablet Take 0.25 mg by mouth See Admin Instructions. 1/2 TABLET PO EVERY 8 HOURS PRN   Yes Historical Provider, MD    Extract (NEUTROGENA T/GEL EX) Apply  topically. Yes Historical Provider, MD   fluticasone (VERAMYST) 27.5 MCG/SPRAY nasal spray 2 sprays by Nasal route daily. Yes Historical Provider, MD   propafenone (RYTHMOL) 225 MG tablet Take 225 mg by mouth 2 times daily. Yes Historical Provider, MD   GLUCOSAMINE-CHONDROITIN PO Take 3 tablets by mouth Daily. Yes Historical Provider, MD   Multiple Vitamins-Minerals (MULTIVITAMIN PO) Take 1 tablet by mouth daily. Yes Historical Provider, MD   Multiple Vitamins-Minerals (TOTAL FORMULA 3) TABS Take 1 tablet by mouth daily. Yes Historical Provider, MD   simvastatin (ZOCOR) 20 MG tablet Take 20 mg by mouth See Admin Instructions. ONE TABLET EVERY OTHER DAY    Historical Provider, MD       Social History     Tobacco Use    Smoking status: Never Smoker    Smokeless tobacco: Never Used    Tobacco comment: never smoked iwona [facility],2/27/2018   Substance Use Topics    Alcohol use: No     Alcohol/week: 0.0 standard drinks     Review of Systems: All 12 systems reviewed and pertinent positives noted above.   Objective:  Vascular: DP and PT pulses palpable 2/4, bilateral.  CFT <3 seconds, bilateral.  Hair growth present to the level of the digits, bilateral.  Edema present, bilateral.  Varicosities absent, bilateral. Erythema absent, bilateral. Distal Rubor absent bilateral.  Temperature decreased bilateral. Hyperpigmentation absent bilateral. Atrophic skin no. Neurological: Sensation intact to light touch to level of digits, bilateral.  Protective sensation intact 10/10 sites via 5.07/10g Callaway-Yamila Monofilament, bilateral.  negative Tinel's, bilateral.  negative Valleix sign, bilateral.  Vibratory intact bilateral.  Reflexes Decreased bilateral.  Paresthesias negative. Dysthesias negative. Sharp/dull intact bilateral.    Musculoskeletal: pain present on palpation L/R hallux nail. Mild hammertoes. Strength range of motion within normal limits. Integument:  Nails left 1, 2 and right 1 thickened, dystrophic and crumbly, discolored with subungual debris. Subungual hematoma distal right hallux with a central ridge across the nail with more proximal clearing right hallux. Hyperkeratotic tissue is absent. Assessment:   Diagnosis Orders   1. Subungual hematoma of toe of right foot, initial encounter     2. Dermatophytosis of nail         Plan:  Pt given tx options for anti fungal therapy. Pt educated on Rx po lamisil, Rx topical Penlac, OTC home remedies or other OTC topical meds. No specific treatment needed for subungual hematoma. Is more chronic in nature due to deformity of the nail. If he starts have any paronychia or increase in pain will need to consider further treatment at that time. Patient is low level medical decision making. Patient is stable chronic conditions along with acute uncomplicated. Different treatment options discussed no current prescriptions given. Overall low risk morbidity. Follow-up as needed.

## 2022-02-22 ENCOUNTER — TELEPHONE (OUTPATIENT)
Dept: INTERNAL MEDICINE | Age: 87
End: 2022-02-22

## 2022-02-22 RX ORDER — PERMETHRIN 50 MG/G
CREAM TOPICAL
Qty: 60 G | Refills: 0 | Status: SHIPPED | OUTPATIENT
Start: 2022-02-22 | End: 2022-03-08 | Stop reason: SDUPTHER

## 2022-02-28 ENCOUNTER — TELEPHONE (OUTPATIENT)
Dept: INTERNAL MEDICINE | Age: 87
End: 2022-02-28

## 2022-02-28 DIAGNOSIS — F41.9 ANXIETY: ICD-10-CM

## 2022-02-28 RX ORDER — ALPRAZOLAM 0.25 MG/1
TABLET ORAL
Qty: 60 TABLET | Refills: 1 | Status: SHIPPED | OUTPATIENT
Start: 2022-02-28 | End: 2022-03-28 | Stop reason: SDUPTHER

## 2022-02-28 NOTE — TELEPHONE ENCOUNTER
Received fax from [facility]    Requesting a refill of the below medication which has been pended for you:     Requested Prescriptions     Pending Prescriptions Disp Refills    ALPRAZolam (XANAX) 0.25 MG tablet 60 tablet 1     Sig: TAKE 1/2 OF A TABLET BY MOUTH DAILY, IN THE MORNING. . TAKE 1 TABLET BY MOUTH DAILY, AT BEDTIME. May take 1/2 tab before shower if needed.        Last Appointment Date: 7/6/2021  Next Appointment Date: Visit date not found    Allergies   Allergen Reactions    Pcn [Penicillins] Swelling     As a child  Pt tolerated ceftriaxone , and keflex with no intolerance    Sulfa Antibiotics     Cefdinir Other (See Comments)     Pt tolerated ceftriaxone , and keflex with no intolerance   , see attached scan

## 2022-03-02 ENCOUNTER — TELEPHONE (OUTPATIENT)
Dept: INTERNAL MEDICINE | Age: 87
End: 2022-03-02

## 2022-03-08 ENCOUNTER — TELEPHONE (OUTPATIENT)
Dept: INTERNAL MEDICINE | Age: 87
End: 2022-03-08

## 2022-03-08 RX ORDER — PERMETHRIN 50 MG/G
CREAM TOPICAL
Qty: 60 G | Refills: 0 | Status: SHIPPED | OUTPATIENT
Start: 2022-03-08 | End: 2022-03-22 | Stop reason: ALTCHOICE

## 2022-03-22 ENCOUNTER — OUTSIDE SERVICES (OUTPATIENT)
Dept: INTERNAL MEDICINE | Age: 87
End: 2022-03-22
Payer: MEDICARE

## 2022-03-22 VITALS
RESPIRATION RATE: 16 BRPM | TEMPERATURE: 97.5 F | DIASTOLIC BLOOD PRESSURE: 82 MMHG | HEART RATE: 89 BPM | SYSTOLIC BLOOD PRESSURE: 136 MMHG

## 2022-03-22 DIAGNOSIS — L30.9 DERMATITIS: Primary | ICD-10-CM

## 2022-03-22 RX ORDER — PREDNISONE 20 MG/1
20 TABLET ORAL DAILY
Qty: 5 TABLET | Refills: 0 | Status: SHIPPED | OUTPATIENT
Start: 2022-03-22 | End: 2022-09-01

## 2022-03-22 ASSESSMENT — ENCOUNTER SYMPTOMS
COUGH: 0
WHEEZING: 0
DIARRHEA: 0
NAUSEA: 0
RHINORRHEA: 0
VOMITING: 0

## 2022-03-28 ENCOUNTER — TELEPHONE (OUTPATIENT)
Dept: INTERNAL MEDICINE | Age: 87
End: 2022-03-28

## 2022-03-28 DIAGNOSIS — F41.9 ANXIETY: ICD-10-CM

## 2022-03-28 RX ORDER — ALPRAZOLAM 0.25 MG/1
TABLET ORAL
Qty: 60 TABLET | Refills: 1 | Status: SHIPPED | OUTPATIENT
Start: 2022-03-28 | End: 2022-09-01

## 2022-05-19 ENCOUNTER — OFFICE VISIT (OUTPATIENT)
Dept: CARDIOLOGY | Age: 87
End: 2022-05-19
Payer: MEDICARE

## 2022-05-19 VITALS
BODY MASS INDEX: 26.15 KG/M2 | WEIGHT: 186.8 LBS | HEART RATE: 66 BPM | HEIGHT: 71 IN | SYSTOLIC BLOOD PRESSURE: 124 MMHG | DIASTOLIC BLOOD PRESSURE: 68 MMHG

## 2022-05-19 DIAGNOSIS — E78.2 MIXED HYPERLIPIDEMIA: ICD-10-CM

## 2022-05-19 DIAGNOSIS — I50.22 CHRONIC SYSTOLIC CHF (CONGESTIVE HEART FAILURE) (HCC): ICD-10-CM

## 2022-05-19 DIAGNOSIS — I10 PRIMARY HYPERTENSION: ICD-10-CM

## 2022-05-19 DIAGNOSIS — I48.0 PAROXYSMAL ATRIAL FIBRILLATION (HCC): Primary | ICD-10-CM

## 2022-05-19 DIAGNOSIS — Z79.899 LONG TERM CURRENT USE OF AMIODARONE: ICD-10-CM

## 2022-05-19 PROCEDURE — 93005 ELECTROCARDIOGRAM TRACING: CPT | Performed by: INTERNAL MEDICINE

## 2022-05-19 PROCEDURE — 1123F ACP DISCUSS/DSCN MKR DOCD: CPT | Performed by: INTERNAL MEDICINE

## 2022-05-19 PROCEDURE — G8417 CALC BMI ABV UP PARAM F/U: HCPCS | Performed by: INTERNAL MEDICINE

## 2022-05-19 PROCEDURE — 4040F PNEUMOC VAC/ADMIN/RCVD: CPT | Performed by: INTERNAL MEDICINE

## 2022-05-19 PROCEDURE — 99214 OFFICE O/P EST MOD 30 MIN: CPT | Performed by: INTERNAL MEDICINE

## 2022-05-19 PROCEDURE — G8427 DOCREV CUR MEDS BY ELIG CLIN: HCPCS | Performed by: INTERNAL MEDICINE

## 2022-05-19 PROCEDURE — 99213 OFFICE O/P EST LOW 20 MIN: CPT | Performed by: INTERNAL MEDICINE

## 2022-05-19 PROCEDURE — 3288F FALL RISK ASSESSMENT DOCD: CPT | Performed by: INTERNAL MEDICINE

## 2022-05-19 PROCEDURE — 1036F TOBACCO NON-USER: CPT | Performed by: INTERNAL MEDICINE

## 2022-05-19 PROCEDURE — 93010 ELECTROCARDIOGRAM REPORT: CPT | Performed by: INTERNAL MEDICINE

## 2022-05-19 PROCEDURE — 0518F FALL PLAN OF CARE DOCD: CPT | Performed by: INTERNAL MEDICINE

## 2022-05-19 NOTE — PROGRESS NOTES
Today's Date: 5/19/2022  Patient Name: Jamal Bill  Patient's age: 80 y. o., 1934    CC: follow up CAD     HPI:    The patient is a 80 y.o.  male is in the office for f/u, he is currently residing in a nursing home. Denies any cp, sob, orthopnea, pnd, le edema. Is able to walk on own at [facility]. Not needing a walker. 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 Past Surgical History:   has a past surgical history that includes Coronary artery bypass graft; Appendectomy (1940 and 1955); Tonsillectomy; retinal laser (Left, 12/22/2011); Cataract removal (Left, 05/07/2013); Skin tag removal (11/01/1999); Abscess Drainage ([id number]); and Colonoscopy (06/14/2021). Home Medications:    Prior to Admission medications    Medication Sig Start Date End Date Taking?  Authorizing Provider   amLODIPine (NORVASC) 5 MG tablet Take 1 tablet by mouth daily 5/20/21  Yes Millie Cm MD   psyllium (KONSYL) 28.3 % PACK Take 1 packet by mouth 2 times daily   Yes Historical Provider, MD hydrocortisone (PREPARATION H) 1 % cream Apply topically to affected area as directed prn (Preparation H)   Yes Historical Provider, MD   Compression Stockings MISC by Does not apply route DAVID hose- on in am, off in pm (Currently doing prn now)   Yes Historical Provider, MD   amiodarone (CORDARONE) 200 MG tablet Take 100 mg by mouth daily   Yes Historical Provider, MD   rivaroxaban (XARELTO) 20 MG TABS tablet Take 20 mg by mouth every morning   Yes Historical Provider, MD   acetaminophen (TYLENOL) 325 MG tablet Take 650 mg by mouth every 4 hours as needed for Pain   Yes Historical Provider, MD   Mineral Oil OIL Use as directed on bottle for occasional constipation   Yes Historical Provider, MD   Dextromethorphan-guaiFENesin  MG/5ML SYRP Take 5 mLs by mouth every 6 hours as needed for Cough (Robafen DM)   Yes Historical Provider, MD   simethicone (MYLICON) 40 SJ/9.3WA drops Take 0.6 mLs by mouth 4 times daily as needed (gas) 4/7/20  Yes DAYANARA Werner - AARON   metoprolol tartrate (LOPRESSOR) 25 MG tablet Take 0.5 tablets by mouth 2 times daily 6/13/19  Yes Obie Becerra, DO   levothyroxine (SYNTHROID) 112 MCG tablet Take 1 tablet by mouth Daily 4/15/19  Yes Guillermo Cerda, DO   polyvinyl alcohol (ARTIFICIAL TEARS) 1.4 % ophthalmic solution Place 1 drop into both eyes every 6 hours as needed   Yes Historical Provider, MD   Multiple Vitamins-Minerals (PRESERVISION AREDS PO) Take 1 capsule by mouth daily    Yes Historical Provider, MD   isosorbide mononitrate (IMDUR) 30 MG extended release tablet Take 1 tablet by mouth daily 8/22/18  Yes Karen Alexander MD   lisinopril (PRINIVIL;ZESTRIL) 40 MG tablet Take 1 tablet by mouth daily 8/22/18  Yes Karen Alexander MD   Multiple Vitamins-Minerals (MULTIVITAMIN ADULT PO) Take 1 tablet by mouth daily   Yes Historical Provider, MD   potassium chloride (KLOR-CON M) 20 MEQ extended release tablet Take 20 mEq by mouth daily   Yes Historical Provider, MD   fluticasone [facility]) 50 MCG/ACT nasal spray 1 spray by Nasal route daily 2/1/18  Yes DAYANARA Alcala CNP   simvastatin (ZOCOR) 20 MG tablet TAKE 1 TABLET BY MOUTH EVERY OTHER DAY  3/7/17  Yes Guillermo Cerda DO   Coenzyme Q-10 100 MG CAPS Take 100 mg by mouth every other day. Yes Historical Provider, MD   tolnaftate (TINACTIN) 1 % external solution Apply topically nightly to toenails per Dr. Lisa Pugh. Yes Historical Provider, MD   aspirin 81 MG tablet Take 81 mg by mouth daily. Yes Historical Provider, MD   GLUCOSAMINE-CHONDROITIN PO Take 3 capsules by mouth daily at 1800 (500-400 mg)   Yes Historical Provider, MD   tamsulosin (FLOMAX) 0.4 MG capsule Take 1 capsule by mouth 2 times daily 12/15/21 3/15/22  Divya Maravilla MD       Allergies:  Pcn [penicillins], Sulfa antibiotics, and Cefdinir    Social History:   reports that he has never smoked. He has never used smokeless tobacco. He reports that he does not drink alcohol and does not use drugs. REVIEW OF SYSTEMS:    · Constitutional: there has been no unanticipated weight loss. There's been No change in energy level, No change in activity level. · Eyes: No visual changes or diplopia. No scleral icterus. · ENT: No Headaches, hearing loss or vertigo. No mouth sores or sore throat. · Cardiovascular: AS HPI  · Respiratory: AS HPI  · Gastrointestinal: No abdominal pain, appetite loss, blood in stools. No change in bowel or bladder habits. · Genitourinary: No dysuria, trouble voiding, or hematuria. · Musculoskeletal:  No gait disturbance, No weakness or joint complaints. · Integumentary: No rash or pruritis. · Neurological: No headache, diplopia, change in muscle strength, numbness or tingling. No change in gait, balance, coordination, mood, affect, memory, mentation, behavior. · Psychiatric: No new anxiety or depression. · Endocrine: No temperature intolerance. No excessive thirst, fluid intake, or urination.  No tremor. · Hematologic/Lymphatic: No abnormal bruising or bleeding, blood clots or swollen lymph nodes. · Allergic/Immunologic: No nasal congestion or hives. PHYSICAL EXAM:      /68   Pulse 66   Ht 5' 11\" (1.803 m)   Wt 186 lb 12.8 oz (84.7 kg)   BMI 26.05 kg/m²    HEENT: PERRL, no cervical lymphadenopathy. No masses palpable. Cardiovascular:  · The apical impulse is not displaced  · Heart  Sounds:RRR, grade 2 x 6 ejection systolic murmur in the aortic area  · Jugular venous pulsation Normal  · The carotid upstroke is NL  · Peripheral pulses are symmetrical and full  Respiratory: Good respiratory effort. On auscultation: clear to auscultation bilaterally no wheezing or crackles  Abdomen:  · No masses or tenderness  · Bowel sounds present  Extremities:  ·  No Cyanosis or Clubbing  ·  Lower extremity edema: No  Skin: Warm and dry    Cardiac data:    EKG: Sinus  Rhythm  -First degree A-V block   Brayan = 298  -Left axis -anterior fascicular block. -consider old anterior infarct.    -Anterolateral ST-elevation -repolarization variant. Echo 02/2018  Biatrial enlargement. 2.  Concentric left ventricular hypertrophy. 3.  Normal wall motion and systolic function. Estimated ejection fraction  of 50%. 4.  Anteroapical wall hypokinesis. 5.  No significant valvular abnormalities. 6.  Mild pulmonary hypertension of 42 mmHg systolic. 7.  No pericardial effusion.     LABS    Lab Results   Component Value Date    CHOL 116 07/01/2021    TRIG 80 07/01/2021    HDL 35 (L) 07/01/2021    LDLCHOLESTEROL 65 07/01/2021    VLDL NOT REPORTED 07/01/2021    CHOLHDLRATIO 3.3 07/01/2021       Lab Results   Component Value Date     10/05/2021    K 4.1 10/05/2021     10/05/2021    CO2 26 10/05/2021    BUN 11 10/05/2021    CREATININE 1.01 10/05/2021    GLUCOSE 123 (H) 10/05/2021    CALCIUM 9.1 10/05/2021    PROT 6.8 10/05/2021    LABALBU 3.7 10/05/2021    BILITOT 0.50 10/05/2021    ALKPHOS 105 10/05/2021    AST 18 10/05/2021    ALT 9 10/05/2021    LABGLOM >60 10/05/2021    GFRAA >60 10/05/2021     IMPRESSION:    · CAD s/p CABG (6/7/2000) LIMA TO LAD, SVG TO D2, STABLE without angina. · MILD LV SYSTOLIC DYSFUNCTION, last LVEF 50% in 2018, NO CLINICAL CHF  · PAF- in NSR, on A/C with NOAC  · HTN  · HLP        RECOMMENDATIONS:  Stop aspirin (stable CAD and on xarelto)  Continue low-dose beta-blocker and Zocor  On amiodarone 100 mg daily and Xarelto for paroxysmal atrial fibrillation    The patient is to continue heart healthy diet, weight loss and exercise as tolerated. Patient's medications and side effects were discussed. Medication refills were provided if needed. Follow up appointment timing was discussed. All questions and concerns were addressed to patient's satisfaction. The patient is to follow up in 6 months or sooner if necessary. Thank you for allowing me to participate in the care of this patient, please do not hesitate to call if you have any questions. Cassie Richard DO, [facility], [address], [address], [place] [address] [facility]  [facility] [employer]  [phone]

## 2022-05-26 DIAGNOSIS — F41.9 ANXIETY: Primary | ICD-10-CM

## 2022-05-26 RX ORDER — ALPRAZOLAM 0.25 MG/1
TABLET ORAL
Qty: 60 TABLET | Refills: 0 | Status: SHIPPED | OUTPATIENT
Start: 2022-05-26 | End: 2022-06-29 | Stop reason: SDUPTHER

## 2022-05-26 NOTE — TELEPHONE ENCOUNTER
Pharmacy requesting a refill of the below medication which has been pended for you:     Requested Prescriptions     Pending Prescriptions Disp Refills    ALPRAZolam (XANAX) 0.25 MG tablet  60 tablet 0     Sig: TAKE 1/2 TABLET (0.125MG) BY MOUTH EVERY MORNING TAKE 1 TABLET BY MOUTH AT BEDTIME TAKE 1/2 TABLET (0.25MG) BY MOUTH ONCE DAILY BEFORE SHOWER AS NEEDED.        Last Appointment Date: 7/6/2021  Next Appointment Date: Visit date not found    Allergies   Allergen Reactions    Pcn [Penicillins] Swelling     As a child  Pt tolerated ceftriaxone , and keflex with no intolerance    Sulfa Antibiotics     Cefdinir Other (See Comments)     Pt tolerated ceftriaxone , and keflex with no intolerance

## 2022-06-29 ENCOUNTER — TELEPHONE (OUTPATIENT)
Dept: INTERNAL MEDICINE | Age: 87
End: 2022-06-29

## 2022-06-29 DIAGNOSIS — F41.9 ANXIETY: ICD-10-CM

## 2022-06-29 RX ORDER — ALPRAZOLAM 0.25 MG/1
TABLET ORAL
Qty: 60 TABLET | Refills: 0 | Status: SHIPPED | OUTPATIENT
Start: 2022-06-29 | End: 2022-07-26

## 2022-06-29 NOTE — TELEPHONE ENCOUNTER
Mili Roche requesting a refill of the below medication which has been pended for you:     Requested Prescriptions     Pending Prescriptions Disp Refills    ALPRAZolam (XANAX) 0.25 MG tablet 60 tablet 0     Sig: TAKE 1/2 TABLET (0.125MG) BY MOUTH EVERY MORNING TAKE 1 TABLET BY MOUTH AT BEDTIME TAKE 1/2 TABLET (0.25MG) BY MOUTH ONCE DAILY BEFORE SHOWER AS NEEDED.        Last Appointment Date: 7/6/2021  Currently a resident at [address] [place] [address] [Penicillins] Swelling     As a child  Pt tolerated ceftriaxone , and keflex with no intolerance    Sulfa Antibiotics     Cefdinir Other (See Comments)     Pt tolerated ceftriaxone , and keflex with no intolerance

## 2022-06-30 ENCOUNTER — HOSPITAL ENCOUNTER (OUTPATIENT)
Age: 87
Setting detail: SPECIMEN
Discharge: HOME | End: 2022-06-30
Payer: MEDICARE

## 2022-06-30 LAB
-: ABNORMAL
BACTERIA: ABNORMAL
BILIRUBIN URINE: NEGATIVE
EPITHELIAL CELLS UA: ABNORMAL /HPF (ref 0–5)
GLUCOSE URINE: NEGATIVE
KETONES, URINE: NEGATIVE
LEUKOCYTE ESTERASE, URINE: ABNORMAL
MUCUS: ABNORMAL
NITRITE, URINE: NEGATIVE
PH UA: 5.5 (ref 5–6)
PROTEIN UA: NEGATIVE
RBC UA: ABNORMAL /HPF (ref 0–4)
SPECIFIC GRAVITY UA: 1.03 (ref 1.01–1.02)
URINE HGB: NEGATIVE
UROBILINOGEN, URINE: NORMAL
WBC UA: ABNORMAL /HPF (ref 0–4)

## 2022-06-30 PROCEDURE — 81001 URINALYSIS AUTO W/SCOPE: CPT

## 2022-06-30 PROCEDURE — 87086 URINE CULTURE/COLONY COUNT: CPT

## 2022-07-01 LAB
CULTURE: NORMAL
SPECIMEN DESCRIPTION: NORMAL

## 2022-07-25 DIAGNOSIS — F41.9 ANXIETY: ICD-10-CM

## 2022-07-25 NOTE — TELEPHONE ENCOUNTER
Monica Galvez called requesting a refill of the below medication which has been pended for you:     Alprazolam 0.25mg.       Last Appointment Date: 7/6/2021  Next Appointment Date: Visit date not found    Allergies   Allergen Reactions    Pcn [Penicillins] Swelling     As a child  Pt tolerated ceftriaxone , and keflex with no intolerance    Sulfa Antibiotics     Cefdinir Other (See Comments)     Pt tolerated ceftriaxone , and keflex with no intolerance

## 2022-07-26 RX ORDER — ALPRAZOLAM 0.25 MG/1
TABLET ORAL
Qty: 60 TABLET | Refills: 2 | Status: SHIPPED | OUTPATIENT
Start: 2022-07-26 | End: 2022-09-01 | Stop reason: SDUPTHER

## 2022-08-19 NOTE — PLAN OF CARE
Problem: Urinary Retention:  Goal: Urinary elimination within specified parameters  Urinary elimination within specified parameters   Outcome: Ongoing    Goal: Able to perform urinary catheter care  Able to perform urinary catheter care   Outcome: Ongoing    Goal: Able to perform urinary self-catheterization  Able to perform urinary self-catheterization   Outcome: Ongoing    Goal: Ability to reestablish a normal urinary elimination pattern will improve - after catheter removal  Ability to reestablish a normal urinary elimination pattern will improve - after catheter removal   Outcome: Ongoing    Goal: Ability to recognize the need to void and respond appropriately will improve  Ability to recognize the need to void and respond appropriately will improve   Outcome: Ongoing    Goal: Absence of postvoid residual urine  Absence of postvoid residual urine   Outcome: Ongoing      Problem: Urinary Elimination:  Goal: Ability to reestablish a normal urinary elimination pattern will improve - after catheter removal  Ability to reestablish a normal urinary elimination pattern will improve - after catheter removal   Outcome: Ongoing Smoking cessation discussed.

## 2022-08-21 DIAGNOSIS — J00 ACUTE NASOPHARYNGITIS: ICD-10-CM

## 2022-08-22 RX ORDER — FLUTICASONE PROPIONATE 50 MCG
SPRAY, SUSPENSION (ML) NASAL
Qty: 16 G | Refills: 1 | Status: SHIPPED | OUTPATIENT
Start: 2022-08-22

## 2022-08-22 NOTE — TELEPHONE ENCOUNTER
Pharmacy requesting a refill of the below medication which has been pended for you:     Requested Prescriptions     Pending Prescriptions Disp Refills    fluticasone (FLONASE) 50 MCG/ACT nasal spray [Pharmacy Med Name: FLUTICASONE PROP 50 MCG SPRAY*] 16 g 1     Sig: INSTILL 1 SPRAY IN EACH NOSTRIL ONCE DAILY       Last Appointment Date: 7/6/2021  Next Appointment Date: Visit date not found    Allergies   Allergen Reactions    Pcn [Penicillins] Swelling     As a child  Pt tolerated ceftriaxone , and keflex with no intolerance    Sulfa Antibiotics     Cefdinir Other (See Comments)     Pt tolerated ceftriaxone , and keflex with no intolerance

## 2022-08-31 ENCOUNTER — TELEPHONE (OUTPATIENT)
Dept: INTERNAL MEDICINE | Age: 87
End: 2022-08-31

## 2022-09-01 ENCOUNTER — TELEPHONE (OUTPATIENT)
Dept: INTERNAL MEDICINE | Age: 87
End: 2022-09-01

## 2022-09-01 DIAGNOSIS — F41.9 ANXIETY: ICD-10-CM

## 2022-09-01 RX ORDER — ALPRAZOLAM 0.25 MG/1
TABLET ORAL
Qty: 90 TABLET | Refills: 0 | Status: SHIPPED | OUTPATIENT
Start: 2022-09-01 | End: 2022-09-01 | Stop reason: CLARIF

## 2022-09-01 RX ORDER — ALPRAZOLAM 0.25 MG/1
TABLET ORAL
Qty: 60 TABLET | Refills: 2 | Status: SHIPPED | OUTPATIENT
Start: 2022-09-01 | End: 2022-09-30

## 2022-09-01 NOTE — TELEPHONE ENCOUNTER
Brice Mcdonald requesting a refill of the below medication which has been pended for you:     Requested Prescriptions     Pending Prescriptions Disp Refills    ALPRAZolam (XANAX) 0.25 MG tablet 90 tablet 0     Sig: One tablet at bedtime       Last Appointment Date: 7/6/2021  Next Appointment Date: Brice Mcdonald Patient    Allergies   Allergen Reactions    Pcn [Penicillins] Swelling     As a child  Pt tolerated ceftriaxone , and keflex with no intolerance    Sulfa Antibiotics     Cefdinir Other (See Comments)     Pt tolerated ceftriaxone , and keflex with no intolerance

## 2022-09-01 NOTE — TELEPHONE ENCOUNTER
[facility] sent us a fax. They need clarification on patient's alprazolam order. One order says one tablet at bedtime. The other order says 1/2 tab each morning and 1 tab at bedtime, with a prn dose prior to showers.

## 2022-09-05 ENCOUNTER — HOSPITAL ENCOUNTER (OUTPATIENT)
Age: 87
Setting detail: SPECIMEN
Discharge: HOME | End: 2022-09-05
Payer: MEDICARE

## 2022-09-05 PROCEDURE — 81001 URINALYSIS AUTO W/SCOPE: CPT

## 2022-09-05 PROCEDURE — 87086 URINE CULTURE/COLONY COUNT: CPT

## 2022-09-06 ENCOUNTER — TELEPHONE (OUTPATIENT)
Dept: INTERNAL MEDICINE | Age: 87
End: 2022-09-06

## 2022-09-06 LAB
AMORPHOUS: ABNORMAL
BACTERIA: ABNORMAL
BILIRUBIN URINE: NEGATIVE
EPITHELIAL CELLS UA: ABNORMAL /HPF (ref 0–5)
GLUCOSE URINE: NEGATIVE
KETONES, URINE: NEGATIVE
LEUKOCYTE ESTERASE, URINE: ABNORMAL
NITRITE, URINE: NEGATIVE
PH UA: 5.5 (ref 5–6)
PROTEIN UA: NEGATIVE
RBC UA: ABNORMAL /HPF (ref 0–4)
SPECIFIC GRAVITY UA: 1.03 (ref 1.01–1.02)
URINE HGB: NEGATIVE
UROBILINOGEN, URINE: NORMAL
WBC UA: ABNORMAL /HPF (ref 0–4)

## 2022-09-06 RX ORDER — NITROFURANTOIN 25; 75 MG/1; MG/1
100 CAPSULE ORAL 2 TIMES DAILY
Qty: 14 CAPSULE | Refills: 0 | Status: SHIPPED | OUTPATIENT
Start: 2022-09-06 | End: 2022-10-11 | Stop reason: SDUPTHER

## 2022-09-07 LAB
CULTURE: NORMAL
SPECIMEN DESCRIPTION: NORMAL

## 2022-10-03 ENCOUNTER — TELEPHONE (OUTPATIENT)
Dept: INTERNAL MEDICINE | Age: 87
End: 2022-10-03

## 2022-10-04 ENCOUNTER — OUTSIDE SERVICES (OUTPATIENT)
Dept: INTERNAL MEDICINE | Age: 87
End: 2022-10-04
Payer: MEDICARE

## 2022-10-04 ENCOUNTER — TELEPHONE (OUTPATIENT)
Dept: INTERNAL MEDICINE | Age: 87
End: 2022-10-04

## 2022-10-04 VITALS
HEART RATE: 78 BPM | DIASTOLIC BLOOD PRESSURE: 68 MMHG | RESPIRATION RATE: 16 BRPM | OXYGEN SATURATION: 94 % | SYSTOLIC BLOOD PRESSURE: 138 MMHG | TEMPERATURE: 97.3 F

## 2022-10-04 DIAGNOSIS — R29.6 MULTIPLE FALLS: ICD-10-CM

## 2022-10-04 DIAGNOSIS — M15.9 PRIMARY OSTEOARTHRITIS INVOLVING MULTIPLE JOINTS: Primary | ICD-10-CM

## 2022-10-04 DIAGNOSIS — R53.1 GENERALIZED WEAKNESS: ICD-10-CM

## 2022-10-04 RX ORDER — ALBUTEROL SULFATE 2.5 MG/3ML
2.5 SOLUTION RESPIRATORY (INHALATION) EVERY 6 HOURS PRN
COMMUNITY

## 2022-10-04 RX ORDER — SIMETHICONE
LIQUID (ML) MISCELLANEOUS
COMMUNITY

## 2022-10-04 RX ORDER — CHOLESTEROL
POWDER (GRAM) MISCELLANEOUS
COMMUNITY
End: 2022-10-04 | Stop reason: CLARIF

## 2022-10-04 RX ORDER — SODIUM PHOSPHATE,MONO-DIBASIC 19G-7G/118
3 ENEMA (ML) RECTAL DAILY
COMMUNITY

## 2022-10-04 RX ORDER — ALPRAZOLAM 0.25 MG/1
0.25 TABLET ORAL NIGHTLY
COMMUNITY

## 2022-10-04 RX ORDER — CHOLESTYRAMINE
POWDER (GRAM) MISCELLANEOUS
COMMUNITY

## 2022-10-04 RX ORDER — ALPRAZOLAM 0.25 MG/1
0.12 TABLET ORAL DAILY PRN
COMMUNITY

## 2022-10-04 RX ORDER — ALPRAZOLAM 0.25 MG/1
0.12 TABLET ORAL EVERY MORNING
COMMUNITY

## 2022-10-04 RX ORDER — NITROFURANTOIN MACROCRYSTALS 100 MG/1
100 CAPSULE ORAL DAILY
COMMUNITY

## 2022-10-04 RX ORDER — HYDROXYZINE HYDROCHLORIDE 25 MG/1
25 TABLET, FILM COATED ORAL EVERY 8 HOURS PRN
COMMUNITY

## 2022-10-04 RX ORDER — LOPERAMIDE HYDROCHLORIDE 2 MG/1
2 CAPSULE ORAL
COMMUNITY

## 2022-10-04 ASSESSMENT — ENCOUNTER SYMPTOMS
WHEEZING: 0
NAUSEA: 0
DIARRHEA: 0
VOMITING: 0
RHINORRHEA: 0
COUGH: 0

## 2022-10-04 NOTE — PROGRESS NOTES
[facility]      Pretty Balderas is a 80 y.o. male resident of [facility] who presents today for medical conditions/complaints as noted below. HPI:     HPI  Patient presents for face to face for physical therapy. Patient has had multiple falls recently. Staff has noted worsening generalized weakness, particularly in his lower extremities. Gait has become increasingly unsteady. Known history of osteoarthritis of multiple joints. He is otherwise doing well. Denies fever, chills, nausea, vomiting, or diarrhea. Current Outpatient Medications   Medication Sig Dispense Refill    glucosamine-chondroitin 500-400 MG CAPS Take 3 capsules by mouth daily      nitrofurantoin (MACRODANTIN) 100 MG capsule Take 100 mg by mouth daily      ALPRAZolam (XANAX) 0.25 MG tablet Take 0.125 mg by mouth every morning. ALPRAZolam (XANAX) 0.25 MG tablet Take 0.25 mg by mouth at bedtime. Psyllium (REGULOID PO) Take by mouth (Reguloid): mix 1 TBSP in 8 oz of water bid      albuterol (PROVENTIL) (2.5 MG/3ML) 0.083% nebulizer solution Take 2.5 mg by nebulization every 6 hours as needed for Wheezing      ALPRAZolam (XANAX) 0.25 MG tablet Take 0.125 mg by mouth daily as needed (before shower if needed). Simethicone LIQD by Does not apply route 20 mg/ 0.3 ml: take 0.6 ml po qid prn gas      hydrOXYzine HCl (ATARAX) 25 MG tablet Take 25 mg by mouth every 8 hours as needed for Itching      loperamide (IMODIUM) 2 MG capsule Take 2 mg by mouth After each loose stool.  *Not to exceed 16 gm/ 24 hours      Cholestyramine POWD Mix 1 scoopful (4 gm) po daily as needed      fluticasone (FLONASE) 50 MCG/ACT nasal spray INSTILL 1 SPRAY IN EACH NOSTRIL ONCE DAILY 16 g 1    tamsulosin (FLOMAX) 0.4 MG capsule Take 1 capsule by mouth 2 times daily 180 capsule 3    amLODIPine (NORVASC) 5 MG tablet Take 1 tablet by mouth daily 30 tablet 3    hydrocortisone 1 % cream Apply topically to affected area as directed prn (Preparation H) Compression Stockings MISC by Does not apply route DAVID hose- on in am, off in pm (Currently doing prn now)      amiodarone (CORDARONE) 200 MG tablet Take 100 mg by mouth daily      rivaroxaban (XARELTO) 20 MG TABS tablet Take 20 mg by mouth every morning      acetaminophen (TYLENOL) 325 MG tablet Take 650 mg by mouth every 4 hours as needed for Pain      Mineral Oil OIL Use as directed on bottle for occasional constipation      Dextromethorphan-guaiFENesin  MG/5ML SYRP Take 5 mLs by mouth every 6 hours as needed for Cough (Robafen DM)      metoprolol tartrate (LOPRESSOR) 25 MG tablet Take 0.5 tablets by mouth 2 times daily 90 tablet 1    levothyroxine (SYNTHROID) 112 MCG tablet Take 1 tablet by mouth Daily 30 tablet 11    polyvinyl alcohol (LIQUIFILM TEARS) 1.4 % ophthalmic solution Place 1 drop into both eyes every 6 hours as needed      Multiple Vitamins-Minerals (PRESERVISION AREDS PO) Take 1 capsule by mouth daily       isosorbide mononitrate (IMDUR) 30 MG extended release tablet Take 1 tablet by mouth daily 30 tablet 3    lisinopril (PRINIVIL;ZESTRIL) 40 MG tablet Take 1 tablet by mouth daily 30 tablet 3    Multiple Vitamins-Minerals (MULTIVITAMIN ADULT PO) Take 1 tablet by mouth daily      potassium chloride (KLOR-CON M) 20 MEQ extended release tablet Take 20 mEq by mouth daily      simvastatin (ZOCOR) 20 MG tablet TAKE 1 TABLET BY MOUTH EVERY OTHER DAY  30 tablet 11    Coenzyme Q-10 100 MG CAPS Take 100 mg by mouth every other day. tolnaftate (TINACTIN) 1 % external solution Apply topically nightly to toenails per Dr. Mayela Molina. No current facility-administered medications for this visit.      Allergies   Allergen Reactions    Pcn [Penicillins] Swelling     As a child  Pt tolerated ceftriaxone , and keflex with no intolerance    Sulfa Antibiotics     Cefdinir Other (See Comments)     Pt tolerated ceftriaxone , and keflex with no intolerance       Health Maintenance   Topic Date Due DTaP/Tdap/Td vaccine (1 - Tdap) Never done    Prostate Specific Antigen (PSA) Screening or Monitoring  12/28/2017    Depression Screen  01/06/2022    Annual Wellness Visit (AWV)  01/07/2022    COVID-19 Vaccine (4 - Booster for Pfizer series) 02/27/2022    Lipids  07/01/2022    Flu vaccine (1) 08/01/2022    Pneumococcal 65+ years Vaccine  Completed    Hepatitis A vaccine  Aged Out    Hepatitis B vaccine  Aged Out    Hib vaccine  Aged Out    Meningococcal (ACWY) vaccine  Aged Out       Subjective:      Review of Systems   Constitutional:  Negative for chills and fever. HENT:  Negative for congestion and rhinorrhea. Respiratory:  Negative for cough and wheezing. Gastrointestinal:  Negative for diarrhea, nausea and vomiting. Musculoskeletal:  Positive for gait problem. Neurological:  Positive for weakness. Negative for dizziness and headaches. Psychiatric/Behavioral:  Negative for agitation and behavioral problems. Denies fever, chills, nausea, vomiting, or diarrhea. Objective:     Vitals:    10/03/22 2024   BP: 138/68   Pulse: 78   Resp: 16   Temp: 97.3 °F (36.3 °C)   SpO2: 94%     Physical Exam  Constitutional:       General: He is not in acute distress. HENT:      Head: Normocephalic and atraumatic. Right Ear: External ear normal.      Left Ear: External ear normal.   Eyes:      Extraocular Movements: Extraocular movements intact. Conjunctiva/sclera: Conjunctivae normal.   Pulmonary:      Effort: No respiratory distress. Musculoskeletal:      Comments: Shuffling gait noted. Patient requires assistance to stand. Neurological:      General: No focal deficit present. Mental Status: He is alert. Mental status is at baseline. Psychiatric:         Mood and Affect: Mood normal.         Behavior: Behavior normal.       Assessment/Plan:        1. Primary osteoarthritis involving multiple joints  2. Generalized weakness  3.  Multiple falls  - PT/OT evaluate and treat      Return if symptoms worsen or fail to improve. No orders of the defined types were placed in this encounter. No orders of the defined types were placed in this encounter.               Electronically signed by DAYANARA Wagner CNP on 10/4/2022 at 10:27 AM

## 2022-10-04 NOTE — TELEPHONE ENCOUNTER
As follow up from recent visit, please fax completed progress note to [facility] for [employer] documentation, thanks

## 2022-10-11 ENCOUNTER — HOSPITAL ENCOUNTER (OUTPATIENT)
Age: 87
Setting detail: SPECIMEN
Discharge: HOME | End: 2022-10-11
Payer: MEDICARE

## 2022-10-11 ENCOUNTER — OUTSIDE SERVICES (OUTPATIENT)
Dept: INTERNAL MEDICINE | Age: 87
End: 2022-10-11
Payer: MEDICARE

## 2022-10-11 VITALS
DIASTOLIC BLOOD PRESSURE: 68 MMHG | HEART RATE: 66 BPM | SYSTOLIC BLOOD PRESSURE: 127 MMHG | OXYGEN SATURATION: 96 % | TEMPERATURE: 97.9 F | RESPIRATION RATE: 18 BRPM

## 2022-10-11 DIAGNOSIS — R19.15 HYPOACTIVE BOWEL SOUNDS: ICD-10-CM

## 2022-10-11 DIAGNOSIS — R33.8 BENIGN PROSTATIC HYPERPLASIA WITH URINARY RETENTION: ICD-10-CM

## 2022-10-11 DIAGNOSIS — R32 URINARY INCONTINENCE, UNSPECIFIED TYPE: Primary | ICD-10-CM

## 2022-10-11 DIAGNOSIS — N40.1 BENIGN PROSTATIC HYPERPLASIA WITH URINARY RETENTION: ICD-10-CM

## 2022-10-11 LAB
BACTERIA: ABNORMAL
BILIRUBIN URINE: NEGATIVE
EPITHELIAL CELLS UA: ABNORMAL /HPF (ref 0–5)
GLUCOSE URINE: NEGATIVE
KETONES, URINE: NEGATIVE
LEUKOCYTE ESTERASE, URINE: ABNORMAL
NITRITE, URINE: POSITIVE
OTHER OBSERVATIONS UA: ABNORMAL
PH UA: 6 (ref 5–6)
PROTEIN UA: NEGATIVE
RBC UA: ABNORMAL /HPF (ref 0–4)
SPECIFIC GRAVITY UA: 1.03 (ref 1.01–1.02)
URINE HGB: ABNORMAL
UROBILINOGEN, URINE: NORMAL
WBC UA: >100 /HPF (ref 0–4)

## 2022-10-11 PROCEDURE — 81001 URINALYSIS AUTO W/SCOPE: CPT

## 2022-10-11 PROCEDURE — 87186 SC STD MICRODIL/AGAR DIL: CPT

## 2022-10-11 PROCEDURE — 87088 URINE BACTERIA CULTURE: CPT

## 2022-10-11 PROCEDURE — 87086 URINE CULTURE/COLONY COUNT: CPT

## 2022-10-11 RX ORDER — NITROFURANTOIN 25; 75 MG/1; MG/1
100 CAPSULE ORAL 2 TIMES DAILY
Qty: 14 CAPSULE | Refills: 0 | Status: SHIPPED | OUTPATIENT
Start: 2022-10-11 | End: 2022-10-18

## 2022-10-11 NOTE — PROGRESS NOTES
[facility]      Jose Nielson is a 80 y.o. male resident of [facility] who presents today for medical conditions/complaints as noted below. HPI:     HPI    Patient presents for evaluation and management of medical conditions as noted below. Staff has noted increased urinary incontinence over the past week. Behavior has been relatively stable. Staff does have some concern for possible urinary retention as well. Patient is currently on Flomax for BPH. Follows with urology. No fever, chills, nausea, vomiting, diarrhea. Staff also noted some hypoactive bowel sounds. Patient denies any abdominal pain. He has been having regular bowel movements. Current Outpatient Medications   Medication Sig Dispense Refill    nitrofurantoin, macrocrystal-monohydrate, (MACROBID) 100 MG capsule Take 1 capsule by mouth 2 times daily for 7 days 14 capsule 0    glucosamine-chondroitin 500-400 MG CAPS Take 3 capsules by mouth daily      nitrofurantoin (MACRODANTIN) 100 MG capsule Take 100 mg by mouth daily      ALPRAZolam (XANAX) 0.25 MG tablet Take 0.125 mg by mouth every morning. ALPRAZolam (XANAX) 0.25 MG tablet Take 0.25 mg by mouth at bedtime. Psyllium (REGULOID PO) Take by mouth (Reguloid): mix 1 TBSP in 8 oz of water bid      albuterol (PROVENTIL) (2.5 MG/3ML) 0.083% nebulizer solution Take 2.5 mg by nebulization every 6 hours as needed for Wheezing      ALPRAZolam (XANAX) 0.25 MG tablet Take 0.125 mg by mouth daily as needed (before shower if needed). Simethicone LIQD by Does not apply route 20 mg/ 0.3 ml: take 0.6 ml po qid prn gas      hydrOXYzine HCl (ATARAX) 25 MG tablet Take 25 mg by mouth every 8 hours as needed for Itching      loperamide (IMODIUM) 2 MG capsule Take 2 mg by mouth After each loose stool.  *Not to exceed 16 gm/ 24 hours      Cholestyramine POWD Mix 1 scoopful (4 gm) po daily as needed      fluticasone (FLONASE) 50 MCG/ACT nasal spray INSTILL 1 SPRAY IN [facility] NOSTRIL ONCE DAILY 16 g 1    tamsulosin (FLOMAX) 0.4 MG capsule Take 1 capsule by mouth 2 times daily 180 capsule 3    amLODIPine (NORVASC) 5 MG tablet Take 1 tablet by mouth daily 30 tablet 3    hydrocortisone 1 % cream Apply topically to affected area as directed prn (Preparation H)      Compression Stockings MISC by Does not apply route DAVID hose- on in am, off in pm (Currently doing prn now)      amiodarone (CORDARONE) 200 MG tablet Take 100 mg by mouth daily      rivaroxaban (XARELTO) 20 MG TABS tablet Take 20 mg by mouth every morning      acetaminophen (TYLENOL) 325 MG tablet Take 650 mg by mouth every 4 hours as needed for Pain      Mineral Oil OIL Use as directed on bottle for occasional constipation      Dextromethorphan-guaiFENesin  MG/5ML SYRP Take 5 mLs by mouth every 6 hours as needed for Cough (Robafen DM)      metoprolol tartrate (LOPRESSOR) 25 MG tablet Take 0.5 tablets by mouth 2 times daily 90 tablet 1    levothyroxine (SYNTHROID) 112 MCG tablet Take 1 tablet by mouth Daily 30 tablet 11    polyvinyl alcohol (LIQUIFILM TEARS) 1.4 % ophthalmic solution Place 1 drop into both eyes every 6 hours as needed      Multiple Vitamins-Minerals (PRESERVISION AREDS PO) Take 1 capsule by mouth daily       isosorbide mononitrate (IMDUR) 30 MG extended release tablet Take 1 tablet by mouth daily 30 tablet 3    lisinopril (PRINIVIL;ZESTRIL) 40 MG tablet Take 1 tablet by mouth daily 30 tablet 3    Multiple Vitamins-Minerals (MULTIVITAMIN ADULT PO) Take 1 tablet by mouth daily      potassium chloride (KLOR-CON M) 20 MEQ extended release tablet Take 20 mEq by mouth daily      simvastatin (ZOCOR) 20 MG tablet TAKE 1 TABLET BY MOUTH EVERY OTHER DAY  30 tablet 11    Coenzyme Q-10 100 MG CAPS Take 100 mg by mouth every other day. tolnaftate (TINACTIN) 1 % external solution Apply topically nightly to toenails per Dr. Carlos A Barnes. No current facility-administered medications for this visit.      Allergies Allergen Reactions    Pcn [Penicillins] Swelling     As a child  Pt tolerated ceftriaxone , and keflex with no intolerance    Sulfa Antibiotics     Cefdinir Other (See Comments)     Pt tolerated ceftriaxone , and keflex with no intolerance       Health Maintenance   Topic Date Due    DTaP/Tdap/Td vaccine (1 - Tdap) Never done    Prostate Specific Antigen (PSA) Screening or Monitoring  12/28/2017    Depression Screen  01/06/2022    Annual Wellness Visit (AWV)  01/07/2022    COVID-19 Vaccine (4 - Booster for Pfizer series) 02/27/2022    Lipids  07/01/2022    Flu vaccine (1) 08/01/2022    Pneumococcal 65+ years Vaccine  Completed    Hepatitis A vaccine  Aged Out    Hib vaccine  Aged Out    Meningococcal (ACWY) vaccine  Aged Out       Subjective:      Review of Systems   Constitutional:  Negative for chills and fever. HENT:  Negative for congestion and rhinorrhea. Respiratory:  Negative for cough and wheezing. Gastrointestinal:  Negative for diarrhea, nausea and vomiting. Genitourinary:         Worsening urinary incontinence   Neurological:  Negative for dizziness and headaches. Due to patient's clinical status, ROS of symptoms was completed by discussion with long term care facility staff, as well as with input from patient. Objective:     Vitals:    10/11/22 0839   BP: 127/68   Pulse: 66   Resp: 18   Temp: 97.9 °F (36.6 °C)   SpO2: 96%     Physical Exam  Constitutional:       General: He is not in acute distress. HENT:      Head: Normocephalic and atraumatic. Right Ear: External ear normal.      Left Ear: External ear normal.   Eyes:      Extraocular Movements: Extraocular movements intact. Conjunctiva/sclera: Conjunctivae normal.   Pulmonary:      Effort: No respiratory distress. Abdominal:      Comments: Hypoactive bowel sounds LUQ and LLQ   Neurological:      General: No focal deficit present. Mental Status: He is alert. Mental status is at baseline.    Psychiatric: Mood and Affect: Mood normal.         Behavior: Behavior normal.       Assessment/Plan:        1. Urinary incontinence, unspecified type  2. Benign prostatic hyperplasia with urinary retention  - UA C&S noted UTI, start macrobid as per result note orders    3. Hypoactive bowel sounds  - Stat abdominal xray      Return if symptoms worsen or fail to improve. No orders of the defined types were placed in this encounter. No orders of the defined types were placed in this encounter.               Electronically signed by DAYANARA Galloway CNP on 10/12/2022 at 2:10 PM

## 2022-10-11 NOTE — RESULT ENCOUNTER NOTE
UTI noted.  Start macrobid 100 mg BID x 7 days, rx sent  Please fax to [facility]  Electronically signed by DAYANARA Zelaya CNP on 10/11/2022 at 12:09 PM

## 2022-10-12 ASSESSMENT — ENCOUNTER SYMPTOMS
COUGH: 0
RHINORRHEA: 0
DIARRHEA: 0
WHEEZING: 0
VOMITING: 0
NAUSEA: 0

## 2022-10-13 LAB
CULTURE: ABNORMAL
SPECIMEN DESCRIPTION: ABNORMAL

## 2022-10-19 ENCOUNTER — TELEPHONE (OUTPATIENT)
Dept: INTERNAL MEDICINE | Age: 87
End: 2022-10-19
Payer: MEDICARE

## 2022-10-19 DIAGNOSIS — E03.4 HYPOTHYROIDISM DUE TO ACQUIRED ATROPHY OF THYROID: ICD-10-CM

## 2022-10-19 DIAGNOSIS — R29.6 MULTIPLE FALLS: ICD-10-CM

## 2022-10-19 DIAGNOSIS — I50.22 CHRONIC SYSTOLIC CHF (CONGESTIVE HEART FAILURE) (HCC): ICD-10-CM

## 2022-10-19 DIAGNOSIS — R53.1 GENERALIZED WEAKNESS: ICD-10-CM

## 2022-10-19 DIAGNOSIS — M15.9 PRIMARY OSTEOARTHRITIS INVOLVING MULTIPLE JOINTS: Primary | ICD-10-CM

## 2022-10-19 DIAGNOSIS — I10 ESSENTIAL HYPERTENSION: ICD-10-CM

## 2022-10-19 PROCEDURE — G0180 MD CERTIFICATION HHA PATIENT: HCPCS | Performed by: NURSE PRACTITIONER

## 2022-10-24 ENCOUNTER — TELEPHONE (OUTPATIENT)
Dept: INTERNAL MEDICINE | Age: 87
End: 2022-10-24

## 2022-10-25 ENCOUNTER — OUTSIDE SERVICES (OUTPATIENT)
Dept: INTERNAL MEDICINE | Age: 87
End: 2022-10-25
Payer: MEDICARE

## 2022-10-25 VITALS
HEART RATE: 84 BPM | SYSTOLIC BLOOD PRESSURE: 109 MMHG | DIASTOLIC BLOOD PRESSURE: 64 MMHG | RESPIRATION RATE: 16 BRPM | TEMPERATURE: 98.5 F | OXYGEN SATURATION: 97 %

## 2022-10-25 DIAGNOSIS — N40.1 BENIGN PROSTATIC HYPERPLASIA WITH URINARY RETENTION: Primary | ICD-10-CM

## 2022-10-25 DIAGNOSIS — R33.8 BENIGN PROSTATIC HYPERPLASIA WITH URINARY RETENTION: Primary | ICD-10-CM

## 2022-10-25 DIAGNOSIS — Z87.440 HISTORY OF RECURRENT UTIS: ICD-10-CM

## 2022-10-25 ASSESSMENT — ENCOUNTER SYMPTOMS
DIARRHEA: 0
COUGH: 0
SHORTNESS OF BREATH: 0
NAUSEA: 0

## 2022-10-25 NOTE — PROGRESS NOTES
[facility]      Natasha Heller is a 80 y.o. male resident of [facility] who presents today for medical conditions/complaints as noted below. HPI:     HPI  Patient of Dr. Marcell Maciel presents via virtual visit with assisted living staff member for evaluation and management of medical conditions as noted below. Patient has been complaining of nonspecific pain while walking. He had initially indicated that he had some inguinal pain, but now denies any pain in this area. Patient is poor historian due to dementia. No fever, chills, nausea, vomiting, diarrhea. He was treated approximately 1 week ago for urinary tract infection, culture was sensitive to antibiotic. Does have a history of recurrent UTIs. Current Outpatient Medications   Medication Sig Dispense Refill    glucosamine-chondroitin 500-400 MG CAPS Take 3 capsules by mouth daily      nitrofurantoin (MACRODANTIN) 100 MG capsule Take 100 mg by mouth daily      ALPRAZolam (XANAX) 0.25 MG tablet Take 0.125 mg by mouth every morning. ALPRAZolam (XANAX) 0.25 MG tablet Take 0.25 mg by mouth at bedtime. Psyllium (REGULOID PO) Take by mouth (Reguloid): mix 1 TBSP in 8 oz of water bid      albuterol (PROVENTIL) (2.5 MG/3ML) 0.083% nebulizer solution Take 2.5 mg by nebulization every 6 hours as needed for Wheezing      ALPRAZolam (XANAX) 0.25 MG tablet Take 0.125 mg by mouth daily as needed (before shower if needed). Simethicone LIQD by Does not apply route 20 mg/ 0.3 ml: take 0.6 ml po qid prn gas      hydrOXYzine HCl (ATARAX) 25 MG tablet Take 25 mg by mouth every 8 hours as needed for Itching      loperamide (IMODIUM) 2 MG capsule Take 2 mg by mouth After each loose stool.  *Not to exceed 16 gm/ 24 hours      Cholestyramine POWD Mix 1 scoopful (4 gm) po daily as needed      fluticasone (FLONASE) 50 MCG/ACT nasal spray INSTILL 1 SPRAY IN EACH NOSTRIL ONCE DAILY 16 g 1    tamsulosin (FLOMAX) 0.4 MG capsule Take 1 capsule by mouth 2 times daily 180 capsule 3    amLODIPine (NORVASC) 5 MG tablet Take 1 tablet by mouth daily 30 tablet 3    hydrocortisone 1 % cream Apply topically to affected area as directed prn (Preparation H)      Compression Stockings MISC by Does not apply route DAVID hose- on in am, off in pm (Currently doing prn now)      amiodarone (CORDARONE) 200 MG tablet Take 100 mg by mouth daily      rivaroxaban (XARELTO) 20 MG TABS tablet Take 20 mg by mouth every morning      acetaminophen (TYLENOL) 325 MG tablet Take 650 mg by mouth every 4 hours as needed for Pain      Mineral Oil OIL Use as directed on bottle for occasional constipation      Dextromethorphan-guaiFENesin  MG/5ML SYRP Take 5 mLs by mouth every 6 hours as needed for Cough (Robafen DM)      metoprolol tartrate (LOPRESSOR) 25 MG tablet Take 0.5 tablets by mouth 2 times daily 90 tablet 1    levothyroxine (SYNTHROID) 112 MCG tablet Take 1 tablet by mouth Daily 30 tablet 11    polyvinyl alcohol (LIQUIFILM TEARS) 1.4 % ophthalmic solution Place 1 drop into both eyes every 6 hours as needed      Multiple Vitamins-Minerals (PRESERVISION AREDS PO) Take 1 capsule by mouth daily       isosorbide mononitrate (IMDUR) 30 MG extended release tablet Take 1 tablet by mouth daily 30 tablet 3    lisinopril (PRINIVIL;ZESTRIL) 40 MG tablet Take 1 tablet by mouth daily 30 tablet 3    Multiple Vitamins-Minerals (MULTIVITAMIN ADULT PO) Take 1 tablet by mouth daily      potassium chloride (KLOR-CON M) 20 MEQ extended release tablet Take 20 mEq by mouth daily      simvastatin (ZOCOR) 20 MG tablet TAKE 1 TABLET BY MOUTH EVERY OTHER DAY  30 tablet 11    Coenzyme Q-10 100 MG CAPS Take 100 mg by mouth every other day. tolnaftate (TINACTIN) 1 % external solution Apply topically nightly to toenails per Dr. Windy Pastor. No current facility-administered medications for this visit.      Allergies   Allergen Reactions    Pcn [Penicillins] Swelling     As a child  Pt tolerated ceftriaxone , and keflex with no intolerance    Sulfa Antibiotics     Cefdinir Other (See Comments)     Pt tolerated ceftriaxone , and keflex with no intolerance       Health Maintenance   Topic Date Due    DTaP/Tdap/Td vaccine (1 - Tdap) Never done    Prostate Specific Antigen (PSA) Screening or Monitoring  12/28/2017    COVID-19 Vaccine (4 - Booster for Pfizer series) 12/22/2021    Depression Screen  01/06/2022    Annual Wellness Visit (AWV)  01/07/2022    Lipids  07/01/2022    Flu vaccine (1) 08/01/2022    Pneumococcal 65+ years Vaccine  Completed    Hepatitis A vaccine  Aged Out    Hib vaccine  Aged Out    Meningococcal (ACWY) vaccine  Aged Out       Subjective:      Review of Systems   Constitutional:  Positive for activity change. Negative for fever. Respiratory:  Negative for cough and shortness of breath. Gastrointestinal:  Negative for diarrhea and nausea. Genitourinary:  Negative for hematuria, scrotal swelling and testicular pain. Neurological:  Negative for dizziness and weakness. Psychiatric/Behavioral:  Negative for agitation and behavioral problems. Due to patient's clinical status, ROS of symptoms was completed by discussion with long term care facility staff, as well as with input from patient. Objective: There were no vitals filed for this visit. Physical Exam  Constitutional:       General: He is not in acute distress. HENT:      Head: Normocephalic and atraumatic. Right Ear: External ear normal.      Left Ear: External ear normal.   Eyes:      Extraocular Movements: Extraocular movements intact. Conjunctiva/sclera: Conjunctivae normal.   Pulmonary:      Effort: Pulmonary effort is normal. No respiratory distress. Genitourinary:     Comments: No apparent inguinal bulge to suggest hernia. Penis and scrotum appear grossly normal.   Skin:     Coloration: Skin is not jaundiced or pale. Neurological:      Mental Status: He is alert. Mental status is at baseline. Assessment/Plan:        1. Benign prostatic hyperplasia with urinary retention  2. History of recurrent UTIs  - UA C&S. Discussed utilizing his current order for PRN tylenol for several days and monitoring for improvement of his pain. - Urinalysis with Reflex to Culture; Future        No follow-ups on file. Orders Placed This Encounter   Procedures    Urinalysis with Reflex to Culture     Standing Status:   Future     Standing Expiration Date:   10/25/2023     Order Specific Question:   SPECIFY(EX-CATH,MIDSTREAM,CYSTO,ETC)? Answer:   clean catch     No orders of the defined types were placed in this encounter. Electronically signed by DAYANARA Cain CNP on 10/25/2022 at 10:16 AM     Evelin Curiel, was evaluated through a synchronous (real-time) audio-video encounter. The patient (or guardian if applicable) is aware that this is a billable service, which includes applicable co-pays. This Virtual Visit was conducted with patient's (and/or legal guardian's) consent. The visit was conducted pursuant to the emergency declaration under the [address] [place] [address] wa[facility]als for Children authority and the [employer] General Act. Patient identification was verified, and a caregiver was present when appropriate. The patient was located at THE [place], [place]   Provider was located at [facility]. Total time spent for this encounter: Not billed by time    --DAYANARA Cain CNP on 10/25/2022 at 10:16 AM    An electronic signature was used to authenticate this note. Stefan Vences

## 2022-10-26 ENCOUNTER — HOSPITAL ENCOUNTER (OUTPATIENT)
Age: 87
Setting detail: SPECIMEN
Discharge: HOME | End: 2022-10-26
Payer: MEDICARE

## 2022-10-26 LAB
BACTERIA: ABNORMAL
BILIRUBIN URINE: NEGATIVE
EPITHELIAL CELLS UA: ABNORMAL /HPF (ref 0–5)
GLUCOSE URINE: NEGATIVE
KETONES, URINE: ABNORMAL
LEUKOCYTE ESTERASE, URINE: ABNORMAL
NITRITE, URINE: POSITIVE
OTHER OBSERVATIONS UA: ABNORMAL
PH UA: 6 (ref 5–6)
PROTEIN UA: ABNORMAL
RBC UA: ABNORMAL /HPF (ref 0–4)
SPECIFIC GRAVITY UA: 1.02 (ref 1.01–1.02)
URINE HGB: ABNORMAL
UROBILINOGEN, URINE: NORMAL
WBC UA: >50 /HPF (ref 0–4)

## 2022-10-26 PROCEDURE — 81001 URINALYSIS AUTO W/SCOPE: CPT

## 2022-10-26 PROCEDURE — 87088 URINE BACTERIA CULTURE: CPT

## 2022-10-26 PROCEDURE — 87086 URINE CULTURE/COLONY COUNT: CPT

## 2022-10-26 PROCEDURE — 87186 SC STD MICRODIL/AGAR DIL: CPT

## 2022-10-27 ENCOUNTER — TELEPHONE (OUTPATIENT)
Dept: INTERNAL MEDICINE | Age: 87
End: 2022-10-27

## 2022-10-27 RX ORDER — NITROFURANTOIN 25; 75 MG/1; MG/1
100 CAPSULE ORAL 2 TIMES DAILY
Qty: 20 CAPSULE | Refills: 0 | Status: SHIPPED | OUTPATIENT
Start: 2022-10-27 | End: 2022-11-06

## 2022-10-27 NOTE — TELEPHONE ENCOUNTER
[facility] nurse informed of UTI. They would like the Rx sent to [facility] [phone].

## 2022-10-28 ENCOUNTER — TELEPHONE (OUTPATIENT)
Dept: INTERNAL MEDICINE | Age: 87
End: 2022-10-28

## 2022-10-28 LAB
CULTURE: ABNORMAL
SPECIMEN DESCRIPTION: ABNORMAL

## 2022-11-16 DIAGNOSIS — F41.9 ANXIETY: Primary | ICD-10-CM

## 2022-11-16 RX ORDER — CLONAZEPAM 0.25 MG/1
TABLET, ORALLY DISINTEGRATING ORAL
Qty: 135 TABLET | Refills: 1 | Status: SHIPPED | OUTPATIENT
Start: 2022-11-16 | End: 2022-11-28 | Stop reason: ALTCHOICE

## 2022-11-16 RX ORDER — CLONAZEPAM 0.25 MG/1
0.12 TABLET, ORALLY DISINTEGRATING ORAL EVERY MORNING
Qty: 45 TABLET | Refills: 2 | Status: CANCELLED | OUTPATIENT
Start: 2022-11-16 | End: 2023-02-14

## 2022-11-16 NOTE — TELEPHONE ENCOUNTER
Yoanna Vail requesting a refill of the below medication which has been pended for you:     Requested Prescriptions     Pending Prescriptions Disp Refills    clonazePAM (KLONOPIN) 0.25 MG disintegrating tablet 45 tablet 2     Sig: Take 0.5 tablets by mouth every morning for 90 days. clonazePAM (KLONOPIN) 0.25 MG disintegrating tablet 90 tablet 0     Sig: Take 1 tablet by mouth at bedtime for 90 days.        Last Appointment Date: 7/6/2021  Next Appointment Date: Patient is at [address] [place] [address] [Penicillins] Swelling     As a child  Pt tolerated ceftriaxone , and keflex with no intolerance    Sulfa Antibiotics     Cefdinir Other (See Comments)     Pt tolerated ceftriaxone , and keflex with no intolerance

## 2022-11-18 ENCOUNTER — OFFICE VISIT (OUTPATIENT)
Dept: CARDIOLOGY | Age: 87
End: 2022-11-18
Payer: MEDICARE

## 2022-11-18 VITALS — DIASTOLIC BLOOD PRESSURE: 58 MMHG | HEART RATE: 68 BPM | SYSTOLIC BLOOD PRESSURE: 108 MMHG

## 2022-11-18 DIAGNOSIS — I48.0 PAROXYSMAL ATRIAL FIBRILLATION (HCC): Primary | ICD-10-CM

## 2022-11-18 PROCEDURE — 93005 ELECTROCARDIOGRAM TRACING: CPT | Performed by: SURGERY

## 2022-11-18 PROCEDURE — 1123F ACP DISCUSS/DSCN MKR DOCD: CPT | Performed by: SURGERY

## 2022-11-18 PROCEDURE — G8417 CALC BMI ABV UP PARAM F/U: HCPCS | Performed by: SURGERY

## 2022-11-18 PROCEDURE — G8427 DOCREV CUR MEDS BY ELIG CLIN: HCPCS | Performed by: SURGERY

## 2022-11-18 PROCEDURE — 99213 OFFICE O/P EST LOW 20 MIN: CPT | Performed by: SURGERY

## 2022-11-18 PROCEDURE — G8484 FLU IMMUNIZE NO ADMIN: HCPCS | Performed by: SURGERY

## 2022-11-18 PROCEDURE — 1036F TOBACCO NON-USER: CPT | Performed by: SURGERY

## 2022-11-18 PROCEDURE — 99214 OFFICE O/P EST MOD 30 MIN: CPT | Performed by: SURGERY

## 2022-11-18 PROCEDURE — 93010 ELECTROCARDIOGRAM REPORT: CPT | Performed by: SURGERY

## 2022-11-18 RX ORDER — NITROFURANTOIN 25; 75 MG/1; MG/1
100 CAPSULE ORAL 2 TIMES DAILY
COMMUNITY

## 2022-11-18 NOTE — PROGRESS NOTES
Today's Date: 11/18/2022  Patient's Name: Marian Spears  Patient's age: 80 y. o., 1934    Subjective: The patient is a 80y.o. year old,  male is in the office for CAD follow up . Denies chest pain or shortness of breath , currently residing in a nursing home  Family History:  Family History   Problem Relation Age of Onset    Diabetes Mother     Osteoporosis Mother     Other Father         complications of prostate surgery (bleeding)    Stroke Sister     Other Sister         respiratory failure         Past Medical History:   has a past medical history of Acute bronchitis, MARITA (acute kidney injury) ([address] Allergic rhinitis, Anxiety, Bradycardia, Cataract, right, Choroidal nevus of right eye, Chronic systolic CHF (congestive heart failure) ([address] Coronary artery disease involving native coronary artery of native heart, Dementia associated with 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 behavioral disturbance ([address] Dermatophytosis of nail, Diverticulosis, Elevated PSA, Hemorrhoids, History of measles, History of mumps, Hyperlipidemia, Hypertension, Hypothyroidism, Mass of upper lobe of right lung, Occult blood positive stool, Osteoarthritis, Overweight, Paroxysmal atrial fibrillation ([address] Pneumonia due to organism, Pseudophakia, left eye, PSVT (paroxysmal supraventricular tachycardia) ([address] Recurrent UTI, Retinal detachment, Seborrheic dermatitis, Sepsis due to urinary tract infection ([address] and Urinary tract infection without hematuria. Past Surgical History:   has a past surgical history that includes Coronary artery bypass graft; Appendectomy (1940 and 1955); Tonsillectomy; retinal laser (Left, 12/22/2011); Cataract removal (Left, 05/07/2013); Skin tag removal (11/01/1999); Abscess Drainage ([id number]); and Colonoscopy (06/14/2021). Home Medications:  Prior to Admission medications    Medication Sig Start Date End Date Taking?  Authorizing Provider   nitrofurantoin, macrocrystal-monohydrate, (MACROBID) 100 MG capsule Take 100 mg by mouth 2 times daily   Yes Historical Provider, MD   clonazePAM (KLONOPIN) 0.25 MG disintegrating tablet Take 0.5 tablet in the morning and 1 tablet at night for 180 days. 11/16/22 5/15/23 Yes Guillermo Cerda DO   glucosamine-chondroitin 500-400 MG CAPS Take 3 capsules by mouth daily   Yes Historical Provider, MD   nitrofurantoin (MACRODANTIN) 100 MG capsule Take 100 mg by mouth daily   Yes Historical Provider, MD   ALPRAZolam (XANAX) 0.25 MG tablet Take 0.125 mg by mouth every morning. Yes Historical Provider, MD   Psyllium (REGULOID PO) Take by mouth (Reguloid): mix 1 TBSP in 8 oz of water bid   Yes Historical Provider, MD   albuterol (PROVENTIL) (2.5 MG/3ML) 0.083% nebulizer solution Take 2.5 mg by nebulization every 6 hours as needed for Wheezing   Yes Historical Provider, MD   Simethicone LIQD by Does not apply route 20 mg/ 0.3 ml: take 0.6 ml po qid prn gas   Yes Historical Provider, MD   hydrOXYzine HCl (ATARAX) 25 MG tablet Take 25 mg by mouth every 8 hours as needed for Itching   Yes Historical Provider, MD   loperamide (IMODIUM) 2 MG capsule Take 2 mg by mouth After each loose stool.  *Not to exceed 16 gm/ 24 hours   Yes Historical Provider, MD   Cholestyramine POWD Mix 1 scoopful (4 gm) po daily as needed   Yes Historical Provider, MD   fluticasone (FLONASE) 50 MCG/ACT nasal spray INSTILL 1 SPRAY IN EACH NOSTRIL ONCE DAILY 8/22/22  Yes Guillermo Cerda DO   tamsulosin (FLOMAX) 0.4 MG capsule Take 1 capsule by mouth 2 times daily 12/15/21 11/18/22 Yes Damaris Garcia MD   amLODIPine (NORVASC) 5 MG tablet Take 1 tablet by mouth daily 5/20/21  Yes Frankie Vega MD   hydrocortisone 1 % cream Apply topically to affected area as directed prn (Preparation H)   Yes Historical Provider, MD   Compression Stockings MISC by Does not apply route DAVID hose- on in am, off in pm (Currently doing prn now)   Yes Historical Provider, MD   amiodarone (CORDARONE) 200 MG tablet Take 100 mg by mouth daily   Yes Historical Provider, MD   rivaroxaban (XARELTO) 20 MG TABS tablet Take 20 mg by mouth every morning   Yes Historical Provider, MD   acetaminophen (TYLENOL) 325 MG tablet Take 650 mg by mouth every 4 hours as needed for Pain   Yes Historical Provider, MD   Mineral Oil OIL Use as directed on bottle for occasional constipation   Yes Historical Provider, MD   Dextromethorphan-guaiFENesin  MG/5ML SYRP Take 5 mLs by mouth every 6 hours as needed for Cough (Robafen DM)   Yes Historical Provider, MD   metoprolol tartrate (LOPRESSOR) 25 MG tablet Take 0.5 tablets by mouth 2 times daily 6/13/19  Yes Obie Becerra DO   levothyroxine (SYNTHROID) 112 MCG tablet Take 1 tablet by mouth Daily 4/15/19  Yes Guillermo Cerda DO   polyvinyl alcohol (LIQUIFILM TEARS) 1.4 % ophthalmic solution Place 1 drop into both eyes every 6 hours as needed   Yes Historical Provider, MD   Multiple Vitamins-Minerals (PRESERVISION AREDS PO) Take 1 capsule by mouth daily    Yes Historical Provider, MD   isosorbide mononitrate (IMDUR) 30 MG extended release tablet Take 1 tablet by mouth daily 8/22/18  Yes Chioma Lopez MD   lisinopril (PRINIVIL;ZESTRIL) 40 MG tablet Take 1 tablet by mouth daily 8/22/18  Yes Chioma Lopez MD   Multiple Vitamins-Minerals (MULTIVITAMIN ADULT PO) Take 1 tablet by mouth daily   Yes Historical Provider, MD   potassium chloride (KLOR-CON M) 20 MEQ extended release tablet Take 20 mEq by mouth daily   Yes Historical Provider, MD   simvastatin (ZOCOR) 20 MG tablet TAKE 1 TABLET BY MOUTH EVERY OTHER DAY  3/7/17  Yes Guillermo Cerda DO   tolnaftate (TINACTIN) 1 % external solution Apply topically nightly to toenails per Dr. Ermias Cook. Yes Historical Provider, MD   Coenzyme Q-10 100 MG CAPS Take 100 mg by mouth every other day. Historical Provider, MD       Allergies:  Pcn [penicillins], Sulfa antibiotics, and Cefdinir    Social History:   reports that he has never smoked.  He has never used smokeless tobacco. He reports that he does not drink alcohol and does not use drugs. Review of Systems:  CONSTITUTIONAL: well nourished   CARDIOVASCULAR: No chest pain, No dyspnea on exertion, No palpitations. No lower extremity edema. RESPIRATORY: denies shortness of breath   GASTROINTESTINAL:  negative  GENITOURINARY:  negative  INTEGUMENT:  negative  MUSCULOSKELETAL:  negative  NEUROLOGICAL:  negative    Physical Exam:  BP (!) 108/58 (Site: Right Upper Arm, Position: Sitting)   Pulse 68   HEENT: PERRL, no cervical lymphadenopathy. No masses palpable. Cardiovascular: The apical impulse is not displaced  Heart  Sounds:regular   Jugular venous pulsation Normal  Peripheral pulses are symmetrical and full  Respiratory: Good respiratory effort. On auscultation: clear to auscultation bilaterally  Abdomen:  No masses or tenderness  Bowel sounds present  Extremities:   No Cyanosis or Clubbing   Lower extremity edema: No  Skin: Warm and dry    Cardiac Data:  EKG: NSR    Echo 02/2018  Biatrial enlargement. 2.  Concentric left ventricular hypertrophy. 3.  Normal wall motion and systolic function. Estimated ejection fraction  of 50%. 4.  Anteroapical wall hypokinesis. 5.  No significant valvular abnormalities. 6.  Mild pulmonary hypertension of 42 mmHg systolic. 7.  No pericardial effusion. Labs:     CBC: No results for input(s): WBC, HGB, HCT, PLT in the last 72 hours. BMP: No results for input(s): NA, K, CO2, BUN, CREATININE, LABGLOM, GLUCOSE in the last 72 hours. PT/INR: No results for input(s): PROTIME, INR in the last 72 hours. FASTING LIPID PANEL:  Lab Results   Component Value Date/Time    HDL 35 07/01/2021 06:19 AM    TRIG 80 07/01/2021 06:19 AM     LIVER PROFILE:No results for input(s): AST, ALT, LABALBU in the last 72 hours. IMPRESSION:        IMPRESSION:    CAD s/p CABG (6/7/2000) LIMA TO LAD, SVG TO D2, STABLE without angina.    MILD LV SYSTOLIC DYSFUNCTION, last LVEF 50% in 2018, NO CLINICAL CHF  PAF- in NSR, on A/C with NOAC  HTN  HLP    Patient Active Problem List   Diagnosis    Hyperlipidemia    Osteoarthritis    Allergic rhinitis    Diverticulosis    Anxiety    Atrial fibrillation ([address]    Coronary artery disease involving native coronary artery of native heart    Mass of upper lobe of right lung    Chronic systolic CHF (congestive heart failure) (HCC)    Benign prostatic hyperplasia with incomplete bladder emptying    Hypothyroidism    Hypertension    Dementia associated with 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 behavioral disturbance (HCC)    Intermediate stage nonexudative age-related macular degeneration of both eyes    Combined forms of age-related cataract of right eye    Pseudophakia of left eye    Class 1 obesity with serious comorbidity and body mass index (BMI) of 30.0 to 30.9 in adult    Overweight    H/O fall       RECOMMENDATIONS:  PAF- stable sinus rhythm on monitor  - continue amiodarone , BB and xarelto   CAD - denies chest pain or shortness of breath -Continue low-dose beta-blocker and Zocor  Encourage activity as tolerated   Follow up in six month           DAYANARA Rodriguez - NP, MD  [facility]           [phone]

## 2022-11-28 DIAGNOSIS — F41.9 ANXIETY: ICD-10-CM

## 2022-11-28 RX ORDER — ALPRAZOLAM 0.25 MG/1
TABLET ORAL
Qty: 60 TABLET | Refills: 2 | Status: SHIPPED | OUTPATIENT
Start: 2022-11-28 | End: 2022-12-27

## 2022-12-21 ENCOUNTER — OFFICE VISIT (OUTPATIENT)
Dept: UROLOGY | Age: 87
End: 2022-12-21
Payer: MEDICARE

## 2022-12-21 VITALS
HEART RATE: 68 BPM | DIASTOLIC BLOOD PRESSURE: 58 MMHG | RESPIRATION RATE: 16 BRPM | OXYGEN SATURATION: 97 % | HEIGHT: 71 IN | WEIGHT: 186 LBS | BODY MASS INDEX: 26.04 KG/M2 | SYSTOLIC BLOOD PRESSURE: 106 MMHG

## 2022-12-21 DIAGNOSIS — N40.1 BPH WITH OBSTRUCTION/LOWER URINARY TRACT SYMPTOMS: Primary | ICD-10-CM

## 2022-12-21 DIAGNOSIS — R33.9 RETENTION OF URINE: ICD-10-CM

## 2022-12-21 DIAGNOSIS — N39.0 RECURRENT UTI: ICD-10-CM

## 2022-12-21 DIAGNOSIS — N13.8 BPH WITH OBSTRUCTION/LOWER URINARY TRACT SYMPTOMS: Primary | ICD-10-CM

## 2022-12-21 DIAGNOSIS — R97.20 ELEVATED PSA: ICD-10-CM

## 2022-12-21 PROCEDURE — 99214 OFFICE O/P EST MOD 30 MIN: CPT | Performed by: UROLOGY

## 2022-12-21 PROCEDURE — G8484 FLU IMMUNIZE NO ADMIN: HCPCS | Performed by: UROLOGY

## 2022-12-21 PROCEDURE — G8417 CALC BMI ABV UP PARAM F/U: HCPCS | Performed by: UROLOGY

## 2022-12-21 PROCEDURE — G8427 DOCREV CUR MEDS BY ELIG CLIN: HCPCS | Performed by: UROLOGY

## 2022-12-21 PROCEDURE — 1036F TOBACCO NON-USER: CPT | Performed by: UROLOGY

## 2022-12-21 PROCEDURE — 1123F ACP DISCUSS/DSCN MKR DOCD: CPT | Performed by: UROLOGY

## 2022-12-21 RX ORDER — NITROFURANTOIN 25; 75 MG/1; MG/1
100 CAPSULE ORAL DAILY
Qty: 90 CAPSULE | Refills: 3 | Status: SHIPPED | COMMUNITY
Start: 2022-12-21 | End: 2022-12-21 | Stop reason: SDUPTHER

## 2022-12-21 RX ORDER — TAMSULOSIN HYDROCHLORIDE 0.4 MG/1
0.4 CAPSULE ORAL 2 TIMES DAILY
Qty: 180 CAPSULE | Refills: 3 | Status: SHIPPED | OUTPATIENT
Start: 2022-12-21

## 2022-12-21 RX ORDER — CLONAZEPAM 0.5 MG/1
0.25 TABLET ORAL 2 TIMES DAILY PRN
COMMUNITY

## 2022-12-21 RX ORDER — NITROFURANTOIN 25; 75 MG/1; MG/1
100 CAPSULE ORAL DAILY
Qty: 90 CAPSULE | Refills: 3 | Status: SHIPPED | OUTPATIENT
Start: 2022-12-21

## 2022-12-21 NOTE — PROGRESS NOTES
Karen Devine MD   Urology Clinic follow up      Patient:  Williams Bacon  YOB: 1934  Date: 12/21/2022    HISTORY OF PRESENT ILLNESS:   The patient is a 80 y.o. male who presents today for evaluation of the following problem(s): hematuria, BPH, retention, rec UTIs  Overall the problem(s) : stable  Associated Symptoms: No dysuria, gross hematuria. Pain Severity:      Summary of old records: Prostate:  >50 grams, hypervascular, obstructive  Bladder: No tumor noted. High median bar, multiple diverticula, cellules  Neg hematuria work up in the past  On flomax  Poor ECOG  (Patient's old records, notes and chart reviewed and summarized above.)      Today:  Follow up 1 year  He states no infection or pain in bladder   flomax BID. LUTS stable. No aggravating factors. No symptomatic UTIs  No gross hematuria. Denies pelvic pain. Last several PSA's:  Lab Results   Component Value Date    PSA 6.63 (H) 12/28/2016    PSA 4.73 (H) 12/30/2015    PSA 6.28 (H) 01/06/2015       Last total testosterone:  No results found for: TESTOSTERONE    Urinalysis today:  No results found for this visit on 12/21/22.       Last BUN and creatinine:  Lab Results   Component Value Date    BUN 11 10/05/2021     Lab Results   Component Value Date    CREATININE 1.01 10/05/2021       Imaging Reviewed during this Office Visit:   (results were independently reviewed by physician and radiology report verified)    PAST MEDICAL, FAMILY AND SOCIAL HISTORY:  Past Medical History:   Diagnosis Date    Acute bronchitis     by history    MARITA (acute kidney injury) ([address] 3/13/2018    Allergic rhinitis     Anxiety     Bradycardia 8/19/2018    Cataract, right     Choroidal nevus of right eye     Chronic systolic CHF (congestive heart failure) ([address] 3/13/2018    Coronary artery disease involving native coronary artery of native heart 10/20/2016    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 LIMA TO LAD, SVG TO DIAGONAL2    Dementia associated with 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 behavioral disturbance ([facility] 9/21/2018    Dermatophytosis of nail 1/10/2014    Diverticulosis     Elevated PSA     Hemorrhoids     History of measles childhood    History of mumps childhood    Hyperlipidemia     Hypertension     Hypothyroidism 9/4/2018    Mass of upper lobe of right lung 2/21/2018    Occult blood positive stool     refuses colonoscopy    Osteoarthritis     Overweight     Paroxysmal atrial fibrillation ([address] 11/7/2013    Pneumonia due to organism     Pseudophakia, left eye     PSVT (paroxysmal supraventricular tachycardia) ([facility]     Recurrent UTI 3/13/2018    Retinal detachment     left, history of     Seborrheic dermatitis     Sepsis due to urinary tract infection ([facility] 8/15/2018    Urinary tract infection without hematuria 3/28/2018     Past Surgical History:   Procedure Laterality Date    ABSCESS DRAINAGE  [id number]    multiple    APPENDECTOMY  [address] and [address] Left 05/07/2013    COLONOSCOPY  06/14/2021    Dr Jaimee Lomax Left 12/22/2011    detached retina    SKIN TAG REMOVAL  11/01/1999    TONSILLECTOMY       Family History   Problem Relation Age of Onset    Diabetes Mother     Osteoporosis Mother     Other Father         complications of prostate surgery (bleeding)    Stroke Sister     Other Sister         respiratory failure     Outpatient Medications Marked as Taking for the 12/21/22 encounter (Office Visit) with Lacey Rutledge MD   Medication Sig Dispense Refill    clonazePAM (KLONOPIN) 0.5 MG tablet Take 0.25 mg by mouth 2 times daily as needed.       tamsulosin (FLOMAX) 0.4 MG capsule Take 1 capsule by mouth 2 times daily 180 capsule 3    nitrofurantoin, macrocrystal-monohydrate, (MACROBID) 100 MG capsule Take 1 capsule by mouth daily 90 capsule 3    ALPRAZolam (XANAX) 0.25 MG tablet TAKE 1/2 TABLET (0.125MG) BY MOUTH EVERY MORNING TAKE 1 TABLET BY MOUTH AT BEDTIME TAKE 1/2 TABLET (0.25MG) BY MOUTH ONCE DAILY BEFORE SHOWER AS NEEDED. 60 tablet 2    glucosamine-chondroitin 500-400 MG CAPS Take 3 capsules by mouth daily      ALPRAZolam (XANAX) 0.25 MG tablet Take 0.125 mg by mouth every morning. Psyllium (REGULOID PO) Take by mouth (Reguloid): mix 1 TBSP in 8 oz of water bid      albuterol (PROVENTIL) (2.5 MG/3ML) 0.083% nebulizer solution Take 2.5 mg by nebulization every 6 hours as needed for Wheezing      Simethicone LIQD by Does not apply route 20 mg/ 0.3 ml: take 0.6 ml po qid prn gas      hydrOXYzine HCl (ATARAX) 25 MG tablet Take 25 mg by mouth every 8 hours as needed for Itching      loperamide (IMODIUM) 2 MG capsule Take 2 mg by mouth After each loose stool.  *Not to exceed 16 gm/ 24 hours      Cholestyramine POWD Mix 1 scoopful (4 gm) po daily as needed      fluticasone (FLONASE) 50 MCG/ACT nasal spray INSTILL 1 SPRAY IN EACH NOSTRIL ONCE DAILY 16 g 1    amLODIPine (NORVASC) 5 MG tablet Take 1 tablet by mouth daily 30 tablet 3    hydrocortisone 1 % cream Apply topically to affected area as directed prn (Preparation H)      amiodarone (CORDARONE) 200 MG tablet Take 100 mg by mouth daily      rivaroxaban (XARELTO) 20 MG TABS tablet Take 20 mg by mouth every morning      acetaminophen (TYLENOL) 325 MG tablet Take 650 mg by mouth every 4 hours as needed for Pain      Mineral Oil OIL Use as directed on bottle for occasional constipation      Dextromethorphan-guaiFENesin  MG/5ML SYRP Take 5 mLs by mouth every 6 hours as needed for Cough (Robafen DM)      metoprolol tartrate (LOPRESSOR) 25 MG tablet Take 0.5 tablets by mouth 2 times daily 90 tablet 1    levothyroxine (SYNTHROID) 112 MCG tablet Take 1 tablet by mouth Daily 30 tablet 11    polyvinyl alcohol (LIQUIFILM TEARS) 1.4 % ophthalmic solution Place 1 drop into both eyes every 6 hours as needed      Multiple Vitamins-Minerals (PRESERVISION AREDS PO) Take 1 capsule by mouth daily       isosorbide mononitrate (IMDUR) 30 MG extended release tablet Take 1 tablet by mouth daily 30 tablet 3    lisinopril (PRINIVIL;ZESTRIL) 40 MG tablet Take 1 tablet by mouth daily 30 tablet 3    Multiple Vitamins-Minerals (MULTIVITAMIN ADULT PO) Take 1 tablet by mouth daily      potassium chloride (KLOR-CON M) 20 MEQ extended release tablet Take 20 mEq by mouth daily      simvastatin (ZOCOR) 20 MG tablet TAKE 1 TABLET BY MOUTH EVERY OTHER DAY  30 tablet 11    Coenzyme Q-10 100 MG CAPS Take 100 mg by mouth every other day. tolnaftate (TINACTIN) 1 % external solution Apply topically nightly to toenails per Dr. Deangelo Grover. Pcn [penicillins], Sulfa antibiotics, and Cefdinir  Social History     Tobacco Use   Smoking Status Never   Smokeless Tobacco Never   Tobacco Comments    never smoked syant rrt,2/27/2018       Social History     Substance and Sexual Activity   Alcohol Use No    Alcohol/week: 0.0 standard drinks       REVIEW OF SYSTEMS:  Constitutional: negative  Eyes: negative  Respiratory: negative  Cardiovascular: negative  Gastrointestinal: negative  Musculoskeletal: negative  Genitourinary: negative except for what is in HPI  Skin: negative   Neurological: negative  Hematological/Lymphatic: negative  Psychological: negative    Physical Exam:    This a 80 y.o. male   Vitals:    12/21/22 1054   BP: (!) 106/58   Pulse: 68   Resp: 16   SpO2: 97%     Constitutional: Patient in no acute distress; Neuro: alert and oriented to person place and time. Psych: Mood and affect normal.  Skin: Normal        Assessment and Plan      1. BPH with obstruction/lower urinary tract symptoms    2. Recurrent UTI    3. Retention of urine    4. Elevated PSA           Plan:       No acute issues or changes since 1 year  BPH- stable overall, no symptomatic UTI's  Continue flomax BID  Recurrent UTIs- prophylactic macrobid daily, refilled  Meds refilled  Follow up 1 year for med and symptom check (with IGLESIA)  Return in about 1 year (around 12/21/2023).            MD Lulu Carvalho [facility]

## 2022-12-28 ENCOUNTER — TELEPHONE (OUTPATIENT)
Dept: INTERNAL MEDICINE | Age: 87
End: 2022-12-28

## 2023-01-04 ENCOUNTER — TELEPHONE (OUTPATIENT)
Dept: INTERNAL MEDICINE | Age: 88
End: 2023-01-04

## 2023-01-20 ENCOUNTER — OFFICE VISIT (OUTPATIENT)
Dept: NEUROLOGY | Age: 88
End: 2023-01-20
Payer: MEDICARE

## 2023-01-20 VITALS
BODY MASS INDEX: 23.77 KG/M2 | DIASTOLIC BLOOD PRESSURE: 62 MMHG | OXYGEN SATURATION: 100 % | SYSTOLIC BLOOD PRESSURE: 102 MMHG | HEIGHT: 71 IN | HEART RATE: 78 BPM | WEIGHT: 169.8 LBS

## 2023-01-20 DIAGNOSIS — E03.9 ACQUIRED HYPOTHYROIDISM: ICD-10-CM

## 2023-01-20 DIAGNOSIS — Z91.81 H/O FALL: ICD-10-CM

## 2023-01-20 DIAGNOSIS — F41.9 ANXIETY: ICD-10-CM

## 2023-01-20 DIAGNOSIS — I48.91 ATRIAL FIBRILLATION, UNSPECIFIED TYPE (HCC): ICD-10-CM

## 2023-01-20 DIAGNOSIS — R26.89 BALANCE PROBLEM: ICD-10-CM

## 2023-01-20 DIAGNOSIS — I10 PRIMARY HYPERTENSION: ICD-10-CM

## 2023-01-20 DIAGNOSIS — I50.22 CHRONIC SYSTOLIC CHF (CONGESTIVE HEART FAILURE) (HCC): ICD-10-CM

## 2023-01-20 DIAGNOSIS — F02.80 DEMENTIA ASSOCIATED WITH OTHER UNDERLYING DISEASE WITHOUT BEHAVIORAL DISTURBANCE (HCC): Primary | ICD-10-CM

## 2023-01-20 DIAGNOSIS — I25.10 CORONARY ARTERY DISEASE INVOLVING NATIVE CORONARY ARTERY OF NATIVE HEART WITHOUT ANGINA PECTORIS: ICD-10-CM

## 2023-01-20 PROCEDURE — 99214 OFFICE O/P EST MOD 30 MIN: CPT | Performed by: PSYCHIATRY & NEUROLOGY

## 2023-01-20 ASSESSMENT — ENCOUNTER SYMPTOMS
TROUBLE SWALLOWING: 0
BLOOD IN STOOL: 0
DIARRHEA: 0
BACK PAIN: 1
ABDOMINAL DISTENTION: 0
VOMITING: 0
CHEST TIGHTNESS: 0
EYE DISCHARGE: 0
SINUS PRESSURE: 0
SORE THROAT: 0
EYE REDNESS: 0
BOWEL INCONTINENCE: 0
SHORTNESS OF BREATH: 0
COUGH: 0
PHOTOPHOBIA: 0
EYE ITCHING: 0
CONSTIPATION: 0
VOICE CHANGE: 0
CHOKING: 0
COLOR CHANGE: 0
ABDOMINAL PAIN: 0
APNEA: 0
EYE PAIN: 0
VISUAL CHANGE: 0
FACIAL SWELLING: 0
NAUSEA: 0
WHEEZING: 0

## 2023-01-20 NOTE — PATIENT INSTRUCTIONS
* FALL   PRECAUTIONS. *  USE   WALKING  ASSISTANCE  DEVICES     QUAD  CANE  /   WALKER        *   SUPERVISED    CARE        *   ADEQUATE   FLUID  INTAKE   AND  ELECTROLYTE  BALANCE             * KEEP  DAIRY  OF   THE  NEUROLOGICAL  SYMPTOMS          *  TO  MAINTAIN  REGULAR  SLEEP  WAKE  CYCLES. *   TO  HAVE  ADEQUATE  REST  AND   SLEEP    HOURS.          *    AVOID  USAGE OF   TOBACCO,  EXCESSIVE  ALCOHOL                AND   ILLEGAL   SUBSTANCES,  IF  ANY          *  Maintain   Healthy  Life Style    With   Periodic  Monitoring  Of         Any  Medical  Conditions  Including   Elevated  Blood  Pressure,  Lipid  Profile,       Blood  Sugar levels  And   Heart  Disease. *   Period   Screening  For  Cancers  Involving  Breast,  Colon,         Lungs  And  Other  Organs  As  Applicable,           In consultation   With  Your  Primary Care Providers. *  If   There is  Any  Significant  Worsening   Of  Current  Symptoms  And             Or  If    Any additional  New  Neurological  Symptoms  and          Significant  Concerns   Should  Call  911 or  Go  To  Emergency  Department            For  Further  Immediate  Evaluation.

## 2023-01-20 NOTE — PROGRESS NOTES
[facility]  Neurology  [address] [place] [address]  Phone: [phone]   Fax: [phone]    SUBJECTIVE:     PATIENT ID:  Marion Becerra is a  RIGHT  HANDED 80 y.o. male. Neurologic Problem  The patient's primary symptoms include clumsiness, focal sensory loss, a loss of balance and memory loss. The patient's pertinent negatives include no altered mental status, focal weakness, near-syncope, slurred speech, syncope, visual change or weakness. This is a chronic problem. Episode onset: MORE  THAN   2  YEARS. The neurological problem developed insidiously. The problem is unchanged. There was lower extremity, upper extremity, right-sided and left-sided focality noted. Associated symptoms include back pain. Pertinent negatives include no abdominal pain, auditory change, aura, bladder incontinence, bowel incontinence, chest pain, confusion, diaphoresis, dizziness, fatigue, fever, headaches, light-headedness, nausea, neck pain, palpitations, shortness of breath, vertigo or vomiting. Past treatments include nothing. The treatment provided no relief. There is no history of a bleeding disorder, a clotting disorder, a CVA, head trauma, liver disease, mood changes or seizures. History obtained from  The patient       [facility]          and other  available medical records were  Also  reviewed. The  Duration,  Quality,  Severity,  Location,  Timing,  Context,  Modifying  Factors   Of   The   Chief   Complaint       And  Present  Illness   Was   Reviewed   In   Chronological   [place].                                             CURRENT PATIENT'S  MAIN  CONCERNS INCLUDE :                       1)        H/O   CHRONIC   MILD      MEMORY  PROBLEMS  /   DEMENTIA                                             FOR    5  - 6        YEARS                                 -   FAIRLY    STABLE 2)  PATIENT  NOT  CONCERNED   ABOUT  MEMORY  PROBLEMS   AND                                REFUSED      MEDICATION  FOR  DEMENTIA                                  3)    PREVIOUS   H/O   FALL   WITH  MILD  HEAD INJURY  IN   SEPT. 2018                            HAD  CT  HEAD  AND  C  SPINE    ON   9/19/2018                                 SHOWED  NO  ACUTE  PATHOLOGY                                      -  NO   H/O     RECURRENCE OF  FALLING                              4)    PERIPHERAL  POLYNEUROPATHY                                      -    STABLE                             5)    H/O   CHRONIC     MODERATE      BALANCE PROBLEMS                                             -    STABLE                                       HIGH  RISK  FOR  FALLS                                                 STRONGLY    ADVISED    TO  USE    QUAD  CANE  /   WALKER                            6)   H/O     CHRONIC     ATRIAL  FIBRILLATION                                 -     ON    XARELTO                                                        7)         MULTIPLE  CARDIAC  CONDITIONS                                  BEING   FOLLOWED  BY  HIS  CARDIOLOGY                           8)   MULTIPLE  CO MORBID  MEDICAL  CONDITIONS                                  BEING  FOLLOWED  BY  HIS    PCP. 9)    HIGH  RISK  FOR  STROKE,  CEREBRAL ISCHEMIA                               10)     CT   HEAD  ,  CT  SPINE    CAROTID  DOPPLER,   EEG      IN    SEPT. 2018                                  SHOWED  NO  SIGNIFICANT    PATHOLOGIES                           11)   H/O  MILD  ANXIETY  -    ON  XANAX                                         -   STABLE                             12)    PATIENT  HAS  BEEN     A  RESIDENT   OF                                         ASSISTED  LIVING   CARE   FACILITY                                                          13)      PATIENT  DENIES  ANY  NEW NEUROLOGICAL   CONCERNS. PATIENT   NOT  INTERESTED  IN    ANY  MEDICATION   FOR                                      HIS   MEMORY  PROBLEMS/   DEMENTIA  .                             14)      RECOMMENDED     TO  CONTINUE                                       A)     SUPERVISED   CARE                                B)      FALL  PRECAUTIONS                                 C)      USE    QUAD  CANE  /   WALKER                                    D)    BE  CAREFULL   WITH  HIS  ACTIVITIES                               E)     FOLLOW  UP    WITH  HIS  PCP   AND     CARDIOLOGY                            PRECIPITATING  FACTORS: including  fever/infection, exertion/relaxation, position change, stress, weather       Change,   medications/alcohol, time of day/darkness/light  Are    Absent                                                               MODIFYING  FACTORS:  fever/infection, exertion/relaxation, position change, stress, weather change,     medications/alcohol, time of day/darkness/light  Are  absent         Patient   Indicates   The  Presence   And  The  Absence  Of  The  Following  Associated         And   Additional  Neurological    Symptoms:                                Balance  And coordination problems  present           Gait problems     absent            Headaches      absent              Migraines           absent           Memory problems        Present             Confusion        absent            Paresthesia numbness          absent           Seizures  And  Starring  Episodes           absent           Syncope,  Near  syncopal episodes         absent           Speech problems           absent             Swallowing  Problems      absent            Dizziness,  Light headedness           absent                        Vertigo        absent             Generalized   Weakness    absent              focal  Weakness     absent             Tremors         absent Sleep  Problems     absent             History  Of   Recent   Head  Injury     absent             History  Of   Recent  TIA     absent             History  Of   Recent    Stroke     absent             Neck  Pain and  Neck muscle  Spasms  Absent               Radiating  down   And   Weakness           absent            Lower back   Pain  And     Spasms  Absent              Radiating    Down   And   Weakness          absent                H/O   FALLS        absent               History  Of   Visual  Symptoms    Absent                  Associated   Diplopia       absent                                Also   Additional   Symptoms   Present    As  Documented    In   The detailed      Review  Of  Systems   And    Please   Refer   To    Them for   Additional  Information. Any components  That are either  Unobtainable  Or  Limited  In   HPI, ROS  And/or PFSH   Are   Due       To   Patient's  Medical  Problems,  Clinical  Condition and/or lack of other  Alternate resources.           RECORDS   REVIEWED:    historical medical records       INFORMATION   REVIEWED:     MEDICAL   HISTORY,     SURGICAL   HISTORY,   MEDICATIONS   LIST,   ALLERGIES AND  DRUG  INTOLERANCES,     FAMILY   HISTORY,  SOCIAL  HISTORY,    PROBLEM  LIST   FOR  PATIENT  CARE   COORDINATION        Past Medical History:   Diagnosis Date    Acute bronchitis     by history    MARITA (acute kidney injury) ([facility] [address] 3/13/2018    Allergic rhinitis     Anxiety     Bradycardia 8/19/2018    Cataract, right     Choroidal nevus of right eye     Chronic systolic CHF (congestive heart failure) ([address] 3/13/2018    Coronary artery disease involving native coronary artery of native heart 10/20/2016    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 LIMA TO LAD, SVG TO DIAGONAL2    Dementia associated with 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 behavioral disturbance ([address] 9/21/2018    Dermatophytosis of nail 1/10/2014    Diverticulosis     Elevated PSA     Hemorrhoids     History of measles childhood    History of mumps childhood    Hyperlipidemia     Hypertension     Hypothyroidism 9/4/2018    Mass of upper lobe of right lung 2/21/2018    Occult blood positive stool     refuses colonoscopy    Osteoarthritis     Overweight     Paroxysmal atrial fibrillation (HCC) 11/7/2013    Pneumonia due to organism     Pseudophakia, left eye     PSVT (paroxysmal supraventricular tachycardia) (HCC)     Recurrent UTI 3/13/2018    Retinal detachment     left, history of     Seborrheic dermatitis     Sepsis due to urinary tract infection (HCC) 8/15/2018    Urinary tract infection without hematuria 3/28/2018         Past Surgical History:   Procedure Laterality Date    ABSCESS DRAINAGE  [id number]    multiple    APPENDECTOMY  1940 and 1955    CATARACT REMOVAL Left 05/07/2013    COLONOSCOPY  06/14/2021    Dr Aguilar    CORONARY ARTERY BYPASS GRAFT      RETINAL LASER Left 12/22/2011    detached retina    SKIN TAG REMOVAL  11/01/1999    TONSILLECTOMY           Current Outpatient Medications   Medication Sig Dispense Refill    tamsulosin (FLOMAX) 0.4 MG capsule Take 1 capsule by mouth 2 times daily 180 capsule 3    nitrofurantoin, macrocrystal-monohydrate, (MACROBID) 100 MG capsule Take 1 capsule by mouth daily 90 capsule 3    glucosamine-chondroitin 500-400 MG CAPS Take 3 capsules by mouth daily      ALPRAZolam (XANAX) 0.25 MG tablet Take 0.125 mg by mouth every morning.      fluticasone (FLONASE) 50 MCG/ACT nasal spray INSTILL 1 SPRAY IN EACH NOSTRIL ONCE DAILY 16 g 1    amLODIPine (NORVASC) 5 MG tablet Take 1 tablet by mouth daily 30 tablet 3    amiodarone (CORDARONE) 200 MG tablet Take 100 mg by mouth daily      rivaroxaban (XARELTO) 20 MG TABS tablet Take 20 mg by mouth every morning      acetaminophen (TYLENOL) 325 MG tablet Take 650 mg by mouth every 4 hours as needed for Pain      metoprolol tartrate (LOPRESSOR) 25 MG tablet Take 0.5 tablets by mouth 2 times daily 90 tablet 1    levothyroxine (SYNTHROID) 112 MCG  tablet Take 1 tablet by mouth Daily 30 tablet 11    polyvinyl alcohol (LIQUIFILM TEARS) 1.4 % ophthalmic solution Place 1 drop into both eyes every 6 hours as needed      Multiple Vitamins-Minerals (PRESERVISION AREDS PO) Take 1 capsule by mouth daily       isosorbide mononitrate (IMDUR) 30 MG extended release tablet Take 1 tablet by mouth daily 30 tablet 3    lisinopril (PRINIVIL;ZESTRIL) 40 MG tablet Take 1 tablet by mouth daily 30 tablet 3    Multiple Vitamins-Minerals (MULTIVITAMIN ADULT PO) Take 1 tablet by mouth daily      potassium chloride (KLOR-CON M) 20 MEQ extended release tablet Take 20 mEq by mouth daily      simvastatin (ZOCOR) 20 MG tablet TAKE 1 TABLET BY MOUTH EVERY OTHER DAY  30 tablet 11    Coenzyme Q-10 100 MG CAPS Take 100 mg by mouth every other day. tolnaftate (TINACTIN) 1 % external solution Apply topically nightly to toenails per Dr. Bryant Langley. clonazePAM (KLONOPIN) 0.5 MG tablet Take 0.25 mg by mouth 2 times daily as needed. (Patient not taking: Reported on 1/20/2023)      Psyllium (REGULOID PO) Take by mouth (Reguloid): mix 1 TBSP in 8 oz of water bid      albuterol (PROVENTIL) (2.5 MG/3ML) 0.083% nebulizer solution Take 2.5 mg by nebulization every 6 hours as needed for Wheezing (Patient not taking: Reported on 1/20/2023)      Simethicone LIQD by Does not apply route 20 mg/ 0.3 ml: take 0.6 ml po qid prn gas (Patient not taking: Reported on 1/20/2023)      hydrOXYzine HCl (ATARAX) 25 MG tablet Take 25 mg by mouth every 8 hours as needed for Itching (Patient not taking: Reported on 1/20/2023)      loperamide (IMODIUM) 2 MG capsule Take 2 mg by mouth After each loose stool.  *Not to exceed 16 gm/ 24 hours (Patient not taking: Reported on 1/20/2023)      Cholestyramine POWD Mix 1 scoopful (4 gm) po daily as needed (Patient not taking: Reported on 1/20/2023)      hydrocortisone 1 % cream Apply topically to affected area as directed prn (Preparation H) (Patient not taking: Reported on 1/20/2023)      Mineral Oil OIL Use as directed on bottle for occasional constipation (Patient not taking: Reported on 1/20/2023)      Dextromethorphan-guaiFENesin  MG/5ML SYRP Take 5 mLs by mouth every 6 hours as needed for Cough (Robafen DM) (Patient not taking: Reported on 1/20/2023)       No current facility-administered medications for this visit.          Allergies   Allergen Reactions    Pcn [Penicillins] Swelling     As a child  Pt tolerated ceftriaxone , and keflex with no intolerance    Sulfa Antibiotics     Cefdinir Other (See Comments)     Pt tolerated ceftriaxone , and keflex with no intolerance         Family History   Problem Relation Age of Onset    Diabetes Mother     Osteoporosis Mother     Other Father         complications of prostate surgery (bleeding)    Stroke Sister     Other Sister         respiratory failure         Social History     Socioeconomic History    Marital status: Single     Spouse name: Not on file    Number of children: Not on file    Years of education: Not on file    Highest education level: Not on file   Occupational History    Not on file   Tobacco Use    Smoking status: Never    Smokeless tobacco: Never    Tobacco comments:     never smoked syant rrt,2/27/2018   Vaping Use    Vaping Use: Never used   Substance and Sexual Activity    Alcohol use: No     Alcohol/week: 0.0 standard drinks    Drug use: No    Sexual activity: Not on file   Other Topics Concern    Not on file   Social History Narrative    Not on file     Social Determinants of Health     Financial Resource Strain: Not on file   Food Insecurity: Not on file   Transportation Needs: Not on file   Physical Activity: Not on file   Stress: Not on file   Social Connections: Not on file   Intimate Partner Violence: Not on file   Housing Stability: Not on file       Vitals:    01/20/23 0949   BP: 102/62   Pulse: 78   SpO2: 100%         Wt Readings from Last 3 Encounters:   01/20/23 169 lb 12.8 oz (77 kg)   12/21/22 186 lb (84.4 kg)   05/19/22 186 lb 12.8 oz (84.7 kg)         BP Readings from Last 3 Encounters:   01/20/23 102/62   12/21/22 (!) 106/58   11/18/22 (!) 108/58       Hematology and Coagulation  Lab Results   Component Value Date/Time    WBC 5.8 10/05/2021 08:26 AM    RBC 4.39 10/05/2021 08:26 AM    HGB 12.9 10/05/2021 08:26 AM    HCT 38.8 10/05/2021 08:26 AM    MCV 88.4 10/05/2021 08:26 AM    MCH 29.4 10/05/2021 08:26 AM    MCHC 33.2 10/05/2021 08:26 AM    RDW 14.5 10/05/2021 08:26 AM     10/05/2021 08:26 AM    MPV 10.5 10/05/2021 08:26 AM     Chemistries  Lab Results   Component Value Date/Time     10/05/2021 08:26 AM    K 4.1 10/05/2021 08:26 AM     10/05/2021 08:26 AM    CO2 26 10/05/2021 08:26 AM    BUN 11 10/05/2021 08:26 AM    CREATININE 1.01 10/05/2021 08:26 AM    CALCIUM 9.1 10/05/2021 08:26 AM    PROT 6.8 10/05/2021 08:26 AM    LABALBU 3.7 10/05/2021 08:26 AM    BILITOT 0.50 10/05/2021 08:26 AM    ALKPHOS 105 10/05/2021 08:26 AM    AST 18 10/05/2021 08:26 AM    ALT 9 10/05/2021 08:26 AM     Lab Results   Component Value Date/Time    ALKPHOS 105 10/05/2021 08:26 AM    ALT 9 10/05/2021 08:26 AM    AST 18 10/05/2021 08:26 AM    PROT 6.8 10/05/2021 08:26 AM    BILITOT 0.50 10/05/2021 08:26 AM    LABALBU 3.7 10/05/2021 08:26 AM     Lab Results   Component Value Date/Time    BUN 11 10/05/2021 08:26 AM    CREATININE 1.01 10/05/2021 08:26 AM     Lab Results   Component Value Date/Time    CALCIUM 9.1 10/05/2021 08:26 AM    MG 2.0 03/14/2018 04:37 AM     Lab Results   Component Value Date/Time    AST 18 10/05/2021 08:26 AM    ALT 9 10/05/2021 08:26 AM       Lab Results   Component Value Date/Time    URICACID 4.7 07/13/2018 10:40 AM     Lab Results   Component Value Date/Time    CKTOTAL 110 02/12/2018 10:05 AM       Review of Systems   Constitutional:  Negative for appetite change, chills, diaphoresis, fatigue, fever and unexpected weight change.    HENT:  Negative for congestion, dental problem, drooling, ear discharge, ear pain, facial swelling, hearing loss, mouth sores, nosebleeds, postnasal drip, sinus pressure, sore throat, tinnitus, trouble swallowing and voice change. Eyes:  Negative for photophobia, pain, discharge, redness, itching and visual disturbance. Respiratory:  Negative for apnea, cough, choking, chest tightness, shortness of breath and wheezing. Cardiovascular:  Negative for chest pain, palpitations, leg swelling and near-syncope. Gastrointestinal:  Negative for abdominal distention, abdominal pain, blood in stool, bowel incontinence, constipation, diarrhea, nausea and vomiting. Endocrine: Negative for cold intolerance, heat intolerance, polydipsia, polyphagia and polyuria. Genitourinary:  Negative for bladder incontinence. Musculoskeletal:  Positive for back pain. Negative for arthralgias, gait problem, joint swelling, myalgias, neck pain and neck stiffness. Skin:  Negative for color change, pallor, rash and wound. Allergic/Immunologic: Negative for environmental allergies, food allergies and immunocompromised state. Neurological:  Positive for loss of balance. Negative for dizziness, vertigo, tremors, focal weakness, seizures, syncope, facial asymmetry, speech difficulty, weakness, light-headedness, numbness and headaches. Hematological:  Negative for adenopathy. Does not bruise/bleed easily. Psychiatric/Behavioral:  Positive for decreased concentration and memory loss. Negative for agitation, behavioral problems, confusion, dysphoric mood, hallucinations, self-injury, sleep disturbance and suicidal ideas. The patient is not nervous/anxious and is not hyperactive. OBJECTIVE:    Physical Exam  Constitutional:       Appearance: He is well-developed. HENT:      Head: Normocephalic and atraumatic. No raccoon eyes or Rico's sign.       Right Ear: External ear normal.      Left Ear: External ear normal.      Nose: Nose normal.   Eyes:      Conjunctiva/sclera: Conjunctivae normal.   Neck:      Thyroid: No thyroid mass or thyromegaly. Vascular: No carotid bruit. Trachea: No tracheal deviation. Meningeal: Brudzinski's sign and Kernig's sign absent. Cardiovascular:      Rate and Rhythm: Normal rate and regular rhythm. Pulmonary:      Effort: Pulmonary effort is normal.   Musculoskeletal:         General: No tenderness. Cervical back: Normal range of motion and neck supple. No rigidity. No muscular tenderness. Normal range of motion. Skin:     General: Skin is warm. Coloration: Skin is not pale. Findings: No erythema or rash. Nails: There is no clubbing. Psychiatric:         Attention and Perception: He is attentive. Mood and Affect: Mood is anxious. Mood is not depressed. Affect is not labile, blunt, angry or inappropriate. Behavior: Behavior is slowed. Behavior is not agitated, aggressive, withdrawn, hyperactive or combative. Behavior is cooperative. Thought Content: Thought content is not paranoid or delusional. Thought content does not include homicidal or suicidal ideation. Thought content does not include homicidal or suicidal plan. Cognition and Memory: Cognition is impaired. Memory is impaired. He exhibits impaired recent memory and impaired remote memory. Judgment: Judgment is not impulsive or inappropriate. NEUROLOGICAL EXAMINATION :      A) MENTAL STATUS:                   Alert and  oriented  To  Person. No Aphasia. No  Dysarthria. Able   To  Follow   SIMPLE   commands without   Any  Difficulty. No right  To left confusion. SLOW  Speech  And language function.                    Insight and  Judgment ,Fund  Of  Knowledge DECREASED                Recent  And  Remote memory  DECREASED                Attention &Concentration are   DECREASED                                                 B) CRANIAL NERVES : 2 CN : Visual  Acuity  And  Visual fields  within normal limits                        Fundi  Could  Not  Be  Could  Not  Be  Evaluated. 3,4,6 CN : Both  Pupils are   Reactive and  Equal.                            Extraocular   Movements  Are  Intact. No  Nystagmus. No  ABDELRAHMAN. No  Afferent  Pupillary  Defect noted. 5 CN :  Normal  Facial sensations and Corneal  Reflexes           7 CN :  Normal  Facial  Symmetry  And  Strength. No facial  Weakness. 8 CN :  Hearing  Appears   DECREASED          9, 10 CN: Normal spontaneous, reflex palate movements         11 CN:   Normal  Shoulder shrug and  Strength         12 CN :   Normal  Tongue movements and  Tongue  In midline                        No tongue   Fasciculations or atrophy         C) MOTOR  EXAM:                 Strength  In upper  And  Lower extremities   within normal limits                          Muscle  Tone  In upper  And  Lower  Extremities  Normal                No rigidity. No  Spasticity. Bradykinesia   Absent                   No  Asterixis. Sustention  Tremor , Resting  Tremor   absent                    No other  Abnormal  Movements noted             D) SENSORY :               light touch, pinprick, position  And  Vibration  DECREASED                       E) REFLEXES:                   Deep  Tendon  Reflexes     DECREASED                    No pathological  Reflexes  Bilaterally.                                   F) COORDINATION  AND  GAIT :                                Station and  Gait    SLOW                                       Romberg's test   POSITIVE                          Ataxia negative      ASSESSMENT:          Patient Active Problem List   Diagnosis    Hyperlipidemia    Osteoarthritis    Allergic rhinitis    Diverticulosis    Anxiety    Atrial fibrillation ([facility] [address]    Coronary artery disease involving native coronary artery of native heart Mass of upper lobe of right lung    Chronic systolic CHF (congestive heart failure) (HCC)    Benign prostatic hyperplasia with incomplete bladder emptying    Hypothyroidism    Hypertension    Dementia associated with 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 behavioral disturbance (HCC)    Intermediate stage nonexudative age-related macular degeneration of both eyes    Combined forms of age-related cataract of right eye    Pseudophakia of left eye    Class 1 obesity with serious comorbidity and body mass index (BMI) of 30.0 to 30.9 in adult    Overweight    H/O fall    Balance problem           ULTRASOUND EVALUATION OF THE CAROTID ARTERIES       9/24/2018       COMPARISON:   None. HISTORY:   ORDERING SYSTEM PROVIDED HISTORY: Dementia associated with other underlying   disease without behavioral disturbance       FINDINGS:       RIGHT:       The right common carotid artery demonstrates peak systolic velocity of 85   cm/sec. The right internal carotid artery demonstrates the systolic velocity of 53   cm/sec. The external carotid artery is patent. The vertebral artery demonstrates   normal antegrade flow. Minimal calcified plaque is seen at the right carotid bulb. No significant   stenosis is seen. ICA/CCA ratio of 0.6. LEFT:       The left common carotid artery demonstrates peak systolic velocity of 77   cm/sec. The left internal carotid artery demonstrates the systolic velocity of 49   cm/sec. The external carotid artery is patent. The vertebral artery demonstrates   normal antegrade flow. Mild calcified plaque is seen within the left internal carotid artery without   significant stenosis. ICA/CCA ratio of 0.6           Impression   The right internal carotid artery demonstrates 0-50% stenosis. The left internal carotid artery demonstrates 0-50% stenosis.        Bilateral vertebral arteries are patent with flow in the normal direction       CT OF THE HEAD WITHOUT CONTRAST; CT OF THE CERVICAL SPINE WITHOUT CONTRAST   9/19/2018 2:00 pm       TECHNIQUE:   CT of the head was performed without the administration of intravenous   contrast. Dose modulation, iterative reconstruction, and/or weight based   adjustment of the mA/kV was utilized to reduce the radiation dose to as low   as reasonably achievable.; CT of the cervical spine was performed without the   administration of intravenous contrast. Multiplanar reformatted images are   provided for review. Dose modulation, iterative reconstruction, and/or weight   based adjustment of the mA/kV was utilized to reduce the radiation dose to as   low as reasonably achievable. COMPARISON:   None. HISTORY:   ORDERING SYSTEM PROVIDED HISTORY: fall yesterday on blood thinners   TECHNOLOGIST PROVIDED HISTORY:       Ordering Physician Provided Reason for Exam: Patient sent from [facility]   after fall yesterday. Nurse states was sitting on bench he thought it had a   back and leaned back and fell unsure of surface he fell onto. Patient denies   any injury. No redness, bruising, or abrasion noted to head. FINDINGS:   CT HEAD:       BRAIN/VENTRICLES: There is no acute intracranial hemorrhage, mass effect or   midline shift. No abnormal extra-axial fluid collection. The gray-white   differentiation is maintained without evidence of an acute infarct. There is   no evidence of hydrocephalus. ORBITS: The visualized portion of the orbits demonstrate no acute abnormality. SINUSES: The visualized paranasal sinuses and mastoid air cells demonstrate   no acute abnormality. SOFT TISSUES/SKULL:  No acute abnormality of the visualized skull or soft   tissues. CT CERVICAL SPINE:       BONE/ALIGNMENT:  No acute fracture, subluxation, compression deformity or   suspicious osseous lesions are identified. AP alignment of the cervical   spine is maintained.        DEGENERATIVE CHANGES:  There is multilevel degenerative disc disease with   essentially complete loss of the disc space at C6-C7 and C7-T1. There is   multilevel anterior spurring. There are hypertrophic degenerative changes of   the facet and uncovertebral joints throughout the cervical spine as well as   the atlantoaxial joint. These findings contribute to moderate neural   foraminal stenosis at C3-C4, moderate bilateral neural foraminal stenosis at   C4-C5, and mild to moderate right neural foraminal stenosis at C5-C6 and   C6-C7. Disc osteophyte complexes at C4-C5, C5-C6 and C6-C7 cause mild   impingement of the ventral thecal sac at these levels. SOFT TISSUES:  No prevertebral soft tissue thickening. The incidentally   imaged paravertebral soft tissues have a normal noncontrast CT appearance. No abnormal lymphadenopathy appreciated. Thyroid has a normal noncontrast CT   appearance. Visualized lung parenchyma is well aerated. Impression   1. No CT evidence for acute intracranial process. 2.  Multilevel degenerative disc disease and hypertrophic degenerative   changes of the cervical spine, but no CT evidence for acute osseous   abnormality. VISITING DIAGNOSIS:      ICD-10-CM    1. 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 Atrial fibrillation, unspecified type ([address]  I48.91       3. H/O fall  Z91.81       4. Anxiety  F41.9       5. Acquired hypothyroidism  E03.9       6. Primary hypertension  I10       7. Chronic systolic CHF (congestive heart failure) ([facility])  I50.22       8. Coronary artery disease involving native coronary artery of native heart without angina pectoris  I25.10       9.  Balance problem  R26.89                  CONCERNS   &   INCREASED   RISK   FOR         * TIA,  CEREBRO  VASCULAR  ISCHEMIA, STROKE      *   COGNITIVE  &   MEMORY PROBLEMS  AND  DEMENTIAS        *   PERIPHERAL  NEUROPATHY,   NEUROPATHIC  PAIN      * BALANCE PROBLMES   AND  FALL                VARIOUS  RISK   FACTORS   WERE  REVIEWED   AND   DISCUSSED. *  PATIENT   HAS  MULTIPLE   MEDICAL,          NEUROLOGICAL   PROBLEMS . PATIENT'S   MANAGEMENT  IS  CHALLENGING. PLAN:       Deb Meuse  Of  The  Diagnoses,  The  Management & Treatment  Options           AND    Care  plan  Were        Reviewed and   Discussed   With  patient. * Goals  And  Expectations  Of  The  Therapy  Discussed   And  Reviewed. *   Benefits   And   Side  Effect  Profile  Of  Medication/s   Were   Discussed             * Need   For  Further   Follow up For  The  Various  Problems  Were discussed. * Results  Of  The  Previous  Diagnostic tests were reviewed and questions answered. patient  understand the same. Medical  Decision  Making  Was  Made  Based on the   Complexity  Of  Above  Mentioned  Diagnoses    ,    Data reviewed   & diagnostic  Tests  Reviewed,  Risk  Of  Significant   Co morbidities and complicating   Factors. Medical  Decision  Was   MODERATE   Complexity  Due   To  The  Patient's  Multiple  Symptoms,      Advancing   Disease,  Complex  Treatment  Regimen,  Multiple medications and   Risk  Of   Side  Effects,      Difficulty  In  Medication  Management  And  Diagnostic  Challenges   In  View  Of  The  Associated       Co  Morbid  Conditions   And  Problems. * FALL   PRECAUTIONS. THESE  REVIEWED   AND  DISCUSSED      *   BE  CAREFUL  WITH  ACTIVITIES            *  SUPERVISED   CARE      *   ADEQUATE   FLUID  INTAKE   AND  ELECTROLYTE  BALANCE               * KEEP  DAIRY  OF   THE  NEUROLOGICAL  SYMPTOMS        RECORDING THE    DURATION  AND  FREQUENCY. *  AVOID    CONDITIONS  AND  FACTORS   THAT  MAKE                  NEUROLOGICAL  SYMPTOMS  WORSE. *  TO  MAINTAIN  REGULAR  SLEEP  WAKE  CYCLES. *   TO  HAVE  ADEQUATE  REST  AND   SLEEP    HOURS.           * AVOID  ANY USAGE OF                   TOBACCO,  EXCESSIVE  ALCOHOL  AND   ILLEGAL   SUBSTANCES          *   Compliance   With  Medications   And  Instructions    *  May   Use  Pill  Box,    If  Needed            *    Prophylactic  Use   Of     Vitamin   B   Complex,  Folic  Acid,    Vitamin  B12    Multivitamin,       Calcium  With  magnesium  And  Vit D    Supplementations   Over  The  Counter  Discussed           *  PATIENT  IS  ALSO   COUNSELED   TO  KEEP    ACTIVITIES:         A)   SIMPLE      B)  ORGANIZED      C)  WRITE   DOWN                       *  EVALUATIONS  AND  FOLLOW UP:                  * PHYSICAL  THERAPY   / OCCUPATIONAL THERAPY                                  * CARDIOLOGY                                     *     H/O   CHRONIC  MILD    MEMORY  PROBLEMS                                       FOR   5- 6       YEARS           -      STABLE                           *     PATIENT  NOT  CONCERNED   ABOUT  MEMORY  PROBLEMS   AND                             NOT  INTERESTED  IN MEDICATION  FOR  DEMENTIA                        *PATIENT   TO  FOLLOW  UP  WITH   PRIMARY  CARE            AND   OTHER  CONSULTANTS  AS  BEFORE. *  Maintain   Healthy  Life Style    With   Periodic  Monitoring  Of      Any  Medical  Conditions  Including   Elevated  Blood  Pressure,  Lipid  Profile,     Blood  Sugar levels  And   Heart  Disease. *   Period   Screening  For  Cancers  Involving  Breast,  Colon,    lungs  And  Other  Organs  As  Applicable,  In consultation   With  Your  Primary Care Providers. * Second  Neurological  Opinion  And  Evaluations  In  [facility] AND [facility]  Setting  If  Patient  Is  Interested. * Please   Contact   Neurology  Clinic   Early   If   Are  Any  New  Neurological                             Symptoms   And  Any neurological  Concerns.                   *  If  The  Patient remains  Neurologically  Stable   Return   To  [facility] Neurology Department   IN  6 - 12   MONTHS  TIME   FOR  FURTHER              FOLLOW UP.                 *  If   There is  Any  Significant  Worsening   Of  Current  Symptoms  And  Or  If patient  Develops       Any additional  New  Neurological  Symptoms  Or  Significant  Concerns   Should  Call  911 or      Go  To  Emergency  Department  For  Further  Immediate  Evaluation. *   The  Neurological   Findings,  Possible  Diagnosis,  Differential diagnoses   And  Options      For    Further   Investigations   And  management   Are  Discussed  Comprehensively. Medications   And  Prescription   Risks  And  Side effects  Are   Also  Discussed. The  Above  Were  Reviewed  With  patient and     questions  Answered  In  Detail. More   Than   50% of face  To face Time   Was  Spent  On  Counseling   And   Coordination  Of  Care       Of   Patient's multiple   Neurological  Problems   And   Comorbid  Medical   Conditions. Electronically signed by Karlos Adamson MD.,  [facility]       Board Certified in  Neurology &  In  [address] of [employer]iat[employer] and Neurology ([employer])      DISCLAIMER:   Although every effort was made to ensure the accuracy of this  electronic transcription, some errors in transcription may have occurred. GENERAL PATIENT INSTRUCTIONS:     A Healthy Lifestyle: Care Instructions  Your Care Instructions  A healthy lifestyle can help you feel good, stay at a healthy weight, and have plenty of energy for both work and play. A healthy lifestyle is something you can share with your whole family. A healthy lifestyle also can lower your risk for serious health problems, such as high blood pressure, heart disease, and diabetes. You can follow a few steps listed below to improve your health and the health of your family. Follow-up care is a key part of your treatment and safety.  Be sure to make and go to all appointments, and call your doctor if you are having problems. Its also a good idea to know your test results and keep a list of the medicines you take. How can you care for yourself at home? Do not eat too much sugar, fat, or fast foods. You can still have dessert and treats now and then. The goal is moderation. Start small to improve your eating habits. Pay attention to portion sizes, drink less juice and soda pop, and eat more fruits and vegetables. Eat a healthy amount of food. A 3-ounce serving of meat, for example, is about the size of a deck of cards. Fill the rest of your plate with vegetables and whole grains. Limit the amount of soda and sports drinks you have every day. Drink more water when you are thirsty. Eat at least 5 servings of fruits and vegetables every day. It may seem like a lot, but it is not hard to reach this goal. A serving or helping is 1 piece of fruit, 1 cup of vegetables, or 2 cups of leafy, raw vegetables. Have an apple or some carrot sticks as an afternoon snack instead of a candy bar. Try to have fruits and/or vegetables at every meal.  Make exercise part of your daily routine. You may want to start with simple activities, such as walking, bicycling, or slow swimming. Try to be active 30 to 60 minutes every day. You do not need to do all 30 to 60 minutes all at once. For example, you can exercise 3 times a day for 10 or 20 minutes. Moderate exercise is safe for most people, but it is always a good idea to talk to your doctor before starting an exercise program.  Keep moving. Wilfredo Salazar the lawn, work in the garden, or [employer]. Take the stairs instead of the elevator at work. If you smoke, quit. People who smoke have an increased risk for heart attack, stroke, cancer, and other lung illnesses. Quitting is hard, but there are ways to boost your chance of quitting tobacco for good. Use nicotine gum, patches, or lozenges. Ask your doctor about stop-smoking programs and medicines.   Keep trying. In addition to reducing your risk of diseases in the future, you will notice some benefits soon after you stop using tobacco. If you have shortness of breath or asthma symptoms, they will likely get better within a few weeks after you quit. Limit how much alcohol you drink. Moderate amounts of alcohol (up to 2 drinks a day for men, 1 drink a day for women) are okay. But drinking too much can lead to liver problems, high blood pressure, and other health problems. Family health  If you have a family, there are many things you can do together to improve your health. Eat meals together as a family as often as possible. Eat healthy foods. This includes fruits, vegetables, lean meats and dairy, and whole grains. Include your family in your fitness plan. Most people think of activities such as jogging or tennis as the way to fitness, but there are many ways you and your family can be more active. Anything that makes you breathe hard and gets your heart pumping is exercise. Here are some tips:  Walk to do errands or to take your child to school or the bus. Go for a family bike ride after dinner instead of watching TV. Where can you learn more? Go to https://[employer]. org and sign in to your [employer] account. Enter G788 in the [employer] box to learn more about \"A Healthy Lifestyle: Care Instructions. \"     If you do not have an account, please click on the \"Sign Up Now\" link. Current as of: July 26, 2016  Content Version: 11.2  © 0936-8831 [employer]. Care instructions adapted under license by [facility]. If you have questions about a medical condition or this instruction, always ask your healthcare professional. [place] [address] any warranty or liability for your use of this information.

## 2023-01-25 ENCOUNTER — OFFICE VISIT (OUTPATIENT)
Dept: INTERNAL MEDICINE | Age: 88
End: 2023-01-25
Payer: MEDICARE

## 2023-01-25 VITALS
SYSTOLIC BLOOD PRESSURE: 90 MMHG | BODY MASS INDEX: 24.36 KG/M2 | DIASTOLIC BLOOD PRESSURE: 50 MMHG | HEART RATE: 80 BPM | RESPIRATION RATE: 16 BRPM | HEIGHT: 71 IN | WEIGHT: 174 LBS

## 2023-01-25 DIAGNOSIS — I10 ESSENTIAL HYPERTENSION: Primary | ICD-10-CM

## 2023-01-25 DIAGNOSIS — I48.0 PAROXYSMAL ATRIAL FIBRILLATION (HCC): ICD-10-CM

## 2023-01-25 DIAGNOSIS — N40.1 BENIGN PROSTATIC HYPERPLASIA WITH INCOMPLETE BLADDER EMPTYING: ICD-10-CM

## 2023-01-25 DIAGNOSIS — H61.23 IMPACTED CERUMEN, BILATERAL: ICD-10-CM

## 2023-01-25 DIAGNOSIS — F41.9 ANXIETY: ICD-10-CM

## 2023-01-25 DIAGNOSIS — E78.2 MIXED HYPERLIPIDEMIA: ICD-10-CM

## 2023-01-25 DIAGNOSIS — R39.14 BENIGN PROSTATIC HYPERPLASIA WITH INCOMPLETE BLADDER EMPTYING: ICD-10-CM

## 2023-01-25 DIAGNOSIS — I25.10 CORONARY ARTERY DISEASE INVOLVING NATIVE CORONARY ARTERY OF NATIVE HEART WITHOUT ANGINA PECTORIS: ICD-10-CM

## 2023-01-25 DIAGNOSIS — M15.9 PRIMARY OSTEOARTHRITIS INVOLVING MULTIPLE JOINTS: ICD-10-CM

## 2023-01-25 DIAGNOSIS — E03.4 HYPOTHYROIDISM DUE TO ACQUIRED ATROPHY OF THYROID: ICD-10-CM

## 2023-01-25 DIAGNOSIS — I50.22 CHRONIC SYSTOLIC CHF (CONGESTIVE HEART FAILURE) (HCC): ICD-10-CM

## 2023-01-25 DIAGNOSIS — F02.80 DEMENTIA ASSOCIATED WITH OTHER UNDERLYING DISEASE WITHOUT BEHAVIORAL DISTURBANCE (HCC): ICD-10-CM

## 2023-01-25 PROBLEM — E66.3 OVERWEIGHT: Status: RESOLVED | Noted: 2021-01-06 | Resolved: 2023-01-25

## 2023-01-25 PROBLEM — E66.811 CLASS 1 OBESITY WITH SERIOUS COMORBIDITY AND BODY MASS INDEX (BMI) OF 30.0 TO 30.9 IN ADULT: Status: RESOLVED | Noted: 2019-03-07 | Resolved: 2023-01-25

## 2023-01-25 PROBLEM — E66.9 CLASS 1 OBESITY WITH SERIOUS COMORBIDITY AND BODY MASS INDEX (BMI) OF 30.0 TO 30.9 IN ADULT: Status: RESOLVED | Noted: 2019-03-07 | Resolved: 2023-01-25

## 2023-01-25 PROCEDURE — G8427 DOCREV CUR MEDS BY ELIG CLIN: HCPCS | Performed by: INTERNAL MEDICINE

## 2023-01-25 PROCEDURE — 99214 OFFICE O/P EST MOD 30 MIN: CPT | Performed by: INTERNAL MEDICINE

## 2023-01-25 PROCEDURE — 0518F FALL PLAN OF CARE DOCD: CPT | Performed by: INTERNAL MEDICINE

## 2023-01-25 PROCEDURE — 3288F FALL RISK ASSESSMENT DOCD: CPT | Performed by: INTERNAL MEDICINE

## 2023-01-25 PROCEDURE — G8484 FLU IMMUNIZE NO ADMIN: HCPCS | Performed by: INTERNAL MEDICINE

## 2023-01-25 PROCEDURE — 1123F ACP DISCUSS/DSCN MKR DOCD: CPT | Performed by: INTERNAL MEDICINE

## 2023-01-25 PROCEDURE — G8420 CALC BMI NORM PARAMETERS: HCPCS | Performed by: INTERNAL MEDICINE

## 2023-01-25 PROCEDURE — 1036F TOBACCO NON-USER: CPT | Performed by: INTERNAL MEDICINE

## 2023-01-25 SDOH — ECONOMIC STABILITY: FOOD INSECURITY: WITHIN THE PAST 12 MONTHS, YOU WORRIED THAT YOUR FOOD WOULD RUN OUT BEFORE YOU GOT MONEY TO BUY MORE.: NEVER TRUE

## 2023-01-25 SDOH — ECONOMIC STABILITY: FOOD INSECURITY: WITHIN THE PAST 12 MONTHS, THE FOOD YOU BOUGHT JUST DIDN'T LAST AND YOU DIDN'T HAVE MONEY TO GET MORE.: NEVER TRUE

## 2023-01-25 ASSESSMENT — PATIENT HEALTH QUESTIONNAIRE - PHQ9
SUM OF ALL RESPONSES TO PHQ9 QUESTIONS 1 & 2: 0
2. FEELING DOWN, DEPRESSED OR HOPELESS: 0
SUM OF ALL RESPONSES TO PHQ QUESTIONS 1-9: 0
1. LITTLE INTEREST OR PLEASURE IN DOING THINGS: 0
SUM OF ALL RESPONSES TO PHQ QUESTIONS 1-9: 0

## 2023-01-25 ASSESSMENT — SOCIAL DETERMINANTS OF HEALTH (SDOH): HOW HARD IS IT FOR YOU TO PAY FOR THE VERY BASICS LIKE FOOD, HOUSING, MEDICAL CARE, AND HEATING?: NOT HARD AT ALL

## 2023-01-25 NOTE — PROGRESS NOTES
Chaz Stuart received counseling on the following healthy behaviors: nutrition and exercise  Reviewed prior labs and health maintenance  Continue current medications, diet and exercise. Discussed use, benefit, and side effects of prescribed medications. Barriers to medication compliance addressed. Patient given educational materials - see patient instructions  Was a self-tracking handout given in paper form or via [employer]? No: not indicated     Requested Prescriptions      No prescriptions requested or ordered in this encounter       All patient questions answered. Patient voiced understanding. Quality Measures    Body mass index is 24.27 kg/m². Normal. Weight control plan discussed: Healthy diet and regular exercise. BP: (!) 90/50. Blood pressure is low. Treatment plan: See main progress note    Fall Risk 1/25/2023 1/6/2021 6/14/2019 4/19/2018 4/20/2017 4/21/2016 4/16/2015   2 or more falls in past year? no no no no no no no   Fall with injury in past year? no no yes no no no no     The patient has a history of falls. I did , complete a risk assessment for falls. See progress note for plan, if risk assessment completed. Lab Results   Component Value Date    LDLCHOLESTEROL 65 07/01/2021    (goal LDL reduction with dx if diabetes is 50% LDL reduction)    PHQ Scores 1/25/2023 1/6/2021 6/14/2019 4/19/2018 4/20/2017 4/21/2016 4/16/2015   PHQ2 Score 0 0 0 1 0 0 0   PHQ9 Score 0 0 0 1 0 0 0     See progress note for plan, if depression exists. Interpretation of Total Score: Major depression if the answer to questions 1 or 2 and 5 or more of questions 1 to 9 are at least [facility] than half the days. \"  Other depressive syndrome if questions 1 or 2 and two, three, or four of questions 1 to 9 are at least [facility] than half the days\"   Depression Severity: 5-9 = Mild depression, 10-14 = Moderate depression, 15-19 = Moderately severe depression, 20-27 = Severe depression

## 2023-01-25 NOTE — PROGRESS NOTES
DR. Clint Emanuel - PROGRESS NOTE    CHIEF COMPLAINT/HISTORY OF CHIEF COMPLAINT: This 80 y.o.  male, resident of [facility], comes in today for ongoing evaluation and management of his hypertension, hyperlipidemia, hypothyroidism, coronary artery disease, congestive heart failure, paroxysmal atrial fibrillation, paroxysmal supraventricular tachycardia, anxiety, osteoarthritis, and dementia. It has been a while since he was last here. He takes Norvasc, Imdur, Lopressor, lisinopril, and Xarelto for hypertension, coronary artery disease, congestive heart failure, and paroxysmal atrial fibrillation. He sees the cardiologists here for those conditions as well. His last visit with them was in November. He denies any chest pain, dizziness, or palpitations. He sees Dr. Ginny Mcdonald for benign prostatic hypertrophy, for which he is on Flomax. His last visit with him was in December. He takes simvastatin for hyperlipidemia. He has not had any muscle aches from the simvastatin. He is on Synthroid for hypothyroidism. He denies any fatigue or temperature intolerance. He uses Xanax for anxiety, which has been stable. He takes glucosamine and chondroitin sulfate for arthritis, which has not bothered him lately. He sees Dr. Malia Michael for his dementia. His last visit with him was last week. Otherwise he seems to be doing fairly well and denies any other complaints.        ALLERGIES/INTOLERANCES:   Allergies   Allergen Reactions    Pcn [Penicillins] Swelling     As a child  Pt tolerated ceftriaxone , and keflex with no intolerance    Sulfa Antibiotics     Cefdinir Other (See Comments)     Pt tolerated ceftriaxone , and keflex with no intolerance       MEDICATIONS:   Outpatient Medications Marked as Taking for the 1/25/23 encounter (Office Visit) with Thomas Bernstein, DO   Medication Sig Dispense Refill    tamsulosin (FLOMAX) 0.4 MG capsule Take 1 capsule by mouth 2 times daily 180 capsule 3 nitrofurantoin, macrocrystal-monohydrate, (MACROBID) 100 MG capsule Take 1 capsule by mouth daily 90 capsule 3    glucosamine-chondroitin 500-400 MG CAPS Take 3 capsules by mouth daily      ALPRAZolam (XANAX) 0.25 MG tablet Take 0.125 mg by mouth every morning. Take 1/2 tab (0.125mg) in am and a full tablet at bedtime      Psyllium (REGULOID PO) Take by mouth (Reguloid): mix 1 TBSP in 8 oz of water bid      albuterol (PROVENTIL) (2.5 MG/3ML) 0.083% nebulizer solution Take 2.5 mg by nebulization every 6 hours as needed for Wheezing      hydrOXYzine HCl (ATARAX) 25 MG tablet Take 25 mg by mouth every 8 hours as needed for Itching      loperamide (IMODIUM) 2 MG capsule Take 2 mg by mouth After each loose stool.  *Not to exceed 16 gm/ 24 hours      Cholestyramine POWD Mix 1 scoopful (4 gm) po daily as needed      fluticasone (FLONASE) 50 MCG/ACT nasal spray INSTILL 1 SPRAY IN EACH NOSTRIL ONCE DAILY 16 g 1    amLODIPine (NORVASC) 5 MG tablet Take 1 tablet by mouth daily 30 tablet 3    hydrocortisone 1 % cream Apply topically to affected area as directed prn (Preparation H)      amiodarone (CORDARONE) 200 MG tablet Take 100 mg by mouth daily      rivaroxaban (XARELTO) 20 MG TABS tablet Take 20 mg by mouth every morning      acetaminophen (TYLENOL) 325 MG tablet Take 650 mg by mouth every 4 hours as needed for Pain      metoprolol tartrate (LOPRESSOR) 25 MG tablet Take 0.5 tablets by mouth 2 times daily 90 tablet 1    levothyroxine (SYNTHROID) 112 MCG tablet Take 1 tablet by mouth Daily 30 tablet 11    Multiple Vitamins-Minerals (PRESERVISION AREDS PO) Take 1 capsule by mouth daily       isosorbide mononitrate (IMDUR) 30 MG extended release tablet Take 1 tablet by mouth daily 30 tablet 3    lisinopril (PRINIVIL;ZESTRIL) 40 MG tablet Take 1 tablet by mouth daily 30 tablet 3    Multiple Vitamins-Minerals (MULTIVITAMIN ADULT PO) Take 1 tablet by mouth daily      potassium chloride (KLOR-CON M) 20 MEQ extended release tablet Take 20 mEq by mouth daily      simvastatin (ZOCOR) 20 MG tablet TAKE 1 TABLET BY MOUTH EVERY OTHER DAY  30 tablet 11    Coenzyme Q-10 100 MG CAPS Take 100 mg by mouth every other day. tolnaftate (TINACTIN) 1 % external solution Apply topically nightly to toenails per Dr. Aleks Quinn.            PAST MEDICAL HISTORY:   Past Medical History:   Diagnosis Date    Acute bronchitis     by history    MARITA (acute kidney injury) ([address] 3/13/2018    Allergic rhinitis     Anxiety     Bradycardia 8/19/2018    Cataract, right     Choroidal nevus of right eye     Chronic systolic CHF (congestive heart failure) ([address] 3/13/2018    Coronary artery disease involving native coronary artery of native heart 10/20/2016    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 LIMA TO LAD, SVG TO DIAGONAL2    Dementia associated with 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 behavioral disturbance ([address] 9/21/2018    Dermatophytosis of nail 1/10/2014    Diverticulosis     Elevated PSA     Hemorrhoids     History of measles childhood    History of mumps childhood    Hyperlipidemia     Hypertension     Hypothyroidism 9/4/2018    Mass of upper lobe of right lung 2/21/2018    Occult blood positive stool     refuses colonoscopy    Osteoarthritis     Overweight     Paroxysmal atrial fibrillation ([address] 11/7/2013    Pneumonia due to organism     Pseudophakia, left eye     PSVT (paroxysmal supraventricular tachycardia) ([address]     Recurrent UTI 3/13/2018    Retinal detachment     left, history of     Seborrheic dermatitis     Sepsis due to urinary tract infection ([address] 8/15/2018    Urinary tract infection without hematuria 3/28/2018       PAST SURGICAL HISTORY:   Past Surgical History:   Procedure Laterality Date    ABSCESS DRAINAGE  7543-0250    multiple    APPENDECTOMY  [address] and [address] Left 05/07/2013    COLONOSCOPY  06/14/2021    Dr Hampton Root Left 12/22/2011    detached retina    SKIN TAG REMOVAL  11/01/1999    TONSILLECTOMY SOCIAL HISTORY:     Tobacco:   Social History     Tobacco Use   Smoking Status Never   Smokeless Tobacco Never   Tobacco Comments    never smoked iwona rrt,2/27/2018     Alcohol:   Social History     Substance and Sexual Activity   Alcohol Use No    Alcohol/week: 0.0 standard drinks     Drugs:   Social History     Substance and Sexual Activity   Drug Use No       FAMILY HISTORY: family history includes Diabetes in his mother; Osteoporosis in his mother; Other in his father and sister; Stroke in his sister. IMMUNIZATIONS: Reviewed for influenza and pneumococcal status as indicated in electronic record.     REVIEW OF SYSTEMS:     General: negative for - chills, fever, or night sweats  Skin: negative for - pruritus or rash  Head: Negative for: headache or recent history of head trauma  Ear, Nose, Throat: negative for - epistaxis, nasal congestion, nasal discharge, sore throat, tinnitus, or vertigo  Cardiovascular: negative for - chest pain, dyspnea on exertion, orthopnea, palpitations, paroxysmal nocturnal dyspnea, or shortness of breath  Respiratory: negative for - cough, hemoptysis, or wheezing  Gastrointestinal: negative for - abdominal pain, blood in stools, constipation, diarrhea, hematemesis, melena, or nausea/vomiting  Genitourinary: negative for - dysuria, hematuria, incontinence, nocturia, or urinary frequency/urgency  Musculoskeletal: positive for - joint pain  negative for - muscle pain or muscular weakness  Neurologic: positive for - memory loss  negative for - gait disturbance, impaired coordination/balance, seizures, or tremors  Psychiatric: positive for - anxiety  negative for - depression     PHYSICAL EXAMINATION:    Wt Readings from Last 2 Encounters:   01/25/23 174 lb (78.9 kg)   01/20/23 169 lb 12.8 oz (77 kg)       Vitals:    01/25/23 0925   BP: (!) 90/50   Site: Right Upper Arm   Position: Sitting   Cuff Size: Medium Adult   Pulse: 80   Resp: 16   Weight: 174 lb (78.9 kg)   Height: 5' 11\" (1.803 m)     Body mass index is 24.27 kg/m². General: This is a 80 y.o.  male who is alert and oriented to person and place but not time. He appears to be his stated age and does not appear to be in any acute distress. Skin: Skin color, texture, turgor normal. No rashes or lesions. HEENT/Neck: Head: Normal, normocephalic, atraumatic. Eye: Normal external eye, conjunctiva, lids cornea, HAROON. Ears: Normal auditory canals and external ears. Non-tender. There were bilateral cerumen impactions. Nose: Normal external nose, mucus membranes and septum. Pharynx: Dental Hygiene adequate. Normal buccal mucosa. Normal pharynx. Neck / Thyroid: Supple, no masses, nodes, nodules or enlargement. Lungs: Normal - CTA without rales, rhonchi, or wheezing. Heart: regular rate and rhythm, S1, S2 normal, no murmur, click, rub or gallop No S3 or S4. Abdomen: Non-obese, soft, non-distended, non-tender, normal active bowel sounds, no masses palpated, and no hepatosplenomegaly  Extremities: Strength is 4/5 bilaterally. Radial pulses are +2/4 bilaterally. Dorsalis pedis pulses are +2/4 bilaterally. There is no clubbing, cyanosis, or edema in any of the extremities. Neurologic: Deep tendon reflexes are 2+/4+ bilaterally. cranial nerves II-XII are grossly intact  Osteopathic Structural Examination: Patient was examined in the seated and standing positions. There was full range of motion in the cervical, thoracic, and lumbar spines. No scoliosis. No change in thoracic kyphosis or lumbar lordosis. No increased paravertebral muscle tenderness. ASSESSMENT/PLAN:    1. Essential hypertension, with blood pressure a little too low  - His blood pressure was a little too low today  - We will discontinue the amlodipine he is taking  - He will continue to take the other blood pressure medications  - CBC with Auto Differential; Future  - Comprehensive Metabolic Panel, Fasting; Future    2.  Impacted cerumen, bilateral, new  - We will have the nurses at [facility] put Debrox in both ears daily for 4 days and then the following day flush his ears out with warm water to remove the cerumen    3. Mixed hyperlipidemia, controlled  - He will continue to take Zocor  - Lipid Panel; Future    4. Hypothyroidism due to acquired atrophy of thyroid, stable  - He will continue to take Synthroid  - He has not had a proper thyroid level done in a while. We will check a TSH and a free T4 and make any necessary adjustments to his medication  - TSH; Future  - T4, Free; Future    5. Coronary artery disease involving native coronary artery of native heart without angina pectoris, stable  6. Chronic systolic CHF (congestive heart failure) ([address] stable  7. Paroxysmal atrial fibrillation ([address] stable  - He will continue to see the cardiologists  - We reviewed their most recent visit note     8. 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 Reymundo Quiñonez  - We reviewed his most recent visit note     9. Benign prostatic hyperplasia with incomplete bladder emptying, stable  - He will continue to see Dr. Roselyn Arrieta  - We reviewed his most recent visit note     10. Primary osteoarthritis involving multiple joints, stable  - We will continue to monitor     11.  Anxiety, stable  - He will continue to take Xanax      Orders Placed This Encounter   Procedures    CBC with Auto Differential     Standing Status:   Future     Standing Expiration Date:   1/25/2024    Comprehensive Metabolic Panel, Fasting     Standing Status:   Future     Standing Expiration Date:   1/25/2024    Lipid Panel     Standing Status:   Future     Standing Expiration Date:   1/25/2024     Order Specific Question:   Is Patient Fasting?/# of Hours     Answer:   12    TSH     Standing Status:   Future     Standing Expiration Date:   1/25/2024    T4, Free     Standing Status:   Future     Standing Expiration Date:   1/25/2024 Requested Prescriptions      No prescriptions requested or ordered in this encounter       Labs as ordered above. Return in about 1 year (around 1/25/2024).         Electronically signed by Jasmine Marks DO on 1/25/2023 at 9:55 AM  Internal Medicine

## 2023-02-02 ENCOUNTER — HOSPITAL ENCOUNTER (OUTPATIENT)
Age: 88
Setting detail: SPECIMEN
Discharge: HOME | End: 2023-02-02
Payer: MEDICARE

## 2023-02-02 DIAGNOSIS — E03.4 HYPOTHYROIDISM DUE TO ACQUIRED ATROPHY OF THYROID: ICD-10-CM

## 2023-02-02 DIAGNOSIS — I10 ESSENTIAL HYPERTENSION: ICD-10-CM

## 2023-02-02 DIAGNOSIS — E78.2 MIXED HYPERLIPIDEMIA: ICD-10-CM

## 2023-02-02 LAB
ABSOLUTE EOS #: 0.25 K/UL (ref 0–0.44)
ABSOLUTE IMMATURE GRANULOCYTE: 0.04 K/UL (ref 0–0.3)
ABSOLUTE LYMPH #: 1.15 K/UL (ref 1.1–3.7)
ABSOLUTE MONO #: 0.3 K/UL (ref 0.1–1.2)
ALBUMIN SERPL-MCNC: 3.6 G/DL (ref 3.5–5.2)
ALBUMIN/GLOBULIN RATIO: 1.1 (ref 1–2.5)
ALP SERPL-CCNC: 129 U/L (ref 40–129)
ALT SERPL-CCNC: 15 U/L (ref 5–41)
ANION GAP SERPL CALCULATED.3IONS-SCNC: 9 MMOL/L (ref 9–17)
AST SERPL-CCNC: 19 U/L
BASOPHILS # BLD: 1 % (ref 0–2)
BASOPHILS ABSOLUTE: 0.04 K/UL (ref 0–0.2)
BILIRUB SERPL-MCNC: 0.4 MG/DL (ref 0.3–1.2)
BUN SERPL-MCNC: 13 MG/DL (ref 8–23)
BUN/CREAT BLD: 11 (ref 9–20)
CALCIUM SERPL-MCNC: 9 MG/DL (ref 8.6–10.4)
CHLORIDE SERPL-SCNC: 104 MMOL/L (ref 98–107)
CHOLEST SERPL-MCNC: 108 MG/DL
CHOLESTEROL/HDL RATIO: 3.1
CO2 SERPL-SCNC: 27 MMOL/L (ref 20–31)
CREAT SERPL-MCNC: 1.15 MG/DL (ref 0.7–1.2)
EOSINOPHILS RELATIVE PERCENT: 4 % (ref 1–4)
GFR SERPL CREATININE-BSD FRML MDRD: >60 ML/MIN/1.73M2
GLUCOSE SERPL-MCNC: 103 MG/DL (ref 70–99)
HCT VFR BLD AUTO: 40.4 % (ref 40.7–50.3)
HDLC SERPL-MCNC: 35 MG/DL
HGB BLD-MCNC: 13.8 G/DL (ref 13–17)
IMMATURE GRANULOCYTES: 1 %
LDLC SERPL CALC-MCNC: 53 MG/DL (ref 0–130)
LYMPHOCYTES # BLD: 20 % (ref 24–43)
MCH RBC QN AUTO: 30.3 PG (ref 25.2–33.5)
MCHC RBC AUTO-ENTMCNC: 34.2 G/DL (ref 25.2–33.5)
MCV RBC AUTO: 88.8 FL (ref 82.6–102.9)
MONOCYTES # BLD: 5 % (ref 3–12)
NRBC AUTOMATED: 0 PER 100 WBC
PDW BLD-RTO: 14.1 % (ref 11.8–14.4)
PLATELET # BLD AUTO: 173 K/UL (ref 138–453)
PMV BLD AUTO: 10.5 FL (ref 8.1–13.5)
POTASSIUM SERPL-SCNC: 4.4 MMOL/L (ref 3.7–5.3)
PROT SERPL-MCNC: 6.9 G/DL (ref 6.4–8.3)
RBC # BLD: 4.55 M/UL (ref 4.21–5.77)
SEG NEUTROPHILS: 69 % (ref 36–65)
SEGMENTED NEUTROPHILS ABSOLUTE COUNT: 4.09 K/UL (ref 1.5–8.1)
SODIUM SERPL-SCNC: 140 MMOL/L (ref 135–144)
T4 FREE SERPL-MCNC: 2.06 NG/DL (ref 0.93–1.7)
TRIGL SERPL-MCNC: 99 MG/DL
TSH SERPL-ACNC: 0.31 UIU/ML (ref 0.3–5)
WBC # BLD AUTO: 5.9 K/UL (ref 3.5–11.3)

## 2023-02-02 PROCEDURE — 84443 ASSAY THYROID STIM HORMONE: CPT

## 2023-02-02 PROCEDURE — 85025 COMPLETE CBC W/AUTO DIFF WBC: CPT

## 2023-02-02 PROCEDURE — 80053 COMPREHEN METABOLIC PANEL: CPT

## 2023-02-02 PROCEDURE — 84439 ASSAY OF FREE THYROXINE: CPT

## 2023-02-02 PROCEDURE — 36415 COLL VENOUS BLD VENIPUNCTURE: CPT

## 2023-02-02 PROCEDURE — 80061 LIPID PANEL: CPT

## 2023-02-13 ENCOUNTER — TELEPHONE (OUTPATIENT)
Dept: INTERNAL MEDICINE | Age: 88
End: 2023-02-13

## 2023-02-13 RX ORDER — LEVOTHYROXINE SODIUM 88 UG/1
88 TABLET ORAL DAILY
Qty: 90 TABLET | Refills: 3 | Status: SHIPPED | OUTPATIENT
Start: 2023-02-13

## 2023-02-13 NOTE — TELEPHONE ENCOUNTER
Writer contacted patient's nurse at [facility]. Informed them of lab result and need for new/different dose of thyroid medication. They are requesting Rx be sent to [facility] (pended for you).

## 2023-02-22 ENCOUNTER — HOSPITAL ENCOUNTER (OUTPATIENT)
Age: 88
Setting detail: SPECIMEN
Discharge: HOME | End: 2023-02-22
Payer: MEDICARE

## 2023-02-22 LAB
BACTERIA: ABNORMAL
BILIRUBIN URINE: NEGATIVE
COLOR: YELLOW
EPITHELIAL CELLS UA: ABNORMAL /HPF (ref 0–5)
GLUCOSE UR STRIP.AUTO-MCNC: NEGATIVE MG/DL
KETONES UR STRIP.AUTO-MCNC: NEGATIVE MG/DL
LEUKOCYTE ESTERASE UR QL STRIP.AUTO: ABNORMAL
NITRITE UR QL STRIP.AUTO: POSITIVE
PROT UR STRIP.AUTO-MCNC: 5 MG/DL (ref 5–6)
PROT UR STRIP.AUTO-MCNC: ABNORMAL MG/DL
RBC CLUMPS #/AREA URNS AUTO: ABNORMAL /HPF (ref 0–4)
SPECIFIC GRAVITY UA: 1.02 (ref 1.01–1.02)
TURBIDITY: ABNORMAL
URINE HGB: ABNORMAL
UROBILINOGEN, URINE: NORMAL
WBC UA: ABNORMAL /HPF (ref 0–4)

## 2023-02-22 PROCEDURE — 87088 URINE BACTERIA CULTURE: CPT

## 2023-02-22 PROCEDURE — 87186 SC STD MICRODIL/AGAR DIL: CPT

## 2023-02-22 PROCEDURE — 81001 URINALYSIS AUTO W/SCOPE: CPT

## 2023-02-22 PROCEDURE — 87086 URINE CULTURE/COLONY COUNT: CPT

## 2023-02-23 ENCOUNTER — APPOINTMENT (OUTPATIENT)
Dept: CT IMAGING | Age: 88
DRG: 728 | End: 2023-02-23
Payer: MEDICARE

## 2023-02-23 ENCOUNTER — APPOINTMENT (OUTPATIENT)
Dept: ULTRASOUND IMAGING | Age: 88
DRG: 728 | End: 2023-02-23
Payer: MEDICARE

## 2023-02-23 ENCOUNTER — TELEPHONE (OUTPATIENT)
Dept: INTERNAL MEDICINE | Age: 88
End: 2023-02-23

## 2023-02-23 ENCOUNTER — HOSPITAL ENCOUNTER (INPATIENT)
Age: 88
LOS: 4 days | Discharge: OTHER INSTITUTION | DRG: 728 | End: 2023-02-27
Attending: EMERGENCY MEDICINE | Admitting: INTERNAL MEDICINE
Payer: MEDICARE

## 2023-02-23 DIAGNOSIS — N43.1 PYOCELE: Primary | ICD-10-CM

## 2023-02-23 DIAGNOSIS — N45.1 EPIDIDYMITIS: ICD-10-CM

## 2023-02-23 DIAGNOSIS — F41.9 ANXIETY: ICD-10-CM

## 2023-02-23 PROBLEM — N49.2 SCROTAL ABSCESS: Status: ACTIVE | Noted: 2023-02-23

## 2023-02-23 LAB
ABSOLUTE EOS #: 0.06 K/UL (ref 0–0.44)
ABSOLUTE IMMATURE GRANULOCYTE: 0.03 K/UL (ref 0–0.3)
ABSOLUTE LYMPH #: 1.06 K/UL (ref 1.1–3.7)
ABSOLUTE MONO #: 0.46 K/UL (ref 0.1–1.2)
ALBUMIN SERPL-MCNC: 3.3 G/DL (ref 3.5–5.2)
ALBUMIN/GLOBULIN RATIO: 1 (ref 1–2.5)
ALP SERPL-CCNC: 109 U/L (ref 40–129)
ALT SERPL-CCNC: 12 U/L (ref 5–41)
ANION GAP SERPL CALCULATED.3IONS-SCNC: 11 MMOL/L (ref 9–17)
AST SERPL-CCNC: 17 U/L
BASOPHILS # BLD: 0 % (ref 0–2)
BASOPHILS ABSOLUTE: 0.03 K/UL (ref 0–0.2)
BILIRUB SERPL-MCNC: 0.5 MG/DL (ref 0.3–1.2)
BUN SERPL-MCNC: 16 MG/DL (ref 8–23)
BUN/CREAT BLD: 14 (ref 9–20)
CALCIUM SERPL-MCNC: 8.5 MG/DL (ref 8.6–10.4)
CHLORIDE SERPL-SCNC: 102 MMOL/L (ref 98–107)
CO2 SERPL-SCNC: 23 MMOL/L (ref 20–31)
CREAT SERPL-MCNC: 1.11 MG/DL (ref 0.7–1.2)
EOSINOPHILS RELATIVE PERCENT: 1 % (ref 1–4)
GFR SERPL CREATININE-BSD FRML MDRD: >60 ML/MIN/1.73M2
GLUCOSE SERPL-MCNC: 100 MG/DL (ref 70–99)
HCT VFR BLD AUTO: 35.7 % (ref 40.7–50.3)
HGB BLD-MCNC: 12 G/DL (ref 13–17)
IMMATURE GRANULOCYTES: 0 %
LACTATE PLASV-SCNC: 1.4 MMOL/L (ref 0.5–2.2)
LYMPHOCYTES # BLD: 13 % (ref 24–43)
MCH RBC QN AUTO: 29.8 PG (ref 25.2–33.5)
MCHC RBC AUTO-ENTMCNC: 33.6 G/DL (ref 25.2–33.5)
MCV RBC AUTO: 88.6 FL (ref 82.6–102.9)
MONOCYTES # BLD: 6 % (ref 3–12)
NRBC AUTOMATED: 0 PER 100 WBC
PDW BLD-RTO: 13.9 % (ref 11.8–14.4)
PLATELET # BLD AUTO: 211 K/UL (ref 138–453)
PMV BLD AUTO: 10.3 FL (ref 8.1–13.5)
POTASSIUM SERPL-SCNC: 4.3 MMOL/L (ref 3.7–5.3)
PROT SERPL-MCNC: 6.5 G/DL (ref 6.4–8.3)
RBC # BLD: 4.03 M/UL (ref 4.21–5.77)
SARS-COV-2 RDRP RESP QL NAA+PROBE: NOT DETECTED
SEG NEUTROPHILS: 80 % (ref 36–65)
SEGMENTED NEUTROPHILS ABSOLUTE COUNT: 6.47 K/UL (ref 1.5–8.1)
SODIUM SERPL-SCNC: 136 MMOL/L (ref 135–144)
SPECIMEN DESCRIPTION: NORMAL
WBC # BLD AUTO: 8.1 K/UL (ref 3.5–11.3)

## 2023-02-23 PROCEDURE — 99222 1ST HOSP IP/OBS MODERATE 55: CPT

## 2023-02-23 PROCEDURE — 74177 CT ABD & PELVIS W/CONTRAST: CPT

## 2023-02-23 PROCEDURE — 93005 ELECTROCARDIOGRAM TRACING: CPT | Performed by: INTERNAL MEDICINE

## 2023-02-23 PROCEDURE — 94640 AIRWAY INHALATION TREATMENT: CPT

## 2023-02-23 PROCEDURE — 87040 BLOOD CULTURE FOR BACTERIA: CPT

## 2023-02-23 PROCEDURE — 6370000000 HC RX 637 (ALT 250 FOR IP): Performed by: INTERNAL MEDICINE

## 2023-02-23 PROCEDURE — 76870 US EXAM SCROTUM: CPT

## 2023-02-23 PROCEDURE — 85025 COMPLETE CBC W/AUTO DIFF WBC: CPT

## 2023-02-23 PROCEDURE — 87154 CUL TYP ID BLD PTHGN 6+ TRGT: CPT

## 2023-02-23 PROCEDURE — 87635 SARS-COV-2 COVID-19 AMP PRB: CPT

## 2023-02-23 PROCEDURE — 80053 COMPREHEN METABOLIC PANEL: CPT

## 2023-02-23 PROCEDURE — 2060000000 HC ICU INTERMEDIATE R&B

## 2023-02-23 PROCEDURE — 6360000002 HC RX W HCPCS: Performed by: EMERGENCY MEDICINE

## 2023-02-23 PROCEDURE — 83605 ASSAY OF LACTIC ACID: CPT

## 2023-02-23 PROCEDURE — 87205 SMEAR GRAM STAIN: CPT

## 2023-02-23 PROCEDURE — 6360000004 HC RX CONTRAST MEDICATION: Performed by: EMERGENCY MEDICINE

## 2023-02-23 PROCEDURE — 99285 EMERGENCY DEPT VISIT HI MDM: CPT

## 2023-02-23 PROCEDURE — 6370000000 HC RX 637 (ALT 250 FOR IP)

## 2023-02-23 PROCEDURE — 96365 THER/PROPH/DIAG IV INF INIT: CPT

## 2023-02-23 PROCEDURE — 96375 TX/PRO/DX INJ NEW DRUG ADDON: CPT

## 2023-02-23 PROCEDURE — 2580000003 HC RX 258: Performed by: INTERNAL MEDICINE

## 2023-02-23 PROCEDURE — 36415 COLL VENOUS BLD VENIPUNCTURE: CPT

## 2023-02-23 RX ORDER — SODIUM CHLORIDE FOR INHALATION 0.9 %
3 VIAL, NEBULIZER (ML) INHALATION
Status: DISCONTINUED | OUTPATIENT
Start: 2023-02-23 | End: 2023-02-27 | Stop reason: HOSPADM

## 2023-02-23 RX ORDER — SODIUM CHLORIDE 0.9 % (FLUSH) 0.9 %
10 SYRINGE (ML) INJECTION PRN
Status: DISCONTINUED | OUTPATIENT
Start: 2023-02-23 | End: 2023-02-27 | Stop reason: HOSPADM

## 2023-02-23 RX ORDER — GUAIFENESIN AND DEXTROMETHORPHAN HYDROBROMIDE 100; 10 MG/5ML; MG/5ML
5 SOLUTION ORAL EVERY 6 HOURS PRN
COMMUNITY

## 2023-02-23 RX ORDER — DOXYCYCLINE 100 MG/1
100 CAPSULE ORAL EVERY 12 HOURS SCHEDULED
Status: DISCONTINUED | OUTPATIENT
Start: 2023-02-23 | End: 2023-02-25

## 2023-02-23 RX ORDER — POLYVINYL ALCOHOL 14 MG/ML
2 SOLUTION/ DROPS OPHTHALMIC EVERY 6 HOURS PRN
Status: DISCONTINUED | OUTPATIENT
Start: 2023-02-23 | End: 2023-02-23 | Stop reason: RX

## 2023-02-23 RX ORDER — LEVOFLOXACIN 5 MG/ML
750 INJECTION, SOLUTION INTRAVENOUS EVERY 24 HOURS
Status: DISCONTINUED | OUTPATIENT
Start: 2023-02-23 | End: 2023-02-23 | Stop reason: DRUGHIGH

## 2023-02-23 RX ORDER — PRENATAL VIT 91/IRON/FOLIC/DHA 28-975-200
COMBINATION PACKAGE (EA) ORAL 2 TIMES DAILY
Status: DISCONTINUED | OUTPATIENT
Start: 2023-02-23 | End: 2023-02-27 | Stop reason: HOSPADM

## 2023-02-23 RX ORDER — KETOROLAC TROMETHAMINE 30 MG/ML
15 INJECTION, SOLUTION INTRAMUSCULAR; INTRAVENOUS ONCE
Status: COMPLETED | OUTPATIENT
Start: 2023-02-23 | End: 2023-02-23

## 2023-02-23 RX ORDER — LEVOTHYROXINE SODIUM 88 UG/1
88 TABLET ORAL DAILY
Status: DISCONTINUED | OUTPATIENT
Start: 2023-02-24 | End: 2023-02-27 | Stop reason: HOSPADM

## 2023-02-23 RX ORDER — LEVOFLOXACIN 5 MG/ML
750 INJECTION, SOLUTION INTRAVENOUS EVERY 24 HOURS
Status: DISCONTINUED | OUTPATIENT
Start: 2023-02-23 | End: 2023-02-23

## 2023-02-23 RX ORDER — ONDANSETRON 2 MG/ML
4 INJECTION INTRAMUSCULAR; INTRAVENOUS EVERY 6 HOURS PRN
Status: DISCONTINUED | OUTPATIENT
Start: 2023-02-23 | End: 2023-02-27 | Stop reason: HOSPADM

## 2023-02-23 RX ORDER — LISINOPRIL 20 MG/1
40 TABLET ORAL DAILY
Status: DISCONTINUED | OUTPATIENT
Start: 2023-02-23 | End: 2023-02-27 | Stop reason: HOSPADM

## 2023-02-23 RX ORDER — FENTANYL CITRATE 50 UG/ML
25 INJECTION, SOLUTION INTRAMUSCULAR; INTRAVENOUS ONCE
Status: DISCONTINUED | OUTPATIENT
Start: 2023-02-23 | End: 2023-02-27 | Stop reason: HOSPADM

## 2023-02-23 RX ORDER — AMIODARONE HYDROCHLORIDE 200 MG/1
100 TABLET ORAL DAILY
Status: DISCONTINUED | OUTPATIENT
Start: 2023-02-24 | End: 2023-02-27 | Stop reason: HOSPADM

## 2023-02-23 RX ORDER — ALPRAZOLAM 0.25 MG/1
0.12 TABLET ORAL EVERY MORNING
Status: DISCONTINUED | OUTPATIENT
Start: 2023-02-24 | End: 2023-02-27 | Stop reason: HOSPADM

## 2023-02-23 RX ORDER — ACETAMINOPHEN 325 MG/1
650 TABLET ORAL EVERY 4 HOURS PRN
Status: DISCONTINUED | OUTPATIENT
Start: 2023-02-23 | End: 2023-02-27 | Stop reason: HOSPADM

## 2023-02-23 RX ORDER — ISOSORBIDE MONONITRATE 30 MG/1
30 TABLET, EXTENDED RELEASE ORAL DAILY
Status: DISCONTINUED | OUTPATIENT
Start: 2023-02-24 | End: 2023-02-27 | Stop reason: HOSPADM

## 2023-02-23 RX ORDER — POLYVINYL ALCOHOL 14 MG/ML
2 SOLUTION/ DROPS OPHTHALMIC EVERY 6 HOURS PRN
COMMUNITY

## 2023-02-23 RX ORDER — SODIUM CHLORIDE 0.9 % (FLUSH) 0.9 %
10 SYRINGE (ML) INJECTION EVERY 12 HOURS SCHEDULED
Status: DISCONTINUED | OUTPATIENT
Start: 2023-02-23 | End: 2023-02-27 | Stop reason: HOSPADM

## 2023-02-23 RX ORDER — ATORVASTATIN CALCIUM 10 MG/1
10 TABLET, FILM COATED ORAL EVERY OTHER DAY
Status: DISCONTINUED | OUTPATIENT
Start: 2023-02-24 | End: 2023-02-27 | Stop reason: HOSPADM

## 2023-02-23 RX ORDER — FLUTICASONE PROPIONATE 50 MCG
1 SPRAY, SUSPENSION (ML) NASAL DAILY
Status: DISCONTINUED | OUTPATIENT
Start: 2023-02-23 | End: 2023-02-27 | Stop reason: HOSPADM

## 2023-02-23 RX ORDER — TAMSULOSIN HYDROCHLORIDE 0.4 MG/1
0.4 CAPSULE ORAL 2 TIMES DAILY
Status: DISCONTINUED | OUTPATIENT
Start: 2023-02-23 | End: 2023-02-27 | Stop reason: HOSPADM

## 2023-02-23 RX ORDER — POTASSIUM CHLORIDE 20 MEQ/1
20 TABLET, EXTENDED RELEASE ORAL DAILY
Status: DISCONTINUED | OUTPATIENT
Start: 2023-02-23 | End: 2023-02-27 | Stop reason: HOSPADM

## 2023-02-23 RX ORDER — MINERAL OIL 100 G/100G
1 OIL RECTAL ONCE
COMMUNITY

## 2023-02-23 RX ORDER — LACTULOSE 10 G/15ML
20 SOLUTION ORAL 3 TIMES DAILY
Status: DISCONTINUED | OUTPATIENT
Start: 2023-02-23 | End: 2023-02-27 | Stop reason: HOSPADM

## 2023-02-23 RX ORDER — SODIUM PHOSPHATE,MONO-DIBASIC 19G-7G/118
3 ENEMA (ML) RECTAL DAILY
Status: DISCONTINUED | OUTPATIENT
Start: 2023-02-23 | End: 2023-02-23 | Stop reason: RX

## 2023-02-23 RX ORDER — ALPRAZOLAM 0.25 MG/1
0.25 TABLET ORAL NIGHTLY
Status: DISCONTINUED | OUTPATIENT
Start: 2023-02-23 | End: 2023-02-27 | Stop reason: HOSPADM

## 2023-02-23 RX ORDER — ALBUTEROL SULFATE 2.5 MG/3ML
2.5 SOLUTION RESPIRATORY (INHALATION)
Status: DISCONTINUED | OUTPATIENT
Start: 2023-02-23 | End: 2023-02-24

## 2023-02-23 RX ORDER — IPRATROPIUM BROMIDE AND ALBUTEROL SULFATE 2.5; .5 MG/3ML; MG/3ML
1 SOLUTION RESPIRATORY (INHALATION) 4 TIMES DAILY
Status: DISCONTINUED | OUTPATIENT
Start: 2023-02-23 | End: 2023-02-24

## 2023-02-23 RX ORDER — SODIUM CHLORIDE 9 MG/ML
INJECTION, SOLUTION INTRAVENOUS PRN
Status: DISCONTINUED | OUTPATIENT
Start: 2023-02-23 | End: 2023-02-27 | Stop reason: HOSPADM

## 2023-02-23 RX ORDER — LEVOTHYROXINE SODIUM 88 UG/1
88 TABLET ORAL DAILY
COMMUNITY

## 2023-02-23 RX ORDER — LEVOFLOXACIN 5 MG/ML
750 INJECTION, SOLUTION INTRAVENOUS
Status: DISCONTINUED | OUTPATIENT
Start: 2023-02-25 | End: 2023-02-25 | Stop reason: ALTCHOICE

## 2023-02-23 RX ADMIN — LACTULOSE 20 G: 20 SOLUTION ORAL at 22:35

## 2023-02-23 RX ADMIN — POTASSIUM CHLORIDE 20 MEQ: 1500 TABLET, EXTENDED RELEASE ORAL at 22:35

## 2023-02-23 RX ADMIN — TAMSULOSIN HYDROCHLORIDE 0.4 MG: 0.4 CAPSULE ORAL at 22:36

## 2023-02-23 RX ADMIN — LISINOPRIL 40 MG: 20 TABLET ORAL at 22:36

## 2023-02-23 RX ADMIN — SODIUM CHLORIDE, PRESERVATIVE FREE 10 ML: 5 INJECTION INTRAVENOUS at 22:36

## 2023-02-23 RX ADMIN — LEVOFLOXACIN 750 MG: 5 INJECTION, SOLUTION INTRAVENOUS at 14:48

## 2023-02-23 RX ADMIN — METOPROLOL TARTRATE 12.5 MG: 25 TABLET, FILM COATED ORAL at 22:36

## 2023-02-23 RX ADMIN — TERBINAFINE HYDROCHLORIDE: 1 CREAM TOPICAL at 22:37

## 2023-02-23 RX ADMIN — IPRATROPIUM BROMIDE AND ALBUTEROL SULFATE 1 AMPULE: .5; 2.5 SOLUTION RESPIRATORY (INHALATION) at 19:51

## 2023-02-23 RX ADMIN — KETOROLAC TROMETHAMINE 15 MG: 30 INJECTION, SOLUTION INTRAMUSCULAR at 11:56

## 2023-02-23 RX ADMIN — DOXYCYCLINE 100 MG: 100 CAPSULE ORAL at 22:36

## 2023-02-23 RX ADMIN — IOPAMIDOL 100 ML: 755 INJECTION, SOLUTION INTRAVENOUS at 12:53

## 2023-02-23 RX ADMIN — ALPRAZOLAM 0.25 MG: 0.25 TABLET ORAL at 22:36

## 2023-02-23 ASSESSMENT — PAIN SCALES - PAIN ASSESSMENT IN ADVANCED DEMENTIA (PAINAD)
NEGVOCALIZATION: 1
CONSOLABILITY: 1
NEGVOCALIZATION: 1
FACIALEXPRESSION: 0
BODYLANGUAGE: 1
CONSOLABILITY: 0
TOTALSCORE: 3
BREATHING: 0
BODYLANGUAGE: 1
CONSOLABILITY: 0
FACIALEXPRESSION: 1
BODYLANGUAGE: 0
FACIALEXPRESSION: 1
NEGVOCALIZATION: 0

## 2023-02-23 ASSESSMENT — LIFESTYLE VARIABLES
HOW OFTEN DO YOU HAVE A DRINK CONTAINING ALCOHOL: NEVER
HOW MANY STANDARD DRINKS CONTAINING ALCOHOL DO YOU HAVE ON A TYPICAL DAY: PATIENT DOES NOT DRINK

## 2023-02-23 ASSESSMENT — PAIN - FUNCTIONAL ASSESSMENT: PAIN_FUNCTIONAL_ASSESSMENT: PAIN ASSESSMENT IN ADVANCED DEMENTIA (PAINAD)

## 2023-02-23 NOTE — PROGRESS NOTES
RN called regarding code status, it states that the [facility] [place] is  in the computer. RN spoke to Rachana at [facility] and verifies that he has an active DNR-CCA order at the facility, date signed is 18.

## 2023-02-23 NOTE — PROGRESS NOTES
Carissa Linder, [facility] 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 Vitals:    02/23/23 1716   BP: 118/61   Pulse: 65   Resp: 17   Temp: 97.2 °F (36.2 °C)   SpO2: 97%   . Patients home meds are   Prior to Admission medications    Medication Sig Start Date End Date Taking?  Authorizing Provider   polyvinyl alcohol (LIQUIFILM TEARS) 1.4 % ophthalmic solution Place 2 drops into both eyes every 6 hours as needed   Yes Historical Provider, MD   simethicone (MYLICON) 40 [place]/0.0AM LIQD drops Take 20 mg by mouth every 6 hours as needed Take 0.6 mLs by mouth 4 times a day as needed for gas   Yes Historical Provider, MD   mineral oil enema Place 1 enema rectally once   Yes Historical Provider, MD   Dextromethorphan-guaiFENesin  MG/5ML SYRP Take 5 mLs by mouth every 6 hours as needed for Cough   Yes Historical Provider, MD   levothyroxine (SYNTHROID) 88 MCG tablet Take 88 mcg by mouth Daily   Yes Historical Provider, MD   levothyroxine (SYNTHROID) 88 MCG tablet Take 1 tablet by mouth daily  Patient not taking: Reported on 2/23/2023 2/13/23   Torres Cerda DO   tamsulosin (FLOMAX) 0.4 MG capsule Take 1 capsule by mouth 2 times daily 12/21/22   Nagi Hassan MD   nitrofurantoin, macrocrystal-monohydrate, (MACROBID) 100 MG capsule Take 1 capsule by mouth daily 12/21/22   Nagi Hassan MD   glucosamine-chondroitin 500-400 MG CAPS Take 3 capsules by mouth daily    Historical Provider, MD   ALPRAZolam (XANAX) 0.25 MG tablet Take 1/2 tab (0.125mg) in am and a full tablet at bedtime    Historical Provider, MD   Psyllium (REGULOID PO) Take by mouth (Reguloid): mix 1 TBSP in 8 oz of water bid  Patient not taking: Reported on 2/23/2023    Historical Provider, MD   albuterol (PROVENTIL) (2.5 MG/3ML) 0.083% nebulizer solution Take 2.5 mg by nebulization every 6 hours as needed for Wheezing    Historical Provider, MD   hydrOXYzine HCl (ATARAX) 25 MG tablet Take 25 mg by mouth every 8 hours as needed for Itching    Historical Provider, MD   loperamide (IMODIUM) 2 MG capsule Take 2 mg by mouth After each loose stool. *Not to exceed 16 gm/ 24 hours    Historical Provider, MD   Cholestyramine POWD Mix 1 scoopful (4 gm) po daily as needed    Historical Provider, MD   fluticasone (FLONASE) 50 MCG/ACT nasal spray INSTILL 1 SPRAY IN EACH NOSTRIL ONCE DAILY 8/22/22   Raissa Cerda DO   amLODIPine (NORVASC) 5 MG tablet Take 1 tablet by mouth daily  Patient not taking: Reported on 2/23/2023 5/20/21   Hari Quintanilla MD   hydrocortisone 1 % cream Apply topically to affected area as directed prn (Preparation H)    Historical Provider, MD   amiodarone (CORDARONE) 200 MG tablet Take 100 mg by mouth daily    Historical Provider, MD   rivaroxaban (XARELTO) 20 MG TABS tablet Take 20 mg by mouth every morning    Historical Provider, MD   acetaminophen (TYLENOL) 325 MG tablet Take 650 mg by mouth every 4 hours as needed for Pain    Historical Provider, MD   metoprolol tartrate (LOPRESSOR) 25 MG tablet Take 0.5 tablets by mouth 2 times daily 6/13/19   Obie Becerra DO   Multiple Vitamins-Minerals (PRESERVISION AREDS PO) Take 1 capsule by mouth daily     Historical Provider, MD   isosorbide mononitrate (IMDUR) 30 MG extended release tablet Take 1 tablet by mouth daily 8/22/18   Tammi Meek MD   lisinopril (PRINIVIL;ZESTRIL) 40 MG tablet Take 1 tablet by mouth daily 8/22/18   Tammi Meek MD   Multiple Vitamins-Minerals (MULTIVITAMIN ADULT PO) Take 1 tablet by mouth daily    Historical Provider, MD   potassium chloride (KLOR-CON M) 20 MEQ extended release tablet Take 20 mEq by mouth daily    Historical Provider, MD   simvastatin (ZOCOR) 20 MG tablet TAKE 1 TABLET BY MOUTH EVERY OTHER DAY  3/7/17   Guillermo Cerda DO   Coenzyme Q-10 100 MG CAPS Take 100 mg by mouth every other day. Historical Provider, MD   tolnaftate (TINACTIN) 1 % external solution Apply topically nightly to toenails per Dr. Spike Castro. Historical Provider, MD       Assessment     Patient unable to achieve protocol / Dementia    RR 14  Breath Sounds: diminished      Bronchodilator assessment at level  1  Hyperinflation assessment at level 1  Secretion Management assessment at level  1    [x]    Bronchodilator Assessment  BRONCHODILATOR ASSESSMENT SCORE  Score 0 1 2 3 4 5   Breath Sounds   [x]  Patient Baseline []  No Wheeze good aeration []  Faint, scattered wheezing, good aeration []  Expiratory Wheezing and or moderately diminished []  Insp/Exp wheeze and/or very diminished []  Insp/Exp and/ or marked distress   Respiratory Rate   []  Patient Baseline []  Less than 20 []  Less than 20 []  20-25 []  Greater than 25 []  Greater than 25   Peak flow % of Pred or PB []  NA   []  Greater than 90%  []  81-90% []  71-80% []  Less than or equal to 70%  or unable to perform []  Unable due to Respiratory Distress   Dyspnea re [x]  Patient Baseline []  No SOB []  No SOB []  SOB on exertion []  SOB min activity []  At rest/acute   e FEV% Predicted       [x]  NA []  Above 69%  []  Unable []  Above 60-69%  []  Unable []  Above 50-59%  []  Unable []  Above 35-49%  []  Unable []  Less than 35%  []  Unable                 [x]  Hyperinflation Assessment  Score 1 2 3   CXR and Breath Sounds   [x]  Clear []  No atelectasis  Basilar aeration []  Atelectasis or absent basilar breath sounds   Incentive Spirometry Volume  (Per IBW)   [x]  Greater than or equal to 15ml/Kg []  less than 15ml/Kg []  less than 15ml/Kg   Surgery within last 2 weeks [x]  None or general   []  Abdominal or thoracic surgery  []  Abdominal or thoracic   Chronic Pulmonary Historyre [x]  No []  Yes []  Yes     [x]  Secretion Management Assessment  Score 1 2 3   Bilateral Breath Sounds   [x]  Occasional Rhonchi []  Scattered Rhonchi []  Course Rhonchi and/or poor aeration   Sputum    [x]  Small amount of thin secretions []  Moderate amount of viscous secretions []  Copius, Viscious Yellow/ Secretions   CXR as reported by physician [x]  clear  []  Unavailable []  Infiltrates and/or consolidation  []  Unavailable []  Mucus Plugging and or lobar consolidation  []  Unavailable   Cough [x]  Strong, productive cough []  Weak productive cough []  No cough or weak non-productive cough   Carissa Linder, [facility]  5:52 PM                            FEMALE                                  MALE                            FEV1 Predicted Normal Values                        FEV1 Predicted Normal Values          Age                                     Height in Feet and Inches       Age                                     Height in Feet and Inches       4' 11\" 5' 1\" 5' 3\" 5' 5\" 5' 7\" 5' 9\" 5' 11\" 6' 1\"  4' 11\" 5' 1\" 5' 3\" 5' 5\" 5' 7\" 5' 9\" 5' 11\" 6' 1\"   42 - 45 2.49 2.66 2.84 3.03 3.22 3.42 3.62 3.83 42 - 45 2.82 3.03 3.26 3.49 3.72 3.96 4.22 4.47   46 - 49 2.40 2.57 2.76 2.94 3.14 3.33 3.54 3.75 46 - 49 2.70 2.92 3.14 3.37 3.61 3.85 4.10 4.36   50 - 53 2.31 2.48 2.66 2.85 3.04 3.24 3.45 3.66 50 - 53 2.58 2.80 3.02 3.25 3.49 3.73 3.98 4.24   54 - 57 2.21 2.38 2.57 2.75 2.95 3.14 3.35 3.56 54 - 57 2.46 2.67 2.89 3.12 3.36 3.60 3.85 4.11   58 - 61 2.10 2.28 2.46 2.65 2.84 3.04 3.24 3.45 58 - 61 2.32 2.54 2.76 2.99 3.23 3.47 3.72 3.98   62 - 65 1.99 2.17 2.35 2.54 2.73 2.93 3.13 3.34 62 - 65 2.19 2.40 2.62 2.85 3.09 3.33 3.58 3.84   66 - 69 1.88 2.05 2.23 2.42 2.61 2.81 3.02 3.23 66 - 69 2.04 2.26 2.48 2.71 2.95 3.19 3.44 3.70   70+ 1.82 1.99 2.17 2.36 2.55 2.75 2.95 3.16 70+ 1.97 2.19 2.41 2.64 2.87 3.12 3.37 3.62             Predicted Peak Expiratory Flow Rate                                       Height (in)  Female       Height (in) Male           Age 56 58 60 62 64 66 68 70 Age            20 344 357 372 [phone]  60 62 64 66 68 70 72 74 76   25 337 352 366 381 [phone] 441 25 447 476 505 [phone]   30 329 344 359 374 389 404 419 434 30 437 466 [phone] 205 [phone]   35 [phone]  [phone]

## 2023-02-23 NOTE — ED PROVIDER NOTES
[facility]  eMERGENCY dEPARTMENT eNCOUnter      Pt Name: Yeyo Bravo  MRN: [id number]  Birthdate 12/31/1934  Date of evaluation: 2/23/2023      CHIEF COMPLAINT       Chief Complaint   Patient presents with    Groin Pain     No problems urinating or bowel movement. Unsure of when started. No abd pain.          HISTORY OF PRESENT ILLNESS    Yeyo Bravo is a 88 y.o. male who presents presents from nursing home with a reddened scrotum apparently has a history of recent UTI and required getting antibiotics but then he started having pain patient denies any urinary symptoms or problems with bowel movement but he is not sure why he is here.      REVIEW OF SYSTEMS         Review of Systems   Unable to perform ROS: Dementia       PAST MEDICAL HISTORY    has a past medical history of Acute bronchitis, MARITA (acute kidney injury) ([facility]), Allergic rhinitis, Anxiety, Bradycardia, Cataract, right, Choroidal nevus of right eye, Chronic systolic CHF (congestive heart failure) ([facility]), Coronary artery disease involving native coronary artery of native heart, Dementia associated with 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 behavioral disturbance ([facility]), Dermatophytosis of nail, Diverticulosis, Elevated PSA, Hemorrhoids, History of measles, History of mumps, Hyperlipidemia, Hypertension, Hypothyroidism, Mass of upper lobe of right lung, Occult blood positive stool, Osteoarthritis, Overweight, Paroxysmal atrial fibrillation ([facility]), Pneumonia due to organism, Pseudophakia, left eye, PSVT (paroxysmal supraventricular tachycardia) ([facility]), Recurrent UTI, Retinal detachment, Seborrheic dermatitis, Sepsis due to urinary tract infection ([facility]), and Urinary tract infection without hematuria.    SURGICAL HISTORY      has a past surgical history that includes Coronary artery bypass graft; Appendectomy (1940 and 1955); Tonsillectomy; retinal laser (Left, 12/22/2011); Cataract removal (Left, 05/07/2013); Skin tag removal (11/01/1999); Abscess Drainage  ([id number]); and Colonoscopy (06/14/2021). CURRENT MEDICATIONS       Discharge Medication List as of 2/27/2023  2:44 PM        CONTINUE these medications which have NOT CHANGED    Details   polyvinyl alcohol (LIQUIFILM TEARS) 1.4 % ophthalmic solution Place 2 drops into both eyes every 6 hours as neededHistorical Med      simethicone (MYLICON) 40 TD/0.3TO LIQD drops Take 20 mg by mouth every 6 hours as needed Take 0.6 mLs by mouth 4 times a day as needed for gasHistorical Med      mineral oil enema Place 1 enema rectally onceHistorical Med      Dextromethorphan-guaiFENesin  MG/5ML SYRP Take 5 mLs by mouth every 6 hours as needed for CoughHistorical Med      levothyroxine (SYNTHROID) 88 MCG tablet Take 88 mcg by mouth DailyHistorical Med      tamsulosin (FLOMAX) 0.4 MG capsule Take 1 capsule by mouth 2 times daily, Disp-180 capsule, R-3Normal      glucosamine-chondroitin 500-400 MG CAPS Take 3 capsules by mouth dailyHistorical Med      Psyllium (REGULOID PO) Take by mouth (Reguloid): mix 1 TBSP in 8 oz of water bidHistorical Med      albuterol (PROVENTIL) (2.5 MG/3ML) 0.083% nebulizer solution Take 2.5 mg by nebulization every 6 hours as needed for WheezingHistorical Med      hydrOXYzine HCl (ATARAX) 25 MG tablet Take 25 mg by mouth every 8 hours as needed for ItchingHistorical Med      loperamide (IMODIUM) 2 MG capsule Take 2 mg by mouth After each loose stool.  *Not to exceed 16 gm/ 24 hoursHistorical Med      Cholestyramine POWD Mix 1 scoopful (4 gm) po daily as neededHistorical Med      fluticasone (FLONASE) 50 MCG/ACT nasal spray INSTILL 1 SPRAY IN EACH NOSTRIL ONCE DAILY, Disp-16 g, R-1Normal      hydrocortisone 1 % cream Apply topically to affected area as directed prn (Preparation H), Topical, Historical Med      amiodarone (CORDARONE) 200 MG tablet Take 100 mg by mouth dailyHistorical Med      rivaroxaban (XARELTO) 20 MG TABS tablet Take 20 mg by mouth every morningHistorical Med      acetaminophen (TYLENOL) 325 MG tablet Take 650 mg by mouth every 4 hours as needed for PainHistorical Med      metoprolol tartrate (LOPRESSOR) 25 MG tablet Take 0.5 tablets by mouth 2 times daily, Disp-90 tablet, R-1Normal      Multiple Vitamins-Minerals (PRESERVISION AREDS PO) Take 1 capsule by mouth daily Historical Med      isosorbide mononitrate (IMDUR) 30 MG extended release tablet Take 1 tablet by mouth daily, Disp-30 tablet, R-3Normal      lisinopril (PRINIVIL;ZESTRIL) 40 MG tablet Take 1 tablet by mouth daily, Disp-30 tablet, R-3Normal      Multiple Vitamins-Minerals (MULTIVITAMIN ADULT PO) Take 1 tablet by mouth dailyHistorical Med      potassium chloride (KLOR-CON M) 20 MEQ extended release tablet Take 20 mEq by mouth dailyHistorical Med      simvastatin (ZOCOR) 20 MG tablet TAKE 1 TABLET BY MOUTH EVERY OTHER DAY , Disp-30 tablet, R-11Normal      Coenzyme Q-10 100 MG CAPS Take 100 mg by mouth every other day. tolnaftate (TINACTIN) 1 % external solution Apply topically nightly to toenails per Dr. Francisco Saldana. , Guille Currie     is allergic to pcn [penicillins], sulfa antibiotics, and cefdinir. FAMILY HISTORY     He indicated that his mother is . He indicated that his father is . He indicated that his sister is . family history includes Diabetes in his mother; Osteoporosis in his mother; Other in his father and sister; Stroke in his sister. SOCIAL HISTORY      reports that he has never smoked. He has never used smokeless tobacco. He reports that he does not drink alcohol and does not use drugs. PHYSICAL EXAM     INITIAL VITALS:  height is 5' 11\" (1.803 m) and weight is 171 lb 3.2 oz (77.7 kg). His tympanic temperature is 96.5 °F (35.8 °C) (abnormal). His blood pressure is 121/71 and his pulse is 65. His respiration is 18 and oxygen saturation is 97%. Physical Exam  Constitutional:       Appearance: Normal appearance. He is well-developed.    HENT: Head: Normocephalic and atraumatic. Right Ear: External ear normal.      Left Ear: External ear normal.   Eyes:      Pupils: Pupils are equal, round, and reactive to light. Cardiovascular:      Rate and Rhythm: Normal rate and regular rhythm. Pulmonary:      Effort: Pulmonary effort is normal.      Breath sounds: Normal breath sounds. Abdominal:      General: Bowel sounds are normal.      Palpations: Abdomen is soft. Tenderness: There is abdominal tenderness. Comments: Distended lower abdomen bowel sounds are normally abdomen is soft   Genitourinary:     Comments: Uncircumcised history remains reddened and tender he has some testicular swelling  Musculoskeletal:         General: Normal range of motion. Cervical back: Normal range of motion and neck supple. Skin:     General: Skin is warm and dry. Capillary Refill: Capillary refill takes less than 2 seconds. Neurological:      Mental Status: He is alert. Comments: Patient is oriented to self and follows simple commands   Psychiatric:         Behavior: Behavior normal.         DIFFERENTIAL DIAGNOSIS/ MDM:     Differential diagnosis considered: Yeast infection UTI possible orchitis or epididymitis    Chronic Conditions affecting care (DM,HTN,CA, etc): Dementia    Social Determinants of Health affecting care (unable to care for self, lives alone, unemployed, homeless,etc): Patient comes from nursing home    History source(s) (patient,spouse,parent,family,friend,EMS,etc): Nursing home    Review of external sources (ECF,Hospital records,EMS report, radiology reports, etc):     Tests considered but not ordered: Not applicable    Independent interpretation of tests (eg.  X-ray, CAT scan, Doppler studies, EKG): None    Discussion of x-ray results with radiology:     Consults:  This was discussed with Dr. Raiv Van of urology he recommended inpatient IV antibiotics over the weekend and then he will see him Monday for probable drainage if needed    Consideration for admission/observation (even if discharged):     Prescription considerations:     Sepsis considered: No evidence of sepsis but started on antibiotics    Critical Care note written: None    DIAGNOSTIC RESULTS     EKG: All EKG's are interpreted by the Emergency Department Physician who either signs or Co-signs this chart in the absence of a cardiologist.        RADIOLOGY:   I directly visualized the following  images and reviewed the radiologist interpretations:       CT OF THE ABDOMEN AND PELVIS WITH CONTRAST 2/23/2023 12:53 pm       TECHNIQUE:   CT of the abdomen and pelvis was performed with the administration of   intravenous contrast. Multiplanar reformatted images are provided for review. Automated exposure control, iterative reconstruction, and/or weight based   adjustment of the mA/kV was utilized to reduce the radiation dose to as low   as reasonably achievable. COMPARISON:   04/14/2020       HISTORY:   ORDERING SYSTEM PROVIDED HISTORY: Lower abdominal pain   TECHNOLOGIST PROVIDED HISTORY:       Lower abdominal pain   Decision Support Exception - unselect if not a suspected or confirmed   emergency medical condition->Emergency Medical Condition (MA)   Reason for Exam: no complaints       FINDINGS:   Lower Chest: Interval development of bibasilar of pulmonary fibrosis with   traction bronchiectasis. No appreciable honeycombing. Organs: Cholelithiasis. Numerous scattered hypodensities in the left lobe of   liver and a few on the right. Up to 11 mm in size. Most are evident on the   prior study. Prior study performed without IV contrast and therefore   comparison is limited. These appear to be cysts. Some are too small to   characterize. Spleen, pancreas, adrenal glands and kidneys show no acute process. GI/Bowel: There is limited evaluation due to absence of oral contrast.  Small   sliding hiatal hernia otherwise stomach unremarkable.   No evidence for bowel   obstruction. Increased stool throughout the colon suggests constipation. Large amount of stool distends the rectum. Stool impaction is not excluded. Pelvis: Enlarged prostate gland in with nodular projection into the bladder   base. Urinary bladder is collapsed and not optimally assessed. Peritoneum/Retroperitoneum: Atherosclerotic disease. No lymphadenopathy. No   ascites       Bones/Soft Tissues: Degenerative changes on background osteopenia. Impression   1. Interval development of quite pronounced bibasilar fibrosis and traction   bronchiectasis. No honeycombing. 2. Large amount of stool throughout the colon with stool also distending the   rectum. Constipation plus/minus stool impaction. 3. Stable findings including cholelithiasis and numerous hepatic   hypodensities up to 11 mm likely cysts. Some too small to characterize. 4. Small sliding hiatal hernia. 5. Prostatomegaly. EXAMINATION:   ULTRASOUND OF THE SCROTUM/TESTICLES WITH COLOR DOPPLER FLOW EVALUATION       2/23/2023       TECHNIQUE:   Duplex ultrasound using B-mode/gray scaled imaging, Doppler spectral analysis   and color flow Doppler was obtained of the testicles. COMPARISON:   None. HISTORY:   ORDERING SYSTEM PROVIDED HISTORY: Pain and swelling   TECHNOLOGIST PROVIDED HISTORY:   Pain and swelling   Reason for Exam: pain and swelling       FINDINGS:       Measurements:       Right Testicle: 3.9 x 2.7 x 1.8 cm       Left Testicle: 3.3 x 3.1 x 2.5 cm           Right:       Grey Scale: The right testicle demonstrates normal homogeneous echotexture   without focal lesion. No evidence of testicular microlithiasis. Doppler Evaluation: There is normal arterial and venous Doppler flow within   the testicle. Scrotal Sac: There is a very small right hydrocele. Epididymis: There is a 4 mm right epididymal cyst.           Left:       Grey Scale:  The left testicle demonstrates normal homogeneous echotexture   without focal lesion. No evidence of testicular microlithiasis. Doppler Evaluation: There is normal arterial and venous Doppler flow within   the testicle. Scrotal Sac: There is a large complicated left hydrocele containing internal   echogenic material and multiple septations, possibly a pyocele. Epididymis: The left epididymis appears enlarged and heterogeneous in   echotexture, and is mildly hyperemic. There is a 10 x 7 x 7 mm left   epididymal cyst.           Impression   1. Unremarkable sonographic appearance of the bilateral testicles, without   evidence of torsion or mass. 2. Findings suggestive of asymmetric left epididymitis. 3. Bilateral epididymal cysts. 4. Large complicated left scrotal fluid collection, possibly a pyocele. ED BEDSIDE ULTRASOUND:       LABS:  Labs Reviewed   CULTURE, BLOOD 1 - Abnormal; Notable for the following components:       Result Value    Culture POSITIVE Blood Culture (*)     Culture   (*)     Value: BACILLUS SPECIES A single positive blood culture of coagulase negative Staphylocci, diphtheroids,micrococci, Cutibacterium, viridans Streptocci, Bacillus, or Lactobacillus species should be interpreted with caution and viewed as a likely skin contaminant.       All other components within normal limits   CBC WITH AUTO DIFFERENTIAL - Abnormal; Notable for the following components:    RBC 4.03 (*)     Hemoglobin 12.0 (*)     Hematocrit 35.7 (*)     MCHC 33.6 (*)     Seg Neutrophils 80 (*)     Lymphocytes 13 (*)     Absolute Lymph # 1.06 (*)     All other components within normal limits   COMPREHENSIVE METABOLIC PANEL W/ REFLEX TO MG FOR LOW K - Abnormal; Notable for the following components:    Glucose 100 (*)     Calcium 8.5 (*)     Albumin 3.3 (*)     All other components within normal limits   CBC WITH AUTO DIFFERENTIAL - Abnormal; Notable for the following components:    Hemoglobin 12.9 (*) Hematocrit 39.0 (*)     Seg Neutrophils 78 (*)     Lymphocytes 13 (*)     Immature Granulocytes 1 (*)     Absolute Lymph # 0.76 (*)     All other components within normal limits   BASIC METABOLIC PANEL - Abnormal; Notable for the following components:    Calcium 8.5 (*)     All other components within normal limits   CBC WITH AUTO DIFFERENTIAL - Abnormal; Notable for the following components:    RBC 4.03 (*)     Hemoglobin 12.0 (*)     Hematocrit 36.4 (*)     Seg Neutrophils 73 (*)     Lymphocytes 17 (*)     Absolute Lymph # 0.93 (*)     All other components within normal limits   BASIC METABOLIC PANEL - Abnormal; Notable for the following components:    Calcium 8.5 (*)     Chloride 108 (*)     All other components within normal limits   CBC WITH AUTO DIFFERENTIAL - Abnormal; Notable for the following components:    Seg Neutrophils 72 (*)     Lymphocytes 20 (*)     Absolute Lymph # 0.97 (*)     All other components within normal limits   BASIC METABOLIC PANEL - Abnormal; Notable for the following components:    Glucose 104 (*)     All other components within normal limits   CBC WITH AUTO DIFFERENTIAL - Abnormal; Notable for the following components:    RBC 3.93 (*)     Hemoglobin 11.7 (*)     Hematocrit 35.6 (*)     Seg Neutrophils 71 (*)     Lymphocytes 18 (*)     Immature Granulocytes 1 (*)     All other components within normal limits   BASIC METABOLIC PANEL - Abnormal; Notable for the following components:    Glucose 104 (*)     Calcium 8.4 (*)     Anion Gap 8 (*)     All other components within normal limits   CULTURE, BLOOD 1   COVID-19, RAPID   LACTIC ACID         EMERGENCY DEPARTMENT COURSE:   Vitals:    Vitals:    02/27/23 0610 02/27/23 0831 02/27/23 0933 02/27/23 1100   BP: (!) 144/76  136/61 121/71   Pulse: 70  68 65   Resp: 18   18   Temp: 97.6 °F (36.4 °C)   (!) 96.5 °F (35.8 °C)   TempSrc: Tympanic   Tympanic   SpO2: 96%   97%   Weight: 171 lb 3.2 oz (77.7 kg)      Height:  5' 11\" (1.803 m) -------------------------  BP: 121/71, Temp: (!) 96.5 °F (35.8 °C), Heart Rate: 65, Resp: 18    Resting    Re-evaluation Notes    Sting comfortably    CRITICAL CARE:   IP CONSULT TO HOSPITALIST  IP CONSULT TO UROLOGY  IP CONSULT TO CARDIOLOGY  IP CONSULT TO UROLOGY        CONSULTS: Urology was consulted and discussed the case with Dr. De La Garza Mas:  None    FINAL IMPRESSION      1. Pyocele    2. Epididymitis    3. Anxiety          DISPOSITION/PLAN   DISPOSITION Admitted    Condition on Disposition    Stable    PATIENT REFERRED TO:  No follow-up provider specified. DISCHARGE MEDICATIONS:  Discharge Medication List as of 2/27/2023  2:44 PM        START taking these medications    Details   lactulose (CHRONULAC) 10 GM/15ML solution Take 30 mLs by mouth 3 times daily as needed (constipation), Disp-1 each, R-0Print      levoFLOXacin (LEVAQUIN) 750 MG tablet Take 1 tablet by mouth every other day for 4 doses, Disp-4 tablet, R-0Print      doxycycline hyclate (VIBRA-TABS) 100 MG tablet Take 1 tablet by mouth 2 times daily for 6 days, Disp-12 tablet, R-0Print      Probiotic Acidophilus (FLORANEX) TABS Take 1 tablet by mouth 3 times daily for 10 days, Disp-30 tablet, R-0OTC             (Please note that portions of this note were completed with a voice recognition program.  Efforts were made to edit the dictations but occasionally words are mis-transcribed.)    Jenni Michel MD,, MD, F.A.A.E.M.   Attending Emergency Physician                           Jenni Michel MD  03/03/23 [id number]

## 2023-02-23 NOTE — TELEPHONE ENCOUNTER
Nurse Tapan Reyes at [facility] informed that patient has UTI. They need the Rx e-scribed to [facility]. And then they also need a copy of this note fax'd to them so they can start him on the medication right away.  (Will pull out of emergency stock)

## 2023-02-23 NOTE — PROGRESS NOTES
Pt pulled out IV in ER per Molly. Pt to PCU with no IV and is refusing to let nurse put in new IV. Pt stated he does not need meds.

## 2023-02-24 ENCOUNTER — APPOINTMENT (OUTPATIENT)
Dept: NON INVASIVE DIAGNOSTICS | Age: 88
DRG: 728 | End: 2023-02-24
Payer: MEDICARE

## 2023-02-24 PROBLEM — N43.1 PYOCELE: Status: ACTIVE | Noted: 2023-02-24

## 2023-02-24 LAB
ABSOLUTE EOS #: 0.13 K/UL (ref 0–0.44)
ABSOLUTE IMMATURE GRANULOCYTE: 0.03 K/UL (ref 0–0.3)
ABSOLUTE LYMPH #: 0.76 K/UL (ref 1.1–3.7)
ABSOLUTE MONO #: 0.27 K/UL (ref 0.1–1.2)
ANION GAP SERPL CALCULATED.3IONS-SCNC: 12 MMOL/L (ref 9–17)
BASOPHILS # BLD: 1 % (ref 0–2)
BASOPHILS ABSOLUTE: 0.03 K/UL (ref 0–0.2)
BUN SERPL-MCNC: 16 MG/DL (ref 8–23)
BUN/CREAT BLD: 14 (ref 9–20)
CALCIUM SERPL-MCNC: 8.5 MG/DL (ref 8.6–10.4)
CHLORIDE SERPL-SCNC: 103 MMOL/L (ref 98–107)
CO2 SERPL-SCNC: 24 MMOL/L (ref 20–31)
CREAT SERPL-MCNC: 1.12 MG/DL (ref 0.7–1.2)
EOSINOPHILS RELATIVE PERCENT: 2 % (ref 1–4)
GFR SERPL CREATININE-BSD FRML MDRD: >60 ML/MIN/1.73M2
GLUCOSE SERPL-MCNC: 82 MG/DL (ref 70–99)
HCT VFR BLD AUTO: 39 % (ref 40.7–50.3)
HGB BLD-MCNC: 12.9 G/DL (ref 13–17)
IMMATURE GRANULOCYTES: 1 %
LV EF: 45 %
LVEF MODALITY: NORMAL
LYMPHOCYTES # BLD: 13 % (ref 24–43)
MCH RBC QN AUTO: 29.7 PG (ref 25.2–33.5)
MCHC RBC AUTO-ENTMCNC: 33.1 G/DL (ref 25.2–33.5)
MCV RBC AUTO: 89.7 FL (ref 82.6–102.9)
MICROORGANISM SPEC CULT: ABNORMAL
MONOCYTES # BLD: 5 % (ref 3–12)
NRBC AUTOMATED: 0 PER 100 WBC
PDW BLD-RTO: 13.8 % (ref 11.8–14.4)
PLATELET # BLD AUTO: 173 K/UL (ref 138–453)
PMV BLD AUTO: 10.1 FL (ref 8.1–13.5)
POTASSIUM SERPL-SCNC: 4.3 MMOL/L (ref 3.7–5.3)
RBC # BLD: 4.35 M/UL (ref 4.21–5.77)
SEG NEUTROPHILS: 78 % (ref 36–65)
SEGMENTED NEUTROPHILS ABSOLUTE COUNT: 4.59 K/UL (ref 1.5–8.1)
SODIUM SERPL-SCNC: 139 MMOL/L (ref 135–144)
SPECIMEN DESCRIPTION: ABNORMAL
WBC # BLD AUTO: 5.8 K/UL (ref 3.5–11.3)

## 2023-02-24 PROCEDURE — 36415 COLL VENOUS BLD VENIPUNCTURE: CPT

## 2023-02-24 PROCEDURE — 6370000000 HC RX 637 (ALT 250 FOR IP): Performed by: INTERNAL MEDICINE

## 2023-02-24 PROCEDURE — 94760 N-INVAS EAR/PLS OXIMETRY 1: CPT

## 2023-02-24 PROCEDURE — 99222 1ST HOSP IP/OBS MODERATE 55: CPT | Performed by: INTERNAL MEDICINE

## 2023-02-24 PROCEDURE — 2060000000 HC ICU INTERMEDIATE R&B

## 2023-02-24 PROCEDURE — 6370000000 HC RX 637 (ALT 250 FOR IP)

## 2023-02-24 PROCEDURE — 2580000003 HC RX 258: Performed by: INTERNAL MEDICINE

## 2023-02-24 PROCEDURE — 80048 BASIC METABOLIC PNL TOTAL CA: CPT

## 2023-02-24 PROCEDURE — 85025 COMPLETE CBC W/AUTO DIFF WBC: CPT

## 2023-02-24 PROCEDURE — 93306 TTE W/DOPPLER COMPLETE: CPT

## 2023-02-24 RX ORDER — ALBUTEROL SULFATE 2.5 MG/3ML
2.5 SOLUTION RESPIRATORY (INHALATION) EVERY 6 HOURS PRN
Status: DISCONTINUED | OUTPATIENT
Start: 2023-02-24 | End: 2023-02-27 | Stop reason: HOSPADM

## 2023-02-24 RX ADMIN — ISOSORBIDE MONONITRATE 30 MG: 30 TABLET, EXTENDED RELEASE ORAL at 10:13

## 2023-02-24 RX ADMIN — METOPROLOL TARTRATE 12.5 MG: 25 TABLET, FILM COATED ORAL at 10:12

## 2023-02-24 RX ADMIN — SODIUM CHLORIDE, PRESERVATIVE FREE 10 ML: 5 INJECTION INTRAVENOUS at 20:34

## 2023-02-24 RX ADMIN — DOXYCYCLINE 100 MG: 100 CAPSULE ORAL at 10:13

## 2023-02-24 RX ADMIN — ALPRAZOLAM 0.12 MG: 0.25 TABLET ORAL at 10:12

## 2023-02-24 RX ADMIN — AMIODARONE HYDROCHLORIDE 100 MG: 200 TABLET ORAL at 10:14

## 2023-02-24 RX ADMIN — TERBINAFINE HYDROCHLORIDE: 1 CREAM TOPICAL at 21:28

## 2023-02-24 RX ADMIN — ALPRAZOLAM 0.25 MG: 0.25 TABLET ORAL at 20:30

## 2023-02-24 RX ADMIN — TAMSULOSIN HYDROCHLORIDE 0.4 MG: 0.4 CAPSULE ORAL at 10:12

## 2023-02-24 RX ADMIN — LEVOTHYROXINE SODIUM 88 MCG: 0.09 TABLET ORAL at 06:16

## 2023-02-24 RX ADMIN — LISINOPRIL 40 MG: 20 TABLET ORAL at 20:30

## 2023-02-24 RX ADMIN — POTASSIUM CHLORIDE 20 MEQ: 1500 TABLET, EXTENDED RELEASE ORAL at 20:30

## 2023-02-24 RX ADMIN — DOXYCYCLINE 100 MG: 100 CAPSULE ORAL at 20:30

## 2023-02-24 RX ADMIN — RIVAROXABAN 15 MG: 15 TABLET, FILM COATED ORAL at 17:55

## 2023-02-24 RX ADMIN — TAMSULOSIN HYDROCHLORIDE 0.4 MG: 0.4 CAPSULE ORAL at 20:30

## 2023-02-24 RX ADMIN — TERBINAFINE HYDROCHLORIDE: 1 CREAM TOPICAL at 10:12

## 2023-02-24 RX ADMIN — ATORVASTATIN CALCIUM 10 MG: 10 TABLET, FILM COATED ORAL at 10:12

## 2023-02-24 RX ADMIN — METOPROLOL TARTRATE 12.5 MG: 25 TABLET, FILM COATED ORAL at 20:30

## 2023-02-24 RX ADMIN — SODIUM CHLORIDE, PRESERVATIVE FREE 10 ML: 5 INJECTION INTRAVENOUS at 11:36

## 2023-02-24 ASSESSMENT — PAIN SCALES - PAIN ASSESSMENT IN ADVANCED DEMENTIA (PAINAD)
NEGVOCALIZATION: 0
BREATHING: 0
BODYLANGUAGE: 0
TOTALSCORE: 0
CONSOLABILITY: 0
FACIALEXPRESSION: 0

## 2023-02-24 ASSESSMENT — PAIN SCALES - GENERAL: PAINLEVEL_OUTOF10: 0

## 2023-02-24 NOTE — CONSULTS
[facility]    Consult                        Today's Date: 2/24/2023  Patient Name: Do Ruiz  Date of admission: 2/23/2023 11:27 AM  Patient's age: 80 y. o., 12/31/1934  Admission Dx: Epididymitis [N45.1]  Pyocele [N43.1]    Reason for Consult:  preoperative clearance for Cysto. Requesting Physician: Bisi Ortiz MD    CHIEF COMPLAINT:  Scrotal abscess    History Obtained From:  patient    HISTORY OF PRESENT ILLNESS:      The patient is a 80 y.o.  male who is admitted to the hospital for groin pain  The patient presented with groin pain, found to have scrotal abscesses. No chest pain or SOB, no dizziness or syncope. 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 Past Surgical History:   has a past surgical history that includes Coronary artery bypass graft; Appendectomy (1940 and 1955); Tonsillectomy; retinal laser (Left, 12/22/2011); Cataract removal (Left, 05/07/2013); Skin tag removal (11/01/1999); Abscess Drainage ([id number]); and Colonoscopy (06/14/2021).      Home Medications:    Prior to Admission medications    Medication Sig Start Date End Date Taking? Authorizing Provider   polyvinyl alcohol (LIQUIFILM TEARS) 1.4 % ophthalmic solution Place 2 drops into both eyes every 6 hours as needed   Yes Historical Provider, MD   simethicone (MYLICON) 40 WC/4.7LZ LIQD drops Take 20 mg by mouth every 6 hours as needed Take 0.6 mLs by mouth 4 times a day as needed for gas   Yes Historical Provider, MD   mineral oil enema Place 1 enema rectally once   Yes Historical Provider, MD   Dextromethorphan-guaiFENesin  MG/5ML SYRP Take 5 mLs by mouth every 6 hours as needed for Cough   Yes Historical Provider, MD   levothyroxine (SYNTHROID) 88 MCG tablet Take 88 mcg by mouth Daily   Yes Historical Provider, MD   levothyroxine (SYNTHROID) 88 MCG tablet Take 1 tablet by mouth daily  Patient not taking: Reported on 2/23/2023 2/13/23   Zelalem Cerda DO   tamsulosin (FLOMAX) 0.4 MG capsule Take 1 capsule by mouth 2 times daily 12/21/22   Gerardo Baumann MD   nitrofurantoin, macrocrystal-monohydrate, (MACROBID) 100 MG capsule Take 1 capsule by mouth daily 12/21/22   Gerardo Baumann MD   glucosamine-chondroitin 500-400 MG CAPS Take 3 capsules by mouth daily    Historical Provider, MD   ALPRAZolam (XANAX) 0.25 MG tablet Take 1/2 tab (0.125mg) in am and a full tablet at bedtime    Historical Provider, MD   Psyllium (REGULOID PO) Take by mouth (Reguloid): mix 1 TBSP in 8 oz of water bid    Historical Provider, MD   albuterol (PROVENTIL) (2.5 MG/3ML) 0.083% nebulizer solution Take 2.5 mg by nebulization every 6 hours as needed for Wheezing    Historical Provider, MD   hydrOXYzine HCl (ATARAX) 25 MG tablet Take 25 mg by mouth every 8 hours as needed for Itching    Historical Provider, MD   loperamide (IMODIUM) 2 MG capsule Take 2 mg by mouth After each loose stool.  *Not to exceed 16 gm/ 24 hours    Historical Provider, MD   Cholestyramine POWD Mix 1 scoopful (4 gm) po daily as needed    Historical Provider, MD fluticasone (FLONASE) 50 MCG/ACT nasal spray INSTILL 1 SPRAY IN EACH NOSTRIL ONCE DAILY 8/22/22   Yared Cerda DO   amLODIPine (NORVASC) 5 MG tablet Take 1 tablet by mouth daily  Patient not taking: Reported on 2/23/2023 5/20/21   Tania Jeter MD   hydrocortisone 1 % cream Apply topically to affected area as directed prn (Preparation H)    Historical Provider, MD   amiodarone (CORDARONE) 200 MG tablet Take 100 mg by mouth daily    Historical Provider, MD   rivaroxaban (XARELTO) 20 MG TABS tablet Take 20 mg by mouth every morning    Historical Provider, MD   acetaminophen (TYLENOL) 325 MG tablet Take 650 mg by mouth every 4 hours as needed for Pain    Historical Provider, MD   metoprolol tartrate (LOPRESSOR) 25 MG tablet Take 0.5 tablets by mouth 2 times daily 6/13/19   Obie Becerra DO   Multiple Vitamins-Minerals (PRESERVISION AREDS PO) Take 1 capsule by mouth daily     Historical Provider, MD   isosorbide mononitrate (IMDUR) 30 MG extended release tablet Take 1 tablet by mouth daily 8/22/18   Panchito Ely MD   lisinopril (PRINIVIL;ZESTRIL) 40 MG tablet Take 1 tablet by mouth daily 8/22/18   Panchito Ely MD   Multiple Vitamins-Minerals (MULTIVITAMIN ADULT PO) Take 1 tablet by mouth daily    Historical Provider, MD   potassium chloride (KLOR-CON M) 20 MEQ extended release tablet Take 20 mEq by mouth daily    Historical Provider, MD   simvastatin (ZOCOR) 20 MG tablet TAKE 1 TABLET BY MOUTH EVERY OTHER DAY  3/7/17   Guillermo Cerda DO   Coenzyme Q-10 100 MG CAPS Take 100 mg by mouth every other day. Historical Provider, MD   tolnaftate (TINACTIN) 1 % external solution Apply topically nightly to toenails per Dr. Elizabeth Ravi. Historical Provider, MD       Allergies:  Pcn [penicillins], Sulfa antibiotics, and Cefdinir    Social History:   reports that he has never smoked. He has never used smokeless tobacco. He reports that he does not drink alcohol and does not use drugs.      Family History: family history includes Diabetes in his mother; Osteoporosis in his mother; Other in his father and sister; Stroke in his sister. No h/o sudden cardiac death. No for premature CAD    REVIEW OF SYSTEMS:    Constitutional: there has been no unanticipated weight loss. There's been No change in energy level, No change in activity level. Eyes: No visual changes or diplopia. No scleral icterus. ENT: No Headaches, hearing loss or vertigo. No mouth sores or sore throat. Cardiovascular: No chest pain, No dyspnea on exertion, No palpitations or No loss of consciousness. No cough, hemoptysis, No pleuritic pain, or phlebitis. Respiratory: No cough or wheezing, no sputum production. No hematemesis. Gastrointestinal: No abdominal pain, appetite loss, blood in stools. No change in bowel or bladder habits. Genitourinary: No dysuria, trouble voiding, or hematuria. Musculoskeletal:  No gait disturbance, No weakness or joint complaints. Integumentary: No rash or pruritis. Neurological: No headache, diplopia, change in muscle strength, numbness or tingling. No change in gait, balance, coordination, mood, affect, memory, mentation, behavior. Psychiatric: No anxiety, or depression. Endocrine: No temperature intolerance. No excessive thirst, fluid intake, or urination. No tremor. Hematologic/Lymphatic: No abnormal bruising or bleeding, blood clots or swollen lymph nodes. Allergic/Immunologic: No nasal congestion or hives. PHYSICAL EXAM:      /65   Pulse 66   Temp 97.7 °F (36.5 °C) (Tympanic)   Resp 18   Ht 5' 11\" (1.803 m)   Wt 173 lb 1.6 oz (78.5 kg)   SpO2 96%   BMI 24.14 kg/m²    Constitutional and General Appearance: alert, cooperative, no distress, and appears stated age  HEENT: PERRL, no cervical lymphadenopathy. No masses palpable. Normal oral mucosa  Respiratory:  Normal excursion and expansion without use of accessory muscles  Resp Auscultation: Good respiratory effort.  No for increased work of breathing. On auscultation: clear to auscultation bilaterally  Cardiovascular: The apical impulse is not displaced  Heart tones are crisp and normal. regular S1 and S2.  Jugular venous pulsation Normal  The carotid upstroke is normal in amplitude and contour without delay or bruit  Peripheral pulses are symmetrical and full  Abdomen:  No masses or tenderness  Bowel sounds present  Extremities:   No Cyanosis or Clubbing   Lower extremity edema: No  Skin: Warm and dry  Neurological:  Alert and oriented. 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 Labs:     CBC:   Recent Labs     02/23/23  1147 02/24/23  0540   WBC 8.1 5.8   HGB 12.0* 12.9*   HCT 35.7* 39.0*    173     BMP:   Recent Labs     02/23/23  1147 02/24/23  0540    139   K 4.3 4.3   CO2 23 24   BUN 16 16   CREATININE 1.11 1.12   LABGLOM >60 >60   GLUCOSE 100* 82     BNP: No results for input(s): BNP in the last 72 hours. PT/INR: No results for input(s): PROTIME, INR in the last 72 hours. APTT:No results for input(s): APTT in the last 72 hours. CARDIAC ENZYMES:No results for input(s): CKTOTAL, CKMB, CKMBINDEX, TROPONINI in the last 72 hours. FASTING LIPID PANEL:  Lab Results   Component Value Date/Time    HDL 35 02/02/2023 06:43 AM    TRIG 99 02/02/2023 06:43 AM     LIVER PROFILE:  Recent Labs     02/23/23  1147   AST 17   ALT 12   LABALBU 3.3*       IMPRESSION/RECOMMENDATIONS:  CAD s/p CABG (6/7/2000) LIMA TO LAD, SVG TO D2, STABLE without angina. Continue current medications. PAF- in NSR, on A/C with NOAC. Ok to hold anticoagulation for surgery  Possible Scortal abscess, being evaluated for possible surgery, obtain Echo, if Echo is preserved, with no significant valvular disease, should be ok to proceed with intermediate risk. Discussed with patient and Nurse.     Bartolo Moore MD, MD  [facility]           [phone]

## 2023-02-24 NOTE — H&P
HOSPITALIST ADMISSION H&P      REASON FOR ADMISSION: Epididymitis, Scrotal abscess  ESTIMATED LENGTH OF STAY:> 2 midnights, 3-4 days    ATTENDING/ADMITTING PHYSICIAN: Nickolas Montana MD  PCP: ANNIKA GREENBERG DO    HISTORY OF PRESENT ILLNESS:      The patient is a 88 y.o. male patient of ANNIKA GREENBERG DO who presents from ER with c/o groin pain. Patient with dementia, unable to tell writer why he is here. Denies pain, shortness of breath, nausea, vomiting, diarrhea or constipation. Per chart, he was sent from assisted living for reddened scrotum with pain, has a history of UTI, had UA sent on 2/22 with E-coli, no sensitivity on chart at this time.     In ER: Toradol, Levaquin.  CT A&P w/contrast:   Impression   1. Interval development of quite pronounced bibasilar fibrosis and traction   bronchiectasis.  No honeycombing.   2. Large amount of stool throughout the colon with stool also distending the   rectum.  Constipation plus/minus stool impaction.   3. Stable findings including cholelithiasis and numerous hepatic   hypodensities up to 11 mm likely cysts.  Some too small to characterize.   4. Small sliding hiatal hernia.   5. Prostatomegaly.   US Scrotum:   Impression   1. Unremarkable sonographic appearance of the bilateral testicles, without   evidence of torsion or mass.   2. Findings suggestive of asymmetric left epididymitis.   3. Bilateral epididymal cysts.   4. Large complicated left scrotal fluid collection, possibly a pyocele.   EKG:  Narrative & Impression    Sinus rhythm with 1st degree A-V block  Left anterior fascicular block  Possible Anteroseptal infarct (cited on or before 10-FEB-2018)  Abnormal ECG  When compared with ECG of 28-FEB-2018 17:01,  Sinus rhythm has replaced Atrial flutter  Vent. rate has decreased BY  60 BPM     Wounds and LDAs present prior to admission: Scrotal swelling, redness and warmth.     See below for additional PMH.    Patient  gavr-ewutcmwbdw-nxfgovop-available records reviewed, including, but not limited to ER records, imaging results, lab results, office records, personal records, and OARRS -- no signs of abuse or diversion. 23 prescriptions, 15 private pay, 3 prescribers, 2 pharmacies in 2 years.      Past Medical History:   Diagnosis Date    Acute bronchitis     by history    MARITA (acute kidney injury) ([address] 3/13/2018    Allergic rhinitis     Anxiety     Bradycardia 8/19/2018    Cataract, right     Choroidal nevus of right eye     Chronic systolic CHF (congestive heart failure) ([address] 3/13/2018    Coronary artery disease involving native coronary artery of native heart 10/20/2016    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 LIMA TO LAD, SVG TO DIAGONAL2    Dementia associated with 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 behavioral disturbance ([address] 9/21/2018    Dermatophytosis of nail 1/10/2014    Diverticulosis     Elevated PSA     Hemorrhoids     History of measles childhood    History of mumps childhood    Hyperlipidemia     Hypertension     Hypothyroidism 9/4/2018    Mass of upper lobe of right lung 2/21/2018    Occult blood positive stool     refuses colonoscopy    Osteoarthritis     Overweight     Paroxysmal atrial fibrillation ([address] 11/7/2013    Pneumonia due to organism     Pseudophakia, left eye     PSVT (paroxysmal supraventricular tachycardia) ([address]     Recurrent UTI 3/13/2018    Retinal detachment     left, history of     Seborrheic dermatitis     Sepsis due to urinary tract infection ([address] 8/15/2018    Urinary tract infection without hematuria 3/28/2018           Past Surgical History:   Procedure Laterality Date    ABSCESS DRAINAGE  [id number]    multiple    APPENDECTOMY  [address] Left 05/07/2013    COLONOSCOPY  06/14/2021    Dr Alcon Zamarripa Left 12/22/2011    detached retina    SKIN TAG REMOVAL  11/01/1999    TONSILLECTOMY         Medications Prior to Admission:    Medications Prior to Admission: polyvinyl alcohol (LIQUIFILM TEARS) 1.4 % ophthalmic solution, Place 2 drops into both eyes every 6 hours as needed  simethicone (MYLICON) 40 RO/5.0JR LIQD drops, Take 20 mg by mouth every 6 hours as needed Take 0.6 mLs by mouth 4 times a day as needed for gas  mineral oil enema, Place 1 enema rectally once  Dextromethorphan-guaiFENesin  MG/5ML SYRP, Take 5 mLs by mouth every 6 hours as needed for Cough  levothyroxine (SYNTHROID) 88 MCG tablet, Take 88 mcg by mouth Daily  levothyroxine (SYNTHROID) 88 MCG tablet, Take 1 tablet by mouth daily (Patient not taking: Reported on 2/23/2023)  tamsulosin (FLOMAX) 0.4 MG capsule, Take 1 capsule by mouth 2 times daily  nitrofurantoin, macrocrystal-monohydrate, (MACROBID) 100 MG capsule, Take 1 capsule by mouth daily  glucosamine-chondroitin 500-400 MG CAPS, Take 3 capsules by mouth daily  ALPRAZolam (XANAX) 0.25 MG tablet, Take 1/2 tab (0.125mg) in am and a full tablet at bedtime  Psyllium (REGULOID PO), Take by mouth (Reguloid): mix 1 TBSP in 8 oz of water bid  albuterol (PROVENTIL) (2.5 MG/3ML) 0.083% nebulizer solution, Take 2.5 mg by nebulization every 6 hours as needed for Wheezing  hydrOXYzine HCl (ATARAX) 25 MG tablet, Take 25 mg by mouth every 8 hours as needed for Itching  loperamide (IMODIUM) 2 MG capsule, Take 2 mg by mouth After each loose stool.  *Not to exceed 16 gm/ 24 hours  Cholestyramine POWD, Mix 1 scoopful (4 gm) po daily as needed  fluticasone (FLONASE) 50 MCG/ACT nasal spray, INSTILL 1 SPRAY IN EACH NOSTRIL ONCE DAILY  amLODIPine (NORVASC) 5 MG tablet, Take 1 tablet by mouth daily (Patient not taking: Reported on 2/23/2023)  hydrocortisone 1 % cream, Apply topically to affected area as directed prn (Preparation H)  amiodarone (CORDARONE) 200 MG tablet, Take 100 mg by mouth daily  rivaroxaban (XARELTO) 20 MG TABS tablet, Take 20 mg by mouth every morning  acetaminophen (TYLENOL) 325 MG tablet, Take 650 mg by mouth every 4 hours as needed for Pain  metoprolol tartrate (LOPRESSOR) 25 MG tablet, Take 0.5 tablets by mouth 2 times daily  Multiple Vitamins-Minerals (PRESERVISION AREDS PO), Take 1 capsule by mouth daily   isosorbide mononitrate (IMDUR) 30 MG extended release tablet, Take 1 tablet by mouth daily  lisinopril (PRINIVIL;ZESTRIL) 40 MG tablet, Take 1 tablet by mouth daily  Multiple Vitamins-Minerals (MULTIVITAMIN ADULT PO), Take 1 tablet by mouth daily  potassium chloride (KLOR-CON M) 20 MEQ extended release tablet, Take 20 mEq by mouth daily  simvastatin (ZOCOR) 20 MG tablet, TAKE 1 TABLET BY MOUTH EVERY OTHER DAY   Coenzyme Q-10 100 MG CAPS, Take 100 mg by mouth every other day. tolnaftate (TINACTIN) 1 % external solution, Apply topically nightly to toenails per Dr. Adwoa Villarreal. Allergies:    Pcn [penicillins], Sulfa antibiotics, and Cefdinir    Social History:   Tobacco:   Social History     Tobacco Use   Smoking Status Never   Smokeless Tobacco Never   Tobacco Comments    never smoked syant [facility],2/27/2018       Alcohol:   Social History     Substance and Sexual Activity   Alcohol Use No    Alcohol/week: 0.0 standard drinks       Drug:   Social History     Substance and Sexual Activity   Drug Use No       Family History:   family history includes Diabetes in his mother; Osteoporosis in his mother; Other in his father and sister; Stroke in his sister. REVIEW OF SYSTEMS:  See HPI and problem list; otherwise no other new complaints with respect to eyes, ENT, neck, pulmonary, coronary, chest, GI, , endocrine, musculoskeletal, immune system/connective tissue disease, hematologic, neurologic, psychiatric, skin, lymphatics, or malignancies. Code status: patient/family wishes for DNR-CCA at this time.     PHYSICAL EXAM:  Vitals:  /61   Pulse 65   Temp 97.1 °F (36.2 °C) (Tympanic)   Resp 17   Ht 5' 11\" (1.803 m)   Wt 173 lb 1.6 oz (78.5 kg)   SpO2 97%   BMI 24.14 kg/m²     General:  unshaven, non-toxic appearing, awake, alert, cooperative, and well nourished  HEENT: PERRLA, EMOI, External nose normal, Normocephalic, Atraumatic, and Neck with full ROM  Neck: Supple, Carotid Pulses Present, No Bruits, No Masses, Tenderness, Nodularity, and No Lymphadenopathy  Chest/Lungs: Clear to Auscultation without Rales, Rhonchi, or Wheezes and Respirations even and unlabored  Cardiac: Regular Rate and Rhythm and Pedal Pulses Palpable Bilaterally  GI/Abdomen:  Bowel Sounds Present, No Masses, and Soft, no guarding, no rebound tenderness, c/o pain with palpation suprapubic area  :  Scrotal swelling with erythema and warmth noted, firm to palpation left side of scrotum and Normal Male  Extremities/Musculoskeletal: All four extremities without edema and All four extremities with 5/5 strength  Skin:  Erythema, warmth and swelling to scrotum and Skin warm and dry  Neuro: Dementia at baseline, Alert and Oriented, to Person, to Situation, No Localizing Signs/Symptoms, follows commands with all 4 extremities, and Strength equal bilaterally  Psychiatric:  disagreeable at times but cooperative, dementia at baseline      LABS:    CBC with Differential:    Lab Results   Component Value Date/Time    WBC 8.1 02/23/2023 11:47 AM    RBC 4.03 02/23/2023 11:47 AM    HGB 12.0 02/23/2023 11:47 AM    HCT 35.7 02/23/2023 11:47 AM     02/23/2023 11:47 AM    MCV 88.6 02/23/2023 11:47 AM    MCH 29.8 02/23/2023 11:47 AM    MCHC 33.6 02/23/2023 11:47 AM    RDW 13.9 02/23/2023 11:47 AM    LYMPHOPCT 13 02/23/2023 11:47 AM    MONOPCT 6 02/23/2023 11:47 AM    BASOPCT 0 02/23/2023 11:47 AM    MONOSABS 0.46 02/23/2023 11:47 AM    LYMPHSABS 1.06 02/23/2023 11:47 AM    EOSABS 0.06 02/23/2023 11:47 AM    BASOSABS 0.03 02/23/2023 11:47 AM    DIFFTYPE NOT REPORTED 10/05/2021 08:26 AM     BMP:    Lab Results   Component Value Date/Time     02/23/2023 11:47 AM    K 4.3 02/23/2023 11:47 AM     02/23/2023 11:47 AM    CO2 23 02/23/2023 11:47 AM    BUN 16 02/23/2023 11:47 AM LABALBU 3.3 02/23/2023 11:47 AM    CREATININE 1.11 02/23/2023 11:47 AM    CALCIUM 8.5 02/23/2023 11:47 AM    GFRAA >60 10/05/2021 08:26 AM    LABGLOM >60 02/23/2023 11:47 AM    GLUCOSE 100 02/23/2023 11:47 AM     Hepatic Function Panel:    Lab Results   Component Value Date/Time    ALKPHOS 109 02/23/2023 11:47 AM    ALT 12 02/23/2023 11:47 AM    AST 17 02/23/2023 11:47 AM    PROT 6.5 02/23/2023 11:47 AM    BILITOT 0.5 02/23/2023 11:47 AM    LABALBU 3.3 02/23/2023 11:47 AM     Lactic acid 1.4, Blood cultures x2 pending, Covid (-).       ASSESSMENT:      Patient Active Problem List   Diagnosis    Hyperlipidemia    Osteoarthritis    Allergic rhinitis    Diverticulosis    Anxiety    Atrial fibrillation ([facility]    Coronary artery disease involving native coronary artery of native heart    Mass of upper lobe of right lung    Chronic systolic CHF (congestive heart failure) (HCC)    Benign prostatic hyperplasia with incomplete bladder emptying    Hypothyroidism    Hypertension    Dementia associated with 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 behavioral disturbance (HCC)    Intermediate stage nonexudative age-related macular degeneration of both eyes    Combined forms of age-related cataract of right eye    Pseudophakia of left eye    H/O fall    Balance problem    Epididymitis    Scrotal abscess     Patient csep-pdytoozcpu-ncmygrrv-available records reviewed, including, but not limited to, ER reports---labs--imaging---office records--personal notes    JUNIOR JOHNS              88 [address]  [sr Isabel Griffith, ---fariha guzman; [facility]---[facility], [place], [facility]]  FULL CODE     Oly Le       AMIODARONE     Anti-infectives:   Levaquin IV--every 48 hour, doxycycline     Scrotal abscess---pyocele--complicated left hydrocele----left epididymitis--2.23.2023        CT abdomen-pelvis--2.23.2023--pronounced bibasilar fibrosis--traction                               bronchiectasis--no honeycombing--large stool throughout colon and distending rectum--cholelithiasis--numerous hepatic                               hypodensities consistent with cysts--prostatomegaly        DUS--scrotal contents---2.23.2023---no testicular mass or torsion--                              asymmetric left epididymitis--bilateral epididymal cysts---                              large complicated scrotal fluid collection--possible pyocele  Groin pain--due to above---2.23.2023  Constipation   UTI---recent  Dementia--severe           Atrial fibrillation---PAF--history---currently SR        EKG---2.23.2023--SR--80--1st degree AVB--LAHB--old anteroseptal infarction        RVR--2.12.2018----paroxysmal---recurrent        MI ruled out----2.13.2018  CHF---chronic systolic        Acute-on-chronic systolic---2.12.2018        2D ECHO----2.12.2018---RVSP ~ 48 mm Hg---LVEF ~ 50%  ASCVD         EKG---11.18.2022---SR--68--LAHB--poor R-wave progression--NSTCs         EKG----2.28.2018---atrial fibrillation--flutter---140--LAHB---                     old anterolateral infarction         EKG---2.12.2018---atrial fibrillation---100-104---LAHB---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             CABG---2000---LIMA-LAD--SVG-D2         MI---history   Arrhythmia--history         PSVT         PAF  Hypertension--labile   Hyperlipidemia  Hypothyroidism   CKD---Stage 2  Anxiety    PMH:   diverticulosis, allergic rhinitis, bronchitis, choroidal nevus right               eye, elevated PSA, measles, mumps, OA, hemorrhoids,               Hemocult-[+]--stool--refused colonoscopy, palpitations,              right cataract, UTIs--2018, uncontrolled urination, hematuria---2018,              atrial fibrillation--[flutter]---2018, overweight, grief reaction---death               of sister--2018, RML pneumonia--2018,  PET---2018--RUL mass-like                opacity--consistent with primary malignancy--no active adenopathy--               no metastatic diseaseT5 bone cyst---L1 uptake  likely due to degenerative change--mild chronic compression deformity      PSH:   appendectomy--[id number], tonsillectomy, left eye laser--             detached retina---2011, left cataract--IOL---2013, skin tag             excision--1999, multiple abscess drainages---[id number]    Allergies:  Penicillin---swelling, sulfa, Cefdinir    Attending Supervising Physician's NENITA Patel 106  I performed a history and physical examination on the patient and discussed the management with the nurse practitioner. I reviewed and agree with the findings and plan as documented in her note . Electronically signed by Pili Weir MD on 2/24/23 at 3:53 PM EST         PLAN:    1. Epididymitis/scrotal abscess: Levaquin, Doxycycline, Pain control, Monitor labs. Urology to see on Monday 2/27/23. 2.  Atrial fib-rate controlled: Telemetry monitoring, Xarelto (renal dosing), 2D Echo, Cardiology consult.   3. UTI POA-Ecoli: recheck urine in house with C&S.    VS per unit protocol, Daily weight, I&O,   Home medications reviewed  See orders     Note that over 55 minutes was spent in evaluation of the patient, review of the chart and pertinent records, discussion with family/staff, etc    DAYANARA Linares NP    8:06 PM  2/23/2023

## 2023-02-24 NOTE — PROGRESS NOTES
[facility] 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 Vitals:    02/24/23 0657   BP: 126/65   Pulse: 66   Resp: 18   Temp: 97.7 °F (36.5 °C)   SpO2: 96%   . Patients home meds are   Prior to Admission medications    Medication Sig Start Date End Date Taking?  Authorizing Provider   polyvinyl alcohol (LIQUIFILM TEARS) 1.4 % ophthalmic solution Place 2 drops into both eyes every 6 hours as needed   Yes Historical Provider, MD   simethicone (MYLICON) 40 ZN/1.6YR LIQD drops Take 20 mg by mouth every 6 hours as needed Take 0.6 mLs by mouth 4 times a day as needed for gas   Yes Historical Provider, MD   mineral oil enema Place 1 enema rectally once   Yes Historical Provider, MD   Dextromethorphan-guaiFENesin  MG/5ML SYRP Take 5 mLs by mouth every 6 hours as needed for Cough   Yes Historical Provider, MD   levothyroxine (SYNTHROID) 88 MCG tablet Take 88 mcg by mouth Daily   Yes Historical Provider, MD   levothyroxine (SYNTHROID) 88 MCG tablet Take 1 tablet by mouth daily  Patient not taking: Reported on 2/23/2023 2/13/23   Julia Cerda DO   tamsulosin (FLOMAX) 0.4 MG capsule Take 1 capsule by mouth 2 times daily 12/21/22   Nury Walsh MD   nitrofurantoin, macrocrystal-monohydrate, (MACROBID) 100 MG capsule Take 1 capsule by mouth daily 12/21/22   Nury Walsh MD   glucosamine-chondroitin 500-400 MG CAPS Take 3 capsules by mouth daily    Historical Provider, MD   ALPRAZolam (XANAX) 0.25 MG tablet Take 1/2 tab (0.125mg) in am and a full tablet at bedtime    Historical Provider, MD   Psyllium (REGULOID PO) Take by mouth (Reguloid): mix 1 TBSP in 8 oz of water bid    Historical Provider, MD   albuterol (PROVENTIL) (2.5 MG/3ML) 0.083% nebulizer solution Take 2.5 mg by nebulization every 6 hours as needed for Wheezing    Historical Provider, MD   hydrOXYzine HCl (ATARAX) 25 MG tablet Take 25 mg by mouth every 8 hours as needed for Itching    Historical Provider, MD loperamide (IMODIUM) 2 MG capsule Take 2 mg by mouth After each loose stool. *Not to exceed 16 gm/ 24 hours    Historical Provider, MD   Cholestyramine POWD Mix 1 scoopful (4 gm) po daily as needed    Historical Provider, MD   fluticasone (FLONASE) 50 MCG/ACT nasal spray INSTILL 1 SPRAY IN EACH NOSTRIL ONCE DAILY 8/22/22   Renay Cerda DO   amLODIPine (NORVASC) 5 MG tablet Take 1 tablet by mouth daily  Patient not taking: Reported on 2/23/2023 5/20/21   Adra Brittle, MD   hydrocortisone 1 % cream Apply topically to affected area as directed prn (Preparation H)    Historical Provider, MD   amiodarone (CORDARONE) 200 MG tablet Take 100 mg by mouth daily    Historical Provider, MD   rivaroxaban (XARELTO) 20 MG TABS tablet Take 20 mg by mouth every morning    Historical Provider, MD   acetaminophen (TYLENOL) 325 MG tablet Take 650 mg by mouth every 4 hours as needed for Pain    Historical Provider, MD   metoprolol tartrate (LOPRESSOR) 25 MG tablet Take 0.5 tablets by mouth 2 times daily 6/13/19   Obie Becerra DO   Multiple Vitamins-Minerals (PRESERVISION AREDS PO) Take 1 capsule by mouth daily     Historical Provider, MD   isosorbide mononitrate (IMDUR) 30 MG extended release tablet Take 1 tablet by mouth daily 8/22/18   Yosi Bucio MD   lisinopril (PRINIVIL;ZESTRIL) 40 MG tablet Take 1 tablet by mouth daily 8/22/18   Yosi Bucio MD   Multiple Vitamins-Minerals (MULTIVITAMIN ADULT PO) Take 1 tablet by mouth daily    Historical Provider, MD   potassium chloride (KLOR-CON M) 20 MEQ extended release tablet Take 20 mEq by mouth daily    Historical Provider, MD   simvastatin (ZOCOR) 20 MG tablet TAKE 1 TABLET BY MOUTH EVERY OTHER DAY  3/7/17   Guillermo Cerda DO   Coenzyme Q-10 100 MG CAPS Take 100 mg by mouth every other day. Historical Provider, MD   tolnaftate (TINACTIN) 1 % external solution Apply topically nightly to toenails per Dr. Luna Elizabeth.      Historical Provider, MD   .  Recent Surgical History: None = 0     Assessment Pt unable to do IS/PF, Pt has dementia.         RR 16  Breath Sounds: clear, diminished      Bronchodilator assessment at level  1  Hyperinflation assessment at level 1  Secretion Management assessment at level  1    [x]    Bronchodilator Assessment  BRONCHODILATOR ASSESSMENT SCORE  Score 0 1 2 3 4 5   Breath Sounds   []  Patient Baseline [x]  No Wheeze good aeration []  Faint, scattered wheezing, good aeration []  Expiratory Wheezing and or moderately diminished []  Insp/Exp wheeze and/or very diminished []  Insp/Exp and/ or marked distress   Respiratory Rate   []  Patient Baseline [x]  Less than 20 []  Less than 20 []  20-25 []  Greater than 25 []  Greater than 25   Peak flow % of Pred or PB []  NA   []  Greater than 90%  []  81-90% []  71-80% []  Less than or equal to 70%  or unable to perform []  Unable due to Respiratory Distress   Dyspnea re []  Patient Baseline [x]  No SOB []  No SOB []  SOB on exertion []  SOB min activity []  At rest/acute   e FEV% Predicted       [x]  NA []  Above 69%  []  Unable []  Above 60-69%  []  Unable []  Above 50-59%  []  Unable []  Above 35-49%  []  Unable []  Less than 35%  []  Unable                 [x]  Hyperinflation Assessment  Score 1 2 3   CXR and Breath Sounds   [x]  Clear []  No atelectasis  Basilar aeration []  Atelectasis or absent basilar breath sounds   Incentive Spirometry Volume  (Per IBW)   []  Greater than or equal to 15ml/Kg []  less than 15ml/Kg []  less than 15ml/Kg   Surgery within last 2 weeks [x]  None or general   []  Abdominal or thoracic surgery  []  Abdominal or thoracic   Chronic Pulmonary Historyre [x]  No []  Yes []  Yes     [x]  Secretion Management Assessment  Score 1 2 3   Bilateral Breath Sounds   [x]  Occasional Rhonchi []  Scattered Rhonchi []  Course Rhonchi and/or poor aeration   Sputum    [x]  Small amount of thin secretions []  Moderate amount of viscous secretions []  Copius, Viscious Yellow/ Secretions   CXR as reported by physician []  clear  [x]  Unavailable []  Infiltrates and/or consolidation  []  Unavailable []  Mucus Plugging and or lobar consolidation  []  Unavailable   Cough [x]  Strong, productive cough []  Weak productive cough []  No cough or weak non-productive cough   [facility]  7:50 AM                            FEMALE                                  MALE                            FEV1 Predicted Normal Values                        FEV1 Predicted Normal Values          Age                                     Height in Feet and Inches       Age                                     Height in Feet and Inches       4' 11\" 5' 1\" 5' 3\" 5' 5\" 5' 7\" 5' 9\" 5' 11\" 6' 1\"  4' 11\" 5' 1\" 5' 3\" 5' 5\" 5' 7\" 5' 9\" 5' 11\" 6' 1\"   42 - 45 2.49 2.66 2.84 3.03 3.22 3.42 3.62 3.83 42 - 45 2.82 3.03 3.26 3.49 3.72 3.96 4.22 4.47   46 - 49 2.40 2.57 2.76 2.94 3.14 3.33 3.54 3.75 46 - 49 2.70 2.92 3.14 3.37 3.61 3.85 4.10 4.36   50 - 53 2.31 2.48 2.66 2.85 3.04 3.24 3.45 3.66 50 - 53 2.58 2.80 3.02 3.25 3.49 3.73 3.98 4.24   54 - 57 2.21 2.38 2.57 2.75 2.95 3.14 3.35 3.56 54 - 57 2.46 2.67 2.89 3.12 3.36 3.60 3.85 4.11   58 - 61 2.10 2.28 2.46 2.65 2.84 3.04 3.24 3.45 58 - 61 2.32 2.54 2.76 2.99 3.23 3.47 3.72 3.98   62 - 65 1.99 2.17 2.35 2.54 2.73 2.93 3.13 3.34 62 - 65 2.19 2.40 2.62 2.85 3.09 3.33 3.58 3.84   66 - 69 1.88 2.05 2.23 2.42 2.61 2.81 3.02 3.23 66 - 69 2.04 2.26 2.48 2.71 2.95 3.19 3.44 3.70   70+ 1.82 1.99 2.17 2.36 2.55 2.75 2.95 3.16 70+ 1.97 2.19 2.41 2.64 2.87 3.12 3.37 3.62             Predicted Peak Expiratory Flow Rate                                       Height (in)  Female       Height (in) Male           Age 64 62 61 58 59 77 76 79 Age            20 721 409 222 597 042 596 535 725  98 37 39 40 75 41 01 45 76   25 337 352 366 381 396 411 426 441 25 447 476 [phone] [phone]   30 329 344 359 374 389 404 419 434 30 437 466 494 523 552 [phone] 667   35 322 337 351 366 381 [phone]   65 277 292 306 321 336 351 366 381 65 363 392 421 449 478 507 [phone]   70 269 284 299 314 329 344 359 374 70 353 382 410 439 468 496 [phone]   75 261 274 289 305 319 334 348 364 75 344 372 400 429 458 487 [phone]   80 253 266 282 296 312 327 342 356 80 335 362 390 419 448 [phone]

## 2023-02-24 NOTE — PROGRESS NOTES
SW attempted to meet with patient to complete assessment. Patient resting at this time. SW dsicused with nurse and discharge planner. Patient restinf of [facility]. Patient has dementia and poor historian at this time. No additional services needed at this time. [facility] will continue to monitor needs and assist as appropriate.       Electronically signed by YOLANDA Renee on 2/24/2023 at 1:27 PM

## 2023-02-24 NOTE — PROGRESS NOTES
02/24/23 1421   Encounter Summary   Encounter Overview/Reason  Spiritual/Emotional Needs   Service Provided For: Patient   Referral/Consult From: [address] Family members   Last Encounter  02/24/23   Complexity of Encounter Moderate   Begin Time 1359   End Time  1404   Total Time Calculated 5 min   Assessment/Intervention/Outcome   Assessment Calm   Intervention Active listening;Prayer (assurance of)/[place]   Outcome Expressed Gratitude;Engaged in conversation

## 2023-02-24 NOTE — PLAN OF CARE
Problem: Discharge Planning  Goal: Discharge to home or other facility with appropriate resources  Outcome: Progressing     Problem: Pain  Goal: Verbalizes/displays adequate comfort level or baseline comfort level  Outcome: Progressing  Flowsheets (Taken 2/24/2023 1526)  Verbalizes/displays adequate comfort level or baseline comfort level:   Assess pain using appropriate pain scale   Implement non-pharmacological measures as appropriate and evaluate response   Administer analgesics based on type and severity of pain and evaluate response  Note: Patient has denied pain this shift. Problem: Confusion  Goal: Confusion, delirium, dementia, or psychosis is improved or at baseline  Description: INTERVENTIONS:  1. Assess for possible contributors to thought disturbance, including medications, impaired vision or hearing, underlying metabolic abnormalities, dehydration, psychiatric diagnoses, and notify attending LIP  2. [place] high risk fall precautions, as indicated  3. Provide frequent short contacts to provide reality reorientation, refocusing and direction  4. Decrease environmental stimuli, including noise as appropriate  5. Monitor and intervene to maintain adequate nutrition, hydration, elimination, sleep and activity  6. If unable to ensure safety without constant attention obtain sitter and review sitter guidelines with assigned personnel  7. Initiate Psychosocial CNS and Spiritual Care consult, as indicated  Outcome: Progressing     Problem: Chronic Conditions and Co-morbidities  Goal: Patient's chronic conditions and co-morbidity symptoms are monitored and maintained or improved  Outcome: Progressing     Problem: Skin/Tissue Integrity  Goal: Absence of new skin breakdown  Description: 1. Monitor for areas of redness and/or skin breakdown  2. Assess vascular access sites hourly  3. Every 4-6 hours minimum:  Change oxygen saturation probe site  4.   Every 4-6 hours:  If on nasal continuous positive airway pressure, respiratory therapy assess nares and determine need for appliance change or resting period.   Outcome: Progressing

## 2023-02-24 NOTE — CARE COORDINATION
Case Management Assessment  Initial Evaluation    Date/Time of Evaluation: 2/24/2023 9:58 AM  Assessment Completed by: Tiburcio Sarmiento RN    If patient is discharged prior to next notation, then this note serves as note for discharge by case management.    Patient Name: Yeyo Bravo                     YOB: 1934  Diagnosis: Epididymitis [N45.1]  Pyocele [N43.1]                     Date / Time: 2/23/2023 11:27 AM    Patient Admission Status: Inpatient   Readmission Risk (Low < 19, Mod (19-27), High > 27): Readmission Risk Score: 14.9    Current PCP: ANNIKA GREENBERG, DO  PCP verified by CM? Yes    Chart Reviewed: Yes      History Provided by: Medical Record  Patient Orientation: Alert and Oriented    Patient Cognition: Dementia / Early Alzheimer's    Hospitalization in the last 30 days (Readmission):  No    If yes, Readmission Assessment in  Navigator will be completed.    Advance Directives:      Code Status: DNR-CCA   Patient's Primary Decision Maker is:        Discharge Planning:    Patient lives with: Other (Comment) (assisted living) Type of Home: Assisted living  Primary Care Giver: Other (Comment) (assisted living)  Patient Support Systems include: Other (Comment) (assisted living)   Current Financial resources: Medicare  Current community resources: Assisted Living  Current services prior to admission:              Current DME:  walker            Type of Home Care services:  None    ADLS  Prior functional level: Assistance with the following:  Current functional level: Assistance with the following:      Family can provide assistance at DC: Other (comment) (assisted living)  Would you like Case Management to discuss the discharge plan with any other family members/significant others, and if so, who?  (pt confused)  Plans to Return to Present Housing: Yes  Other Identified Issues/Barriers to RETURNING to current housing: none noted  Potential Assistance needed at discharge: N/A         Potential DME:  none noted  Patient expects to discharge to: Assisted living  Plan for transportation at discharge:  [facility]    Financial    Payor: [address] / Plan: MEDICARE PART A AND B / Product Type: *No Product type* /       Potential assistance Purchasing Medications:  no       [facility], [place] - [address] [facility] [phone] Priyaklaus Sniders [phone]  [address] [facility] [address] Yvette Jones  Phone: [phone] Fax: [phone]    [address] Yonatan Valdez [phone] - [phone]  [address] Jered Ordonez [address]  Phone: [phone] Fax: [phone]    [address] [place], [place] [id number]  [address] [phone] Priyaklaus Sniders [phone]  [address] [place] [id number]  Phone: [phone] Fax: [phone]      Notes:    Factors facilitating achievement of predicted outcomes: Caregiver support, Has needed Durable Medical Equipment at home, and Home is wheelchair accessible    Barriers to discharge: Confusion, Limited safety awareness, Limited insight into deficits, Decreased endurance, Upper extremity weakness, and Lower extremity weakness    Additional Case Management Notes: pt expected to return to [facility]    The Plan for Transition of Care is related to the following treatment goals of Epididymitis [N45.1]  Pyocele [N43.1]    The Patient and/or Patient Representative Agree with the Discharge Plan?   Pt confused    Michelle Cruz RN  Case Management Department

## 2023-02-24 NOTE — PROGRESS NOTES
rounding on PCU    Assessment: Patient is sitting up in bed with a barely touched lunch tray. He appears confused. Intervention: Engaged in conversation.  prayed with him and blessed him. Patient expressed appreciation for visit and offer of continued prayer. Plan: Chaplains are available on site or on call 24/7 for spiritual and emotional support.

## 2023-02-25 LAB
ABSOLUTE EOS #: 0.17 K/UL (ref 0–0.44)
ABSOLUTE IMMATURE GRANULOCYTE: <0.03 K/UL (ref 0–0.3)
ABSOLUTE LYMPH #: 0.93 K/UL (ref 1.1–3.7)
ABSOLUTE MONO #: 0.32 K/UL (ref 0.1–1.2)
ANION GAP SERPL CALCULATED.3IONS-SCNC: 9 MMOL/L (ref 9–17)
BASOPHILS # BLD: 1 % (ref 0–2)
BASOPHILS ABSOLUTE: 0.04 K/UL (ref 0–0.2)
BUN SERPL-MCNC: 15 MG/DL (ref 8–23)
BUN/CREAT BLD: 18 (ref 9–20)
CALCIUM SERPL-MCNC: 8.5 MG/DL (ref 8.6–10.4)
CHLORIDE SERPL-SCNC: 108 MMOL/L (ref 98–107)
CO2 SERPL-SCNC: 24 MMOL/L (ref 20–31)
CREAT SERPL-MCNC: 0.85 MG/DL (ref 0.7–1.2)
EKG ATRIAL RATE: 80 BPM
EKG P AXIS: 72 DEGREES
EKG P-R INTERVAL: 292 MS
EKG Q-T INTERVAL: 388 MS
EKG QRS DURATION: 116 MS
EKG QTC CALCULATION (BAZETT): 447 MS
EKG R AXIS: -65 DEGREES
EKG T AXIS: 81 DEGREES
EKG VENTRICULAR RATE: 80 BPM
EOSINOPHILS RELATIVE PERCENT: 3 % (ref 1–4)
GFR SERPL CREATININE-BSD FRML MDRD: >60 ML/MIN/1.73M2
GLUCOSE SERPL-MCNC: 89 MG/DL (ref 70–99)
HCT VFR BLD AUTO: 36.4 % (ref 40.7–50.3)
HGB BLD-MCNC: 12 G/DL (ref 13–17)
IMMATURE GRANULOCYTES: 0 %
LYMPHOCYTES # BLD: 17 % (ref 24–43)
MCH RBC QN AUTO: 29.8 PG (ref 25.2–33.5)
MCHC RBC AUTO-ENTMCNC: 33 G/DL (ref 25.2–33.5)
MCV RBC AUTO: 90.3 FL (ref 82.6–102.9)
MICROORGANISM SPEC CULT: ABNORMAL
MONOCYTES # BLD: 6 % (ref 3–12)
NRBC AUTOMATED: 0 PER 100 WBC
PDW BLD-RTO: 13.9 % (ref 11.8–14.4)
PLATELET # BLD AUTO: 199 K/UL (ref 138–453)
PMV BLD AUTO: 10.6 FL (ref 8.1–13.5)
POTASSIUM SERPL-SCNC: 4.3 MMOL/L (ref 3.7–5.3)
RBC # BLD: 4.03 M/UL (ref 4.21–5.77)
SEG NEUTROPHILS: 73 % (ref 36–65)
SEGMENTED NEUTROPHILS ABSOLUTE COUNT: 4.09 K/UL (ref 1.5–8.1)
SODIUM SERPL-SCNC: 141 MMOL/L (ref 135–144)
SPECIMEN DESCRIPTION: ABNORMAL
WBC # BLD AUTO: 5.6 K/UL (ref 3.5–11.3)

## 2023-02-25 PROCEDURE — 36415 COLL VENOUS BLD VENIPUNCTURE: CPT

## 2023-02-25 PROCEDURE — 2060000000 HC ICU INTERMEDIATE R&B

## 2023-02-25 PROCEDURE — 80048 BASIC METABOLIC PNL TOTAL CA: CPT

## 2023-02-25 PROCEDURE — 94760 N-INVAS EAR/PLS OXIMETRY 1: CPT

## 2023-02-25 PROCEDURE — 85025 COMPLETE CBC W/AUTO DIFF WBC: CPT

## 2023-02-25 PROCEDURE — 2500000003 HC RX 250 WO HCPCS

## 2023-02-25 PROCEDURE — 6370000000 HC RX 637 (ALT 250 FOR IP): Performed by: FAMILY MEDICINE

## 2023-02-25 PROCEDURE — 2580000003 HC RX 258

## 2023-02-25 PROCEDURE — 2580000003 HC RX 258: Performed by: INTERNAL MEDICINE

## 2023-02-25 PROCEDURE — 99232 SBSQ HOSP IP/OBS MODERATE 35: CPT | Performed by: FAMILY MEDICINE

## 2023-02-25 PROCEDURE — 6370000000 HC RX 637 (ALT 250 FOR IP): Performed by: INTERNAL MEDICINE

## 2023-02-25 RX ORDER — LEVOFLOXACIN 250 MG/1
750 TABLET ORAL EVERY OTHER DAY
Status: DISCONTINUED | OUTPATIENT
Start: 2023-02-25 | End: 2023-02-27 | Stop reason: HOSPADM

## 2023-02-25 RX ADMIN — SODIUM CHLORIDE 25 ML: 9 INJECTION, SOLUTION INTRAVENOUS at 21:31

## 2023-02-25 RX ADMIN — TAMSULOSIN HYDROCHLORIDE 0.4 MG: 0.4 CAPSULE ORAL at 20:16

## 2023-02-25 RX ADMIN — FLUTICASONE PROPIONATE 1 SPRAY: 50 SPRAY, METERED NASAL at 10:25

## 2023-02-25 RX ADMIN — METOPROLOL TARTRATE 12.5 MG: 25 TABLET, FILM COATED ORAL at 10:25

## 2023-02-25 RX ADMIN — ALPRAZOLAM 0.12 MG: 0.25 TABLET ORAL at 10:24

## 2023-02-25 RX ADMIN — METOPROLOL TARTRATE 12.5 MG: 25 TABLET, FILM COATED ORAL at 20:16

## 2023-02-25 RX ADMIN — DOXYCYCLINE 100 MG: 100 INJECTION, POWDER, LYOPHILIZED, FOR SOLUTION INTRAVENOUS at 12:50

## 2023-02-25 RX ADMIN — ISOSORBIDE MONONITRATE 30 MG: 30 TABLET, EXTENDED RELEASE ORAL at 10:24

## 2023-02-25 RX ADMIN — LEVOTHYROXINE SODIUM 88 MCG: 0.09 TABLET ORAL at 05:29

## 2023-02-25 RX ADMIN — LEVOFLOXACIN 750 MG: 250 TABLET, FILM COATED ORAL at 16:52

## 2023-02-25 RX ADMIN — TERBINAFINE HYDROCHLORIDE: 1 CREAM TOPICAL at 20:21

## 2023-02-25 RX ADMIN — LISINOPRIL 40 MG: 20 TABLET ORAL at 20:16

## 2023-02-25 RX ADMIN — RIVAROXABAN 20 MG: 10 TABLET, FILM COATED ORAL at 16:52

## 2023-02-25 RX ADMIN — DOXYCYCLINE 100 MG: 100 INJECTION, POWDER, LYOPHILIZED, FOR SOLUTION INTRAVENOUS at 21:32

## 2023-02-25 RX ADMIN — POTASSIUM CHLORIDE 20 MEQ: 1500 TABLET, EXTENDED RELEASE ORAL at 20:16

## 2023-02-25 RX ADMIN — TERBINAFINE HYDROCHLORIDE: 1 CREAM TOPICAL at 12:51

## 2023-02-25 RX ADMIN — SODIUM CHLORIDE, PRESERVATIVE FREE 10 ML: 5 INJECTION INTRAVENOUS at 21:31

## 2023-02-25 RX ADMIN — AMIODARONE HYDROCHLORIDE 100 MG: 200 TABLET ORAL at 10:26

## 2023-02-25 RX ADMIN — ALPRAZOLAM 0.25 MG: 0.25 TABLET ORAL at 20:16

## 2023-02-25 RX ADMIN — TAMSULOSIN HYDROCHLORIDE 0.4 MG: 0.4 CAPSULE ORAL at 10:25

## 2023-02-25 ASSESSMENT — PAIN SCALES - PAIN ASSESSMENT IN ADVANCED DEMENTIA (PAINAD)
BREATHING: 0
TOTALSCORE: 0
BODYLANGUAGE: 0
CONSOLABILITY: 0
BODYLANGUAGE: 0
BREATHING: 0
NEGVOCALIZATION: 0
NEGVOCALIZATION: 0
BREATHING: 0
FACIALEXPRESSION: 0
FACIALEXPRESSION: 0
BREATHING: 0
BODYLANGUAGE: 0
TOTALSCORE: 0
BODYLANGUAGE: 0
BREATHING: 0
NEGVOCALIZATION: 0
NEGVOCALIZATION: 0
CONSOLABILITY: 0
CONSOLABILITY: 0
NEGVOCALIZATION: 0
TOTALSCORE: 0
FACIALEXPRESSION: 0
TOTALSCORE: 0
FACIALEXPRESSION: 0
NEGVOCALIZATION: 0
FACIALEXPRESSION: 0
FACIALEXPRESSION: 0
BREATHING: 0
BREATHING: 0
BODYLANGUAGE: 0
BODYLANGUAGE: 0
FACIALEXPRESSION: 0
BREATHING: 0
NEGVOCALIZATION: 0
FACIALEXPRESSION: 0
BODYLANGUAGE: 0
BODYLANGUAGE: 0
CONSOLABILITY: 0
TOTALSCORE: 0
NEGVOCALIZATION: 0
NEGVOCALIZATION: 0
CONSOLABILITY: 0
FACIALEXPRESSION: 0
CONSOLABILITY: 0
BREATHING: 0
BODYLANGUAGE: 0
CONSOLABILITY: 0
CONSOLABILITY: 0
TOTALSCORE: 0
NEGVOCALIZATION: 0
CONSOLABILITY: 0
BODYLANGUAGE: 0
CONSOLABILITY: 0
NEGVOCALIZATION: 0
BREATHING: 0
BREATHING: 0
FACIALEXPRESSION: 0
TOTALSCORE: 0
NEGVOCALIZATION: 0
NEGVOCALIZATION: 0
BREATHING: 0
BREATHING: 0
FACIALEXPRESSION: 0
BODYLANGUAGE: 0
FACIALEXPRESSION: 0
BREATHING: 0
TOTALSCORE: 0
BODYLANGUAGE: 0
BREATHING: 0
TOTALSCORE: 0
FACIALEXPRESSION: 0
CONSOLABILITY: 0
BODYLANGUAGE: 0
FACIALEXPRESSION: 0
FACIALEXPRESSION: 0
CONSOLABILITY: 0
FACIALEXPRESSION: 0
BREATHING: 0
CONSOLABILITY: 0
BODYLANGUAGE: 0
BODYLANGUAGE: 0
NEGVOCALIZATION: 0
FACIALEXPRESSION: 0
BODYLANGUAGE: 0
TOTALSCORE: 0
TOTALSCORE: 0
BODYLANGUAGE: 0
NEGVOCALIZATION: 0
CONSOLABILITY: 0
BREATHING: 0
CONSOLABILITY: 0
NEGVOCALIZATION: 0
CONSOLABILITY: 0
NEGVOCALIZATION: 0
CONSOLABILITY: 0
TOTALSCORE: 0
CONSOLABILITY: 0
FACIALEXPRESSION: 0
NEGVOCALIZATION: 0
TOTALSCORE: 0
CONSOLABILITY: 0
FACIALEXPRESSION: 0
BODYLANGUAGE: 0
NEGVOCALIZATION: 0
BODYLANGUAGE: 0
CONSOLABILITY: 0
NEGVOCALIZATION: 0
BREATHING: 0
BREATHING: 0
FACIALEXPRESSION: 0
BREATHING: 0
TOTALSCORE: 0
BODYLANGUAGE: 0

## 2023-02-25 NOTE — PROGRESS NOTES
Direct oral anticoagulant (DOAC) - Daily Pharmacy Review  Plan:   Adjusting dose back to 20 mg daily due to improved renal function . Ophelia Brown [facility]   2/25/2023  11:36 AM    Assessment:  Indication: AFib. Concurrent anticoagulants or drug interactions?: none  Recent Labs     02/23/23  1147 02/24/23  0540 02/25/23  0545   HGB 12.0* 12.9* 12.0*   HCT 35.7* 39.0* 36.4*    173 199     Recent Labs     02/23/23  1147 02/24/23  0540 02/25/23  0545   CREATININE 1.11 1.12 0.85     Calculated CrCl (using Actual Body Weight):  64  ml/min. (For AFib on apixaban, dose change with 2 of 3: AGE >80, Weight < 60kg, sCr > 1.5, no adjustment for VTE)    ---------------------------------------------------------------------------------------------  No results for input(s): INR in the last 72 hours.     Marla Hashimoto, [place]  2/25/2023  11:37 AM

## 2023-02-25 NOTE — PROGRESS NOTES
Hospitalist Progress Note  2/25/2023 11:58 AM  Subjective:   Admit Date: 2/23/2023  PCP: Sherrie Garduno, DO    Interval History: Patient with scrotal swelling likely pyocele is on levaquin and doxycycline remains confused from dementia. Denies chest pain or SOB. ECHO with diminished EF at 45% and severediastolic dysfunction  Wbc stable  Diet: ADULT DIET; Regular  Medications:   Scheduled Meds:   doxycycline (VIBRAMYCIN) IV  100 mg IntraVENous Q12H    rivaroxaban  20 mg Oral Daily    fentanNYL  25 mcg IntraVENous Once    sodium chloride flush  10 mL IntraVENous 2 times per day    lactulose  20 g Oral TID    ALPRAZolam  0.125 mg Oral QAM    ALPRAZolam  0.25 mg Oral Nightly    amiodarone  100 mg Oral Daily    fluticasone  1 spray Each Nostril Daily    isosorbide mononitrate  30 mg Oral Daily    levothyroxine  88 mcg Oral Daily    lisinopril  40 mg Oral Daily    metoprolol tartrate  12.5 mg Oral BID    potassium chloride  20 mEq Oral Daily    atorvastatin  10 mg Oral Every Other Day    tamsulosin  0.4 mg Oral BID    levofloxacin  750 mg IntraVENous Q48H    terbinafine   Topical BID     Continuous Infusions:   sodium chloride       CBC:   Recent Labs     02/23/23  1147 02/24/23  0540 02/25/23  0545   WBC 8.1 5.8 5.6   HGB 12.0* 12.9* 12.0*    173 199     BMP:    Recent Labs     02/23/23  1147 02/24/23  0540 02/25/23  0545    139 141   K 4.3 4.3 4.3    103 108*   CO2 23 24 24   BUN 16 16 15   CREATININE 1.11 1.12 0.85   GLUCOSE 100* 82 89     Hepatic:   Recent Labs     02/23/23  1147   AST 17   ALT 12   BILITOT 0.5   ALKPHOS 109     Troponin: No results for input(s): TROPONINI in the last 72 hours. BNP: No results for input(s): BNP in the last 72 hours. Lipids: No results for input(s): CHOL, HDL in the last 72 hours. Invalid input(s): LDLCALCU  INR: No results for input(s): INR in the last 72 hours.     Objective:   Vitals: /78   Pulse 63   Temp 97 °F (36.1 °C) (Tympanic)   Resp 16  5' 11\" (1.803 m)   Wt 173 lb 1.6 oz (78.5 kg)   SpO2 95%   BMI 24.14 kg/m²   General appearance: alert and cooperative with exam  HEENT: Eye: Normal external eye, conjunctiva, lids cornea, HAROON.  Neck: no adenopathy, no carotid bruit, no JVD, supple, symmetrical, trachea midline, and thyroid not enlarged, symmetric, no tenderness/mass/nodules  Lungs: clear to auscultation bilaterally  Heart: regular rate and rhythm, S1, S2 normal, no murmur, click, rub or gallop  Abdomen: soft, non-tender; bowel sounds normal; no masses,  no organomegaly  Refused scrotal exam  Extremities: extremities normal, atraumatic, no cyanosis or edema  Neurologic: Mental status: Alert, oriented, thought content appropriate    Assessment and Plan:   Scrotal pyocele likely.  Dementia  PAF    Plan:Patient awaiting urolgy consult.continue with current antibiotics.  Patient Active Problem List:     Hyperlipidemia     Osteoarthritis     Allergic rhinitis     Diverticulosis     Anxiety     Atrial fibrillation (HCC)     Coronary artery disease involving native coronary artery of native heart     Mass of upper lobe of right lung     Chronic systolic CHF (congestive heart failure) (HCC)     Benign prostatic hyperplasia with incomplete bladder emptying     Hypothyroidism     Hypertension     Dementia associated with 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 behavioral disturbance (HCC)     Intermediate stage nonexudative age-related macular degeneration of both eyes     Combined forms of age-related cataract of right eye     Pseudophakia of left eye     H/O fall     Balance problem     Epididymitis     Scrotal abscess     Pyocele      Benjamin Franco MD, MD  Rounding Hospitalist

## 2023-02-26 LAB
ABSOLUTE EOS #: 0.13 K/UL (ref 0–0.44)
ABSOLUTE IMMATURE GRANULOCYTE: <0.03 K/UL (ref 0–0.3)
ABSOLUTE LYMPH #: 0.97 K/UL (ref 1.1–3.7)
ABSOLUTE MONO #: 0.24 K/UL (ref 0.1–1.2)
ANION GAP SERPL CALCULATED.3IONS-SCNC: 11 MMOL/L (ref 9–17)
BASOPHILS # BLD: 1 % (ref 0–2)
BASOPHILS ABSOLUTE: 0.03 K/UL (ref 0–0.2)
BUN SERPL-MCNC: 13 MG/DL (ref 8–23)
BUN/CREAT BLD: 14 (ref 9–20)
CALCIUM SERPL-MCNC: 8.9 MG/DL (ref 8.6–10.4)
CHLORIDE SERPL-SCNC: 106 MMOL/L (ref 98–107)
CO2 SERPL-SCNC: 23 MMOL/L (ref 20–31)
CREAT SERPL-MCNC: 0.92 MG/DL (ref 0.7–1.2)
EOSINOPHILS RELATIVE PERCENT: 3 % (ref 1–4)
GFR SERPL CREATININE-BSD FRML MDRD: >60 ML/MIN/1.73M2
GLUCOSE SERPL-MCNC: 104 MG/DL (ref 70–99)
HCT VFR BLD AUTO: 43.1 % (ref 40.7–50.3)
HGB BLD-MCNC: 14.1 G/DL (ref 13–17)
IMMATURE GRANULOCYTES: 0 %
LYMPHOCYTES # BLD: 20 % (ref 24–43)
MCH RBC QN AUTO: 29.4 PG (ref 25.2–33.5)
MCHC RBC AUTO-ENTMCNC: 32.7 G/DL (ref 25.2–33.5)
MCV RBC AUTO: 90 FL (ref 82.6–102.9)
MONOCYTES # BLD: 5 % (ref 3–12)
NRBC AUTOMATED: 0 PER 100 WBC
PDW BLD-RTO: 13.8 % (ref 11.8–14.4)
PLATELET # BLD AUTO: 200 K/UL (ref 138–453)
PMV BLD AUTO: 9.9 FL (ref 8.1–13.5)
POTASSIUM SERPL-SCNC: 4.3 MMOL/L (ref 3.7–5.3)
RBC # BLD: 4.79 M/UL (ref 4.21–5.77)
SEG NEUTROPHILS: 72 % (ref 36–65)
SEGMENTED NEUTROPHILS ABSOLUTE COUNT: 3.52 K/UL (ref 1.5–8.1)
SODIUM SERPL-SCNC: 140 MMOL/L (ref 135–144)
WBC # BLD AUTO: 4.9 K/UL (ref 3.5–11.3)

## 2023-02-26 PROCEDURE — 2580000003 HC RX 258: Performed by: INTERNAL MEDICINE

## 2023-02-26 PROCEDURE — 99232 SBSQ HOSP IP/OBS MODERATE 35: CPT | Performed by: FAMILY MEDICINE

## 2023-02-26 PROCEDURE — 94760 N-INVAS EAR/PLS OXIMETRY 1: CPT

## 2023-02-26 PROCEDURE — 6370000000 HC RX 637 (ALT 250 FOR IP): Performed by: INTERNAL MEDICINE

## 2023-02-26 PROCEDURE — 2580000003 HC RX 258

## 2023-02-26 PROCEDURE — 85025 COMPLETE CBC W/AUTO DIFF WBC: CPT

## 2023-02-26 PROCEDURE — 36415 COLL VENOUS BLD VENIPUNCTURE: CPT

## 2023-02-26 PROCEDURE — 80048 BASIC METABOLIC PNL TOTAL CA: CPT

## 2023-02-26 PROCEDURE — 2500000003 HC RX 250 WO HCPCS

## 2023-02-26 PROCEDURE — 2060000000 HC ICU INTERMEDIATE R&B

## 2023-02-26 RX ADMIN — SODIUM CHLORIDE 25 ML: 9 INJECTION, SOLUTION INTRAVENOUS at 21:19

## 2023-02-26 RX ADMIN — SODIUM CHLORIDE, PRESERVATIVE FREE 10 ML: 5 INJECTION INTRAVENOUS at 08:16

## 2023-02-26 RX ADMIN — METOPROLOL TARTRATE 12.5 MG: 25 TABLET, FILM COATED ORAL at 20:15

## 2023-02-26 RX ADMIN — TERBINAFINE HYDROCHLORIDE: 1 CREAM TOPICAL at 20:17

## 2023-02-26 RX ADMIN — SODIUM CHLORIDE, PRESERVATIVE FREE 10 ML: 5 INJECTION INTRAVENOUS at 21:18

## 2023-02-26 RX ADMIN — RIVAROXABAN 20 MG: 10 TABLET, FILM COATED ORAL at 17:29

## 2023-02-26 RX ADMIN — DOXYCYCLINE 100 MG: 100 INJECTION, POWDER, LYOPHILIZED, FOR SOLUTION INTRAVENOUS at 21:20

## 2023-02-26 RX ADMIN — POTASSIUM CHLORIDE 20 MEQ: 1500 TABLET, EXTENDED RELEASE ORAL at 20:15

## 2023-02-26 RX ADMIN — ISOSORBIDE MONONITRATE 30 MG: 30 TABLET, EXTENDED RELEASE ORAL at 08:09

## 2023-02-26 RX ADMIN — ALPRAZOLAM 0.25 MG: 0.25 TABLET ORAL at 20:14

## 2023-02-26 RX ADMIN — ATORVASTATIN CALCIUM 10 MG: 10 TABLET, FILM COATED ORAL at 08:08

## 2023-02-26 RX ADMIN — LEVOTHYROXINE SODIUM 88 MCG: 0.09 TABLET ORAL at 05:12

## 2023-02-26 RX ADMIN — AMIODARONE HYDROCHLORIDE 100 MG: 200 TABLET ORAL at 08:08

## 2023-02-26 RX ADMIN — METOPROLOL TARTRATE 12.5 MG: 25 TABLET, FILM COATED ORAL at 08:08

## 2023-02-26 RX ADMIN — ALPRAZOLAM 0.12 MG: 0.25 TABLET ORAL at 08:07

## 2023-02-26 RX ADMIN — TAMSULOSIN HYDROCHLORIDE 0.4 MG: 0.4 CAPSULE ORAL at 08:08

## 2023-02-26 RX ADMIN — DOXYCYCLINE 100 MG: 100 INJECTION, POWDER, LYOPHILIZED, FOR SOLUTION INTRAVENOUS at 08:14

## 2023-02-26 RX ADMIN — LISINOPRIL 40 MG: 20 TABLET ORAL at 20:15

## 2023-02-26 RX ADMIN — TAMSULOSIN HYDROCHLORIDE 0.4 MG: 0.4 CAPSULE ORAL at 20:15

## 2023-02-26 ASSESSMENT — PAIN SCALES - PAIN ASSESSMENT IN ADVANCED DEMENTIA (PAINAD)
CONSOLABILITY: 0
FACIALEXPRESSION: 0
TOTALSCORE: 0
FACIALEXPRESSION: 0
CONSOLABILITY: 0
BREATHING: 0
TOTALSCORE: 0
BREATHING: 0
TOTALSCORE: 0
BREATHING: 0
NEGVOCALIZATION: 0
BREATHING: 0
BREATHING: 0
CONSOLABILITY: 0
FACIALEXPRESSION: 0
BODYLANGUAGE: 0
BREATHING: 0
CONSOLABILITY: 0
BODYLANGUAGE: 0
BREATHING: 0
TOTALSCORE: 0
FACIALEXPRESSION: 0
BODYLANGUAGE: 0
NEGVOCALIZATION: 0
CONSOLABILITY: 0
BREATHING: 0
FACIALEXPRESSION: 0
BODYLANGUAGE: 0
FACIALEXPRESSION: 0
NEGVOCALIZATION: 0
BODYLANGUAGE: 0
NEGVOCALIZATION: 0
FACIALEXPRESSION: 0
TOTALSCORE: 0
NEGVOCALIZATION: 0
TOTALSCORE: 0
CONSOLABILITY: 0
BODYLANGUAGE: 0
NEGVOCALIZATION: 0
CONSOLABILITY: 0
NEGVOCALIZATION: 0
BODYLANGUAGE: 0
TOTALSCORE: 0
BODYLANGUAGE: 0
NEGVOCALIZATION: 0
BODYLANGUAGE: 0
TOTALSCORE: 0
CONSOLABILITY: 0
BODYLANGUAGE: 0
CONSOLABILITY: 0
BODYLANGUAGE: 0
CONSOLABILITY: 0
TOTALSCORE: 0
CONSOLABILITY: 0
FACIALEXPRESSION: 0
NEGVOCALIZATION: 0
FACIALEXPRESSION: 0
CONSOLABILITY: 0
BREATHING: 0
FACIALEXPRESSION: 0
BREATHING: 0
BREATHING: 0
TOTALSCORE: 0
BODYLANGUAGE: 0
FACIALEXPRESSION: 0
BREATHING: 0
NEGVOCALIZATION: 0
FACIALEXPRESSION: 0
NEGVOCALIZATION: 0
FACIALEXPRESSION: 0
BODYLANGUAGE: 0
NEGVOCALIZATION: 0
CONSOLABILITY: 0
NEGVOCALIZATION: 0
BREATHING: 0
TOTALSCORE: 0

## 2023-02-26 ASSESSMENT — PAIN SCALES - GENERAL: PAINLEVEL_OUTOF10: 0

## 2023-02-26 NOTE — PLAN OF CARE
Problem: Discharge Planning  Goal: Discharge to home or other facility with appropriate resources  Outcome: Progressing  Flowsheets (Taken 2/25/2023 2012 by Nathaniel Goodpasture, RN)  Discharge to home or other facility with appropriate resources:   Identify barriers to discharge with patient and caregiver   Arrange for needed discharge resources and transportation as appropriate     Problem: Pain  Goal: Verbalizes/displays adequate comfort level or baseline comfort level  Outcome: Progressing     Problem: Confusion  Goal: Confusion, delirium, dementia, or psychosis is improved or at baseline  Description: INTERVENTIONS:  1. Assess for possible contributors to thought disturbance, including medications, impaired vision or hearing, underlying metabolic abnormalities, dehydration, psychiatric diagnoses, and notify attending LIP  2. [place] high risk fall precautions, as indicated  3. Provide frequent short contacts to provide reality reorientation, refocusing and direction  4. Decrease environmental stimuli, including noise as appropriate  5. Monitor and intervene to maintain adequate nutrition, hydration, elimination, sleep and activity  6. If unable to ensure safety without constant attention obtain sitter and review sitter guidelines with assigned personnel  7.  Initiate Psychosocial CNS and Spiritual Care consult, as indicated  Outcome: Progressing     Problem: Chronic Conditions and Co-morbidities  Goal: Patient's chronic conditions and co-morbidity symptoms are monitored and maintained or improved  Outcome: Progressing  Flowsheets (Taken 2/25/2023 2012 by Nathaniel Goodpasture, RN)  Care Plan - Patient's Chronic Conditions and Co-Morbidity Symptoms are Monitored and Maintained or Improved:   Monitor and assess patient's chronic conditions and comorbid symptoms for stability, deterioration, or improvement   Collaborate with multidisciplinary team to address chronic and comorbid conditions and prevent exacerbation or deterioration     Problem: Skin/Tissue Integrity  Goal: Absence of new skin breakdown  Description: 1. Monitor for areas of redness and/or skin breakdown  2. Assess vascular access sites hourly  3. Every 4-6 hours minimum:  Change oxygen saturation probe site  4. Every 4-6 hours:  If on nasal continuous positive airway pressure, respiratory therapy assess nares and determine need for appliance change or resting period.   Outcome: Progressing     Problem: ABCDS Injury Assessment  Goal: Absence of physical injury  Outcome: Progressing     Problem: Risk for Elopement  Goal: Patient will not exit the unit/facility without proper excort  Outcome: Progressing  Flowsheets (Taken 2/25/2023 2200 by Elly Mason RN)  Nursing Interventions for Elopement Risk:   Assist with personal care needs such as toileting, eating, dressing, as needed to reduce the risk of wandering   Make sure patient has all necessary personal care items   Place patient in room far away from exits and stairways   Reduce environmental triggers   Shoes and clothing collected and placed in gown attire     Problem: Neurosensory - Adult  Goal: Achieves stable or improved neurological status  Outcome: Progressing     Problem: Respiratory - Adult  Goal: Achieves optimal ventilation and oxygenation  Outcome: Progressing     Problem: Cardiovascular - Adult  Goal: Maintains optimal cardiac output and hemodynamic stability  Outcome: Progressing  Goal: Absence of cardiac dysrhythmias or at baseline  Outcome: Progressing

## 2023-02-26 NOTE — PROGRESS NOTES
Hospitalist Progress Note  2/26/2023 9:58 AM  Subjective:   Admit Date: 2/23/2023  PCP: Asia Iqbal DO    Interval History: Patient with scrotal swelling likely pyocele is on levaquin and doxycycline remains confused from dementia. Denies chest pain or SOB. ECHO with diminished EF at 45% and severe diastolic dysfunction  Wbc remains stable  Diet: ADULT DIET; Regular  Medications:   Scheduled Meds:   doxycycline (VIBRAMYCIN) IV  100 mg IntraVENous Q12H    rivaroxaban  20 mg Oral Daily    levoFLOXacin  750 mg Oral Every Other Day    fentanNYL  25 mcg IntraVENous Once    sodium chloride flush  10 mL IntraVENous 2 times per day    lactulose  20 g Oral TID    ALPRAZolam  0.125 mg Oral QAM    ALPRAZolam  0.25 mg Oral Nightly    amiodarone  100 mg Oral Daily    fluticasone  1 spray Each Nostril Daily    isosorbide mononitrate  30 mg Oral Daily    levothyroxine  88 mcg Oral Daily    lisinopril  40 mg Oral Daily    metoprolol tartrate  12.5 mg Oral BID    potassium chloride  20 mEq Oral Daily    atorvastatin  10 mg Oral Every Other Day    tamsulosin  0.4 mg Oral BID    terbinafine   Topical BID     Continuous Infusions:   sodium chloride Stopped (02/25/23 2247)     CBC:   Recent Labs     02/24/23  0540 02/25/23  0545 02/26/23  0531   WBC 5.8 5.6 4.9   HGB 12.9* 12.0* 14.1    199 200     BMP:    Recent Labs     02/24/23  0540 02/25/23  0545 02/26/23  0531    141 140   K 4.3 4.3 4.3    108* 106   CO2 24 24 23   BUN 16 15 13   CREATININE 1.12 0.85 0.92   GLUCOSE 82 89 104*     Hepatic:   Recent Labs     02/23/23  1147   AST 17   ALT 12   BILITOT 0.5   ALKPHOS 109     Troponin: No results for input(s): TROPONINI in the last 72 hours. BNP: No results for input(s): BNP in the last 72 hours. Lipids: No results for input(s): CHOL, HDL in the last 72 hours. Invalid input(s): LDLCALCU  INR: No results for input(s): INR in the last 72 hours.     Objective:   Vitals: /75   Pulse 86   Temp 97.2 °F (36.2 °C) (Tympanic)   Resp 18   Ht 5' 11\" (1.803 m)   Wt 168 lb 3.2 oz (76.3 kg)   SpO2 97%   BMI 23.46 kg/m²   General appearance: alert and cooperative with exam  HEENT: Eye: Normal external eye, conjunctiva, lids cornea, HAROON. Neck: no adenopathy, no carotid bruit, no JVD, supple, symmetrical, trachea midline, and thyroid not enlarged, symmetric, no tenderness/mass/nodules  Lungs: clear to auscultation bilaterally  Heart: regular rate and rhythm, S1, S2 normal, no murmur, click, rub or gallop  Abdomen: soft, non-tender; bowel sounds normal; no masses,  no organomegaly  External genitalia; Has scrotal swelling with tenderness  Extremities: extremities normal, atraumatic, no cyanosis or edema  Neurologic: Mental status: Alert, oriented, thought content appropriate    Assessment and Plan:   Scrotal swelling /pyocele likely. Dementia  PAF    Plan:Patient awaiting urology consult. continue with current antibiotics.   Patient Active Problem List:     Hyperlipidemia     Osteoarthritis     Allergic rhinitis     Diverticulosis     Anxiety     Atrial fibrillation ([address]     Coronary artery disease involving native coronary artery of native heart     Mass of upper lobe of right lung     Chronic systolic CHF (congestive heart failure) (HCC)     Benign prostatic hyperplasia with incomplete bladder emptying     Hypothyroidism     Hypertension     Dementia associated with 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 behavioral disturbance (HCC)     Intermediate stage nonexudative age-related macular degeneration of both eyes     Combined forms of age-related cataract of right eye     Pseudophakia of left eye     H/O fall     Balance problem     Epididymitis     Scrotal abscess     Pyocele      Temo Moreno MD, MD  Rounding Hospitalist

## 2023-02-27 VITALS
HEART RATE: 65 BPM | SYSTOLIC BLOOD PRESSURE: 121 MMHG | RESPIRATION RATE: 18 BRPM | TEMPERATURE: 96.5 F | BODY MASS INDEX: 23.97 KG/M2 | HEIGHT: 71 IN | WEIGHT: 171.2 LBS | OXYGEN SATURATION: 97 % | DIASTOLIC BLOOD PRESSURE: 71 MMHG

## 2023-02-27 DIAGNOSIS — N50.82 SCROTAL PAIN: Primary | ICD-10-CM

## 2023-02-27 LAB
ABSOLUTE EOS #: 0.25 K/UL (ref 0–0.44)
ABSOLUTE IMMATURE GRANULOCYTE: 0.07 K/UL (ref 0–0.3)
ABSOLUTE LYMPH #: 1.14 K/UL (ref 1.1–3.7)
ABSOLUTE MONO #: 0.38 K/UL (ref 0.1–1.2)
ANION GAP SERPL CALCULATED.3IONS-SCNC: 8 MMOL/L (ref 9–17)
BASOPHILS # BLD: 1 % (ref 0–2)
BASOPHILS ABSOLUTE: 0.03 K/UL (ref 0–0.2)
BUN SERPL-MCNC: 14 MG/DL (ref 8–23)
BUN/CREAT BLD: 14 (ref 9–20)
CALCIUM SERPL-MCNC: 8.4 MG/DL (ref 8.6–10.4)
CHLORIDE SERPL-SCNC: 107 MMOL/L (ref 98–107)
CO2 SERPL-SCNC: 26 MMOL/L (ref 20–31)
CREAT SERPL-MCNC: 0.97 MG/DL (ref 0.7–1.2)
EOSINOPHILS RELATIVE PERCENT: 4 % (ref 1–4)
GFR SERPL CREATININE-BSD FRML MDRD: >60 ML/MIN/1.73M2
GLUCOSE SERPL-MCNC: 104 MG/DL (ref 70–99)
HCT VFR BLD AUTO: 35.6 % (ref 40.7–50.3)
HGB BLD-MCNC: 11.7 G/DL (ref 13–17)
IMMATURE GRANULOCYTES: 1 %
LYMPHOCYTES # BLD: 18 % (ref 24–43)
MCH RBC QN AUTO: 29.8 PG (ref 25.2–33.5)
MCHC RBC AUTO-ENTMCNC: 32.9 G/DL (ref 25.2–33.5)
MCV RBC AUTO: 90.6 FL (ref 82.6–102.9)
MONOCYTES # BLD: 6 % (ref 3–12)
NRBC AUTOMATED: 0 PER 100 WBC
PDW BLD-RTO: 13.8 % (ref 11.8–14.4)
PLATELET # BLD AUTO: 197 K/UL (ref 138–453)
PMV BLD AUTO: 10.3 FL (ref 8.1–13.5)
POTASSIUM SERPL-SCNC: 4.1 MMOL/L (ref 3.7–5.3)
RBC # BLD: 3.93 M/UL (ref 4.21–5.77)
SEG NEUTROPHILS: 71 % (ref 36–65)
SEGMENTED NEUTROPHILS ABSOLUTE COUNT: 4.59 K/UL (ref 1.5–8.1)
SODIUM SERPL-SCNC: 141 MMOL/L (ref 135–144)
WBC # BLD AUTO: 6.5 K/UL (ref 3.5–11.3)

## 2023-02-27 PROCEDURE — 80048 BASIC METABOLIC PNL TOTAL CA: CPT

## 2023-02-27 PROCEDURE — 2580000003 HC RX 258: Performed by: INTERNAL MEDICINE

## 2023-02-27 PROCEDURE — 99238 HOSP IP/OBS DSCHRG MGMT 30/<: CPT | Performed by: INTERNAL MEDICINE

## 2023-02-27 PROCEDURE — 2500000003 HC RX 250 WO HCPCS

## 2023-02-27 PROCEDURE — 36415 COLL VENOUS BLD VENIPUNCTURE: CPT

## 2023-02-27 PROCEDURE — 6370000000 HC RX 637 (ALT 250 FOR IP): Performed by: INTERNAL MEDICINE

## 2023-02-27 PROCEDURE — 6370000000 HC RX 637 (ALT 250 FOR IP): Performed by: FAMILY MEDICINE

## 2023-02-27 PROCEDURE — 2580000003 HC RX 258

## 2023-02-27 PROCEDURE — 85025 COMPLETE CBC W/AUTO DIFF WBC: CPT

## 2023-02-27 RX ORDER — GREEN TEA/HOODIA GORDONII 315-12.5MG
1 CAPSULE ORAL 3 TIMES DAILY
Qty: 30 TABLET | Refills: 0 | COMMUNITY
Start: 2023-02-27 | End: 2023-03-09

## 2023-02-27 RX ORDER — ALPRAZOLAM 0.25 MG/1
TABLET ORAL
Qty: 6 TABLET | Refills: 0 | Status: SHIPPED | OUTPATIENT
Start: 2023-02-27 | End: 2023-03-02

## 2023-02-27 RX ORDER — LACTULOSE 10 G/15ML
20 SOLUTION ORAL 3 TIMES DAILY PRN
Qty: 1 EACH | Refills: 0 | Status: SHIPPED | OUTPATIENT
Start: 2023-02-27

## 2023-02-27 RX ORDER — LEVOFLOXACIN 750 MG/1
750 TABLET ORAL EVERY OTHER DAY
Qty: 4 TABLET | Refills: 0 | Status: SHIPPED | OUTPATIENT
Start: 2023-03-01 | End: 2023-03-08

## 2023-02-27 RX ORDER — DOXYCYCLINE HYCLATE 100 MG
100 TABLET ORAL 2 TIMES DAILY
Qty: 12 TABLET | Refills: 0 | Status: SHIPPED | OUTPATIENT
Start: 2023-02-27 | End: 2023-03-05

## 2023-02-27 RX ADMIN — METOPROLOL TARTRATE 12.5 MG: 25 TABLET, FILM COATED ORAL at 09:33

## 2023-02-27 RX ADMIN — TAMSULOSIN HYDROCHLORIDE 0.4 MG: 0.4 CAPSULE ORAL at 09:35

## 2023-02-27 RX ADMIN — LEVOFLOXACIN 750 MG: 250 TABLET, FILM COATED ORAL at 09:35

## 2023-02-27 RX ADMIN — ISOSORBIDE MONONITRATE 30 MG: 30 TABLET, EXTENDED RELEASE ORAL at 09:34

## 2023-02-27 RX ADMIN — LEVOTHYROXINE SODIUM 88 MCG: 0.09 TABLET ORAL at 06:17

## 2023-02-27 RX ADMIN — AMIODARONE HYDROCHLORIDE 100 MG: 200 TABLET ORAL at 09:34

## 2023-02-27 RX ADMIN — DOXYCYCLINE 100 MG: 100 INJECTION, POWDER, LYOPHILIZED, FOR SOLUTION INTRAVENOUS at 09:45

## 2023-02-27 RX ADMIN — ALPRAZOLAM 0.12 MG: 0.25 TABLET ORAL at 09:33

## 2023-02-27 RX ADMIN — SODIUM CHLORIDE, PRESERVATIVE FREE 10 ML: 5 INJECTION INTRAVENOUS at 09:35

## 2023-02-27 RX ADMIN — TERBINAFINE HYDROCHLORIDE: 1 CREAM TOPICAL at 09:38

## 2023-02-27 ASSESSMENT — PAIN SCALES - PAIN ASSESSMENT IN ADVANCED DEMENTIA (PAINAD)
BREATHING: 0
CONSOLABILITY: 0
NEGVOCALIZATION: 0
TOTALSCORE: 0
BODYLANGUAGE: 0
FACIALEXPRESSION: 0
BREATHING: 0
BODYLANGUAGE: 0
BODYLANGUAGE: 0
NEGVOCALIZATION: 0
CONSOLABILITY: 0
NEGVOCALIZATION: 0
CONSOLABILITY: 0
FACIALEXPRESSION: 0
FACIALEXPRESSION: 0
BODYLANGUAGE: 0
TOTALSCORE: 0
BREATHING: 0
TOTALSCORE: 0
CONSOLABILITY: 0
BODYLANGUAGE: 0
BREATHING: 0
BODYLANGUAGE: 0
BREATHING: 0
FACIALEXPRESSION: 0
BREATHING: 0
TOTALSCORE: 0
CONSOLABILITY: 0
BREATHING: 0
FACIALEXPRESSION: 0
BREATHING: 0
NEGVOCALIZATION: 0
CONSOLABILITY: 0
CONSOLABILITY: 0
BODYLANGUAGE: 0
FACIALEXPRESSION: 0
BREATHING: 0
BODYLANGUAGE: 0
FACIALEXPRESSION: 0
BREATHING: 0
TOTALSCORE: 0
FACIALEXPRESSION: 0
FACIALEXPRESSION: 0
BREATHING: 0
TOTALSCORE: 0
CONSOLABILITY: 0
TOTALSCORE: 0
CONSOLABILITY: 0
NEGVOCALIZATION: 0
BODYLANGUAGE: 0
FACIALEXPRESSION: 0
FACIALEXPRESSION: 0
NEGVOCALIZATION: 0
NEGVOCALIZATION: 0
TOTALSCORE: 0
CONSOLABILITY: 0
TOTALSCORE: 0
CONSOLABILITY: 0
BREATHING: 0
NEGVOCALIZATION: 0
TOTALSCORE: 0
NEGVOCALIZATION: 0
CONSOLABILITY: 0
BODYLANGUAGE: 0
NEGVOCALIZATION: 0
NEGVOCALIZATION: 0
BODYLANGUAGE: 0
FACIALEXPRESSION: 0
NEGVOCALIZATION: 0
BODYLANGUAGE: 0

## 2023-02-27 NOTE — DISCHARGE INSTRUCTIONS
Follow up with Dr. Danielle Mackenzie in one week    Follow up with urology in one week    Elevate scrotum

## 2023-02-27 NOTE — CONSULTS
Kal Mistry MD.  Urology Consultation    Patient:  Saira Bolton  MRN: [id number]  YOB: 1934    CHIEF COMPLAINT:  groin pain    HISTORY OF PRESENT ILLNESS:     The patient is a 80 y.o. male who presents with groin pain. Urology consulted for poss pyeocele    Onset was days ago  Overall, the problem(s) are better. Severity is described as mild-moderate. Pt admitted for iv abx  Has complex hydrocele vs pyocele  Poor historian. Lives in [address] [place] [address]  Pt of dr Marianna Barber      Patient's old records reviewed. Pertinent patient notes and patient chart reviewed and summarized above.     Past Medical History:    Past Medical History:   Diagnosis Date    Acute bronchitis     by history    MARITA (acute kidney injury) ([address] 3/13/2018    Allergic rhinitis     Anxiety     Bradycardia 8/19/2018    Cataract, right     Choroidal nevus of right eye     Chronic systolic CHF (congestive heart failure) ([address] 3/13/2018    Coronary artery disease involving native coronary artery of native heart 10/20/2016    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 LIMA TO LAD, SVG TO DIAGONAL2    Dementia associated with 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 behavioral disturbance ([address] 9/21/2018    Dermatophytosis of nail 1/10/2014    Diverticulosis     Elevated PSA     Hemorrhoids     History of measles childhood    History of mumps childhood    Hyperlipidemia     Hypertension     Hypothyroidism 9/4/2018    Mass of upper lobe of right lung 2/21/2018    Occult blood positive stool     refuses colonoscopy    Osteoarthritis     Overweight     Paroxysmal atrial fibrillation ([address] 11/7/2013    Pneumonia due to organism     Pseudophakia, left eye     PSVT (paroxysmal supraventricular tachycardia) ([address]     Recurrent UTI 3/13/2018    Retinal detachment     left, history of     Seborrheic dermatitis     Sepsis due to urinary tract infection ([address] 8/15/2018    Urinary tract infection without hematuria 3/28/2018       Past Surgical History:    Past Surgical History:   Procedure Laterality Date    ABSCESS DRAINAGE  0342-2359    multiple    APPENDECTOMY  1940 and 1955    CATARACT REMOVAL Left 05/07/2013    COLONOSCOPY  06/14/2021    Dr Vanessa Galdamez Left 12/22/2011    detached retina    SKIN TAG REMOVAL  11/01/1999    TONSILLECTOMY         Medications:    Scheduled Meds:   doxycycline (VIBRAMYCIN) IV  100 mg IntraVENous Q12H    rivaroxaban  20 mg Oral Daily    levoFLOXacin  750 mg Oral Every Other Day    fentanNYL  25 mcg IntraVENous Once    sodium chloride flush  10 mL IntraVENous 2 times per day    lactulose  20 g Oral TID    ALPRAZolam  0.125 mg Oral QAM    ALPRAZolam  0.25 mg Oral Nightly    amiodarone  100 mg Oral Daily    fluticasone  1 spray Each Nostril Daily    isosorbide mononitrate  30 mg Oral Daily    levothyroxine  88 mcg Oral Daily    lisinopril  40 mg Oral Daily    metoprolol tartrate  12.5 mg Oral BID    potassium chloride  20 mEq Oral Daily    atorvastatin  10 mg Oral Every Other Day    tamsulosin  0.4 mg Oral BID    terbinafine   Topical BID     Continuous Infusions:   sodium chloride Stopped (02/26/23 2237)     PRN Meds:.albuterol, sodium chloride flush, sodium chloride, ondansetron, acetaminophen, sodium chloride nebulizer, polyvinyl alcohol-povidone    Allergies:  Pcn [penicillins], Sulfa antibiotics, and Cefdinir    Social History:    Social History     Socioeconomic History    Marital status: Single     Spouse name: Not on file    Number of children: Not on file    Years of education: Not on file    Highest education level: Not on file   Occupational History    Not on file   Tobacco Use    Smoking status: Never    Smokeless tobacco: Never    Tobacco comments:     never smoked syant rrt,2/27/2018   Vaping Use    Vaping Use: Never used   Substance and Sexual Activity    Alcohol use: No     Alcohol/week: 0.0 standard drinks    Drug use: No    Sexual activity: Not on file   Other Topics Concern    Not on file   Social History Narrative    Not on file     Social Determinants of Health     Financial Resource Strain: Low Risk     Difficulty of Paying Living Expenses: Not hard at all   Food Insecurity: No Food Insecurity    Worried About Running Out of Food in the Last Year: Never true    Ran Out of Food in the Last Year: Never true   Transportation Needs: Not on file   Physical Activity: Not on file   Stress: Not on file   Social Connections: Not on file   Intimate Partner Violence: Not on file   Housing Stability: Not on file       Family History:    Family History   Problem Relation Age of Onset    Diabetes Mother     Osteoporosis Mother     Other Father         complications of prostate surgery (bleeding)    Stroke Sister     Other Sister         respiratory failure       REVIEW OF SYSTEMS:  Constitutional: negative  Eyes: negative  Respiratory: negative  Cardiovascular: negative  Gastrointestinal: negative  Genitourinary: see HPI  Musculoskeletal: negative  Skin: negative   Neurological: negative  Hematological/Lymphatic: negative  Psychological: negative      Physical Exam:      This a 80 y.o. female   Patient Vitals for the past 24 hrs:   BP Temp Temp src Pulse Resp SpO2 Height Weight   02/27/23 1100 121/71 (!) 96.5 °F (35.8 °C) Tympanic 65 18 97 % -- --   02/27/23 0933 136/61 -- -- 68 -- -- -- --   02/27/23 0831 -- -- -- -- -- -- 5' 11\" (1.803 m) --   02/27/23 0610 (!) 144/76 97.6 °F (36.4 °C) Tympanic 70 18 96 % -- 171 lb 3.2 oz (77.7 kg)   02/27/23 0250 138/77 97.6 °F (36.4 °C) Tympanic 73 18 96 % -- --   02/26/23 2231 115/68 97.7 °F (36.5 °C) Tympanic 75 20 97 % -- --   02/26/23 2015 -- -- -- 80 -- -- -- --   02/26/23 2014 117/66 -- -- -- -- -- -- --   02/26/23 1820 128/62 97.6 °F (36.4 °C) Tympanic 92 -- 96 % -- --   02/26/23 1615 132/71 97 °F (36.1 °C) Tympanic 82 -- 97 % -- --     Constitutional: Patient in no acute distress. Neuro: Alert and oriented to person, place and time.   Psych: mood and affect normal  HEENT negative  Lungs: Respiratory effort is normal  Cardiovascular: Normal peripheral pulses. Regular rate  Abdomen: Soft, non-tender, non-distended with no CVA, flank pain or hepatosplenomegaly. No hernias. Kidneys normal.  Lymphatics: No palpable lymphadenopathy. Bladder non-tender and not distended. Pelvic exam: deferred  Rectal exam not indicated  Minimal/no edema in bilateral lower extremities. LABS:   Recent Labs     02/25/23  0545 02/26/23  0531 02/27/23  0500   WBC 5.6 4.9 6.5   HGB 12.0* 14.1 11.7*   HCT 36.4* 43.1 35.6*   MCV 90.3 90.0 90.6    200 197     Recent Labs     02/25/23  0545 02/26/23  0531 02/27/23  0500    140 141   K 4.3 4.3 4.1   * 106 107   CO2 24 23 26   BUN 15 13 14   CREATININE 0.85 0.92 0.97       Additional Lab/Culture results: none    Urinalysis: No results for input(s): COLORU, PHUR, LABCAST, WBCUA, RBCUA, MUCUS, TRICHOMONAS, YEAST, BACTERIA, CLARITYU, SPECGRAV, LEUKOCYTESUR, UROBILINOGEN, BILIRUBINUR, BLOODU in the last 72 hours. Invalid input(s): Leticia Castanon     -----------------------------------------------------------------  Imaging Reviewed during this encounter:   Natalee Orellana MD independently reviewed the images and verified the radiology reports from:    CT ABDOMEN PELVIS W IV CONTRAST Additional Contrast? None    Result Date: 2/23/2023  EXAMINATION: CT OF THE ABDOMEN AND PELVIS WITH CONTRAST 2/23/2023 12:53 pm TECHNIQUE: CT of the abdomen and pelvis was performed with the administration of intravenous contrast. Multiplanar reformatted images are provided for review. Automated exposure control, iterative reconstruction, and/or weight based adjustment of the mA/kV was utilized to reduce the radiation dose to as low as reasonably achievable.  COMPARISON: 04/14/2020 HISTORY: ORDERING SYSTEM PROVIDED HISTORY: Lower abdominal pain TECHNOLOGIST PROVIDED HISTORY: Lower abdominal pain Decision Support Exception - unselect if not a suspected or confirmed emergency medical condition->Emergency Medical Condition (MA) Reason for Exam: no complaints FINDINGS: Lower Chest: Interval development of bibasilar of pulmonary fibrosis with traction bronchiectasis. No appreciable honeycombing. Organs: Cholelithiasis. Numerous scattered hypodensities in the left lobe of liver and a few on the right. Up to 11 mm in size. Most are evident on the prior study. Prior study performed without IV contrast and therefore comparison is limited. These appear to be cysts. Some are too small to characterize. Spleen, pancreas, adrenal glands and kidneys show no acute process. GI/Bowel: There is limited evaluation due to absence of oral contrast.  Small sliding hiatal hernia otherwise stomach unremarkable. No evidence for bowel obstruction. Increased stool throughout the colon suggests constipation. Large amount of stool distends the rectum. Stool impaction is not excluded. Pelvis: Enlarged prostate gland in with nodular projection into the bladder base. Urinary bladder is collapsed and not optimally assessed. Peritoneum/Retroperitoneum: Atherosclerotic disease. No lymphadenopathy. No ascites Bones/Soft Tissues: Degenerative changes on background osteopenia. 1. Interval development of quite pronounced bibasilar fibrosis and traction bronchiectasis. No honeycombing. 2. Large amount of stool throughout the colon with stool also distending the rectum. Constipation plus/minus stool impaction. 3. Stable findings including cholelithiasis and numerous hepatic hypodensities up to 11 mm likely cysts. Some too small to characterize. 4. Small sliding hiatal hernia. 5. Prostatomegaly.      US SCROTUM W LIMITED DUPLEX    Result Date: 2/23/2023  EXAMINATION: ULTRASOUND OF THE SCROTUM/TESTICLES WITH COLOR DOPPLER FLOW EVALUATION 2/23/2023 TECHNIQUE: Duplex ultrasound using B-mode/gray scaled imaging, Doppler spectral analysis and color flow Doppler was obtained of the testicles. COMPARISON: None. HISTORY: ORDERING SYSTEM PROVIDED HISTORY: Pain and swelling TECHNOLOGIST PROVIDED HISTORY: Pain and swelling Reason for Exam: pain and swelling FINDINGS: Measurements: Right Testicle: 3.9 x 2.7 x 1.8 cm Left Testicle: 3.3 x 3.1 x 2.5 cm Right: Grey Scale: The right testicle demonstrates normal homogeneous echotexture without focal lesion. No evidence of testicular microlithiasis. Doppler Evaluation: There is normal arterial and venous Doppler flow within the testicle. Scrotal Sac: There is a very small right hydrocele. Epididymis: There is a 4 mm right epididymal cyst. Left: Grey Scale: The left testicle demonstrates normal homogeneous echotexture without focal lesion. No evidence of testicular microlithiasis. Doppler Evaluation: There is normal arterial and venous Doppler flow within the testicle. Scrotal Sac: There is a large complicated left hydrocele containing internal echogenic material and multiple septations, possibly a pyocele. Epididymis: The left epididymis appears enlarged and heterogeneous in echotexture, and is mildly hyperemic. There is a 10 x 7 x 7 mm left epididymal cyst.     1. Unremarkable sonographic appearance of the bilateral testicles, without evidence of torsion or mass. 2. Findings suggestive of asymmetric left epididymitis. 3. Bilateral epididymal cysts. 4. Large complicated left scrotal fluid collection, possibly a pyocele.          Assessment and Plan   Impression:    Patient Active Problem List   Diagnosis    Hyperlipidemia    Osteoarthritis    Allergic rhinitis    Diverticulosis    Anxiety    Atrial fibrillation ([facility]    Coronary artery disease involving native coronary artery of native heart    Mass of upper lobe of right lung    Chronic systolic CHF (congestive heart failure) (H[facility])    Benign prostatic hyperplasia with incomplete bladder emptying    Hypothyroidism    Hypertension    Dementia associated with 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 behavioral disturbance (HCC)    Intermediate stage nonexudative age-related macular degeneration of both eyes    Combined forms of age-related cataract of right eye    Pseudophakia of left eye    H/O fall    Balance problem    Epididymitis    Scrotal abscess    Pyocele       Plan:     Does not look or present like pyocele  Recommend discharge with PO abx for 14 days  Repeat scrotal u/s and follow up with mostafa in office in 4 weeks      Joselin Jose MD  3:45 PM 2/27/2023

## 2023-02-27 NOTE — PROGRESS NOTES
Comprehensive Nutrition Assessment    Type and Reason for Visit:  Initial, Positive Nutrition Screen    Nutrition Recommendations/Plan:   Encourage oral intakes at all meals     Malnutrition Assessment:  Malnutrition Status: At risk for malnutrition (Comment) (02/27/23 2288)    Context:  Acute Illness     Findings of the 6 clinical characteristics of malnutrition:  Energy Intake:  Mild decrease in energy intake (Comment) (per admission data)  Weight Loss:  No significant weight loss     Body Fat Loss:  Unable to assess     Muscle Mass Loss:  Unable to assess    Fluid Accumulation:  Mild Genitals   Strength:  Not Performed    Nutrition Assessment:    No nutrition diagnosis at this time. Positive nutrition screen r/t lower PO intakes and weight losses. Weights appear with only mild fluctuation from historical values and current PO recorded is very good. It is noted patient has dementia and may not be best historian. Nutrition Related Findings:    scrotal swelling. Wound Type: None       Current Nutrition Intake & Therapies:    Average Meal Intake: %  Average Supplements Intake: None Ordered  ADULT DIET; Regular    Anthropometric Measures:  Height: 5' 11\" (180.3 cm)  Ideal Body Weight (IBW): 172 lbs (78 kg)    Admission Body Weight: 173 lb 1.6 oz (78.5 kg)  Current Body Weight: 171 lb 3.2 oz (77.7 kg), 99.5 % IBW. Weight Source: Bed Scale  Current BMI (kg/m2): 23.9  Usual Body Weight: 174 lb (78.9 kg) (in January)  % Weight Change (Calculated): -1.6  Weight Adjustment For: No Adjustment                 BMI Categories: Normal Weight (BMI 18.5-24. 9)    Estimated Daily Nutrient Needs:  Energy Requirements Based On: Kcal/kg  Weight Used for Energy Requirements: Current  Energy (kcal/day): 4735-9008 (23-28)  Weight Used for Protein Requirements: Current  Protein (g/day):  (1.1-1.3)  Method Used for Fluid Requirements: 1 ml/kcal  Fluid (ml/day): 2100    Nutrition Diagnosis:   No nutrition diagnosis at this time       Lab Results   Component Value Date     02/27/2023    K 4.1 02/27/2023     02/27/2023    CO2 26 02/27/2023    BUN 14 02/27/2023    CREATININE 0.97 02/27/2023    GLUCOSE 104 (H) 02/27/2023    CALCIUM 8.4 (L) 02/27/2023    PROT 6.5 02/23/2023    LABALBU 3.3 (L) 02/23/2023    BILITOT 0.5 02/23/2023    ALKPHOS 109 02/23/2023    AST 17 02/23/2023    ALT 12 02/23/2023    LABGLOM >60 02/27/2023    GFRAA >60 10/05/2021     No results found for: LABA1C  No results found for: VITD25    Nutrition Interventions:   Food and/or Nutrient Delivery: Continue Current Diet  Nutrition Education/Counseling: Education not indicated  Coordination of Nutrition Care: Continue to monitor while inpatient  Plan of Care discussed with: no one    Goals:     Goals: Meet at least 75% of estimated needs       Nutrition Monitoring and Evaluation:   Behavioral-Environmental Outcomes:  Other (Comment) (cognition)  Food/Nutrient Intake Outcomes: Food and Nutrient Intake  Physical Signs/Symptoms Outcomes: Biochemical Data, Fluid Status or Edema, Weight    Discharge Planning:    No discharge needs at this time     Ragini Mcclendon LD  Contact: [phone] O-Z Plasty Text: The defect edges were debeveled with a #15 scalpel blade.  Given the location of the defect, shape of the defect and the proximity to free margins an O-Z plasty (double transposition flap) was deemed most appropriate.  Using a sterile surgical marker, the appropriate transposition flaps were drawn incorporating the defect and placing the expected incisions within the relaxed skin tension lines where possible.    The area thus outlined was incised deep to adipose tissue with a #15 scalpel blade.  The skin margins were undermined to an appropriate distance in all directions utilizing iris scissors.  Hemostasis was achieved with electrocautery.  The flaps were then transposed into place, one clockwise and the other counterclockwise, and anchored with interrupted buried subcutaneous sutures.

## 2023-02-27 NOTE — PROGRESS NOTES
Hospitalist Progress Note    Patient:  Luisa Greer     YOB: 1934    MRN: [id number]   Admit date: 2/23/2023     Acct: [de-identified]     PCP: DO RAN Mcneil--Interval History:    Epididymitis---complicated left hydrocele--improved--seen by Urology---cont'd antibiotics and return to [facility] inside--2.27.2023    Constipation---improved with lactulose    Dementia----severe    See note below     All other ROS negative except noted in HPI    Diet:  ADULT DIET;  Regular    Medications:  Scheduled Meds:   doxycycline (VIBRAMYCIN) IV  100 mg IntraVENous Q12H    rivaroxaban  20 mg Oral Daily    levoFLOXacin  750 mg Oral Every Other Day    fentanNYL  25 mcg IntraVENous Once    sodium chloride flush  10 mL IntraVENous 2 times per day    lactulose  20 g Oral TID    ALPRAZolam  0.125 mg Oral QAM    ALPRAZolam  0.25 mg Oral Nightly    amiodarone  100 mg Oral Daily    fluticasone  1 spray Each Nostril Daily    isosorbide mononitrate  30 mg Oral Daily    levothyroxine  88 mcg Oral Daily    lisinopril  40 mg Oral Daily    metoprolol tartrate  12.5 mg Oral BID    potassium chloride  20 mEq Oral Daily    atorvastatin  10 mg Oral Every Other Day    tamsulosin  0.4 mg Oral BID    terbinafine   Topical BID     Continuous Infusions:   sodium chloride Stopped (02/26/23 2237)     PRN Meds:albuterol, sodium chloride flush, sodium chloride, ondansetron, acetaminophen, sodium chloride nebulizer, polyvinyl alcohol-povidone    Objective:  Labs:  CBC with Differential:    Lab Results   Component Value Date/Time    WBC 6.5 02/27/2023 05:00 AM    RBC 3.93 02/27/2023 05:00 AM    HGB 11.7 02/27/2023 05:00 AM    HCT 35.6 02/27/2023 05:00 AM     02/27/2023 05:00 AM    MCV 90.6 02/27/2023 05:00 AM    MCH 29.8 02/27/2023 05:00 AM    MCHC 32.9 02/27/2023 05:00 AM    RDW 13.8 02/27/2023 05:00 AM    LYMPHOPCT 18 02/27/2023 05:00 AM    MONOPCT 6 02/27/2023 05:00 AM    BASOPCT 1 02/27/2023 05:00 AM    MONOSABS 0.38 02/27/2023 05:00 AM    LYMPHSABS 1.14 02/27/2023 05:00 AM    EOSABS 0.25 02/27/2023 05:00 AM    BASOSABS 0.03 02/27/2023 05:00 AM    DIFFTYPE NOT REPORTED 10/05/2021 08:26 AM     BMP:    Lab Results   Component Value Date/Time     02/27/2023 05:00 AM    K 4.1 02/27/2023 05:00 AM     02/27/2023 05:00 AM    CO2 26 02/27/2023 05:00 AM    BUN 14 02/27/2023 05:00 AM    LABALBU 3.3 02/23/2023 11:47 AM    CREATININE 0.97 02/27/2023 05:00 AM    CALCIUM 8.4 02/27/2023 05:00 AM    GFRAA >60 10/05/2021 08:26 AM    LABGLOM >60 02/27/2023 05:00 AM    GLUCOSE 104 02/27/2023 05:00 AM           Physical Exam:  Vitals: /61   Pulse 68   Temp 97.6 °F (36.4 °C) (Tympanic)   Resp 18   Ht 5' 11\" (1.803 m)   Wt 171 lb 3.2 oz (77.7 kg)   SpO2 96%   BMI 23.88 kg/m²   24 hour intake/output:  Intake/Output Summary (Last 24 hours) at 2/27/2023 1131  Last data filed at 2/27/2023 0017  Gross per 24 hour   Intake 1140.99 ml   Output --   Net 1140.99 ml     Last 3 weights: Wt Readings from Last 3 Encounters:   02/27/23 171 lb 3.2 oz (77.7 kg)   01/25/23 174 lb (78.9 kg)   01/20/23 169 lb 12.8 oz (77 kg)     HEENT: Normocephalic and Atraumatic  Neck: Supple, No Masses, Tenderness, Nodularity, and No Lymphadenopathy  Chest/Lungs: Distant Breath Sounds  Cardiac: Regular Rate and Rhythm  GI/Abdomen: Bowel Sounds Present and Soft, Non-tender, without Guarding or Rebound Tenderness  : Not examined   [seen by Urology]  EXT/Skin: No Edema, No Cyanosis, and No Clubbing  Neuro:  alert---dementia = baseline--gait-balance instability--generalized weakness      Assessment:    Principal Problem:    Epididymitis  Active Problems:    Scrotal abscess    Pyocele  Resolved Problems:    * No resolved hospital problems.  JUNIOR Meléndez              88 WM  [sr ANGELICA Maher---fariha guzman; [facility]---[facility], [place], Urology]  FULL CODE     Khushboo Reddy       AMIODARONE     Anti-infectives:   Levaquin IV--every 48 hour, doxycycline Scrotal abscess---pyocele--complicated left hydrocele----left epididymitis--2.23.2023        BLOOD CULTURE--1/2--GNR--2.23.2023        2D ECHO---2.24.2023---mildly reduced LVSF--NRVSF--MAC--KRISTEN without                               stenosis--AR mildly dilated 4.0. cm--IVC not visualized---                              Grade III severe DD---LVEF ~ 45%--no vegetations described        CT abdomen-pelvis--2.23.2023--pronounced bibasilar fibrosis--traction                               bronchiectasis--no honeycombing--large stool throughout                                colon and distending rectum--cholelithiasis--numerous hepatic                               hypodensities consistent with cysts--prostatomegaly        DUS--scrotal contents---2.23.2023---no testicular mass or torsion--                              asymmetric left epididymitis--bilateral epididymal cysts---                              large complicated scrotal fluid collection--possible pyocele  Groin pain--due to above---2.23.2023  Constipation   UTI---recent  Dementia--severe           Atrial fibrillation---PAF--history---currently SR        EKG---2.23.2023--SR--80--1st degree AVB--LAHB--old anteroseptal infarction        RVR--2.12.2018----paroxysmal---recurrent        MI ruled out----2.13.2018  CHF---chronic systolic        Acute-on-chronic systolic---2.12.2018        2D ECHO----2.12.2018---RVSP ~ 48 mm Hg---LVEF ~ 50%  ASCVD         EKG---11.18.2022---SR--68--LAHB--poor R-wave progression--NSTCs         EKG----2.28.2018---atrial fibrillation--flutter---140--LAHB---                     old anterolateral infarction         EKG---2.12.2018---atrial fibrillation---100-104---LAHB---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             CABG---2000---LIMA-LAD--SVG-D2         MI---history   Arrhythmia--history         PSVT         PAF  Hypertension--labile   Hyperlipidemia  Hypothyroidism   CKD---Stage 2  Anxiety  PMH:   diverticulosis, allergic rhinitis, bronchitis, choroidal nevus right               eye, elevated PSA, measles, mumps, OA, hemorrhoids,               Hemocult-[+]--stool--refused colonoscopy, palpitations,              right cataract, UTIs--2018, uncontrolled urination, hematuria---2018,              atrial fibrillation--[flutter]---2018, overweight, grief reaction---death               of sister--2018, RML pneumonia--2018,  PET---2018--RUL mass-like                opacity--consistent with primary malignancy--no active adenopathy--               no metastatic diseaseT5 bone cyst---L1 uptake  likely due               to degenerative change--mild chronic compression deformity  PSH:    appendectomy--5621-8431, tonsillectomy, left eye laser--              detached retina---2011, left cataract--IOL---2013, skin tag              excision--1999, multiple abscess drainages---5585-8110    Allergies:  Penicillin---swelling, sulfa, Cefdinir      Plan:       To Gabriella Blelo [place]---2.27.2023       Medications reviewed       Levaquin PO q48h----note decreased dose due to amiodarone----has tolerated both without difficulty       Code Status---DNR-CCA       Urology--outpatient       Constipation---lactulose---PRN       Probiotics       Amiodarone 100 mg PO daily       Follow up Dr. Ben Dailey,        See orders    Electronically signed by Micaela Grace MD on 2/27/2023 at 11:31 AM    Hospitalist

## 2023-02-28 LAB
MICROORGANISM SPEC CULT: NORMAL
SPECIMEN DESCRIPTION: NORMAL

## 2023-02-28 NOTE — DISCHARGE SUMMARY
I[address]                 [address] [place] [address], [address] [place] [address]                               DISCHARGE SUMMARY    PATIENT NAME: Ruddy Ley                        :        1934  MED REC NO:   [id number]                             ROOM:       5219  ACCOUNT NO:   [de-identified]                           ADMIT DATE: 2023  PROVIDER:     Jose Carlos Lane. Maria Esther Masters MD                  DISCHARGE DATE:  2023    ATTENDING PHYSICIAN OF HOSPITALIZATION:  Crissy Quintero MD    PERSONAL PHYSICIAN:  Alberto Smith DO, Internal Medicine, [facility]. The patient is a resident of [facility]. The patient has seen  [facility], [facility]; Nadira Connors,  Urology. DIAGNOSES:  1. Complicated left hydrocele with left epididymitis, possible scrotal  abscess, pyocele, 2023. Blood culture, 1 of 2, gram-negative  rods, 2023. A 2D echo 2023, mildly reduced left ventricular  systolic function, normal right ventricular systolic function, MAC, KRISTEN  without stenosis, AR mildly dilated at 4.0 cm, IVC not visualized, grade  3 severe diastolic dysfunction, LVEF 45%. No vegetations described. 2.  CT scan of the abdomen and pelvis 2023, pronounced bibasilar  fibrosis, traction bronchiectasis, no honeycombing, large stool  throughout colon and distending rectum, cholelithiasis, numerous hepatic  hypodensities consistent with cysts, prostatomegaly. Doppler  ultrasound, scrotum contents 2023, no testicular mass or torsion,  asymmetric left epididymis, bilateral epididymal cysts, large  complicated scrotal fluid collection, possible pyocele. Groin pain due  to above, 2023, normal.  3.  Constipation, resolved. 4.  Urinary tract infection, recent, PTA. 5.  Dementia, severe. 6.  Atrial fibrillation, paroxysmal atrial fibrillation history,  currently sinus rhythm.   EKG 2023 sinus rhythm, rate [de-identified],  first-degree AV block, LAHB, old anteroseptal infarction, congestive  heart failure, chronic systolic. 7.  Coronary artery disease. CABG 2000, LIMA to LAD, SVG to D2, MI  history. 8.  Arrhythmia, paroxysmal supraventricular tachycardia, paroxysmal  atrial fibrillation as above. 9.  Hypertension, labile. 10.  Hyperlipidemia. 11.  Hypothyroidism. 12.  Chronic kidney disease stage II, stable. 13.  Anxiety, episodic outbursts. Other medical problems set forth in the progress note of 02/27/2023,  incorporated for reference herein. HISTORY OF PRESENT ILLNESS AND HOSPITAL COURSE:  The patient is an  77-year-old white male, patient of Coty Hernandez DO, Internal  Medicine, [facility]. The patient is a resident of [facility]. The patient presented with groin pain together with the  findings set forth above for scrotal contents. The patient was treated  with Levaquin every 48 hours together with doxycycline given the patient  being on a low dose of amiodarone. The patient tolerated both well with  no known complications or problems. The patient's scrotal pain and  tenderness improved, was seen by Urology, Dr. Rubén Min on 02/27/2023. The  patient able to be discharged to [facility], follow up in the outpatient  for further evaluation and treatment at least of left epididymitis  together with a complicated left hydrocele. The patient's constipation treated with lactulose with good response to  the bowel regimen. Recent urinary tract infection. Dementia is quite  severe for this patient, certainly a marked decline over the last year  or so. Atrial fibrillation, PAF by history, currently in a sinus rhythm with a  first-degree AV block, LAHB, old anteroseptal infarction present on  EKGs. Note prior history of a CABG in 2000 together with an MI. Hypertension, highly labile, currently 144/76. Hyperlipidemia,      Hypothyroidism, replacement therapy.     Chronic  kidney disease, stage II.  The patient's BUN of 14, creatinine of 0.97  with a GFR greater than 60 calculated.    LABORATORY DATA:  Around the time of discharge, white cell count 6.5,  hemoglobin 11.7, hematocrit 35.6, platelets 197,000.  Sodium 141,  potassium 4.1, chloride 107, CO2 22, BUN 14, creatinine 0.97, glucose  104, calcium 8.4, GFR greater than 60.    DISCHARGE INSTRUCTIONS/FOLLOWUP:  Discharged to [facility] on  02/27/2023.    DIET:  Cardiac.    ACTIVITY:  As tolerated with physical and occupational therapies  continued from the hospital setting.    CONDITION AT DISCHARGE:  Fair, improved.    CODE STATUS:  DNRCC-Arrest.    Scrotal elevation, fall precautions.    MEDICATIONS:  NEW MEDICATIONS:  Doxycycline 100 mg p.o. b.i.d. 6 days; lactulose  (Chronulac) 30 mL three times a day p.r.n. constipation; levofloxacin  (Levaquin) 750 mg p.o. every 48 hours for four doses, then stop;  probiotic acidophilus one three times a day.    FOLLOWING MEDICATIONS FOR WHICH CHANGES MAY HAVE OCCURRED:  Synthroid  (levothyroxine) 88 mcg (0.088 mg) p.o. daily.    FOLLOWING MEDICATIONS CONTINUED WITHOUT CHANGE:  Acetaminophen (Tylenol)  650 mg p.o. q.4 hours p.r.n. pain, fever; albuterol 0.083% nebulizer q.6  hours and p.r.n. shortness of breath, wheezing; alprazolam (Xanax) 0.125  mg p.o. q.a.m. and 0.25 mg p.o. at bedtime; amiodarone 100 mg p.o.  daily; cholestyramine 4 gm daily p.r.n. loose stool, GI distress;  coenzyme 10 100 mg p.o. every 48 hours; dextromethorphan (guaifenesin) 5  mL every 6 hours p.r.n. cough; fluticasone (Flonase) 1 spray each  nostril daily; glucosamine/chondroitin sulfate 500/400 three capsules  daily; hydrocortisone 1% cream topically affected areas, specifically  perirectal; Preparation H p.r.n.; hydroxyzine (Atarax) 25 mg every 8  hours p.r.n. itching; isosorbide mononitrate 30 mg p.o. daily;  lisinopril 40 mg p.o. daily; loperamide (Imodium) 2 mg with each stool,  not to exceed 16 mg  in a 24-hour period; metoprolol tartrate (Lopressor)  12.5 mg p.o. b.i.d.; mineral oil enemas rectally on a p.r.n. basis;  Multivitamin Adult one p.o. daily; PreserVision AREDS one p.o. daily;  polyvinyl chloride 1.4% ophthalmic solution; Liquifilm Tears 2 drops  both eyes every 6 hours p.r.n. dry eyes; potassium chloride 20 mEq p.o.  daily; Reguloid 1 tablespoon in 8 ounces of water b.i.d.; rivaroxaban  (Xarelto) 20 mg p.o. daily; simethicone (Mylicon) 20 mg every 6 hours  p.r.n. gas; simvastatin (Zocor) 20 mg p.o. every 48 hours; tamsulosin  (Flomax) 0.4 mg twice daily; tolnaftate (Tinactin) 1% external solution,  topically, nighttime toenails. FOLLOWING MEDICATIONS DISCONTINUED:  Amlodipine (Norvasc) and  nitrofurantoin (Macrobid). Follow up with the patient's personal physician, Asia Lemus DO, Internal Medicine, [facility]. Follow up  with Yvetta Meigs, Urology. Any aspect of the patient's care not discussed in the chart and/or  dictation will be addressed and treated as an outpatient. The patient's medications have been reviewed including, but not limited  to, pre-hospital, hospital and discharge medications. The patient  and/or the patient's personal representatives were specifically advised  the only medications to be taken are those set forth in the discharge  orders and no other medications should be taken. Any prior medications  not on the discharge orders are specifically discontinued.         Avery Goldsmith MD    D: 02/27/2023 13:31:07       T: 02/27/2023 13:35:44     [email or web address]  Job#: [id number]     Doc#: [id number]    CC:

## 2023-03-01 ENCOUNTER — TELEPHONE (OUTPATIENT)
Dept: INTERNAL MEDICINE | Age: 88
End: 2023-03-01

## 2023-03-01 NOTE — TELEPHONE ENCOUNTER
Care Transitions Initial Follow Up Call    Outreach made within 2 business days of discharge: Yes    Patient: Nataliia Hunt Patient : 1934   MRN: [id number]  Reason for Admission: There are no discharge diagnoses documented for the most recent discharge. Discharge Date: 23       Spoke with: Sera Liao nurse Haze Najjar)    Discharge department/facility: [facility]    TCM Interactive Patient Contact:  Was patient able to fill all prescriptions: Yes  Was patient instructed to bring all medications to the follow-up visit: NA- will follow at [facility]  Is patient taking all medications as directed in the discharge summary?  Yes  Does patient understand their discharge instructions: Yes (nurse)  Does patient have questions or concerns that need addressed prior to 7-14 day follow up office visit: no (nurse)    Scheduled appointment with PCP within 7-14 days    Follow Up  Future Appointments   Date Time Provider Vanessa Valdovinos   3/21/2023 10:00 AM Yvette Hood 178 RM 1 [facility]IN [facility] [place]   3/29/2023 11:20 AM MD Héctor Bernabe [facility]   2023  2:15 PM DO RIANNA Tamayo [facility]   2023 11:00 AM MD JOCELYN Bernabe [facility]   2024 24:06 [facility]   2024  9:30 AM Emilie Andrews DO [facility]       Mer Amador LPN

## 2023-03-04 NOTE — PROGRESS NOTES
[facility]      Mehrdad Loyola is a 80 y.o. male resident of [facility] who presents today for medical conditions/complaints as noted below. HPI:     HPI   Patient presents for evaluation and management of rash. Symptoms have been worsening over the past week. Previous treatment with permethrin cream for scabies outbreak at facility - seemed to irritate the area. Rash starts on distal lower left extremity and extends to thigh. Staff has noted at times it has appears of multiple \"red blotches\" that is worse when he is showering. Patient notes this is intermittently pruritic. Otherwise he is doing well, denies fever, chills, nausea, vomiting, or diarrhea. Current Outpatient Medications   Medication Sig Dispense Refill    predniSONE (DELTASONE) 20 MG tablet Take 1 tablet by mouth daily for 5 days 5 tablet 0    ALPRAZolam (XANAX) 0.25 MG tablet TAKE 1/2 OF A TABLET BY MOUTH DAILY, IN THE MORNING. . TAKE 1 TABLET BY MOUTH DAILY, AT BEDTIME. May take 1/2 tab before shower if needed.  60 tablet 1    tamsulosin (FLOMAX) 0.4 MG capsule Take 1 capsule by mouth 2 times daily 180 capsule 3    amLODIPine (NORVASC) 5 MG tablet Take 1 tablet by mouth daily 30 tablet 3    psyllium (KONSYL) 28.3 % PACK Take 1 packet by mouth 2 times daily      hydrocortisone (PREPARATION H) 1 % cream Apply topically to affected area as directed prn (Preparation H)      Compression Stockings MISC by Does not apply route DAVID hose- on in am, off in pm (Currently doing prn now)      amiodarone (CORDARONE) 200 MG tablet Take 100 mg by mouth daily      rivaroxaban (XARELTO) 20 MG TABS tablet Take 20 mg by mouth every morning      acetaminophen (TYLENOL) 325 MG tablet Take 650 mg by mouth every 4 hours as needed for Pain      Mineral Oil OIL Use as directed on bottle for occasional constipation      Dextromethorphan-guaiFENesin  MG/5ML SYRP Take 5 mLs by mouth every 6 hours as needed for Cough (Robafen DM)      simethicone (MYLICON) 40 IE/9.6UI drops Take 0.6 mLs by mouth 4 times daily as needed (gas) 60 mL 3    metoprolol tartrate (LOPRESSOR) 25 MG tablet Take 0.5 tablets by mouth 2 times daily 90 tablet 1    levothyroxine (SYNTHROID) 112 MCG tablet Take 1 tablet by mouth Daily 30 tablet 11    polyvinyl alcohol (ARTIFICIAL TEARS) 1.4 % ophthalmic solution Place 1 drop into both eyes every 6 hours as needed      Multiple Vitamins-Minerals (PRESERVISION AREDS PO) Take 1 capsule by mouth daily       isosorbide mononitrate (IMDUR) 30 MG extended release tablet Take 1 tablet by mouth daily 30 tablet 3    lisinopril (PRINIVIL;ZESTRIL) 40 MG tablet Take 1 tablet by mouth daily 30 tablet 3    Multiple Vitamins-Minerals (MULTIVITAMIN ADULT PO) Take 1 tablet by mouth daily      potassium chloride (KLOR-CON M) 20 MEQ extended release tablet Take 20 mEq by mouth daily      fluticasone (FLONASE) 50 MCG/ACT nasal spray 1 spray by Nasal route daily 1 Bottle 0    simvastatin (ZOCOR) 20 MG tablet TAKE 1 TABLET BY MOUTH EVERY OTHER DAY  30 tablet 11    Coenzyme Q-10 100 MG CAPS Take 100 mg by mouth every other day.  tolnaftate (TINACTIN) 1 % external solution Apply topically nightly to toenails per Dr. Niki Webber. (Patient not taking: Reported on 2/16/2022)      aspirin 81 MG tablet Take 81 mg by mouth daily.  GLUCOSAMINE-CHONDROITIN PO Take 3 capsules by mouth daily at 1800 (500-400 mg)       No current facility-administered medications for this visit.      Allergies   Allergen Reactions    Pcn [Penicillins] Swelling     As a child  Pt tolerated ceftriaxone , and keflex with no intolerance    Sulfa Antibiotics     Cefdinir Other (See Comments)     Pt tolerated ceftriaxone , and keflex with no intolerance       Health Maintenance   Topic Date Due    Depression Screen  Never done    DTaP/Tdap/Td vaccine (1 - Tdap) Never done    PSA counseling  12/28/2017    Flu vaccine (1) 09/01/2021    Annual Wellness Visit (AWV) 01/07/2022    Lipid screen  07/01/2022    TSH testing  08/13/2022    Potassium monitoring  10/05/2022    Creatinine monitoring  10/05/2022    Pneumococcal 65+ years Vaccine  Completed    COVID-19 Vaccine  Completed    Hepatitis A vaccine  Aged Out    Hepatitis B vaccine  Aged Out    Hib vaccine  Aged Out    Meningococcal (ACWY) vaccine  Aged Out       Subjective:      Review of Systems   Constitutional: Negative for chills and fever. HENT: Negative for congestion and rhinorrhea. Respiratory: Negative for cough and wheezing. Gastrointestinal: Negative for diarrhea, nausea and vomiting. Skin: Positive for rash. Neurological: Negative for dizziness and headaches. Objective:     Vitals:    03/22/22 1114   BP: 136/82   Pulse: 89   Resp: 16   Temp: 97.5 °F (36.4 °C)     Physical Exam  Constitutional:       General: He is not in acute distress. HENT:      Head: Normocephalic and atraumatic. Right Ear: External ear normal.      Left Ear: External ear normal.   Eyes:      Extraocular Movements: Extraocular movements intact. Conjunctiva/sclera: Conjunctivae normal.   Pulmonary:      Effort: No respiratory distress. Skin:     Comments: Eczematous rash noted on lateral left lower extremity   Neurological:      General: No focal deficit present. Mental Status: He is alert. Mental status is at baseline. Psychiatric:         Mood and Affect: Mood normal.         Behavior: Behavior normal.         Assessment/Plan:        1. Dermatitis  - Will start prednisone as noted below. - predniSONE (DELTASONE) 20 MG tablet; Take 1 tablet by mouth daily for 5 days  Dispense: 5 tablet; Refill: 0        Return if symptoms worsen or fail to improve. No orders of the defined types were placed in this encounter.     Orders Placed This Encounter   Medications    predniSONE (DELTASONE) 20 MG tablet     Sig: Take 1 tablet by mouth daily for 5 days     Dispense:  5 tablet     Refill:  0 Electronically signed by DAYANARA Guzman CNP on 3/22/2022 at 12:37 PM THIN

## 2023-03-06 RX ORDER — SIMETHICONE 20 MG/.3ML
40 EMULSION ORAL 4 TIMES DAILY PRN
COMMUNITY

## 2023-03-06 RX ORDER — ALPRAZOLAM 0.25 MG/1
0.25 TABLET ORAL NIGHTLY
COMMUNITY
End: 2023-03-10 | Stop reason: CLARIF

## 2023-03-06 RX ORDER — ALPRAZOLAM 0.25 MG/1
0.12 TABLET ORAL DAILY PRN
COMMUNITY
End: 2023-03-10 | Stop reason: CLARIF

## 2023-03-06 RX ORDER — ALPRAZOLAM 0.25 MG/1
0.12 TABLET ORAL EVERY MORNING
COMMUNITY
End: 2023-03-10 | Stop reason: CLARIF

## 2023-03-07 ENCOUNTER — OUTSIDE SERVICES (OUTPATIENT)
Dept: INTERNAL MEDICINE | Age: 88
End: 2023-03-07

## 2023-03-07 VITALS — DIASTOLIC BLOOD PRESSURE: 76 MMHG | HEART RATE: 96 BPM | TEMPERATURE: 97.8 F | SYSTOLIC BLOOD PRESSURE: 128 MMHG

## 2023-03-07 DIAGNOSIS — N43.1 PYOCELE: ICD-10-CM

## 2023-03-07 DIAGNOSIS — N45.1 EPIDIDYMITIS: ICD-10-CM

## 2023-03-07 DIAGNOSIS — E78.2 MIXED HYPERLIPIDEMIA: ICD-10-CM

## 2023-03-07 DIAGNOSIS — I25.10 CORONARY ARTERY DISEASE INVOLVING NATIVE CORONARY ARTERY OF NATIVE HEART WITHOUT ANGINA PECTORIS: ICD-10-CM

## 2023-03-07 DIAGNOSIS — I48.0 PAROXYSMAL ATRIAL FIBRILLATION (HCC): ICD-10-CM

## 2023-03-07 DIAGNOSIS — I50.22 CHRONIC SYSTOLIC CHF (CONGESTIVE HEART FAILURE) (HCC): ICD-10-CM

## 2023-03-07 DIAGNOSIS — Z09 HOSPITAL DISCHARGE FOLLOW-UP: Primary | ICD-10-CM

## 2023-03-07 DIAGNOSIS — N49.2 SCROTAL ABSCESS: ICD-10-CM

## 2023-03-07 DIAGNOSIS — E03.9 ACQUIRED HYPOTHYROIDISM: ICD-10-CM

## 2023-03-07 DIAGNOSIS — I10 PRIMARY HYPERTENSION: ICD-10-CM

## 2023-03-07 NOTE — PROGRESS NOTES
Post-Discharge Transitional Care Follow Up      Destiny Phillips   YOB: 1934    Date of Office Visit:  3/7/2023  Date of Hospital Admission: 2/23/23  Date of Hospital Discharge: 2/27/23  Readmission Risk Score (high >=14%. Medium >=10%):Readmission Risk Score: 15      Care management risk score Rising risk (score 2-5) and Complex Care (Scores >=6): No Risk Score On File     Non face to face  following discharge, date last encounter closed (first attempt may have been earlier): 03/01/2023     Call initiated 2 business days of discharge: Yes     Hospital discharge follow-up  -     [place] DISCHARGE MEDS RECONCILED W/ CURRENT OUTPATIENT MED LIST  Epididymitis  Pyocele  Scrotal abscess  Paroxysmal atrial fibrillation (HCC)  Chronic systolic CHF (congestive heart failure) ([facility] [address]  Coronary artery disease involving native coronary artery of native heart without angina pectoris  Primary hypertension  Mixed hyperlipidemia  Acquired hypothyroidism    Medical Decision Making: moderate complexity  No follow-ups on file. Subjective:   HPI    Inpatient course: Discharge summary reviewed- see chart. Patient presents for transition of care visit following hospitalization for complicated left hydrocele with left epididymitis, possible scrotal abscess and pyocele. Patient was treated with Levaquin and doxycycline, tolerated this regimen well. Scrotal pain and tenderness improved. Urology was consulted, and was advised to have outpatient evaluation. Did have some constipation, was treated with lactulose with good response to bowel regimen. Cardiac history including atrial fibrillation and first-degree AV block were stable during his stay. Hypertension was highly labile, no medication changes were made. Blood pressure has been well controlled since returning to facility, continues on lisinopril and metoprolol. Dyslipidemia stable on simvastatin. [place] Hypothyroidism stable on levothyroxine.   CKD stable, GFR greater than 60, creatinine 0.97. Overall he has been doing well since returning to facility. No concerns with recurrence of pain. Has follow up scheduled with urology    Interval history/Current status: stable    Patient Active Problem List   Diagnosis    Hyperlipidemia    Osteoarthritis    Allergic rhinitis    Diverticulosis    Anxiety    Atrial fibrillation ([address]    Coronary artery disease involving native coronary artery of native heart    Mass of upper lobe of right lung    Chronic systolic CHF (congestive heart failure) ([address]    Benign prostatic hyperplasia with incomplete bladder emptying    Hypothyroidism    Hypertension    Dementia associated with 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 behavioral disturbance (HCC)    Intermediate stage nonexudative age-related macular degeneration of both eyes    Combined forms of age-related cataract of right eye    Pseudophakia of left eye    H/O fall    Balance problem    Epididymitis    Scrotal abscess    Pyocele       Medications listed as ordered at the time of discharge from hospital     Medication List            Accurate as of March 7, 2023 11:59 PM. If you have any questions, ask your nurse or doctor.                 CHANGE how you take these medications      levoFLOXacin 750 MG tablet  Commonly known as: LEVAQUIN  Take 1 tablet by mouth every other day for 4 doses  What changed: additional instructions            CONTINUE taking these medications      acetaminophen 325 MG tablet  Commonly known as: TYLENOL     albuterol (2.5 MG/3ML) 0.083% nebulizer solution  Commonly known as: PROVENTIL     * ALPRAZolam 0.25 MG tablet  Commonly known as: XANAX     * ALPRAZolam 0.25 MG tablet  Commonly known as: XANAX     * ALPRAZolam 0.25 MG tablet  Commonly known as: XANAX     amiodarone 200 MG tablet  Commonly known as: CORDARONE     Cholestyramine Powd     coenzyme Q-10 100 MG capsule     Dextromethorphan-guaiFENesin  MG/5ML Syrp     fluticasone 50 MCG/ACT nasal spray  Commonly known as: FLONASE  INSTILL 1 SPRAY IN EACH NOSTRIL ONCE DAILY     Gas Relief 40 MG/0.6ML drops  Generic drug: simethicone     glucosamine-chondroitin 500-400 MG Caps     hydrocortisone 1 % cream     hydrOXYzine HCl 25 MG tablet  Commonly known as: ATARAX     isosorbide mononitrate 30 MG extended release tablet  Commonly known as: IMDUR  Take 1 tablet by mouth daily     lactulose 10 GM/15ML solution  Commonly known as: CHRONULAC  Take 30 mLs by mouth 3 times daily as needed (constipation)     levothyroxine 88 MCG tablet  Commonly known as: SYNTHROID     lisinopril 40 MG tablet  Commonly known as: PRINIVIL;ZESTRIL  Take 1 tablet by mouth daily     loperamide 2 MG capsule  Commonly known as: IMODIUM     metoprolol tartrate 25 MG tablet  Commonly known as: LOPRESSOR  Take 0.5 tablets by mouth 2 times daily     mineral oil enema     * MULTIVITAMIN ADULT PO     * PRESERVISION AREDS PO     polyvinyl alcohol 1.4 % ophthalmic solution  Commonly known as: LIQUIFILM TEARS     potassium chloride 20 MEQ extended release tablet  Commonly known as: KLOR-CON M     Probiotic Acidophilus Tabs  Take 1 tablet by mouth 3 times daily for 10 days     REGULOID PO     rivaroxaban 20 MG Tabs tablet  Commonly known as: XARELTO     simvastatin 20 MG tablet  Commonly known as: ZOCOR  TAKE 1 TABLET BY MOUTH EVERY OTHER DAY     tamsulosin 0.4 MG capsule  Commonly known as: FLOMAX  Take 1 capsule by mouth 2 times daily     tolnaftate 1 % external solution  Commonly known as: TINACTIN           * This list has 5 medication(s) that are the same as other medications prescribed for you. Read the directions carefully, and ask your doctor or other care provider to review them with you. Medications marked \"taking\" at this time  No outpatient medications have been marked as taking for the 3/7/23 encounter (Outside Services) with DAYANARA Arnett CNP.         Medications patient taking as of now reconciled against medications ordered at time of hospital discharge: Yes    Review of Systems   Constitutional:  Negative for activity change and appetite change. HENT:  Negative for congestion and rhinorrhea. Respiratory:  Negative for cough, shortness of breath and wheezing. Cardiovascular:  Negative for chest pain and leg swelling. Gastrointestinal:  Negative for diarrhea and vomiting. Genitourinary:  Negative for frequency and hematuria. Musculoskeletal:  Negative for back pain and myalgias. Neurological:  Negative for weakness. Psychiatric/Behavioral:  Negative for agitation and behavioral problems. Objective:    /76   Pulse 96   Temp 97.8 °F (36.6 °C)   Physical Exam  Vitals reviewed. Constitutional:       General: He is not in acute distress. Appearance: He is not ill-appearing. HENT:      Head: Normocephalic and atraumatic. Right Ear: External ear normal.      Left Ear: External ear normal.   Eyes:      Extraocular Movements: Extraocular movements intact. Conjunctiva/sclera: Conjunctivae normal.   Cardiovascular:      Rate and Rhythm: Normal rate and regular rhythm. Pulmonary:      Effort: Pulmonary effort is normal. No respiratory distress. Breath sounds: Normal breath sounds. Genitourinary:     Comments: Patient refuses genitourinary exam on multiple requests. Per staff, has had some concerns with behavioral disturbances due to dementia this morning. Neurological:      General: No focal deficit present. Mental Status: He is alert. Mental status is at baseline. Psychiatric:         Mood and Affect: Mood normal.         Behavior: Behavior normal.       Assessment and Plan:      1. Hospital discharge follow-up  - ID DISCHARGE MEDS RECONCILED W/ CURRENT OUTPATIENT MED LIST    2. Epididymitis, stable  3. Pyocele, stable  4. Scrotal abscess, stable  - Continue to follow with urology    5. Paroxysmal atrial fibrillation ([address] stable  6.  Chronic systolic CHF (congestive heart failure) ([facility] [address] stable  7. Coronary artery disease involving native coronary artery of native heart without angina pectoris, stable  - Continue to follow with cardiology    8. Primary hypertension,  stable  - Continue current medications    9. Mixed hyperlipidemia,  stable  - Continue current medications    10.  Acquired hypothyroidism,  stable  - Continue current medications      Electronically signed by DAYANARA Cohen CNP on 3/8/2023 at 10:01 AM

## 2023-03-08 ENCOUNTER — HOSPITAL ENCOUNTER (EMERGENCY)
Age: 88
Discharge: HOME | End: 2023-03-08
Attending: EMERGENCY MEDICINE
Payer: MEDICARE

## 2023-03-08 ENCOUNTER — APPOINTMENT (OUTPATIENT)
Dept: CT IMAGING | Age: 88
End: 2023-03-08
Payer: MEDICARE

## 2023-03-08 ENCOUNTER — APPOINTMENT (OUTPATIENT)
Dept: ULTRASOUND IMAGING | Age: 88
End: 2023-03-08
Payer: MEDICARE

## 2023-03-08 VITALS
DIASTOLIC BLOOD PRESSURE: 53 MMHG | OXYGEN SATURATION: 95 % | HEART RATE: 70 BPM | WEIGHT: 167 LBS | TEMPERATURE: 96.8 F | BODY MASS INDEX: 23.29 KG/M2 | SYSTOLIC BLOOD PRESSURE: 121 MMHG | RESPIRATION RATE: 16 BRPM

## 2023-03-08 DIAGNOSIS — K59.00 CONSTIPATION, UNSPECIFIED CONSTIPATION TYPE: ICD-10-CM

## 2023-03-08 DIAGNOSIS — N45.1 EPIDIDYMITIS: Primary | ICD-10-CM

## 2023-03-08 LAB
ABSOLUTE EOS #: 0.09 K/UL (ref 0–0.44)
ABSOLUTE IMMATURE GRANULOCYTE: 0.04 K/UL (ref 0–0.3)
ABSOLUTE LYMPH #: 1.11 K/UL (ref 1.1–3.7)
ABSOLUTE MONO #: 0.23 K/UL (ref 0.1–1.2)
ALBUMIN SERPL-MCNC: 3.2 G/DL (ref 3.5–5.2)
ALBUMIN/GLOBULIN RATIO: 1.1 (ref 1–2.5)
ALP SERPL-CCNC: 117 U/L (ref 40–129)
ALT SERPL-CCNC: 11 U/L (ref 5–41)
ANION GAP SERPL CALCULATED.3IONS-SCNC: 10 MMOL/L (ref 9–17)
AST SERPL-CCNC: 16 U/L
BACTERIA: ABNORMAL
BASOPHILS # BLD: 1 % (ref 0–2)
BASOPHILS ABSOLUTE: 0.03 K/UL (ref 0–0.2)
BILIRUB SERPL-MCNC: 0.4 MG/DL (ref 0.3–1.2)
BILIRUBIN URINE: ABNORMAL
BUN SERPL-MCNC: 14 MG/DL (ref 8–23)
BUN/CREAT BLD: 13 (ref 9–20)
CALCIUM SERPL-MCNC: 8.8 MG/DL (ref 8.6–10.4)
CHLORIDE SERPL-SCNC: 102 MMOL/L (ref 98–107)
CO2 SERPL-SCNC: 27 MMOL/L (ref 20–31)
COLOR: ABNORMAL
CREAT SERPL-MCNC: 1.04 MG/DL (ref 0.7–1.2)
EOSINOPHILS RELATIVE PERCENT: 2 % (ref 1–4)
EPITHELIAL CELLS UA: ABNORMAL /HPF (ref 0–5)
GFR SERPL CREATININE-BSD FRML MDRD: >60 ML/MIN/1.73M2
GLUCOSE SERPL-MCNC: 105 MG/DL (ref 70–99)
GLUCOSE UR STRIP.AUTO-MCNC: NEGATIVE MG/DL
HCT VFR BLD AUTO: 40.5 % (ref 40.7–50.3)
HGB BLD-MCNC: 13.7 G/DL (ref 13–17)
IMMATURE GRANULOCYTES: 1 %
KETONES UR STRIP.AUTO-MCNC: ABNORMAL MG/DL
LACTATE PLASV-SCNC: 1.7 MMOL/L (ref 0.5–2.2)
LEUKOCYTE ESTERASE UR QL STRIP.AUTO: NEGATIVE
LYMPHOCYTES # BLD: 18 % (ref 24–43)
MCH RBC QN AUTO: 30.1 PG (ref 25.2–33.5)
MCHC RBC AUTO-ENTMCNC: 33.8 G/DL (ref 25.2–33.5)
MCV RBC AUTO: 89 FL (ref 82.6–102.9)
MONOCYTES # BLD: 4 % (ref 3–12)
NITRITE UR QL STRIP.AUTO: NEGATIVE
NRBC AUTOMATED: 0 PER 100 WBC
OTHER OBSERVATIONS UA: ABNORMAL
PDW BLD-RTO: 14.5 % (ref 11.8–14.4)
PLATELET # BLD AUTO: 185 K/UL (ref 138–453)
PMV BLD AUTO: 10.4 FL (ref 8.1–13.5)
POTASSIUM SERPL-SCNC: 4.2 MMOL/L (ref 3.7–5.3)
PROT SERPL-MCNC: 6.2 G/DL (ref 6.4–8.3)
PROT UR STRIP.AUTO-MCNC: 5 MG/DL (ref 5–6)
PROT UR STRIP.AUTO-MCNC: ABNORMAL MG/DL
RBC # BLD: 4.55 M/UL (ref 4.21–5.77)
RBC # BLD: ABNORMAL 10*6/UL
RBC CLUMPS #/AREA URNS AUTO: ABNORMAL /HPF (ref 0–4)
SEG NEUTROPHILS: 74 % (ref 36–65)
SEGMENTED NEUTROPHILS ABSOLUTE COUNT: 4.59 K/UL (ref 1.5–8.1)
SODIUM SERPL-SCNC: 139 MMOL/L (ref 135–144)
SPECIFIC GRAVITY UA: 1.02 (ref 1.01–1.02)
TURBIDITY: ABNORMAL
URINE HGB: ABNORMAL
UROBILINOGEN, URINE: NORMAL
WBC # BLD AUTO: 6.1 K/UL (ref 3.5–11.3)
WBC UA: ABNORMAL /HPF (ref 0–4)

## 2023-03-08 PROCEDURE — 96375 TX/PRO/DX INJ NEW DRUG ADDON: CPT

## 2023-03-08 PROCEDURE — 83605 ASSAY OF LACTIC ACID: CPT

## 2023-03-08 PROCEDURE — 6360000002 HC RX W HCPCS: Performed by: EMERGENCY MEDICINE

## 2023-03-08 PROCEDURE — 80053 COMPREHEN METABOLIC PANEL: CPT

## 2023-03-08 PROCEDURE — 76870 US EXAM SCROTUM: CPT

## 2023-03-08 PROCEDURE — 85025 COMPLETE CBC W/AUTO DIFF WBC: CPT

## 2023-03-08 PROCEDURE — 2709999900 CT ABDOMEN PELVIS W IV CONTRAST

## 2023-03-08 PROCEDURE — 6370000000 HC RX 637 (ALT 250 FOR IP): Performed by: EMERGENCY MEDICINE

## 2023-03-08 PROCEDURE — 36415 COLL VENOUS BLD VENIPUNCTURE: CPT

## 2023-03-08 PROCEDURE — 81001 URINALYSIS AUTO W/SCOPE: CPT

## 2023-03-08 PROCEDURE — 6360000004 HC RX CONTRAST MEDICATION: Performed by: EMERGENCY MEDICINE

## 2023-03-08 PROCEDURE — 99285 EMERGENCY DEPT VISIT HI MDM: CPT

## 2023-03-08 PROCEDURE — 96374 THER/PROPH/DIAG INJ IV PUSH: CPT

## 2023-03-08 RX ORDER — DOXYCYCLINE HYCLATE 100 MG/1
100 CAPSULE ORAL 2 TIMES DAILY
Qty: 20 CAPSULE | Refills: 0 | Status: SHIPPED | OUTPATIENT
Start: 2023-03-08 | End: 2023-03-10 | Stop reason: ALTCHOICE

## 2023-03-08 RX ORDER — FENTANYL CITRATE 50 UG/ML
50 INJECTION, SOLUTION INTRAMUSCULAR; INTRAVENOUS ONCE
Status: COMPLETED | OUTPATIENT
Start: 2023-03-08 | End: 2023-03-08

## 2023-03-08 RX ORDER — DOXYCYCLINE 100 MG/1
100 CAPSULE ORAL ONCE
Status: COMPLETED | OUTPATIENT
Start: 2023-03-08 | End: 2023-03-08

## 2023-03-08 RX ORDER — KETOROLAC TROMETHAMINE 30 MG/ML
15 INJECTION, SOLUTION INTRAMUSCULAR; INTRAVENOUS ONCE
Status: COMPLETED | OUTPATIENT
Start: 2023-03-08 | End: 2023-03-08

## 2023-03-08 RX ORDER — DOXYCYCLINE 100 MG/1
100 CAPSULE ORAL ONCE
Status: DISCONTINUED | OUTPATIENT
Start: 2023-03-08 | End: 2023-03-08 | Stop reason: CLARIF

## 2023-03-08 RX ADMIN — FENTANYL CITRATE 50 MCG: 50 INJECTION, SOLUTION INTRAMUSCULAR; INTRAVENOUS at 11:08

## 2023-03-08 RX ADMIN — KETOROLAC TROMETHAMINE 15 MG: 30 INJECTION, SOLUTION INTRAMUSCULAR at 10:32

## 2023-03-08 RX ADMIN — DOXYCYCLINE 100 MG: 100 CAPSULE ORAL at 14:05

## 2023-03-08 RX ADMIN — IOPAMIDOL 100 ML: 755 INJECTION, SOLUTION INTRAVENOUS at 13:10

## 2023-03-08 ASSESSMENT — ENCOUNTER SYMPTOMS
VOMITING: 0
RHINORRHEA: 0
BACK PAIN: 0
DIARRHEA: 0
COUGH: 0
NAUSEA: 0
SORE THROAT: 0
DIARRHEA: 0
SHORTNESS OF BREATH: 0
VOMITING: 0
BACK PAIN: 0
WHEEZING: 0
SHORTNESS OF BREATH: 0
WHEEZING: 0
ABDOMINAL PAIN: 0

## 2023-03-08 ASSESSMENT — PAIN SCALES - PAIN ASSESSMENT IN ADVANCED DEMENTIA (PAINAD)
FACIALEXPRESSION: 1
NEGVOCALIZATION: 1
CONSOLABILITY: 1
BODYLANGUAGE: 1
BREATHING: 0
TOTALSCORE: 2
CONSOLABILITY: 0
FACIALEXPRESSION: 0
BODYLANGUAGE: 1
TOTALSCORE: 4
BREATHING: 0
NEGVOCALIZATION: 1

## 2023-03-08 ASSESSMENT — PAIN - FUNCTIONAL ASSESSMENT: PAIN_FUNCTIONAL_ASSESSMENT: PAIN ASSESSMENT IN ADVANCED DEMENTIA (PAINAD)

## 2023-03-08 NOTE — ED PROVIDER NOTES
[id number] [facility]  eMERGENCY dEPARTMENT eNCOUnter      Pt Name: Maximino Burton  MRN: [id number]  [place] 12/31/1934  Date of evaluation: 3/8/2023      CHIEF COMPLAINT       Chief Complaint   Patient presents with    Groin Swelling         HISTORY OF PRESENT ILLNESS    Maximino Burton is a 80 y.o. male who presents complaint of hematuria and scrotal swelling and pain, patient had been admitted here last week with epididymitis and possible pyocele he been treated with Doxy and Levaquin he still taking Levaquin at the nursing home apparently the catheter got blood and he is having increased scrotal pain  Patient has a history of dementia and is not able to give a history    REVIEW OF SYSTEMS         Review of Systems   Reason unable to perform ROS: Review of systems from nursing home notes patient is unable to give any other history. Constitutional:  Negative for chills and fever. HENT:  Negative for congestion and sore throat. Eyes:  Negative for visual disturbance. Respiratory:  Negative for shortness of breath and wheezing. Cardiovascular:  Negative for chest pain. Gastrointestinal:  Negative for abdominal pain, diarrhea, nausea and vomiting. Genitourinary:  Positive for hematuria, scrotal swelling and testicular pain. Negative for difficulty urinating and dysuria. Musculoskeletal:  Negative for back pain, joint swelling and neck pain. Skin:  Negative for rash. Neurological:  Negative for dizziness, syncope, weakness and headaches. Hematological:  Negative for adenopathy. Does not bruise/bleed easily. Psychiatric/Behavioral:  Positive for confusion.         PAST MEDICAL HISTORY    has a past medical history of Acute bronchitis, MARITA (acute kidney injury) ([address] Allergic rhinitis, Anxiety, Bradycardia, Cataract, right, Choroidal nevus of right eye, Chronic systolic CHF (congestive heart failure) ([address] Coronary artery disease involving native coronary artery of native heart, Dementia associated with 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 behavioral disturbance ([facility] [address] Dermatophytosis of nail, Diverticulosis, Elevated PSA, Hemorrhoids, History of measles, History of mumps, Hyperlipidemia, Hypertension, Hypothyroidism, Mass of upper lobe of right lung, Occult blood positive stool, Osteoarthritis, Overweight, Paroxysmal atrial fibrillation ([address] Pneumonia due to organism, Pseudophakia, left eye, PSVT (paroxysmal supraventricular tachycardia) ([address] Recurrent UTI, Retinal detachment, Seborrheic dermatitis, Sepsis due to urinary tract infection ([address] and Urinary tract infection without hematuria. SURGICAL HISTORY      has a past surgical history that includes Coronary artery bypass graft; Appendectomy (1940 and 1955); Tonsillectomy; retinal laser (Left, 12/22/2011); Cataract removal (Left, 05/07/2013); Skin tag removal (11/01/1999); Abscess Drainage ([id number]); and Colonoscopy (06/14/2021). CURRENT MEDICATIONS       Previous Medications    ACETAMINOPHEN (TYLENOL) 325 MG TABLET    Take 650 mg by mouth every 4 hours as needed for Pain    ALBUTEROL (PROVENTIL) (2.5 MG/3ML) 0.083% NEBULIZER SOLUTION    Take 2.5 mg by nebulization every 6 hours as needed for Wheezing    ALPRAZOLAM (XANAX) 0.25 MG TABLET    Take 0.125 mg by mouth every morning. ALPRAZOLAM (XANAX) 0.25 MG TABLET    Take 0.25 mg by mouth at bedtime. ALPRAZOLAM (XANAX) 0.25 MG TABLET    Take 0.125 mg by mouth daily as needed (before shower). AMIODARONE (CORDARONE) 200 MG TABLET    Take 100 mg by mouth daily    CHOLESTYRAMINE POWD    Mix 1 scoopful (4 gm) po daily as needed    COENZYME Q-10 100 MG CAPS    Take 100 mg by mouth every other day.     DEXTROMETHORPHAN-GUAIFENESIN  MG/5ML SYRP    Take 5 mLs by mouth every 6 hours as needed for Cough    FLUTICASONE (FLONASE) 50 MCG/ACT NASAL SPRAY    INSTILL 1 SPRAY IN EACH NOSTRIL ONCE DAILY    GLUCOSAMINE-CHONDROITIN 500-400 MG CAPS    Take 3 capsules by mouth daily    HYDROCORTISONE 1 % CREAM Apply topically to affected area as directed prn (Preparation H)    HYDROXYZINE HCL (ATARAX) 25 MG TABLET    Take 25 mg by mouth every 8 hours as needed for Itching    ISOSORBIDE MONONITRATE (IMDUR) 30 MG EXTENDED RELEASE TABLET    Take 1 tablet by mouth daily    LACTULOSE (CHRONULAC) 10 GM/15ML SOLUTION    Take 30 mLs by mouth 3 times daily as needed (constipation)    LEVOFLOXACIN (LEVAQUIN) 750 MG TABLET    Take 1 tablet by mouth every other day for 4 doses    LEVOTHYROXINE (SYNTHROID) 88 MCG TABLET    Take 88 mcg by mouth Daily    LISINOPRIL (PRINIVIL;ZESTRIL) 40 MG TABLET    Take 1 tablet by mouth daily    LOPERAMIDE (IMODIUM) 2 MG CAPSULE    Take 2 mg by mouth After each loose stool. *Not to exceed 16 gm/ 24 hours    METOPROLOL TARTRATE (LOPRESSOR) 25 MG TABLET    Take 0.5 tablets by mouth 2 times daily    MINERAL OIL ENEMA    Place 1 enema rectally - Use as directed on the bottle for occasional constipation    MULTIPLE VITAMINS-MINERALS (MULTIVITAMIN ADULT PO)    Take 1 tablet by mouth daily    MULTIPLE VITAMINS-MINERALS (PRESERVISION AREDS PO)    Take 1 capsule by mouth daily     POLYVINYL ALCOHOL (LIQUIFILM TEARS) 1.4 % OPHTHALMIC SOLUTION    Place 2 drops into both eyes every 6 hours as needed    POTASSIUM CHLORIDE (KLOR-CON M) 20 MEQ EXTENDED RELEASE TABLET    Take 20 mEq by mouth daily    PROBIOTIC ACIDOPHILUS (FLORANEX) TABS    Take 1 tablet by mouth 3 times daily for 10 days    PSYLLIUM (REGULOID PO)    Take by mouth (Reguloid): mix 1 TBSP in 8 oz of water bid    RIVAROXABAN (XARELTO) 20 MG TABS TABLET    Take 20 mg by mouth every morning    SIMETHICONE (GAS RELIEF) 40 MG/0.6ML DROPS    Take 40 mg by mouth 4 times daily as needed (gas)    SIMVASTATIN (ZOCOR) 20 MG TABLET    TAKE 1 TABLET BY MOUTH EVERY OTHER DAY     TAMSULOSIN (FLOMAX) 0.4 MG CAPSULE    Take 1 capsule by mouth 2 times daily    TOLNAFTATE (TINACTIN) 1 % EXTERNAL SOLUTION    Apply topically nightly to toenails per Dr. Pati Yuan. ALLERGIES     is allergic to pcn [penicillins], sulfa antibiotics, and cefdinir. FAMILY HISTORY     He indicated that his mother is . He indicated that his father is . He indicated that his sister is . family history includes Diabetes in his mother; Osteoporosis in his mother; Other in his father and sister; Stroke in his sister. SOCIAL HISTORY      reports that he has never smoked. He has never used smokeless tobacco. He reports that he does not drink alcohol and does not use drugs. PHYSICAL EXAM     INITIAL VITALS:  weight is 167 lb (75.8 kg). His tympanic temperature is 96.8 °F (36 °C). His blood pressure is 121/53 (abnormal) and his pulse is 70. His respiration is 16 and oxygen saturation is 95%. Physical Exam  Constitutional:       Appearance: He is well-developed. Comments: Elderly male who is has his legs drawn up and his knee is tight together   HENT:      Head: Normocephalic and atraumatic. Right Ear: External ear normal.      Left Ear: External ear normal.   Eyes:      Pupils: Pupils are equal, round, and reactive to light. Cardiovascular:      Rate and Rhythm: Normal rate and regular rhythm. Pulmonary:      Effort: Pulmonary effort is normal.      Breath sounds: Normal breath sounds. Abdominal:      General: Bowel sounds are normal.      Palpations: Abdomen is soft. Genitourinary:     Comments: Penis and scrotum are quite erythematous she is diffusely tender to very difficult exam because he tries to lock his legs  Musculoskeletal:         General: Normal range of motion. Cervical back: Normal range of motion and neck supple. Skin:     General: Skin is warm and dry. Neurological:      Mental Status: He is alert and oriented to person, place, and time.    Psychiatric:         Behavior: Behavior normal.         DIFFERENTIAL DIAGNOSIS/ MDM:     Differential diagnosis considered: Scrotal abscess cellulitisWill do a work-up    Chronic Conditions affecting care (DM,HTN,CA, etc): Dementia history of epididymitis    Social Determinants of Health affecting care (unable to care for self, lives alone, unemployed, homeless,etc): Patient lives in a nursing home    History source(s) (patient,spouse,parent,family,friend,EMS,etc): Nursing home and prior charts    Review of external sources ([facility],Hospital records,EMS report, radiology reports, etc): Recent hospitalization    Tests considered but not ordered: Not applicable    Independent interpretation of tests (eg.  X-ray, CAT scan, Doppler studies, EKG):     Discussion of x-ray results with radiology:     Consults:     Consideration for admission/observation (even if discharged): Since had improvement of his epididymitis and there is no signs of a pyocele chronic changes seen in his lung bases he is not in any symptoms his sat is 98% on room air I will place him on another course of doxycycline to cover any lung findings and also to continue treating the epididymitis    Prescription considerations: Doxycycline    Sepsis considered: No evidence    Critical Care note written: Not applicable    DIAGNOSTIC RESULTS     EKG: All EKG's are interpreted by the Emergency Department Physician who either signs or Co-signs this chart in the absence of a cardiologist.        RADIOLOGY:   I directly visualized the following  images and reviewed the radiologist interpretations:       EXAMINATION:   CT OF THE ABDOMEN AND PELVIS WITH CONTRAST 3/8/2023 12:57 pm       TECHNIQUE:   CT of the abdomen and pelvis was performed with the administration of   intravenous contrast. Multiplanar reformatted images are provided for review. Automated exposure control, iterative reconstruction, and/or weight based   adjustment of the mA/kV was utilized to reduce the radiation dose to as low   as reasonably achievable. COMPARISON:   None.        HISTORY:   ORDERING SYSTEM PROVIDED HISTORY: Groin pain   TECHNOLOGIST PROVIDED HISTORY: Groin pain   Decision Support Exception - unselect if not a suspected or confirmed   emergency medical condition->Emergency Medical Condition (MA)   Reason for Exam: groin pain, left epididymis suggesting subacute epididymitis       FINDINGS:   Lower Chest: There are infiltrates noted in both lung bases in keeping with   multifocal pneumonia or aspiration. Coronary artery calcifications are noted. Organs: There are several hepatic cysts noted. Multiple gallstones are noted   in the gallbladder. The pancreas appears unremarkable. There are calcified   granulomas in the spleen. The adrenal glands and kidneys appear normal.       GI/Bowel: There is a small hiatal hernia. The stomach is otherwise   unremarkable. Duodenum, and small bowel loops appear normal.       There is significant fecal load noted, with prominent rectal fecal impaction,   in keeping with constipation. Pelvis: The bladder is empty. There is a prominently enlarged prostate. There is no evidence of inguinal or pelvic adenopathy and there are no   hernias noted. Peritoneum/Retroperitoneum: Unremarkable       Bones/Soft Tissues: There is chronic loss of height of the L1 vertebral body. Impression   Infiltrates noted in both lung bases, in keeping with multifocal pneumonia or   aspiration. There is significant fecal load, with prominent rectosigmoid fecal impaction. Coronary artery calcifications noted. Multiple gallstones in the   gallbladder. Several hepatic cysts. Calcified granulomas in the spleen. Small hiatal hernia. Prominently enlarged prostate. Chronic loss of height   of the L1 vertebral body. ULTRASOUND OF THE SCROTUM/TESTICLES WITH COLOR DOPPLER FLOW EVALUATION       3/8/2023       TECHNIQUE:   Duplex ultrasound using B-mode/gray scaled imaging, Doppler spectral analysis   and color flow Doppler was obtained of the testicles. COMPARISON:   None.        HISTORY: ORDERING SYSTEM PROVIDED HISTORY: Recent history of epididymitis and pyocele   pain becoming worse   TECHNOLOGIST PROVIDED HISTORY:   Recent history of epididymitis and pyocele pain becoming worse   Reason for Exam: recent hx of epididymitis, pyocele       FINDINGS:       Measurements:       Right Testicle: 3.3 x 3.3 x 2.4 cm       Left Testicle: 3.9 x 3.1 x 2.7 cm           Right:       Grey Scale: The right testicle demonstrates normal homogeneous echotexture   without focal lesion. No evidence of testicular microlithiasis. Doppler Evaluation: There is normal arterial and venous Doppler flow within   the testicle. Scrotal Sac: No evidence of hydrocele. Epididymis: 5 mm epididymal cyst.           Left:       Grey Scale: The left testicle demonstrates normal homogeneous echotexture   without focal lesion. No evidence of testicular microlithiasis. Doppler Evaluation: There is normal arterial and venous Doppler flow within   the testicle. Scrotal Sac: Moderate hydrocele. Epididymis: 4 mm epididymal cyst.  Epididymis is hypervascular           Impression   Moderate left hydrocele       Small bilateral epididymal cysts       Hypervascular left epididymis suggesting subacute epididymitis.            ED BEDSIDE ULTRASOUND:       LABS:  Labs Reviewed   CBC WITH AUTO DIFFERENTIAL - Abnormal; Notable for the following components:       Result Value    Hematocrit 40.5 (*)     MCHC 33.8 (*)     RDW 14.5 (*)     Seg Neutrophils 74 (*)     Lymphocytes 18 (*)     Immature Granulocytes 1 (*)     All other components within normal limits   COMPREHENSIVE METABOLIC PANEL W/ REFLEX TO MG FOR LOW K - Abnormal; Notable for the following components:    Glucose 105 (*)     Total Protein 6.2 (*)     Albumin 3.2 (*)     All other components within normal limits   URINALYSIS WITH REFLEX TO CULTURE - Abnormal; Notable for the following components:    Color, UA Brown (*)     Turbidity UA Cloudy (*) Bilirubin Urine NEGATIVE  Verified by ictotest. (*)     Ketones, Urine TRACE (*)     Urine Hgb 3+ (*)     Protein, UA 2+ (*)     All other components within normal limits   MICROSCOPIC URINALYSIS - Abnormal; Notable for the following components:    Bacteria, UA MODERATE (*)     Other Observations UA   (*)     Value: Utilizing a urinalysis as the only screening method to exclude a potential uropathogen can be unreliable in many patient populations. Rapid screening tests are less sensitive than culture and if UTI is a clinical possibility, culture should be considered despite a negative urinalysis. All other components within normal limits   LACTIC ACID           EMERGENCY DEPARTMENT COURSE:   Vitals:    Vitals:    03/08/23 1000 03/08/23 1110 03/08/23 1200 03/08/23 1230   BP: (!) 120/46 (!) 107/57 (!) 152/76 (!) 121/53   Pulse: 68 75 71 70   Resp:       Temp:       TempSrc:       SpO2: 99% 98% 95% 95%   Weight:         -------------------------  BP: (!) 121/53, Temp: 96.8 °F (36 °C), Heart Rate: 70, Resp: 16      Re-evaluation Notes    Resting comfortably    CRITICAL CARE:   None        CONSULTS:      PROCEDURES:  None    FINAL IMPRESSION      1. Epididymitis    2. Constipation, unspecified constipation type          DISPOSITION/PLAN   DISPOSITION Decision To Discharge    Condition on Disposition    Stable    PATIENT REFERRED TO:  Lance Junior, DO  [address] [facility]  [phone]          DISCHARGE MEDICATIONS:  New Prescriptions    DOXYCYCLINE HYCLATE (VIBRAMYCIN) 100 MG CAPSULE    Take 1 capsule by mouth 2 times daily for 10 days       (Please note that portions of this note were completed with a voice recognition program.  Efforts were made to edit the dictations but occasionally words are mis-transcribed.)    Phoebe Gaary MD,, MD, F.A.A.E.M.   Attending Emergency Physician                           Phoebe Garay MD  03/08/23 0047

## 2023-03-08 NOTE — DISCHARGE INSTRUCTIONS
Continue lactulose for constipation, restart doxycycline for another 10 days follow-up with urology Dr. Mo Galvez return for any problems

## 2023-03-10 ENCOUNTER — HOSPITAL ENCOUNTER (OUTPATIENT)
Age: 88
Discharge: HOME | End: 2023-03-10
Payer: MEDICARE

## 2023-03-10 ENCOUNTER — OFFICE VISIT (OUTPATIENT)
Dept: INTERNAL MEDICINE | Age: 88
End: 2023-03-10
Payer: MEDICARE

## 2023-03-10 ENCOUNTER — TELEPHONE (OUTPATIENT)
Dept: INTERNAL MEDICINE | Age: 88
End: 2023-03-10

## 2023-03-10 VITALS
RESPIRATION RATE: 16 BRPM | DIASTOLIC BLOOD PRESSURE: 50 MMHG | BODY MASS INDEX: 22.73 KG/M2 | WEIGHT: 163 LBS | HEART RATE: 58 BPM | SYSTOLIC BLOOD PRESSURE: 90 MMHG

## 2023-03-10 DIAGNOSIS — R15.9 INCONTINENCE OF FECES, UNSPECIFIED FECAL INCONTINENCE TYPE: ICD-10-CM

## 2023-03-10 DIAGNOSIS — N45.1 EPIDIDYMITIS: Primary | ICD-10-CM

## 2023-03-10 DIAGNOSIS — N50.82 SCROTAL PAIN: ICD-10-CM

## 2023-03-10 DIAGNOSIS — R31.0 GROSS HEMATURIA: ICD-10-CM

## 2023-03-10 DIAGNOSIS — R31.0 GROSS HEMATURIA: Primary | ICD-10-CM

## 2023-03-10 DIAGNOSIS — R32 URINARY INCONTINENCE, UNSPECIFIED TYPE: ICD-10-CM

## 2023-03-10 DIAGNOSIS — N43.3 LEFT HYDROCELE: ICD-10-CM

## 2023-03-10 LAB
ABSOLUTE EOS #: 0.32 K/UL (ref 0–0.44)
ABSOLUTE IMMATURE GRANULOCYTE: 0.04 K/UL (ref 0–0.3)
ABSOLUTE LYMPH #: 1.57 K/UL (ref 1.1–3.7)
ABSOLUTE MONO #: 0.41 K/UL (ref 0.1–1.2)
ANION GAP SERPL CALCULATED.3IONS-SCNC: 10 MMOL/L (ref 9–17)
BASOPHILS # BLD: 1 % (ref 0–2)
BASOPHILS ABSOLUTE: 0.04 K/UL (ref 0–0.2)
BUN SERPL-MCNC: 22 MG/DL (ref 8–23)
BUN/CREAT BLD: 19 (ref 9–20)
CALCIUM SERPL-MCNC: 9.3 MG/DL (ref 8.6–10.4)
CHLORIDE SERPL-SCNC: 104 MMOL/L (ref 98–107)
CO2 SERPL-SCNC: 25 MMOL/L (ref 20–31)
CREAT SERPL-MCNC: 1.13 MG/DL (ref 0.7–1.2)
EOSINOPHILS RELATIVE PERCENT: 4 % (ref 1–4)
GFR SERPL CREATININE-BSD FRML MDRD: >60 ML/MIN/1.73M2
GLUCOSE SERPL-MCNC: 100 MG/DL (ref 70–99)
HCT VFR BLD AUTO: 39.6 % (ref 40.7–50.3)
HGB BLD-MCNC: 13.1 G/DL (ref 13–17)
IMMATURE GRANULOCYTES: 1 %
LYMPHOCYTES # BLD: 18 % (ref 24–43)
MCH RBC QN AUTO: 30.3 PG (ref 25.2–33.5)
MCHC RBC AUTO-ENTMCNC: 33.1 G/DL (ref 25.2–33.5)
MCV RBC AUTO: 91.5 FL (ref 82.6–102.9)
MONOCYTES # BLD: 5 % (ref 3–12)
NRBC AUTOMATED: 0 PER 100 WBC
PDW BLD-RTO: 15 % (ref 11.8–14.4)
PLATELET # BLD AUTO: 231 K/UL (ref 138–453)
PMV BLD AUTO: 10.6 FL (ref 8.1–13.5)
POTASSIUM SERPL-SCNC: 4.3 MMOL/L (ref 3.7–5.3)
RBC # BLD: 4.33 M/UL (ref 4.21–5.77)
RBC # BLD: ABNORMAL 10*6/UL
SEG NEUTROPHILS: 71 % (ref 36–65)
SEGMENTED NEUTROPHILS ABSOLUTE COUNT: 6.29 K/UL (ref 1.5–8.1)
SODIUM SERPL-SCNC: 139 MMOL/L (ref 135–144)
WBC # BLD AUTO: 8.7 K/UL (ref 3.5–11.3)

## 2023-03-10 PROCEDURE — 80048 BASIC METABOLIC PNL TOTAL CA: CPT

## 2023-03-10 PROCEDURE — 99214 OFFICE O/P EST MOD 30 MIN: CPT | Performed by: INTERNAL MEDICINE

## 2023-03-10 PROCEDURE — 85025 COMPLETE CBC W/AUTO DIFF WBC: CPT

## 2023-03-10 PROCEDURE — 36415 COLL VENOUS BLD VENIPUNCTURE: CPT

## 2023-03-10 RX ORDER — DOXYCYCLINE HYCLATE 100 MG
100 TABLET ORAL 2 TIMES DAILY
COMMUNITY

## 2023-03-10 RX ORDER — PYRAZINAMIDE 500 MG/1
1 TABLET ORAL EVERY 6 HOURS PRN
Qty: 56 TABLET | Refills: 0 | Status: SHIPPED | OUTPATIENT
Start: 2023-03-10 | End: 2023-03-24

## 2023-03-10 RX ORDER — ALPRAZOLAM 0.25 MG/1
TABLET ORAL
COMMUNITY

## 2023-03-10 SDOH — ECONOMIC STABILITY: FOOD INSECURITY: WITHIN THE PAST 12 MONTHS, THE FOOD YOU BOUGHT JUST DIDN'T LAST AND YOU DIDN'T HAVE MONEY TO GET MORE.: NEVER TRUE

## 2023-03-10 SDOH — ECONOMIC STABILITY: FOOD INSECURITY: WITHIN THE PAST 12 MONTHS, YOU WORRIED THAT YOUR FOOD WOULD RUN OUT BEFORE YOU GOT MONEY TO BUY MORE.: NEVER TRUE

## 2023-03-10 SDOH — ECONOMIC STABILITY: INCOME INSECURITY: HOW HARD IS IT FOR YOU TO PAY FOR THE VERY BASICS LIKE FOOD, HOUSING, MEDICAL CARE, AND HEATING?: NOT VERY HARD

## 2023-03-10 SDOH — ECONOMIC STABILITY: HOUSING INSECURITY
IN THE LAST 12 MONTHS, WAS THERE A TIME WHEN YOU DID NOT HAVE A STEADY PLACE TO SLEEP OR SLEPT IN A SHELTER (INCLUDING NOW)?: NO

## 2023-03-10 NOTE — TELEPHONE ENCOUNTER
Husam Escobedo from [facility] called states they tried to do a blood draw on patient and were not successful and asked if it could wait till Monday. After confirming with Dr. Eric Acosta, told [facility] to try again as Dr. Eric Acosta wanted the CBC today to determine if he was anemic. [facility] stated they will try and if needed take him else where for the blood draw.

## 2023-03-10 NOTE — PROGRESS NOTES
DR. Miya Ocampo - PROGRESS NOTE    CHIEF COMPLAINT/HISTORY OF CHIEF COMPLAINT: This 80 y.o.  male, resident of [facility], comes in today after having been seen in the emergency room at [facility] on 3/8/23 for continued swelling in his groin. At the end of February he was hospitalized for a complicated left hydrocele with pyocele and scrotal abscess and left sided epididymitis. He was treated and seen by urology and sent back to [facility] on antibiotics, which he finished. When he went to the emergency room the other day he was having hematuria and continued scrotal swelling and pain. They catheterized him to get a urine sample and did another scrotal ultrasound and a CT of the abdomen and pelvis. The ultrasound redemonstrated the hydrocele which was assessed as \"moderate\" and there was evidence of subacute epididymitis. The CT scan showed bilateral infiltrates in the lung bases and rectosigmoid fecal impaction. He was discharged back to [facility] with another course of doxycycline, both for the lungs and for the epididymis. Now the scrotum is no longer swollen but it is still very erythematous. It is extremely painful to the touch. Also, since being back in the emergency room he has been incontinent of both stool and urine, and his urine is grossly bloody. It looks \"as if he is urinating pure blood. \" He was brought in by one of his caregivers from [facility], who provides most of the history, as he states he is unsure of why he is here. He does have a follow up examination with Urology, but not until the end of the month. There are no other complaints.       ALLERGIES/INTOLERANCES:   Allergies   Allergen Reactions    Pcn [Penicillins] Swelling     As a child  Pt tolerated ceftriaxone , and keflex with no intolerance    Sulfa Antibiotics     Cefdinir Other (See Comments)     Pt tolerated ceftriaxone , and keflex with no intolerance       MEDICATIONS:   Outpatient Medications Marked as Taking for the 3/10/23 encounter (Office Visit) with Guillermo Cerda, DO   Medication Sig Dispense Refill    doxycycline hyclate (VIBRA-TABS) 100 MG tablet Take 100 mg by mouth 2 times daily      ALPRAZolam (XANAX) 0.25 MG tablet Take 0.125 mg every morning and 0.25 mg at bedtime. May take an additional 0.125 mg daily before showering as needed.      simethicone (MYLICON) 40 MG/0.6ML drops Take 40 mg by mouth 4 times daily as needed (gas)      lactulose (CHRONULAC) 10 GM/15ML solution Take 30 mLs by mouth 3 times daily as needed (constipation) 1 each 0    polyvinyl alcohol (LIQUIFILM TEARS) 1.4 % ophthalmic solution Place 2 drops into both eyes every 6 hours as needed      mineral oil enema Place 1 enema rectally - Use as directed on the bottle for occasional constipation      Dextromethorphan-guaiFENesin  MG/5ML SYRP Take 5 mLs by mouth every 6 hours as needed for Cough      levothyroxine (SYNTHROID) 88 MCG tablet Take 88 mcg by mouth Daily      tamsulosin (FLOMAX) 0.4 MG capsule Take 1 capsule by mouth 2 times daily 180 capsule 3    glucosamine-chondroitin 500-400 MG CAPS Take 3 capsules by mouth daily      Psyllium (REGULOID PO) Take by mouth (Reguloid): mix 1 TBSP in 8 oz of water bid      albuterol (PROVENTIL) (2.5 MG/3ML) 0.083% nebulizer solution Take 2.5 mg by nebulization every 6 hours as needed for Wheezing      hydrOXYzine HCl (ATARAX) 25 MG tablet Take 25 mg by mouth every 8 hours as needed for Itching      loperamide (IMODIUM) 2 MG capsule Take 2 mg by mouth After each loose stool. *Not to exceed 16 gm/ 24 hours      Cholestyramine POWD Mix 1 scoopful (4 gm) po daily as needed      fluticasone (FLONASE) 50 MCG/ACT nasal spray INSTILL 1 SPRAY IN EACH NOSTRIL ONCE DAILY 16 g 1    hydrocortisone 1 % cream Apply topically to affected area as directed prn (Preparation H)      amiodarone (CORDARONE) 200 MG tablet Take 100 mg by mouth daily      rivaroxaban (XARELTO) 20 MG  TABS tablet Take 20 mg by mouth every morning      acetaminophen (TYLENOL) 325 MG tablet Take 650 mg by mouth every 4 hours as needed for Pain      metoprolol tartrate (LOPRESSOR) 25 MG tablet Take 0.5 tablets by mouth 2 times daily 90 tablet 1    Multiple Vitamins-Minerals (PRESERVISION AREDS PO) Take 1 capsule by mouth daily       isosorbide mononitrate (IMDUR) 30 MG extended release tablet Take 1 tablet by mouth daily 30 tablet 3    lisinopril (PRINIVIL;ZESTRIL) 40 MG tablet Take 1 tablet by mouth daily 30 tablet 3    Multiple Vitamins-Minerals (MULTIVITAMIN ADULT PO) Take 1 tablet by mouth daily      potassium chloride (KLOR-CON M) 20 MEQ extended release tablet Take 20 mEq by mouth daily      simvastatin (ZOCOR) 20 MG tablet TAKE 1 TABLET BY MOUTH EVERY OTHER DAY  30 tablet 11    Coenzyme Q-10 100 MG CAPS Take 100 mg by mouth every other day. tolnaftate (TINACTIN) 1 % external solution Apply topically nightly to toenails per Dr. Casimiro Childress. IMMUNIZATIONS: Reviewed for influenza and pneumococcal status as indicated in electronic record. REVIEW OF SYSTEMS:     Please see history of chief complaint above; otherwise no new problems with respect to General, HEENT, Cardiovascular, Respiratory, Gastrointestinal, Genitourinary, Endocrinologic, Musculoskeletal, or Neuropsychiatric complaints. Unable to obtain full review of systems from patient due to current mental status; accordingly history has been obtained from the chart and the caregiver. PHYSICAL EXAMINATION:    Wt Readings from Last 2 Encounters:   03/10/23 163 lb (73.9 kg)   03/08/23 167 lb (75.8 kg)       Vitals:    03/10/23 1047   BP: (!) 90/50   Site: Right Upper Arm   Position: Sitting   Cuff Size: Large Adult   Pulse: 58   Resp: 16   Weight: 163 lb (73.9 kg)     Body mass index is 22.73 kg/m². General: This is a 80 y.o.  male who is alert and oriented to person, place, and time.  He appears to be his stated age and does not appear to be in any acute distress. Skin: Skin color, texture, turgor normal. No rashes or lesions. HEENT/Neck: essentially unremarkable  Lungs: Normal - CTA without rales, rhonchi, or wheezing. Heart: regular rate and rhythm, S1, S2 normal, no murmur, click, rub or gallop No S3 or S4. Abdomen: Non-obese, soft, non-distended, non-tender, normal active bowel sounds, no masses palpated, and no hepatosplenomegaly  Extremities: There is no clubbing, cyanosis, or edema in any of the extremities. ASSESSMENT/PLAN:    1. Epididymitis, ongoing  2. Scrotal pain, worse  3. Gross hematuria, worse  4. Left hydrocele, ongoing  5. Incontinence of feces, unspecified fecal incontinence type  6. Urinary incontinence, unspecified type  - We reviewed the emergency room visit progress note, as well as the emergency room scrotal ultrasound report and the CT scan report from the emergency room  - He will continue the doxycycline as prescribed by the emergency room doctor  - We will try giving him Tylenol #3 with codeine every 6 hours as needed for the pain for 14 days. We instructed the [facility] caregiver to call us if that does not control the pain, as he might need something stronger  - We called up to urology and were able to get his upcoming urology appointment moved up to this coming Monday, March 13, with Dr. Nereida Alcantara. - acetaminophen-codeine (TYLENOL/CODEINE #3) 300-30 MG per tablet; Take 1 tablet by mouth every 6 hours as needed for Pain for up to 14 days. Take lowest dose possible to manage pain Max Daily Amount: 4 tablets  Dispense: 56 tablet; Refill: 0        No orders of the defined types were placed in this encounter. Requested Prescriptions     Signed Prescriptions Disp Refills    acetaminophen-codeine (TYLENOL/CODEINE #3) 300-30 MG per tablet 56 tablet 0     Sig: Take 1 tablet by mouth every 6 hours as needed for Pain for up to 14 days.  Take lowest dose possible to manage pain Max Daily Amount: 4 tablets       He will return here as previously scheduled or sooner if needed.         Electronically signed by Mónica Delgado DO on 3/10/2023 at 11:25 AM  Internal Medicine

## 2023-03-13 ENCOUNTER — OFFICE VISIT (OUTPATIENT)
Dept: UROLOGY | Age: 88
End: 2023-03-13
Payer: MEDICARE

## 2023-03-13 ENCOUNTER — TELEPHONE (OUTPATIENT)
Dept: UROLOGY | Age: 88
End: 2023-03-13

## 2023-03-13 VITALS
HEIGHT: 71 IN | SYSTOLIC BLOOD PRESSURE: 122 MMHG | RESPIRATION RATE: 16 BRPM | HEART RATE: 58 BPM | DIASTOLIC BLOOD PRESSURE: 62 MMHG | BODY MASS INDEX: 22.82 KG/M2 | WEIGHT: 163 LBS | OXYGEN SATURATION: 95 %

## 2023-03-13 DIAGNOSIS — F41.9 ANXIETY: Primary | ICD-10-CM

## 2023-03-13 DIAGNOSIS — N45.1 EPIDIDYMITIS: ICD-10-CM

## 2023-03-13 DIAGNOSIS — N13.8 BPH WITH OBSTRUCTION/LOWER URINARY TRACT SYMPTOMS: Primary | ICD-10-CM

## 2023-03-13 DIAGNOSIS — N40.1 BPH WITH OBSTRUCTION/LOWER URINARY TRACT SYMPTOMS: Primary | ICD-10-CM

## 2023-03-13 PROCEDURE — 3288F FALL RISK ASSESSMENT DOCD: CPT | Performed by: UROLOGY

## 2023-03-13 PROCEDURE — 99214 OFFICE O/P EST MOD 30 MIN: CPT | Performed by: UROLOGY

## 2023-03-13 PROCEDURE — G8484 FLU IMMUNIZE NO ADMIN: HCPCS | Performed by: UROLOGY

## 2023-03-13 PROCEDURE — 1123F ACP DISCUSS/DSCN MKR DOCD: CPT | Performed by: UROLOGY

## 2023-03-13 PROCEDURE — G8420 CALC BMI NORM PARAMETERS: HCPCS | Performed by: UROLOGY

## 2023-03-13 PROCEDURE — 0518F FALL PLAN OF CARE DOCD: CPT | Performed by: UROLOGY

## 2023-03-13 PROCEDURE — 1036F TOBACCO NON-USER: CPT | Performed by: UROLOGY

## 2023-03-13 PROCEDURE — G8427 DOCREV CUR MEDS BY ELIG CLIN: HCPCS | Performed by: UROLOGY

## 2023-03-13 PROCEDURE — 1111F DSCHRG MED/CURRENT MED MERGE: CPT | Performed by: UROLOGY

## 2023-03-13 RX ORDER — NITROFURANTOIN MACROCRYSTALS 100 MG/1
100 CAPSULE ORAL 4 TIMES DAILY
Status: ON HOLD | COMMUNITY
End: 2023-03-18 | Stop reason: HOSPADM

## 2023-03-13 RX ORDER — ALPRAZOLAM 0.25 MG/1
TABLET ORAL
Qty: 60 TABLET | Refills: 3 | Status: ON HOLD | OUTPATIENT
Start: 2023-03-13 | End: 2023-03-18 | Stop reason: SDUPTHER

## 2023-03-13 RX ORDER — POLYETHYLENE GLYCOL 3350 17 G/17G
17 POWDER, FOR SOLUTION ORAL 2 TIMES DAILY
Qty: 1530 G | Refills: 1 | Status: SHIPPED | OUTPATIENT
Start: 2023-03-13 | End: 2023-04-12

## 2023-03-13 RX ORDER — DOXYCYCLINE 100 MG/1
100 CAPSULE ORAL 2 TIMES DAILY
Qty: 14 CAPSULE | Refills: 0 | Status: ON HOLD | OUTPATIENT
Start: 2023-03-13 | End: 2023-03-18 | Stop reason: HOSPADM

## 2023-03-13 NOTE — TELEPHONE ENCOUNTER
Adryan Oden requesting a refill of the below medication which has been pended for you:     Requested Prescriptions     Pending Prescriptions Disp Refills    ALPRAZolam (XANAX) 0.25 MG tablet 60 tablet 3     Sig: Take 0.125mg every am. Take 1 tab at HS. May take an additional 0.125 mg daily before showering as needed.        Last Appointment Date: 3/10/2023  Next Appointment Date: 1/25/2024    Allergies   Allergen Reactions    Pcn [Penicillins] Swelling     As a child  Pt tolerated ceftriaxone , and keflex with no intolerance    Sulfa Antibiotics     Cefdinir Other (See Comments)     Pt tolerated ceftriaxone , and keflex with no intolerance

## 2023-03-13 NOTE — PROGRESS NOTES
[address] [address] [address]  [address]  Dept: [phone]  Dept Fax: [phone]    [facility] Urology Office Note -     Patient:  Rahul Johnson  YOB: 1934    The patient is a 80 y.o. male who presents today for evaluation of the following problems:   Chief Complaint   Patient presents with    Hematuria     With abdominal pain, seen in ER on 3/8/23- given antibiotic for a 2nd time        History of Present Illness:    BPH  Worsening voiding  Difficulty emptying-- freq  Pt poor historian. Caretaker present  [employer] normally  Has been going to ED frequently    Epididymitis  Finished abx  Was admitted with epididymorchitis  Unsure that this is primary issue        Requested/reviewed records from [facility],  office and/or outside physician/EMR    (Patient's old records have been requested, reviewed and pertinent findings summarized in today's note.)    Procedures Today: N/A    Last several PSA's:  Lab Results   Component Value Date    PSA 6.63 (H) 12/28/2016    PSA 4.73 (H) 12/30/2015    PSA 6.28 (H) 01/06/2015       Last total testosterone:  No results found for: TESTOSTERONE    Urinalysis today:  No results found for this visit on 03/13/23. Last BUN and creatinine:  Lab Results   Component Value Date    BUN 22 03/10/2023     Lab Results   Component Value Date    CREATININE 1.13 03/10/2023       Imaging Reviewed during this Office Visit:   Sarthak Tipton MD independently reviewed the images and verified the radiology reports from:    CT  Infiltrates noted in both lung bases, in keeping with multifocal pneumonia or   aspiration. There is significant fecal load, with prominent rectosigmoid fecal impaction. Coronary artery calcifications noted. Multiple gallstones in the   gallbladder. Several hepatic cysts.   Calcified granulomas in the spleen. Small hiatal hernia. Prominently enlarged prostate. Chronic loss of height   of the L1 vertebral body. Scrotal u/s  Moderate left hydrocele       Small bilateral epididymal cysts       Hypervascular left epididymis suggesting subacute epididymitis.              PAST MEDICAL, FAMILY AND SOCIAL HISTORY:  Past Medical History:   Diagnosis Date    Acute bronchitis     by history    MARITA (acute kidney injury) ([address] 3/13/2018    Allergic rhinitis     Anxiety     Bradycardia 8/19/2018    Cataract, right     Choroidal nevus of right eye     Chronic systolic CHF (congestive heart failure) ([address] 3/13/2018    Coronary artery disease involving native coronary artery of native heart 10/20/2016    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 LIMA TO LAD, SVG TO DIAGONAL2    Dementia associated with 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 behavioral disturbance ([address] 9/21/2018    Dermatophytosis of nail 1/10/2014    Diverticulosis     Elevated PSA     Hemorrhoids     History of measles childhood    History of mumps childhood    Hyperlipidemia     Hypertension     Hypothyroidism 9/4/2018    Mass of upper lobe of right lung 2/21/2018    Occult blood positive stool     refuses colonoscopy    Osteoarthritis     Overweight     Paroxysmal atrial fibrillation ([address] 11/7/2013    Pneumonia due to organism     Pseudophakia, left eye     PSVT (paroxysmal supraventricular tachycardia) ([address]     Recurrent UTI 3/13/2018    Retinal detachment     left, history of     Seborrheic dermatitis     Sepsis due to urinary tract infection ([address] 8/15/2018    Urinary tract infection without hematuria 3/28/2018     Past Surgical History:   Procedure Laterality Date    ABSCESS DRAINAGE  [id number]    multiple    APPENDECTOMY  [address] Left 05/07/2013    COLONOSCOPY  06/14/2021    Dr Renzo Desouza Left 12/22/2011    detached retina    SKIN TAG REMOVAL  11/01/1999    TONSILLECTOMY Family History   Problem Relation Age of Onset    Diabetes Mother     Osteoporosis Mother     Other Father         complications of prostate surgery (bleeding)    Stroke Sister     Other Sister         respiratory failure     Outpatient Medications Marked as Taking for the 3/13/23 encounter (Office Visit) with Terrie Maldonado MD   Medication Sig Dispense Refill    Probiotic Product (PROBIOTIC PO) Take by mouth      nitrofurantoin (MACRODANTIN) 100 MG capsule Take 100 mg by mouth 4 times daily      doxycycline hyclate (VIBRA-TABS) 100 MG tablet Take 100 mg by mouth 2 times daily      acetaminophen-codeine (TYLENOL/CODEINE #3) 300-30 MG per tablet Take 1 tablet by mouth every 6 hours as needed for Pain for up to 14 days. Take lowest dose possible to manage pain Max Daily Amount: 4 tablets 56 tablet 0    [DISCONTINUED] ALPRAZolam (XANAX) 0.25 MG tablet Take 0.125 mg every morning and 0.25 mg at bedtime. May take an additional 0.125 mg daily before showering as needed. simethicone (MYLICON) 40 UY/9.4TF drops Take 40 mg by mouth 4 times daily as needed (gas)      lactulose (CHRONULAC) 10 GM/15ML solution Take 30 mLs by mouth 3 times daily as needed (constipation) 1 each 0    polyvinyl alcohol (LIQUIFILM TEARS) 1.4 % ophthalmic solution Place 2 drops into both eyes every 6 hours as needed      levothyroxine (SYNTHROID) 88 MCG tablet Take 88 mcg by mouth Daily      tamsulosin (FLOMAX) 0.4 MG capsule Take 1 capsule by mouth 2 times daily 180 capsule 3    glucosamine-chondroitin 500-400 MG CAPS Take 3 capsules by mouth daily      Psyllium (REGULOID PO) Take by mouth (Reguloid): mix 1 TBSP in 8 oz of water bid      albuterol (PROVENTIL) (2.5 MG/3ML) 0.083% nebulizer solution Take 2.5 mg by nebulization every 6 hours as needed for Wheezing      hydrOXYzine HCl (ATARAX) 25 MG tablet Take 25 mg by mouth every 8 hours as needed for Itching      loperamide (IMODIUM) 2 MG capsule Take 2 mg by mouth After each loose stool.  *Not to exceed 16 gm/ 24 hours      Cholestyramine POWD Mix 1 scoopful (4 gm) po daily as needed      fluticasone (FLONASE) 50 MCG/ACT nasal spray INSTILL 1 SPRAY IN EACH NOSTRIL ONCE DAILY 16 g 1    hydrocortisone 1 % cream Apply topically to affected area as directed prn (Preparation H)      amiodarone (CORDARONE) 200 MG tablet Take 100 mg by mouth daily      rivaroxaban (XARELTO) 20 MG TABS tablet Take 20 mg by mouth every morning      acetaminophen (TYLENOL) 325 MG tablet Take 650 mg by mouth every 4 hours as needed for Pain      metoprolol tartrate (LOPRESSOR) 25 MG tablet Take 0.5 tablets by mouth 2 times daily 90 tablet 1    Multiple Vitamins-Minerals (PRESERVISION AREDS PO) Take 1 capsule by mouth daily       isosorbide mononitrate (IMDUR) 30 MG extended release tablet Take 1 tablet by mouth daily 30 tablet 3    lisinopril (PRINIVIL;ZESTRIL) 40 MG tablet Take 1 tablet by mouth daily 30 tablet 3    Multiple Vitamins-Minerals (MULTIVITAMIN ADULT PO) Take 1 tablet by mouth daily      potassium chloride (KLOR-CON M) 20 MEQ extended release tablet Take 20 mEq by mouth daily      simvastatin (ZOCOR) 20 MG tablet TAKE 1 TABLET BY MOUTH EVERY OTHER DAY  30 tablet 11    Coenzyme Q-10 100 MG CAPS Take 100 mg by mouth every other day. tolnaftate (TINACTIN) 1 % external solution Apply topically nightly to toenails per Dr. Darwin Tapia.           Pcn [penicillins], Sulfa antibiotics, and Cefdinir  Social History     Tobacco Use   Smoking Status Never   Smokeless Tobacco Never   Tobacco Comments    never smoked syant rrt,2/27/2018      (If patient a smoker, smoking cessation counseling offered)   Social History     Substance and Sexual Activity   Alcohol Use No    Alcohol/week: 0.0 standard drinks       REVIEW OF SYSTEMS:  Constitutional: negative  Eyes: negative  Respiratory: negative  Cardiovascular: negative  Gastrointestinal: negative  Genitourinary: see HPI  Musculoskeletal: negative  Skin: negative   Neurological: negative  Hematological/Lymphatic: negative  Psychological: negative    Physical Exam:    This a 80 y.o. male  Vitals:    03/13/23 0932   BP: 122/62   Pulse: 58   Resp: 16   SpO2: 95%     Body mass index is 22.73 kg/m². Constitutional: Patient in no acute distress;       Assessment and Plan        1. BPH with obstruction/lower urinary tract symptoms    2. Epididymitis               Plan:      BPH- worsening voiding. Incomplete emptying/freq. This could be primary issue. Needs indwelling catheter. VERY large prostate on CT scan. Epididymitis- with reactive hydrocele. continue abx for now. Doxycycline refilled. Needs cystoscopy under mac with mostafa    Prescriptions Ordered:  No orders of the defined types were placed in this encounter. Orders Placed:  No orders of the defined types were placed in this encounter.            Mora Henderson MD

## 2023-03-13 NOTE — TELEPHONE ENCOUNTER
[address] [place] [address]           :1934           Surgical/Procedure Planned: cystoscopy    Date & Location: 23 [facility]       Outpatient   Planned Length of OR: 1hour    Sedation: MAC    This is notification of the scheduled procedure only:  Cardiac clearance    Estimated Cardiac Risk for Non-Cardiac Surgery/Procedure     Low______ Moderate__X____ High______    Medication Instructions - Clarification needed by this date:   -Insulin:  -Other medications:    ASA 81 mg/325 mg Hold _5__ Days  Xarelto   Hold _2__ Days      Resume medications:     Lovenox indicated: _____Yes __X___NO    Provider:  Dr. Nahun Ferrer of Provider Giving Orders for Medication holds:    _____________________________________________  Medardo Garcia DO, [facility], [address], [address], [address] [facility]  [email or web address]  [phone]

## 2023-03-13 NOTE — TELEPHONE ENCOUNTER
[address] [place] [address]           :1934           Surgical/Procedure Planned: Cystoscopy    Date & Location: 23 [facility]       Outpatient   Planned Length of OR: 1hour    Sedation: MAC                                                                           This is notification of the scheduled procedure only: Medical clearance    Estimated Cardiac Risk for Non-Cardiac Surgery/Procedure     Low______ Moderate______ High______    Medication Instructions - Clarification needed by this date:   -Insulin:  -Other medications:      Resume medications:     Lovenox indicated: _____Yes _____NO    Provider: Dr. Ana Varner of Provider Giving Orders for Medication holds:    _____________________________________________

## 2023-03-13 NOTE — TELEPHONE ENCOUNTER
Patient is clear for procedure at moderate risk level from medical standpoint but will have to ask his cardiologist (he sees our group that comes here) for surgical clearance and for instructions on holding his Xarelto as well.

## 2023-03-14 ENCOUNTER — TELEPHONE (OUTPATIENT)
Dept: UROLOGY | Age: 88
End: 2023-03-14

## 2023-03-14 ENCOUNTER — OUTSIDE SERVICES (OUTPATIENT)
Dept: INTERNAL MEDICINE | Age: 88
End: 2023-03-14
Payer: MEDICARE

## 2023-03-14 VITALS
DIASTOLIC BLOOD PRESSURE: 69 MMHG | SYSTOLIC BLOOD PRESSURE: 98 MMHG | RESPIRATION RATE: 16 BRPM | HEART RATE: 71 BPM | TEMPERATURE: 97.8 F

## 2023-03-14 DIAGNOSIS — Z91.81 HISTORY OF FALL: ICD-10-CM

## 2023-03-14 DIAGNOSIS — F02.80 DEMENTIA ASSOCIATED WITH OTHER UNDERLYING DISEASE WITHOUT BEHAVIORAL DISTURBANCE (HCC): ICD-10-CM

## 2023-03-14 DIAGNOSIS — R53.1 GENERALIZED WEAKNESS: Primary | ICD-10-CM

## 2023-03-14 DIAGNOSIS — R26.89 BALANCE PROBLEM: ICD-10-CM

## 2023-03-14 DIAGNOSIS — M15.9 PRIMARY OSTEOARTHRITIS INVOLVING MULTIPLE JOINTS: ICD-10-CM

## 2023-03-14 PROCEDURE — 99347 HOME/RES VST EST SF MDM 20: CPT | Performed by: NURSE PRACTITIONER

## 2023-03-14 NOTE — Clinical Note
Please fax completed progress note to [facility] for face-to-face documentation, thanks

## 2023-03-14 NOTE — PROGRESS NOTES
[facility]      Jermaine Dickinson is a 80 y.o. male resident of [facility]. HPI:     HPI  Patient presents for face-to-face for physical and Occupational Therapy. Staff has noted increased generalized weakness, and he is requiring increased assistance with his activities of daily living. Symptoms were exacerbated by his recent hospitalization for epididymitis. Known history of osteoarthritis at multiple joints, as well as dementia. Multiple history of falls at the facility, and continues to have balance concerns. He is otherwise stable, no reports of fever, chills, nausea, vomiting, diarrhea. Current Outpatient Medications   Medication Sig Dispense Refill    ALPRAZolam (XANAX) 0.25 MG tablet Take 0.125mg every am. Take 1 tab at HS. May take an additional 0.125 mg daily before showering as needed. 60 tablet 3    Probiotic Product (PROBIOTIC PO) Take by mouth      nitrofurantoin (MACRODANTIN) 100 MG capsule Take 100 mg by mouth 4 times daily      polyethylene glycol (GLYCOLAX) 17 GM/SCOOP powder Take 17 g by mouth in the morning and at bedtime 1530 g 1    doxycycline monohydrate (MONODOX) 100 MG capsule Take 1 capsule by mouth 2 times daily for 7 days 14 capsule 0    doxycycline hyclate (VIBRA-TABS) 100 MG tablet Take 100 mg by mouth 2 times daily      acetaminophen-codeine (TYLENOL/CODEINE #3) 300-30 MG per tablet Take 1 tablet by mouth every 6 hours as needed for Pain for up to 14 days.  Take lowest dose possible to manage pain Max Daily Amount: 4 tablets 56 tablet 0    simethicone (MYLICON) 40 AX/3.3EB drops Take 40 mg by mouth 4 times daily as needed (gas)      lactulose (CHRONULAC) 10 GM/15ML solution Take 30 mLs by mouth 3 times daily as needed (constipation) 1 each 0    polyvinyl alcohol (LIQUIFILM TEARS) 1.4 % ophthalmic solution Place 2 drops into both eyes every 6 hours as needed      mineral oil enema Place 1 enema rectally - Use as directed on the bottle for occasional constipation      Dextromethorphan-guaiFENesin  MG/5ML SYRP Take 5 mLs by mouth every 6 hours as needed for Cough (Patient not taking: Reported on 3/13/2023)      levothyroxine (SYNTHROID) 88 MCG tablet Take 88 mcg by mouth Daily      tamsulosin (FLOMAX) 0.4 MG capsule Take 1 capsule by mouth 2 times daily 180 capsule 3    glucosamine-chondroitin 500-400 MG CAPS Take 3 capsules by mouth daily      Psyllium (REGULOID PO) Take by mouth (Reguloid): mix 1 TBSP in 8 oz of water bid      albuterol (PROVENTIL) (2.5 MG/3ML) 0.083% nebulizer solution Take 2.5 mg by nebulization every 6 hours as needed for Wheezing      hydrOXYzine HCl (ATARAX) 25 MG tablet Take 25 mg by mouth every 8 hours as needed for Itching      loperamide (IMODIUM) 2 MG capsule Take 2 mg by mouth After each loose stool.  *Not to exceed 16 gm/ 24 hours      Cholestyramine POWD Mix 1 scoopful (4 gm) po daily as needed      fluticasone (FLONASE) 50 MCG/ACT nasal spray INSTILL 1 SPRAY IN EACH NOSTRIL ONCE DAILY 16 g 1    hydrocortisone 1 % cream Apply topically to affected area as directed prn (Preparation H)      amiodarone (CORDARONE) 200 MG tablet Take 100 mg by mouth daily      rivaroxaban (XARELTO) 20 MG TABS tablet Take 20 mg by mouth every morning      acetaminophen (TYLENOL) 325 MG tablet Take 650 mg by mouth every 4 hours as needed for Pain      metoprolol tartrate (LOPRESSOR) 25 MG tablet Take 0.5 tablets by mouth 2 times daily 90 tablet 1    Multiple Vitamins-Minerals (PRESERVISION AREDS PO) Take 1 capsule by mouth daily       isosorbide mononitrate (IMDUR) 30 MG extended release tablet Take 1 tablet by mouth daily 30 tablet 3    lisinopril (PRINIVIL;ZESTRIL) 40 MG tablet Take 1 tablet by mouth daily 30 tablet 3    Multiple Vitamins-Minerals (MULTIVITAMIN ADULT PO) Take 1 tablet by mouth daily      potassium chloride (KLOR-CON M) 20 MEQ extended release tablet Take 20 mEq by mouth daily      simvastatin (ZOCOR) 20 MG tablet TAKE 1 TABLET BY MOUTH EVERY OTHER DAY  30 tablet 11    Coenzyme Q-10 100 MG CAPS Take 100 mg by mouth every other day. tolnaftate (TINACTIN) 1 % external solution Apply topically nightly to toenails per Dr. Rasta Rocha. No current facility-administered medications for this visit. Allergies   Allergen Reactions    Pcn [Penicillins] Swelling     As a child  Pt tolerated ceftriaxone , and keflex with no intolerance    Sulfa Antibiotics     Cefdinir Other (See Comments)     Pt tolerated ceftriaxone , and keflex with no intolerance       Health Maintenance   Topic Date Due    DTaP/Tdap/Td vaccine (1 - Tdap) Never done    Shingles vaccine (2 of 3) 11/30/2012    Prostate Specific Antigen (PSA) Screening or Monitoring  12/28/2017    Annual Wellness Visit (AWV)  01/07/2022    Flu vaccine (1) 08/01/2022    Depression Screen  01/25/2024    Lipids  02/02/2024    Pneumococcal 65+ years Vaccine  Completed    COVID-19 Vaccine  Completed    Hepatitis A vaccine  Aged Out    Hib vaccine  Aged Out    Meningococcal (ACWY) vaccine  Aged Out       Subjective:      Review of Systems   Constitutional:  Negative for chills and fever. HENT:  Negative for congestion and rhinorrhea. Respiratory:  Negative for cough and wheezing. Gastrointestinal:  Negative for diarrhea, nausea and vomiting. Neurological:  Positive for weakness. Negative for dizziness and headaches. Due to patient's clinical status, ROS of symptoms was completed by discussion with long term care facility staff, as well as with input from patient. Objective:     Vitals:    03/14/23 0909   BP: 98/69   Pulse: 71   Resp: 16   Temp: 97.8 °F (36.6 °C)     Physical Exam  Vitals reviewed. Constitutional:       General: He is not in acute distress. Appearance: He is not ill-appearing. HENT:      Head: Normocephalic and atraumatic.       Right Ear: External ear normal.      Left Ear: External ear normal.   Eyes:      Extraocular Movements: Extraocular movements intact. Conjunctiva/sclera: Conjunctivae normal.   Cardiovascular:      Rate and Rhythm: Normal rate and regular rhythm. Pulmonary:      Effort: Pulmonary effort is normal. No respiratory distress. Breath sounds: Normal breath sounds. Neurological:      General: No focal deficit present. Mental Status: He is alert. Mental status is at baseline. Psychiatric:         Mood and Affect: Mood normal.         Behavior: Behavior normal.       Assessment/Plan:        1. Generalized weakness  2. Primary osteoarthritis involving multiple joints  3. Dementia associated with 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 behavioral disturbance ([facility]  4. Balance problem  5. History of fall  - PT/OT evaluate and treat      Return if symptoms worsen or fail to improve. No orders of the defined types were placed in this encounter. No orders of the defined types were placed in this encounter.               Electronically signed by DAYANARA Matthews CNP on 3/15/2023 at 2:38 PM

## 2023-03-14 NOTE — TELEPHONE ENCOUNTER
[facility]    Pre-Operative Evaluation/Consultation    Name:  Mac Orellana                                         Age:  80 y.o. MRN:  [id number]       :  1934   Date:  3/14/2023         Sex: male    There were no encounter diagnoses. Surgeon:  Dr. Feliciano Fontanez  Procedure (Planned):  Cystoscopy with sanchez catheter exchange  Date Scheduled surgery: 23    Attending : No att. providers found    Primary Physician:   Cardiologist: Ina Sands    Type of Anesthesia Requested: Monitored Anesthesia Care    Patient Medical history:   Allergies   Allergen Reactions    Pcn [Penicillins] Swelling     As a child  Pt tolerated ceftriaxone , and keflex with no intolerance    Sulfa Antibiotics     Cefdinir Other (See Comments)     Pt tolerated ceftriaxone , and keflex with no intolerance     Patient Active Problem List   Diagnosis    Hyperlipidemia    Osteoarthritis    Allergic rhinitis    Diverticulosis    Anxiety    Atrial fibrillation (HCC)    Coronary artery disease involving native coronary artery of native heart    Mass of upper lobe of right lung    Chronic systolic CHF (congestive heart failure) (HCC)    Benign prostatic hyperplasia with incomplete bladder emptying    Hypothyroidism    Hypertension    Dementia associated with 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 behavioral disturbance (HCC)    Intermediate stage nonexudative age-related macular degeneration of both eyes    Combined forms of age-related cataract of right eye    Pseudophakia of left eye    H/O fall    Balance problem    Epididymitis    Scrotal abscess    Pyocele     Past Medical History:   Diagnosis Date    Acute bronchitis     by history    MARITA (acute kidney injury) ([address] 3/13/2018    Allergic rhinitis     Anxiety     Bradycardia 2018    Cataract, right     Choroidal nevus of right eye     Chronic systolic CHF (congestive heart failure) ([address] 3/13/2018    Coronary artery disease involving native coronary artery of native heart 10/20/2016    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 LIMA TO LAD, SVG TO DIAGONAL2    Dementia associated with 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 behavioral disturbance ([address] 9/21/2018    Dermatophytosis of nail 1/10/2014    Diverticulosis     Elevated PSA     Hemorrhoids     History of measles childhood    History of mumps childhood    Hyperlipidemia     Hypertension     Hypothyroidism 9/4/2018    Mass of upper lobe of right lung 2/21/2018    Occult blood positive stool     refuses colonoscopy    Osteoarthritis     Overweight     Paroxysmal atrial fibrillation ([address] 11/7/2013    Pneumonia due to organism     Pseudophakia, left eye     PSVT (paroxysmal supraventricular tachycardia) ([address]     Recurrent UTI 3/13/2018    Retinal detachment     left, history of     Seborrheic dermatitis     Sepsis due to urinary tract infection ([address] 8/15/2018    Urinary tract infection without hematuria 3/28/2018     Past Surgical History:   Procedure Laterality Date    ABSCESS DRAINAGE  [id number]    multiple    APPENDECTOMY  [address] Left 05/07/2013    COLONOSCOPY  06/14/2021    Dr Roxana Gee Left 12/22/2011    detached retina    SKIN TAG REMOVAL  11/01/1999    TONSILLECTOMY       Social History     Tobacco Use    Smoking status: Never    Smokeless tobacco: Never    Tobacco comments:     never smoked syant rrt,2/27/2018   Vaping Use    Vaping Use: Never used   Substance Use Topics    Alcohol use: No     Alcohol/week: 0.0 standard drinks    Drug use: No     Medications:  Current Outpatient Medications   Medication Sig Dispense Refill    ALPRAZolam (XANAX) 0.25 MG tablet Take 0.125mg every am. Take 1 tab at HS. May take an additional 0.125 mg daily before showering as needed.  60 tablet 3    Probiotic Product (PROBIOTIC PO) Take by mouth      nitrofurantoin (MACRODANTIN) 100 MG capsule Take 100 mg by mouth 4 times daily      polyethylene glycol (GLYCOLAX) 17 GM/SCOOP powder Take 17 g by mouth in the morning and at bedtime 1530 g 1    doxycycline monohydrate (MONODOX) 100 MG capsule Take 1 capsule by mouth 2 times daily for 7 days 14 capsule 0    doxycycline hyclate (VIBRA-TABS) 100 MG tablet Take 100 mg by mouth 2 times daily      acetaminophen-codeine (TYLENOL/CODEINE #3) 300-30 MG per tablet Take 1 tablet by mouth every 6 hours as needed for Pain for up to 14 days. Take lowest dose possible to manage pain Max Daily Amount: 4 tablets 56 tablet 0    simethicone (MYLICON) 40 ANDRÉS/0.9SF drops Take 40 mg by mouth 4 times daily as needed (gas)      lactulose (CHRONULAC) 10 GM/15ML solution Take 30 mLs by mouth 3 times daily as needed (constipation) 1 each 0    polyvinyl alcohol (LIQUIFILM TEARS) 1.4 % ophthalmic solution Place 2 drops into both eyes every 6 hours as needed      mineral oil enema Place 1 enema rectally - Use as directed on the bottle for occasional constipation      Dextromethorphan-guaiFENesin  MG/5ML SYRP Take 5 mLs by mouth every 6 hours as needed for Cough (Patient not taking: Reported on 3/13/2023)      levothyroxine (SYNTHROID) 88 MCG tablet Take 88 mcg by mouth Daily      tamsulosin (FLOMAX) 0.4 MG capsule Take 1 capsule by mouth 2 times daily 180 capsule 3    glucosamine-chondroitin 500-400 MG CAPS Take 3 capsules by mouth daily      Psyllium (REGULOID PO) Take by mouth (Reguloid): mix 1 TBSP in 8 oz of water bid      albuterol (PROVENTIL) (2.5 MG/3ML) 0.083% nebulizer solution Take 2.5 mg by nebulization every 6 hours as needed for Wheezing      hydrOXYzine HCl (ATARAX) 25 MG tablet Take 25 mg by mouth every 8 hours as needed for Itching      loperamide (IMODIUM) 2 MG capsule Take 2 mg by mouth After each loose stool.  *Not to exceed 16 gm/ 24 hours      Cholestyramine POWD Mix 1 scoopful (4 gm) po daily as needed      fluticasone (FLONASE) 50 MCG/ACT nasal spray INSTILL 1 SPRAY IN EACH NOSTRIL ONCE DAILY 16 g 1    hydrocortisone 1 % cream Apply topically to affected area as directed prn (Preparation H)      amiodarone (CORDARONE) 200 MG tablet Take 100 mg by mouth daily      rivaroxaban (XARELTO) 20 MG TABS tablet Take 20 mg by mouth every morning      acetaminophen (TYLENOL) 325 MG tablet Take 650 mg by mouth every 4 hours as needed for Pain      metoprolol tartrate (LOPRESSOR) 25 MG tablet Take 0.5 tablets by mouth 2 times daily 90 tablet 1    Multiple Vitamins-Minerals (PRESERVISION AREDS PO) Take 1 capsule by mouth daily       isosorbide mononitrate (IMDUR) 30 MG extended release tablet Take 1 tablet by mouth daily 30 tablet 3    lisinopril (PRINIVIL;ZESTRIL) 40 MG tablet Take 1 tablet by mouth daily 30 tablet 3    Multiple Vitamins-Minerals (MULTIVITAMIN ADULT PO) Take 1 tablet by mouth daily      potassium chloride (KLOR-CON M) 20 MEQ extended release tablet Take 20 mEq by mouth daily      simvastatin (ZOCOR) 20 MG tablet TAKE 1 TABLET BY MOUTH EVERY OTHER DAY  30 tablet 11    Coenzyme Q-10 100 MG CAPS Take 100 mg by mouth every other day. tolnaftate (TINACTIN) 1 % external solution Apply topically nightly to toenails per Dr. Yady Swanson. No current facility-administered medications for this visit. Scheduled Meds:  Continuous Infusions:  PRN Meds:. Prior to Admission medications    Medication Sig Start Date End Date Taking? Authorizing Provider   ALPRAZolam (XANAX) 0.25 MG tablet Take 0.125mg every am. Take 1 tab at HS. May take an additional 0.125 mg daily before showering as needed.  3/13/23 4/13/23  Ritu Cerda DO   Probiotic Product (PROBIOTIC PO) Take by mouth    Historical Provider, MD   nitrofurantoin (MACRODANTIN) 100 MG capsule Take 100 mg by mouth 4 times daily    Historical Provider, MD   polyethylene glycol (GLYCOLAX) 17 GM/SCOOP powder Take 17 g by mouth in the morning and at bedtime 3/13/23 4/12/23  Darek Bravo MD   doxycycline monohydrate (MONODOX) 100 MG capsule Take 1 capsule by mouth 2 times daily for 7 days 3/13/23 3/20/23  Darek Bravo MD   doxycycline hyclate (VIBRA-TABS) 100 MG tablet Take 100 mg by mouth 2 times daily    Historical Provider, MD   acetaminophen-codeine (TYLENOL/CODEINE #3) 300-30 MG per tablet Take 1 tablet by mouth every 6 hours as needed for Pain for up to 14 days. Take lowest dose possible to manage pain Max Daily Amount: 4 tablets 3/10/23 3/24/23  Guillermo Cerda DO   simethicone (MYLICON) 40 GJ/2.9OK drops Take 40 mg by mouth 4 times daily as needed (gas)    Historical Provider, MD   lactulose (CHRONULAC) 10 GM/15ML solution Take 30 mLs by mouth 3 times daily as needed (constipation) 2/27/23   Malini Ventura MD   polyvinyl alcohol (LIQUIFILM TEARS) 1.4 % ophthalmic solution Place 2 drops into both eyes every 6 hours as needed    Historical Provider, MD   mineral oil enema Place 1 enema rectally - Use as directed on the bottle for occasional constipation    Historical Provider, MD   Dextromethorphan-guaiFENesin  MG/5ML SYRP Take 5 mLs by mouth every 6 hours as needed for Cough  Patient not taking: Reported on 3/13/2023    Historical Provider, MD   levothyroxine (SYNTHROID) 88 MCG tablet Take 88 mcg by mouth Daily    Historical Provider, MD   tamsulosin (FLOMAX) 0.4 MG capsule Take 1 capsule by mouth 2 times daily 12/21/22   Corazon Hart MD   glucosamine-chondroitin 500-400 MG CAPS Take 3 capsules by mouth daily    Historical Provider, MD   Psyllium (REGULOID PO) Take by mouth (Reguloid): mix 1 TBSP in 8 oz of water bid    Historical Provider, MD   albuterol (PROVENTIL) (2.5 MG/3ML) 0.083% nebulizer solution Take 2.5 mg by nebulization every 6 hours as needed for Wheezing    Historical Provider, MD   hydrOXYzine HCl (ATARAX) 25 MG tablet Take 25 mg by mouth every 8 hours as needed for Itching    Historical Provider, MD   loperamide (IMODIUM) 2 MG capsule Take 2 mg by mouth After each loose stool.  *Not to exceed 16 gm/ 24 hours    Historical Provider, MD   Cholestyramine POWD Mix 1 scoopful (4 gm) po daily as needed    Historical Provider, MD   fluticasone (FLONASE) 50 MCG/ACT nasal spray INSTILL 1 SPRAY IN EACH NOSTRIL ONCE DAILY 8/22/22   Guillermo Cerda DO   hydrocortisone 1 % cream Apply topically to affected area as directed prn (Preparation H)    Historical Provider, MD   amiodarone (CORDARONE) 200 MG tablet Take 100 mg by mouth daily    Historical Provider, MD   rivaroxaban (XARELTO) 20 MG TABS tablet Take 20 mg by mouth every morning    Historical Provider, MD   acetaminophen (TYLENOL) 325 MG tablet Take 650 mg by mouth every 4 hours as needed for Pain    Historical Provider, MD   metoprolol tartrate (LOPRESSOR) 25 MG tablet Take 0.5 tablets by mouth 2 times daily 6/13/19   Obie Becerra DO   Multiple Vitamins-Minerals (PRESERVISION AREDS PO) Take 1 capsule by mouth daily     Historical Provider, MD   isosorbide mononitrate (IMDUR) 30 MG extended release tablet Take 1 tablet by mouth daily 8/22/18   Maria G Hitchcock MD   lisinopril (PRINIVIL;ZESTRIL) 40 MG tablet Take 1 tablet by mouth daily 8/22/18   Maria G Hitchcock MD   Multiple Vitamins-Minerals (MULTIVITAMIN ADULT PO) Take 1 tablet by mouth daily    Historical Provider, MD   potassium chloride (KLOR-CON M) 20 MEQ extended release tablet Take 20 mEq by mouth daily    Historical Provider, MD   simvastatin (ZOCOR) 20 MG tablet TAKE 1 TABLET BY MOUTH EVERY OTHER DAY  3/7/17   Guillermo Cerda DO   Coenzyme Q-10 100 MG CAPS Take 100 mg by mouth every other day. Historical Provider, MD   tolnaftate (TINACTIN) 1 % external solution Apply topically nightly to toenails per Dr. Héctor Mehta. Historical Provider, MD     Vital Signs (Current) [unfilled]    Weight:   Wt Readings from Last 1 Encounters:   03/13/23 163 lb (73.9 kg)     Height:   Ht Readings from Last 1 Encounters:   03/13/23 5' 11\" (1.803 m)      BMI:  There is no height or weight on file to calculate BMI.   Estimated body mass index is 22.73 kg/m² as calculated from the following:    Height as of 3/13/23: 5' 11\" (1.803 m). Weight as of 3/13/23: 163 lb (73.9 kg). body mass index is unknown because there is no height or weight on file. Cardiac Clearance: cleared by [address] [place] [address] by     Appointment for surgery Clearance scheduled for:None     Preoperative Testing: These are the current and completed labs:  CBC:   Lab Results   Component Value Date/Time    WBC 8.7 03/10/2023 02:57 PM    RBC 4.33 03/10/2023 02:57 PM    HGB 13.1 03/10/2023 02:57 PM    HCT 39.6 03/10/2023 02:57 PM    MCV 91.5 03/10/2023 02:57 PM    RDW 15.0 03/10/2023 02:57 PM     03/10/2023 02:57 PM     CMP:   Lab Results   Component Value Date/Time     03/10/2023 02:57 PM    K 4.3 03/10/2023 02:57 PM     03/10/2023 02:57 PM    CO2 25 03/10/2023 02:57 PM    BUN 22 03/10/2023 02:57 PM    CREATININE 1.13 03/10/2023 02:57 PM    GFRAA >60 10/05/2021 08:26 AM    LABGLOM >60 03/10/2023 02:57 PM    GLUCOSE 100 03/10/2023 02:57 PM    PROT 6.2 03/08/2023 10:40 AM    CALCIUM 9.3 03/10/2023 02:57 PM    BILITOT 0.4 03/08/2023 10:40 AM    ALKPHOS 117 03/08/2023 10:40 AM    AST 16 03/08/2023 10:40 AM    ALT 11 03/08/2023 10:40 AM     POC Tests: No results for input(s): POCGLU, POCNA, POCK, POCCL, POCBUN, POCHEMO, POCHCT in the last 72 hours.   Coags    Lab Results   Component Value Date/Time    PROTIME 22.3 10/05/2021 08:26 AM    INR 2.1 10/05/2021 08:26 AM     HCG (If Applicable) No results found for: PREGTESTUR, PREGSERUM, HCG, HCGQUANT   ABGs No results found for: PHART, PO2ART, EWT3YPG, TNK6SUH, BEART, L9QUENUB   Type & Screen (If Applicable)  No results found for: Neena Rios    Additional ordered pre-operative testing:  []CBC    []ABG      [] BMP   []URINALYSIS   []CMP    []HCG   []COAGS PT/INR  []T&C  []LFTs   []TYPE AND SCREEN    [] EKG  [] Chest X-Ray  [] Other Radiology    [] Sent to Hospitalist None  [] Sent to Anesthesia for your review: None   [] Additional Orders: None     Comments:None   Requests: No Special requests    Signed: Philip Milan LPN 1/15/9869 43:35 AM

## 2023-03-15 ENCOUNTER — TELEPHONE (OUTPATIENT)
Dept: OTHER | Facility: CLINIC | Age: 88
End: 2023-03-15

## 2023-03-15 ENCOUNTER — APPOINTMENT (OUTPATIENT)
Dept: GENERAL RADIOLOGY | Age: 88
DRG: 699 | End: 2023-03-15
Payer: MEDICARE

## 2023-03-15 ENCOUNTER — APPOINTMENT (OUTPATIENT)
Dept: CT IMAGING | Age: 88
DRG: 699 | End: 2023-03-15
Payer: MEDICARE

## 2023-03-15 ENCOUNTER — HOSPITAL ENCOUNTER (INPATIENT)
Age: 88
LOS: 2 days | Discharge: NURSING, REHAB OR LONG-TERM CARE | DRG: 699 | End: 2023-03-18
Attending: EMERGENCY MEDICINE | Admitting: INTERNAL MEDICINE
Payer: MEDICARE

## 2023-03-15 DIAGNOSIS — F41.9 ANXIETY: ICD-10-CM

## 2023-03-15 DIAGNOSIS — T83.9XXA PROBLEM WITH FOLEY CATHETER, INITIAL ENCOUNTER (HCC): ICD-10-CM

## 2023-03-15 DIAGNOSIS — R31.0 GROSS HEMATURIA: ICD-10-CM

## 2023-03-15 DIAGNOSIS — A41.9 SEPSIS, DUE TO UNSPECIFIED ORGANISM, UNSPECIFIED WHETHER ACUTE ORGAN DYSFUNCTION PRESENT (HCC): ICD-10-CM

## 2023-03-15 DIAGNOSIS — R10.9 ABDOMINAL PAIN, UNSPECIFIED ABDOMINAL LOCATION: Primary | ICD-10-CM

## 2023-03-15 LAB
ABSOLUTE EOS #: 0 K/UL (ref 0–0.4)
ABSOLUTE IMMATURE GRANULOCYTE: 0 K/UL (ref 0–0.3)
ABSOLUTE LYMPH #: 0.6 K/UL (ref 1–4.8)
ABSOLUTE MONO #: 0 K/UL (ref 0.1–1.2)
ALBUMIN SERPL-MCNC: 3.6 G/DL (ref 3.5–5.2)
ALBUMIN/GLOBULIN RATIO: 1.2 (ref 1–2.5)
ALP SERPL-CCNC: 128 U/L (ref 40–129)
ALT SERPL-CCNC: 17 U/L (ref 5–41)
AMYLASE SERPL-CCNC: 38 U/L (ref 28–100)
ANION GAP SERPL CALCULATED.3IONS-SCNC: 15 MMOL/L (ref 9–17)
AST SERPL-CCNC: 40 U/L
BASOPHILS # BLD: 0 % (ref 0–2)
BASOPHILS ABSOLUTE: 0 K/UL (ref 0–0.2)
BILIRUB DIRECT SERPL-MCNC: 0.3 MG/DL
BILIRUB INDIRECT SERPL-MCNC: 0.7 MG/DL (ref 0–1)
BILIRUB SERPL-MCNC: 1 MG/DL (ref 0.3–1.2)
BUN SERPL-MCNC: 34 MG/DL (ref 8–23)
CALCIUM SERPL-MCNC: 9.2 MG/DL (ref 8.6–10.4)
CHLORIDE SERPL-SCNC: 105 MMOL/L (ref 98–107)
CO2 SERPL-SCNC: 22 MMOL/L (ref 20–31)
CREAT SERPL-MCNC: 2.52 MG/DL (ref 0.7–1.2)
EKG ATRIAL RATE: 102 BPM
EKG ATRIAL RATE: 119 BPM
EKG Q-T INTERVAL: 312 MS
EKG Q-T INTERVAL: 314 MS
EKG QRS DURATION: 102 MS
EKG QRS DURATION: 98 MS
EKG QTC CALCULATION (BAZETT): 441 MS
EKG QTC CALCULATION (BAZETT): 450 MS
EKG R AXIS: -48 DEGREES
EKG R AXIS: -69 DEGREES
EKG T AXIS: 86 DEGREES
EKG T AXIS: 95 DEGREES
EKG VENTRICULAR RATE: 119 BPM
EKG VENTRICULAR RATE: 125 BPM
EOSINOPHILS RELATIVE PERCENT: 0 % (ref 1–8)
GFR SERPL CREATININE-BSD FRML MDRD: 24 ML/MIN/1.73M2
GLUCOSE SERPL-MCNC: 126 MG/DL (ref 70–99)
HCT VFR BLD AUTO: 36.2 % (ref 40.7–50.3)
HGB BLD-MCNC: 12.3 G/DL (ref 13–17)
IMMATURE GRANULOCYTES: 0 %
INR PPP: 2.2
LACTATE PLASV-SCNC: 2.1 MMOL/L (ref 0.5–2.2)
LACTATE PLASV-SCNC: 2.7 MMOL/L (ref 0.5–2.2)
LIPASE SERPL-CCNC: 6 U/L (ref 13–60)
LYMPHOCYTES # BLD: 5 % (ref 15–43)
MCH RBC QN AUTO: 30.4 PG (ref 25.2–33.5)
MCHC RBC AUTO-ENTMCNC: 34 G/DL (ref 25.2–33.5)
MCV RBC AUTO: 89.6 FL (ref 82.6–102.9)
MONOCYTES # BLD: 0 % (ref 6–14)
MORPHOLOGY: ABNORMAL
MORPHOLOGY: ABNORMAL
NRBC AUTOMATED: 0 PER 100 WBC
PDW BLD-RTO: 15.4 % (ref 11.8–14.4)
PLATELET # BLD AUTO: 150 K/UL (ref 138–453)
PMV BLD AUTO: 10.9 FL (ref 8.1–13.5)
POTASSIUM SERPL-SCNC: 5 MMOL/L (ref 3.7–5.3)
PROT SERPL-MCNC: 6.6 G/DL (ref 6.4–8.3)
PROTHROMBIN TIME: 23.8 SEC (ref 11.5–14.2)
RBC # BLD: 4.04 M/UL (ref 4.21–5.77)
SARS-COV-2 RDRP RESP QL NAA+PROBE: NOT DETECTED
SEG NEUTROPHILS: 95 % (ref 44–74)
SEGMENTED NEUTROPHILS ABSOLUTE COUNT: 11.3 K/UL (ref 1.5–8.1)
SODIUM SERPL-SCNC: 142 MMOL/L (ref 135–144)
SPECIMEN DESCRIPTION: NORMAL
TROPONIN I SERPL DL<=0.01 NG/ML-MCNC: 45 NG/L (ref 0–22)
TROPONIN I SERPL DL<=0.01 NG/ML-MCNC: 50 NG/L (ref 0–22)
WBC # BLD AUTO: 11.9 K/UL (ref 3.5–11.3)

## 2023-03-15 PROCEDURE — 99285 EMERGENCY DEPT VISIT HI MDM: CPT

## 2023-03-15 PROCEDURE — 96365 THER/PROPH/DIAG IV INF INIT: CPT

## 2023-03-15 PROCEDURE — 83690 ASSAY OF LIPASE: CPT

## 2023-03-15 PROCEDURE — 85025 COMPLETE CBC W/AUTO DIFF WBC: CPT

## 2023-03-15 PROCEDURE — 87205 SMEAR GRAM STAIN: CPT

## 2023-03-15 PROCEDURE — 96367 TX/PROPH/DG ADDL SEQ IV INF: CPT

## 2023-03-15 PROCEDURE — 94660 CPAP INITIATION&MGMT: CPT

## 2023-03-15 PROCEDURE — 2580000003 HC RX 258: Performed by: EMERGENCY MEDICINE

## 2023-03-15 PROCEDURE — 36415 COLL VENOUS BLD VENIPUNCTURE: CPT

## 2023-03-15 PROCEDURE — 85610 PROTHROMBIN TIME: CPT

## 2023-03-15 PROCEDURE — 83605 ASSAY OF LACTIC ACID: CPT

## 2023-03-15 PROCEDURE — 87106 FUNGI IDENTIFICATION YEAST: CPT

## 2023-03-15 PROCEDURE — 6370000000 HC RX 637 (ALT 250 FOR IP): Performed by: EMERGENCY MEDICINE

## 2023-03-15 PROCEDURE — 87040 BLOOD CULTURE FOR BACTERIA: CPT

## 2023-03-15 PROCEDURE — 6360000002 HC RX W HCPCS: Performed by: EMERGENCY MEDICINE

## 2023-03-15 PROCEDURE — 71045 X-RAY EXAM CHEST 1 VIEW: CPT

## 2023-03-15 PROCEDURE — 74176 CT ABD & PELVIS W/O CONTRAST: CPT

## 2023-03-15 PROCEDURE — 87635 SARS-COV-2 COVID-19 AMP PRB: CPT

## 2023-03-15 PROCEDURE — 2500000003 HC RX 250 WO HCPCS: Performed by: EMERGENCY MEDICINE

## 2023-03-15 PROCEDURE — 93005 ELECTROCARDIOGRAM TRACING: CPT | Performed by: EMERGENCY MEDICINE

## 2023-03-15 PROCEDURE — 87154 CUL TYP ID BLD PTHGN 6+ TRGT: CPT

## 2023-03-15 PROCEDURE — 82248 BILIRUBIN DIRECT: CPT

## 2023-03-15 PROCEDURE — 96374 THER/PROPH/DIAG INJ IV PUSH: CPT

## 2023-03-15 PROCEDURE — 80053 COMPREHEN METABOLIC PANEL: CPT

## 2023-03-15 PROCEDURE — 82150 ASSAY OF AMYLASE: CPT

## 2023-03-15 PROCEDURE — 51701 INSERT BLADDER CATHETER: CPT

## 2023-03-15 PROCEDURE — 84484 ASSAY OF TROPONIN QUANT: CPT

## 2023-03-15 RX ORDER — ACETAMINOPHEN 325 MG/1
650 TABLET ORAL ONCE
Status: COMPLETED | OUTPATIENT
Start: 2023-03-15 | End: 2023-03-15

## 2023-03-15 RX ORDER — 0.9 % SODIUM CHLORIDE 0.9 %
1000 INTRAVENOUS SOLUTION INTRAVENOUS ONCE
Status: COMPLETED | OUTPATIENT
Start: 2023-03-15 | End: 2023-03-15

## 2023-03-15 RX ORDER — CIPROFLOXACIN 2 MG/ML
400 INJECTION, SOLUTION INTRAVENOUS ONCE
Status: COMPLETED | OUTPATIENT
Start: 2023-03-15 | End: 2023-03-15

## 2023-03-15 RX ORDER — METRONIDAZOLE 500 MG/100ML
500 INJECTION, SOLUTION INTRAVENOUS ONCE
Status: COMPLETED | OUTPATIENT
Start: 2023-03-15 | End: 2023-03-15

## 2023-03-15 RX ADMIN — METRONIDAZOLE 500 MG: 500 INJECTION, SOLUTION INTRAVENOUS at 22:07

## 2023-03-15 RX ADMIN — ACETAMINOPHEN 650 MG: 325 TABLET ORAL at 20:57

## 2023-03-15 RX ADMIN — SODIUM CHLORIDE 1000 ML: 9 INJECTION, SOLUTION INTRAVENOUS at 19:30

## 2023-03-15 RX ADMIN — NOREPINEPHRINE BITARTRATE 1 MCG/MIN: 1 INJECTION, SOLUTION, CONCENTRATE INTRAVENOUS at 23:31

## 2023-03-15 RX ADMIN — CIPROFLOXACIN 400 MG: 2 INJECTION, SOLUTION INTRAVENOUS at 21:00

## 2023-03-15 ASSESSMENT — ENCOUNTER SYMPTOMS
NAUSEA: 0
WHEEZING: 0
RHINORRHEA: 0
COUGH: 0
VOMITING: 0
DIARRHEA: 0

## 2023-03-15 ASSESSMENT — PAIN - FUNCTIONAL ASSESSMENT
PAIN_FUNCTIONAL_ASSESSMENT: PAIN ASSESSMENT IN ADVANCED DEMENTIA (PAINAD)
PAIN_FUNCTIONAL_ASSESSMENT: PAIN ASSESSMENT IN ADVANCED DEMENTIA (PAINAD)

## 2023-03-15 ASSESSMENT — PAIN SCALES - PAIN ASSESSMENT IN ADVANCED DEMENTIA (PAINAD)
TOTALSCORE: 5
BREATHING: 0
BODYLANGUAGE: 1
FACIALEXPRESSION: 1
CONSOLABILITY: 0
CONSOLABILITY: 1
BREATHING: 0
BODYLANGUAGE: 1
FACIALEXPRESSION: 2
NEGVOCALIZATION: 0
NEGVOCALIZATION: 1
TOTALSCORE: 2

## 2023-03-15 NOTE — ED PROVIDER NOTES
[facility]  eMERGENCY dEPARTMENT eNCOUnter      Pt Name: Bob Martinez  MRN: [id number]  Margaretgfkirti 12/31/1934  Date of evaluation: 3/15/2023      CHIEF COMPLAINT       Chief Complaint   Patient presents with    Abdominal Pain     Noted blood in his urine. HISTORY OF PRESENT ILLNESS    Bob Martinez is a 80 y.o. male who presents with chief complaint of abdominal pain he is unable to give any history due to dementia he does not complain of anything specific he does answer simple questions and follow commands , he moans apparently he has had hematuria as well he comes from the nursing home  Patient is on Xarelto for A-fib  Patient is a DNR [employer] arrest    REVIEW OF SYSTEMS         Review of Systems   Unable to perform ROS: Dementia       PAST MEDICAL HISTORY    has a past medical history of Acute bronchitis, MARITA (acute kidney injury) ([address] Allergic rhinitis, Anxiety, Bradycardia, Cataract, right, Choroidal nevus of right eye, Chronic systolic CHF (congestive heart failure) ([address] Coronary artery disease involving native coronary artery of native heart, Dementia associated with 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 behavioral disturbance ([address] Dermatophytosis of nail, Diverticulosis, Elevated PSA, Hemorrhoids, History of measles, History of mumps, Hyperlipidemia, Hypertension, Hypothyroidism, Mass of upper lobe of right lung, Occult blood positive stool, Osteoarthritis, Overweight, Paroxysmal atrial fibrillation ([address] Pneumonia due to organism, Pseudophakia, left eye, PSVT (paroxysmal supraventricular tachycardia) ([address] Recurrent UTI, Retinal detachment, Seborrheic dermatitis, Sepsis due to urinary tract infection ([address] and Urinary tract infection without hematuria. SURGICAL HISTORY      has a past surgical history that includes Coronary artery bypass graft; Appendectomy (1940 and 1955); Tonsillectomy; retinal laser (Left, 12/22/2011); Cataract removal (Left, 05/07/2013);  Skin tag removal (11/01/1999); Abscess Drainage ([id number]); and Colonoscopy (06/14/2021). CURRENT MEDICATIONS       Previous Medications    ACETAMINOPHEN (TYLENOL) 325 MG TABLET    Take 650 mg by mouth every 4 hours as needed for Pain    ACETAMINOPHEN-CODEINE (TYLENOL/CODEINE #3) 300-30 MG PER TABLET    Take 1 tablet by mouth every 6 hours as needed for Pain for up to 14 days. Take lowest dose possible to manage pain Max Daily Amount: 4 tablets    ALBUTEROL (PROVENTIL) (2.5 MG/3ML) 0.083% NEBULIZER SOLUTION    Take 2.5 mg by nebulization every 6 hours as needed for Wheezing    ALPRAZOLAM (XANAX) 0.25 MG TABLET    Take 0.125mg every am. Take 1 tab at HS. May take an additional 0.125 mg daily before showering as needed. AMIODARONE (CORDARONE) 200 MG TABLET    Take 100 mg by mouth daily    CHOLESTYRAMINE POWD    Mix 1 scoopful (4 gm) po daily as needed    COENZYME Q-10 100 MG CAPS    Take 100 mg by mouth every other day.     DEXTROMETHORPHAN-GUAIFENESIN  MG/5ML SYRP    Take 5 mLs by mouth every 6 hours as needed for Cough    DOXYCYCLINE HYCLATE (VIBRA-TABS) 100 MG TABLET    Take 100 mg by mouth 2 times daily    DOXYCYCLINE MONOHYDRATE (MONODOX) 100 MG CAPSULE    Take 1 capsule by mouth 2 times daily for 7 days    FLUTICASONE (FLONASE) 50 MCG/ACT NASAL SPRAY    INSTILL 1 SPRAY IN EACH NOSTRIL ONCE DAILY    GLUCOSAMINE-CHONDROITIN 500-400 MG CAPS    Take 3 capsules by mouth daily    HYDROCORTISONE 1 % CREAM    Apply topically to affected area as directed prn (Preparation H)    HYDROXYZINE HCL (ATARAX) 25 MG TABLET    Take 25 mg by mouth every 8 hours as needed for Itching    ISOSORBIDE MONONITRATE (IMDUR) 30 MG EXTENDED RELEASE TABLET    Take 1 tablet by mouth daily    LACTULOSE (CHRONULAC) 10 GM/15ML SOLUTION    Take 30 mLs by mouth 3 times daily as needed (constipation)    LEVOTHYROXINE (SYNTHROID) 88 MCG TABLET    Take 88 mcg by mouth Daily    LISINOPRIL (PRINIVIL;ZESTRIL) 40 MG TABLET    Take 1 tablet by mouth daily LOPERAMIDE (IMODIUM) 2 MG CAPSULE    Take 2 mg by mouth After each loose stool. *Not to exceed 16 gm/ 24 hours    METOPROLOL TARTRATE (LOPRESSOR) 25 MG TABLET    Take 0.5 tablets by mouth 2 times daily    MINERAL OIL ENEMA    Place 1 enema rectally - Use as directed on the bottle for occasional constipation    MULTIPLE VITAMINS-MINERALS (MULTIVITAMIN ADULT PO)    Take 1 tablet by mouth daily    MULTIPLE VITAMINS-MINERALS (PRESERVISION AREDS PO)    Take 1 capsule by mouth daily     NITROFURANTOIN (MACRODANTIN) 100 MG CAPSULE    Take 100 mg by mouth 4 times daily    POLYETHYLENE GLYCOL (GLYCOLAX) 17 GM/SCOOP POWDER    Take 17 g by mouth in the morning and at bedtime    POLYVINYL ALCOHOL (LIQUIFILM TEARS) 1.4 % OPHTHALMIC SOLUTION    Place 2 drops into both eyes every 6 hours as needed    POTASSIUM CHLORIDE (KLOR-CON M) 20 MEQ EXTENDED RELEASE TABLET    Take 20 mEq by mouth daily    PROBIOTIC PRODUCT (PROBIOTIC PO)    Take by mouth    PSYLLIUM (REGULOID PO)    Take by mouth (Reguloid): mix 1 TBSP in 8 oz of water bid    RIVAROXABAN (XARELTO) 20 MG TABS TABLET    Take 20 mg by mouth every morning    SIMETHICONE (MYLICON) 40 NG/6.9NQ DROPS    Take 40 mg by mouth 4 times daily as needed (gas)    SIMVASTATIN (ZOCOR) 20 MG TABLET    TAKE 1 TABLET BY MOUTH EVERY OTHER DAY     TAMSULOSIN (FLOMAX) 0.4 MG CAPSULE    Take 1 capsule by mouth 2 times daily    TOLNAFTATE (TINACTIN) 1 % EXTERNAL SOLUTION    Apply topically nightly to toenails per Dr. Yuan Delgado. ALLERGIES     is allergic to pcn [penicillins], sulfa antibiotics, and cefdinir. FAMILY HISTORY     He indicated that his mother is . He indicated that his father is . He indicated that his sister is . family history includes Diabetes in his mother; Osteoporosis in his mother; Other in his father and sister; Stroke in his sister. SOCIAL HISTORY      reports that he has never smoked.  He has never used smokeless tobacco. He reports that he does not drink alcohol and does not use drugs. PHYSICAL EXAM     INITIAL VITALS:  height is 5' 11\" (1.803 m) and weight is 163 lb (73.9 kg). His tympanic temperature is 100 °F (37.8 °C). His blood pressure is 133/48 (abnormal) and his pulse is 117 (abnormal). His respiration is 16 and oxygen saturation is 97%. Physical Exam  Vitals and nursing note reviewed. Constitutional:       General: He is in acute distress. Appearance: He is well-developed. He is not ill-appearing or toxic-appearing. Comments: Patient is intermittently moaning and then quiet maintaining his own airway   HENT:      Head: Normocephalic and atraumatic. Comments: Is a little bit of old blood on the nare but no active bleeding     Right Ear: External ear normal.      Left Ear: External ear normal.   Eyes:      Pupils: Pupils are equal, round, and reactive to light. Cardiovascular:      Rate and Rhythm: Normal rate and regular rhythm. Pulmonary:      Effort: Pulmonary effort is normal.      Breath sounds: Normal breath sounds. Abdominal:      General: Bowel sounds are normal.      Palpations: Abdomen is soft. Tenderness: There is generalized abdominal tenderness. Musculoskeletal:         General: Normal range of motion. Cervical back: Normal range of motion and neck supple. Skin:     General: Skin is warm and dry. Neurological:      General: No focal deficit present. Mental Status: He is alert and oriented to person, place, and time.       Comments: Patient does answer simple questions he stops moaning he says he feels pretty good   Psychiatric:         Behavior: Behavior normal.         DIFFERENTIAL DIAGNOSIS/ MDM:     Nominal pain hematuria we will do work-up abdominal    DIAGNOSTIC RESULTS     EKG: All EKG's are interpreted by the Emergency Department Physician who either signs or Co-signs this chart in the absence of a cardiologist.  EKG shows what appears to be sinus tach rate of 119 there is a large amount of artifact QRS durations 102 ms QT corrected 441 ms axis of -69 there is no acute ST or T wave changes    RADIOLOGY:   I directly visualized the following  images and reviewed the radiologist interpretations:          ED BEDSIDE ULTRASOUND:       LABS:  Labs Reviewed   CBC WITH AUTO DIFFERENTIAL - Abnormal; Notable for the following components:       Result Value    WBC 11.9 (*)     RBC 4.04 (*)     Hemoglobin 12.3 (*)     Hematocrit 36.2 (*)     MCHC 34.0 (*)     RDW 15.4 (*)     Monocytes 0 (*)     Lymphocytes 5 (*)     Seg Neutrophils 95 (*)     Eosinophils % 0 (*)     Absolute Mono # 0.00 (*)     Absolute Lymph # 0.60 (*)     Segs Absolute 11.30 (*)     All other components within normal limits   COMPREHENSIVE METABOLIC W/ BILI PROFILE W/ REFLEX TO MG - Abnormal; Notable for the following components:    AST 40 (*)     Bilirubin, Direct 0.3 (*)     BUN 34 (*)     Creatinine 2.52 (*)     Est, Glom Filt Rate 24 (*)     Glucose 126 (*)     All other components within normal limits   LACTIC ACID - Abnormal; Notable for the following components:    Lactic Acid 2.7 (*)     All other components within normal limits   LIPASE - Abnormal; Notable for the following components:    Lipase 6 (*)     All other components within normal limits   PROTIME-INR - Abnormal; Notable for the following components:    Protime 23.8 (*)     All other components within normal limits   TROPONIN - Abnormal; Notable for the following components:    Troponin, High Sensitivity 50 (*)     All other components within normal limits   AMYLASE   URINALYSIS WITH REFLEX TO CULTURE   TROPONIN       Elevated lactic acid elevated BUN and creatinine above normal troponin elevated    EMERGENCY DEPARTMENT COURSE:   Vitals:    Vitals:    03/15/23 1638   BP: (!) 133/48   Pulse: (!) 117   Resp: 16   Temp: 100 °F (37.8 °C)   TempSrc: Tympanic   SpO2: 97%   Weight: 163 lb (73.9 kg)   Height: 5' 11\" (1.803 m)     -------------------------  BP: (!) 133/48, Temp: 100 °F (37.8 °C), Heart Rate: (!) 117, Resp: 16        Re-evaluation Notes    Resting currently comfortable    CRITICAL CARE:   None        CONSULTS:      PROCEDURES:  None    FINAL IMPRESSION    No diagnosis found. DISPOSITION/PLAN   DISPOSITION   Care of this patient is passed to the oncoming physician at the end of my shift 1930 hrs. pending results    Condition on Disposition    Stable    PATIENT REFERRED TO:  No follow-up provider specified. DISCHARGE MEDICATIONS:  New Prescriptions    No medications on file       (Please note that portions of this note were completed with a voice recognition program.  Efforts were made to edit the dictations but occasionally words are mis-transcribed.)    Kia Ricks MD,, MD, F.A.A.E.M.   Attending Emergency Physician

## 2023-03-15 NOTE — TELEPHONE ENCOUNTER
Writer contacted Dr Parish Kolb to inform of 30 day readmission risk. Writer was unable to speak with the attending, and left a message. No Decision on disposition at this time.       Call Back: If you need to call back to inform of disposition you can contact me at [phone]     Attending physician: Dr. Parish Kolb

## 2023-03-16 PROBLEM — N32.0 BLADDER OUTLET OBSTRUCTION: Status: ACTIVE | Noted: 2023-03-16

## 2023-03-16 PROBLEM — I95.9 HYPOTENSION: Status: ACTIVE | Noted: 2023-03-16

## 2023-03-16 PROBLEM — R10.9 ABDOMINAL PAIN: Status: ACTIVE | Noted: 2023-03-16

## 2023-03-16 PROBLEM — I50.42 CHRONIC COMBINED SYSTOLIC AND DIASTOLIC CHF (CONGESTIVE HEART FAILURE) (HCC): Status: ACTIVE | Noted: 2018-03-13

## 2023-03-16 PROBLEM — N13.30 HYDRONEPHROSIS, BILATERAL: Status: ACTIVE | Noted: 2023-03-16

## 2023-03-16 LAB
ABSOLUTE BANDS #: 0.37 K/UL
ABSOLUTE EOS #: 0 K/UL (ref 0–0.4)
ABSOLUTE IMMATURE GRANULOCYTE: 0 K/UL (ref 0–0.3)
ABSOLUTE LYMPH #: 0.37 K/UL (ref 1–4.8)
ABSOLUTE MONO #: 0.18 K/UL (ref 0.1–1.2)
ALBUMIN SERPL-MCNC: 2.9 G/DL (ref 3.5–5.2)
ALBUMIN/GLOBULIN RATIO: 1.1 (ref 1–2.5)
ALP SERPL-CCNC: 101 U/L (ref 40–129)
ALT SERPL-CCNC: 14 U/L (ref 5–41)
ANION GAP SERPL CALCULATED.3IONS-SCNC: 13 MMOL/L (ref 9–17)
AST SERPL-CCNC: 32 U/L
BACTERIA: ABNORMAL
BANDS: 4 %
BASOPHILS # BLD: 1 % (ref 0–2)
BASOPHILS ABSOLUTE: 0.09 K/UL (ref 0–0.2)
BILIRUB SERPL-MCNC: 0.7 MG/DL (ref 0.3–1.2)
BILIRUBIN URINE: ABNORMAL
BUN SERPL-MCNC: 29 MG/DL (ref 8–23)
BUN/CREAT BLD: 18 (ref 9–20)
CALCIUM SERPL-MCNC: 8.4 MG/DL (ref 8.6–10.4)
CHLORIDE SERPL-SCNC: 108 MMOL/L (ref 98–107)
CO2 SERPL-SCNC: 20 MMOL/L (ref 20–31)
COLOR: ABNORMAL
CREAT SERPL-MCNC: 1.58 MG/DL (ref 0.7–1.2)
EOSINOPHILS RELATIVE PERCENT: 0 % (ref 1–4)
EPITHELIAL CELLS UA: ABNORMAL /HPF (ref 0–5)
GFR SERPL CREATININE-BSD FRML MDRD: 42 ML/MIN/1.73M2
GLUCOSE SERPL-MCNC: 120 MG/DL (ref 70–99)
GLUCOSE UR STRIP.AUTO-MCNC: NEGATIVE MG/DL
HCT VFR BLD AUTO: 30.6 % (ref 40.7–50.3)
HGB BLD-MCNC: 10.2 G/DL (ref 13–17)
IMMATURE GRANULOCYTES: 0 %
KETONES UR STRIP.AUTO-MCNC: ABNORMAL MG/DL
LACTATE PLASV-SCNC: 1.5 MMOL/L (ref 0.5–2.2)
LEUKOCYTE ESTERASE UR QL STRIP.AUTO: ABNORMAL
LYMPHOCYTES # BLD: 4 % (ref 15–43)
MCH RBC QN AUTO: 29.8 PG (ref 25.2–33.5)
MCHC RBC AUTO-ENTMCNC: 33.3 G/DL (ref 25.2–33.5)
MCV RBC AUTO: 89.5 FL (ref 82.6–102.9)
MONOCYTES # BLD: 2 % (ref 6–14)
MORPHOLOGY: ABNORMAL
MORPHOLOGY: ABNORMAL
MUCUS: ABNORMAL
NITRITE UR QL STRIP.AUTO: POSITIVE
NRBC AUTOMATED: 0 PER 100 WBC
OTHER OBSERVATIONS UA: ABNORMAL
PDW BLD-RTO: 15.6 % (ref 11.8–14.4)
PLATELET # BLD AUTO: ABNORMAL K/UL (ref 138–453)
PLATELET, FLUORESCENCE: 126 K/UL (ref 138–453)
PLATELET, IMMATURE FRACTION: 3.1 % (ref 1.1–10.3)
POTASSIUM SERPL-SCNC: 4 MMOL/L (ref 3.7–5.3)
PROT SERPL-MCNC: 5.6 G/DL (ref 6.4–8.3)
PROT UR STRIP.AUTO-MCNC: 6.5 MG/DL (ref 5–6)
PROT UR STRIP.AUTO-MCNC: ABNORMAL MG/DL
RBC # BLD: 3.42 M/UL (ref 4.21–5.77)
RBC CLUMPS #/AREA URNS AUTO: ABNORMAL /HPF (ref 0–4)
SEG NEUTROPHILS: 89 % (ref 44–74)
SEGMENTED NEUTROPHILS ABSOLUTE COUNT: 8.19 K/UL (ref 1.5–8.1)
SODIUM SERPL-SCNC: 141 MMOL/L (ref 135–144)
SPECIFIC GRAVITY UA: 1.02 (ref 1.01–1.02)
TURBIDITY: ABNORMAL
URINE HGB: ABNORMAL
UROBILINOGEN, URINE: ABNORMAL
WBC # BLD AUTO: 9.2 K/UL (ref 3.5–11.3)
WBC UA: ABNORMAL /HPF (ref 0–4)

## 2023-03-16 PROCEDURE — 6370000000 HC RX 637 (ALT 250 FOR IP): Performed by: NURSE PRACTITIONER

## 2023-03-16 PROCEDURE — 85025 COMPLETE CBC W/AUTO DIFF WBC: CPT

## 2023-03-16 PROCEDURE — 87086 URINE CULTURE/COLONY COUNT: CPT

## 2023-03-16 PROCEDURE — 36415 COLL VENOUS BLD VENIPUNCTURE: CPT

## 2023-03-16 PROCEDURE — 6360000002 HC RX W HCPCS: Performed by: NURSE PRACTITIONER

## 2023-03-16 PROCEDURE — 94660 CPAP INITIATION&MGMT: CPT

## 2023-03-16 PROCEDURE — 85055 RETICULATED PLATELET ASSAY: CPT

## 2023-03-16 PROCEDURE — 80053 COMPREHEN METABOLIC PANEL: CPT

## 2023-03-16 PROCEDURE — 99222 1ST HOSP IP/OBS MODERATE 55: CPT | Performed by: SURGERY

## 2023-03-16 PROCEDURE — 81001 URINALYSIS AUTO W/SCOPE: CPT

## 2023-03-16 PROCEDURE — 99231 SBSQ HOSP IP/OBS SF/LOW 25: CPT | Performed by: INTERNAL MEDICINE

## 2023-03-16 PROCEDURE — 2580000003 HC RX 258: Performed by: NURSE PRACTITIONER

## 2023-03-16 PROCEDURE — 99223 1ST HOSP IP/OBS HIGH 75: CPT | Performed by: NURSE PRACTITIONER

## 2023-03-16 PROCEDURE — 2000000000 HC ICU R&B

## 2023-03-16 PROCEDURE — 83605 ASSAY OF LACTIC ACID: CPT

## 2023-03-16 RX ORDER — ATORVASTATIN CALCIUM 10 MG/1
10 TABLET, FILM COATED ORAL EVERY OTHER DAY
Status: DISCONTINUED | OUTPATIENT
Start: 2023-03-16 | End: 2023-03-18 | Stop reason: HOSPADM

## 2023-03-16 RX ORDER — FLUTICASONE PROPIONATE 50 MCG
1 SPRAY, SUSPENSION (ML) NASAL DAILY
Status: DISCONTINUED | OUTPATIENT
Start: 2023-03-16 | End: 2023-03-18 | Stop reason: HOSPADM

## 2023-03-16 RX ORDER — ACETAMINOPHEN 650 MG/1
650 SUPPOSITORY RECTAL EVERY 6 HOURS PRN
Status: DISCONTINUED | OUTPATIENT
Start: 2023-03-16 | End: 2023-03-18 | Stop reason: HOSPADM

## 2023-03-16 RX ORDER — TAMSULOSIN HYDROCHLORIDE 0.4 MG/1
0.4 CAPSULE ORAL 2 TIMES DAILY
Status: DISCONTINUED | OUTPATIENT
Start: 2023-03-16 | End: 2023-03-18 | Stop reason: HOSPADM

## 2023-03-16 RX ORDER — SODIUM PHOSPHATE, DIBASIC AND SODIUM PHOSPHATE, MONOBASIC 7; 19 G/133ML; G/133ML
1 ENEMA RECTAL DAILY PRN
Status: DISCONTINUED | OUTPATIENT
Start: 2023-03-16 | End: 2023-03-18 | Stop reason: HOSPADM

## 2023-03-16 RX ORDER — SODIUM CHLORIDE 9 MG/ML
25 INJECTION, SOLUTION INTRAVENOUS PRN
Status: DISCONTINUED | OUTPATIENT
Start: 2023-03-16 | End: 2023-03-18 | Stop reason: HOSPADM

## 2023-03-16 RX ORDER — LEVALBUTEROL INHALATION SOLUTION 1.25 MG/3ML
1.25 SOLUTION RESPIRATORY (INHALATION) EVERY 4 HOURS PRN
Status: DISCONTINUED | OUTPATIENT
Start: 2023-03-16 | End: 2023-03-18 | Stop reason: HOSPADM

## 2023-03-16 RX ORDER — SODIUM CHLORIDE FOR INHALATION 0.9 %
3 VIAL, NEBULIZER (ML) INHALATION
Status: DISCONTINUED | OUTPATIENT
Start: 2023-03-16 | End: 2023-03-18 | Stop reason: HOSPADM

## 2023-03-16 RX ORDER — POLYETHYLENE GLYCOL 3350 17 G/17G
17 POWDER, FOR SOLUTION ORAL 2 TIMES DAILY
Status: DISCONTINUED | OUTPATIENT
Start: 2023-03-16 | End: 2023-03-18 | Stop reason: HOSPADM

## 2023-03-16 RX ORDER — SODIUM CHLORIDE FOR INHALATION 0.9 %
3 VIAL, NEBULIZER (ML) INHALATION EVERY 4 HOURS PRN
Status: DISCONTINUED | OUTPATIENT
Start: 2023-03-16 | End: 2023-03-18 | Stop reason: HOSPADM

## 2023-03-16 RX ORDER — ACETAMINOPHEN 325 MG/1
650 TABLET ORAL EVERY 6 HOURS PRN
Status: DISCONTINUED | OUTPATIENT
Start: 2023-03-16 | End: 2023-03-18 | Stop reason: HOSPADM

## 2023-03-16 RX ORDER — SODIUM CHLORIDE 0.9 % (FLUSH) 0.9 %
5-40 SYRINGE (ML) INJECTION EVERY 12 HOURS SCHEDULED
Status: DISCONTINUED | OUTPATIENT
Start: 2023-03-16 | End: 2023-03-18 | Stop reason: HOSPADM

## 2023-03-16 RX ORDER — ONDANSETRON 2 MG/ML
4 INJECTION INTRAMUSCULAR; INTRAVENOUS EVERY 6 HOURS PRN
Status: DISCONTINUED | OUTPATIENT
Start: 2023-03-16 | End: 2023-03-18 | Stop reason: HOSPADM

## 2023-03-16 RX ORDER — LACTULOSE 10 G/15ML
20 SOLUTION ORAL 3 TIMES DAILY PRN
Status: DISCONTINUED | OUTPATIENT
Start: 2023-03-16 | End: 2023-03-18 | Stop reason: HOSPADM

## 2023-03-16 RX ORDER — SODIUM CHLORIDE 9 MG/ML
INJECTION, SOLUTION INTRAVENOUS CONTINUOUS
Status: DISCONTINUED | OUTPATIENT
Start: 2023-03-16 | End: 2023-03-17

## 2023-03-16 RX ORDER — ISOSORBIDE MONONITRATE 30 MG/1
30 TABLET, EXTENDED RELEASE ORAL DAILY
Status: DISCONTINUED | OUTPATIENT
Start: 2023-03-16 | End: 2023-03-18 | Stop reason: HOSPADM

## 2023-03-16 RX ORDER — ONDANSETRON 4 MG/1
4 TABLET, ORALLY DISINTEGRATING ORAL EVERY 8 HOURS PRN
Status: DISCONTINUED | OUTPATIENT
Start: 2023-03-16 | End: 2023-03-18 | Stop reason: HOSPADM

## 2023-03-16 RX ORDER — ALPRAZOLAM 0.25 MG/1
0.12 TABLET ORAL 4 TIMES DAILY PRN
Status: DISCONTINUED | OUTPATIENT
Start: 2023-03-16 | End: 2023-03-18 | Stop reason: HOSPADM

## 2023-03-16 RX ORDER — HYDROXYZINE HYDROCHLORIDE 25 MG/1
25 TABLET, FILM COATED ORAL EVERY 8 HOURS PRN
Status: DISCONTINUED | OUTPATIENT
Start: 2023-03-16 | End: 2023-03-18 | Stop reason: HOSPADM

## 2023-03-16 RX ORDER — SODIUM CHLORIDE 0.9 % (FLUSH) 0.9 %
5-40 SYRINGE (ML) INJECTION PRN
Status: DISCONTINUED | OUTPATIENT
Start: 2023-03-16 | End: 2023-03-18 | Stop reason: HOSPADM

## 2023-03-16 RX ORDER — AMIODARONE HYDROCHLORIDE 200 MG/1
100 TABLET ORAL DAILY
Status: DISCONTINUED | OUTPATIENT
Start: 2023-03-16 | End: 2023-03-18 | Stop reason: HOSPADM

## 2023-03-16 RX ORDER — LEVOTHYROXINE SODIUM 88 UG/1
88 TABLET ORAL DAILY
Status: DISCONTINUED | OUTPATIENT
Start: 2023-03-16 | End: 2023-03-18 | Stop reason: HOSPADM

## 2023-03-16 RX ADMIN — POLYETHYLENE GLYCOL 3350 17 G: 17 POWDER, FOR SOLUTION ORAL at 09:00

## 2023-03-16 RX ADMIN — SODIUM CHLORIDE, PRESERVATIVE FREE 10 ML: 5 INJECTION INTRAVENOUS at 19:54

## 2023-03-16 RX ADMIN — SODIUM CHLORIDE: 9 INJECTION, SOLUTION INTRAVENOUS at 22:52

## 2023-03-16 RX ADMIN — SODIUM CHLORIDE: 9 INJECTION, SOLUTION INTRAVENOUS at 11:37

## 2023-03-16 RX ADMIN — METOPROLOL TARTRATE 12.5 MG: 25 TABLET, FILM COATED ORAL at 19:48

## 2023-03-16 RX ADMIN — TAMSULOSIN HYDROCHLORIDE 0.4 MG: 0.4 CAPSULE ORAL at 19:48

## 2023-03-16 RX ADMIN — TAMSULOSIN HYDROCHLORIDE 0.4 MG: 0.4 CAPSULE ORAL at 09:01

## 2023-03-16 RX ADMIN — ATORVASTATIN CALCIUM 10 MG: 10 TABLET, FILM COATED ORAL at 09:01

## 2023-03-16 RX ADMIN — LEVOTHYROXINE SODIUM 88 MCG: 0.09 TABLET ORAL at 06:30

## 2023-03-16 RX ADMIN — CEFTRIAXONE 1000 MG: 1 INJECTION, POWDER, FOR SOLUTION INTRAMUSCULAR; INTRAVENOUS at 06:21

## 2023-03-16 RX ADMIN — RIVAROXABAN 15 MG: 15 TABLET, FILM COATED ORAL at 09:01

## 2023-03-16 RX ADMIN — ACETAMINOPHEN 650 MG: 325 TABLET ORAL at 06:30

## 2023-03-16 RX ADMIN — FLUTICASONE PROPIONATE 1 SPRAY: 50 SPRAY, METERED NASAL at 09:01

## 2023-03-16 RX ADMIN — POLYETHYLENE GLYCOL 3350 17 G: 17 POWDER, FOR SOLUTION ORAL at 19:52

## 2023-03-16 RX ADMIN — SODIUM CHLORIDE 25 ML: 9 INJECTION, SOLUTION INTRAVENOUS at 06:27

## 2023-03-16 ASSESSMENT — PAIN SCALES - PAIN ASSESSMENT IN ADVANCED DEMENTIA (PAINAD)
BODYLANGUAGE: 0
BREATHING: 1
CONSOLABILITY: 1
CONSOLABILITY: 0
BODYLANGUAGE: 0
FACIALEXPRESSION: 1
FACIALEXPRESSION: 0
TOTALSCORE: 5
BREATHING: 0
NEGVOCALIZATION: 0
TOTALSCORE: 0
TOTALSCORE: 0
NEGVOCALIZATION: 1
FACIALEXPRESSION: 0
BODYLANGUAGE: 1
NEGVOCALIZATION: 0
CONSOLABILITY: 0
BREATHING: 0

## 2023-03-16 ASSESSMENT — PAIN SCALES - GENERAL
PAINLEVEL_OUTOF10: 3
PAINLEVEL_OUTOF10: 4

## 2023-03-16 ASSESSMENT — PAIN DESCRIPTION - ORIENTATION: ORIENTATION: MID

## 2023-03-16 ASSESSMENT — PAIN DESCRIPTION - DESCRIPTORS: DESCRIPTORS: ACHING

## 2023-03-16 ASSESSMENT — PAIN DESCRIPTION - LOCATION: LOCATION: GROIN

## 2023-03-16 NOTE — PROGRESS NOTES
[facility]arilyn Rodriguez, [facility]. Hydronephrosis, bilateral [N13.30]  Abdominal pain, unspecified abdominal location [R10.9] . Vitals:    03/16/23 0446   BP: 137/74   Pulse:    Resp:    Temp: (!) 100.7 °F (38.2 °C)   SpO2: 98%   . Patients home meds are   Prior to Admission medications    Medication Sig Start Date End Date Taking? Authorizing Provider   ALPRAZolam (XANAX) 0.25 MG tablet Take 0.125mg every am. Take 1 tab at HS. May take an additional 0.125 mg daily before showering as needed. 3/13/23 4/13/23  Ana Cerda DO   Probiotic Product (PROBIOTIC PO) Take by mouth  Patient not taking: Reported on 3/15/2023    Historical Provider, MD   nitrofurantoin (MACRODANTIN) 100 MG capsule Take 100 mg by mouth 4 times daily  Patient not taking: Reported on 3/15/2023    Historical Provider, MD   polyethylene glycol (GLYCOLAX) 17 GM/SCOOP powder Take 17 g by mouth in the morning and at bedtime 3/13/23 4/12/23  aCssidy Licea MD   doxycycline monohydrate (MONODOX) 100 MG capsule Take 1 capsule by mouth 2 times daily for 7 days  Patient not taking: Reported on 3/15/2023 3/13/23 3/20/23  Cassidy Licea MD   acetaminophen-codeine (TYLENOL/CODEINE #3) 300-30 MG per tablet Take 1 tablet by mouth every 6 hours as needed for Pain for up to 14 days.  Take lowest dose possible to manage pain Max Daily Amount: 4 tablets 3/10/23 3/24/23  Guillermo Cerda DO   simethicone (MYLICON) 40 QL/7.6OD drops Take 40 mg by mouth 4 times daily as needed (gas)    Historical Provider, MD   lactulose (CHRONULAC) 10 GM/15ML solution Take 30 mLs by mouth 3 times daily as needed (constipation) 2/27/23   Chika Sepulveda MD   polyvinyl alcohol (LIQUIFILM TEARS) 1.4 % ophthalmic solution Place 2 drops into both eyes every 6 hours as needed    Historical Provider, MD   mineral oil enema Place 1 enema rectally - Use as directed on the bottle for occasional constipation    Historical Provider, MD   Dextromethorphan-guaiFENesin  MG/5ML SYRP Take 5 mLs by mouth every 6 hours as needed for Cough  Patient not taking: No sig reported    Historical Provider, MD   levothyroxine (SYNTHROID) 88 MCG tablet Take 88 mcg by mouth Daily    Historical Provider, MD   tamsulosin (FLOMAX) 0.4 MG capsule Take 1 capsule by mouth 2 times daily 12/21/22   Larissa Lozoya MD   glucosamine-chondroitin 500-400 MG CAPS Take 3 capsules by mouth daily    Historical Provider, MD   Psyllium (REGULOID PO) Take by mouth (Reguloid): mix 1 TBSP in 8 oz of water bid    Historical Provider, MD   albuterol (PROVENTIL) (2.5 MG/3ML) 0.083% nebulizer solution Take 2.5 mg by nebulization every 6 hours as needed for Wheezing    Historical Provider, MD   hydrOXYzine HCl (ATARAX) 25 MG tablet Take 25 mg by mouth every 8 hours as needed for Itching    Historical Provider, MD   loperamide (IMODIUM) 2 MG capsule Take 2 mg by mouth After each loose stool.  *Not to exceed 16 gm/ 24 hours    Historical Provider, MD   Cholestyramine POWD Mix 1 scoopful (4 gm) po daily as needed    Historical Provider, MD   fluticasone (FLONASE) 50 MCG/ACT nasal spray INSTILL 1 SPRAY IN EACH NOSTRIL ONCE DAILY 8/22/22   Guillermo Cerda, DO   hydrocortisone 1 % cream Apply topically to affected area as directed prn (Preparation H)    Historical Provider, MD   amiodarone (CORDARONE) 200 MG tablet Take 100 mg by mouth daily    Historical Provider, MD   rivaroxaban (XARELTO) 20 MG TABS tablet Take 20 mg by mouth every morning    Historical Provider, MD   acetaminophen (TYLENOL) 325 MG tablet Take 650 mg by mouth every 4 hours as needed for Pain    Historical Provider, MD   metoprolol tartrate (LOPRESSOR) 25 MG tablet Take 0.5 tablets by mouth 2 times daily 6/13/19   Obie Becerra,    Multiple Vitamins-Minerals (PRESERVISION AREDS PO) Take 1 capsule by mouth daily     Historical Provider, MD   isosorbide mononitrate (IMDUR) 30 MG extended release tablet Take 1 tablet by mouth daily 8/22/18   [facility] Jovany Smith MD   lisinopril (PRINIVIL;ZESTRIL) 40 MG tablet Take 1 tablet by mouth daily 8/22/18   Patricia Godoy MD   Multiple Vitamins-Minerals (MULTIVITAMIN ADULT PO) Take 1 tablet by mouth daily    Historical Provider, MD   potassium chloride (KLOR-CON M) 20 MEQ extended release tablet Take 20 mEq by mouth daily    Historical Provider, MD   simvastatin (ZOCOR) 20 MG tablet TAKE 1 TABLET BY MOUTH EVERY OTHER DAY  3/7/17   Guillermo Cerda DO   Coenzyme Q-10 100 MG CAPS Take 100 mg by mouth every other day. Historical Provider, MD   tolnaftate (TINACTIN) 1 % external solution Apply topically nightly to toenails per Dr. Marianela Ritchie.      Historical Provider, MD   .  Recent Surgical History: None = 0     Assessment     Peak Flow (asthma only)    Predicted: 476  Personal Best: UNABLE TO ASSESS DUE TO DEMENTIA  PEF UNABLE TO ASSESS DUE TO DEMENTIA  % Predicted UNABLE TO ASSESS DUE TO DEMENTIA  Peak Flow : not applicable = 0    QWR8/NKK    FEV1 Predicted 3.37      FEV1 UNABLE TO ASSESS DUE TO DEMENTIA    FEV1 % Predicted UNABLE TO ASSESS DUE TO DEMENTIA  FVC UNABLE TO ASSESS DUE TO DEMENTIA  IS volume UNABLE TO ASSESS DUE TO DEMENTIA  IBW 75.3     RR 16  Breath Sounds: CLEAR       Bronchodilator assessment at level  1  Hyperinflation assessment at level 1  Secretion Management assessment at level  1    [x]    Bronchodilator Assessment  BRONCHODILATOR ASSESSMENT SCORE  Score 0 1 2 3 4 5   Breath Sounds   []  Patient Baseline [x]  No Wheeze good aeration []  Faint, scattered wheezing, good aeration []  Expiratory Wheezing and or moderately diminished []  Insp/Exp wheeze and/or very diminished []  Insp/Exp and/ or marked distress   Respiratory Rate   []  Patient Baseline [x]  Less than 20 []  Less than 20 []  20-25 []  Greater than 25 []  Greater than 25   Peak flow % of Pred or PB [x]  NA   []  Greater than 90%  []  81-90% []  71-80% []  Less than or equal to 70%  or unable to perform []  Unable due to Respiratory Distress Dyspnea re [x]  Patient Baseline []  No SOB []  No SOB []  SOB on exertion []  SOB min activity []  At rest/acute   e FEV% Predicted       [x]  NA []  Above 69%  []  Unable []  Above 60-69%  []  Unable []  Above 50-59%  []  Unable []  Above 35-49%  []  Unable []  Less than 35%  []  Unable                 [x]  Hyperinflation Assessment  Score 1 2 3   CXR and Breath Sounds   [x]  Clear []  No atelectasis  Basilar aeration []  Atelectasis or absent basilar breath sounds   Incentive Spirometry Volume  (Per IBW)   []  Greater than or equal to 15ml/Kg []  less than 15ml/Kg []  less than 15ml/Kg   Surgery within last 2 weeks [x]  None or general   []  Abdominal or thoracic surgery  []  Abdominal or thoracic   Chronic Pulmonary Historyre [x]  No []  Yes []  Yes     [x]  Secretion Management Assessment  Score 1 2 3   Bilateral Breath Sounds   [x]  Occasional Rhonchi []  Scattered Rhonchi []  Course Rhonchi and/or poor aeration   Sputum    [x]  Small amount of thin secretions []  Moderate amount of viscous secretions []  Copius, Viscious Yellow/ Secretions   CXR as reported by physician [x]  clear  []  Unavailable []  Infiltrates and/or consolidation  []  Unavailable []  Mucus Plugging and or lobar consolidation  []  Unavailable   Cough [x]  Strong, productive cough []  Weak productive cough []  No cough or weak non-productive cough   [facility]  5:12 AM                            FEMALE                                  MALE                            FEV1 Predicted Normal Values                        FEV1 Predicted Normal Values          Age                                     Height in Feet and Inches       Age                                     Height in Feet and Inches       4' 11\" 5' 1\" 5' 3\" 5' 5\" 5' 7\" 5' 9\" 5' 11\" 6' 1\"  4' 11\" 5' 1\" 5' 3\" 5' 5\" 5' 7\" 5' 9\" 5' 11\" 6' 1\"   42 - 45 2.49 2.66 2.84 3.03 3.22 3.42 3.62 3.83 42 - 45 2.82 3.03 3.26 3.49 3.72 3.96 4.22 4.47   46 - 49 2.40 2.57 2.76 2.94 3.14 3. 33 3.54 3.75 46 - 49 2.70 2.92 3.14 3.37 3.61 3.85 4.10 4.36   50 - 53 2.31 2.48 2.66 2.85 3.04 3.24 3.45 3.66 50 - 53 2.58 2.80 3.02 3.25 3.49 3.73 3.98 4.24   54 - 57 2.21 2.38 2.57 2.75 2.95 3.14 3.35 3.56 54 - 57 2.46 2.67 2.89 3.12 3.36 3.60 3.85 4.11   58 - 61 2.10 2.28 2.46 2.65 2.84 3.04 3.24 3.45 58 - 61 2.32 2.54 2.76 2.99 3.23 3.47 3.72 3.98   62 - 65 1.99 2.17 2.35 2.54 2.73 2.93 3.13 3.34 62 - 65 2.19 2.40 2.62 2.85 3.09 3.33 3.58 3.84   66 - 69 1.88 2.05 2.23 2.42 2.61 2.81 3.02 3.23 66 - 69 2.04 2.26 2.48 2.71 2.95 3.19 3.44 3.70   70+ 1.82 1.99 2.17 2.36 2.55 2.75 2.95 3.16 70+ 1.97 2.19 2.41 2.64 2.87 3.12 3.37 3.62             Predicted Peak Expiratory Flow Rate                                       Height (in)  Female       Height (in) Male           Age 64 63 56 61 58 73 78 74 Age            21 344 357 372 387 402 417 432 446  60 62 64 66 68 70 72 74 76   25 337 352 366 381 396 411 426 441 25 447 476 505 533 [phone] 677   30 329 344 359 374 389 404 419 434 30 437 466 494 523 552 580 609 638 667   35 322 337 351 366 381 396 411 426 35 426 455 484 512 541 570 598 627 657   40 314 329 344 359 374 389 404 419 40 416 445 473 502 531 559 588 617 647   45 307 322 336 351 366 381 396 411 45 405 434 463 491 520 549 577 606 636   50 299 314 329 344 359 374 389 404 50 395 424 452 481 510 538 567 596 625   55 292 307 321 336 351 366 381 396 55 384 413 442 470 499 528 556 585 615   60 284 299 314 329 344 359 374 389 60 374 403 431 460 489 517 546 575 605   65 277 292 306 321 336 351 366 381 65 363 392 421 449 478 507 535 564 594   70 269 284 299 314 329 344 359 374 70 353 382 410 439 468 496 525 554 583   75 261 274 289 305 319 334 348 364 75 344 372 400 429 458 487 515 544 573   80 253 266 282 296 312 327 342 356 80 335 362 390 419 448 476 [phone]

## 2023-03-16 NOTE — CONSULTS
Jemraine Dickinson is a 80 y.o. male      CC:    gallstones    HISTORY OF PRESENT ILLNESS:    Pt is 79 yo M came to ER from [facility] with dementia, for c/o abd pain, fever and gross hematuria. He has had recent hx of epididymitis and scrotal abscess and has been on antibiotics    He came in with fever of 102.2, wbc 11.9, lactic acid 2.7 and sanchez was not draining. CT abd: showed cholelithiasis with moderate distension of the GB. No surrounding GB stranding.      Pt had bilateral hydronephrosis due to obstruction of sanchez       Review of Systems:    General:  Fever: Negative  Weight Change:Negative  Night Sweats: Negative    Eye:  Blurry Vision:Negative  Double Vision: Negative    Ent:  Headaches: Negative  Sore throat: Negative    Allergy/Immunology:  Hives: Negative  Latex allergy: Negative    Hematology/Lymphatic:  Bleeding Problems: Negative  Blood Clots: Negative  Swollen Lymph Nodes: Negative    Lungs:  Cough: Negative  SOB: Negative  Wheezing:Negative    Cardiovascular:  Chest Pain: Negative  Palpitations:Negative    GI:   Decreased Appetite: Negative  Heartburn: Negative  Dysphagia: Negative  Nausea/Vomiting: Negative  Abdominal Pain: Negative  Change in Bowels:Negative  Constipation: Negative  Diarrhea: Negative  Rectal Bleeding: Negative    :   Dysuria: Negative  Increase Urinary Frequency/Urgency: Negative    Neuro:  Seizures: Negative  Confusion: Negative        PAST MEDICAL HISTORY:      Family History   Problem Relation Age of Onset    Diabetes Mother     Osteoporosis Mother     Other Father         complications of prostate surgery (bleeding)    Stroke Sister     Other Sister         respiratory failure     Social History     Socioeconomic History    Marital status: Single     Spouse name: Not on file    Number of children: Not on file    Years of education: Not on file    Highest education level: Not on file   Occupational History    Not on file   Tobacco Use    Smoking status: Never    Smokeless tobacco: Never    Tobacco comments:     never smoked iwona rrt,2/27/2018   Vaping Use    Vaping Use: Never used   Substance and Sexual Activity    Alcohol use: No     Alcohol/week: 0.0 standard drinks    Drug use: No    Sexual activity: Not on file   Other Topics Concern    Not on file   Social History Narrative    Not on file     Social Determinants of Health     Financial Resource Strain: Low Risk     Difficulty of Paying Living Expenses: Not very hard   Food Insecurity: No Food Insecurity    Worried About Running Out of Food in the Last Year: Never true    Ran Out of Food in the Last Year: Never true   Transportation Needs: Unknown    Lack of Transportation (Medical): Not on file    Lack of Transportation (Non-Medical):  No   Physical Activity: Not on file   Stress: Not on file   Social Connections: Not on file   Intimate Partner Violence: Not on file   Housing Stability: Unknown    Unable to Pay for Housing in the Last Year: Not on file    Number of Places Lived in the Last Year: Not on file    Unstable Housing in the Last Year: No     Past Surgical History:   Procedure Laterality Date    ABSCESS DRAINAGE  [id number]    multiple    APPENDECTOMY  [address] Left 05/07/2013    COLONOSCOPY  06/14/2021    Dr Nathaniel Bunch Left 12/22/2011    detached retina    SKIN TAG REMOVAL  11/01/1999    TONSILLECTOMY       Past Medical History:   Diagnosis Date    Acute bronchitis     by history    MARITA (acute kidney injury) ([address] 3/13/2018    Allergic rhinitis     Anxiety     Bradycardia 8/19/2018    Cataract, right     Choroidal nevus of right eye     Chronic systolic CHF (congestive heart failure) ([address] 3/13/2018    Coronary artery disease involving native coronary artery of native heart 10/20/2016    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 LIMA TO LAD, SVG TO DIAGONAL2    Dementia associated with 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 behavioral disturbance ([address] 9/21/2018 Dermatophytosis of nail 1/10/2014    Diverticulosis     Elevated PSA     Hemorrhoids     History of measles childhood    History of mumps childhood    Hyperlipidemia     Hypertension     Hypothyroidism 9/4/2018    Mass of upper lobe of right lung 2/21/2018    Occult blood positive stool     refuses colonoscopy    Osteoarthritis     Overweight     Paroxysmal atrial fibrillation ([facility] 11/7/2013    Pneumonia due to organism     Pseudophakia, left eye     PSVT (paroxysmal supraventricular tachycardia) ([facility]     Recurrent UTI 3/13/2018    Retinal detachment     left, history of     Seborrheic dermatitis     Sepsis due to urinary tract infection ([facility] 8/15/2018    Urinary tract infection without hematuria 3/28/2018     No current facility-administered medications on file prior to encounter. Current Outpatient Medications on File Prior to Encounter   Medication Sig Dispense Refill    ALPRAZolam (XANAX) 0.25 MG tablet Take 0.125mg every am. Take 1 tab at HS. May take an additional 0.125 mg daily before showering as needed. 60 tablet 3    Probiotic Product (PROBIOTIC PO) Take by mouth (Patient not taking: Reported on 3/15/2023)      nitrofurantoin (MACRODANTIN) 100 MG capsule Take 100 mg by mouth 4 times daily (Patient not taking: Reported on 3/15/2023)      polyethylene glycol (GLYCOLAX) 17 GM/SCOOP powder Take 17 g by mouth in the morning and at bedtime 1530 g 1    doxycycline monohydrate (MONODOX) 100 MG capsule Take 1 capsule by mouth 2 times daily for 7 days (Patient not taking: Reported on 3/15/2023) 14 capsule 0    acetaminophen-codeine (TYLENOL/CODEINE #3) 300-30 MG per tablet Take 1 tablet by mouth every 6 hours as needed for Pain for up to 14 days.  Take lowest dose possible to manage pain Max Daily Amount: 4 tablets 56 tablet 0    simethicone (MYLICON) 40 XZ/1.3CX drops Take 40 mg by mouth 4 times daily as needed (gas)      lactulose (CHRONULAC) 10 GM/15ML solution Take 30 mLs by mouth 3 times daily as needed (constipation) 1 each 0    polyvinyl alcohol (LIQUIFILM TEARS) 1.4 % ophthalmic solution Place 2 drops into both eyes every 6 hours as needed      mineral oil enema Place 1 enema rectally - Use as directed on the bottle for occasional constipation      Dextromethorphan-guaiFENesin  MG/5ML SYRP Take 5 mLs by mouth every 6 hours as needed for Cough (Patient not taking: No sig reported)      levothyroxine (SYNTHROID) 88 MCG tablet Take 88 mcg by mouth Daily      tamsulosin (FLOMAX) 0.4 MG capsule Take 1 capsule by mouth 2 times daily 180 capsule 3    glucosamine-chondroitin 500-400 MG CAPS Take 3 capsules by mouth daily      Psyllium (REGULOID PO) Take by mouth (Reguloid): mix 1 TBSP in 8 oz of water bid      albuterol (PROVENTIL) (2.5 MG/3ML) 0.083% nebulizer solution Take 2.5 mg by nebulization every 6 hours as needed for Wheezing      hydrOXYzine HCl (ATARAX) 25 MG tablet Take 25 mg by mouth every 8 hours as needed for Itching      loperamide (IMODIUM) 2 MG capsule Take 2 mg by mouth After each loose stool.  *Not to exceed 16 gm/ 24 hours      Cholestyramine POWD Mix 1 scoopful (4 gm) po daily as needed      fluticasone (FLONASE) 50 MCG/ACT nasal spray INSTILL 1 SPRAY IN EACH NOSTRIL ONCE DAILY 16 g 1    hydrocortisone 1 % cream Apply topically to affected area as directed prn (Preparation H)      amiodarone (CORDARONE) 200 MG tablet Take 100 mg by mouth daily      rivaroxaban (XARELTO) 20 MG TABS tablet Take 20 mg by mouth every morning      acetaminophen (TYLENOL) 325 MG tablet Take 650 mg by mouth every 4 hours as needed for Pain      metoprolol tartrate (LOPRESSOR) 25 MG tablet Take 0.5 tablets by mouth 2 times daily 90 tablet 1    Multiple Vitamins-Minerals (PRESERVISION AREDS PO) Take 1 capsule by mouth daily       isosorbide mononitrate (IMDUR) 30 MG extended release tablet Take 1 tablet by mouth daily 30 tablet 3    lisinopril (PRINIVIL;ZESTRIL) 40 MG tablet Take 1 tablet by mouth daily 30 tablet 3 Multiple Vitamins-Minerals (MULTIVITAMIN ADULT PO) Take 1 tablet by mouth daily      potassium chloride (KLOR-CON M) 20 MEQ extended release tablet Take 20 mEq by mouth daily      simvastatin (ZOCOR) 20 MG tablet TAKE 1 TABLET BY MOUTH EVERY OTHER DAY  30 tablet 11    Coenzyme Q-10 100 MG CAPS Take 100 mg by mouth every other day. tolnaftate (TINACTIN) 1 % external solution Apply topically nightly to toenails per Dr. Mandy Winkler. Allergies as of 03/15/2023 - Fully Reviewed 03/15/2023   Allergen Reaction Noted    Pcn [penicillins] Swelling 06/24/2013    Sulfa antibiotics  06/24/2013    Cefdinir Other (See Comments) 10/17/2015     PHYSICAL EXAM:    Blood pressure 134/71, pulse 91, temperature 99.7 °F (37.6 °C), temperature source Tympanic, resp. rate 20, height 5' 11\" (1.803 m), weight 161 lb (73 kg), SpO2 97 %. Gen:  A and O x 3, NAD, well nourished  Eyes:  Sclera non icterus, PERRL  Lungs:  CTA, symmetrical  Chest:  RRR, no murmurs  Abd:  Soft, diff to exam as he starts yelling \"no you don't no you don't as I start to approach his abd. ASSESS MENT:    I do not think he has acute cholecystitis with no inflammatory findings around the GB on the CT and no abnormal LFTs. However, he is difficult to get any hx or exam from. If there is any reason to question it a HIDA scan could be done to see if cystic duct is obstructed which would suggest or support acute cholecystitis. If it were acute cholecystitis , I would highly suggest to tx medically with antibiotics if possible as pt is high risk for surgery . PLAN:     Will be on stand by if Jacqualine Shanks is done and pt appears to have acute cholecystitis or if there is a question of other abdominal issues.

## 2023-03-16 NOTE — ED NOTES
Flushed sanchez with 60 mL, minimal return out of sanchez.       Armando Brown, JACQUELIN  03/15/23 [id number]

## 2023-03-16 NOTE — ED PROVIDER NOTES
ADDENDUM:      Care of this patient was assumed from Dr. Dariel Jordan. The patient was seen for Abdominal Pain (Noted blood in his urine. )  . The patient's initial evaluation and plan have been discussed with the prior provider who initially evaluated the patient. Nursing Notes, Past Medical Hx, Past Surgical Hx, Social Hx, Family Hx, Medications and Allergies, and  were all reviewed. Diagnostic Results     EKG   Twelve-lead EKG timed at 19: 28 accelerated junctional rhythm with a rate of 125 bpm.  Left anterior fascicular block. Left axis deviation. No acute ST elevations depressions or T wave inversions. RADIOLOGY:  CT ABDOMEN PELVIS WO CONTRAST Additional Contrast? None    Result Date: 3/15/2023  EXAMINATION: CT OF THE ABDOMEN AND PELVIS WITHOUT CONTRAST 3/15/2023 6:30 pm TECHNIQUE: CT of the abdomen and pelvis was performed without the administration of intravenous contrast. Multiplanar reformatted images are provided for review. Automated exposure control, iterative reconstruction, and/or weight based adjustment of the mA/kV was utilized to reduce the radiation dose to as low as reasonably achievable. COMPARISON: 03/08/2023 HISTORY: ORDERING SYSTEM PROVIDED HISTORY: Worsening abdominal pain TECHNOLOGIST PROVIDED HISTORY: Worsening abdominal pain FINDINGS: LOWER CHEST: Extensive consolidative changes within the lower lobes with associated fibrotic change and interstitial lung abnormalities. Coronary arterial calcification. KIDNEYS AND URINARY TRACT: No renal calculi are identified. Mild bilateral hydroureteronephrosis. [place] ORGANS: Cholelithiasis with moderate distension of the gallbladder. No surrounding gallbladder stranding. Visualized portions of the liver, spleen, pancreas, and adrenal glands demonstrate no acute abnormality. GI/BOWEL: No bowel obstruction. No evidence of acute appendicitis. Massive amount of stool throughout the colon.  PELVIS: Massively enlarged prostate gland protrudes into the bladder base. The bladder has thickened wall with multiple focal areas of outpouching consistent with bladder diverticula. The bladder and pelvic organs are unremarkable. PERITONEUM/RETROPERITONEUM: No free air or free fluid is noted. No pathologically enlarged lymphadenopathy. The vasculature do not demonstrate acute abnormality. BONES/SOFT TISSUES: The osseous structures demonstrate no acute abnormality. Chronic superior endplate compression fracture of L1 with 30% height loss     Cholelithiasis with moderate distension of the gallbladder. No surrounding gallbladder stranding. If there is focal tenderness in this area finding can be suggestive of acute cholecystitis. Right upper quadrant ultrasound is recommended for further evaluation. Massively enlarged prostate gland protrudes into the bladder base. The bladder has thickened wall with multiple focal areas of outpouching consistent with bladder diverticula. The finding is associated with mild bilateral hydroureteronephrosis which was not present on prior examination. Significant fecal overload throughout the colon and rectum. Extensive consolidative changes within the lower lobes with associated fibrotic change and interstitial lung abnormalities. Findings are similar to prior examination Chronic superior endplate compression fracture of L1 with 30% height loss     CT ABDOMEN PELVIS W IV CONTRAST Additional Contrast? None    Result Date: 3/8/2023  EXAMINATION: CT OF THE ABDOMEN AND PELVIS WITH CONTRAST 3/8/2023 12:57 pm TECHNIQUE: CT of the abdomen and pelvis was performed with the administration of intravenous contrast. Multiplanar reformatted images are provided for review. Automated exposure control, iterative reconstruction, and/or weight based adjustment of the mA/kV was utilized to reduce the radiation dose to as low as reasonably achievable. COMPARISON: None.  HISTORY: ORDERING SYSTEM PROVIDED HISTORY: Groin pain TECHNOLOGIST PROVIDED HISTORY: Groin pain Decision Support Exception - unselect if not a suspected or confirmed emergency medical condition->Emergency Medical Condition (MA) Reason for Exam: groin pain, left epididymis suggesting subacute epididymitis FINDINGS: Lower Chest: There are infiltrates noted in both lung bases in keeping with multifocal pneumonia or aspiration. Coronary artery calcifications are noted. Organs: There are several hepatic cysts noted. Multiple gallstones are noted in the gallbladder. The pancreas appears unremarkable. There are calcified granulomas in the spleen. The adrenal glands and kidneys appear normal. GI/Bowel: There is a small hiatal hernia. The stomach is otherwise unremarkable. Duodenum, and small bowel loops appear normal. There is significant fecal load noted, with prominent rectal fecal impaction, in keeping with constipation. Pelvis: The bladder is empty. There is a prominently enlarged prostate. There is no evidence of inguinal or pelvic adenopathy and there are no hernias noted. Peritoneum/Retroperitoneum: Unremarkable Bones/Soft Tissues: There is chronic loss of height of the L1 vertebral body. Infiltrates noted in both lung bases, in keeping with multifocal pneumonia or aspiration. There is significant fecal load, with prominent rectosigmoid fecal impaction. Coronary artery calcifications noted. Multiple gallstones in the gallbladder. Several hepatic cysts. Calcified granulomas in the spleen. Small hiatal hernia. Prominently enlarged prostate. Chronic loss of height of the L1 vertebral body. CT ABDOMEN PELVIS W IV CONTRAST Additional Contrast? None    Result Date: 2/23/2023  EXAMINATION: CT OF THE ABDOMEN AND PELVIS WITH CONTRAST 2/23/2023 12:53 pm TECHNIQUE: CT of the abdomen and pelvis was performed with the administration of intravenous contrast. Multiplanar reformatted images are provided for review.  Automated exposure control, iterative reconstruction, and/or weight based adjustment of the mA/kV was utilized to reduce the radiation dose to as low as reasonably achievable. COMPARISON: 04/14/2020 HISTORY: ORDERING SYSTEM PROVIDED HISTORY: Lower abdominal pain TECHNOLOGIST PROVIDED HISTORY: Lower abdominal pain Decision Support Exception - unselect if not a suspected or confirmed emergency medical condition->Emergency Medical Condition (MA) Reason for Exam: no complaints FINDINGS: Lower Chest: Interval development of bibasilar of pulmonary fibrosis with traction bronchiectasis. No appreciable honeycombing. Organs: Cholelithiasis. Numerous scattered hypodensities in the left lobe of liver and a few on the right. Up to 11 mm in size. Most are evident on the prior study. Prior study performed without IV contrast and therefore comparison is limited. These appear to be cysts. Some are too small to characterize. Spleen, pancreas, adrenal glands and kidneys show no acute process. GI/Bowel: There is limited evaluation due to absence of oral contrast.  Small sliding hiatal hernia otherwise stomach unremarkable. No evidence for bowel obstruction. Increased stool throughout the colon suggests constipation. Large amount of stool distends the rectum. Stool impaction is not excluded. Pelvis: Enlarged prostate gland in with nodular projection into the bladder base. Urinary bladder is collapsed and not optimally assessed. Peritoneum/Retroperitoneum: Atherosclerotic disease. No lymphadenopathy. No ascites Bones/Soft Tissues: Degenerative changes on background osteopenia. 1. Interval development of quite pronounced bibasilar fibrosis and traction bronchiectasis. No honeycombing. 2. Large amount of stool throughout the colon with stool also distending the rectum. Constipation plus/minus stool impaction. 3. Stable findings including cholelithiasis and numerous hepatic hypodensities up to 11 mm likely cysts. Some too small to characterize. 4. Small sliding hiatal hernia.  5. Prostatomegaly. XR CHEST PORTABLE    Result Date: 3/15/2023  EXAMINATION: ONE XRAY VIEW OF THE CHEST 3/15/2023 6:34 pm COMPARISON: None. HISTORY: ORDERING SYSTEM PROVIDED HISTORY: fever TECHNOLOGIST PROVIDED HISTORY: fever Reason for Exam: Fever FINDINGS: Cardial pericardial silhouette is enlarged. The pulmonary vasculature is congested. Increased central and basilar opacity noted. Small bilateral pleural effusions are suggested. No acute bony abnormality identified. No pneumothorax. No free air. No acute bony or. .     Findings most compatible with pulmonary edema. Nonetheless, please correlate with any clinical assistant present mack, especially at the lung bases. US SCROTUM W LIMITED DUPLEX    Result Date: 3/8/2023  EXAMINATION: ULTRASOUND OF THE SCROTUM/TESTICLES WITH COLOR DOPPLER FLOW EVALUATION 3/8/2023 TECHNIQUE: Duplex ultrasound using B-mode/gray scaled imaging, Doppler spectral analysis and color flow Doppler was obtained of the testicles. COMPARISON: None. HISTORY: ORDERING SYSTEM PROVIDED HISTORY: Recent history of epididymitis and pyocele pain becoming worse TECHNOLOGIST PROVIDED HISTORY: Recent history of epididymitis and pyocele pain becoming worse Reason for Exam: recent hx of epididymitis, pyocele FINDINGS: Measurements: Right Testicle: 3.3 x 3.3 x 2.4 cm Left Testicle: 3.9 x 3.1 x 2.7 cm Right: Grey Scale: The right testicle demonstrates normal homogeneous echotexture without focal lesion. No evidence of testicular microlithiasis. Doppler Evaluation: There is normal arterial and venous Doppler flow within the testicle. Scrotal Sac: No evidence of hydrocele. Epididymis: 5 mm epididymal cyst. Left: Grey Scale: The left testicle demonstrates normal homogeneous echotexture without focal lesion. No evidence of testicular microlithiasis. Doppler Evaluation: There is normal arterial and venous Doppler flow within the testicle. Scrotal Sac: Moderate hydrocele.  Epididymis: 4 mm epididymal cyst.  Epididymis is hypervascular     Moderate left hydrocele Small bilateral epididymal cysts Hypervascular left epididymis suggesting subacute epididymitis. US SCROTUM W LIMITED DUPLEX    Result Date: 2/23/2023  EXAMINATION: ULTRASOUND OF THE SCROTUM/TESTICLES WITH COLOR DOPPLER FLOW EVALUATION 2/23/2023 TECHNIQUE: Duplex ultrasound using B-mode/gray scaled imaging, Doppler spectral analysis and color flow Doppler was obtained of the testicles. COMPARISON: None. HISTORY: ORDERING SYSTEM PROVIDED HISTORY: Pain and swelling TECHNOLOGIST PROVIDED HISTORY: Pain and swelling Reason for Exam: pain and swelling FINDINGS: Measurements: Right Testicle: 3.9 x 2.7 x 1.8 cm Left Testicle: 3.3 x 3.1 x 2.5 cm Right: Grey Scale: The right testicle demonstrates normal homogeneous echotexture without focal lesion. No evidence of testicular microlithiasis. Doppler Evaluation: There is normal arterial and venous Doppler flow within the testicle. Scrotal Sac: There is a very small right hydrocele. Epididymis: There is a 4 mm right epididymal cyst. Left: Grey Scale: The left testicle demonstrates normal homogeneous echotexture without focal lesion. No evidence of testicular microlithiasis. Doppler Evaluation: There is normal arterial and venous Doppler flow within the testicle. Scrotal Sac: There is a large complicated left hydrocele containing internal echogenic material and multiple septations, possibly a pyocele. Epididymis: The left epididymis appears enlarged and heterogeneous in echotexture, and is mildly hyperemic. There is a 10 x 7 x 7 mm left epididymal cyst.     1. Unremarkable sonographic appearance of the bilateral testicles, without evidence of torsion or mass. 2. Findings suggestive of asymmetric left epididymitis. 3. Bilateral epididymal cysts. 4. Large complicated left scrotal fluid collection, possibly a pyocele.         LABS:   Results for orders placed or performed during the hospital encounter of 03/15/23 COVID-19, Rapid    Specimen: Nasopharyngeal Swab   Result Value Ref Range    Specimen Description . NASOPHARYNGEAL SWAB     SARS-CoV-2, Rapid Not Detected Not Detected   CBC with Auto Differential   Result Value Ref Range    WBC 11.9 (H) 3.5 - 11.3 k/uL    RBC 4.04 (L) 4.21 - 5.77 m/uL    Hemoglobin 12.3 (L) 13.0 - 17.0 g/dL    Hematocrit 36.2 (L) 40.7 - 50.3 %    MCV 89.6 82.6 - 102.9 fL    MCH 30.4 25.2 - 33.5 pg    MCHC 34.0 (H) 25.2 - 33.5 g/dL    RDW 15.4 (H) 11.8 - 14.4 %    Platelets 972 860 - 975 k/uL    MPV 10.9 8.1 - 13.5 fL    NRBC Automated 0.0 0.0 per 100 WBC    Monocytes 0 (L) 6 - 14 %    Lymphocytes 5 (L) 15 - 43 %    Seg Neutrophils 95 (H) 44 - 74 %    Eosinophils % 0 (L) 1 - 8 %    Basophils 0 0 - 2 %    Immature Granulocytes 0 0 %    Absolute Mono # 0.00 (L) 0.1 - 1.2 k/uL    Absolute Lymph # 0.60 (L) 1.0 - 4.8 k/uL    Segs Absolute 11.30 (H) 1.50 - 8.10 k/uL    Absolute Eos # 0.00 0.0 - 0.4 k/uL    Basophils Absolute 0.00 0.0 - 0.2 k/uL    Absolute Immature Granulocyte 0.00 0.00 - 0.30 k/uL    Morphology ANISOCYTOSIS PRESENT     Morphology Platelet count adequate    Comprehensive Metabolic w/ Bili Profile w/ Reflex to MG   Result Value Ref Range    Albumin 3.6 3.5 - 5.2 g/dL    Albumin/Globulin Ratio 1.2 1.0 - 2.5    Alkaline Phosphatase 128 40 - 129 U/L    ALT 17 5 - 41 U/L    AST 40 (H) <40 U/L    Total Bilirubin 1.0 0.3 - 1.2 mg/dL    Bilirubin, Direct 0.3 (H) <0.3 mg/dL    Bilirubin, Indirect 0.7 0.0 - 1.0 mg/dL    BUN 34 (H) 8 - 23 mg/dL    Calcium 9.2 8.6 - 10.4 mg/dL    Creatinine 2.52 (H) 0.70 - 1.20 mg/dL    Est, Glom Filt Rate 24 (L) >60 mL/min/1.73m2    Glucose 126 (H) 70 - 99 mg/dL    Total Protein 6.6 6.4 - 8.3 g/dL    Sodium 142 135 - 144 mmol/L    Potassium 5.0 3.7 - 5.3 mmol/L    Chloride 105 98 - 107 mmol/L    CO2 22 20 - 31 mmol/L    Anion Gap 15 9 - 17 mmol/L   Lactic Acid   Result Value Ref Range    Lactic Acid 2.7 (H) 0.5 - 2.2 mmol/L   Amylase   Result Value Ref Range Amylase 38 28 - 100 U/L   Lipase   Result Value Ref Range    Lipase 6 (L) 13 - 60 U/L   Protime-INR   Result Value Ref Range    Protime 23.8 (H) 11.5 - 14.2 sec    INR 2.2    Troponin   Result Value Ref Range    Troponin, High Sensitivity 50 (H) 0 - 22 ng/L   Urinalysis with Reflex to Culture    Specimen: Urine   Result Value Ref Range    Color, UA Red (A) Yellow    Turbidity UA Turbid (A) Clear    Glucose, Ur NEGATIVE NEGATIVE    Bilirubin Urine NEGATIVE  Verified by ictotest. (A) NEGATIVE    Ketones, Urine 1+ (A) NEGATIVE    Specific Gravity, UA 1.020 1.010 - 1.025    Urine Hgb 3+ (A) NEGATIVE    pH, UA 6.5 (H) 5.0 - 6.0    Protein, UA 3+ (A) NEGATIVE    Urobilinogen, Urine 2 mg/dL (A) Normal    Nitrite, Urine POSITIVE (A) NEGATIVE    Leukocyte Esterase, Urine 1+ (A) NEGATIVE   Troponin   Result Value Ref Range    Troponin, High Sensitivity 45 (H) 0 - 22 ng/L   Lactic Acid   Result Value Ref Range    Lactic Acid 2.1 0.5 - 2.2 mmol/L   Lactic Acid   Result Value Ref Range    Lactic Acid 1.5 0.5 - 2.2 mmol/L   Comprehensive Metabolic Panel w/ Reflex to MG   Result Value Ref Range    Glucose 120 (H) 70 - 99 mg/dL    BUN 29 (H) 8 - 23 mg/dL    Creatinine 1.58 (H) 0.70 - 1.20 mg/dL    Est, Glom Filt Rate 42 (L) >60 mL/min/1.73m2    Bun/Cre Ratio 18 9 - 20    Calcium 8.4 (L) 8.6 - 10.4 mg/dL    Sodium 141 135 - 144 mmol/L    Potassium 4.0 3.7 - 5.3 mmol/L    Chloride 108 (H) 98 - 107 mmol/L    CO2 20 20 - 31 mmol/L    Anion Gap 13 9 - 17 mmol/L    Alkaline Phosphatase 101 40 - 129 U/L    ALT 14 5 - 41 U/L    AST 32 <40 U/L    Total Bilirubin 0.7 0.3 - 1.2 mg/dL    Total Protein 5.6 (L) 6.4 - 8.3 g/dL    Albumin 2.9 (L) 3.5 - 5.2 g/dL    Albumin/Globulin Ratio 1.1 1.0 - 2.5   CBC auto differential   Result Value Ref Range    WBC 9.2 3.5 - 11.3 k/uL    RBC 3.42 (L) 4.21 - 5.77 m/uL    Hemoglobin 10.2 (L) 13.0 - 17.0 g/dL    Hematocrit 30.6 (L) 40.7 - 50.3 %    MCV 89.5 82.6 - 102.9 fL    MCH 29.8 25.2 - 33.5 pg    MCHC 33.3 25.2 - 33.5 g/dL    RDW 15.6 (H) 11.8 - 14.4 %    Platelets See Reflexed IPF Result 138 - 453 k/uL    NRBC Automated 0.0 0.0 per 100 WBC    Seg Neutrophils 94 (H) 36 - 65 %    Lymphocytes 2 (L) 24 - 43 %    Monocytes 3 3 - 12 %    Eosinophils % 0 (L) 1 - 4 %    Basophils 0 0 - 2 %    Immature Granulocytes 0 0 %    Segs Absolute 8.58 (H) 1.50 - 8.10 k/uL    Absolute Lymph # 0.19 (L) 1.10 - 3.70 k/uL    Absolute Mono # 0.31 0.10 - 1.20 k/uL    Absolute Eos # <0.03 0.00 - 0.44 k/uL    Basophils Absolute 0.03 0.00 - 0.20 k/uL    Absolute Immature Granulocyte 0.04 0.00 - 0.30 k/uL   Microscopic Urinalysis   Result Value Ref Range    WBC, UA 10 TO 20 0 - 4 /HPF    RBC, UA TOO NUMEROUS TO COUNT 0 - 4 /HPF    Epithelial Cells UA 0 TO 2 0 - 5 /HPF    Bacteria, UA MODERATE (A) None    Mucus, UA 1+ (A) None    Other Observations UA Culture ordered based on defined criteria. (A) NOT REQ.    Immature Platelet Fraction   Result Value Ref Range    Platelet, Immature Fraction 3.1 1.1 - 10.3 %    Platelet, Fluorescence 126 (L) 138 - 453 k/uL   EKG 12 Lead   Result Value Ref Range    Ventricular Rate 119 BPM    Atrial Rate 119 BPM    QRS Duration 102 ms    Q-T Interval 314 ms    QTc Calculation (Bazett) 441 ms    R Axis -69 degrees    T Axis 95 degrees   EKG 12 Lead   Result Value Ref Range    Ventricular Rate 125 BPM    Atrial Rate 102 BPM    QRS Duration 98 ms    Q-T Interval 312 ms    QTc Calculation (Bazett) 450 ms    R Axis -48 degrees    T Axis 86 degrees       RECENT VITALS:  BP: 137/74, Temp: (!) 100.7 °F (38.2 °C), Heart Rate: 92, Resp: 20     ED Course     The patient was given the following medications:  Orders Placed This Encounter   Medications    0.9 % sodium chloride bolus    ciprofloxacin (CIPRO) IVPB 400 mg     Order Specific Question:   Antimicrobial Indications     Answer:   Intra-Abdominal Infection    metronidazole (FLAGYL) 500 mg in 0.9% NaCl 100 mL IVPB premix     Order Specific Question:   Antimicrobial Indications     Answer:   Intra-Abdominal Infection    acetaminophen (TYLENOL) tablet 650 mg    norepinephrine (LEVOPHED) 16 mg in sodium chloride 0.9 % 250 mL infusion     Order Specific Question:   Titrate Infusion? Answer:   Yes     Order Specific Question:   Initial Infusion Dose: Answer:   5 mcg/min     Order Specific Question:   Goal of Therapy is: Answer:   MAP greater than 65 mmHg     Order Specific Question:   Contact Provider if:     Answer:   Patient is receiving the maximum dose and is not achieving the goal of therapy    ALPRAZolam (XANAX) tablet 0.125 mg    amiodarone (CORDARONE) tablet 100 mg    fluticasone (FLONASE) 50 MCG/ACT nasal spray 1 spray    isosorbide mononitrate (IMDUR) extended release tablet 30 mg    levothyroxine (SYNTHROID) tablet 88 mcg    polyethylene glycol (GLYCOLAX) powder 17 g    lactulose (CHRONULAC) 10 GM/15ML solution 20 g    hydrOXYzine HCl (ATARAX) tablet 25 mg    atorvastatin (LIPITOR) tablet 10 mg    tamsulosin (FLOMAX) capsule 0.4 mg    metoprolol tartrate (LOPRESSOR) tablet 12.5 mg    0.9 % sodium chloride infusion    sodium chloride flush 0.9 % injection 5-40 mL    sodium chloride flush 0.9 % injection 5-40 mL    0.9 % sodium chloride infusion    OR Linked Order Group     ondansetron (ZOFRAN-ODT) disintegrating tablet 4 mg     ondansetron (ZOFRAN) injection 4 mg    OR Linked Order Group     acetaminophen (TYLENOL) tablet 650 mg     acetaminophen (TYLENOL) suppository 650 mg    sodium phosphate (FLEET) rectal enema 1 enema    sodium chloride nebulizer 0.9 % solution 3 mL    levalbuterol (XOPENEX) nebulizer solution 1.25 mg     Order Specific Question:   Please select a reason the therapeutic interchange was not accepted: Answer:    Other (Please Comment)     Comments:   tachycardia, fib    sodium chloride nebulizer 0.9 % solution 3 mL    cefTRIAXone (ROCEPHIN) 1,000 mg in sodium chloride 0.9 % 50 mL IVPB (mini-bag)     Order Specific Question: Antimicrobial Indications     Answer:   Urinary Tract Infection     Order Specific Question:   UTI duration of therapy     Answer:   7 days    rivaroxaban (XARELTO) tablet 15 mg     Order Specific Question:   Indication of Use     Answer:   A Fib/A Flutter       Medical Decision Making      Case identified by Dr. Otho Scheuermann. CT scan shows possible cholecystitis. Case discussed with the on-call surgeon Dr. Zac Diaz. Dr. Zac Diaz is willing to follow the patient to definitively rule in or out cholecystitis with HIDA scan. Patient does have leukocytosis but not significantly altered LFTs. For me patient did have tenderness in the right upper quadrant on exam.  Case was discussed with Jacqui Hutchins CNP on the hospitalist service. Patient was also noted to have phillip hematuria and a Beck catheter that was not draining after changing the bag and flushing it. I did discuss the case with Dr. Js Wagner who is on-call for urology. We will attempt to change out the Beck catheter. If we are unable to drain his bladder we will transfer him to [facility]. [facility] Nursing staff was able to change the catheter to a three-way catheter that drained over 500 mL of frankly bloody urine. Patient is on Xarelto for history of atrial fibrillation. Shortly after this patient became hypotensive. He was placed on BiPAP and started on a Levophed drip. Chest x-ray showed volume overload. After patient had stabilized Case was rediscussed with hospitalist service and we will admit him here at this time. I also discussed with the patient's healthcare power of  who preferred him to be in the community hospital setting unless we are unable to accommodate his healthcare needs at which point he would also be agreeable to transfer to a tertiary care facility. CRITICAL CARE: There was a high probability of clinically significant/life threatening deterioration in this patient's condition which required my urgent intervention.   Total critical care time was 60 minutes. This excludes any time for separately reportable procedures. ED Course as of 03/16/23 0651   Wed Mar 15, 2023   2209 ECHO 2/2023 ECHO EF 45%. [NP]      ED Course User Index  [NP] Jann Gosselin, MD        Disposition     FINAL IMPRESSION      1. Abdominal pain, unspecified abdominal location    2. Gross hematuria    3. Problem with Beck catheter, initial encounter ([facility] [address]    4. Sepsis, due to unspecified organism, unspecified whether acute organ dysfunction present [facility])          DISPOSITION/PLAN   DISPOSITION Admitted 03/16/2023 02:33:28 AM      PATIENT REFERRED TO:  No follow-up provider specified.     DISCHARGE MEDICATIONS:  Current Discharge Medication List                (Please note that portions of this note were completed with a voice recognition program.  Efforts were made to edit the dictations but occasionally words are mis-transcribed.)    Jann Gosselin, MD, [facility]  Attending Emergency Medicine Physician                  Jann Gosselin, MD  03/16/23 [id number]

## 2023-03-16 NOTE — H&P
HOSPITALIST ADMISSION H&P    REASON FOR ADMISSION:  bilateral hydronephrosis -- MARITA -- hypotension -- gross hematuria  ESTIMATED LENGTH OF STAY:>2 midnights, 3-4 days    ATTENDING/ADMITTING PHYSICIAN: REJI Montana MD    HISTORY OF PRESENT ILLNESS:      The patient is a 88 y.o. male patient of ANNIKA GREENBERG DO who presents from the ER. He resides at [facility] and has dementia, so some history is obtained from EMR, staff report, and [facility] documentation. He was sent to ER due to c/o abdominal pain, fevers, and gross hematuria. He is a patient of Dr. Guajardo, urology. He has been hospitalized recently for epididymitis and scrotal abscess and has been on antibiotics since 02/23/2023 (2 courses of levaquin and 3 courses of doxycycline). His previous urine culture 02/22/2023 grew E. Coli. He is on Xarelto for PAF.    In ER, he was febrile with temperature of 102.2F, WBC 11.9, lactic acid 2.7 --> 2.1. His indwelling sanchez catheter was not draining, even after irrigation, and was replaced with a 3-way sanchez after the abdominal CT resulted. CT of the abdomen impression: Cholelithiasis with moderate distension of the gallbladder.  No surrounding gallbladder stranding.  If there is focal tenderness in this area finding can be suggestive of acute cholecystitis.  Right upper quadrant ultrasound is recommended for further evaluation. Massively enlarged prostate gland protrudes into the bladder base. The bladder has thickened wall with multiple focal areas of outpouching consistent with bladder diverticula.  The finding is associated with mild bilateral hydroureteronephrosis which was not present on prior examination. Significant fecal overload throughout the colon and rectum.     After sanchez was replaced, the patient's blood pressure dropped significantly to 68/45 and required IV levophed gtt. Per report, he was placed on bipap at this time due to tachycardia in the 130's and rales. Chest xray impression: Findings  most compatible with pulmonary edema. EKG showed sinus tachycardia with LAFB. Troponin 50 --> 45. HC-POA was contacted from ER and verified DNR-CCA code status. Dr. Fahad Grayson, general surgery, accepted consult from ER for possible cholecystitis. MARITA --serum creatinine was 1.13 on March 10, 2023 and is 2.52 today. In ER, he received Levophed IV gtt, 1L NS IV bolus, tylenol, IV cipro, and IV flagyl. Chronic combined CHF -- grade 3DD, EF 45%    Anemia -- stable with Hgb 12.3    Wounds and LDAs present prior to admission: sanchez catheter     See below for additional PMH. Patient ranb-qlpykxayeo-uwihpzug-available records reviewed, including, but not limited to ER records, imaging results, lab results, office records, personal records, and OARRS -- no signs of abuse or diversion.      Past Medical History:   Diagnosis Date    Acute bronchitis     by history    MARITA (acute kidney injury) ([address] 3/13/2018    Allergic rhinitis     Anxiety     Bradycardia 8/19/2018    Cataract, right     Choroidal nevus of right eye     Chronic systolic CHF (congestive heart failure) ([address] 3/13/2018    Coronary artery disease involving native coronary artery of native heart 10/20/2016    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 LIMA TO LAD, SVG TO DIAGONAL2    Dementia associated with 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 behavioral disturbance ([address] 9/21/2018    Dermatophytosis of nail 1/10/2014    Diverticulosis     Elevated PSA     Hemorrhoids     History of measles childhood    History of mumps childhood    Hyperlipidemia     Hypertension     Hypothyroidism 9/4/2018    Mass of upper lobe of right lung 2/21/2018    Occult blood positive stool     refuses colonoscopy    Osteoarthritis     Overweight     Paroxysmal atrial fibrillation ([address] 11/7/2013    Pneumonia due to organism     Pseudophakia, left eye     PSVT (paroxysmal supraventricular tachycardia) ([address]     Recurrent UTI 3/13/2018    Retinal detachment     left, history of     Seborrheic dermatitis Sepsis due to urinary tract infection ([facility] 8/15/2018    Urinary tract infection without hematuria 3/28/2018           Past Surgical History:   Procedure Laterality Date    ABSCESS DRAINAGE  [id number]    multiple    APPENDECTOMY  [address] and [address] Left 05/07/2013    COLONOSCOPY  06/14/2021    Dr Tessa Hi Left 12/22/2011    detached retina    SKIN TAG REMOVAL  11/01/1999    TONSILLECTOMY         Allergies:    Pcn [penicillins], Sulfa antibiotics, and Cefdinir    Social History:    reports that he has never smoked. He has never used smokeless tobacco. He reports that he does not drink alcohol and does not use drugs. Family History:   family history includes Diabetes in his mother; Osteoporosis in his mother; Other in his father and sister; Stroke in his sister. REVIEW OF SYSTEMS:  See HPI and problem list; otherwise no other new complaints with respect to eyes, ENT, neck, pulmonary, coronary, chest, GI, , endocrine, musculoskeletal, hematologic, lymphatic, allergic/immunologic, neurologic, psychiatric, or skin. Code status: -POA wishes for DNR-CCA at this time. PHYSICAL EXAM:  Vitals:  /74   Pulse 92   Temp (!) 100.7 °F (38.2 °C) (Tympanic)   Resp 20   Ht 5' 11\" (1.803 m)   Wt 161 lb (73 kg)   SpO2 98%   BMI 22.45 kg/m²     General: awake, alert, and uncooperative at times  HEENT:  bipap in place, External nose normal, Normocephalic, and Atraumatic  Neck: Carotid Pulses Present, No Masses, Tenderness, Nodularity, and No Lymphadenopathy  Chest/Lungs: Clear to Auscultation without Rales, Rhonchi, or Wheezes, Bases Diminished Bilaterally, and Respirations even and unlabored  Cardiac: Regular Rate and Rhythm  GI/Abdomen:  Bowel Sounds Present and Soft, no guarding, no rebound tenderness, no focal tenderness, but moans with palpation of bilateral lower quadrants  : Not examined and sanchez catheter present -- urine peach colored with small clots present  Extremities/Musculoskeletal: All four extremities without edema and Generalized weakness  Skin: No Cyanosis and Skin warm and dry  Neuro:  gait impairment at baseline, Dementia at baseline, and No Localizing Signs/Symptoms  Psychiatric: anxious, poor concentration, and flat affect -- moans and yells out at times      LABS:    CBC with Differential:    Lab Results   Component Value Date/Time    WBC 11.9 03/15/2023 05:37 PM    RBC 4.04 03/15/2023 05:37 PM    HGB 12.3 03/15/2023 05:37 PM    HCT 36.2 03/15/2023 05:37 PM     03/15/2023 05:37 PM    MCV 89.6 03/15/2023 05:37 PM    MCH 30.4 03/15/2023 05:37 PM    MCHC 34.0 03/15/2023 05:37 PM    RDW 15.4 03/15/2023 05:37 PM    LYMPHOPCT 5 03/15/2023 05:37 PM    MONOPCT 0 03/15/2023 05:37 PM    BASOPCT 0 03/15/2023 05:37 PM    MONOSABS 0.00 03/15/2023 05:37 PM    LYMPHSABS 0.60 03/15/2023 05:37 PM    EOSABS 0.00 03/15/2023 05:37 PM    BASOSABS 0.00 03/15/2023 05:37 PM    DIFFTYPE NOT REPORTED 10/05/2021 08:26 AM     CMP:    Lab Results   Component Value Date/Time     03/15/2023 05:37 PM    K 5.0 03/15/2023 05:37 PM     03/15/2023 05:37 PM    CO2 22 03/15/2023 05:37 PM    BUN 34 03/15/2023 05:37 PM    CREATININE 2.52 03/15/2023 05:37 PM    GFRAA >60 10/05/2021 08:26 AM    LABGLOM 24 03/15/2023 05:37 PM    GLUCOSE 126 03/15/2023 05:37 PM    PROT 6.6 03/15/2023 05:37 PM    LABALBU 3.6 03/15/2023 05:37 PM    CALCIUM 9.2 03/15/2023 05:37 PM    BILITOT 1.0 03/15/2023 05:37 PM    ALKPHOS 128 03/15/2023 05:37 PM    AST 40 03/15/2023 05:37 PM    ALT 17 03/15/2023 05:37 PM       ASSESSMENT:      Patient Active Problem List   Diagnosis    Hyperlipidemia    Osteoarthritis    Allergic rhinitis    Diverticulosis    Anxiety    Atrial fibrillation (HCC)    Coronary artery disease involving native coronary artery of native heart    Mass of upper lobe of right lung    MARITA (acute kidney injury) ([facility]    Chronic combined systolic and diastolic CHF (congestive heart failure) (HCC)    Gross hematuria    Benign prostatic hyperplasia with incomplete bladder emptying    Hypothyroidism    Hypertension    Dementia associated with 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 behavioral disturbance (HCC)    Intermediate stage nonexudative age-related macular degeneration of both eyes    Combined forms of age-related cataract of right eye    Pseudophakia of left eye    H/O fall    Balance problem    Epididymitis    Scrotal abscess    Pyocele    Hydronephrosis, bilateral    Hypotension    Bladder outlet obstruction       PLAN:    Medications:  Scheduled Meds:   amiodarone  100 mg Oral Daily    fluticasone  1 spray Each Nostril Daily    isosorbide mononitrate  30 mg Oral Daily    levothyroxine  88 mcg Oral Daily    polyethylene glycol  17 g Oral BID    atorvastatin  10 mg Oral Every Other Day    tamsulosin  0.4 mg Oral BID    metoprolol tartrate  12.5 mg Oral BID    sodium chloride flush  5-40 mL IntraVENous 2 times per day    cefTRIAXone (ROCEPHIN) IV  1,000 mg IntraVENous Q24H     Continuous Infusions:   sodium chloride 100 mL/hr at 03/16/23 0454    sodium chloride      norepinephrine 5 mcg/min (03/16/23 0455)     Patient ctsl-hwbsxmqjxt-kckujexp-available records reviewed, including, but not limited to, ER reports--labs--imaging--EKGs---office records--personal notes    Ebonie Vogel              88 WM  [sr Elier Vargas ---fariha guzman; [facility]---[facility], Erika Andrea, Urology;  Vinod Alba ]  DNR-CCA     XARELTO AMIODARONE      CBI---3.16.2023   LEVOPHED---3.16.2023--dc'd  BiPAP--dc'd    Anti-infectives:   Rocephin IV    MARITA---3.16.2023--dehydration----bilateral hydronephrosis   Hypotension---3.16.2023  Gross hematuria---3.16.2023---now champagne  Fecal overload        CT abdomen--pelvis--3.16.2023--cholelithiasis with moderate distension GB---no stranding     CHF---chronic---combined--systolic--diastolic          TTO---2.57.7871---XGJOZ tachycardia--LAFB Troponn---3.16.2023---50 à 39        CXR---3.16.2023---mild pulmonary edema        2D ECHO---2.24.2023---mildly reduced LVSF--NRVSF--MAC--KRISTEN without                               stenosis--AR mildly dilated 4.0. cm--IVC not visualized---                              Grade III severe DD---LVEF ~ 45%--no vegetations described  Constipation   Anemia---chronic   Dementia--severe--with agitation            Atrial fibrillation---PAF--history---currently SR        EKG---2.23.2023--SR--80--1st degree AVB--LAHB--old anteroseptal infarction        RVR--2.12.2018----paroxysmal---recurrent        MI ruled out----2.13.2018  CHF---chronic systolic        Acute-on-chronic systolic---2.12.2018        2D ECHO----2.12.2018---RVSP ~ 48 mm Hg---LVEF ~ 50%  ASCVD         EKG---11.18.2022---SR--68--LAHB--poor R-wave progression--NSTCs         EKG----2.28.2018---atrial fibrillation--flutter---140--LAHB---                     old anterolateral infarction         EKG---2.12.2018---atrial fibrillation---100-104---LAHB---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             CABG---2000---LIMA-LAD--SVG-D2         MI---history   Arrhythmia--history         PSVT         PAF  Hypertension--labile   Hyperlipidemia  Hypothyroidism   CKD---Stage 2  Anxiety  Prior:         CT abdomen-pelvis--2.23.2023--pronounced bibasilar fibrosis--traction                               bronchiectasis--no honeycombing--large stool throughout                                colon and distending rectum--cholelithiasis--numerous hepatic                               hypodensities consistent with cysts--prostatomegaly        DUS--scrotal contents---2.23.2023---no testicular mass or torsion--                              asymmetric left epididymis--bilateral epididymal cysts---                              large complicated scrotal fluid collection--possible pyocele        Groin pain--due to above---2.23.2023       PMH:   diverticulosis, allergic rhinitis, bronchitis, choroidal nevus right               eye, elevated PSA, measles, mumps, OA, hemorrhoids,               Hemocult-[+]--stool--refused colonoscopy, palpitations,              right cataract, UTIs--2018, uncontrolled urination, hematuria---2018,              atrial fibrillation--[flutter]---2018, overweight, grief reaction---death               of sister--2018, RML pneumonia--2018,  PET---2018--RUL mass-like                opacity--consistent with primary malignancy--no active adenopathy--               no metastatic diseaseT5 bone cyst---L1 uptake  likely due               to degenerative change--mild chronic compression deformity,                scrotal abscess---pyocele--complicated left hydrocele----left               epididymitis--2.23.2023--blood culture--1/2--E coli--2.23.2023    PSH:    appendectomy--[id number], tonsillectomy, left eye laser--              detached retina---2011, left cataract--IOL---2013, skin tag              excision--1999, multiple abscess drainages---[id number]    Allergies:  Penicillin---swelling, sulfa, Cefdinir    Attending Supervising [de-identified] Attestation Statement  I performed a history and physical examination on the patient and discussed the management with the nurse practitioner. I reviewed and agree with the findings and plan as documented in her note . Electronically signed by Mendez Ramirez MD on 3/16/23 at 9:58 PM EDT     PLAN:    1. Hypotension -- improving -- telemetry monitoring, IV hydration, IV levophed gtt prn, holding home lisinopril, parameter placed on home beta blocker  2. Hydronephrosis, bilateral, due to obstruction of sanchez catheter -- sanchez was replaced in ER, CBI, maintain sanchez catheter, consider urology consult if available (vs outpatient follow up), monitor I&O and renal function  3. MARITA -- due to above -- maintain sanchez catheter, IV hydration, holding home lisinopril, monitor I&O and renal function, avoid nephrotoxins when possible, home potassium supplement held  4. Urinalysis and repeat urine culture pending -- on IV rocephin due to fevers, leukocytosis, and possible UTI  5. Chronic combined CHF -- daily weight, I&Os, bipap prn, monitor fluid balance  6. Chronic constipation -- bowel regimen  7. Anemia -- stable, monitor, may need to hold Xarelto if worsening significantly  8. Possible cholecystitis -- general surgery consulted  9. Dementia -- reorient prn, maintain patient safety  10. Home medications reviewed  11. DVT prophylaxis -- on Xarelto  12. See orders     Note that over 75 minutes was spent in reviewing and obtaining history, physical examination and evaluation of the patient, placing orders, providing education, coordinating care, reviewing test results, documenting in the medical record, and discussion/communicating with patient/caregiver/family.     (Please note that portions of this note were completed with a voice recognition program. Efforts are made to edit these dictations, but occasionally words are mis-transcribed.)     Electronically signed by DAYANARA Mckinney CNP, NP-C, FNP-BC on 3/16/2023 at 5:23 AM  Hospitalist/Nocturnist

## 2023-03-16 NOTE — PROGRESS NOTES
Assisted patient with drinks of water. Pills given whole in applesauce 1 at a time. Patient spits out amiodarone and flomax across room. Patient also yells and moans with the slightest touch from nursing.

## 2023-03-16 NOTE — CARE COORDINATION
SW met with patient to complete assessment. Patient has has Dementia and resides at [facility]. Patient struggled with engaging in conversation.        Electronically signed by YOLANDA Grace on 3/16/2023 at 11:32 AM

## 2023-03-16 NOTE — PROGRESS NOTES
rounding on PCU. Assessment:  Patient is very anxious and just wants to go home.  could not get any conversation. Intervention: Engaged in conversation. Patient expressed gratitude for visit and offer of continued prayer. Plan: Chaplains are available 24/7 to help with spiritual and emotional concerns.         03/16/23 1135   Encounter Summary   Encounter Overview/Reason  Volunteer Encounter   Service Provided For: Patient   Referral/Consult From: Rounding   Support System Unknown   Last Encounter  03/16/23   Complexity of Encounter Moderate   Begin Time 1130   End Time  1139   Total Time Calculated 9 min   Spiritual/Emotional needs   Type Spiritual Support   Assessment/Intervention/Outcome   Assessment Anxious   Intervention Active listening   Outcome Less anxious, Less agitated

## 2023-03-17 ENCOUNTER — APPOINTMENT (OUTPATIENT)
Dept: GENERAL RADIOLOGY | Age: 88
DRG: 699 | End: 2023-03-17
Payer: MEDICARE

## 2023-03-17 LAB
ABSOLUTE EOS #: 0 K/UL (ref 0–0.4)
ABSOLUTE IMMATURE GRANULOCYTE: 0 K/UL (ref 0–0.3)
ABSOLUTE LYMPH #: 0.69 K/UL (ref 1–4.8)
ABSOLUTE MONO #: 0.27 K/UL (ref 0.1–1.2)
ANION GAP SERPL CALCULATED.3IONS-SCNC: 9 MMOL/L (ref 9–17)
BASOPHILS # BLD: 1 % (ref 0–2)
BASOPHILS ABSOLUTE: 0.05 K/UL (ref 0–0.2)
BUN SERPL-MCNC: 25 MG/DL (ref 8–23)
BUN/CREAT BLD: 22 (ref 9–20)
CALCIUM SERPL-MCNC: 8.3 MG/DL (ref 8.6–10.4)
CHLORIDE SERPL-SCNC: 113 MMOL/L (ref 98–107)
CO2 SERPL-SCNC: 22 MMOL/L (ref 20–31)
CREAT SERPL-MCNC: 1.13 MG/DL (ref 0.7–1.2)
EOSINOPHILS RELATIVE PERCENT: 0 % (ref 1–8)
GFR SERPL CREATININE-BSD FRML MDRD: >60 ML/MIN/1.73M2
GLUCOSE SERPL-MCNC: 105 MG/DL (ref 70–99)
HCT VFR BLD AUTO: 30.7 % (ref 40.7–50.3)
HGB BLD-MCNC: 10 G/DL (ref 13–17)
IMMATURE GRANULOCYTES: 0 %
LYMPHOCYTES # BLD: 13 % (ref 15–43)
MCH RBC QN AUTO: 29.8 PG (ref 25.2–33.5)
MCHC RBC AUTO-ENTMCNC: 32.6 G/DL (ref 25.2–33.5)
MCV RBC AUTO: 91.4 FL (ref 82.6–102.9)
MONOCYTES # BLD: 5 % (ref 6–14)
MORPHOLOGY: ABNORMAL
NRBC AUTOMATED: 0 PER 100 WBC
PDW BLD-RTO: 15.6 % (ref 11.8–14.4)
PLATELET # BLD AUTO: 96 K/UL (ref 138–453)
PMV BLD AUTO: 12 FL (ref 8.1–13.5)
POTASSIUM SERPL-SCNC: 4.2 MMOL/L (ref 3.7–5.3)
RBC # BLD: 3.36 M/UL (ref 4.21–5.77)
SEG NEUTROPHILS: 81 % (ref 44–74)
SEGMENTED NEUTROPHILS ABSOLUTE COUNT: 4.29 K/UL (ref 1.5–8.1)
SODIUM SERPL-SCNC: 144 MMOL/L (ref 135–144)
WBC # BLD AUTO: 5.3 K/UL (ref 3.5–11.3)

## 2023-03-17 PROCEDURE — 99231 SBSQ HOSP IP/OBS SF/LOW 25: CPT | Performed by: INTERNAL MEDICINE

## 2023-03-17 PROCEDURE — 2580000003 HC RX 258: Performed by: NURSE PRACTITIONER

## 2023-03-17 PROCEDURE — 6360000002 HC RX W HCPCS: Performed by: NURSE PRACTITIONER

## 2023-03-17 PROCEDURE — 36415 COLL VENOUS BLD VENIPUNCTURE: CPT

## 2023-03-17 PROCEDURE — 80048 BASIC METABOLIC PNL TOTAL CA: CPT

## 2023-03-17 PROCEDURE — 6370000000 HC RX 637 (ALT 250 FOR IP): Performed by: NURSE PRACTITIONER

## 2023-03-17 PROCEDURE — 51700 IRRIGATION OF BLADDER: CPT

## 2023-03-17 PROCEDURE — 71045 X-RAY EXAM CHEST 1 VIEW: CPT

## 2023-03-17 PROCEDURE — 6360000002 HC RX W HCPCS: Performed by: INTERNAL MEDICINE

## 2023-03-17 PROCEDURE — 51798 US URINE CAPACITY MEASURE: CPT

## 2023-03-17 PROCEDURE — 2000000000 HC ICU R&B

## 2023-03-17 PROCEDURE — 85025 COMPLETE CBC W/AUTO DIFF WBC: CPT

## 2023-03-17 RX ORDER — LISINOPRIL 20 MG/1
40 TABLET ORAL DAILY
Status: DISCONTINUED | OUTPATIENT
Start: 2023-03-18 | End: 2023-03-18 | Stop reason: HOSPADM

## 2023-03-17 RX ORDER — FLUCONAZOLE 2 MG/ML
200 INJECTION, SOLUTION INTRAVENOUS DAILY
Status: DISCONTINUED | OUTPATIENT
Start: 2023-03-17 | End: 2023-03-18 | Stop reason: HOSPADM

## 2023-03-17 RX ADMIN — RIVAROXABAN 15 MG: 15 TABLET, FILM COATED ORAL at 08:13

## 2023-03-17 RX ADMIN — FLUTICASONE PROPIONATE 1 SPRAY: 50 SPRAY, METERED NASAL at 08:21

## 2023-03-17 RX ADMIN — POLYETHYLENE GLYCOL 3350 17 G: 17 POWDER, FOR SOLUTION ORAL at 08:21

## 2023-03-17 RX ADMIN — SODIUM CHLORIDE: 9 INJECTION, SOLUTION INTRAVENOUS at 11:49

## 2023-03-17 RX ADMIN — FLUCONAZOLE 200 MG: 2 INJECTION, SOLUTION INTRAVENOUS at 11:53

## 2023-03-17 RX ADMIN — ISOSORBIDE MONONITRATE 30 MG: 30 TABLET, EXTENDED RELEASE ORAL at 08:13

## 2023-03-17 RX ADMIN — METOPROLOL TARTRATE 12.5 MG: 25 TABLET, FILM COATED ORAL at 20:00

## 2023-03-17 RX ADMIN — TAMSULOSIN HYDROCHLORIDE 0.4 MG: 0.4 CAPSULE ORAL at 08:13

## 2023-03-17 RX ADMIN — CEFTRIAXONE 1000 MG: 1 INJECTION, POWDER, FOR SOLUTION INTRAMUSCULAR; INTRAVENOUS at 05:17

## 2023-03-17 RX ADMIN — TAMSULOSIN HYDROCHLORIDE 0.4 MG: 0.4 CAPSULE ORAL at 20:00

## 2023-03-17 RX ADMIN — AMIODARONE HYDROCHLORIDE 100 MG: 200 TABLET ORAL at 08:13

## 2023-03-17 RX ADMIN — METOPROLOL TARTRATE 12.5 MG: 25 TABLET, FILM COATED ORAL at 08:12

## 2023-03-17 RX ADMIN — LEVOTHYROXINE SODIUM 88 MCG: 0.09 TABLET ORAL at 05:17

## 2023-03-17 ASSESSMENT — PAIN SCALES - PAIN ASSESSMENT IN ADVANCED DEMENTIA (PAINAD)
CONSOLABILITY: 0
CONSOLABILITY: 0
TOTALSCORE: 0
NEGVOCALIZATION: 0
BREATHING: 0
BREATHING: 0
BODYLANGUAGE: 0
TOTALSCORE: 0
BREATHING: 0
NEGVOCALIZATION: 0
CONSOLABILITY: 0
BREATHING: 0
TOTALSCORE: 0
BODYLANGUAGE: 0
BODYLANGUAGE: 1
BREATHING: 0
FACIALEXPRESSION: 0
NEGVOCALIZATION: 1
FACIALEXPRESSION: 0
BODYLANGUAGE: 0
FACIALEXPRESSION: 0
NEGVOCALIZATION: 0
FACIALEXPRESSION: 1
BODYLANGUAGE: 0
BREATHING: 0
TOTALSCORE: 0
BODYLANGUAGE: 0
FACIALEXPRESSION: 0
FACIALEXPRESSION: 0
BREATHING: 0
FACIALEXPRESSION: 0
CONSOLABILITY: 0
NEGVOCALIZATION: 0
CONSOLABILITY: 0
NEGVOCALIZATION: 0
BODYLANGUAGE: 0
CONSOLABILITY: 0
TOTALSCORE: 0
BODYLANGUAGE: 0
BREATHING: 0
NEGVOCALIZATION: 0
TOTALSCORE: 0
FACIALEXPRESSION: 0
TOTALSCORE: 4
CONSOLABILITY: 0
CONSOLABILITY: 1
TOTALSCORE: 0
NEGVOCALIZATION: 0

## 2023-03-17 NOTE — PROGRESS NOTES
SPIRITUAL CARE DEPARTMENT - [facility]  PROGRESS NOTE    Shift date: 03/17/23  Shift day: Friday     Room # 0201/0201-01   Name: Williams Bacon                  Referral: Routine Visit    Admit Date & Time: 3/15/2023  4:49 PM    Assessment:  Williams Bacon is a 80 y.o. male in the hospital because of hydronephrosis. Upon entering the room writer observes patient in hospital bed watching TV. He was not interested in talking much, but stated he had a sister and was a . He stated this was not his first time with this health concern, but wasn't sure why he is in the hospital.  When prayer was offered, he agreed. Intervention:   provided a listening and supportive presence. Outcome:  Patient seemed comforted by prayer and expressed gratitude for visit. Plan:  Chaplains will remain available to offer spiritual and emotional support as needed.        03/17/23 1003   Encounter Summary   Encounter Overview/Reason  Initial Encounter   Service Provided For: Patient   Referral/Consult From: [address] Family members   Last Encounter  03/17/23   Complexity of Encounter Moderate   Begin Time 0930   End Time  0945   Total Time Calculated 15 min   Spiritual/Emotional needs   Type Spiritual Support   Assessment/Intervention/Outcome   Assessment Sad   Intervention Active listening;Explored/Affirmed feelings, thoughts, concerns;Prayer (assurance of)/[place];Sustaining Presence/Ministry of presence   Outcome Engaged in conversation;Expressed Gratitude     Electronically signed by Wisam Bledsoe on 3/17/2023 at 10:11 AM

## 2023-03-17 NOTE — PROGRESS NOTES
Hospitalist Progress Note    Patient:  Nicola Miller     YOB: 1934    MRN: [id number]   Admit date: 3/15/2023     Acct: [de-identified]     PCP: Svitlana Uriarte, DO    CC--Interval History: MARITA--resolved    Hematuria--controlled----CBI pale clear yellow  --> treat as sanchez without irrigation if urine remains clear    Blood culture---[+] x 2----yeast ---> Diflucan IV    Anemia---HGB ~ 10---stable    PAF---currently SR    Constipation---bowel regimen    HTN---131/65----no longer on Levophed    Initial respiratory distress resolved---no BiPAP---RA    See note below     All other ROS negative except noted in HPI    Diet:  ADULT DIET;  Dysphagia - Pureed; Mildly Thick (Nectar)    Medications:  Scheduled Meds:   amiodarone  100 mg Oral Daily    fluticasone  1 spray Each Nostril Daily    isosorbide mononitrate  30 mg Oral Daily    levothyroxine  88 mcg Oral Daily    polyethylene glycol  17 g Oral BID    atorvastatin  10 mg Oral Every Other Day    tamsulosin  0.4 mg Oral BID    metoprolol tartrate  12.5 mg Oral BID    sodium chloride flush  5-40 mL IntraVENous 2 times per day    cefTRIAXone (ROCEPHIN) IV  1,000 mg IntraVENous Q24H    rivaroxaban  15 mg Oral Daily with breakfast     Continuous Infusions:   sodium chloride 50 mL/hr at 03/17/23 9812    sodium chloride Stopped (03/16/23 0717)     PRN Meds:ALPRAZolam, lactulose, hydrOXYzine HCl, sodium chloride flush, sodium chloride, ondansetron **OR** ondansetron, acetaminophen **OR** acetaminophen, sodium phosphate, sodium chloride nebulizer, levalbuterol, sodium chloride nebulizer    Objective:  Labs:  CBC with Differential:    Lab Results   Component Value Date/Time    WBC 5.3 03/17/2023 05:15 AM    RBC 3.36 03/17/2023 05:15 AM    HGB 10.0 03/17/2023 05:15 AM    HCT 30.7 03/17/2023 05:15 AM    PLT 96 03/17/2023 05:15 AM    MCV 91.4 03/17/2023 05:15 AM    MCH 29.8 03/17/2023 05:15 AM    MCHC 32.6 03/17/2023 05:15 AM    RDW 15.6 03/17/2023 05:15 AM    LYMPHOPCT 13 03/17/2023 05:15 AM    MONOPCT 5 03/17/2023 05:15 AM    BASOPCT 1 03/17/2023 05:15 AM    MONOSABS 0.27 03/17/2023 05:15 AM    LYMPHSABS 0.69 03/17/2023 05:15 AM    EOSABS 0.00 03/17/2023 05:15 AM    BASOSABS 0.05 03/17/2023 05:15 AM    DIFFTYPE NOT REPORTED 10/05/2021 08:26 AM     CMP:    Lab Results   Component Value Date/Time     03/17/2023 05:15 AM    K 4.2 03/17/2023 05:15 AM     03/17/2023 05:15 AM    CO2 22 03/17/2023 05:15 AM    BUN 25 03/17/2023 05:15 AM    CREATININE 1.13 03/17/2023 05:15 AM    GFRAA >60 10/05/2021 08:26 AM    LABGLOM >60 03/17/2023 05:15 AM    GLUCOSE 105 03/17/2023 05:15 AM    PROT 5.6 03/16/2023 05:45 AM    LABALBU 2.9 03/16/2023 05:45 AM    CALCIUM 8.3 03/17/2023 05:15 AM    BILITOT 0.7 03/16/2023 05:45 AM    ALKPHOS 101 03/16/2023 05:45 AM    AST 32 03/16/2023 05:45 AM    ALT 14 03/16/2023 05:45 AM     BMP:    Lab Results   Component Value Date/Time     03/17/2023 05:15 AM    K 4.2 03/17/2023 05:15 AM     03/17/2023 05:15 AM    CO2 22 03/17/2023 05:15 AM    BUN 25 03/17/2023 05:15 AM    LABALBU 2.9 03/16/2023 05:45 AM    CREATININE 1.13 03/17/2023 05:15 AM    CALCIUM 8.3 03/17/2023 05:15 AM    GFRAA >60 10/05/2021 08:26 AM    LABGLOM >60 03/17/2023 05:15 AM    GLUCOSE 105 03/17/2023 05:15 AM           Physical Exam:  Vitals: /65   Pulse 80   Temp 98 °F (36.7 °C) (Tympanic)   Resp 17   Ht 5' 11\" (1.803 m)   Wt 169 lb 8 oz (76.9 kg)   SpO2 98%   BMI 23.64 kg/m²   24 hour intake/output:  Intake/Output Summary (Last 24 hours) at 3/17/2023 0737  Last data filed at 3/17/2023 9539  Gross per 24 hour   Intake 2932.96 ml   Output 1780 ml   Net 1152.96 ml     Last 3 weights:   Wt Readings from Last 3 Encounters:   03/17/23 169 lb 8 oz (76.9 kg)   03/13/23 163 lb (73.9 kg)   03/10/23 163 lb (73.9 kg)     HEENT: Normocephalic and Atraumatic  Neck: Supple, No Masses, Tenderness, Nodularity, and No Lymphadenopathy  Chest/Lungs: Clear to Auscultation without Rales, Rhonchi, or Wheezes and Distant Breath Sounds  Cardiac: Regular Rate and Rhythm  GI/Abdomen: Bowel Sounds Present and Soft, Non-tender, without Guarding or Rebound Tenderness  :  CBI----urine pale clear yellow--- and Normal Male  EXT/Skin: No Cyanosis, No Clubbing, and Edema Present----trivial  Neuro:  more alert and interactive----generalized weakness---no screaming as much      Assessment:    Principal Problem:    Hydronephrosis, bilateral  Active Problems:    Hypotension    Abdominal pain    MARITA (acute kidney injury) ([facility])    Chronic combined systolic and diastolic CHF (congestive heart failure) ([facility])    Gross hematuria  Resolved Problems:    * No resolved hospital problems. *    JUNIOR JOHNS              88 [address]  [sr Zaida ---fariha guzman; DC Cardiology---[facility], logan Scott, Urology;  ruby Finn ]  DNR-CCA     [XARELTO]   AMIODARONE      CBI---3.16.2023   LEVOPHED---3.16.2023---dc'd    Anti-infectives:   Rocephin IV,  Diflucan IV--3.17.2023    MARITA---3.16.2023--resolved----now at a Stage 2 level---prior dehydration----bilateral hydronephrosis due to VU   BLOOD CULTURE--[+]---yeast---3.16.2023  Hypotension---3.16.2023--resolved   Gross hematuria---3.16.2023---now clear  Fecal overload        CT abdomen--pelvis--3.16.2023--cholelithiasis with moderate distension GB---no stranding     CHF---chronic---combined--systolic--diastolic          EKG---3.16.2023---sinus tachycardia--LAFB         Troponin---3.16.2023---50 à 45        CXR---3.16.2023---mild pulmonary edema        2D ECHO---2.24.2023---mildly reduced LVSF--NRVSF--MAC--KRISTEN without                               stenosis--AR mildly dilated 4.0. cm--IVC not visualized---                              Grade III severe DD---LVEF ~ 45%--no vegetations described  Constipation   Anemia---chronic   Dementia--severe--with agitation            Atrial fibrillation---PAF--history---currently SR        EKG---2.23.2023--SR--80--1st degree AVB--LAHB--old  anteroseptal infarction        RVR--2.12.2018----paroxysmal---recurrent        MI ruled out----2.13.2018  CHF---chronic systolic        Acute-on-chronic systolic---2.12.2018        2D ECHO----2.12.2018---RVSP ~ 48 mm Hg---LVEF ~ 50%  ASCVD         EKG---11.18.2022---SR--68--LAHB--poor R-wave progression--NSTCs         EKG----2.28.2018---atrial fibrillation--flutter---140--LAHB---                     old anterolateral infarction         EKG---2.12.2018---atrial fibrillation---100-104---LAHB---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             CABG---2000---LIMA-LAD--SVG-D2         MI---history   Arrhythmia--history         PSVT         PAF  Hypertension--labile   Hyperlipidemia  Hypothyroidism   CKD---Stage 2  Anxiety  Prior:         CT abdomen-pelvis--2.23.2023--pronounced bibasilar fibrosis--traction                               bronchiectasis--no honeycombing--large stool throughout                                colon and distending rectum--cholelithiasis--numerous hepatic                               hypodensities consistent with cysts--prostatomegaly        DUS--scrotal contents---2.23.2023---no testicular mass or torsion--                              asymmetric left epididymis--bilateral epididymal cysts---                              large complicated scrotal fluid collection--possible pyocele        Groin pain--due to above---2.23.2023       PMH:   diverticulosis, allergic rhinitis, bronchitis, choroidal nevus right               eye, elevated PSA, measles, mumps, OA, hemorrhoids,               Hemocult-[+]--stool--refused colonoscopy, palpitations,              right cataract, UTIs--2018, uncontrolled urination, hematuria---2018,              atrial fibrillation--[flutter]---2018, overweight, grief reaction---death               of sister--2018, RML pneumonia--2018,  PET---2018--RUL mass-like                opacity--consistent with primary malignancy--no active adenopathy--               no metastatic diseaseT5 bone cyst---L1 uptake  likely due               to degenerative change--mild chronic compression deformity,                scrotal abscess---pyocele--complicated left hydrocele----left               epididymitis--2.23.2023--blood culture--1/2--E coli--2.23.2023    PSH:    appendectomy--[id number], tonsillectomy, left eye laser--              detached retina---2011, left cataract--IOL---2013, skin tag              excision--1999, multiple abscess drainages---[id number]    Allergies:  Penicillin---swelling, sulfa, Cefdinir    Plan:      Blood culture x 2--yeast--added Diflucan IV to regimen      Urinary retention--cont'd CBI catheter--treat as sanchez--if recurrence of bleeding --> CBI      Rocephin IV       See orders    Electronically signed by Jona Solis MD on 3/17/2023 at 7:37 AM    Hospitalist

## 2023-03-17 NOTE — CARE COORDINATION
Readmission Assessment  Number of Days since last admission?: 8-30 days  Previous Disposition: Other (comment) (assisted living)  Who is being Interviewed: Patient (assessment difficult secondary to dementia)  What was the patient's/caregiver's perception as to why they think they needed to return back to the hospital?: Other (Comment) (constipation per assisted living)  Did you visit your Primary Care Physician after you left the hospital, before you returned this time?: Yes  Did you see a specialist, such as Cardiac, Pulmonary, Orthopedic Physician, etc. after you left the hospital?: Yes  Who advised the patient to return to the hospital?: Other (Comment) (assisted living)  Does the patient report anything that got in the way of taking their medications?: No (per pt records)  In our efforts to provide the best possible care to you and others like you, can you think of anything that we could have done to help you after you left the hospital the first time, so that you might not have needed to return so soon?: Other (Comment) (nothing noted)

## 2023-03-18 ENCOUNTER — APPOINTMENT (OUTPATIENT)
Dept: CT IMAGING | Age: 88
End: 2023-03-18
Payer: MEDICARE

## 2023-03-18 ENCOUNTER — HOSPITAL ENCOUNTER (EMERGENCY)
Age: 88
Discharge: HOME | End: 2023-03-18
Attending: EMERGENCY MEDICINE
Payer: MEDICARE

## 2023-03-18 VITALS
HEART RATE: 90 BPM | SYSTOLIC BLOOD PRESSURE: 120 MMHG | TEMPERATURE: 98.4 F | HEIGHT: 71 IN | BODY MASS INDEX: 23.79 KG/M2 | DIASTOLIC BLOOD PRESSURE: 71 MMHG | OXYGEN SATURATION: 96 % | WEIGHT: 169.9 LBS | RESPIRATION RATE: 20 BRPM

## 2023-03-18 VITALS
SYSTOLIC BLOOD PRESSURE: 142 MMHG | OXYGEN SATURATION: 98 % | RESPIRATION RATE: 12 BRPM | DIASTOLIC BLOOD PRESSURE: 68 MMHG | HEART RATE: 78 BPM | TEMPERATURE: 98.5 F

## 2023-03-18 DIAGNOSIS — S01.01XA LACERATION OF SCALP, INITIAL ENCOUNTER: ICD-10-CM

## 2023-03-18 DIAGNOSIS — S09.90XA INJURY OF HEAD, INITIAL ENCOUNTER: Primary | ICD-10-CM

## 2023-03-18 LAB
ABSOLUTE EOS #: 0.09 K/UL (ref 0–0.44)
ABSOLUTE IMMATURE GRANULOCYTE: <0.03 K/UL (ref 0–0.3)
ABSOLUTE LYMPH #: 0.65 K/UL (ref 1.1–3.7)
ABSOLUTE MONO #: 0.34 K/UL (ref 0.1–1.2)
ANION GAP SERPL CALCULATED.3IONS-SCNC: 10 MMOL/L (ref 9–17)
BASOPHILS # BLD: 0 % (ref 0–2)
BASOPHILS ABSOLUTE: <0.03 K/UL (ref 0–0.2)
BUN SERPL-MCNC: 20 MG/DL (ref 8–23)
BUN/CREAT BLD: 20 (ref 9–20)
CALCIUM SERPL-MCNC: 8.1 MG/DL (ref 8.6–10.4)
CHLORIDE SERPL-SCNC: 108 MMOL/L (ref 98–107)
CO2 SERPL-SCNC: 23 MMOL/L (ref 20–31)
CREAT SERPL-MCNC: 0.99 MG/DL (ref 0.7–1.2)
EOSINOPHILS RELATIVE PERCENT: 2 % (ref 1–4)
GFR SERPL CREATININE-BSD FRML MDRD: >60 ML/MIN/1.73M2
GLUCOSE SERPL-MCNC: 103 MG/DL (ref 70–99)
HCT VFR BLD AUTO: 29.1 % (ref 40.7–50.3)
HGB BLD-MCNC: 9.7 G/DL (ref 13–17)
IMMATURE GRANULOCYTES: 0 %
LYMPHOCYTES # BLD: 11 % (ref 24–43)
MCH RBC QN AUTO: 30 PG (ref 25.2–33.5)
MCHC RBC AUTO-ENTMCNC: 33.3 G/DL (ref 25.2–33.5)
MCV RBC AUTO: 90.1 FL (ref 82.6–102.9)
MICROORGANISM SPEC CULT: NORMAL
MONOCYTES # BLD: 6 % (ref 3–12)
NRBC AUTOMATED: 0 PER 100 WBC
PDW BLD-RTO: 15.2 % (ref 11.8–14.4)
PLATELET # BLD AUTO: ABNORMAL K/UL (ref 138–453)
PLATELET, FLUORESCENCE: 111 K/UL (ref 138–453)
PLATELET, IMMATURE FRACTION: 4.9 % (ref 1.1–10.3)
POTASSIUM SERPL-SCNC: 3.1 MMOL/L (ref 3.7–5.3)
RBC # BLD: 3.23 M/UL (ref 4.21–5.77)
SEG NEUTROPHILS: 82 % (ref 36–65)
SEGMENTED NEUTROPHILS ABSOLUTE COUNT: 5.09 K/UL (ref 1.5–8.1)
SODIUM SERPL-SCNC: 141 MMOL/L (ref 135–144)
SPECIMEN DESCRIPTION: NORMAL
WBC # BLD AUTO: 6.2 K/UL (ref 3.5–11.3)

## 2023-03-18 PROCEDURE — 6360000002 HC RX W HCPCS: Performed by: INTERNAL MEDICINE

## 2023-03-18 PROCEDURE — 70450 CT HEAD/BRAIN W/O DYE: CPT

## 2023-03-18 PROCEDURE — 94760 N-INVAS EAR/PLS OXIMETRY 1: CPT

## 2023-03-18 PROCEDURE — 99239 HOSP IP/OBS DSCHRG MGMT >30: CPT | Performed by: FAMILY MEDICINE

## 2023-03-18 PROCEDURE — 80048 BASIC METABOLIC PNL TOTAL CA: CPT

## 2023-03-18 PROCEDURE — 6370000000 HC RX 637 (ALT 250 FOR IP): Performed by: NURSE PRACTITIONER

## 2023-03-18 PROCEDURE — 72125 CT NECK SPINE W/O DYE: CPT

## 2023-03-18 PROCEDURE — 6370000000 HC RX 637 (ALT 250 FOR IP): Performed by: EMERGENCY MEDICINE

## 2023-03-18 PROCEDURE — 2580000003 HC RX 258: Performed by: NURSE PRACTITIONER

## 2023-03-18 PROCEDURE — 99284 EMERGENCY DEPT VISIT MOD MDM: CPT

## 2023-03-18 PROCEDURE — 12001 RPR S/N/AX/GEN/TRNK 2.5CM/<: CPT

## 2023-03-18 PROCEDURE — 36415 COLL VENOUS BLD VENIPUNCTURE: CPT

## 2023-03-18 PROCEDURE — 85055 RETICULATED PLATELET ASSAY: CPT

## 2023-03-18 PROCEDURE — 85025 COMPLETE CBC W/AUTO DIFF WBC: CPT

## 2023-03-18 PROCEDURE — 6360000002 HC RX W HCPCS: Performed by: NURSE PRACTITIONER

## 2023-03-18 RX ORDER — CEPHALEXIN 500 MG/1
500 CAPSULE ORAL 2 TIMES DAILY
Qty: 14 CAPSULE | Refills: 0 | Status: SHIPPED | OUTPATIENT
Start: 2023-03-18 | End: 2023-03-25

## 2023-03-18 RX ORDER — CEFDINIR 300 MG/1
300 CAPSULE ORAL 2 TIMES DAILY
Qty: 14 CAPSULE | Refills: 0 | Status: SHIPPED | OUTPATIENT
Start: 2023-03-18 | End: 2023-03-18 | Stop reason: HOSPADM

## 2023-03-18 RX ORDER — FLUCONAZOLE 100 MG/1
100 TABLET ORAL DAILY
Qty: 7 TABLET | Refills: 0 | Status: SHIPPED | OUTPATIENT
Start: 2023-03-18 | End: 2023-03-25

## 2023-03-18 RX ORDER — ALPRAZOLAM 0.25 MG/1
TABLET ORAL
Qty: 6 TABLET | Refills: 0 | Status: SHIPPED | OUTPATIENT
Start: 2023-03-18 | End: 2023-03-20

## 2023-03-18 RX ADMIN — AMIODARONE HYDROCHLORIDE 100 MG: 200 TABLET ORAL at 08:52

## 2023-03-18 RX ADMIN — Medication 3 ML: at 15:22

## 2023-03-18 RX ADMIN — METOPROLOL TARTRATE 12.5 MG: 25 TABLET, FILM COATED ORAL at 08:52

## 2023-03-18 RX ADMIN — LEVOTHYROXINE SODIUM 88 MCG: 0.09 TABLET ORAL at 05:00

## 2023-03-18 RX ADMIN — SODIUM CHLORIDE 25 ML: 9 INJECTION, SOLUTION INTRAVENOUS at 04:53

## 2023-03-18 RX ADMIN — ISOSORBIDE MONONITRATE 30 MG: 30 TABLET, EXTENDED RELEASE ORAL at 08:52

## 2023-03-18 RX ADMIN — SODIUM CHLORIDE, PRESERVATIVE FREE 10 ML: 5 INJECTION INTRAVENOUS at 08:54

## 2023-03-18 RX ADMIN — FLUTICASONE PROPIONATE 1 SPRAY: 50 SPRAY, METERED NASAL at 08:53

## 2023-03-18 RX ADMIN — RIVAROXABAN 15 MG: 15 TABLET, FILM COATED ORAL at 08:53

## 2023-03-18 RX ADMIN — CEFTRIAXONE 1000 MG: 1 INJECTION, POWDER, FOR SOLUTION INTRAMUSCULAR; INTRAVENOUS at 04:55

## 2023-03-18 RX ADMIN — TAMSULOSIN HYDROCHLORIDE 0.4 MG: 0.4 CAPSULE ORAL at 08:52

## 2023-03-18 RX ADMIN — ATORVASTATIN CALCIUM 10 MG: 10 TABLET, FILM COATED ORAL at 08:52

## 2023-03-18 RX ADMIN — LISINOPRIL 40 MG: 20 TABLET ORAL at 08:53

## 2023-03-18 RX ADMIN — FLUCONAZOLE 200 MG: 2 INJECTION, SOLUTION INTRAVENOUS at 08:59

## 2023-03-18 ASSESSMENT — PAIN SCALES - PAIN ASSESSMENT IN ADVANCED DEMENTIA (PAINAD)
CONSOLABILITY: 0
FACIALEXPRESSION: 0
BREATHING: 0
TOTALSCORE: 0
CONSOLABILITY: 0
FACIALEXPRESSION: 0
NEGVOCALIZATION: 0
TOTALSCORE: 0
BODYLANGUAGE: 0
CONSOLABILITY: 0
FACIALEXPRESSION: 0
BREATHING: 0
CONSOLABILITY: 0
BODYLANGUAGE: 0
BREATHING: 0
NEGVOCALIZATION: 0
BREATHING: 0
NEGVOCALIZATION: 0
FACIALEXPRESSION: 0
NEGVOCALIZATION: 0
FACIALEXPRESSION: 0
TOTALSCORE: 0
NEGVOCALIZATION: 0
FACIALEXPRESSION: 0
BODYLANGUAGE: 0
TOTALSCORE: 0
CONSOLABILITY: 0
TOTALSCORE: 0
CONSOLABILITY: 0
NEGVOCALIZATION: 0
BREATHING: 0
FACIALEXPRESSION: 0
TOTALSCORE: 0
BODYLANGUAGE: 0
FACIALEXPRESSION: 0
TOTALSCORE: 0
BODYLANGUAGE: 0
BREATHING: 0
BODYLANGUAGE: 0
CONSOLABILITY: 0
TOTALSCORE: 0
BODYLANGUAGE: 0
FACIALEXPRESSION: 0
NEGVOCALIZATION: 0
BODYLANGUAGE: 0
NEGVOCALIZATION: 0
NEGVOCALIZATION: 0
TOTALSCORE: 0
BODYLANGUAGE: 0

## 2023-03-18 ASSESSMENT — PAIN DESCRIPTION - PAIN TYPE: TYPE: ACUTE PAIN

## 2023-03-18 ASSESSMENT — PAIN - FUNCTIONAL ASSESSMENT: PAIN_FUNCTIONAL_ASSESSMENT: WONG-BAKER FACES

## 2023-03-18 ASSESSMENT — PAIN DESCRIPTION - LOCATION: LOCATION: HEAD

## 2023-03-18 ASSESSMENT — PAIN SCALES - WONG BAKER: WONGBAKER_NUMERICALRESPONSE: 6

## 2023-03-18 ASSESSMENT — LIFESTYLE VARIABLES: HOW OFTEN DO YOU HAVE A DRINK CONTAINING ALCOHOL: NEVER

## 2023-03-18 NOTE — DISCHARGE SUMMARY
Hospitalist Discharge Summary    Ehsan Mansfield  :  1934  MRN:  [id number]    Admit date:  3/15/2023  Discharge date:  3/18/23    Admitting Physician:  Janny Castellanos MD    Discharge Diagnoses:   Patient Active Problem List   Diagnosis    Hyperlipidemia    Osteoarthritis    Allergic rhinitis    Diverticulosis    Anxiety    Atrial fibrillation ([facility] [address]    Coronary artery disease involving native coronary artery of native heart    Mass of upper lobe of right lung    MARITA (acute kidney injury) ([facility] [address]    Chronic combined systolic and diastolic CHF (congestive heart failure) ([facility] [address]    Gross hematuria    Benign prostatic hyperplasia with incomplete bladder emptying    Hypothyroidism    Hypertension    Dementia associated with 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 behavioral disturbance (HCC)    Intermediate stage nonexudative age-related macular degeneration of both eyes    Combined forms of age-related cataract of right eye    Pseudophakia of left eye    H/O fall    Balance problem    Epididymitis    Scrotal abscess    Pyocele    Hydronephrosis, bilateral    Hypotension    Bladder outlet obstruction    Abdominal pain        Admission Condition:  fair      Discharged Condition:  fair    Hospital Course/Treatments   admitted with h/o of marita, pt has uti and was rx with iv antibiotics, pt also grew some yeast, pt is on diflucan, pt iv and diflucan was switched to oral, pt will be transferred to [facility] in stable condition    Discharge Medications:         Medication List        START taking these medications      cefdinir 300 MG capsule  Commonly known as: OMNICEF  Take 1 capsule by mouth 2 times daily for 7 days     fluconazole 100 MG tablet  Commonly known as: DIFLUCAN  Take 1 tablet by mouth daily for 7 days            CONTINUE taking these medications      acetaminophen 325 MG tablet  Commonly known as: TYLENOL     acetaminophen-codeine 300-30 MG per tablet  Commonly known as: TYLENOL/CODEINE #3  Take 1 tablet by mouth every 6 hours as needed for Pain for up to 14 days. Take lowest dose possible to manage pain Max Daily Amount: 4 tablets     albuterol (2.5 MG/3ML) 0.083% nebulizer solution  Commonly known as: PROVENTIL     ALPRAZolam 0.25 MG tablet  Commonly known as: XANAX  Take 0.125mg every am. Take 1 tab at HS. May take an additional 0.125 mg daily before showering as needed.      amiodarone 200 MG tablet  Commonly known as: CORDARONE     Cholestyramine Powd     coenzyme Q-10 100 MG capsule     fluticasone 50 MCG/ACT nasal spray  Commonly known as: FLONASE  INSTILL 1 SPRAY IN EACH NOSTRIL ONCE DAILY     glucosamine-chondroitin 500-400 MG Caps     hydrocortisone 1 % cream     hydrOXYzine HCl 25 MG tablet  Commonly known as: ATARAX     isosorbide mononitrate 30 MG extended release tablet  Commonly known as: IMDUR  Take 1 tablet by mouth daily     lactulose 10 GM/15ML solution  Commonly known as: CHRONULAC  Take 30 mLs by mouth 3 times daily as needed (constipation)     levothyroxine 88 MCG tablet  Commonly known as: SYNTHROID     lisinopril 40 MG tablet  Commonly known as: PRINIVIL;ZESTRIL  Take 1 tablet by mouth daily     loperamide 2 MG capsule  Commonly known as: IMODIUM     metoprolol tartrate 25 MG tablet  Commonly known as: LOPRESSOR  Take 0.5 tablets by mouth 2 times daily     mineral oil enema     * MULTIVITAMIN ADULT PO     * PRESERVISION AREDS PO     polyethylene glycol 17 GM/SCOOP powder  Commonly known as: GLYCOLAX  Take 17 g by mouth in the morning and at bedtime     polyvinyl alcohol 1.4 % ophthalmic solution  Commonly known as: LIQUIFILM TEARS     potassium chloride 20 MEQ extended release tablet  Commonly known as: KLOR-CON M     PROBIOTIC PO     REGULOID PO     rivaroxaban 20 MG Tabs tablet  Commonly known as: XARELTO     simethicone 40 MG/0.6ML drops  Commonly known as: MYLICON     simvastatin 20 MG tablet  Commonly known as: ZOCOR  TAKE 1 TABLET BY MOUTH EVERY OTHER DAY     tamsulosin 0.4 MG capsule  Commonly known as: FLOMAX  Take 1 capsule by mouth 2 times daily     tolnaftate 1 % external solution  Commonly known as: TINACTIN           * This list has 2 medication(s) that are the same as other medications prescribed for you. Read the directions carefully, and ask your doctor or other care provider to review them with you. STOP taking these medications      Dextromethorphan-guaiFENesin  MG/5ML Syrp     doxycycline monohydrate 100 MG capsule  Commonly known as: MONODOX     nitrofurantoin 100 MG capsule  Commonly known as: MACRODANTIN               Where to Get Your Medications        You can get these medications from any pharmacy    Bring a paper prescription for each of these medications  ALPRAZolam 0.25 MG tablet  cefdinir 300 MG capsule  fluconazole 100 MG tablet         Consults:  IP CONSULT TO GENERAL SURGERY    Significant Diagnostic Studies:  CT ABDOMEN PELVIS WO CONTRAST Additional Contrast? None    Result Date: 3/15/2023  EXAMINATION: CT OF THE ABDOMEN AND PELVIS WITHOUT CONTRAST 3/15/2023 6:30 pm TECHNIQUE: CT of the abdomen and pelvis was performed without the administration of intravenous contrast. Multiplanar reformatted images are provided for review. Automated exposure control, iterative reconstruction, and/or weight based adjustment of the mA/kV was utilized to reduce the radiation dose to as low as reasonably achievable. COMPARISON: 03/08/2023 HISTORY: ORDERING SYSTEM PROVIDED HISTORY: Worsening abdominal pain TECHNOLOGIST PROVIDED HISTORY: Worsening abdominal pain FINDINGS: LOWER CHEST: Extensive consolidative changes within the lower lobes with associated fibrotic change and interstitial lung abnormalities. Coronary arterial calcification. KIDNEYS AND URINARY TRACT: No renal calculi are identified. Mild bilateral hydroureteronephrosis. Les Pimple ORGANS: Cholelithiasis with moderate distension of the gallbladder. No surrounding gallbladder stranding.    Visualized portions of the liver, spleen, pancreas, and adrenal glands demonstrate no acute abnormality. GI/BOWEL: No bowel obstruction. No evidence of acute appendicitis. Massive amount of stool throughout the colon. PELVIS: Massively enlarged prostate gland protrudes into the bladder base. The bladder has thickened wall with multiple focal areas of outpouching consistent with bladder diverticula. The bladder and pelvic organs are unremarkable. PERITONEUM/RETROPERITONEUM: No free air or free fluid is noted. No pathologically enlarged lymphadenopathy. The vasculature do not demonstrate acute abnormality. BONES/SOFT TISSUES: The osseous structures demonstrate no acute abnormality. Chronic superior endplate compression fracture of L1 with 30% height loss     Cholelithiasis with moderate distension of the gallbladder. No surrounding gallbladder stranding. If there is focal tenderness in this area finding can be suggestive of acute cholecystitis. Right upper quadrant ultrasound is recommended for further evaluation. Massively enlarged prostate gland protrudes into the bladder base. The bladder has thickened wall with multiple focal areas of outpouching consistent with bladder diverticula. The finding is associated with mild bilateral hydroureteronephrosis which was not present on prior examination. Significant fecal overload throughout the colon and rectum. Extensive consolidative changes within the lower lobes with associated fibrotic change and interstitial lung abnormalities. Findings are similar to prior examination Chronic superior endplate compression fracture of L1 with 30% height loss     XR CHEST PORTABLE    Result Date: 3/15/2023  EXAMINATION: ONE XRAY VIEW OF THE CHEST 3/15/2023 6:34 pm COMPARISON: None. HISTORY: ORDERING SYSTEM PROVIDED HISTORY: fever TECHNOLOGIST PROVIDED HISTORY: fever Reason for Exam: Fever FINDINGS: Cardial pericardial silhouette is enlarged. The pulmonary vasculature is congested.   Increased central and basilar opacity noted. Small bilateral pleural effusions are suggested. No acute bony abnormality identified. No pneumothorax. No free air. No acute bony or. .     Findings most compatible with pulmonary edema. Nonetheless, please correlate with any clinical assistant present mack, especially at the lung bases. Disposition:   long term care facility    Discharge Instructions: Activity: activity as tolerated  Diet:  regular diet    Follow up with ANNIKA GREENBERG DO in 1 weeks.     Signed:  Graeme Anderson MD  3/18/2023, 11:13 AM    Time spent in discharge of this pt is more than 30 minutes in examination,evaluvation,  counseling and review of medication and discharge plan

## 2023-03-18 NOTE — DISCHARGE INSTRUCTIONS
Return immediately if any worsening symptoms or any other concerns    Tell us how we did visit: [email or web address]   and let us know about your experience

## 2023-03-18 NOTE — ED PROVIDER NOTES
[facility] ED  [address]  [place] [address]  Phone: [phone]  Shayy      Pt Name: Humble James  MRN: [id number]  Margaretgfkirti 12/31/1934  Date of evaluation: 3/18/2023    CHIEF COMPLAINT       Chief Complaint   Patient presents with    Fall    Head Injury    Head Laceration     Left posterior       HISTORY OF PRESENT ILLNESS    Humble James is a 80 y.o. male who presents to the emergency department following a witnessed fall. Comes from the nursing home fell backwards hit his head on a chair/table. No loss of consciousness. He is on Xarelto. Denies any other injuries. Presents by ambulance. He did suffer a laceration to the back of his head.   He was just discharged from our hospital 2 hours ago    REVIEW OF SYSTEMS       Constitutional: No fevers or chills   HEENT: No sore throat, rhinorrhea, or earache   Eyes: No blurry vision or double vision no drainage   Cardiovascular: No chest pain or tachycardia   Respiratory: No wheezing or shortness of breath no cough   Gastrointestinal: No nausea, vomiting, diarrhea, constipation, or abdominal pain   : Indwelling Beck with hematuria  Musculoskeletal: No extremity swelling or pain   Skin: No rash positive scalp laceration  Neurological: No focal neurologic complaints, paresthesias, weakness, or headache     PAST MEDICAL HISTORY    has a past medical history of Acute bronchitis, MARITA (acute kidney injury) ([address] Allergic rhinitis, Anxiety, Bradycardia, Cataract, right, Choroidal nevus of right eye, Chronic systolic CHF (congestive heart failure) ([address] Coronary artery disease involving native coronary artery of native heart, Dementia associated with 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 behavioral disturbance ([address] Dermatophytosis of nail, Diverticulosis, Elevated PSA, Hemorrhoids, History of measles, History of mumps, Hyperlipidemia, Hypertension, Hypothyroidism, Mass of upper lobe of right lung, Occult blood positive stool, Osteoarthritis, Overweight, Paroxysmal atrial fibrillation ([address] Pneumonia due to organism, Pseudophakia, left eye, PSVT (paroxysmal supraventricular tachycardia) ([address] Recurrent UTI, Retinal detachment, Seborrheic dermatitis, Sepsis due to urinary tract infection ([address] and Urinary tract infection without hematuria. SURGICAL HISTORY      has a past surgical history that includes Coronary artery bypass graft; Appendectomy (1940 and 1955); Tonsillectomy; retinal laser (Left, 12/22/2011); Cataract removal (Left, 05/07/2013); Skin tag removal (11/01/1999); Abscess Drainage ([id number]); and Colonoscopy (06/14/2021). CURRENT MEDICATIONS       Previous Medications    ACETAMINOPHEN (TYLENOL) 325 MG TABLET    Take 650 mg by mouth every 4 hours as needed for Pain    ACETAMINOPHEN-CODEINE (TYLENOL/CODEINE #3) 300-30 MG PER TABLET    Take 1 tablet by mouth every 6 hours as needed for Pain for up to 14 days. Take lowest dose possible to manage pain Max Daily Amount: 4 tablets    ALBUTEROL (PROVENTIL) (2.5 MG/3ML) 0.083% NEBULIZER SOLUTION    Take 2.5 mg by nebulization every 6 hours as needed for Wheezing    ALPRAZOLAM (XANAX) 0.25 MG TABLET    Take 0.125mg every am. Take 1 tab at HS. May take an additional 0.125 mg daily before showering as needed. AMIODARONE (CORDARONE) 200 MG TABLET    Take 100 mg by mouth daily    CEPHALEXIN (KEFLEX) 500 MG CAPSULE    Take 1 capsule by mouth 2 times daily for 7 days    CHOLESTYRAMINE POWD    Mix 1 scoopful (4 gm) po daily as needed    COENZYME Q-10 100 MG CAPS    Take 100 mg by mouth every other day.     FLUCONAZOLE (DIFLUCAN) 100 MG TABLET    Take 1 tablet by mouth daily for 7 days    FLUTICASONE (FLONASE) 50 MCG/ACT NASAL SPRAY    INSTILL 1 SPRAY IN EACH NOSTRIL ONCE DAILY    GLUCOSAMINE-CHONDROITIN 500-400 MG CAPS    Take 3 capsules by mouth daily    HYDROCORTISONE 1 % CREAM    Apply topically to affected area as directed prn (Preparation H)    HYDROXYZINE HCL (ATARAX) 25 MG TABLET    Take 25 mg by mouth every 8 hours as needed for Itching    ISOSORBIDE MONONITRATE (IMDUR) 30 MG EXTENDED RELEASE TABLET    Take 1 tablet by mouth daily    LACTULOSE (CHRONULAC) 10 GM/15ML SOLUTION    Take 30 mLs by mouth 3 times daily as needed (constipation)    LEVOTHYROXINE (SYNTHROID) 88 MCG TABLET    Take 88 mcg by mouth Daily    LISINOPRIL (PRINIVIL;ZESTRIL) 40 MG TABLET    Take 1 tablet by mouth daily    LOPERAMIDE (IMODIUM) 2 MG CAPSULE    Take 2 mg by mouth After each loose stool. *Not to exceed 16 gm/ 24 hours    METOPROLOL TARTRATE (LOPRESSOR) 25 MG TABLET    Take 0.5 tablets by mouth 2 times daily    MINERAL OIL ENEMA    Place 1 enema rectally - Use as directed on the bottle for occasional constipation    MULTIPLE VITAMINS-MINERALS (MULTIVITAMIN ADULT PO)    Take 1 tablet by mouth daily    MULTIPLE VITAMINS-MINERALS (PRESERVISION AREDS PO)    Take 1 capsule by mouth daily     POLYETHYLENE GLYCOL (GLYCOLAX) 17 GM/SCOOP POWDER    Take 17 g by mouth in the morning and at bedtime    POLYVINYL ALCOHOL (LIQUIFILM TEARS) 1.4 % OPHTHALMIC SOLUTION    Place 2 drops into both eyes every 6 hours as needed    POTASSIUM CHLORIDE (KLOR-CON M) 20 MEQ EXTENDED RELEASE TABLET    Take 20 mEq by mouth daily    PROBIOTIC PRODUCT (PROBIOTIC PO)    Take by mouth    PSYLLIUM (REGULOID PO)    Take by mouth (Reguloid): mix 1 TBSP in 8 oz of water bid    RIVAROXABAN (XARELTO) 20 MG TABS TABLET    Take 20 mg by mouth every morning    SIMETHICONE (MYLICON) 40 UO/8.9HE DROPS    Take 40 mg by mouth 4 times daily as needed (gas)    SIMVASTATIN (ZOCOR) 20 MG TABLET    TAKE 1 TABLET BY MOUTH EVERY OTHER DAY     TAMSULOSIN (FLOMAX) 0.4 MG CAPSULE    Take 1 capsule by mouth 2 times daily    TOLNAFTATE (TINACTIN) 1 % EXTERNAL SOLUTION    Apply topically nightly to toenails per Dr. Lacey Miami. ALLERGIES     is allergic to pcn [penicillins], sulfa antibiotics, and cefdinir.     FAMILY HISTORY     He indicated that his mother is . He indicated that his father is . He indicated that his sister is . family history includes Diabetes in his mother; Osteoporosis in his mother; Other in his father and sister; Stroke in his sister. SOCIAL HISTORY      reports that he has never smoked. He has never used smokeless tobacco. He reports that he does not drink alcohol and does not use drugs. PHYSICAL EXAM       BP (!) 151/75   Pulse 78   Temp 98.5 °F (36.9 °C) (Tympanic)   Resp 12   SpO2 98%     Constitutional: Alert, confused nontoxic,  in no acute distress   HEENT: Extraocular muscles intact, mucus membranes moist, posterior scalp vertically oriented laceration measuring approximately 2.5 cm in length no active bleeding. No posterior pharyngeal erythema or exudates, Pupils equal, round, reactive to light,   Neck: Trachea midline no pulse midline neck tenderness palpation  Cardiovascular: Regular rhythm and rate no murmurs   Respiratory: Diminished to auscultation bilaterally no wheezes, rhonchi, rales, no respiratory distress no tachypnea no retractions no hypoxia  Gastrointestinal: Soft, nontender, nondistended, no abdominal bruit or pulsatile mass diminished bowel sounds. No rebound, rigidity, or guarding. Musculoskeletal: No extremity pain or swelling no calf tenderness or asymmetry  Neurologic: Moving all 4 extremities without difficulty there are no gross focal neurologic deficits   Skin: Warm and dry   Physical Exam  HENT:      Head:         DIFFERENTIAL DIAGNOSIS/ MDM:     Scalp laceration repair refer to procedure below. CT head and cervical spine.   Just let the hospital 2 hours ago and had hematuria while in the hospital.    DIAGNOSTIC RESULTS     EKG: All EKG's are interpreted by the Emergency Department Physician who either signs or Co-signs this chart in the absence of a cardiologist.        Not indicated unless otherwise documented above    LABS:  No results found for this visit on 03/18/23. Not indicated unless otherwise documented above    RADIOLOGY:   I reviewed the radiologist interpretations:    CT HEAD WO CONTRAST   Final Result   No acute intracranial or cervical spine abnormality. Multilevel degenerative disc disease, advanced and greatest at C6-T1,   unchanged. CT CERVICAL SPINE WO CONTRAST   Final Result   No acute intracranial or cervical spine abnormality. Multilevel degenerative disc disease, advanced and greatest at C6-T1,   unchanged. Not indicated unless otherwise documented above    EMERGENCY DEPARTMENT COURSE:     The patient was given the following medications:  Orders Placed This Encounter   Medications    lidocaine-EPINEPHrine-tetracaine (LET) topical solution 3 mL syringe        Vitals:   -------------------------  BP (!) 151/75   Pulse 78   Temp 98.5 °F (36.9 °C) (Tympanic)   Resp 12   SpO2 98%     4:40 PM returned from CAT scan. Staple repair performed refer to procedure below. 4:55 PM CT of the head and cervical spine negative. Will discharge to follow-up. Staple removal in 10 days. CRITICAL CARE:    None    PROCEDURES:    Placed in a reclining position wound was irrigated with normal saline explored no foreign bodies were appreciated. No involvement of the galea. Skin was anesthetized using LET and under sterile precautions wound margins were approximated with 3 staples without difficulty. OARRS Report if indicated             FINAL IMPRESSION      1. Injury of head, initial encounter    2.  Laceration of scalp, initial encounter          DISPOSITION/PLAN   DISPOSITION Decision To Discharge 03/18/2023 04:53:53 PM        CONDITION ON DISPOSITION: STABLE       PATIENT REFERRED TO:  Gloria Novak DO  [address] [facility] [place]  [phone]    Schedule an appointment as soon as possible for a visit in 10 days  For wound re-check, For staple removal    DISCHARGE MEDICATIONS:  New Prescriptions No medications on file       (Please note that portions of this note were completed with a voice recognition program.  Efforts were made to edit the dictations but occasionally words are mis-transcribed.)    Radha Montelongo DO   Attending Emergency Physician        Radha Montelongo DO  03/18/23 5900

## 2023-03-18 NOTE — PLAN OF CARE

## 2023-03-19 LAB
MICROORGANISM SPEC CULT: ABNORMAL
SPECIMEN DESCRIPTION: ABNORMAL
SPECIMEN DESCRIPTION: ABNORMAL

## 2023-03-20 LAB
MICROORGANISM SPEC CULT: ABNORMAL
SPECIMEN DESCRIPTION: ABNORMAL
SPECIMEN DESCRIPTION: ABNORMAL

## 2023-03-21 ENCOUNTER — HOSPITAL ENCOUNTER (OUTPATIENT)
Dept: ULTRASOUND IMAGING | Age: 88
Discharge: HOME | End: 2023-03-23
Payer: MEDICARE

## 2023-03-21 DIAGNOSIS — N50.82 SCROTAL PAIN: ICD-10-CM

## 2023-03-21 PROCEDURE — 93976 VASCULAR STUDY: CPT

## 2023-03-23 ENCOUNTER — HOSPITAL ENCOUNTER (OUTPATIENT)
Age: 88
Setting detail: SPECIMEN
Discharge: HOME | End: 2023-03-23
Payer: MEDICARE

## 2023-03-23 LAB
ANION GAP SERPL CALCULATED.3IONS-SCNC: 12 MMOL/L (ref 9–17)
BUN SERPL-MCNC: 15 MG/DL (ref 8–23)
BUN/CREAT BLD: 10 (ref 9–20)
CALCIUM SERPL-MCNC: 8.1 MG/DL (ref 8.6–10.4)
CHLORIDE SERPL-SCNC: 104 MMOL/L (ref 98–107)
CO2 SERPL-SCNC: 26 MMOL/L (ref 20–31)
CREAT SERPL-MCNC: 1.44 MG/DL (ref 0.7–1.2)
GFR SERPL CREATININE-BSD FRML MDRD: 47 ML/MIN/1.73M2
GLUCOSE SERPL-MCNC: 124 MG/DL (ref 70–99)
POTASSIUM SERPL-SCNC: 3.7 MMOL/L (ref 3.7–5.3)
SODIUM SERPL-SCNC: 142 MMOL/L (ref 135–144)

## 2023-03-23 PROCEDURE — 80048 BASIC METABOLIC PNL TOTAL CA: CPT

## 2023-03-24 ENCOUNTER — HOSPITAL ENCOUNTER (EMERGENCY)
Age: 88
Discharge: HOME | End: 2023-03-24
Attending: EMERGENCY MEDICINE
Payer: MEDICARE

## 2023-03-24 ENCOUNTER — TELEPHONE (OUTPATIENT)
Dept: OTHER | Facility: CLINIC | Age: 88
End: 2023-03-24

## 2023-03-24 ENCOUNTER — APPOINTMENT (OUTPATIENT)
Dept: CT IMAGING | Age: 88
End: 2023-03-24
Payer: MEDICARE

## 2023-03-24 VITALS
HEART RATE: 88 BPM | SYSTOLIC BLOOD PRESSURE: 155 MMHG | DIASTOLIC BLOOD PRESSURE: 70 MMHG | TEMPERATURE: 98.2 F | BODY MASS INDEX: 23.71 KG/M2 | OXYGEN SATURATION: 97 % | WEIGHT: 170 LBS | RESPIRATION RATE: 18 BRPM

## 2023-03-24 DIAGNOSIS — N30.01 ACUTE CYSTITIS WITH HEMATURIA: Primary | ICD-10-CM

## 2023-03-24 DIAGNOSIS — K80.70 CALCULUS OF GALLBLADDER AND BILE DUCT WITHOUT CHOLECYSTITIS OR OBSTRUCTION: ICD-10-CM

## 2023-03-24 LAB
ABSOLUTE EOS #: 0.11 K/UL (ref 0–0.44)
ABSOLUTE IMMATURE GRANULOCYTE: 0.14 K/UL (ref 0–0.3)
ABSOLUTE LYMPH #: 0.78 K/UL (ref 1.1–3.7)
ABSOLUTE MONO #: 0.33 K/UL (ref 0.1–1.2)
ALBUMIN SERPL-MCNC: 2.8 G/DL (ref 3.5–5.2)
ALBUMIN/GLOBULIN RATIO: 0.9 (ref 1–2.5)
ALP SERPL-CCNC: 99 U/L (ref 40–129)
ALT SERPL-CCNC: 24 U/L (ref 5–41)
ANION GAP SERPL CALCULATED.3IONS-SCNC: 10 MMOL/L (ref 9–17)
AST SERPL-CCNC: 23 U/L
BACTERIA: ABNORMAL
BASOPHILS # BLD: 0 % (ref 0–2)
BASOPHILS ABSOLUTE: <0.03 K/UL (ref 0–0.2)
BILIRUB DIRECT SERPL-MCNC: 0.2 MG/DL
BILIRUB INDIRECT SERPL-MCNC: 0.3 MG/DL (ref 0–1)
BILIRUB SERPL-MCNC: 0.5 MG/DL (ref 0.3–1.2)
BILIRUBIN URINE: ABNORMAL
BUN SERPL-MCNC: 13 MG/DL (ref 8–23)
BUN/CREAT BLD: 8 (ref 9–20)
CALCIUM SERPL-MCNC: 8.2 MG/DL (ref 8.6–10.4)
CHLORIDE SERPL-SCNC: 108 MMOL/L (ref 98–107)
CO2 SERPL-SCNC: 26 MMOL/L (ref 20–31)
COLOR: ABNORMAL
COMMENT UA: ABNORMAL
CREAT SERPL-MCNC: 1.54 MG/DL (ref 0.7–1.2)
EOSINOPHILS RELATIVE PERCENT: 1 % (ref 1–4)
EPITHELIAL CELLS UA: ABNORMAL /HPF (ref 0–5)
GFR SERPL CREATININE-BSD FRML MDRD: 43 ML/MIN/1.73M2
GLOBULIN SER-MCNC: 3.2 G/DL (ref 1.5–3.8)
GLUCOSE SERPL-MCNC: 102 MG/DL (ref 70–99)
GLUCOSE UR STRIP.AUTO-MCNC: ABNORMAL MG/DL
HCT VFR BLD AUTO: 28.8 % (ref 40.7–50.3)
HGB BLD-MCNC: 9.6 G/DL (ref 13–17)
IMMATURE GRANULOCYTES: 2 %
INR PPP: 1.3
KETONES UR STRIP.AUTO-MCNC: ABNORMAL MG/DL
LEUKOCYTE ESTERASE UR QL STRIP.AUTO: ABNORMAL
LIPASE SERPL-CCNC: 32 U/L (ref 13–60)
LYMPHOCYTES # BLD: 9 % (ref 24–43)
MCH RBC QN AUTO: 29.6 PG (ref 25.2–33.5)
MCHC RBC AUTO-ENTMCNC: 33.3 G/DL (ref 25.2–33.5)
MCV RBC AUTO: 88.9 FL (ref 82.6–102.9)
MONOCYTES # BLD: 4 % (ref 3–12)
NITRITE UR QL STRIP.AUTO: POSITIVE
NRBC AUTOMATED: 0 PER 100 WBC
OTHER OBSERVATIONS UA: ABNORMAL
PDW BLD-RTO: 14.8 % (ref 11.8–14.4)
PLATELET # BLD AUTO: 244 K/UL (ref 138–453)
PMV BLD AUTO: 10.3 FL (ref 8.1–13.5)
POTASSIUM SERPL-SCNC: 3.7 MMOL/L (ref 3.7–5.3)
PROT SERPL-MCNC: 6 G/DL (ref 6.4–8.3)
PROT UR STRIP.AUTO-MCNC: 6.5 MG/DL (ref 5–6)
PROT UR STRIP.AUTO-MCNC: ABNORMAL MG/DL
PROTHROMBIN TIME: 15.2 SEC (ref 11.5–14.2)
RBC # BLD: 3.24 M/UL (ref 4.21–5.77)
RBC # BLD: ABNORMAL 10*6/UL
RBC CLUMPS #/AREA URNS AUTO: ABNORMAL /HPF (ref 0–4)
SEG NEUTROPHILS: 84 % (ref 36–65)
SEGMENTED NEUTROPHILS ABSOLUTE COUNT: 7.44 K/UL (ref 1.5–8.1)
SODIUM SERPL-SCNC: 144 MMOL/L (ref 135–144)
SPECIFIC GRAVITY UA: 1 (ref 1.01–1.02)
TURBIDITY: ABNORMAL
URINE HGB: ABNORMAL
UROBILINOGEN, URINE: ABNORMAL
WBC # BLD AUTO: 8.8 K/UL (ref 3.5–11.3)
WBC UA: ABNORMAL /HPF (ref 0–4)

## 2023-03-24 PROCEDURE — 51702 INSERT TEMP BLADDER CATH: CPT

## 2023-03-24 PROCEDURE — 74176 CT ABD & PELVIS W/O CONTRAST: CPT

## 2023-03-24 PROCEDURE — 83690 ASSAY OF LIPASE: CPT

## 2023-03-24 PROCEDURE — 6360000002 HC RX W HCPCS: Performed by: EMERGENCY MEDICINE

## 2023-03-24 PROCEDURE — 80076 HEPATIC FUNCTION PANEL: CPT

## 2023-03-24 PROCEDURE — 99284 EMERGENCY DEPT VISIT MOD MDM: CPT

## 2023-03-24 PROCEDURE — 36415 COLL VENOUS BLD VENIPUNCTURE: CPT

## 2023-03-24 PROCEDURE — 80048 BASIC METABOLIC PNL TOTAL CA: CPT

## 2023-03-24 PROCEDURE — 96365 THER/PROPH/DIAG IV INF INIT: CPT

## 2023-03-24 PROCEDURE — 81001 URINALYSIS AUTO W/SCOPE: CPT

## 2023-03-24 PROCEDURE — 85025 COMPLETE CBC W/AUTO DIFF WBC: CPT

## 2023-03-24 PROCEDURE — 2580000003 HC RX 258: Performed by: EMERGENCY MEDICINE

## 2023-03-24 PROCEDURE — 85610 PROTHROMBIN TIME: CPT

## 2023-03-24 RX ORDER — MULTIVITAMIN WITH FOLIC ACID 400 MCG
TABLET ORAL
COMMUNITY
Start: 2023-03-15

## 2023-03-24 RX ORDER — ALPRAZOLAM 0.25 MG/1
0.25 TABLET ORAL NIGHTLY PRN
COMMUNITY

## 2023-03-24 RX ORDER — CEPHALEXIN 500 MG/1
500 CAPSULE ORAL 4 TIMES DAILY
Qty: 28 CAPSULE | Refills: 0 | Status: SHIPPED | OUTPATIENT
Start: 2023-03-24 | End: 2023-03-31

## 2023-03-24 RX ORDER — ALPRAZOLAM 0.25 MG/1
0.25 TABLET ORAL EVERY MORNING
COMMUNITY

## 2023-03-24 RX ORDER — ANTIOX #8/OM3/DHA/EPA/LUT/ZEAX 250-2.5 MG
CAPSULE ORAL
COMMUNITY
Start: 2023-03-15

## 2023-03-24 RX ADMIN — CEFTRIAXONE 1000 MG: 1 INJECTION, POWDER, FOR SOLUTION INTRAMUSCULAR; INTRAVENOUS at 10:56

## 2023-03-24 ASSESSMENT — PAIN - FUNCTIONAL ASSESSMENT: PAIN_FUNCTIONAL_ASSESSMENT: 0-10

## 2023-03-24 NOTE — ED PROVIDER NOTES
Result Value    RBC 3.24 (*)     Hemoglobin 9.6 (*)     Hematocrit 28.8 (*)     RDW 14.8 (*)     Seg Neutrophils 84 (*)     Lymphocytes 9 (*)     Immature Granulocytes 2 (*)     Absolute Lymph # 0.78 (*)     All other components within normal limits   BASIC METABOLIC PANEL - Abnormal; Notable for the following components:    Glucose 102 (*)     Creatinine 1.54 (*)     Est, Glom Filt Rate 43 (*)     Bun/Cre Ratio 8 (*)     Calcium 8.2 (*)     Chloride 108 (*)     All other components within normal limits   URINALYSIS WITH REFLEX TO CULTURE - Abnormal; Notable for the following components:    Color, UA Red (*)     Turbidity UA SLIGHTLY CLOUDY (*)     Glucose, Ur TRACE (*)     Bilirubin Urine NEGATIVE  Verified by ictotest. (*)     Ketones, Urine 1+ (*)     Specific Gravity, UA 1.005 (*)     Urine Hgb 3+ (*)     pH, UA 6.5 (*)     Protein, UA 3+ (*)     Urobilinogen, Urine 2 mg/dL (*)     Nitrite, Urine POSITIVE (*)     Leukocyte Esterase, Urine 3+ (*)     All other components within normal limits   PROTIME-INR - Abnormal; Notable for the following components:    Protime 15.2 (*)     All other components within normal limits   MICROSCOPIC URINALYSIS - Abnormal; Notable for the following components:    Bacteria, UA FEW (*)     Other Observations UA   (*)     Value: Utilizing a urinalysis as the only screening method to exclude a potential uropathogen can be unreliable in many patient populations. Rapid screening tests are less sensitive than culture and if UTI is a clinical possibility, culture should be considered despite a negative urinalysis.       All other components within normal limits   HEPATIC FUNCTION PANEL - Abnormal; Notable for the following components:    Albumin 2.8 (*)     Total Protein 6.0 (*)     Albumin/Globulin Ratio 0.9 (*)     All other components within normal limits   LIPASE       RECENT VITALS:  BP: 132/68, Temp: 98.2 °F (36.8 °C), Heart Rate: 62, Resp: 16     ED Course     The patient was given the following medications:  Orders Placed This Encounter   Medications    cefTRIAXone (ROCEPHIN) 1,000 mg in sodium chloride 0.9 % 50 mL IVPB (mini-bag)     Order Specific Question:   Antimicrobial Indications     Answer:   Urinary Tract Infection    cephALEXin (KEFLEX) 500 MG capsule     Sig: Take 1 capsule by mouth 4 times daily for 7 days     Dispense:  28 capsule     Refill:  0           Medical Decision Making      Patient CAT scan revealed that the Beck was in his urethra so this was removed and replaced no longer draining. Blood evidence of a cystitis however is noted to urine is sent for culture he will be placed on Keflex after dose of Rocephin is listed does show allergy to certain antibiotics but has tolerated Keflex and Rocephin recently. As for the gallbladder issue he has no right upper quadrant tenderness no nausea vomiting and his liver enzymes are normal we will have him follow-up as an outpatient with his    Disposition     CLINICAL IMPRESSION:  1. Acute cystitis with hematuria    2.  Calculus of gallbladder and bile duct without cholecystitis or obstruction        PATIENT REFERRED TO:  Danette Claros DO  [facility] [address] [id number]  [facility] [place]  [address] [facility] [phone]    In 1 week  For urinary tract infection    MD Dorcas Ramirez. [address]  [phone]      General surgeon for your gallstones    DISCHARGE MEDICATIONS:  New Prescriptions    CEPHALEXIN (KEFLEX) 500 MG CAPSULE    Take 1 capsule by mouth 4 times daily for 7 days       DISPOSITION:   Discharged in stable condition  Shane Horner MD  (Please note that portions of this note were completed with a voice recognition program.  Efforts were made to edit the dictations but occasionally words are mis-transcribed.)       Shane Horner MD  03/24/23 8805

## 2023-03-24 NOTE — TELEPHONE ENCOUNTER
Writer contacted ED provider to inform of 30 day readmission risk. No Decision on disposition at this time.       Call Back: If you need to call back to inform of disposition you can contact me at [phone]

## 2023-03-24 NOTE — ED PROVIDER NOTES
TABLET    Take 0.5 tablets by mouth 2 times daily    MINERAL OIL ENEMA    Place 1 enema rectally - Use as directed on the bottle for occasional constipation    MULTIPLE VITAMIN (MULTIVITAMIN) TABLET        MULTIPLE VITAMINS-MINERALS (MULTIVITAMIN ADULT PO)    Take 1 tablet by mouth daily    MULTIPLE VITAMINS-MINERALS (PRESERVISION AREDS 2) CAPS        MULTIPLE VITAMINS-MINERALS (PRESERVISION AREDS PO)    Take 1 capsule by mouth daily     POLYETHYLENE GLYCOL (GLYCOLAX) 17 GM/SCOOP POWDER    Take 17 g by mouth in the morning and at bedtime    POLYVINYL ALCOHOL (LIQUIFILM TEARS) 1.4 % OPHTHALMIC SOLUTION    Place 2 drops into both eyes every 6 hours as needed    POTASSIUM CHLORIDE (KLOR-CON M) 20 MEQ EXTENDED RELEASE TABLET    Take 20 mEq by mouth daily    PROBIOTIC PRODUCT (PROBIOTIC PO)    Take by mouth    PSYLLIUM (REGULOID PO)    Take by mouth (Reguloid): mix 1 TBSP in 8 oz of water bid    RIVAROXABAN (XARELTO) 20 MG TABS TABLET    Take 20 mg by mouth every morning    SIMETHICONE (MYLICON) 40 YZ/4.1UO DROPS    Take 40 mg by mouth 4 times daily as needed (gas)    SIMVASTATIN (ZOCOR) 20 MG TABLET    TAKE 1 TABLET BY MOUTH EVERY OTHER DAY     TAMSULOSIN (FLOMAX) 0.4 MG CAPSULE    Take 1 capsule by mouth 2 times daily    TOLNAFTATE (TINACTIN) 1 % EXTERNAL SOLUTION    Apply topically nightly to toenails per Dr. Rosalia Escalante. ALLERGIES     is allergic to pcn [penicillins], sulfa antibiotics, and cefdinir. FAMILY HISTORY     He indicated that his mother is . He indicated that his father is . He indicated that his sister is . family history includes Diabetes in his mother; Osteoporosis in his mother; Other in his father and sister; Stroke in his sister. SOCIAL HISTORY      reports that he has never smoked. He has never used smokeless tobacco. He reports that he does not drink alcohol and does not use drugs. PHYSICAL EXAM     INITIAL VITALS:  weight is 170 lb (77.1 kg).  His tympanic

## 2023-03-24 NOTE — ED NOTES
Pt to ED from [facility] for blood in catheter and abd pain on movement. Per report had blood in urine when nurses at [address] inserted new catheter, pt c/o lower abd pain/groin pain upon movement. Pt arrived in NAD, baseline confusion per ems.       Heriberto Napoles RN  03/24/23 [id number]

## 2023-03-29 ENCOUNTER — OFFICE VISIT (OUTPATIENT)
Dept: INTERNAL MEDICINE | Age: 88
End: 2023-03-29
Payer: MEDICARE

## 2023-03-29 ENCOUNTER — OFFICE VISIT (OUTPATIENT)
Dept: UROLOGY | Age: 88
End: 2023-03-29
Payer: MEDICARE

## 2023-03-29 VITALS
BODY MASS INDEX: 23.8 KG/M2 | HEIGHT: 71 IN | DIASTOLIC BLOOD PRESSURE: 60 MMHG | WEIGHT: 170 LBS | SYSTOLIC BLOOD PRESSURE: 118 MMHG | HEART RATE: 76 BPM

## 2023-03-29 VITALS
RESPIRATION RATE: 16 BRPM | HEART RATE: 88 BPM | DIASTOLIC BLOOD PRESSURE: 58 MMHG | WEIGHT: 165 LBS | BODY MASS INDEX: 23.1 KG/M2 | SYSTOLIC BLOOD PRESSURE: 100 MMHG | HEIGHT: 71 IN

## 2023-03-29 DIAGNOSIS — N45.1 EPIDIDYMITIS: ICD-10-CM

## 2023-03-29 DIAGNOSIS — E23.2: ICD-10-CM

## 2023-03-29 DIAGNOSIS — R97.20 ELEVATED PSA: ICD-10-CM

## 2023-03-29 DIAGNOSIS — N39.0 RECURRENT UTI: ICD-10-CM

## 2023-03-29 DIAGNOSIS — R33.9 RETENTION OF URINE: ICD-10-CM

## 2023-03-29 DIAGNOSIS — S01.01XD LACERATION OF OCCIPITAL REGION OF SCALP, SUBSEQUENT ENCOUNTER: Primary | ICD-10-CM

## 2023-03-29 DIAGNOSIS — N40.1 BPH WITH OBSTRUCTION/LOWER URINARY TRACT SYMPTOMS: Primary | ICD-10-CM

## 2023-03-29 DIAGNOSIS — S09.90XS TRAUMATIC INJURY OF HEAD, SEQUELA: ICD-10-CM

## 2023-03-29 DIAGNOSIS — N13.8 BPH WITH OBSTRUCTION/LOWER URINARY TRACT SYMPTOMS: Primary | ICD-10-CM

## 2023-03-29 PROCEDURE — 1123F ACP DISCUSS/DSCN MKR DOCD: CPT | Performed by: INTERNAL MEDICINE

## 2023-03-29 PROCEDURE — 3288F FALL RISK ASSESSMENT DOCD: CPT | Performed by: UROLOGY

## 2023-03-29 PROCEDURE — 1036F TOBACCO NON-USER: CPT | Performed by: INTERNAL MEDICINE

## 2023-03-29 PROCEDURE — G8420 CALC BMI NORM PARAMETERS: HCPCS | Performed by: UROLOGY

## 2023-03-29 PROCEDURE — 0518F FALL PLAN OF CARE DOCD: CPT | Performed by: INTERNAL MEDICINE

## 2023-03-29 PROCEDURE — G8427 DOCREV CUR MEDS BY ELIG CLIN: HCPCS | Performed by: UROLOGY

## 2023-03-29 PROCEDURE — 99214 OFFICE O/P EST MOD 30 MIN: CPT | Performed by: UROLOGY

## 2023-03-29 PROCEDURE — G8420 CALC BMI NORM PARAMETERS: HCPCS | Performed by: INTERNAL MEDICINE

## 2023-03-29 PROCEDURE — 3288F FALL RISK ASSESSMENT DOCD: CPT | Performed by: INTERNAL MEDICINE

## 2023-03-29 PROCEDURE — 1036F TOBACCO NON-USER: CPT | Performed by: UROLOGY

## 2023-03-29 PROCEDURE — 99214 OFFICE O/P EST MOD 30 MIN: CPT | Performed by: INTERNAL MEDICINE

## 2023-03-29 PROCEDURE — G8427 DOCREV CUR MEDS BY ELIG CLIN: HCPCS | Performed by: INTERNAL MEDICINE

## 2023-03-29 PROCEDURE — 1123F ACP DISCUSS/DSCN MKR DOCD: CPT | Performed by: UROLOGY

## 2023-03-29 PROCEDURE — 0518F FALL PLAN OF CARE DOCD: CPT | Performed by: UROLOGY

## 2023-03-29 PROCEDURE — G8484 FLU IMMUNIZE NO ADMIN: HCPCS | Performed by: UROLOGY

## 2023-03-29 PROCEDURE — 1111F DSCHRG MED/CURRENT MED MERGE: CPT | Performed by: UROLOGY

## 2023-03-29 PROCEDURE — 1111F DSCHRG MED/CURRENT MED MERGE: CPT | Performed by: INTERNAL MEDICINE

## 2023-03-29 PROCEDURE — 15853 REMOVAL SUTR/STAPL XREQ ANES: CPT | Performed by: INTERNAL MEDICINE

## 2023-03-29 PROCEDURE — G8484 FLU IMMUNIZE NO ADMIN: HCPCS | Performed by: INTERNAL MEDICINE

## 2023-03-29 NOTE — PROGRESS NOTES
in any of the extremities. ASSESSMENT/PLAN:    1. Laceration of occipital region of scalp, subsequent encounter  - We reviewed the emergency room visit report along with the CT scan report and blood tests done in the emergency room  - We used a staple remover to remove the three staples without difficulty  - He tolerated the procedure well  - AK REMOVAL SUTURES/STAPLES NOT REQUIRING ANESTHESIA    2. Traumatic injury of head, sequela  3. 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 Tonya Sauer in [facility] for endocrinologic evaluation and treatment of this condition.  - External Referral To Endocrinology      Orders Placed This Encounter   Procedures    External Referral To Endocrinology     Referral Priority:   Routine     Referral Type:   Eval and Treat     Referral Reason:   Specialty Services Required     Referred to Provider:   Gabriel Langley MD     Requested Specialty:   Endocrinology     Number of Visits Requested:   1    AK REMOVAL SUTURES/STAPLES NOT REQUIRING ANESTHESIA       Requested Prescriptions      No prescriptions requested or ordered in this encounter       He will return here as previously scheduled or sooner if needed.         Electronically signed by Sofía Christianson DO on 3/29/2023 at 3:04 PM  Internal Medicine

## 2023-03-29 NOTE — PROGRESS NOTES
[place] [id number] [employer]  [address]  [address]  [place] [address] [place]  Dept: [phone]  Dept Fax: [phone]    [facility] Urology Office Note -     Patient:  Janine Lira  YOB: 1934    The patient is a 80 y.o. male who presents today for evaluation of the following problems:   Chief Complaint   Patient presents with    Benign Prostatic Hypertrophy     Scrotal ultrasound results, scheduled for cysto in OR on 4/19/23        History of Present Illness:    BPH  Worsening voiding  Difficulty emptying-- freq  Pt poor historian. Has been going to ED frequently  Currently has a catheter    Epididymitis  Finished abx  Was admitted with epididymorchitis  Unsure that this is primary issue      I independently reviewed and verified the images and reports from:    CT ABDOMEN PELVIS WO CONTRAST Additional Contrast? None    Result Date: 3/24/2023  EXAMINATION: CT OF THE ABDOMEN AND PELVIS WITHOUT CONTRAST 3/24/2023 8:17 am TECHNIQUE: CT of the abdomen and pelvis was performed without the administration of intravenous contrast. Multiplanar reformatted images are provided for review. Automated exposure control, iterative reconstruction, and/or weight based adjustment of the mA/kV was utilized to reduce the radiation dose to as low as reasonably achievable. COMPARISON: 15 March 2023 HISTORY: ORDERING SYSTEM PROVIDED HISTORY: Abdominal pain gross hematuria TECHNOLOGIST PROVIDED HISTORY: Abdominal pain gross hematuria Decision Support Exception - unselect if not a suspected or confirmed emergency medical condition->Emergency Medical Condition (MA) Reason for Exam: Gross hematuria, abdominal pain, urinary cath in place FINDINGS: Lower Chest: Small bilateral pleural effusions and atelectasis is noted. Septal thickening is noted which may be related to chronic change or mild interstitial edema.   Coronary artery

## 2023-03-31 ENCOUNTER — HOSPITAL ENCOUNTER (OUTPATIENT)
Age: 88
Setting detail: SPECIMEN
Discharge: HOME | End: 2023-03-31
Payer: MEDICARE

## 2023-03-31 LAB
BACTERIA: ABNORMAL
BILIRUBIN URINE: NEGATIVE
COLOR: YELLOW
EPITHELIAL CELLS UA: ABNORMAL /HPF (ref 0–5)
GLUCOSE UR STRIP.AUTO-MCNC: NEGATIVE MG/DL
KETONES UR STRIP.AUTO-MCNC: NEGATIVE MG/DL
LEUKOCYTE ESTERASE UR QL STRIP.AUTO: ABNORMAL
NITRITE UR QL STRIP.AUTO: NEGATIVE
PROT UR STRIP.AUTO-MCNC: 5.5 MG/DL (ref 5–6)
PROT UR STRIP.AUTO-MCNC: NEGATIVE MG/DL
RBC CLUMPS #/AREA URNS AUTO: ABNORMAL /HPF (ref 0–4)
SPECIFIC GRAVITY UA: 1.01 (ref 1.01–1.02)
TURBIDITY: ABNORMAL
URINE HGB: ABNORMAL
UROBILINOGEN, URINE: NORMAL
WBC UA: ABNORMAL /HPF (ref 0–4)

## 2023-03-31 PROCEDURE — 87086 URINE CULTURE/COLONY COUNT: CPT

## 2023-03-31 PROCEDURE — 81001 URINALYSIS AUTO W/SCOPE: CPT

## 2023-04-01 LAB
MICROORGANISM SPEC CULT: NO GROWTH
SPECIMEN DESCRIPTION: NORMAL

## 2023-04-15 ENCOUNTER — APPOINTMENT (OUTPATIENT)
Dept: CT IMAGING | Age: 88
DRG: 699 | End: 2023-04-15
Payer: MEDICARE

## 2023-04-15 ENCOUNTER — HOSPITAL ENCOUNTER (INPATIENT)
Age: 88
LOS: 1 days | Discharge: HOME | DRG: 699 | End: 2023-04-17
Attending: EMERGENCY MEDICINE | Admitting: FAMILY MEDICINE
Payer: MEDICARE

## 2023-04-15 DIAGNOSIS — F41.9 ANXIETY: ICD-10-CM

## 2023-04-15 DIAGNOSIS — G89.4 CHRONIC PAIN SYNDROME: ICD-10-CM

## 2023-04-15 DIAGNOSIS — T83.511A URINARY TRACT INFECTION ASSOCIATED WITH INDWELLING URETHRAL CATHETER, INITIAL ENCOUNTER (HCC): ICD-10-CM
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
## 2023-04-15 DIAGNOSIS — N39.0 URINARY TRACT INFECTION ASSOCIATED WITH INDWELLING URETHRAL CATHETER, INITIAL ENCOUNTER (HCC): ICD-10-CM

## 2023-04-15 DIAGNOSIS — N17.9 AKI (ACUTE KIDNEY INJURY) (HCC): Primary | ICD-10-CM

## 2023-04-15 LAB
ABSOLUTE EOS #: 0.17 K/UL (ref 0–0.44)
ABSOLUTE IMMATURE GRANULOCYTE: 0.05 K/UL (ref 0–0.3)
ABSOLUTE LYMPH #: 1.28 K/UL (ref 1.1–3.7)
ABSOLUTE MONO #: 0.17 K/UL (ref 0.1–1.2)
ALBUMIN SERPL-MCNC: 3.5 G/DL (ref 3.5–5.2)
ALBUMIN/GLOBULIN RATIO: 1.1 (ref 1–2.5)
ALP SERPL-CCNC: 128 U/L (ref 40–129)
ALT SERPL-CCNC: 15 U/L (ref 5–41)
ANION GAP SERPL CALCULATED.3IONS-SCNC: 13 MMOL/L (ref 9–17)
AST SERPL-CCNC: 21 U/L
BACTERIA: ABNORMAL
BASOPHILS # BLD: 0 % (ref 0–2)
BASOPHILS ABSOLUTE: 0.03 K/UL (ref 0–0.2)
BILIRUB DIRECT SERPL-MCNC: 0.1 MG/DL
BILIRUB INDIRECT SERPL-MCNC: 0.3 MG/DL (ref 0–1)
BILIRUB SERPL-MCNC: 0.4 MG/DL (ref 0.3–1.2)
BILIRUBIN URINE: ABNORMAL
BUN SERPL-MCNC: 25 MG/DL (ref 8–23)
BUN/CREAT BLD: 9 (ref 9–20)
CALCIUM SERPL-MCNC: 8.7 MG/DL (ref 8.6–10.4)
CHLORIDE SERPL-SCNC: 102 MMOL/L (ref 98–107)
CO2 SERPL-SCNC: 23 MMOL/L (ref 20–31)
COLOR: ABNORMAL
CREAT SERPL-MCNC: 2.84 MG/DL (ref 0.7–1.2)
EOSINOPHILS RELATIVE PERCENT: 2 % (ref 1–4)
EPITHELIAL CELLS UA: ABNORMAL /HPF (ref 0–5)
GFR SERPL CREATININE-BSD FRML MDRD: 21 ML/MIN/1.73M2
GLOBULIN SER-MCNC: 3.2 G/DL (ref 1.5–3.8)
GLUCOSE SERPL-MCNC: 112 MG/DL (ref 70–99)
GLUCOSE UR STRIP.AUTO-MCNC: ABNORMAL MG/DL
HCT VFR BLD AUTO: 34.9 % (ref 40.7–50.3)
HGB BLD-MCNC: 11.5 G/DL (ref 13–17)
IMMATURE GRANULOCYTES: 1 %
INR PPP: 1.4
KETONES UR STRIP.AUTO-MCNC: ABNORMAL MG/DL
LEUKOCYTE ESTERASE UR QL STRIP.AUTO: ABNORMAL
LIPASE SERPL-CCNC: 44 U/L (ref 13–60)
LYMPHOCYTES # BLD: 18 % (ref 24–43)
MCH RBC QN AUTO: 30.3 PG (ref 25.2–33.5)
MCHC RBC AUTO-ENTMCNC: 33 G/DL (ref 25.2–33.5)
MCV RBC AUTO: 91.8 FL (ref 82.6–102.9)
MONOCYTES # BLD: 2 % (ref 3–12)
NITRITE UR QL STRIP.AUTO: POSITIVE
NRBC AUTOMATED: 0 PER 100 WBC
OTHER OBSERVATIONS UA: ABNORMAL
PARTIAL THROMBOPLASTIN TIME: 30.2 SEC (ref 23.9–33.8)
PDW BLD-RTO: 15.3 % (ref 11.8–14.4)
PLATELET # BLD AUTO: 220 K/UL (ref 138–453)
PMV BLD AUTO: 10.3 FL (ref 8.1–13.5)
POTASSIUM SERPL-SCNC: 4.3 MMOL/L (ref 3.7–5.3)
PROT SERPL-MCNC: 6.7 G/DL (ref 6.4–8.3)
PROT UR STRIP.AUTO-MCNC: 7.5 MG/DL (ref 5–6)
PROT UR STRIP.AUTO-MCNC: ABNORMAL MG/DL
PROTHROMBIN TIME: 16.2 SEC (ref 11.5–14.2)
RBC # BLD: 3.8 M/UL (ref 4.21–5.77)
RBC # BLD: ABNORMAL 10*6/UL
RBC CLUMPS #/AREA URNS AUTO: ABNORMAL /HPF (ref 0–4)
SEG NEUTROPHILS: 77 % (ref 36–65)
SEGMENTED NEUTROPHILS ABSOLUTE COUNT: 5.33 K/UL (ref 1.5–8.1)
SODIUM SERPL-SCNC: 138 MMOL/L (ref 135–144)
SPECIFIC GRAVITY UA: 1.01 (ref 1.01–1.02)
TURBIDITY: CLEAR
URINE HGB: ABNORMAL
UROBILINOGEN, URINE: NORMAL
WBC # BLD AUTO: 7 K/UL (ref 3.5–11.3)
WBC UA: ABNORMAL /HPF (ref 0–4)

## 2023-04-15 PROCEDURE — 96365 THER/PROPH/DIAG IV INF INIT: CPT

## 2023-04-15 PROCEDURE — 99285 EMERGENCY DEPT VISIT HI MDM: CPT

## 2023-04-15 PROCEDURE — 85730 THROMBOPLASTIN TIME PARTIAL: CPT

## 2023-04-15 PROCEDURE — 85025 COMPLETE CBC W/AUTO DIFF WBC: CPT

## 2023-04-15 PROCEDURE — 36415 COLL VENOUS BLD VENIPUNCTURE: CPT

## 2023-04-15 PROCEDURE — 72125 CT NECK SPINE W/O DYE: CPT

## 2023-04-15 PROCEDURE — 80048 BASIC METABOLIC PNL TOTAL CA: CPT

## 2023-04-15 PROCEDURE — 74176 CT ABD & PELVIS W/O CONTRAST: CPT

## 2023-04-15 PROCEDURE — 2580000003 HC RX 258: Performed by: EMERGENCY MEDICINE

## 2023-04-15 PROCEDURE — 83874 ASSAY OF MYOGLOBIN: CPT

## 2023-04-15 PROCEDURE — 80076 HEPATIC FUNCTION PANEL: CPT

## 2023-04-15 PROCEDURE — 6360000002 HC RX W HCPCS: Performed by: EMERGENCY MEDICINE

## 2023-04-15 PROCEDURE — 70450 CT HEAD/BRAIN W/O DYE: CPT

## 2023-04-15 PROCEDURE — 96375 TX/PRO/DX INJ NEW DRUG ADDON: CPT

## 2023-04-15 PROCEDURE — 83690 ASSAY OF LIPASE: CPT

## 2023-04-15 PROCEDURE — 82550 ASSAY OF CK (CPK): CPT

## 2023-04-15 PROCEDURE — 85610 PROTHROMBIN TIME: CPT

## 2023-04-15 PROCEDURE — 81001 URINALYSIS AUTO W/SCOPE: CPT

## 2023-04-15 RX ORDER — FENTANYL CITRATE 50 UG/ML
25 INJECTION, SOLUTION INTRAMUSCULAR; INTRAVENOUS ONCE
Status: COMPLETED | OUTPATIENT
Start: 2023-04-15 | End: 2023-04-15

## 2023-04-15 RX ORDER — 0.9 % SODIUM CHLORIDE 0.9 %
500 INTRAVENOUS SOLUTION INTRAVENOUS ONCE
Status: COMPLETED | OUTPATIENT
Start: 2023-04-15 | End: 2023-04-16

## 2023-04-15 RX ORDER — LEVOFLOXACIN 5 MG/ML
500 INJECTION, SOLUTION INTRAVENOUS ONCE
Status: COMPLETED | OUTPATIENT
Start: 2023-04-15 | End: 2023-04-16

## 2023-04-15 RX ADMIN — SODIUM CHLORIDE 500 ML: 9 INJECTION, SOLUTION INTRAVENOUS at 23:51

## 2023-04-15 RX ADMIN — LEVOFLOXACIN 500 MG: 5 INJECTION, SOLUTION INTRAVENOUS at 23:01

## 2023-04-15 RX ADMIN — FENTANYL CITRATE 25 MCG: 50 INJECTION, SOLUTION INTRAMUSCULAR; INTRAVENOUS at 23:51

## 2023-04-16 LAB
ABSOLUTE EOS #: 0.06 K/UL (ref 0–0.44)
ABSOLUTE IMMATURE GRANULOCYTE: 0.03 K/UL (ref 0–0.3)
ABSOLUTE LYMPH #: 0.96 K/UL (ref 1.1–3.7)
ABSOLUTE MONO #: 0.25 K/UL (ref 0.1–1.2)
ANION GAP SERPL CALCULATED.3IONS-SCNC: 10 MMOL/L (ref 9–17)
BASOPHILS # BLD: 0 % (ref 0–2)
BASOPHILS ABSOLUTE: 0.03 K/UL (ref 0–0.2)
BUN SERPL-MCNC: 22 MG/DL (ref 8–23)
BUN/CREAT BLD: 11 (ref 9–20)
CALCIUM SERPL-MCNC: 8.5 MG/DL (ref 8.6–10.4)
CHLORIDE SERPL-SCNC: 106 MMOL/L (ref 98–107)
CK SERPL-CCNC: 52 U/L (ref 39–308)
CO2 SERPL-SCNC: 23 MMOL/L (ref 20–31)
CREAT SERPL-MCNC: 2.05 MG/DL (ref 0.7–1.2)
EOSINOPHILS RELATIVE PERCENT: 1 % (ref 1–4)
GFR SERPL CREATININE-BSD FRML MDRD: 31 ML/MIN/1.73M2
GLUCOSE SERPL-MCNC: 94 MG/DL (ref 70–99)
HCT VFR BLD AUTO: 29.2 % (ref 40.7–50.3)
HGB BLD-MCNC: 9.8 G/DL (ref 13–17)
IMMATURE GRANULOCYTES: 0 %
LYMPHOCYTES # BLD: 14 % (ref 24–43)
MCH RBC QN AUTO: 30.7 PG (ref 25.2–33.5)
MCHC RBC AUTO-ENTMCNC: 33.6 G/DL (ref 25.2–33.5)
MCV RBC AUTO: 91.5 FL (ref 82.6–102.9)
MONOCYTES # BLD: 4 % (ref 3–12)
MYOGLOBIN SERPL-MCNC: 69 NG/ML (ref 28–72)
NRBC AUTOMATED: 0 PER 100 WBC
PDW BLD-RTO: 15 % (ref 11.8–14.4)
PLATELET # BLD AUTO: 196 K/UL (ref 138–453)
PMV BLD AUTO: 10.7 FL (ref 8.1–13.5)
POTASSIUM SERPL-SCNC: 4.9 MMOL/L (ref 3.7–5.3)
RBC # BLD: 3.19 M/UL (ref 4.21–5.77)
RBC # BLD: ABNORMAL 10*6/UL
SARS-COV-2 RDRP RESP QL NAA+PROBE: NOT DETECTED
SEG NEUTROPHILS: 81 % (ref 36–65)
SEGMENTED NEUTROPHILS ABSOLUTE COUNT: 5.5 K/UL (ref 1.5–8.1)
SODIUM SERPL-SCNC: 139 MMOL/L (ref 135–144)
SPECIMEN DESCRIPTION: NORMAL
WBC # BLD AUTO: 6.8 K/UL (ref 3.5–11.3)

## 2023-04-16 PROCEDURE — 80048 BASIC METABOLIC PNL TOTAL CA: CPT

## 2023-04-16 PROCEDURE — 36415 COLL VENOUS BLD VENIPUNCTURE: CPT

## 2023-04-16 PROCEDURE — 2060000000 HC ICU INTERMEDIATE R&B

## 2023-04-16 PROCEDURE — 6360000002 HC RX W HCPCS: Performed by: NURSE PRACTITIONER

## 2023-04-16 PROCEDURE — 87077 CULTURE AEROBIC IDENTIFY: CPT

## 2023-04-16 PROCEDURE — 51702 INSERT TEMP BLADDER CATH: CPT

## 2023-04-16 PROCEDURE — 6370000000 HC RX 637 (ALT 250 FOR IP): Performed by: NURSE PRACTITIONER

## 2023-04-16 PROCEDURE — 87186 SC STD MICRODIL/AGAR DIL: CPT

## 2023-04-16 PROCEDURE — 99222 1ST HOSP IP/OBS MODERATE 55: CPT | Performed by: FAMILY MEDICINE

## 2023-04-16 PROCEDURE — 2580000003 HC RX 258: Performed by: NURSE PRACTITIONER

## 2023-04-16 PROCEDURE — 87635 SARS-COV-2 COVID-19 AMP PRB: CPT

## 2023-04-16 PROCEDURE — 85025 COMPLETE CBC W/AUTO DIFF WBC: CPT

## 2023-04-16 PROCEDURE — 87086 URINE CULTURE/COLONY COUNT: CPT

## 2023-04-16 RX ORDER — ALBUTEROL SULFATE 2.5 MG/3ML
2.5 SOLUTION RESPIRATORY (INHALATION) EVERY 6 HOURS PRN
Status: DISCONTINUED | OUTPATIENT
Start: 2023-04-16 | End: 2023-04-17 | Stop reason: HOSPADM

## 2023-04-16 RX ORDER — ONDANSETRON 2 MG/ML
4 INJECTION INTRAMUSCULAR; INTRAVENOUS EVERY 6 HOURS PRN
Status: DISCONTINUED | OUTPATIENT
Start: 2023-04-16 | End: 2023-04-17 | Stop reason: HOSPADM

## 2023-04-16 RX ORDER — POTASSIUM CHLORIDE 20 MEQ/1
20 TABLET, EXTENDED RELEASE ORAL DAILY
Status: DISCONTINUED | OUTPATIENT
Start: 2023-04-16 | End: 2023-04-17 | Stop reason: HOSPADM

## 2023-04-16 RX ORDER — SODIUM CHLORIDE 9 MG/ML
25 INJECTION, SOLUTION INTRAVENOUS PRN
Status: DISCONTINUED | OUTPATIENT
Start: 2023-04-16 | End: 2023-04-17 | Stop reason: HOSPADM

## 2023-04-16 RX ORDER — FLUTICASONE PROPIONATE 50 MCG
1 SPRAY, SUSPENSION (ML) NASAL DAILY
Status: DISCONTINUED | OUTPATIENT
Start: 2023-04-16 | End: 2023-04-17 | Stop reason: HOSPADM

## 2023-04-16 RX ORDER — ALPRAZOLAM 0.25 MG/1
0.12 TABLET ORAL EVERY MORNING
Status: DISCONTINUED | OUTPATIENT
Start: 2023-04-16 | End: 2023-04-17 | Stop reason: HOSPADM

## 2023-04-16 RX ORDER — ATORVASTATIN CALCIUM 10 MG/1
10 TABLET, FILM COATED ORAL DAILY
Status: DISCONTINUED | OUTPATIENT
Start: 2023-04-16 | End: 2023-04-17 | Stop reason: HOSPADM

## 2023-04-16 RX ORDER — ONDANSETRON 4 MG/1
4 TABLET, ORALLY DISINTEGRATING ORAL EVERY 8 HOURS PRN
Status: DISCONTINUED | OUTPATIENT
Start: 2023-04-16 | End: 2023-04-17 | Stop reason: HOSPADM

## 2023-04-16 RX ORDER — TAMSULOSIN HYDROCHLORIDE 0.4 MG/1
0.4 CAPSULE ORAL 2 TIMES DAILY
Status: DISCONTINUED | OUTPATIENT
Start: 2023-04-16 | End: 2023-04-17 | Stop reason: HOSPADM

## 2023-04-16 RX ORDER — ALPRAZOLAM 0.25 MG/1
0.25 TABLET ORAL NIGHTLY PRN
Status: DISCONTINUED | OUTPATIENT
Start: 2023-04-16 | End: 2023-04-17 | Stop reason: HOSPADM

## 2023-04-16 RX ORDER — LEVOTHYROXINE SODIUM 88 UG/1
88 TABLET ORAL DAILY
Status: DISCONTINUED | OUTPATIENT
Start: 2023-04-16 | End: 2023-04-17 | Stop reason: HOSPADM

## 2023-04-16 RX ORDER — ACETAMINOPHEN 650 MG/1
650 SUPPOSITORY RECTAL EVERY 6 HOURS PRN
Status: DISCONTINUED | OUTPATIENT
Start: 2023-04-16 | End: 2023-04-17 | Stop reason: HOSPADM

## 2023-04-16 RX ORDER — SODIUM CHLORIDE 9 MG/ML
INJECTION, SOLUTION INTRAVENOUS CONTINUOUS
Status: DISCONTINUED | OUTPATIENT
Start: 2023-04-16 | End: 2023-04-17 | Stop reason: HOSPADM

## 2023-04-16 RX ORDER — AMIODARONE HYDROCHLORIDE 200 MG/1
100 TABLET ORAL DAILY
Status: DISCONTINUED | OUTPATIENT
Start: 2023-04-16 | End: 2023-04-17 | Stop reason: HOSPADM

## 2023-04-16 RX ORDER — ACETAMINOPHEN 325 MG/1
650 TABLET ORAL EVERY 6 HOURS PRN
Status: DISCONTINUED | OUTPATIENT
Start: 2023-04-16 | End: 2023-04-17 | Stop reason: HOSPADM

## 2023-04-16 RX ORDER — LACTULOSE 10 G/15ML
20 SOLUTION ORAL 3 TIMES DAILY PRN
Status: DISCONTINUED | OUTPATIENT
Start: 2023-04-16 | End: 2023-04-17 | Stop reason: HOSPADM

## 2023-04-16 RX ORDER — POLYETHYLENE GLYCOL 3350 17 G/17G
17 POWDER, FOR SOLUTION ORAL DAILY PRN
Status: DISCONTINUED | OUTPATIENT
Start: 2023-04-16 | End: 2023-04-17 | Stop reason: HOSPADM

## 2023-04-16 RX ORDER — SODIUM CHLORIDE 0.9 % (FLUSH) 0.9 %
5-40 SYRINGE (ML) INJECTION EVERY 12 HOURS SCHEDULED
Status: DISCONTINUED | OUTPATIENT
Start: 2023-04-16 | End: 2023-04-17 | Stop reason: HOSPADM

## 2023-04-16 RX ORDER — SODIUM CHLORIDE 0.9 % (FLUSH) 0.9 %
5-40 SYRINGE (ML) INJECTION PRN
Status: DISCONTINUED | OUTPATIENT
Start: 2023-04-16 | End: 2023-04-17 | Stop reason: HOSPADM

## 2023-04-16 RX ADMIN — POTASSIUM CHLORIDE 20 MEQ: 20 TABLET, EXTENDED RELEASE ORAL at 10:11

## 2023-04-16 RX ADMIN — SODIUM CHLORIDE: 9 INJECTION, SOLUTION INTRAVENOUS at 02:31

## 2023-04-16 RX ADMIN — SODIUM CHLORIDE: 9 INJECTION, SOLUTION INTRAVENOUS at 15:39

## 2023-04-16 RX ADMIN — ALPRAZOLAM 0.12 MG: 0.25 TABLET ORAL at 08:34

## 2023-04-16 RX ADMIN — RIVAROXABAN 15 MG: 15 TABLET, FILM COATED ORAL at 10:06

## 2023-04-16 RX ADMIN — CEFTRIAXONE 1000 MG: 1 INJECTION, POWDER, FOR SOLUTION INTRAMUSCULAR; INTRAVENOUS at 05:21

## 2023-04-16 RX ADMIN — AMIODARONE HYDROCHLORIDE 100 MG: 200 TABLET ORAL at 10:07

## 2023-04-16 RX ADMIN — LEVOTHYROXINE SODIUM 88 MCG: 0.09 TABLET ORAL at 05:22

## 2023-04-16 RX ADMIN — METOPROLOL TARTRATE 12.5 MG: 25 TABLET, FILM COATED ORAL at 10:08

## 2023-04-16 RX ADMIN — TAMSULOSIN HYDROCHLORIDE 0.4 MG: 0.4 CAPSULE ORAL at 20:32

## 2023-04-16 RX ADMIN — METOPROLOL TARTRATE 12.5 MG: 25 TABLET, FILM COATED ORAL at 20:32

## 2023-04-16 RX ADMIN — ATORVASTATIN CALCIUM 10 MG: 10 TABLET, FILM COATED ORAL at 10:12

## 2023-04-16 RX ADMIN — TAMSULOSIN HYDROCHLORIDE 0.4 MG: 0.4 CAPSULE ORAL at 10:10

## 2023-04-16 ASSESSMENT — PAIN DESCRIPTION - LOCATION: LOCATION: PENIS

## 2023-04-16 ASSESSMENT — PAIN SCALES - WONG BAKER: WONGBAKER_NUMERICALRESPONSE: 2

## 2023-04-16 ASSESSMENT — PAIN DESCRIPTION - DESCRIPTORS: DESCRIPTORS: ACHING

## 2023-04-16 ASSESSMENT — PAIN SCALES - GENERAL
PAINLEVEL_OUTOF10: 5
PAINLEVEL_OUTOF10: 2

## 2023-04-17 ENCOUNTER — TELEPHONE (OUTPATIENT)
Dept: INTERNAL MEDICINE | Age: 88
End: 2023-04-17
Payer: MEDICARE

## 2023-04-17 VITALS
RESPIRATION RATE: 13 BRPM | SYSTOLIC BLOOD PRESSURE: 150 MMHG | BODY MASS INDEX: 22.41 KG/M2 | HEART RATE: 61 BPM | HEIGHT: 71 IN | DIASTOLIC BLOOD PRESSURE: 64 MMHG | OXYGEN SATURATION: 93 % | WEIGHT: 160.1 LBS | TEMPERATURE: 97.4 F

## 2023-04-17 DIAGNOSIS — M15.9 PRIMARY OSTEOARTHRITIS INVOLVING MULTIPLE JOINTS: Primary | ICD-10-CM

## 2023-04-17 DIAGNOSIS — I10 PRIMARY HYPERTENSION: ICD-10-CM

## 2023-04-17 DIAGNOSIS — N13.30 HYDRONEPHROSIS, BILATERAL: ICD-10-CM

## 2023-04-17 DIAGNOSIS — N17.9 AKI (ACUTE KIDNEY INJURY) (HCC): ICD-10-CM

## 2023-04-17 DIAGNOSIS — N32.0 BLADDER OUTLET OBSTRUCTION: ICD-10-CM

## 2023-04-17 DIAGNOSIS — I48.0 PAROXYSMAL ATRIAL FIBRILLATION (HCC): ICD-10-CM

## 2023-04-17 DIAGNOSIS — I25.10 CORONARY ARTERY DISEASE INVOLVING NATIVE CORONARY ARTERY OF NATIVE HEART WITHOUT ANGINA PECTORIS: ICD-10-CM

## 2023-04-17 LAB
ABSOLUTE EOS #: 0.22 K/UL (ref 0–0.44)
ABSOLUTE IMMATURE GRANULOCYTE: 0.04 K/UL (ref 0–0.3)
ABSOLUTE LYMPH #: 1.11 K/UL (ref 1.1–3.7)
ABSOLUTE MONO #: 0.26 K/UL (ref 0.1–1.2)
ANION GAP SERPL CALCULATED.3IONS-SCNC: 8 MMOL/L (ref 9–17)
BASOPHILS # BLD: 1 % (ref 0–2)
BASOPHILS ABSOLUTE: 0.04 K/UL (ref 0–0.2)
BUN SERPL-MCNC: 16 MG/DL (ref 8–23)
BUN/CREAT BLD: 12 (ref 9–20)
CALCIUM SERPL-MCNC: 8.5 MG/DL (ref 8.6–10.4)
CHLORIDE SERPL-SCNC: 111 MMOL/L (ref 98–107)
CO2 SERPL-SCNC: 24 MMOL/L (ref 20–31)
CREAT SERPL-MCNC: 1.38 MG/DL (ref 0.7–1.2)
EOSINOPHILS RELATIVE PERCENT: 4 % (ref 1–4)
GFR SERPL CREATININE-BSD FRML MDRD: 49 ML/MIN/1.73M2
GLUCOSE SERPL-MCNC: 92 MG/DL (ref 70–99)
HCT VFR BLD AUTO: 29.1 % (ref 40.7–50.3)
HGB BLD-MCNC: 9.6 G/DL (ref 13–17)
IMMATURE GRANULOCYTES: 1 %
LYMPHOCYTES # BLD: 22 % (ref 24–43)
MCH RBC QN AUTO: 30 PG (ref 25.2–33.5)
MCHC RBC AUTO-ENTMCNC: 33 G/DL (ref 25.2–33.5)
MCV RBC AUTO: 90.9 FL (ref 82.6–102.9)
MONOCYTES # BLD: 5 % (ref 3–12)
NRBC AUTOMATED: 0 PER 100 WBC
PDW BLD-RTO: 15 % (ref 11.8–14.4)
PLATELET # BLD AUTO: 184 K/UL (ref 138–453)
PMV BLD AUTO: 10.5 FL (ref 8.1–13.5)
POTASSIUM SERPL-SCNC: 4.3 MMOL/L (ref 3.7–5.3)
RBC # BLD: 3.2 M/UL (ref 4.21–5.77)
RBC # BLD: ABNORMAL 10*6/UL
SEG NEUTROPHILS: 67 % (ref 36–65)
SEGMENTED NEUTROPHILS ABSOLUTE COUNT: 3.45 K/UL (ref 1.5–8.1)
SODIUM SERPL-SCNC: 143 MMOL/L (ref 135–144)
WBC # BLD AUTO: 5.1 K/UL (ref 3.5–11.3)

## 2023-04-17 PROCEDURE — 85025 COMPLETE CBC W/AUTO DIFF WBC: CPT

## 2023-04-17 PROCEDURE — 51702 INSERT TEMP BLADDER CATH: CPT

## 2023-04-17 PROCEDURE — 99238 HOSP IP/OBS DSCHRG MGMT 30/<: CPT | Performed by: INTERNAL MEDICINE

## 2023-04-17 PROCEDURE — 80048 BASIC METABOLIC PNL TOTAL CA: CPT

## 2023-04-17 PROCEDURE — 6360000002 HC RX W HCPCS: Performed by: NURSE PRACTITIONER

## 2023-04-17 PROCEDURE — 6370000000 HC RX 637 (ALT 250 FOR IP): Performed by: NURSE PRACTITIONER

## 2023-04-17 PROCEDURE — G0180 MD CERTIFICATION HHA PATIENT: HCPCS | Performed by: NURSE PRACTITIONER

## 2023-04-17 PROCEDURE — 36415 COLL VENOUS BLD VENIPUNCTURE: CPT

## 2023-04-17 PROCEDURE — 94760 N-INVAS EAR/PLS OXIMETRY 1: CPT

## 2023-04-17 PROCEDURE — 2580000003 HC RX 258: Performed by: NURSE PRACTITIONER

## 2023-04-17 RX ORDER — PYRAZINAMIDE 500 MG/1
1 TABLET ORAL EVERY 6 HOURS PRN
Qty: 12 TABLET | Refills: 0 | Status: SHIPPED | OUTPATIENT
Start: 2023-04-17 | End: 2023-04-20

## 2023-04-17 RX ORDER — CEFDINIR 300 MG/1
300 CAPSULE ORAL 2 TIMES DAILY
Qty: 10 CAPSULE | Refills: 0 | Status: SHIPPED | OUTPATIENT
Start: 2023-04-17 | End: 2023-04-22

## 2023-04-17 RX ORDER — CEFDINIR 300 MG/1
300 CAPSULE ORAL 2 TIMES DAILY
Qty: 10 CAPSULE | Refills: 0
Start: 2023-04-17 | End: 2023-04-17 | Stop reason: SDUPTHER

## 2023-04-17 RX ORDER — ALPRAZOLAM 0.25 MG/1
0.12 TABLET ORAL EVERY MORNING
Qty: 2 TABLET | Refills: 0 | Status: SHIPPED | OUTPATIENT
Start: 2023-04-18 | End: 2023-04-21

## 2023-04-17 RX ORDER — GREEN TEA/HOODIA GORDONII 315-12.5MG
1 CAPSULE ORAL 3 TIMES DAILY
Qty: 30 TABLET | Refills: 0 | COMMUNITY
Start: 2023-04-17 | End: 2023-04-27

## 2023-04-17 RX ORDER — ATORVASTATIN CALCIUM 10 MG/1
10 TABLET, FILM COATED ORAL DAILY
Qty: 30 TABLET | Refills: 0
Start: 2023-04-18

## 2023-04-17 RX ORDER — ALPRAZOLAM 0.25 MG/1
0.25 TABLET ORAL NIGHTLY PRN
Qty: 3 TABLET | Refills: 0 | Status: SHIPPED | OUTPATIENT
Start: 2023-04-17 | End: 2023-04-20

## 2023-04-17 RX ADMIN — LEVOTHYROXINE SODIUM 88 MCG: 0.09 TABLET ORAL at 05:08

## 2023-04-17 RX ADMIN — CEFTRIAXONE 1000 MG: 1 INJECTION, POWDER, FOR SOLUTION INTRAMUSCULAR; INTRAVENOUS at 05:03

## 2023-04-17 RX ADMIN — AMIODARONE HYDROCHLORIDE 100 MG: 200 TABLET ORAL at 10:26

## 2023-04-17 RX ADMIN — ATORVASTATIN CALCIUM 10 MG: 10 TABLET, FILM COATED ORAL at 10:24

## 2023-04-17 RX ADMIN — TAMSULOSIN HYDROCHLORIDE 0.4 MG: 0.4 CAPSULE ORAL at 10:24

## 2023-04-17 RX ADMIN — POTASSIUM CHLORIDE 20 MEQ: 20 TABLET, EXTENDED RELEASE ORAL at 10:28

## 2023-04-17 RX ADMIN — SODIUM CHLORIDE: 9 INJECTION, SOLUTION INTRAVENOUS at 05:05

## 2023-04-17 RX ADMIN — RIVAROXABAN 15 MG: 15 TABLET, FILM COATED ORAL at 10:24

## 2023-04-17 RX ADMIN — METOPROLOL TARTRATE 12.5 MG: 25 TABLET, FILM COATED ORAL at 10:24

## 2023-04-17 RX ADMIN — ALPRAZOLAM 0.12 MG: 0.25 TABLET ORAL at 10:24

## 2023-04-17 NOTE — PROGRESS NOTES
Hospitalist Progress Note    Patient:  Garcia Bauer     YOB: 1934    MRN: [id number]   Admit date: 4/15/2023     Acct: [de-identified]     PCP: DO RAN Young--Interval History: MARITA due to dehydration--4.15.2023--back to baseline---CRE 2.05 --> 1.38    Urinary retention---chronic sanchez catheter to be seen for cystoscopy by Urology outpatient---at admission patient's sanchez balloon was pulled into the urethra---replaced and corrected. CHF--combined--chronic     Dementia---severe--with episodic agitation    See note below    All other ROS negative except noted in HPI    Diet:  ADULT DIET;  Regular; Low Fat/Low Chol/High Fiber/LE    Medications:  Scheduled Meds:   ALPRAZolam  0.125 mg Oral QAM    amiodarone  100 mg Oral Daily    fluticasone  1 spray Each Nostril Daily    levothyroxine  88 mcg Oral Daily    metoprolol tartrate  12.5 mg Oral BID    potassium chloride  20 mEq Oral Daily    psyllium  1 packet Oral Daily    rivaroxaban  15 mg Oral Daily with breakfast    atorvastatin  10 mg Oral Daily    tamsulosin  0.4 mg Oral BID    sodium chloride flush  5-40 mL IntraVENous 2 times per day    cefTRIAXone (ROCEPHIN) IV  1,000 mg IntraVENous Q24H     Continuous Infusions:   sodium chloride 75 mL/hr at 04/17/23 0505    sodium chloride       PRN Meds:ALPRAZolam, albuterol, lactulose, sodium chloride flush, sodium chloride, ondansetron **OR** ondansetron, polyethylene glycol, acetaminophen **OR** acetaminophen    Objective:  Labs:  CBC with Differential:    Lab Results   Component Value Date/Time    WBC 5.1 04/17/2023 05:10 AM    RBC 3.20 04/17/2023 05:10 AM    HGB 9.6 04/17/2023 05:10 AM    HCT 29.1 04/17/2023 05:10 AM     04/17/2023 05:10 AM    MCV 90.9 04/17/2023 05:10 AM    MCH 30.0 04/17/2023 05:10 AM    MCHC 33.0 04/17/2023 05:10 AM    RDW 15.0 04/17/2023 05:10 AM    LYMPHOPCT 22 04/17/2023 05:10 AM    MONOPCT 5 04/17/2023 05:10 AM    BASOPCT 1 04/17/2023 05:10 AM    MONOSABS 0.26

## 2023-04-17 NOTE — FLOWSHEET NOTE
rounding in PCU. Assessment: Patient stated that he was \"not doing well\" and declined offer of visit or prayer. Plan: Chaplains are available on site or on call 24/7 for spiritual and emotional support.     Electronically signed by Mika Yoo on 4/17/2023 at 4:30 PM

## 2023-04-17 NOTE — DISCHARGE INSTRUCTIONS
Follow up with Dr. Radha Brink in 1 week    Follow up with Dr. Kaiser Miranda scheduled 4/19/23-Call to confirm     BMP and CBC Thursday, 4/20/23

## 2023-04-17 NOTE — PROGRESS NOTES
Physician Progress Note      PATIENT:               Massimo Monique  CSN #:                  [id number]  :                       1934  ADMIT DATE:       4/15/2023 9:46 PM  [address] [place] [place] DATE:  RESPONDING  PROVIDER #:        Amrit Deleon MD          QUERY TEXT:    Patient admitted with UTI with chronic indwelling sanchez catheter, noted to   have Paroxysmal atrial fibrillation and is maintained on Xarelto. If possible, please document in progress notes and discharge summary if you   are evaluating and/or treating any of the following: The medical record reflects the following:  Risk Factors: Age 80, Hx of CHF, HTN,  Clinical Indicators: H&P  \"PAF is on Xarelto\". Treatment: Xarelto 15 mg    Query  By Isabella Adame  . Options provided:  -- Secondary hypercoagulable state in a patient with atrial fibrillation  -- Other - I will add my own diagnosis  -- Disagree - Not applicable / Not valid  -- Disagree - Clinically unable to determine / Unknown  -- Refer to Clinical Documentation Reviewer    PROVIDER RESPONSE TEXT:    This patient has secondary hypercoagulable state in a patient with atrial   fibrillation.     Query created by: Temitope Hernandez on 2023 9:50 AM      Electronically signed by:  Amrit Deleon MD 2023 12:06 PM

## 2023-04-17 NOTE — CARE COORDINATION
Case Management Assessment  Initial Evaluation    Date/Time of Evaluation: 4/17/2023 9:40 AM  Assessment Completed by: Isaias Arboleda RN    If patient is discharged prior to next notation, then this note serves as note for discharge by case management. Patient Name: Max Horton                   YOB: 1934  Diagnosis: MARITA (acute kidney injury) ([facility] [N17.9]  Fall, initial encounter [W19. XXXA]  Urinary tract infection associated with indwelling urethral catheter, initial encounter ([facility] [X63.974M, N39.0]                   Date / Time: 4/15/2023  9:46 PM    Patient Admission Status: Inpatient   Readmission Risk (Low < 19, Mod (19-27), High > 27): Readmission Risk Score: 26.4    Current PCP: Vinod Conte, DO  PCP verified by CM? Yes    Chart Reviewed: Yes      History Provided by: Patient, Medical Record  Patient Orientation: Alert and Oriented    Patient Cognition: Dementia / Early Alzheimer's    Hospitalization in the last 30 days (Readmission):  Yes    If yes, Readmission Assessment in  Navigator will be completed. Advance Directives:      Code Status: DNR-CCA   Patient's Primary Decision Maker is: Named in [address] [place] [address]      Discharge Planning:    Patient lives with: Other (Comment) Type of Home: Assisted living  Primary Care Giver: Other (Comment) (Varghese Ramos)  Patient Support Systems include:  Other (Comment) (Emergency contacts)   Current Financial resources: Medicare  Current community resources: Assisted Living  Current services prior to admission: Emergency Call  System            Current DME:              Type of Home Care services:  Nursing Services    ADLS  Prior functional level: Assistance with the following:, Bathing, Toileting, Mobility, Housework, Cooking  Current functional level: Assistance with the following:, Bathing, Toileting, Cooking, Housework, Mobility    PT AM-PAC:   /24  OT AM-PAC:   /24    Family can provide assistance at DC: No (Resides at

## 2023-04-17 NOTE — DISCHARGE INSTR - DIET

## 2023-04-17 NOTE — PROGRESS NOTES
Physical Therapy    Patient refused therapy evaluation this date. Reporting he does not feel like getting up/did not want to participate. Nursing notified. Will try back at a later date for PT evaluation.        Thaddeus Daigle, PT

## 2023-04-18 ENCOUNTER — CARE COORDINATION (OUTPATIENT)
Dept: CASE MANAGEMENT | Age: 88
End: 2023-04-18

## 2023-04-18 LAB
MICROORGANISM SPEC CULT: ABNORMAL
SPECIMEN DESCRIPTION: ABNORMAL

## 2023-04-18 NOTE — PROGRESS NOTES
Physician Progress Note      PATIENT:               Jose Manuel Reddy  CSN #:                  [id number]  :                       1934  ADMIT DATE:       4/15/2023 9:46 PM  [address] [place] DATE:        2023 1:23 PM  RESPONDING  PROVIDER #:        Babak Garza MD          QUERY TEXT:    Patient admitted  4/15/23 (discharged 23) with MARITA. UTI with chronic indwelling catheter is documented in the H&P. Urine culture   subsequently returned Pseudomonas putida >430167 cfu/ml    If possible, please document in the progress notes and discharge summary if   UTI was    The medical record reflects the following:  Risk Factors: chronic indwelling sanchez catheter, prior to admission pt fell   and catheter tugged, found to have bloody urine  Clinical Indicators: admission UA: moderate bacteria, positive nitrite, 1+   leukocyte esterase, urine culture: Pseudomonas putida >705978 cfu/ml  Treatment: IV Levaquin -> IV Rocephin, discharged on oral cefdnir    Shivam Angel, MSN, RN, CCDS, [address] [place] [address]   [phone]  . Options provided:  -- UTI, present on admission, due to indwelling urinary catheter  -- UTI, present on admission, not due to indwelling urinary catheter  -- UTI ruled out after study  -- Other - I will add my own diagnosis  -- Disagree - Not applicable / Not valid  -- Disagree - Clinically unable to determine / Unknown  -- Refer to Clinical Documentation Reviewer    PROVIDER RESPONSE TEXT:    UTI, present on admission, due to indwelling urinary catheter.     Query created by: Ashly Jeter on 2023 7:54 AM      Electronically signed by:  Babak Garza MD 2023 8:26 AM

## 2023-04-19 NOTE — DISCHARGE SUMMARY
cream  topically affected areas p.r.n. (preparation H); lactulose (Chronulac)  30 mL three times a day p.r.n. constipation; levothyroxine (Synthroid)  88 mcg (0.088 mg) p.o. daily; loperamide (Imodium) 2 mg after loose  stool, not to exceed 16 gm in a 24-hour period; mineral oil enema  rectally as needed p.r.n. constipation; Liquifilm Tears 1.4 ophthalmic  solution 2 drops both eyes every 6 hours as needed for dry eyes;  potassium chloride 20 mEq p.o. daily, _____ one tablespoon in 8 ounces  of water b.i.d.; simethicone (Mylicon) 0.6 mg four times daily p.r.n.  gas, flatulence; tamsulosin (Flomax) 0.4 mg p.o. b.i.d.; tolnaftate  (Tinactin) 1% external solution topically to toenails nightly. SPECIFICALLY DISCONTINUED:  Cholestyramine powder, Coenzyme Q10,  glucosamine chondroitin sulfate, hydroxyzine, isosorbide mononitrate  (Imdur), lisinopril, multivitamin, probiotic, simvastatin, and Zocor. Follow up with the patient's personal physician, Gilberto Lazaro DO, Internal Medicine, [facility] and/or Medical Director  Princess. Follow up with Dr. Yayo Streeter, Urology, 04/19/2023, for cystoscopy. Any aspect of the patient's care not discussed in the chart and/or  dictation will be addressed and treated as an outpatient. The patient's medications have been reviewed including, but not limited  to, pre-hospital, hospital and discharge medications. The patient  and/or the patient's personal representatives were specifically advised  the only medications to be taken are those set forth in the discharge  orders and no other medications should be taken. Any prior medications  not on the discharge orders are specifically discontinued.         Nadja Gardiner MD    D: 04/18/2023 15:06:07       T: 04/18/2023 15:09:31     [email or web address]  Job#: [id number]     Doc#: [id number]    CC:

## 2023-04-20 ENCOUNTER — HOSPITAL ENCOUNTER (OUTPATIENT)
Age: 88
Setting detail: SPECIMEN
Discharge: HOME | End: 2023-04-20
Payer: MEDICARE

## 2023-04-20 DIAGNOSIS — N17.9 AKI (ACUTE KIDNEY INJURY) (HCC): ICD-10-CM

## 2023-04-20 LAB
ANION GAP SERPL CALCULATED.3IONS-SCNC: 10 MMOL/L (ref 9–17)
BUN SERPL-MCNC: 11 MG/DL (ref 8–23)
BUN/CREAT BLD: 9 (ref 9–20)
CALCIUM SERPL-MCNC: 9.1 MG/DL (ref 8.6–10.4)
CHLORIDE SERPL-SCNC: 106 MMOL/L (ref 98–107)
CO2 SERPL-SCNC: 27 MMOL/L (ref 20–31)
CREAT SERPL-MCNC: 1.27 MG/DL (ref 0.7–1.2)
GFR SERPL CREATININE-BSD FRML MDRD: 54 ML/MIN/1.73M2
GLUCOSE SERPL-MCNC: 94 MG/DL (ref 70–99)
HCT VFR BLD AUTO: 33.1 % (ref 40.7–50.3)
HGB BLD-MCNC: 10.9 G/DL (ref 13–17)
MCH RBC QN AUTO: 30.2 PG (ref 25.2–33.5)
MCHC RBC AUTO-ENTMCNC: 32.9 G/DL (ref 25.2–33.5)
MCV RBC AUTO: 91.7 FL (ref 82.6–102.9)
NRBC AUTOMATED: 0 PER 100 WBC
PDW BLD-RTO: 15 % (ref 11.8–14.4)
PLATELET # BLD AUTO: 213 K/UL (ref 138–453)
PMV BLD AUTO: 10.4 FL (ref 8.1–13.5)
POTASSIUM SERPL-SCNC: 4.6 MMOL/L (ref 3.7–5.3)
RBC # BLD: 3.61 M/UL (ref 4.21–5.77)
SODIUM SERPL-SCNC: 143 MMOL/L (ref 135–144)
WBC # BLD AUTO: 6 K/UL (ref 3.5–11.3)

## 2023-04-20 PROCEDURE — 36415 COLL VENOUS BLD VENIPUNCTURE: CPT

## 2023-04-20 PROCEDURE — 85027 COMPLETE CBC AUTOMATED: CPT

## 2023-04-20 PROCEDURE — 80048 BASIC METABOLIC PNL TOTAL CA: CPT

## 2023-05-03 ENCOUNTER — ANESTHESIA EVENT (OUTPATIENT)
Dept: OPERATING ROOM | Age: 88
End: 2023-05-03
Payer: MEDICARE

## 2023-05-03 ENCOUNTER — HOSPITAL ENCOUNTER (OUTPATIENT)
Age: 88
Setting detail: OUTPATIENT SURGERY
Discharge: NURSING, REHAB OR LONG-TERM CARE | End: 2023-05-03
Attending: UROLOGY | Admitting: UROLOGY
Payer: MEDICARE

## 2023-05-03 ENCOUNTER — ANESTHESIA (OUTPATIENT)
Dept: OPERATING ROOM | Age: 88
End: 2023-05-03
Payer: MEDICARE

## 2023-05-03 VITALS
DIASTOLIC BLOOD PRESSURE: 72 MMHG | SYSTOLIC BLOOD PRESSURE: 151 MMHG | WEIGHT: 145 LBS | BODY MASS INDEX: 20.3 KG/M2 | HEART RATE: 65 BPM | HEIGHT: 71 IN | TEMPERATURE: 97.2 F | OXYGEN SATURATION: 99 % | RESPIRATION RATE: 16 BRPM

## 2023-05-03 PROCEDURE — 3600000002 HC SURGERY LEVEL 2 BASE: Performed by: UROLOGY

## 2023-05-03 PROCEDURE — 3700000001 HC ADD 15 MINUTES (ANESTHESIA): Performed by: UROLOGY

## 2023-05-03 PROCEDURE — 2580000003 HC RX 258: Performed by: UROLOGY

## 2023-05-03 PROCEDURE — 3700000000 HC ANESTHESIA ATTENDED CARE: Performed by: UROLOGY

## 2023-05-03 PROCEDURE — 3600000012 HC SURGERY LEVEL 2 ADDTL 15MIN: Performed by: UROLOGY

## 2023-05-03 PROCEDURE — 6360000002 HC RX W HCPCS: Performed by: NURSE ANESTHETIST, CERTIFIED REGISTERED

## 2023-05-03 PROCEDURE — 2709999900 HC NON-CHARGEABLE SUPPLY: Performed by: UROLOGY

## 2023-05-03 PROCEDURE — 7100000010 HC PHASE II RECOVERY - FIRST 15 MIN: Performed by: UROLOGY

## 2023-05-03 PROCEDURE — 6360000002 HC RX W HCPCS: Performed by: UROLOGY

## 2023-05-03 PROCEDURE — 7100000011 HC PHASE II RECOVERY - ADDTL 15 MIN: Performed by: UROLOGY

## 2023-05-03 RX ORDER — CIPROFLOXACIN 500 MG/1
500 TABLET, FILM COATED ORAL 2 TIMES DAILY
Qty: 6 TABLET | Refills: 0 | Status: SHIPPED | OUTPATIENT
Start: 2023-05-03 | End: 2023-05-06

## 2023-05-03 RX ORDER — PROPOFOL 10 MG/ML
INJECTION, EMULSION INTRAVENOUS PRN
Status: DISCONTINUED | OUTPATIENT
Start: 2023-05-03 | End: 2023-05-03 | Stop reason: SDUPTHER

## 2023-05-03 RX ORDER — SODIUM CHLORIDE 9 MG/ML
INJECTION, SOLUTION INTRAVENOUS PRN
Status: DISCONTINUED | OUTPATIENT
Start: 2023-05-03 | End: 2023-05-03 | Stop reason: HOSPADM

## 2023-05-03 RX ORDER — SODIUM CHLORIDE 0.9 % (FLUSH) 0.9 %
5-40 SYRINGE (ML) INJECTION PRN
Status: DISCONTINUED | OUTPATIENT
Start: 2023-05-03 | End: 2023-05-03 | Stop reason: HOSPADM

## 2023-05-03 RX ORDER — CIPROFLOXACIN 2 MG/ML
400 INJECTION, SOLUTION INTRAVENOUS
Status: COMPLETED | OUTPATIENT
Start: 2023-05-03 | End: 2023-05-03

## 2023-05-03 RX ORDER — SODIUM CHLORIDE, SODIUM LACTATE, POTASSIUM CHLORIDE, CALCIUM CHLORIDE 600; 310; 30; 20 MG/100ML; MG/100ML; MG/100ML; MG/100ML
INJECTION, SOLUTION INTRAVENOUS CONTINUOUS
Status: DISCONTINUED | OUTPATIENT
Start: 2023-05-03 | End: 2023-05-03 | Stop reason: HOSPADM

## 2023-05-03 RX ORDER — SODIUM CHLORIDE 0.9 % (FLUSH) 0.9 %
5-40 SYRINGE (ML) INJECTION EVERY 12 HOURS SCHEDULED
Status: DISCONTINUED | OUTPATIENT
Start: 2023-05-03 | End: 2023-05-03 | Stop reason: HOSPADM

## 2023-05-03 RX ADMIN — PROPOFOL 20 MG: 10 INJECTION, EMULSION INTRAVENOUS at 12:16

## 2023-05-03 RX ADMIN — PROPOFOL 50 MG: 10 INJECTION, EMULSION INTRAVENOUS at 12:20

## 2023-05-03 RX ADMIN — CIPROFLOXACIN 400 MG: 2 INJECTION, SOLUTION INTRAVENOUS at 12:20

## 2023-05-03 RX ADMIN — SODIUM CHLORIDE, POTASSIUM CHLORIDE, SODIUM LACTATE AND CALCIUM CHLORIDE: 600; 310; 30; 20 INJECTION, SOLUTION INTRAVENOUS at 11:21

## 2023-05-03 RX ADMIN — PROPOFOL 50 MG: 10 INJECTION, EMULSION INTRAVENOUS at 12:26

## 2023-05-03 ASSESSMENT — PAIN - FUNCTIONAL ASSESSMENT: PAIN_FUNCTIONAL_ASSESSMENT: 0-10

## 2023-05-03 ASSESSMENT — PAIN SCALES - PAIN ASSESSMENT IN ADVANCED DEMENTIA (PAINAD)
TOTALSCORE: 0
BREATHING: 0
CONSOLABILITY: 0
FACIALEXPRESSION: 0
NEGVOCALIZATION: 0
BODYLANGUAGE: 0

## 2023-05-03 ASSESSMENT — PAIN SCALES - GENERAL
PAINLEVEL_OUTOF10: 0
PAINLEVEL_OUTOF10: 0

## 2023-05-03 NOTE — DISCHARGE INSTRUCTIONS
Call your doctor for the following:   Chills   Temperature greater than 101   Pain that is not tolerable despite taking pain medicine as ordered   Non draining catheter            No alcoholic beverages, no driving or operating machinery, no making important decisions for 24 hours. Take your prescribed medications (if any) as instructed. You may have a normal diet but should eat lightly on the day of surgery. Drink plenty of fluids. You may notice some burning or blood with urination, this is normal and should improve over next few days. You may also take motrin/ibuprofen for pain control, you can alternate this with your other pain medications. Be sure to take over the counter stool softeners if you notice constipation or hard stools. Follow up with Dr. Alexa Selby, office will arrange. Problems / Questions - Call [phone] [facility]) or after hours call [phone] [facility]) and they will page the doctor for you.

## 2023-05-03 NOTE — ANESTHESIA POSTPROCEDURE EVALUATION
Department of Anesthesiology  Postprocedure Note    Patient: Jasmin Shukla  MRN: [id number]  [place]: 12/31/1934  Date of evaluation: 5/3/2023      Procedure Summary     Date: 05/03/23 Room / Location: [address] [place] [address]    Anesthesia Start: 1214 Anesthesia Stop: 1234    Procedure: CYSTOSCOPY with Beck Catheter Exchange (Urethra) Diagnosis:       Urinary retention      (Urinary retention [R33.9])    Surgeons: Shmuel Oates MD Responsible Provider: DAYANARA Ibanez CRNA    Anesthesia Type: general, TIVA ASA Status: 4          Anesthesia Type: No value filed. Ann Phase I: Ann Score: 10    Ann Phase II: Ann Score: 9      Anesthesia Post Evaluation    Patient location during evaluation: bedside  Patient participation: complete - patient participated  Post-procedure mental status: patient at baseline.   Pain score: 0  Airway patency: patent  Nausea & Vomiting: no vomiting  Complications: no  Cardiovascular status: hemodynamically stable  Respiratory status: acceptable  Hydration status: euvolemic

## 2023-05-03 NOTE — ANESTHESIA PRE PROCEDURE
Department of Anesthesiology  Preprocedure Note       Name:  Parish Infante   Age:  80 y.o.  :  1934                                          MRN:  [id number]         Date:  5/3/2023      Surgeon: Fletcher Stevens):  Marlene Santillan MD    Procedure: Procedure(s):  CYSTOSCOPY with Beck Catheter Exchange ([address] [place] [address] [phone] [facility])(ARRIVAL 11am-lp)    Medications prior to admission:   Prior to Admission medications    Medication Sig Start Date End Date Taking? Authorizing Provider   rivaroxaban (XARELTO) 15 MG TABS tablet Take 1 tablet by mouth daily (with breakfast) ANTICOAGULANT! Doses greater than 15 mg/day must be administered with food.  23   Hunter Sosa MD   atorvastatin (LIPITOR) 10 MG tablet Take 1 tablet by mouth daily 23   Hunter Sosa MD   metoprolol tartrate (LOPRESSOR) 25 MG tablet Take 0.5 tablets by mouth 2 times daily Hold for HR <60 or sbp <100 23  Hunter Sosa MD   simethicone (MYLICON) 40 QE/5.4NJ drops Take 0.6 mLs by mouth 4 times daily as needed (gas)    Historical Provider, MD   lactulose (CHRONULAC) 10 GM/15ML solution Take 30 mLs by mouth 3 times daily as needed (constipation) 23   Hunter Sosa MD   polyvinyl alcohol (LIQUIFILM TEARS) 1.4 % ophthalmic solution Place 2 drops into both eyes every 6 hours as needed    Historical Provider, MD   mineral oil enema Place 1 enema rectally - Use as directed on the bottle for occasional constipation    Historical Provider, MD   levothyroxine (SYNTHROID) 88 MCG tablet Take 1 tablet by mouth Daily    Historical Provider, MD   tamsulosin (FLOMAX) 0.4 MG capsule Take 1 capsule by mouth 2 times daily 22   Marlene Santillan MD   Psyllium (REGULOID PO) Take by mouth (Reguloid): mix 1 TBSP in 8 oz of water bid    Historical Provider, MD   albuterol (PROVENTIL) (2.5 MG/3ML) 0.083% nebulizer solution Take 2.5 mg by nebulization every 6 hours as needed for Wheezing    Historical Provider, MD   loperamide (IMODIUM) 2 MG

## 2023-05-03 NOTE — H&P
History and Physical    Patient:  Genevieve Fong  MRN: [id number]  YOB: 1934    CHIEF COMPLAINT:  gross hematuria    HISTORY OF PRESENT ILLNESS:   The patient is a 80 y.o. male who presents with as above, here for surgery    Patient's old records, notes and chart reviewed and summarized above.      Past Medical History:    Past Medical History:   Diagnosis Date    Acute bronchitis     by history    MARITA (acute kidney injury) ([address] 3/13/2018    Allergic rhinitis     Anxiety     Bradycardia 8/19/2018    Cataract, right     Choroidal nevus of right eye     Chronic systolic CHF (congestive heart failure) ([address] 3/13/2018    Coronary artery disease involving native coronary artery of native heart 10/20/2016    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 LIMA TO LAD, SVG TO DIAGONAL2    Dementia associated with 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 behavioral disturbance ([address] 9/21/2018    Dermatophytosis of nail 1/10/2014    Diverticulosis     Elevated PSA     Hemorrhoids     History of measles childhood    History of mumps childhood    Hyperlipidemia     Hypertension     Hypothyroidism 9/4/2018    Mass of upper lobe of right lung 2/21/2018    Occult blood positive stool     refuses colonoscopy    Osteoarthritis     Overweight     Paroxysmal atrial fibrillation ([address] 11/7/2013    Pneumonia due to organism     Pseudophakia, left eye     PSVT (paroxysmal supraventricular tachycardia) ([address]     Recurrent UTI 3/13/2018    Retinal detachment     left, history of     Seborrheic dermatitis     Sepsis due to urinary tract infection ([address] 8/15/2018    Urinary tract infection without hematuria 3/28/2018       Past Surgical History:    Past Surgical History:   Procedure Laterality Date    ABSCESS DRAINAGE  [id number]    multiple    APPENDECTOMY  [address] and [address] Left 05/07/2013    COLONOSCOPY  06/14/2021    Dr Karen Stack Left 12/22/2011    detached retina    SKIN TAG REMOVAL  11/01/1999

## 2023-05-08 ENCOUNTER — TELEPHONE (OUTPATIENT)
Dept: INTERNAL MEDICINE | Age: 88
End: 2023-05-08

## 2023-05-10 ENCOUNTER — APPOINTMENT (OUTPATIENT)
Dept: GENERAL RADIOLOGY | Age: 88
DRG: 699 | End: 2023-05-10
Payer: MEDICARE

## 2023-05-10 ENCOUNTER — HOSPITAL ENCOUNTER (INPATIENT)
Age: 88
LOS: 2 days | Discharge: NURSING, REHAB OR LONG-TERM CARE | DRG: 699 | End: 2023-05-12
Attending: EMERGENCY MEDICINE | Admitting: INTERNAL MEDICINE
Payer: MEDICARE

## 2023-05-10 ENCOUNTER — TELEPHONE (OUTPATIENT)
Dept: INTERNAL MEDICINE | Age: 88
End: 2023-05-10

## 2023-05-10 DIAGNOSIS — N39.0 URINARY TRACT INFECTION ASSOCIATED WITH INDWELLING URETHRAL CATHETER, INITIAL ENCOUNTER (HCC): Primary | ICD-10-CM

## 2023-05-10 DIAGNOSIS — N17.9 ACUTE KIDNEY INJURY (NONTRAUMATIC) (HCC): ICD-10-CM

## 2023-05-10 DIAGNOSIS — T83.511A URINARY TRACT INFECTION ASSOCIATED WITH INDWELLING URETHRAL CATHETER, INITIAL ENCOUNTER (HCC): Primary | ICD-10-CM

## 2023-05-10 PROBLEM — R79.89 ELEVATED TROPONIN: Status: ACTIVE | Noted: 2023-05-10

## 2023-05-10 PROBLEM — R77.8 ELEVATED TROPONIN: Status: ACTIVE | Noted: 2023-05-10

## 2023-05-10 LAB
ABSOLUTE EOS #: 0 K/UL (ref 0–0.4)
ABSOLUTE IMMATURE GRANULOCYTE: 0 K/UL (ref 0–0.3)
ABSOLUTE LYMPH #: 0.69 K/UL (ref 1–4.8)
ABSOLUTE MONO #: 0.06 K/UL (ref 0.1–1.2)
ALBUMIN SERPL-MCNC: 3.5 G/DL (ref 3.5–5.2)
ALBUMIN/GLOBULIN RATIO: 1.2 (ref 1–2.5)
ALP SERPL-CCNC: 116 U/L (ref 40–129)
ALT SERPL-CCNC: 12 U/L (ref 5–41)
ANION GAP SERPL CALCULATED.3IONS-SCNC: 13 MMOL/L (ref 9–17)
AST SERPL-CCNC: 24 U/L
BACTERIA: ABNORMAL
BASOPHILS # BLD: 0 % (ref 0–2)
BASOPHILS ABSOLUTE: 0 K/UL (ref 0–0.2)
BILIRUB SERPL-MCNC: 0.6 MG/DL (ref 0.3–1.2)
BILIRUBIN URINE: NEGATIVE
BUN SERPL-MCNC: 26 MG/DL (ref 8–23)
BUN/CREAT BLD: 12 (ref 9–20)
CALCIUM SERPL-MCNC: 8.8 MG/DL (ref 8.6–10.4)
CHLORIDE SERPL-SCNC: 106 MMOL/L (ref 98–107)
CO2 SERPL-SCNC: 25 MMOL/L (ref 20–31)
COLOR: ABNORMAL
CREAT SERPL-MCNC: 2.21 MG/DL (ref 0.7–1.2)
EKG ATRIAL RATE: 79 BPM
EKG ATRIAL RATE: 82 BPM
EKG P AXIS: 67 DEGREES
EKG P AXIS: 93 DEGREES
EKG P-R INTERVAL: 316 MS
EKG P-R INTERVAL: 326 MS
EKG Q-T INTERVAL: 378 MS
EKG Q-T INTERVAL: 382 MS
EKG QRS DURATION: 106 MS
EKG QRS DURATION: 110 MS
EKG QTC CALCULATION (BAZETT): 433 MS
EKG QTC CALCULATION (BAZETT): 446 MS
EKG R AXIS: -51 DEGREES
EKG R AXIS: -52 DEGREES
EKG T AXIS: 73 DEGREES
EKG T AXIS: 76 DEGREES
EKG VENTRICULAR RATE: 79 BPM
EKG VENTRICULAR RATE: 82 BPM
EOSINOPHILS RELATIVE PERCENT: 0 % (ref 1–8)
EPITHELIAL CELLS UA: ABNORMAL /HPF (ref 0–5)
GFR SERPL CREATININE-BSD FRML MDRD: 28 ML/MIN/1.73M2
GLUCOSE SERPL-MCNC: 109 MG/DL (ref 70–99)
GLUCOSE UR STRIP.AUTO-MCNC: NEGATIVE MG/DL
HCT VFR BLD AUTO: 33.1 % (ref 40.7–50.3)
HGB BLD-MCNC: 10.6 G/DL (ref 13–17)
IMMATURE GRANULOCYTES: 0 %
KETONES UR STRIP.AUTO-MCNC: NEGATIVE MG/DL
LACTIC ACID, SEPSIS: 1.5 MMOL/L (ref 0.5–1.9)
LACTIC ACID, SEPSIS: 2.2 MMOL/L (ref 0.5–1.9)
LEUKOCYTE ESTERASE UR QL STRIP.AUTO: ABNORMAL
LYMPHOCYTES # BLD: 11 % (ref 15–43)
MAGNESIUM SERPL-MCNC: 2 MG/DL (ref 1.6–2.6)
MCH RBC QN AUTO: 29.4 PG (ref 25.2–33.5)
MCHC RBC AUTO-ENTMCNC: 32 G/DL (ref 25.2–33.5)
MCV RBC AUTO: 91.9 FL (ref 82.6–102.9)
MONOCYTES # BLD: 1 % (ref 6–14)
MORPHOLOGY: ABNORMAL
MORPHOLOGY: ABNORMAL
NITRITE UR QL STRIP.AUTO: NEGATIVE
NRBC AUTOMATED: 0 PER 100 WBC
OTHER OBSERVATIONS UA: ABNORMAL
PDW BLD-RTO: 14.6 % (ref 11.8–14.4)
PLATELET # BLD AUTO: 144 K/UL (ref 138–453)
PMV BLD AUTO: 10 FL (ref 8.1–13.5)
POTASSIUM SERPL-SCNC: 4.4 MMOL/L (ref 3.7–5.3)
PROT SERPL-MCNC: 6.5 G/DL (ref 6.4–8.3)
PROT UR STRIP.AUTO-MCNC: 5.5 MG/DL (ref 5–6)
PROT UR STRIP.AUTO-MCNC: ABNORMAL MG/DL
RBC # BLD: 3.6 M/UL (ref 4.21–5.77)
RBC CLUMPS #/AREA URNS AUTO: ABNORMAL /HPF (ref 0–4)
SARS-COV-2 RDRP RESP QL NAA+PROBE: NOT DETECTED
SEG NEUTROPHILS: 88 % (ref 44–74)
SEGMENTED NEUTROPHILS ABSOLUTE COUNT: 5.55 K/UL (ref 1.5–8.1)
SODIUM SERPL-SCNC: 144 MMOL/L (ref 135–144)
SPECIFIC GRAVITY UA: 1.01 (ref 1.01–1.02)
SPECIMEN DESCRIPTION: NORMAL
TROPONIN I SERPL DL<=0.01 NG/ML-MCNC: 127 NG/L (ref 0–22)
TROPONIN I SERPL DL<=0.01 NG/ML-MCNC: 148 NG/L (ref 0–22)
TROPONIN I SERPL DL<=0.01 NG/ML-MCNC: 160 NG/L (ref 0–22)
TURBIDITY: CLEAR
URINE HGB: ABNORMAL
UROBILINOGEN, URINE: NORMAL
WBC # BLD AUTO: 6.3 K/UL (ref 3.5–11.3)
WBC UA: ABNORMAL /HPF (ref 0–4)
YEAST: ABNORMAL

## 2023-05-10 PROCEDURE — 87635 SARS-COV-2 COVID-19 AMP PRB: CPT

## 2023-05-10 PROCEDURE — 87106 FUNGI IDENTIFICATION YEAST: CPT

## 2023-05-10 PROCEDURE — 2580000003 HC RX 258: Performed by: INTERNAL MEDICINE

## 2023-05-10 PROCEDURE — 87205 SMEAR GRAM STAIN: CPT

## 2023-05-10 PROCEDURE — 85025 COMPLETE CBC W/AUTO DIFF WBC: CPT

## 2023-05-10 PROCEDURE — 99285 EMERGENCY DEPT VISIT HI MDM: CPT

## 2023-05-10 PROCEDURE — 2580000003 HC RX 258: Performed by: EMERGENCY MEDICINE

## 2023-05-10 PROCEDURE — 87154 CUL TYP ID BLD PTHGN 6+ TRGT: CPT

## 2023-05-10 PROCEDURE — 83605 ASSAY OF LACTIC ACID: CPT

## 2023-05-10 PROCEDURE — 93005 ELECTROCARDIOGRAM TRACING: CPT | Performed by: EMERGENCY MEDICINE

## 2023-05-10 PROCEDURE — 36415 COLL VENOUS BLD VENIPUNCTURE: CPT

## 2023-05-10 PROCEDURE — 6360000002 HC RX W HCPCS: Performed by: EMERGENCY MEDICINE

## 2023-05-10 PROCEDURE — 81001 URINALYSIS AUTO W/SCOPE: CPT

## 2023-05-10 PROCEDURE — 83735 ASSAY OF MAGNESIUM: CPT

## 2023-05-10 PROCEDURE — 2060000000 HC ICU INTERMEDIATE R&B

## 2023-05-10 PROCEDURE — 80053 COMPREHEN METABOLIC PANEL: CPT

## 2023-05-10 PROCEDURE — 84484 ASSAY OF TROPONIN QUANT: CPT

## 2023-05-10 PROCEDURE — 6370000000 HC RX 637 (ALT 250 FOR IP): Performed by: EMERGENCY MEDICINE

## 2023-05-10 PROCEDURE — 87086 URINE CULTURE/COLONY COUNT: CPT

## 2023-05-10 PROCEDURE — 94760 N-INVAS EAR/PLS OXIMETRY 1: CPT

## 2023-05-10 PROCEDURE — 71045 X-RAY EXAM CHEST 1 VIEW: CPT

## 2023-05-10 PROCEDURE — 87040 BLOOD CULTURE FOR BACTERIA: CPT

## 2023-05-10 PROCEDURE — 99222 1ST HOSP IP/OBS MODERATE 55: CPT | Performed by: INTERNAL MEDICINE

## 2023-05-10 PROCEDURE — 6370000000 HC RX 637 (ALT 250 FOR IP): Performed by: INTERNAL MEDICINE

## 2023-05-10 RX ORDER — SODIUM CHLORIDE 9 MG/ML
INJECTION, SOLUTION INTRAVENOUS PRN
Status: DISCONTINUED | OUTPATIENT
Start: 2023-05-10 | End: 2023-05-12 | Stop reason: HOSPADM

## 2023-05-10 RX ORDER — SODIUM CHLORIDE 0.9 % (FLUSH) 0.9 %
10 SYRINGE (ML) INJECTION EVERY 12 HOURS SCHEDULED
Status: DISCONTINUED | OUTPATIENT
Start: 2023-05-10 | End: 2023-05-12 | Stop reason: HOSPADM

## 2023-05-10 RX ORDER — LACTULOSE 10 G/15ML
20 SOLUTION ORAL 3 TIMES DAILY PRN
Status: DISCONTINUED | OUTPATIENT
Start: 2023-05-10 | End: 2023-05-12 | Stop reason: HOSPADM

## 2023-05-10 RX ORDER — ONDANSETRON 2 MG/ML
4 INJECTION INTRAMUSCULAR; INTRAVENOUS EVERY 6 HOURS PRN
Status: DISCONTINUED | OUTPATIENT
Start: 2023-05-10 | End: 2023-05-12 | Stop reason: HOSPADM

## 2023-05-10 RX ORDER — LEVOTHYROXINE SODIUM 88 UG/1
88 TABLET ORAL DAILY
Status: DISCONTINUED | OUTPATIENT
Start: 2023-05-11 | End: 2023-05-12 | Stop reason: HOSPADM

## 2023-05-10 RX ORDER — ALPRAZOLAM 0.25 MG/1
0.25 TABLET ORAL NIGHTLY PRN
Status: ON HOLD | COMMUNITY
End: 2023-05-12 | Stop reason: SDUPTHER

## 2023-05-10 RX ORDER — ACETAMINOPHEN 500 MG
1000 TABLET ORAL ONCE
Status: COMPLETED | OUTPATIENT
Start: 2023-05-10 | End: 2023-05-10

## 2023-05-10 RX ORDER — ALBUTEROL SULFATE 2.5 MG/3ML
2.5 SOLUTION RESPIRATORY (INHALATION) EVERY 4 HOURS PRN
Status: DISCONTINUED | OUTPATIENT
Start: 2023-05-10 | End: 2023-05-12 | Stop reason: HOSPADM

## 2023-05-10 RX ORDER — SODIUM CHLORIDE 0.9 % (FLUSH) 0.9 %
10 SYRINGE (ML) INJECTION PRN
Status: DISCONTINUED | OUTPATIENT
Start: 2023-05-10 | End: 2023-05-12 | Stop reason: HOSPADM

## 2023-05-10 RX ORDER — TAMSULOSIN HYDROCHLORIDE 0.4 MG/1
0.4 CAPSULE ORAL 2 TIMES DAILY
Status: DISCONTINUED | OUTPATIENT
Start: 2023-05-10 | End: 2023-05-12 | Stop reason: HOSPADM

## 2023-05-10 RX ORDER — MULTIVITAMIN WITH IRON
1 TABLET ORAL DAILY
Status: DISCONTINUED | OUTPATIENT
Start: 2023-05-11 | End: 2023-05-12 | Stop reason: HOSPADM

## 2023-05-10 RX ORDER — POTASSIUM CHLORIDE 20 MEQ/1
20 TABLET, EXTENDED RELEASE ORAL DAILY
Status: DISCONTINUED | OUTPATIENT
Start: 2023-05-10 | End: 2023-05-12 | Stop reason: HOSPADM

## 2023-05-10 RX ORDER — 0.9 % SODIUM CHLORIDE 0.9 %
1000 INTRAVENOUS SOLUTION INTRAVENOUS ONCE
Status: COMPLETED | OUTPATIENT
Start: 2023-05-10 | End: 2023-05-10

## 2023-05-10 RX ORDER — ALBUTEROL SULFATE 2.5 MG/3ML
2.5 SOLUTION RESPIRATORY (INHALATION)
Status: DISCONTINUED | OUTPATIENT
Start: 2023-05-10 | End: 2023-05-10

## 2023-05-10 RX ORDER — PRENATAL VIT 91/IRON/FOLIC/DHA 28-975-200
COMBINATION PACKAGE (EA) ORAL 2 TIMES DAILY
Status: DISCONTINUED | OUTPATIENT
Start: 2023-05-10 | End: 2023-05-12 | Stop reason: HOSPADM

## 2023-05-10 RX ORDER — SODIUM CHLORIDE 9 MG/ML
INJECTION, SOLUTION INTRAVENOUS CONTINUOUS
Status: DISCONTINUED | OUTPATIENT
Start: 2023-05-10 | End: 2023-05-12 | Stop reason: HOSPADM

## 2023-05-10 RX ORDER — ENOXAPARIN SODIUM 100 MG/ML
30 INJECTION SUBCUTANEOUS DAILY
Status: CANCELLED | OUTPATIENT
Start: 2023-05-10

## 2023-05-10 RX ORDER — ATORVASTATIN CALCIUM 10 MG/1
10 TABLET, FILM COATED ORAL NIGHTLY
Status: DISCONTINUED | OUTPATIENT
Start: 2023-05-10 | End: 2023-05-12 | Stop reason: HOSPADM

## 2023-05-10 RX ORDER — IPRATROPIUM BROMIDE AND ALBUTEROL SULFATE 2.5; .5 MG/3ML; MG/3ML
1 SOLUTION RESPIRATORY (INHALATION)
Status: DISCONTINUED | OUTPATIENT
Start: 2023-05-10 | End: 2023-05-10

## 2023-05-10 RX ORDER — ACETAMINOPHEN 325 MG/1
650 TABLET ORAL EVERY 4 HOURS PRN
Status: DISCONTINUED | OUTPATIENT
Start: 2023-05-10 | End: 2023-05-12 | Stop reason: HOSPADM

## 2023-05-10 RX ORDER — FLUTICASONE PROPIONATE 50 MCG
1 SPRAY, SUSPENSION (ML) NASAL DAILY
Status: DISCONTINUED | OUTPATIENT
Start: 2023-05-11 | End: 2023-05-12 | Stop reason: HOSPADM

## 2023-05-10 RX ORDER — AMIODARONE HYDROCHLORIDE 200 MG/1
100 TABLET ORAL DAILY
Status: DISCONTINUED | OUTPATIENT
Start: 2023-05-11 | End: 2023-05-12 | Stop reason: HOSPADM

## 2023-05-10 RX ORDER — SODIUM CHLORIDE FOR INHALATION 0.9 %
3 VIAL, NEBULIZER (ML) INHALATION
Status: DISCONTINUED | OUTPATIENT
Start: 2023-05-10 | End: 2023-05-12 | Stop reason: HOSPADM

## 2023-05-10 RX ORDER — ALPRAZOLAM 0.25 MG/1
0.25 TABLET ORAL NIGHTLY PRN
Status: DISCONTINUED | OUTPATIENT
Start: 2023-05-10 | End: 2023-05-12 | Stop reason: HOSPADM

## 2023-05-10 RX ORDER — ELECTROLYTES/DEXTROSE
1 SOLUTION, ORAL ORAL DAILY
Status: DISCONTINUED | OUTPATIENT
Start: 2023-05-10 | End: 2023-05-10

## 2023-05-10 RX ADMIN — CEFTRIAXONE 1000 MG: 1 INJECTION, POWDER, FOR SOLUTION INTRAMUSCULAR; INTRAVENOUS at 14:45

## 2023-05-10 RX ADMIN — SODIUM CHLORIDE: 9 INJECTION, SOLUTION INTRAVENOUS at 16:31

## 2023-05-10 RX ADMIN — TAMSULOSIN HYDROCHLORIDE 0.4 MG: 0.4 CAPSULE ORAL at 21:09

## 2023-05-10 RX ADMIN — METOPROLOL TARTRATE 12.5 MG: 25 TABLET, FILM COATED ORAL at 21:09

## 2023-05-10 RX ADMIN — POTASSIUM CHLORIDE 20 MEQ: 1500 TABLET, EXTENDED RELEASE ORAL at 21:09

## 2023-05-10 RX ADMIN — SODIUM CHLORIDE 1000 ML: 9 INJECTION, SOLUTION INTRAVENOUS at 11:52

## 2023-05-10 RX ADMIN — ATORVASTATIN CALCIUM 10 MG: 10 TABLET, FILM COATED ORAL at 21:09

## 2023-05-10 RX ADMIN — ACETAMINOPHEN 1000 MG: 500 TABLET ORAL at 13:54

## 2023-05-10 ASSESSMENT — PAIN SCALES - GENERAL: PAINLEVEL_OUTOF10: 0

## 2023-05-10 NOTE — ED PROVIDER NOTES
[facility]  eMERGENCY dEPARTMENT eNCOUnter      Pt Name: Jon Valenzuela  MRN: [id number]  [place] 12/31/1934  Date of evaluation: 5/10/2023      CHIEF COMPLAINT     Fever, low blood sats        HISTORY OF PRESENT ILLNESS    Jon Valenzuela is a 80 y.o. male who presents for increased weakness    He was seen last night at [facility] for decreased urine output is found to have his Beck kinked and that was replaced he had 400 cc in his bladder at that time he also noted that he had an increase of his creatinine up to 2.7  He was not running a fever at that time  REVIEW OF SYSTEMS         Review of Systems   Unable to perform ROS: Dementia       PAST MEDICAL HISTORY    has a past medical history of Acute bronchitis, MARITA (acute kidney injury) ([address] Allergic rhinitis, Anxiety, Bradycardia, Cataract, right, Choroidal nevus of right eye, Chronic systolic CHF (congestive heart failure) ([address] Coronary artery disease involving native coronary artery of native heart, Dementia associated with 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 behavioral disturbance ([address] Dermatophytosis of nail, Diverticulosis, Elevated PSA, Hemorrhoids, History of measles, History of mumps, Hyperlipidemia, Hypertension, Hypothyroidism, Mass of upper lobe of right lung, Occult blood positive stool, Osteoarthritis, Overweight, Paroxysmal atrial fibrillation ([address] Pneumonia due to organism, Pseudophakia, left eye, PSVT (paroxysmal supraventricular tachycardia) ([address] Recurrent UTI, Retinal detachment, Seborrheic dermatitis, Sepsis due to urinary tract infection ([address] and Urinary tract infection without hematuria. SURGICAL HISTORY      has a past surgical history that includes Coronary artery bypass graft; Appendectomy (1940 and 1955); Tonsillectomy; retinal laser (Left, 12/22/2011); Cataract removal (Left, 05/07/2013); Skin tag removal (11/01/1999); Abscess Drainage ([id number]);  Colonoscopy (06/14/2021); and Cystoscopy (N/A,

## 2023-05-10 NOTE — OP NOTE
Operative Note      Patient: Nikky Coates  YOB: 1934  MRN: [id number]    Date of Procedure: 5/3/2023    Pre-Op Diagnosis Codes:     * Urinary retention [R33.9]    Post-Op Diagnosis: Same       Procedure(s):  CYSTOSCOPY with Beck Catheter Exchange    Surgeon(s):  Nelta Goldberg, MD    Assistant:   * No surgical staff found *    Anesthesia: Monitor Anesthesia Care    Estimated Blood Loss (mL): Minimal    Complications: None    Specimens:   * No specimens in log *    Implants:  * No implants in log *      Drains:   [REMOVED] Urinary Catheter 03/15/23 Beck (Removed)       [REMOVED] Urinary Catheter 03/15/23 [address] (Removed)       [REMOVED] Urinary Catheter 03/24/23 Beck (Removed)       [REMOVED] Urinary Catheter 04/15/23 (Removed)   $ Urethral catheter insertion $ Not inserted for procedure 04/17/23 1030   Catheter Indications Urinary retention (acute or chronic), continuous bladder irrigation or bladder outlet obstruction 04/17/23 1032   Site Assessment Pink; No urethral drainage 04/17/23 1032   Urine Color Yellow 04/17/23 1032   Urine Appearance Clear 04/17/23 1032   Collection Container Standard 04/17/23 1032   Catheter Care  Perineal wipes 04/17/23 1030   Catheter Best Practices  Drainage tube clipped to bed;Catheter secured to thigh; Bag below bladder;Bag not on floor; Lack of dependent loop in tubing 04/17/23 1032   Status Draining 04/17/23 1032   Output (mL) 650 mL 04/17/23 1030       [REMOVED] Urinary Catheter (Removed)   Urine Color Yellow 05/03/23 1124   Urine Appearance Cloudy 05/03/23 1124       [REMOVED] Urinary Catheter 05/03/23 Beck (Removed)   Urine Color Yellow 05/03/23 1240   Urine Appearance Clear 05/03/23 1240   Collection Container Standard 05/03/23 1240   Securement Method Leg strap 05/03/23 1240       Findings: large obstructing prostate with very large intravesical component and noted catheter cystitis, no tumors or stones noted      Detailed Description of Procedure:

## 2023-05-10 NOTE — TELEPHONE ENCOUNTER
Temp 102.4 and not eating. Clammy, shaking, fall this am, hematura. Sending out to ER again, this time will request him to go to [facility] ER.

## 2023-05-10 NOTE — H&P
EKG---11.18.2022---SR--68--LAHB--poor R-wave progression--NSTCs         EKG----2.28.2018---atrial fibrillation--flutter---140--LAHB---                     old anterolateral infarction         EKG---2.12.2018---atrial fibrillation---100-104---LAHB---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             CABG---2000---LIMA-LAD--SVG-D2         MI---history   Arrhythmia--history         PSVT         PAF  Hypertension--labile   Hyperlipidemia  Hypothyroidism   CKD---Stage 2  Anxiety    PMH:   diverticulosis, allergic rhinitis, bronchitis, choroidal nevus right               eye, elevated PSA, measles, mumps, OA, hemorrhoids,               Hemocult-[+]--stool--refused colonoscopy, palpitations,              right cataract, UTIs--2018, uncontrolled urination, hematuria---2018,              atrial fibrillation--[flutter]---2018, overweight, grief reaction---death               of sister--2018, RML pneumonia--2018, PET---2018--RUL mass-like                opacity--consistent with primary malignancy--no active adenopathy--               no metastatic diseaseT5 bone cyst---L1 uptake likely due               to degenerative change--mild chronic compression deformity,                scrotal abscess---pyocele--complicated left hydrocele----left               epididymitis--2.23.2023--blood culture--1/2--E coli--2.23.2023,                MARITA---3.16.2023--resolved----now at a Stage 2 level---prior dehydration----               bilateral hydronephrosis due to VU,  blood culture--yeast---3.16.2023               hypotension---3.16.2023, gross hematuria---3.16.2023, fecal overload,                MARITA---4.15.2023, dehydration---4.15.2023, asymmetric left epididymis--               bilateral epididymal cysts---large complicated scrotal fluid collection--               possible pyocele---2023, bronchiectasis  PSH:     appendectomy--[id number], tonsillectomy, left eye laser--               detached retina---2011, left cataract--IOL---2013, skin tag

## 2023-05-10 NOTE — PLAN OF CARE
Problem: Discharge Planning  Goal: Discharge to home or other facility with appropriate resources  Outcome: Progressing     Problem: Skin/Tissue Integrity  Goal: Absence of new skin breakdown  Description: 1. Monitor for areas of redness and/or skin breakdown  2. Assess vascular access sites hourly  3. Every 4-6 hours minimum:  Change oxygen saturation probe site  4. Every 4-6 hours:  If on nasal continuous positive airway pressure, respiratory therapy assess nares and determine need for appliance change or resting period.   Outcome: Progressing     Problem: Safety - Adult  Goal: Free from fall injury  Outcome: Progressing     Problem: Genitourinary - Adult  Goal: Urinary catheter remains patent  Outcome: Progressing     Problem: Infection - Adult  Goal: Absence of fever/infection during anticipated neutropenic period  Outcome: Progressing

## 2023-05-11 ENCOUNTER — APPOINTMENT (OUTPATIENT)
Dept: GENERAL RADIOLOGY | Age: 88
DRG: 699 | End: 2023-05-11
Payer: MEDICARE

## 2023-05-11 LAB
ABSOLUTE EOS #: 0 K/UL (ref 0–0.4)
ABSOLUTE IMMATURE GRANULOCYTE: 0 K/UL (ref 0–0.3)
ABSOLUTE LYMPH #: 0.31 K/UL (ref 1–4.8)
ABSOLUTE MONO #: 0.04 K/UL (ref 0.1–1.2)
ANION GAP SERPL CALCULATED.3IONS-SCNC: 12 MMOL/L (ref 9–17)
BASOPHILS # BLD: 0 % (ref 0–2)
BASOPHILS ABSOLUTE: 0 K/UL (ref 0–0.2)
BUN SERPL-MCNC: 24 MG/DL (ref 8–23)
BUN/CREAT BLD: 16 (ref 9–20)
CALCIUM SERPL-MCNC: 8.4 MG/DL (ref 8.6–10.4)
CHLORIDE SERPL-SCNC: 110 MMOL/L (ref 98–107)
CO2 SERPL-SCNC: 24 MMOL/L (ref 20–31)
CREAT SERPL-MCNC: 1.48 MG/DL (ref 0.7–1.2)
EOSINOPHILS RELATIVE PERCENT: 0 % (ref 1–8)
GFR SERPL CREATININE-BSD FRML MDRD: 45 ML/MIN/1.73M2
GLUCOSE SERPL-MCNC: 104 MG/DL (ref 70–99)
HCT VFR BLD AUTO: 30.1 % (ref 40.7–50.3)
HGB BLD-MCNC: 9.5 G/DL (ref 13–17)
IMMATURE GRANULOCYTES: 0 %
LYMPHOCYTES # BLD: 7 % (ref 15–43)
MCH RBC QN AUTO: 29.4 PG (ref 25.2–33.5)
MCHC RBC AUTO-ENTMCNC: 31.6 G/DL (ref 25.2–33.5)
MCV RBC AUTO: 93.2 FL (ref 82.6–102.9)
MICROORGANISM SPEC CULT: NORMAL
MONOCYTES # BLD: 1 % (ref 6–14)
MORPHOLOGY: ABNORMAL
NRBC AUTOMATED: 0 PER 100 WBC
PDW BLD-RTO: 14.6 % (ref 11.8–14.4)
PLATELET # BLD AUTO: 130 K/UL (ref 138–453)
PMV BLD AUTO: 10.5 FL (ref 8.1–13.5)
POTASSIUM SERPL-SCNC: 3.9 MMOL/L (ref 3.7–5.3)
RBC # BLD: 3.23 M/UL (ref 4.21–5.77)
SEG NEUTROPHILS: 92 % (ref 44–74)
SEGMENTED NEUTROPHILS ABSOLUTE COUNT: 4.05 K/UL (ref 1.5–8.1)
SODIUM SERPL-SCNC: 146 MMOL/L (ref 135–144)
SPECIMEN DESCRIPTION: NORMAL
TROPONIN I SERPL DL<=0.01 NG/ML-MCNC: 112 NG/L (ref 0–22)
WBC # BLD AUTO: 4.4 K/UL (ref 3.5–11.3)

## 2023-05-11 PROCEDURE — 80048 BASIC METABOLIC PNL TOTAL CA: CPT

## 2023-05-11 PROCEDURE — 36415 COLL VENOUS BLD VENIPUNCTURE: CPT

## 2023-05-11 PROCEDURE — 97165 OT EVAL LOW COMPLEX 30 MIN: CPT | Performed by: OCCUPATIONAL THERAPIST

## 2023-05-11 PROCEDURE — 2580000003 HC RX 258: Performed by: INTERNAL MEDICINE

## 2023-05-11 PROCEDURE — 6360000002 HC RX W HCPCS: Performed by: INTERNAL MEDICINE

## 2023-05-11 PROCEDURE — 94760 N-INVAS EAR/PLS OXIMETRY 1: CPT

## 2023-05-11 PROCEDURE — 97161 PT EVAL LOW COMPLEX 20 MIN: CPT | Performed by: PHYSICAL THERAPIST

## 2023-05-11 PROCEDURE — 6370000000 HC RX 637 (ALT 250 FOR IP): Performed by: INTERNAL MEDICINE

## 2023-05-11 PROCEDURE — 71045 X-RAY EXAM CHEST 1 VIEW: CPT

## 2023-05-11 PROCEDURE — 84484 ASSAY OF TROPONIN QUANT: CPT

## 2023-05-11 PROCEDURE — 99231 SBSQ HOSP IP/OBS SF/LOW 25: CPT | Performed by: INTERNAL MEDICINE

## 2023-05-11 PROCEDURE — 85025 COMPLETE CBC W/AUTO DIFF WBC: CPT

## 2023-05-11 PROCEDURE — 2060000000 HC ICU INTERMEDIATE R&B

## 2023-05-11 RX ADMIN — TERBINAFINE HYDROCHLORIDE: 1 CREAM TOPICAL at 21:35

## 2023-05-11 RX ADMIN — CEFTRIAXONE 1000 MG: 1 INJECTION, POWDER, FOR SOLUTION INTRAMUSCULAR; INTRAVENOUS at 09:21

## 2023-05-11 RX ADMIN — FLUTICASONE PROPIONATE 1 SPRAY: 50 SPRAY, METERED NASAL at 09:12

## 2023-05-11 RX ADMIN — TERBINAFINE HYDROCHLORIDE: 1 CREAM TOPICAL at 17:06

## 2023-05-11 RX ADMIN — METOPROLOL TARTRATE 12.5 MG: 25 TABLET, FILM COATED ORAL at 09:12

## 2023-05-11 RX ADMIN — TAMSULOSIN HYDROCHLORIDE 0.4 MG: 0.4 CAPSULE ORAL at 21:25

## 2023-05-11 RX ADMIN — POTASSIUM CHLORIDE 20 MEQ: 1500 TABLET, EXTENDED RELEASE ORAL at 09:11

## 2023-05-11 RX ADMIN — SODIUM CHLORIDE: 9 INJECTION, SOLUTION INTRAVENOUS at 23:14

## 2023-05-11 RX ADMIN — TAMSULOSIN HYDROCHLORIDE 0.4 MG: 0.4 CAPSULE ORAL at 09:11

## 2023-05-11 RX ADMIN — METOPROLOL TARTRATE 12.5 MG: 25 TABLET, FILM COATED ORAL at 21:25

## 2023-05-11 RX ADMIN — SODIUM CHLORIDE: 9 INJECTION, SOLUTION INTRAVENOUS at 02:24

## 2023-05-11 RX ADMIN — ATORVASTATIN CALCIUM 10 MG: 10 TABLET, FILM COATED ORAL at 21:25

## 2023-05-11 RX ADMIN — LEVOTHYROXINE SODIUM 88 MCG: 0.09 TABLET ORAL at 05:29

## 2023-05-11 RX ADMIN — THERA TABS 1 TABLET: TAB at 09:12

## 2023-05-11 RX ADMIN — RIVAROXABAN 15 MG: 15 TABLET, FILM COATED ORAL at 09:11

## 2023-05-11 RX ADMIN — AMIODARONE HYDROCHLORIDE 100 MG: 200 TABLET ORAL at 09:11

## 2023-05-11 RX ADMIN — SODIUM CHLORIDE: 9 INJECTION, SOLUTION INTRAVENOUS at 13:03

## 2023-05-11 NOTE — PLAN OF CARE
Problem: Discharge Planning  Goal: Discharge to home or other facility with appropriate resources  Outcome: Progressing     Problem: Skin/Tissue Integrity  Goal: Absence of new skin breakdown  Description: 1. Monitor for areas of redness and/or skin breakdown  2. Assess vascular access sites hourly  3. Every 4-6 hours minimum:  Change oxygen saturation probe site  4. Every 4-6 hours:  If on nasal continuous positive airway pressure, respiratory therapy assess nares and determine need for appliance change or resting period.   Outcome: Progressing     Problem: Safety - Adult  Goal: Free from fall injury  Outcome: Progressing     Problem: Genitourinary - Adult  Goal: Urinary catheter remains patent  Outcome: Progressing     Problem: Infection - Adult  Goal: Absence of fever/infection during anticipated neutropenic period  Outcome: Progressing     Problem: Chronic Conditions and Co-morbidities  Goal: Patient's chronic conditions and co-morbidity symptoms are monitored and maintained or improved  Outcome: Progressing

## 2023-05-11 NOTE — CONSULTS
[facility]                                                   CONSULT NOTE                  Date:   5/11/2023  Patient name: Marco Wilburn  Date of admission:  5/8/2023  9:16 PM  MRN:   [id number]  YOB: 1948    Reason for Admission: UTI    CHIEF COMPLAINT:   confusion, altered sensorium     History Obtained From:  Patient and chart review     HISTORY OF PRESENT ILLNESS:      55-year-old male with underlying multivessel CAD post CABG in 2000 and paroxysmal atrial fibrillation on DOAC admitted with complaints of fever, confusion and altered mental status, found to have UTI, Beck catheter was exchanged on 5/9/2023. Also found to have dehydration and MARITA. This morning he remains pleasantly confused. In no apparent distress on room air. He denies any chest pain or angina. He denies any dyspnea. He does have underlying dementia with intermittent agitation. ECG showed sinus rhythm with first-degree AV block and left anterior fascicular block, unchanged from before. High-sensitivity troponins elevated at 160 with downward trend. Creatinine improving with IV hydration.     Past Medical History:   has a past medical history of Ascending aortic aneurysm (HCC), Basal cell carcinoma, scalp/neck, Benign prostatic hypertrophy, Cardiomyopathy ([address] Carotid stenosis, Community acquired pneumonia of right lower lobe of lung, COVID-19, Depression, Erectile dysfunction, Fever and chills, Gastrostomy tube in place ([address] Hyperlipidemia, Hypertension, Hyponatremia, Hypothyroidism, IFG (impaired fasting glucose), Melanoma ([address] Mild mitral regurgitation, Non Hodgkin's lymphoma ([address] Paroxysmal atrial fibrillation ([address] Pneumonia of right lower lobe due to infectious organism, Positive hepatitis C antibody test, PVC's (premature ventricular contractions), SIADH (syndrome of inappropriate ADH production) ([address] Squamous cell carcinoma of oropharynx ([address]

## 2023-05-12 ENCOUNTER — APPOINTMENT (OUTPATIENT)
Dept: GENERAL RADIOLOGY | Age: 88
DRG: 699 | End: 2023-05-12
Payer: MEDICARE

## 2023-05-12 VITALS
HEIGHT: 71 IN | SYSTOLIC BLOOD PRESSURE: 138 MMHG | BODY MASS INDEX: 23.66 KG/M2 | OXYGEN SATURATION: 97 % | DIASTOLIC BLOOD PRESSURE: 65 MMHG | RESPIRATION RATE: 17 BRPM | HEART RATE: 86 BPM | TEMPERATURE: 98.5 F | WEIGHT: 169 LBS

## 2023-05-12 LAB
ABSOLUTE EOS #: 0.1 K/UL (ref 0–0.44)
ABSOLUTE IMMATURE GRANULOCYTE: <0.03 K/UL (ref 0–0.3)
ABSOLUTE LYMPH #: 0.81 K/UL (ref 1.1–3.7)
ABSOLUTE MONO #: 0.31 K/UL (ref 0.1–1.2)
ANION GAP SERPL CALCULATED.3IONS-SCNC: 11 MMOL/L (ref 9–17)
BASOPHILS # BLD: 0 % (ref 0–2)
BASOPHILS ABSOLUTE: <0.03 K/UL (ref 0–0.2)
BUN SERPL-MCNC: 17 MG/DL (ref 8–23)
BUN/CREAT BLD: 15 (ref 9–20)
CALCIUM SERPL-MCNC: 8 MG/DL (ref 8.6–10.4)
CHLORIDE SERPL-SCNC: 105 MMOL/L (ref 98–107)
CO2 SERPL-SCNC: 21 MMOL/L (ref 20–31)
CREAT SERPL-MCNC: 1.17 MG/DL (ref 0.7–1.2)
EOSINOPHILS RELATIVE PERCENT: 2 % (ref 1–4)
GFR SERPL CREATININE-BSD FRML MDRD: 60 ML/MIN/1.73M2
GLUCOSE SERPL-MCNC: 95 MG/DL (ref 70–99)
HCT VFR BLD AUTO: 29.5 % (ref 40.7–50.3)
HGB BLD-MCNC: 9.7 G/DL (ref 13–17)
IMMATURE GRANULOCYTES: 1 %
LYMPHOCYTES # BLD: 18 % (ref 24–43)
MCH RBC QN AUTO: 29.8 PG (ref 25.2–33.5)
MCHC RBC AUTO-ENTMCNC: 32.9 G/DL (ref 25.2–33.5)
MCV RBC AUTO: 90.8 FL (ref 82.6–102.9)
MICROORGANISM SPEC CULT: ABNORMAL
MONOCYTES # BLD: 7 % (ref 3–12)
NRBC AUTOMATED: 0 PER 100 WBC
PDW BLD-RTO: 14.2 % (ref 11.8–14.4)
PLATELET # BLD AUTO: 126 K/UL (ref 138–453)
PMV BLD AUTO: 10.9 FL (ref 8.1–13.5)
POTASSIUM SERPL-SCNC: 3.6 MMOL/L (ref 3.7–5.3)
RBC # BLD: 3.25 M/UL (ref 4.21–5.77)
SEG NEUTROPHILS: 72 % (ref 36–65)
SEGMENTED NEUTROPHILS ABSOLUTE COUNT: 3.17 K/UL (ref 1.5–8.1)
SODIUM SERPL-SCNC: 137 MMOL/L (ref 135–144)
SPECIMEN DESCRIPTION: ABNORMAL
WBC # BLD AUTO: 4.4 K/UL (ref 3.5–11.3)

## 2023-05-12 PROCEDURE — 85025 COMPLETE CBC W/AUTO DIFF WBC: CPT

## 2023-05-12 PROCEDURE — 71045 X-RAY EXAM CHEST 1 VIEW: CPT

## 2023-05-12 PROCEDURE — 94760 N-INVAS EAR/PLS OXIMETRY 1: CPT

## 2023-05-12 PROCEDURE — 80048 BASIC METABOLIC PNL TOTAL CA: CPT

## 2023-05-12 PROCEDURE — 6370000000 HC RX 637 (ALT 250 FOR IP): Performed by: INTERNAL MEDICINE

## 2023-05-12 PROCEDURE — 97110 THERAPEUTIC EXERCISES: CPT

## 2023-05-12 PROCEDURE — 99238 HOSP IP/OBS DSCHRG MGMT 30/<: CPT | Performed by: INTERNAL MEDICINE

## 2023-05-12 PROCEDURE — 2580000003 HC RX 258: Performed by: INTERNAL MEDICINE

## 2023-05-12 PROCEDURE — 36415 COLL VENOUS BLD VENIPUNCTURE: CPT

## 2023-05-12 PROCEDURE — 6360000002 HC RX W HCPCS: Performed by: INTERNAL MEDICINE

## 2023-05-12 RX ORDER — ALPRAZOLAM 0.25 MG/1
0.25 TABLET ORAL NIGHTLY PRN
Qty: 3 TABLET | Refills: 0 | Status: SHIPPED | OUTPATIENT
Start: 2023-05-12 | End: 2023-05-15

## 2023-05-12 RX ORDER — POTASSIUM CHLORIDE 20 MEQ/1
40 TABLET, EXTENDED RELEASE ORAL ONCE
Status: COMPLETED | OUTPATIENT
Start: 2023-05-12 | End: 2023-05-12

## 2023-05-12 RX ORDER — CEFDINIR 300 MG/1
300 CAPSULE ORAL 2 TIMES DAILY
Qty: 8 CAPSULE | Refills: 0
Start: 2023-05-12 | End: 2023-05-16

## 2023-05-12 RX ORDER — GREEN TEA/HOODIA GORDONII 315-12.5MG
1 CAPSULE ORAL 3 TIMES DAILY
Qty: 30 TABLET | Refills: 0 | COMMUNITY
Start: 2023-05-12 | End: 2023-05-17 | Stop reason: ALTCHOICE

## 2023-05-12 RX ADMIN — POTASSIUM CHLORIDE 20 MEQ: 1500 TABLET, EXTENDED RELEASE ORAL at 07:59

## 2023-05-12 RX ADMIN — RIVAROXABAN 15 MG: 15 TABLET, FILM COATED ORAL at 07:59

## 2023-05-12 RX ADMIN — AMIODARONE HYDROCHLORIDE 100 MG: 200 TABLET ORAL at 07:59

## 2023-05-12 RX ADMIN — METOPROLOL TARTRATE 12.5 MG: 25 TABLET, FILM COATED ORAL at 07:59

## 2023-05-12 RX ADMIN — LEVOTHYROXINE SODIUM 88 MCG: 0.09 TABLET ORAL at 05:42

## 2023-05-12 RX ADMIN — THERA TABS 1 TABLET: TAB at 07:59

## 2023-05-12 RX ADMIN — FLUTICASONE PROPIONATE 1 SPRAY: 50 SPRAY, METERED NASAL at 11:22

## 2023-05-12 RX ADMIN — TERBINAFINE HYDROCHLORIDE: 1 CREAM TOPICAL at 11:21

## 2023-05-12 RX ADMIN — CEFTRIAXONE 1000 MG: 1 INJECTION, POWDER, FOR SOLUTION INTRAMUSCULAR; INTRAVENOUS at 08:04

## 2023-05-12 RX ADMIN — SODIUM CHLORIDE: 9 INJECTION, SOLUTION INTRAVENOUS at 08:03

## 2023-05-12 RX ADMIN — TAMSULOSIN HYDROCHLORIDE 0.4 MG: 0.4 CAPSULE ORAL at 08:00

## 2023-05-12 RX ADMIN — POTASSIUM CHLORIDE 40 MEQ: 1500 TABLET, EXTENDED RELEASE ORAL at 10:08

## 2023-05-12 NOTE — DISCHARGE INSTRUCTIONS
Follow up with Dr. Michelle Coffey in 1 week    Follow up with Cardiology in 1-2 weeks    CBC and BMP Monday 5/15/23

## 2023-05-12 NOTE — ACP (ADVANCE CARE PLANNING)
Advance Care Planning     Advance Care Planning Inpatient Note  Spiritual Care Department    Today's Date: 5/12/2023  Unit: [facility]  PROGRESSIVE CARE    Received request from rounding. Upon review of chart and communication with care team, Spiritual Care will defer advance care planning with patient at this time. . Patient was/were present in the room during visit. Goals of ACP Conversation:  Facilitate a discussion related to patient's goals of care as they align with the patient's values and beliefs. Health Care Decision Makers:     No healthcare decision makers have been documented. Click here to complete [place] including selection of the Healthcare Decision Maker Relationship (ie \"Primary\")  Summary:  No Decision Maker named by patient at this time    Advance Care Planning Documents (Patient Wishes): Only DNR-Arrest on file. Assessment:  Patient presents as confused and not oriented to time and place. He identified as  at [facility]. When not in conversation he calls out and moans. He is on a telesitter as he attempts to get out of bed insisting that he needs to leave. No visitors at this time. Interventions:  See above note.     Care Preferences Communicated:   See above note    Outcomes/Plan:  ACP Discussion: See above note    Electronically signed by Seamus Mendieta on 5/12/2023 at 11:53 AM

## 2023-05-12 NOTE — DISCHARGE INSTR - COC
Continuity of Care Form    Patient Name: Robby Patterson   :  1934  MRN:  [id number]    [facility] date:  5/10/2023  Discharge date:  ***    Code Status Order: DNR-CCA   Advance Directives:     Admitting Physician:  Babak Garza MD  PCP: Tyrese Daly DO    Discharging Nurse: [facility] Unit/Room#: 0206/0206-01  Discharging Unit Phone Number: ***    Emergency Contact:   Extended Emergency Contact Information  Primary Emergency Contact: William Bravo   [address] Phone: [phone]  Mobile Phone: [phone]  Relation: Other  Secondary Emergency Contact: Ana Cantu   [address] Phone: [phone]  Relation: Other    Past Surgical History:  Past Surgical History:   Procedure Laterality Date    ABSCESS DRAINAGE  [id number]    multiple    APPENDECTOMY  [address] and [address] Left 2013    COLONOSCOPY  2021    Dr Vielka Hughes N/A 5/3/2023    CYSTOSCOPY with Beck Catheter Exchange performed by Kristy Huggins MD at [address] [place] [address],[facility] [address] Left 2011    detached retina    SKIN TAG REMOVAL  1999    TONSILLECTOMY         Immunization History:   Immunization History   Administered Date(s) Administered    COVID-19, PFIZER Bivalent BOOSTER, DO NOT Dilute, (age 12y+), IM, 30 mcg/0.3 mL 2022    COVID-19, PFIZER PURPLE top, DILUTE for use, (age 15 y+), 30mcg/0.3mL 2021, 2021, 10/27/2021    Influenza Virus Vaccine 2005    Influenza, High Dose (Fluzone 65 yrs and older) 10/30/2018, 10/15/2020    Influenza, Triv, inactivated, subunit, adjuvanted, IM (Fluad 65 yrs and older) 10/19/2019    Pneumococcal, PCV-13, PREVNAR 13, (age 6w+), IM, 0.5mL 10/19/2015    Pneumococcal, PPSV23, PNEUMOVAX 23, (age 2y+), SC/IM, 0.5mL 2001, 2014    Zoster Live (Zostavax) 10/05/2012       Active Problems:  Patient Active Problem List   Diagnosis Code    Hyperlipidemia E78.5

## 2023-05-12 NOTE — DISCHARGE INSTR - DIET

## 2023-05-12 NOTE — CARE COORDINATION
a specialist, such as Cardiac, Pulmonary, Orthopedic Physician, etc. after you left the hospital?: (P) Yes  Who advised the patient to return to the hospital?: (P) Caregiver  Does the patient report anything that got in the way of taking their medications?: (P) No  In our efforts to provide the best possible care to you and others like you, can you think of anything that we could have done to help you after you left the hospital the first time, so that you might not have needed to return so soon?: (P) Other (Comment) (nothing noted)      Cato Halsted, RN  Case Management Department

## 2023-05-13 NOTE — PROGRESS NOTES
rounding on PCU    Assessment: Patient presents as confused and not oriented to time and place. He identified as  at [facility]. When not in conversation he calls out and moans. He is on a telesitter as he attempts to get out of bed insisting that he needs to leave. No visitors at this time. Intervention: Engaged in conversation.  offered prayer which he accepted. Patient expressed appreciation for visit and offer of continued prayer. Plan: Chaplains are available on site or on call 24/7 for spiritual and emotional support.
05/12/23 1157   Encounter Summary   Encounter Overview/Reason  Spiritual/Emotional Needs; Advance Care Planning   Service Provided For: Patient   Referral/Consult From: Rounding   Support System Unknown   Last Encounter  05/12/23   Complexity of Encounter Moderate   Begin Time 1142   End Time  1158   Total Time Calculated 16 min   Spiritual/Emotional needs   Type Spiritual Support   Advance Care Planning   Type ACP conversation  (not possible due to mental state.)   Assessment/Intervention/Outcome   Assessment Anxious   Intervention Active listening;Prayer (assurance of)/[place];Sustaining Presence/Ministry of presence   Outcome Engaged in conversation;Expressed Gratitude
1518-RN notified of positive gram stain for yeast.  Dr. Mae Stephenson notified. Order received for Diflucan 200mg daily x5 days. Rx called in to [facility] pharmacy for the patient since he resides at [facility].
Hospitalist Progress Note    Patient:  Ivy Severino     YOB: 1934    MRN: [id number]   Admit date: 5/10/2023     Acct: [de-identified]     PCP: Alexandra Tenorio DO    CC--Interval History: UTI--POA--clinically with fever--on Rocephin IV --> Omnicef---to Alberto Álvarez----5.12.2023    Add:    report after discharge---one blood culture --[+]--yeast---possible contaminant--but given presentation--Diflucan    Chronic sanchez catheter--apparently to receive antibiotics with sanchez catheter change--had catheter change at an OSH for kinked sanchez, hence, no antibiotics. MARITA resolved    ASCVD---CHF    HTN--138/65    See note below     All other ROS negative except noted in HPI    Diet:  ADULT DIET;  Regular; Low Fat/Low Chol/High Fiber/LE    Medications:  Scheduled Meds:   sodium chloride flush  10 mL IntraVENous 2 times per day    cefTRIAXone (ROCEPHIN) IV  1,000 mg IntraVENous Q24H    amiodarone  100 mg Oral Daily    atorvastatin  10 mg Oral Nightly    fluticasone  1 spray Each Nostril Daily    levothyroxine  88 mcg Oral Daily    metoprolol tartrate  12.5 mg Oral BID    potassium chloride  20 mEq Oral Daily    rivaroxaban  15 mg Oral Daily with breakfast    tamsulosin  0.4 mg Oral BID    multivitamin  1 tablet Oral Daily    terbinafine   Topical BID     Continuous Infusions:   sodium chloride      sodium chloride 100 mL/hr at 05/12/23 0803     PRN Meds:sodium chloride flush, sodium chloride, ondansetron, acetaminophen, sodium chloride nebulizer, ALPRAZolam, lactulose, polyvinyl alcohol-povidone, albuterol    Objective:  Labs:  CBC with Differential:    Lab Results   Component Value Date/Time    WBC 4.4 05/12/2023 05:14 AM    RBC 3.25 05/12/2023 05:14 AM    HGB 9.7 05/12/2023 05:14 AM    HCT 29.5 05/12/2023 05:14 AM     05/12/2023 05:14 AM    MCV 90.8 05/12/2023 05:14 AM    MCH 29.8 05/12/2023 05:14 AM    MCHC 32.9 05/12/2023 05:14 AM    RDW 14.2 05/12/2023 05:14 AM    LYMPHOPCT 18 05/12/2023 05:14 AM
Lab called at shift change to report second positive blood culture aerobic bottle, yeast gram stain. Myriam Wesley NP made aware and informed writer pt was d/c with Diflucan.
Left message for Christina ORNELASw, to return call re: completing admission questions re: blood transfusion consent, etc.  Will await return call.
Mercedez Leon, PPatient Assessment complete. UTI (urinary tract infection) [N39.0]  Acute kidney injury (nontraumatic) (HCC) [N17.9]  Urinary tract infection associated with indwelling urethral catheter, initial encounter ([facility] [address] [T83.511A, N39.0] . Vitals:    05/10/23 1520   BP: (!) 107/54   Pulse: 76   Resp: 20   Temp: 99.9 °F (37.7 °C)   SpO2: 95%   . Patients home meds are   Prior to Admission medications    Medication Sig Start Date End Date Taking? Authorizing Provider   ALPRAZolam [facility]) 0.25 MG tablet Take 1 tablet by mouth nightly as needed for Sleep. Max Daily Amount: 0.25 mg   Yes Historical Provider, MD   rivaroxaban (XARELTO) 15 MG TABS tablet Take 1 tablet by mouth daily (with breakfast) ANTICOAGULANT! Doses greater than 15 mg/day must be administered with food.  5/8/23   Winnie Cerda DO   atorvastatin (LIPITOR) 10 MG tablet Take 1 tablet by mouth daily 4/18/23   Shawna Rashid MD   metoprolol tartrate (LOPRESSOR) 25 MG tablet Take 0.5 tablets by mouth 2 times daily Hold for HR <60 or sbp <100 4/17/23 5/17/23  Shawna Rashid MD   simethicone (MYLICON) 40 KP/6.0AM drops Take 0.6 mLs by mouth 4 times daily as needed (gas)    Historical Provider, MD   lactulose (CHRONULAC) 10 GM/15ML solution Take 30 mLs by mouth 3 times daily as needed (constipation) 2/27/23   Shawna Rashid MD   polyvinyl alcohol (LIQUIFILM TEARS) 1.4 % ophthalmic solution Place 2 drops into both eyes every 6 hours as needed    Historical Provider, MD   mineral oil enema Place 1 enema rectally - Use as directed on the bottle for occasional constipation    Historical Provider, MD   levothyroxine (SYNTHROID) 88 MCG tablet Take 1 tablet by mouth Daily    Historical Provider, MD   tamsulosin (FLOMAX) 0.4 MG capsule Take 1 capsule by mouth 2 times daily 12/21/22   Melissa Leiva MD   Psyllium (REGULOID PO) Take by mouth (Reguloid): mix 1 TBSP in 8 oz of water bid    Historical Provider, MD   albuterol (PROVENTIL) (2.5 MG/3ML)
Occupational Therapy  Facility/Department: [address] [place] [address]  Occupational Therapy Initial Assessment    Name: Elida Callahan  : 1934  MRN: [id number]  Date of Service: 2023    Discharge Recommendations:  Home with Home health OT     Patient Diagnosis(es): The primary encounter diagnosis was Urinary tract infection associated with indwelling urethral catheter, initial encounter ([facility] [address] A diagnosis of Acute kidney injury (nontraumatic) (HCC) was also pertinent to this visit. Past Medical History:  has a past medical history of Acute bronchitis, MARITA (acute kidney injury) ([address] Allergic rhinitis, Anxiety, Bradycardia, Cataract, right, Choroidal nevus of right eye, Chronic systolic CHF (congestive heart failure) ([address] Coronary artery disease involving native coronary artery of native heart, Dementia associated with 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 behavioral disturbance ([address] Dermatophytosis of nail, Diverticulosis, Elevated PSA, Hemorrhoids, History of measles, History of mumps, Hyperlipidemia, Hypertension, Hypothyroidism, Mass of upper lobe of right lung, Occult blood positive stool, Osteoarthritis, Overweight, Paroxysmal atrial fibrillation ([address] Pneumonia due to organism, Pseudophakia, left eye, PSVT (paroxysmal supraventricular tachycardia) ([address] Recurrent UTI, Retinal detachment, Seborrheic dermatitis, Sepsis due to urinary tract infection ([address] and Urinary tract infection without hematuria. Past Surgical History:  has a past surgical history that includes Coronary artery bypass graft; Appendectomy (1940 and 1955); Tonsillectomy; retinal laser (Left, 2011); Cataract removal (Left, 2013); Skin tag removal (1999); Abscess Drainage ([id number]); Colonoscopy (2021); and Cystoscopy (N/A, 5/3/2023). Treatment Diagnosis: general weakness      Assessment   Performance deficits / Impairments: Decreased functional mobility ; Decreased ADL status; Decreased balance;Decreased
Patient arrived to unit from ER via bed. Patient transferred to PCU bed and placed on telemetry. Patient admitted for UTI (urinary tract infection) [N39.0]  Acute kidney injury (nontraumatic) (HCC) [N17.9]  Urinary tract infection associated with indwelling urethral catheter, initial encounter ([facility] [address] [T83.511A, N39.0]. Vitals obtained. See flowsheets. Patient's IV access includes 20 G right forearm. Current infusion Rocephin. Assessment completed by this RN. See flowsheets for assessment details. Policies and procedures of Progressive Care and patient rights explained to patient. Patient aware of high fall risk while being in the hospital, education of the bed alarm provided/ activated. Call light in reach.
Patient attempting to get out of bed without assistance. Able to re-orient for short periods of time. Order placed for telesitter and patient notified of need. No evidence of learning at this time.
Physical Therapy  Facility/Department: [facility]  PROGRESSIVE CARE  Daily Treatment Note  NAME: Cyndie Jaramillo  : 1934  MRN: [id number]    Date of Service: 2023    Discharge Recommendations:  Continue to assess pending progress        Patient Diagnosis(es): The primary encounter diagnosis was Urinary tract infection associated with indwelling urethral catheter, initial encounter ([facility] [address] A diagnosis of Acute kidney injury (nontraumatic) (HCC) was also pertinent to this visit. Assessment   Activity Tolerance: Treatment limited secondary to medical complications;Treatment limited secondary to decreased cognition     Plan    Physcial Therapy Plan  Current Treatment Recommendations: Transfer training;Gait training     Restrictions  Restrictions/Precautions  Restrictions/Precautions: Fall Risk     Subjective    Subjective  Subjective: Patient supine in bed reluctant to participate. Patient on tele-monitor therefore limited in range for ambulation. Pain: Not rated, voiced pain when trying to perform bed mobility. Orientation  Overall Orientation Status: Impaired  Orientation Level: Oriented to person;Disoriented to time;Disoriented to place; Disoriented to situation  Cognition  Following Commands:  Follows one step commands with increased time     Objective   Vitals  O2 Device: None (Room air)  Bed Mobility Training  Bed Mobility Training: Yes  Overall Level of Assistance: Minimum assistance;Contact-guard assistance (Unable to untangle feet from blankets)  Supine to Sit: Minimum assistance;Contact-guard assistance  Sit to Supine: Minimum assistance;Contact-guard assistance  Transfer Training  Transfer Training: Yes  Overall Level of Assistance: Contact-guard assistance  Sit to Stand: Contact-guard assistance  Stand to Sit: Contact-guard assistance  Gait Training  Gait Training: No (Patient increasingly confused and asking to go back to [place] once standing therefore did not ambulate but rather increased education
Physician Progress Note      PATIENT:               Prabha Tejeda  CSN #:                  [id number]  :                       1934  ADMIT DATE:       5/10/2023 10:25 AM  [address] [place] DATE:  RESPONDING  PROVIDER #:        Ulysses Davenport MD          QUERY TEXT:    Pt admitted with UTI. Pt noted to have \"Elevated temperature, respiratory   rate, lactic acid. If possible, please document in the progress notes and   discharge summary if you are evaluating and /or treating any of the following: The medical record reflects the following:  Risk Factors: UTI, chronic sanchez  Clinical Indicators: per 5/10 H/P--> \"UTI; MARITA Creat 1.27 --> 2.21\"; WBC 6.3;   temp 102.4F; LA 2.2; UA+ 2+ LE, few bacteria, few yeast, WBC 10-20; Treatment: 0.9 % sodium chloride bolus, maintenance IVF @ 100ml/hr, IV   ceftriaxone, LA level, UC in progress,  Options provided:  -- Sepsis, present on admission due to UTI associated with sanchez  -- Sepsis, present on admission due to UTI not associated with sanchez  -- Sepsis ruled out, UTI due to sanchez catheter  -- Sepsis ruled out, UTI not due to sanchez catheter  -- Other - I will add my own diagnosis  -- Disagree - Not applicable / Not valid  -- Disagree - Clinically unable to determine / Unknown  -- Refer to Clinical Documentation Reviewer    PROVIDER RESPONSE TEXT:    After study, sepsis was ruled out, patient has UTI due to sanchez cath only.     Query created by: Corby Jiménez on 2023 8:48 AM      Electronically signed by:  Ulysses Davenport MD 2023 12:16 PM
Received call back from [facility] S Kody Lui, gave ok to blood transfusion if needed.
Spoke to nurse Barry Feliciano at [place] to notify of admission. Bruno informed that patient has not had recent falls, no weight loss, no cultural/ethnic considerations re: care or diet.
BASOSABS 0.00 05/11/2023 05:24 AM    DIFFTYPE NOT REPORTED 10/05/2021 08:26 AM     BMP:    Lab Results   Component Value Date/Time     05/11/2023 05:24 AM    K 3.9 05/11/2023 05:24 AM     05/11/2023 05:24 AM    CO2 24 05/11/2023 05:24 AM    BUN 24 05/11/2023 05:24 AM    LABALBU 3.5 05/10/2023 10:46 AM    CREATININE 1.48 05/11/2023 05:24 AM    CALCIUM 8.4 05/11/2023 05:24 AM    GFRAA >60 10/05/2021 08:26 AM    LABGLOM 45 05/11/2023 05:24 AM    GLUCOSE 104 05/11/2023 05:24 AM           Physical Exam:  Vitals: /64   Pulse 84   Temp 97.1 °F (36.2 °C) (Tympanic)   Resp 18   Ht 5' 11\" (1.803 m)   Wt 167 lb 14.4 oz (76.2 kg)   SpO2 98%   BMI 23.42 kg/m²   24 hour intake/output:  Intake/Output Summary (Last 24 hours) at 5/11/2023 1233  Last data filed at 5/11/2023 0600  Gross per 24 hour   Intake 2783.73 ml   Output 1480 ml   Net 1303.73 ml     Last 3 weights: Wt Readings from Last 3 Encounters:   05/11/23 167 lb 14.4 oz (76.2 kg)   05/03/23 145 lb (65.8 kg)   04/16/23 160 lb 1.6 oz (72.6 kg)     HEENT:  O2 NC, Normocephalic, and Atraumatic  Neck: Supple, No Masses, Tenderness, Nodularity, and No Lymphadenopathy  Chest/Lungs: Distant Breath Sounds  Cardiac: Regular Rate and Rhythm  GI/Abdomen: Bowel Sounds Present and Soft, Non-tender, without Guarding or Rebound Tenderness  : Not examined  EXT/Skin: No Edema, No Cyanosis, and No Clubbing  Neuro:  alert---generalized weakness--dementia = baseline      Assessment:    Principal Problem:    UTI (urinary tract infection)  Active Problems:    Elevated troponin  Resolved Problems:    * No resolved hospital problems.  JUNIOR Macias              [address]  [sr ANGELICA Avery---fariha guzman; [facility] Cardiology---[facility], Heather Montoya, Urology;  Landy JANE  DNR-CCA     XARELTO   AMIODARONE      sanchez--chronic    Anti-infectives:    Rocephin IV     UTI--POA--5.10.2023--fever         ER [facility]--5.9.2023--kinked sanchez--sanchez change  MARITA----5.10.2023  ASCVD
RLE  Comment: 3-/5  Strength LLE  Comment: 3-/5           Bed mobility  Supine to Sit: Moderate assistance  Sit to Supine: Minimal assistance  Scooting: Minimal assistance  Transfers  Sit to Stand: Minimal Assistance  Stand to Sit: Minimal Assistance  Ambulation  Surface: Level tile  Device: Standard Walker  Assistance: Contact guard assistance  Quality of Gait: Flexed posture  Gait Deviations: Slow Jessica  Distance: 10 ft     Balance  Sitting - Static: Good  Sitting - Dynamic: Good  Standing - Static: Fair  Standing - Dynamic: Poor           OutComes Score                                                  AM-PAC Score             Tinneti Score       Goals  Short Term Goals  Time Frame for Short Term Goals: 1 day  Short Term Goal 1: Assess functional status  Long Term Goals  Time Frame for Long Term Goals : 2-3 days  Long Term Goal 1: Bed mob SBA  Long Term Goal 2: Transfer SBA  Long Term Goal 3: Gait with walker 20 ft       Education         Therapy Time   Individual Concurrent Group Co-treatment   Time In 1425         Time Out 1447         Minutes 22                 Giuliana Sarmiento, PT

## 2023-05-13 NOTE — DISCHARGE SUMMARY
[address]                 [address] [place] [address], [address] [place] [address]                               DISCHARGE SUMMARY    PATIENT NAME: Ralf Avalos                        :        1934  MED REC NO:   [id number]                             ROOM:       0206  ACCOUNT NO:   [de-identified]                           ADMIT DATE: 05/10/2023  PROVIDER:     J Luis Alvarez. Armando Finn MD                  DISCHARGE DATE:  2023    ATTENDING PHYSICIAN OF HOSPITALIZATION:  Muna Woodard MD    PERSONAL PHYSICIAN:  Myrna Ladd DO, Internal Medicine, [facility]. The patient is a resident of [facility]. DIAGNOSES:  1. Urinary tract infection, POA, 05/10/2023, fever, no growth. ER  visit, 2023, kinked Beck, Beck changed, subsequent hematuria. 2.  Acute kidney injury, 05/10/2023.  3.  Coronary artery disease. Elevated troponin, 05/10/2023, seen by  Cardiology, no intervention. EKG, 05/10/2023, sinus rhythm, rate 79,  first-degree AV block, PACs, LAHB, LVH. EKG, 05/10/2023, #1, sinus  rhythm, rate 82, first-degree AV block, 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. Chest  x-ray, 05/10/2023, persistent bibasilar opacities. Chest x-ray,  2023, slightly increased right mid to lower airspace opacity,  other opacities no change, interstitial edema, small bilateral pleural  effusions. 4.  Congestive heart failure, chronic combined systolic/diastolic. A 2D  echo, 2023, mildly reduced left ventricular systolic function,  normal right ventricular systolic function, MAC, KRISTEN without stenosis,  AR mildly dilated 4.0 cm, IVC not visualized, grade 3 severe diastolic  dysfunction, LVEF 45%. No vegetations described. 5.  Constipation, bowel regimen with success. 6.  Anemia, chronic. 7.  Chronic pain syndrome. 8.  Dementia, severe with agitation, frequently calling out and the  like. 9.  Atrial fibrillation, PAF. 10.  Coronary artery disease.   CABG in ,

## 2023-05-14 LAB
MICROORGANISM SPEC CULT: ABNORMAL
SPECIMEN DESCRIPTION: ABNORMAL

## 2023-05-15 ENCOUNTER — HOSPITAL ENCOUNTER (OUTPATIENT)
Age: 88
Discharge: HOME | End: 2023-05-15
Payer: MEDICARE

## 2023-05-15 ENCOUNTER — CARE COORDINATION (OUTPATIENT)
Dept: CASE MANAGEMENT | Age: 88
End: 2023-05-15

## 2023-05-15 DIAGNOSIS — T83.511A URINARY TRACT INFECTION ASSOCIATED WITH INDWELLING URETHRAL CATHETER, INITIAL ENCOUNTER (HCC): ICD-10-CM

## 2023-05-15 DIAGNOSIS — N39.0 URINARY TRACT INFECTION ASSOCIATED WITH INDWELLING URETHRAL CATHETER, INITIAL ENCOUNTER (HCC): ICD-10-CM

## 2023-05-15 LAB
ANION GAP SERPL CALCULATED.3IONS-SCNC: 12 MMOL/L (ref 9–17)
BUN SERPL-MCNC: 12 MG/DL (ref 8–23)
BUN/CREAT BLD: 11 (ref 9–20)
CALCIUM SERPL-MCNC: 8.2 MG/DL (ref 8.6–10.4)
CHLORIDE SERPL-SCNC: 100 MMOL/L (ref 98–107)
CO2 SERPL-SCNC: 25 MMOL/L (ref 20–31)
CREAT SERPL-MCNC: 1.05 MG/DL (ref 0.7–1.2)
ERYTHROCYTE [DISTWIDTH] IN BLOOD BY AUTOMATED COUNT: 14.2 % (ref 11.8–14.4)
GFR SERPL CREATININE-BSD FRML MDRD: >60 ML/MIN/1.73M2
GLUCOSE SERPL-MCNC: 80 MG/DL (ref 70–99)
HCT VFR BLD AUTO: 31.5 % (ref 40.7–50.3)
HGB BLD-MCNC: 10.2 G/DL (ref 13–17)
MCH RBC QN AUTO: 29.3 PG (ref 25.2–33.5)
MCHC RBC AUTO-ENTMCNC: 32.4 G/DL (ref 25.2–33.5)
MCV RBC AUTO: 90.5 FL (ref 82.6–102.9)
NRBC AUTOMATED: 0 PER 100 WBC
PLATELET # BLD AUTO: 229 K/UL (ref 138–453)
PMV BLD AUTO: 10.7 FL (ref 8.1–13.5)
POTASSIUM SERPL-SCNC: 3.8 MMOL/L (ref 3.7–5.3)
RBC # BLD AUTO: 3.48 M/UL (ref 4.21–5.77)
SODIUM SERPL-SCNC: 137 MMOL/L (ref 135–144)
WBC OTHER # BLD: 6.7 K/UL (ref 3.5–11.3)

## 2023-05-15 PROCEDURE — 80048 BASIC METABOLIC PNL TOTAL CA: CPT

## 2023-05-15 PROCEDURE — 36415 COLL VENOUS BLD VENIPUNCTURE: CPT

## 2023-05-15 PROCEDURE — 85027 COMPLETE CBC AUTOMATED: CPT

## 2023-05-15 NOTE — CARE COORDINATION
[facility] Care Transitions Initial Follow Up Call    Call within 2 business days of discharge: Yes    Patient Current Location: [address] Transition Nurse contacted the caregiver by telephone to perform post hospital discharge assessment. Verified name and  with caregiver as identifiers. Provided introduction to self, and explanation of the Care Transition Nurse role. Patient: Rahul Johnson Patient : 1934   MRN: [id number]  Reason for Admission: UTI  Discharge Date: 23 RARS: Readmission Risk Score: 25.4      Last Discharge  Street       Date Complaint Diagnosis Description Type Department Provider    5/10/23 Fever; Altered Mental Status Urinary tract infection associated with indwelling urethral catheter, initial encounter ([facility] [address] . .. ED to Hosp-Admission (Discharged) (ADMITTED) Elsa Hidalgo MD; Sagar Roberto. .. Was this an external facility discharge? No Discharge Facility: [place] to be reviewed by the provider   Additional needs identified to be addressed with provider: No  none               Method of communication with provider: none. Call placed to [facility] and spoke with nurse in charge, Po Kraft. She stated that they did not have any questions on Yeyo's discharge. She stated that he was doing \"good\" and the they had all his medications. She is aware of pending lab work and she said that the NP Marsha Angelucci will be out to see the pt tomorrow. Will not follow for care transitions due to facility supplying nursing and aide assistance.         Care Transitions 24 Hour Call    Do you have a copy of your discharge instructions?: Yes  Do you have all of your prescriptions and are they filled?: Yes  Have you been contacted by a [address]?: No  Have you scheduled your follow up appointment?: Yes  How are you going to get to your appointment?: Other (Comment: NP to see pt at facility)  Do you feel like you have everything you need to keep you well at home?:

## 2023-05-16 ENCOUNTER — OUTSIDE SERVICES (OUTPATIENT)
Dept: INTERNAL MEDICINE | Age: 88
End: 2023-05-16

## 2023-05-16 VITALS
TEMPERATURE: 97.9 F | RESPIRATION RATE: 18 BRPM | OXYGEN SATURATION: 96 % | SYSTOLIC BLOOD PRESSURE: 146 MMHG | DIASTOLIC BLOOD PRESSURE: 69 MMHG | HEART RATE: 79 BPM

## 2023-05-16 DIAGNOSIS — I10 PRIMARY HYPERTENSION: ICD-10-CM

## 2023-05-16 DIAGNOSIS — Z09 HOSPITAL DISCHARGE FOLLOW-UP: Primary | ICD-10-CM

## 2023-05-16 DIAGNOSIS — F02.80 DEMENTIA ASSOCIATED WITH OTHER UNDERLYING DISEASE WITHOUT BEHAVIORAL DISTURBANCE (HCC): ICD-10-CM

## 2023-05-16 DIAGNOSIS — I50.42 CHRONIC COMBINED SYSTOLIC AND DIASTOLIC CHF (CONGESTIVE HEART FAILURE) (HCC): ICD-10-CM

## 2023-05-16 DIAGNOSIS — R39.14 BENIGN PROSTATIC HYPERPLASIA WITH INCOMPLETE BLADDER EMPTYING: ICD-10-CM

## 2023-05-16 DIAGNOSIS — I25.10 CORONARY ARTERY DISEASE INVOLVING NATIVE CORONARY ARTERY OF NATIVE HEART WITHOUT ANGINA PECTORIS: ICD-10-CM

## 2023-05-16 DIAGNOSIS — I48.0 PAROXYSMAL ATRIAL FIBRILLATION (HCC): ICD-10-CM

## 2023-05-16 DIAGNOSIS — N30.01 ACUTE CYSTITIS WITH HEMATURIA: ICD-10-CM

## 2023-05-16 DIAGNOSIS — D64.9 ANEMIA, UNSPECIFIED TYPE: ICD-10-CM

## 2023-05-16 DIAGNOSIS — N40.1 BENIGN PROSTATIC HYPERPLASIA WITH INCOMPLETE BLADDER EMPTYING: ICD-10-CM

## 2023-05-16 NOTE — PROGRESS NOTES
Post-Discharge Transitional Care Follow Up      Mac Orellana   YOB: 1934    Date of Office Visit:  5/16/2023  Date of Hospital Admission: 5/10/23  Date of Hospital Discharge: 5/12/23  Readmission Risk Score (high >=14%. Medium >=10%):Readmission Risk Score: 25.4      Care management risk score Rising risk (score 2-5) and Complex Care (Scores >=6): No Risk Score On File     Non face to face  following discharge, date last encounter closed (first attempt may have been earlier): 05/15/2023     Call initiated 2 business days of discharge: Yes     Hospital discharge follow-up  -     MD DISCHARGE MEDS RECONCILED W/ CURRENT OUTPATIENT MED LIST  Acute cystitis with hematuria  Primary hypertension  Chronic combined systolic and diastolic CHF (congestive heart failure) ([facility] [address]  Coronary artery disease involving native coronary artery of native heart without angina pectoris  Paroxysmal atrial fibrillation (HCC)  Benign prostatic hyperplasia with incomplete bladder emptying  Dementia associated with 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 behavioral disturbance ([address]  Anemia, unspecified type    Medical Decision Making: low complexity  No follow-ups on file. Subjective:   HPI    Inpatient course: Discharge summary reviewed- see chart. Patient presents for transition of care visit following hospitalization for urinary tract infection. Had change in mental status at facility. Does have sanchez catheter with hematuria. Was treated with rocephin, then converted to omnicef at discharge. Known history of CAD and CHF, which was stable during his stay. Did have constipation which responded well to medications. Dementia did have some worsening with significant agitation. Mild hypokalemia, which was corrected. Stable anemia with Hb 9.7. CKD stable, CGR>60, Cr 1.05. He has been stable since returning to facility, mental status has returned to baseline.      Interval history/Current status: stable    Patient Active

## 2023-05-17 LAB
MICROORGANISM SPEC CULT: ABNORMAL
SPECIMEN DESCRIPTION: ABNORMAL

## 2023-05-17 RX ORDER — ALPRAZOLAM 0.25 MG/1
0.12 TABLET ORAL EVERY MORNING
COMMUNITY

## 2023-05-17 RX ORDER — ALPRAZOLAM 0.25 MG/1
0.12 TABLET ORAL DAILY PRN
COMMUNITY

## 2023-05-17 RX ORDER — ELECTROLYTES/DEXTROSE
1 SOLUTION, ORAL ORAL DAILY
COMMUNITY

## 2023-05-17 RX ORDER — POLYETHYLENE GLYCOL 3350 17 G/17G
17 POWDER, FOR SOLUTION ORAL 2 TIMES DAILY
COMMUNITY

## 2023-05-17 RX ORDER — ALPRAZOLAM 0.25 MG/1
0.25 TABLET ORAL NIGHTLY
COMMUNITY

## 2023-05-17 RX ORDER — CARBOXYMETHYLCELLULOSE SODIUM 5 MG/ML
2 SOLUTION/ DROPS OPHTHALMIC EVERY 8 HOURS PRN
COMMUNITY

## 2023-05-18 PROBLEM — N39.0 UTI (URINARY TRACT INFECTION): Status: RESOLVED | Noted: 2023-05-10 | Resolved: 2023-05-18

## 2023-05-18 ASSESSMENT — ENCOUNTER SYMPTOMS
DIARRHEA: 0
BACK PAIN: 0
RHINORRHEA: 0
COUGH: 0
VOMITING: 0
SHORTNESS OF BREATH: 0
WHEEZING: 0

## 2023-05-19 ENCOUNTER — OFFICE VISIT (OUTPATIENT)
Dept: UROLOGY | Age: 88
End: 2023-05-19
Payer: MEDICARE

## 2023-05-19 VITALS
BODY MASS INDEX: 23.57 KG/M2 | HEART RATE: 68 BPM | DIASTOLIC BLOOD PRESSURE: 86 MMHG | SYSTOLIC BLOOD PRESSURE: 130 MMHG | HEIGHT: 71 IN

## 2023-05-19 DIAGNOSIS — N45.1 EPIDIDYMITIS: ICD-10-CM

## 2023-05-19 DIAGNOSIS — N39.0 RECURRENT UTI: ICD-10-CM

## 2023-05-19 DIAGNOSIS — N13.8 BPH WITH OBSTRUCTION/LOWER URINARY TRACT SYMPTOMS: Primary | ICD-10-CM

## 2023-05-19 DIAGNOSIS — R33.9 RETENTION OF URINE: ICD-10-CM

## 2023-05-19 DIAGNOSIS — N40.1 BPH WITH OBSTRUCTION/LOWER URINARY TRACT SYMPTOMS: Primary | ICD-10-CM

## 2023-05-19 PROCEDURE — 3288F FALL RISK ASSESSMENT DOCD: CPT | Performed by: UROLOGY

## 2023-05-19 PROCEDURE — 99214 OFFICE O/P EST MOD 30 MIN: CPT | Performed by: UROLOGY

## 2023-05-19 PROCEDURE — G8427 DOCREV CUR MEDS BY ELIG CLIN: HCPCS | Performed by: UROLOGY

## 2023-05-19 PROCEDURE — 1036F TOBACCO NON-USER: CPT | Performed by: UROLOGY

## 2023-05-19 PROCEDURE — 1123F ACP DISCUSS/DSCN MKR DOCD: CPT | Performed by: UROLOGY

## 2023-05-19 PROCEDURE — 1111F DSCHRG MED/CURRENT MED MERGE: CPT | Performed by: UROLOGY

## 2023-05-19 PROCEDURE — 0518F FALL PLAN OF CARE DOCD: CPT | Performed by: UROLOGY

## 2023-05-19 PROCEDURE — G8420 CALC BMI NORM PARAMETERS: HCPCS | Performed by: UROLOGY

## 2023-05-19 NOTE — PROGRESS NOTES
[facility]  [address]  [address] [address]  Dept: [phone]  Dept Fax: [phone] [address] [facility] Urology Office Note -     Patient:  Saeed Wong  YOB: 1934    The patient is a 80 y.o. male who presents today for evaluation of the following problems:   Chief Complaint   Patient presents with    Benign Prostatic Hypertrophy     1 mo post cysto        History of Present Illness:    BPH  Worsening voiding  Difficulty emptying-- freq  Pt poor historian. Has been going to ED frequently  Currently has a catheter    Epididymitis  Finished abx  Was admitted with epididymorchitis  Unsure that this is primary issue      I independently reviewed and verified the images and reports from:    CT ABDOMEN PELVIS WO CONTRAST Additional Contrast? None    Result Date: 3/24/2023  EXAMINATION: CT OF THE ABDOMEN AND PELVIS WITHOUT CONTRAST 3/24/2023 8:17 am TECHNIQUE: CT of the abdomen and pelvis was performed without the administration of intravenous contrast. Multiplanar reformatted images are provided for review. Automated exposure control, iterative reconstruction, and/or weight based adjustment of the mA/kV was utilized to reduce the radiation dose to as low as reasonably achievable. COMPARISON: 15 March 2023 HISTORY: ORDERING SYSTEM PROVIDED HISTORY: Abdominal pain gross hematuria TECHNOLOGIST PROVIDED HISTORY: Abdominal pain gross hematuria Decision Support Exception - unselect if not a suspected or confirmed emergency medical condition->Emergency Medical Condition (MA) Reason for Exam: Gross hematuria, abdominal pain, urinary cath in place FINDINGS: Lower Chest: Small bilateral pleural effusions and atelectasis is noted. Septal thickening is noted which may be related to chronic change or mild interstitial edema. Coronary artery calcification is noted.  Organs: Evaluation limited due

## 2023-06-05 ENCOUNTER — OFFICE VISIT (OUTPATIENT)
Dept: CARDIOLOGY | Age: 88
End: 2023-06-05
Payer: MEDICARE

## 2023-06-05 VITALS
SYSTOLIC BLOOD PRESSURE: 138 MMHG | OXYGEN SATURATION: 96 % | RESPIRATION RATE: 17 BRPM | HEART RATE: 74 BPM | BODY MASS INDEX: 23.57 KG/M2 | DIASTOLIC BLOOD PRESSURE: 73 MMHG | HEIGHT: 71 IN

## 2023-06-05 DIAGNOSIS — I10 ESSENTIAL HYPERTENSION: ICD-10-CM

## 2023-06-05 DIAGNOSIS — I48.0 PAROXYSMAL ATRIAL FIBRILLATION (HCC): Primary | ICD-10-CM

## 2023-06-05 DIAGNOSIS — E78.5 DYSLIPIDEMIA: ICD-10-CM

## 2023-06-05 DIAGNOSIS — Z95.1 S/P CABG (CORONARY ARTERY BYPASS GRAFT): ICD-10-CM

## 2023-06-05 PROCEDURE — G8427 DOCREV CUR MEDS BY ELIG CLIN: HCPCS | Performed by: INTERNAL MEDICINE

## 2023-06-05 PROCEDURE — 1111F DSCHRG MED/CURRENT MED MERGE: CPT | Performed by: INTERNAL MEDICINE

## 2023-06-05 PROCEDURE — 99214 OFFICE O/P EST MOD 30 MIN: CPT | Performed by: INTERNAL MEDICINE

## 2023-06-05 PROCEDURE — 1036F TOBACCO NON-USER: CPT | Performed by: INTERNAL MEDICINE

## 2023-06-05 PROCEDURE — 1123F ACP DISCUSS/DSCN MKR DOCD: CPT | Performed by: INTERNAL MEDICINE

## 2023-06-05 PROCEDURE — 93005 ELECTROCARDIOGRAM TRACING: CPT | Performed by: INTERNAL MEDICINE

## 2023-06-05 PROCEDURE — 0518F FALL PLAN OF CARE DOCD: CPT | Performed by: INTERNAL MEDICINE

## 2023-06-05 PROCEDURE — 3288F FALL RISK ASSESSMENT DOCD: CPT | Performed by: INTERNAL MEDICINE

## 2023-06-05 PROCEDURE — 93010 ELECTROCARDIOGRAM REPORT: CPT | Performed by: INTERNAL MEDICINE

## 2023-06-05 PROCEDURE — G8420 CALC BMI NORM PARAMETERS: HCPCS | Performed by: INTERNAL MEDICINE

## 2023-06-05 NOTE — PROGRESS NOTES
Today's Date: 6/5/2023  Patient Name: Marian Spears  Patient's age: 80 y. o., 12/31/1934    CC: follow up CAD     HPI:    The patient is a 80 y.o.  male is in the office for f/u, he is currently residing in a nursing home. Denies any cp, sob, orthopnea, pnd, le edema. Is able to walk on own at [facility]. Not needing a walker. No dizziness or syncope      Past Medical History:   has a past medical history of Acute bronchitis, MARITA (acute kidney injury) ([address] Allergic rhinitis, Anxiety, Bradycardia, Cataract, right, Choroidal nevus of right eye, Chronic systolic CHF (congestive heart failure) ([address] Coronary artery disease involving native coronary artery of native heart, Dementia associated with 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 behavioral disturbance ([address] Dermatophytosis of nail, Diverticulosis, Elevated PSA, Hemorrhoids, History of measles, History of mumps, Hyperlipidemia, Hypertension, Hypothyroidism, Mass of upper lobe of right lung, Occult blood positive stool, Osteoarthritis, Overweight, Paroxysmal atrial fibrillation ([address] Pneumonia due to organism, Pseudophakia, left eye, PSVT (paroxysmal supraventricular tachycardia) ([address] Recurrent UTI, Retinal detachment, Seborrheic dermatitis, Sepsis due to urinary tract infection ([address] Urinary tract infection without hematuria, and UTI (urinary tract infection). Past Surgical History:   has a past surgical history that includes Coronary artery bypass graft; Appendectomy (1940 and 1955); Tonsillectomy; retinal laser (Left, 12/22/2011); Cataract removal (Left, 05/07/2013); Skin tag removal (11/01/1999); Abscess Drainage ([id number]); Colonoscopy (06/14/2021); and Cystoscopy (N/A, 5/3/2023). Home Medications:    Prior to Admission medications    Medication Sig Start Date End Date Taking? Authorizing Provider   Noel Lawrence 0.25 MG tablet Take 0.5 tablets by mouth every morning.    Yes Historical Provider, MD Noel Crespo)

## 2023-06-06 ENCOUNTER — OUTSIDE SERVICES (OUTPATIENT)
Dept: INTERNAL MEDICINE | Age: 88
End: 2023-06-06
Payer: MEDICARE

## 2023-06-06 ENCOUNTER — HOSPITAL ENCOUNTER (OUTPATIENT)
Age: 88
Setting detail: SPECIMEN
Discharge: HOME | End: 2023-06-06
Payer: MEDICARE

## 2023-06-06 VITALS
SYSTOLIC BLOOD PRESSURE: 117 MMHG | OXYGEN SATURATION: 98 % | HEART RATE: 78 BPM | DIASTOLIC BLOOD PRESSURE: 72 MMHG | TEMPERATURE: 97.5 F | RESPIRATION RATE: 16 BRPM

## 2023-06-06 DIAGNOSIS — N30.01 ACUTE CYSTITIS WITH HEMATURIA: Primary | ICD-10-CM

## 2023-06-06 LAB
BACTERIA URNS QL MICRO: ABNORMAL
BILIRUB UR QL STRIP: NEGATIVE
CHARACTER UR: ABNORMAL
CLARITY UR: ABNORMAL
COLOR UR: YELLOW
EPI CELLS #/AREA URNS HPF: ABNORMAL /HPF (ref 0–5)
GLUCOSE UR STRIP-MCNC: NEGATIVE MG/DL
HGB UR QL STRIP.AUTO: ABNORMAL
KETONES UR STRIP-MCNC: NEGATIVE MG/DL
LEUKOCYTE ESTERASE UR QL STRIP: ABNORMAL
NITRITE UR QL STRIP: NEGATIVE
PH UR STRIP: 6 [PH] (ref 5–6)
PROT UR STRIP-MCNC: ABNORMAL MG/DL
RBC #/AREA URNS HPF: ABNORMAL /HPF (ref 0–4)
SP GR UR STRIP: 1.01 (ref 1.01–1.02)
UROBILINOGEN UR STRIP-ACNC: NORMAL
WBC #/AREA URNS HPF: ABNORMAL /HPF (ref 0–4)

## 2023-06-06 PROCEDURE — 81001 URINALYSIS AUTO W/SCOPE: CPT

## 2023-06-06 PROCEDURE — 99347 HOME/RES VST EST SF MDM 20: CPT | Performed by: NURSE PRACTITIONER

## 2023-06-06 PROCEDURE — 87184 SC STD DISK METHOD PER PLATE: CPT

## 2023-06-06 PROCEDURE — 87086 URINE CULTURE/COLONY COUNT: CPT

## 2023-06-06 PROCEDURE — 87186 SC STD MICRODIL/AGAR DIL: CPT

## 2023-06-06 PROCEDURE — 87077 CULTURE AEROBIC IDENTIFY: CPT

## 2023-06-06 RX ORDER — CEFUROXIME AXETIL 250 MG/1
250 TABLET ORAL 2 TIMES DAILY
Qty: 14 TABLET | Refills: 0 | Status: SHIPPED | OUTPATIENT
Start: 2023-06-06 | End: 2023-06-08 | Stop reason: ALTCHOICE

## 2023-06-07 ENCOUNTER — TELEPHONE (OUTPATIENT)
Dept: INTERNAL MEDICINE | Age: 88
End: 2023-06-07
Payer: MEDICARE

## 2023-06-07 DIAGNOSIS — N18.2 CHRONIC KIDNEY DISEASE, STAGE 2 (MILD): ICD-10-CM

## 2023-06-07 DIAGNOSIS — R31.9 URINARY TRACT INFECTION WITH HEMATURIA, SITE UNSPECIFIED: Primary | ICD-10-CM

## 2023-06-07 DIAGNOSIS — N39.0 URINARY TRACT INFECTION WITH HEMATURIA, SITE UNSPECIFIED: Primary | ICD-10-CM

## 2023-06-07 DIAGNOSIS — I13.0 HYPERTENSIVE HEART AND CHRONIC KIDNEY DISEASE WITH HEART FAILURE AND STAGE 1 THROUGH STAGE 4 CHRONIC KIDNEY DISEASE, OR UNSPECIFIED CHRONIC KIDNEY DISEASE (HCC): ICD-10-CM

## 2023-06-07 DIAGNOSIS — I50.42 CHRONIC COMBINED SYSTOLIC AND DIASTOLIC HEART FAILURE (HCC): ICD-10-CM

## 2023-06-07 PROCEDURE — G0180 MD CERTIFICATION HHA PATIENT: HCPCS | Performed by: INTERNAL MEDICINE

## 2023-06-08 RX ORDER — LEVOFLOXACIN 250 MG/1
250 TABLET ORAL DAILY
Qty: 5 TABLET | Refills: 0 | Status: SHIPPED | OUTPATIENT
Start: 2023-06-08 | End: 2023-06-13

## 2023-06-08 ASSESSMENT — ENCOUNTER SYMPTOMS
NAUSEA: 0
COUGH: 0
DIARRHEA: 0
RHINORRHEA: 0
WHEEZING: 0
VOMITING: 0

## 2023-06-09 PROBLEM — R79.89 ELEVATED TROPONIN: Status: RESOLVED | Noted: 2023-05-10 | Resolved: 2023-06-09

## 2023-06-09 PROBLEM — R77.8 ELEVATED TROPONIN: Status: RESOLVED | Noted: 2023-05-10 | Resolved: 2023-06-09

## 2023-06-09 LAB
MICROORGANISM SPEC CULT: ABNORMAL
SPECIMEN DESCRIPTION: ABNORMAL

## 2023-06-20 ENCOUNTER — HOSPITAL ENCOUNTER (OUTPATIENT)
Age: 88
Setting detail: SPECIMEN
Discharge: HOME | End: 2023-06-20
Payer: MEDICARE

## 2023-06-20 PROCEDURE — 87086 URINE CULTURE/COLONY COUNT: CPT

## 2023-06-20 PROCEDURE — 87077 CULTURE AEROBIC IDENTIFY: CPT

## 2023-06-20 PROCEDURE — 81001 URINALYSIS AUTO W/SCOPE: CPT

## 2023-06-20 PROCEDURE — 87186 SC STD MICRODIL/AGAR DIL: CPT

## 2023-06-20 PROCEDURE — 87184 SC STD DISK METHOD PER PLATE: CPT

## 2023-06-21 LAB
BACTERIA URNS QL MICRO: ABNORMAL
BILIRUB UR QL STRIP: NEGATIVE
CLARITY UR: ABNORMAL
COLOR UR: YELLOW
EPI CELLS #/AREA URNS HPF: ABNORMAL /HPF (ref 0–5)
GLUCOSE UR STRIP-MCNC: NEGATIVE MG/DL
HGB UR QL STRIP.AUTO: ABNORMAL
KETONES UR STRIP-MCNC: NEGATIVE MG/DL
LEUKOCYTE ESTERASE UR QL STRIP: ABNORMAL
NITRITE UR QL STRIP: NEGATIVE
PH UR STRIP: 5.5 [PH] (ref 5–6)
PROT UR STRIP-MCNC: NEGATIVE MG/DL
RBC #/AREA URNS HPF: ABNORMAL /HPF (ref 0–4)
SP GR UR STRIP: 1.02 (ref 1.01–1.02)
UROBILINOGEN UR STRIP-ACNC: NORMAL
WBC #/AREA URNS HPF: ABNORMAL /HPF (ref 0–4)
YEAST URNS QL MICRO: ABNORMAL

## 2023-06-25 LAB
MICROORGANISM SPEC CULT: ABNORMAL
MICROORGANISM SPEC CULT: ABNORMAL
SERVICE CMNT-IMP: ABNORMAL
SPECIMEN DESCRIPTION: ABNORMAL

## 2023-07-06 ENCOUNTER — HOSPITAL ENCOUNTER (EMERGENCY)
Age: 88
Discharge: HOME | End: 2023-07-06
Attending: EMERGENCY MEDICINE
Payer: MEDICARE

## 2023-07-06 VITALS
OXYGEN SATURATION: 98 % | TEMPERATURE: 98.9 F | SYSTOLIC BLOOD PRESSURE: 134 MMHG | DIASTOLIC BLOOD PRESSURE: 74 MMHG | WEIGHT: 161 LBS | BODY MASS INDEX: 22.54 KG/M2 | HEART RATE: 84 BPM | RESPIRATION RATE: 16 BRPM | HEIGHT: 71 IN

## 2023-07-06 DIAGNOSIS — T83.511A URINARY TRACT INFECTION ASSOCIATED WITH INDWELLING URETHRAL CATHETER, INITIAL ENCOUNTER (HCC): Primary | ICD-10-CM

## 2023-07-06 DIAGNOSIS — N39.0 URINARY TRACT INFECTION ASSOCIATED WITH INDWELLING URETHRAL CATHETER, INITIAL ENCOUNTER (HCC): Primary | ICD-10-CM

## 2023-07-06 LAB
ANION GAP SERPL CALCULATED.3IONS-SCNC: 11 MMOL/L (ref 9–17)
BACTERIA URNS QL MICRO: ABNORMAL
BASOPHILS # BLD: 0.03 K/UL (ref 0–0.2)
BASOPHILS NFR BLD: 0 % (ref 0–2)
BILIRUB UR QL STRIP: NEGATIVE
BUN SERPL-MCNC: 18 MG/DL (ref 8–23)
BUN/CREAT SERPL: 16 (ref 9–20)
CALCIUM SERPL-MCNC: 9.2 MG/DL (ref 8.6–10.4)
CHLORIDE SERPL-SCNC: 100 MMOL/L (ref 98–107)
CLARITY UR: ABNORMAL
CO2 SERPL-SCNC: 28 MMOL/L (ref 20–31)
COLOR UR: YELLOW
CREAT SERPL-MCNC: 1.11 MG/DL (ref 0.7–1.2)
EOSINOPHIL # BLD: 0.2 K/UL (ref 0–0.44)
EOSINOPHILS RELATIVE PERCENT: 2 % (ref 1–4)
EPI CELLS #/AREA URNS HPF: ABNORMAL /HPF (ref 0–5)
ERYTHROCYTE [DISTWIDTH] IN BLOOD BY AUTOMATED COUNT: 13.7 % (ref 11.8–14.4)
GFR SERPL CREATININE-BSD FRML MDRD: >60 ML/MIN/1.73M2
GLUCOSE SERPL-MCNC: 110 MG/DL (ref 70–99)
GLUCOSE UR STRIP-MCNC: NEGATIVE MG/DL
HCT VFR BLD AUTO: 35.5 % (ref 40.7–50.3)
HGB BLD-MCNC: 11.4 G/DL (ref 13–17)
HGB UR QL STRIP.AUTO: ABNORMAL
IMM GRANULOCYTES # BLD AUTO: 0.04 K/UL (ref 0–0.3)
IMM GRANULOCYTES NFR BLD: 0 %
KETONES UR STRIP-MCNC: NEGATIVE MG/DL
LEUKOCYTE ESTERASE UR QL STRIP: ABNORMAL
LYMPHOCYTES # BLD: 12 % (ref 24–43)
LYMPHOCYTES NFR BLD: 1.15 K/UL (ref 1.1–3.7)
MCH RBC QN AUTO: 27.9 PG (ref 25.2–33.5)
MCHC RBC AUTO-ENTMCNC: 32.1 G/DL (ref 25.2–33.5)
MCV RBC AUTO: 86.8 FL (ref 82.6–102.9)
MONOCYTES NFR BLD: 0.54 K/UL (ref 0.1–1.2)
MONOCYTES NFR BLD: 6 % (ref 3–12)
NEUTROPHILS NFR BLD: 80 % (ref 36–65)
NEUTS SEG NFR BLD: 7.69 K/UL (ref 1.5–8.1)
NITRITE UR QL STRIP: NEGATIVE
NRBC BLD-RTO: 0 PER 100 WBC
PH UR STRIP: 5.5 [PH] (ref 5–6)
PLATELET # BLD AUTO: 193 K/UL (ref 138–453)
PMV BLD AUTO: 10.1 FL (ref 8.1–13.5)
POTASSIUM SERPL-SCNC: 4.4 MMOL/L (ref 3.7–5.3)
PROT UR STRIP-MCNC: ABNORMAL MG/DL
RBC # BLD AUTO: 4.09 M/UL (ref 4.21–5.77)
RBC #/AREA URNS HPF: ABNORMAL /HPF (ref 0–4)
SODIUM SERPL-SCNC: 139 MMOL/L (ref 135–144)
SP GR UR STRIP: 1.02 (ref 1.01–1.02)
UROBILINOGEN UR STRIP-ACNC: NORMAL
WBC #/AREA URNS HPF: ABNORMAL /HPF (ref 0–4)
WBC OTHER # BLD: 9.7 K/UL (ref 3.5–11.3)

## 2023-07-06 PROCEDURE — 80048 BASIC METABOLIC PNL TOTAL CA: CPT

## 2023-07-06 PROCEDURE — 99284 EMERGENCY DEPT VISIT MOD MDM: CPT

## 2023-07-06 PROCEDURE — 6360000002 HC RX W HCPCS: Performed by: EMERGENCY MEDICINE

## 2023-07-06 PROCEDURE — 2580000003 HC RX 258: Performed by: EMERGENCY MEDICINE

## 2023-07-06 PROCEDURE — 96365 THER/PROPH/DIAG IV INF INIT: CPT

## 2023-07-06 PROCEDURE — 81001 URINALYSIS AUTO W/SCOPE: CPT

## 2023-07-06 PROCEDURE — 85027 COMPLETE CBC AUTOMATED: CPT

## 2023-07-06 RX ADMIN — CEFTRIAXONE 1000 MG: 1 INJECTION, POWDER, FOR SOLUTION INTRAMUSCULAR; INTRAVENOUS at 17:58

## 2023-07-06 ASSESSMENT — PAIN SCALES - PAIN ASSESSMENT IN ADVANCED DEMENTIA (PAINAD)
TOTALSCORE: 1
BODYLANGUAGE: 0
NEGVOCALIZATION: 0
BREATHING: 0
FACIALEXPRESSION: 1
CONSOLABILITY: 0

## 2023-07-06 ASSESSMENT — ENCOUNTER SYMPTOMS
CHEST TIGHTNESS: 0
ABDOMINAL PAIN: 0
VOMITING: 0
SHORTNESS OF BREATH: 0
NAUSEA: 0

## 2023-07-06 ASSESSMENT — PAIN - FUNCTIONAL ASSESSMENT
PAIN_FUNCTIONAL_ASSESSMENT: NONE - DENIES PAIN
PAIN_FUNCTIONAL_ASSESSMENT: PAIN ASSESSMENT IN ADVANCED DEMENTIA (PAINAD)

## 2023-07-06 NOTE — CARE COORDINATION
Writer called Dr France Gaston to discuss IV antibiotics. Dr France Gaston states he would like pt to do outpt IV antibiotic therapy and not home health d/t delays from previous encounters. [phone] to arrange outpt antibiotics, Johanna Conner states he will have to check to see if they will have transportation.

## 2023-07-06 NOTE — DISCHARGE INSTRUCTIONS
We have arranged for you to receive outpatient antibiotics. You received antibiotics in the emergency department today. We have spoken with your primary care physician Dr. Ciera Downey and we have arranged for you to return back to the ER tomorrow forA midline placement. So please return tomorrow at 8 AM at [facility] emergency department for placement of a midline so that you can receive outpatient antibiotics Rocephin 1 times a day.       PLEASE RETURN TO ER TOMORROW AT 8AM

## 2023-07-06 NOTE — ED PROVIDER NOTES
RE-EVALUATION:  Stable    CONSULTS:  None    PROCEDURES:  Procedures    Critical Care:  None    FINAL IMPRESSION      1. Urinary tract infection associated with indwelling urethral catheter, initial encounter [facility])          DISPOSITION/PLAN   DISPOSITION Decision To Discharge 07/06/2023 06:50:23 PM    Discharge Patient was informed of their diagnosis and told to follow up with [facility] tomorrow. Shared decision making was utilized in the discharge decision and patient/family in agreement with current plan of care. Patient told to return to ED for any worsening symptoms. Patient remains stable, will discharge home. They were given the opportunity to ask any questions regarding their care. These questions were answered to their satisfaction. CONDITION ON DISPOSITION:   Stable    PATIENT REFERRED TO:  No follow-up provider specified. DISCHARGE MEDICATIONS:  Discharge Medication List as of 7/6/2023  6:56 PM             This note was created using Dragon dictation software. The note was briefly reviewed and proofread, but may contain some grammatical and phonetic errors.     Albert Smith To, DO  Emergency Medicine Physician       Grady Rizvi D To,   07/06/23 2040

## 2023-07-07 ENCOUNTER — ANESTHESIA EVENT (OUTPATIENT)
Dept: OPERATING ROOM | Age: 88
End: 2023-07-07

## 2023-07-07 ENCOUNTER — HOSPITAL ENCOUNTER (OUTPATIENT)
Dept: INFUSION THERAPY | Age: 88
Setting detail: INFUSION SERIES
Discharge: HOME | End: 2023-07-07
Payer: MEDICARE

## 2023-07-07 ENCOUNTER — ANESTHESIA (OUTPATIENT)
Dept: OPERATING ROOM | Age: 88
End: 2023-07-07

## 2023-07-07 VITALS
RESPIRATION RATE: 16 BRPM | TEMPERATURE: 98.1 F | HEART RATE: 71 BPM | SYSTOLIC BLOOD PRESSURE: 131 MMHG | DIASTOLIC BLOOD PRESSURE: 69 MMHG | OXYGEN SATURATION: 93 %

## 2023-07-07 VITALS
SYSTOLIC BLOOD PRESSURE: 114 MMHG | DIASTOLIC BLOOD PRESSURE: 54 MMHG | WEIGHT: 161 LBS | RESPIRATION RATE: 16 BRPM | BODY MASS INDEX: 22.54 KG/M2 | OXYGEN SATURATION: 99 % | HEIGHT: 71 IN | TEMPERATURE: 97.6 F | HEART RATE: 70 BPM

## 2023-07-07 DIAGNOSIS — F41.9 ANXIETY: Primary | ICD-10-CM

## 2023-07-07 DIAGNOSIS — N30.01 ACUTE CYSTITIS WITH HEMATURIA: Primary | ICD-10-CM

## 2023-07-07 PROBLEM — N39.0 UTI (URINARY TRACT INFECTION): Status: ACTIVE | Noted: 2023-07-07

## 2023-07-07 PROCEDURE — 2580000003 HC RX 258: Performed by: INTERNAL MEDICINE

## 2023-07-07 PROCEDURE — 3700000000 HC ANESTHESIA ATTENDED CARE

## 2023-07-07 PROCEDURE — 3700000001 HC ADD 15 MINUTES (ANESTHESIA)

## 2023-07-07 PROCEDURE — 6360000002 HC RX W HCPCS: Performed by: INTERNAL MEDICINE

## 2023-07-07 RX ORDER — SODIUM CHLORIDE 9 MG/ML
5-250 INJECTION, SOLUTION INTRAVENOUS PRN
Status: CANCELLED | OUTPATIENT
Start: 2023-07-08

## 2023-07-07 RX ORDER — DIPHENHYDRAMINE HYDROCHLORIDE 50 MG/ML
50 INJECTION INTRAMUSCULAR; INTRAVENOUS
Status: CANCELLED | OUTPATIENT
Start: 2023-07-08

## 2023-07-07 RX ORDER — ALPRAZOLAM 0.25 MG/1
0.12 TABLET ORAL DAILY PRN
Qty: 45 TABLET | Refills: 0 | Status: SHIPPED | OUTPATIENT
Start: 2023-07-07 | End: 2023-10-05

## 2023-07-07 RX ORDER — HEPARIN SODIUM 100 [USP'U]/ML
500 INJECTION, SOLUTION INTRAVENOUS PRN
Status: CANCELLED | OUTPATIENT
Start: 2023-07-08

## 2023-07-07 RX ORDER — ALBUTEROL SULFATE 90 UG/1
4 AEROSOL, METERED RESPIRATORY (INHALATION) PRN
Status: CANCELLED | OUTPATIENT
Start: 2023-07-08

## 2023-07-07 RX ORDER — ONDANSETRON 2 MG/ML
8 INJECTION INTRAMUSCULAR; INTRAVENOUS
Status: CANCELLED | OUTPATIENT
Start: 2023-07-08

## 2023-07-07 RX ORDER — EPINEPHRINE 1 MG/ML
0.3 INJECTION, SOLUTION, CONCENTRATE INTRAVENOUS PRN
Status: CANCELLED | OUTPATIENT
Start: 2023-07-08

## 2023-07-07 RX ORDER — ACETAMINOPHEN 325 MG/1
650 TABLET ORAL
Status: CANCELLED | OUTPATIENT
Start: 2023-07-08

## 2023-07-07 RX ORDER — SODIUM CHLORIDE 0.9 % (FLUSH) 0.9 %
5-40 SYRINGE (ML) INJECTION PRN
Status: CANCELLED | OUTPATIENT
Start: 2023-07-08

## 2023-07-07 RX ORDER — ALPRAZOLAM 0.25 MG/1
0.12 TABLET ORAL EVERY MORNING
Qty: 45 TABLET | Refills: 0 | Status: SHIPPED | OUTPATIENT
Start: 2023-07-07 | End: 2023-10-07

## 2023-07-07 RX ORDER — SODIUM CHLORIDE 9 MG/ML
INJECTION, SOLUTION INTRAVENOUS CONTINUOUS
Status: CANCELLED | OUTPATIENT
Start: 2023-07-08

## 2023-07-07 RX ORDER — ALPRAZOLAM 0.25 MG/1
0.25 TABLET ORAL NIGHTLY
Qty: 30 TABLET | Refills: 0 | Status: SHIPPED | OUTPATIENT
Start: 2023-07-07 | End: 2023-08-06

## 2023-07-07 RX ADMIN — CEFTRIAXONE 1000 MG: 1 INJECTION, POWDER, FOR SOLUTION INTRAMUSCULAR; INTRAVENOUS at 08:30

## 2023-07-07 NOTE — ED NOTES
Discussed with outcomes Nurse Molly in regards to plan of care. Pt to receive atb therapy in the ED every day at 0800. Pt to keep his IV in and to be replaced every 3 days.  Orders will be placed under Dr. Viviane Maldonado RN  07/06/23 3081

## 2023-07-07 NOTE — PROGRESS NOTES
Midline insertion per Neeraj, CRNA. 4cm on the skin. Dressing applied. Covered with stockinet. Pt tolerated well.

## 2023-07-07 NOTE — ANESTHESIA PROCEDURE NOTES
PICC/Midline:    A midline catheter    was placed using ultrasound guided in the patient floor for the following indication(s): hydration and intravenous antibiotics. 7/7/2023 10:45 AM, 7/7/2023 11:10 AM.  Staffing  Performed: Anesthesiologist   Anesthesiologist: DAYANARA Matthews preparation included the following: hand hygiene performed prior to procedure, maximum sterile barriers used and sterile technique used to drape from head to toe. All elements of maximal sterile barrier technique not followed for medical reasons. The  right,  basilic vein was prepped. A 3 Fr (size), Chloraprep}lidocaine 1%, single lumen   Advanced 16 cm., Exposed 4 cm., Total 20 cm. Expiration date: 4/30/2024  none    Additional notes:  Under ultrasound guidance, basilic vein visualized and accessed with finder needle with good blood return noted. Guide wire inserted freely and visualized in vein with US and images stored. Midline inserted with ease to hub. Good blood return noted after insertion. Sterile occlusive dressing applied.  Patient tolerates without incident    Preanesthetic Checklist  Completed: patient identified, IV checked, site marked, risks and benefits discussed, surgical/procedural consents, equipment checked, pre-op evaluation, timeout performed, anesthesia consent given, oxygen available and monitors applied/VS acknowledged

## 2023-07-07 NOTE — TELEPHONE ENCOUNTER
Jacquelyn [facility] requesting a refill of the below medication which has been pended for you:     Requested Prescriptions     Pending Prescriptions Disp Refills    ALPRAZolam (XANAX) 0.25 MG tablet 45 tablet 0     Sig: Take 0.5 tablets by mouth every morning for 92 days. Max Daily Amount: 0.125 mg    ALPRAZolam (XANAX) 0.25 MG tablet 30 tablet 0     Sig: Take 1 tablet by mouth at bedtime for 30 days. Max Daily Amount: 0.25 mg    ALPRAZolam (XANAX) 0.25 MG tablet 45 tablet 0     Sig: Take 0.5 tablets by mouth daily as needed for Anxiety for up to 90 days.  Max Daily Amount: 0.125 mg       Last Appointment Date: 3/29/2023  Next Appointment Date: 1/25/2024    Allergies   Allergen Reactions    Pcn [Penicillins] Swelling     As a child  Pt tolerated ceftriaxone , and keflex with no intolerance    Sulfa Antibiotics     Cefdinir Other (See Comments)     Pt tolerated ceftriaxone , and keflex with no intolerance

## 2023-07-08 ENCOUNTER — HOSPITAL ENCOUNTER (OUTPATIENT)
Dept: INFUSION THERAPY | Age: 88
Setting detail: INFUSION SERIES
Discharge: HOME | End: 2023-07-08
Payer: MEDICARE

## 2023-07-08 VITALS
SYSTOLIC BLOOD PRESSURE: 127 MMHG | OXYGEN SATURATION: 99 % | RESPIRATION RATE: 18 BRPM | DIASTOLIC BLOOD PRESSURE: 60 MMHG | HEART RATE: 77 BPM | TEMPERATURE: 97.9 F

## 2023-07-08 DIAGNOSIS — N30.01 ACUTE CYSTITIS WITH HEMATURIA: Primary | ICD-10-CM

## 2023-07-08 PROCEDURE — 96365 THER/PROPH/DIAG IV INF INIT: CPT

## 2023-07-08 PROCEDURE — 2580000003 HC RX 258: Performed by: INTERNAL MEDICINE

## 2023-07-08 PROCEDURE — 6360000002 HC RX W HCPCS: Performed by: INTERNAL MEDICINE

## 2023-07-08 RX ORDER — SODIUM CHLORIDE 0.9 % (FLUSH) 0.9 %
5-40 SYRINGE (ML) INJECTION PRN
Status: DISCONTINUED | OUTPATIENT
Start: 2023-07-08 | End: 2023-07-09 | Stop reason: HOSPADM

## 2023-07-08 RX ORDER — SODIUM CHLORIDE 9 MG/ML
5-250 INJECTION, SOLUTION INTRAVENOUS PRN
Status: DISCONTINUED | OUTPATIENT
Start: 2023-07-08 | End: 2023-07-09 | Stop reason: HOSPADM

## 2023-07-08 RX ORDER — HEPARIN SODIUM 100 [USP'U]/ML
500 INJECTION, SOLUTION INTRAVENOUS PRN
Status: DISCONTINUED | OUTPATIENT
Start: 2023-07-08 | End: 2023-07-09 | Stop reason: HOSPADM

## 2023-07-08 RX ORDER — HEPARIN SODIUM 100 [USP'U]/ML
500 INJECTION, SOLUTION INTRAVENOUS PRN
Status: CANCELLED | OUTPATIENT
Start: 2023-07-09

## 2023-07-08 RX ORDER — ACETAMINOPHEN 325 MG/1
650 TABLET ORAL
Status: CANCELLED | OUTPATIENT
Start: 2023-07-09

## 2023-07-08 RX ORDER — SODIUM CHLORIDE 0.9 % (FLUSH) 0.9 %
5-40 SYRINGE (ML) INJECTION PRN
Status: CANCELLED | OUTPATIENT
Start: 2023-07-09

## 2023-07-08 RX ORDER — SODIUM CHLORIDE 9 MG/ML
INJECTION, SOLUTION INTRAVENOUS CONTINUOUS
Status: CANCELLED | OUTPATIENT
Start: 2023-07-09

## 2023-07-08 RX ORDER — DIPHENHYDRAMINE HYDROCHLORIDE 50 MG/ML
50 INJECTION INTRAMUSCULAR; INTRAVENOUS
Status: CANCELLED | OUTPATIENT
Start: 2023-07-09

## 2023-07-08 RX ORDER — SODIUM CHLORIDE 9 MG/ML
5-250 INJECTION, SOLUTION INTRAVENOUS PRN
Status: CANCELLED | OUTPATIENT
Start: 2023-07-09

## 2023-07-08 RX ORDER — ALBUTEROL SULFATE 90 UG/1
4 AEROSOL, METERED RESPIRATORY (INHALATION) PRN
Status: CANCELLED | OUTPATIENT
Start: 2023-07-09

## 2023-07-08 RX ORDER — ONDANSETRON 2 MG/ML
8 INJECTION INTRAMUSCULAR; INTRAVENOUS
Status: CANCELLED | OUTPATIENT
Start: 2023-07-09

## 2023-07-08 RX ORDER — EPINEPHRINE 1 MG/ML
0.3 INJECTION, SOLUTION, CONCENTRATE INTRAVENOUS PRN
Status: CANCELLED | OUTPATIENT
Start: 2023-07-09

## 2023-07-08 RX ADMIN — HEPARIN 100 UNITS: 100 SYRINGE at 08:44

## 2023-07-08 RX ADMIN — CEFTRIAXONE 1000 MG: 1 INJECTION, POWDER, FOR SOLUTION INTRAMUSCULAR; INTRAVENOUS at 08:11

## 2023-07-09 ENCOUNTER — HOSPITAL ENCOUNTER (OUTPATIENT)
Dept: INFUSION THERAPY | Age: 88
Setting detail: INFUSION SERIES
Discharge: HOME | End: 2023-07-09
Payer: MEDICARE

## 2023-07-09 VITALS
TEMPERATURE: 97.1 F | DIASTOLIC BLOOD PRESSURE: 54 MMHG | HEART RATE: 73 BPM | SYSTOLIC BLOOD PRESSURE: 115 MMHG | RESPIRATION RATE: 18 BRPM | OXYGEN SATURATION: 98 %

## 2023-07-09 DIAGNOSIS — N30.01 ACUTE CYSTITIS WITH HEMATURIA: Primary | ICD-10-CM

## 2023-07-09 PROCEDURE — 96365 THER/PROPH/DIAG IV INF INIT: CPT

## 2023-07-09 PROCEDURE — 2580000003 HC RX 258: Performed by: INTERNAL MEDICINE

## 2023-07-09 PROCEDURE — 6360000002 HC RX W HCPCS: Performed by: INTERNAL MEDICINE

## 2023-07-09 RX ORDER — ACETAMINOPHEN 325 MG/1
650 TABLET ORAL
Status: CANCELLED | OUTPATIENT
Start: 2023-07-10

## 2023-07-09 RX ORDER — SODIUM CHLORIDE 9 MG/ML
5-250 INJECTION, SOLUTION INTRAVENOUS PRN
Status: CANCELLED | OUTPATIENT
Start: 2023-07-10

## 2023-07-09 RX ORDER — HEPARIN SODIUM 100 [USP'U]/ML
500 INJECTION, SOLUTION INTRAVENOUS PRN
Status: DISCONTINUED | OUTPATIENT
Start: 2023-07-09 | End: 2023-07-10 | Stop reason: HOSPADM

## 2023-07-09 RX ORDER — SODIUM CHLORIDE 0.9 % (FLUSH) 0.9 %
5-40 SYRINGE (ML) INJECTION PRN
Status: CANCELLED | OUTPATIENT
Start: 2023-07-10

## 2023-07-09 RX ORDER — HEPARIN SODIUM 100 [USP'U]/ML
500 INJECTION, SOLUTION INTRAVENOUS PRN
Status: CANCELLED | OUTPATIENT
Start: 2023-07-10

## 2023-07-09 RX ORDER — SODIUM CHLORIDE 9 MG/ML
5-250 INJECTION, SOLUTION INTRAVENOUS PRN
Status: DISCONTINUED | OUTPATIENT
Start: 2023-07-09 | End: 2023-07-10 | Stop reason: HOSPADM

## 2023-07-09 RX ORDER — ALBUTEROL SULFATE 90 UG/1
4 AEROSOL, METERED RESPIRATORY (INHALATION) PRN
Status: CANCELLED | OUTPATIENT
Start: 2023-07-10

## 2023-07-09 RX ORDER — EPINEPHRINE 1 MG/ML
0.3 INJECTION, SOLUTION, CONCENTRATE INTRAVENOUS PRN
Status: CANCELLED | OUTPATIENT
Start: 2023-07-10

## 2023-07-09 RX ORDER — DIPHENHYDRAMINE HYDROCHLORIDE 50 MG/ML
50 INJECTION INTRAMUSCULAR; INTRAVENOUS
Status: CANCELLED | OUTPATIENT
Start: 2023-07-10

## 2023-07-09 RX ORDER — SODIUM CHLORIDE 9 MG/ML
INJECTION, SOLUTION INTRAVENOUS CONTINUOUS
Status: CANCELLED | OUTPATIENT
Start: 2023-07-10

## 2023-07-09 RX ORDER — ONDANSETRON 2 MG/ML
8 INJECTION INTRAMUSCULAR; INTRAVENOUS
Status: CANCELLED | OUTPATIENT
Start: 2023-07-10

## 2023-07-09 RX ORDER — SODIUM CHLORIDE 0.9 % (FLUSH) 0.9 %
5-40 SYRINGE (ML) INJECTION PRN
Status: DISCONTINUED | OUTPATIENT
Start: 2023-07-09 | End: 2023-07-10 | Stop reason: HOSPADM

## 2023-07-09 RX ADMIN — CEFTRIAXONE 1000 MG: 1 INJECTION, POWDER, FOR SOLUTION INTRAMUSCULAR; INTRAVENOUS at 08:05

## 2023-07-09 RX ADMIN — HEPARIN 100 UNITS: 100 SYRINGE at 08:40

## 2023-07-10 ENCOUNTER — HOSPITAL ENCOUNTER (OUTPATIENT)
Dept: INFUSION THERAPY | Age: 88
Setting detail: INFUSION SERIES
Discharge: HOME | End: 2023-07-10
Payer: MEDICARE

## 2023-07-10 ENCOUNTER — OUTSIDE SERVICES (OUTPATIENT)
Dept: INTERNAL MEDICINE | Age: 88
End: 2023-07-10
Payer: MEDICARE

## 2023-07-10 VITALS
OXYGEN SATURATION: 98 % | DIASTOLIC BLOOD PRESSURE: 70 MMHG | RESPIRATION RATE: 12 BRPM | SYSTOLIC BLOOD PRESSURE: 141 MMHG | TEMPERATURE: 97.3 F | HEART RATE: 69 BPM

## 2023-07-10 VITALS
HEART RATE: 69 BPM | DIASTOLIC BLOOD PRESSURE: 70 MMHG | SYSTOLIC BLOOD PRESSURE: 120 MMHG | TEMPERATURE: 97.7 F | OXYGEN SATURATION: 97 %

## 2023-07-10 DIAGNOSIS — R31.9 URINARY TRACT INFECTION WITH HEMATURIA, SITE UNSPECIFIED: Primary | ICD-10-CM

## 2023-07-10 DIAGNOSIS — N32.0 BLADDER OUTLET OBSTRUCTION: ICD-10-CM

## 2023-07-10 DIAGNOSIS — N39.0 URINARY TRACT INFECTION WITH HEMATURIA, SITE UNSPECIFIED: Primary | ICD-10-CM

## 2023-07-10 DIAGNOSIS — N40.1 BENIGN PROSTATIC HYPERPLASIA WITH INCOMPLETE BLADDER EMPTYING: ICD-10-CM

## 2023-07-10 DIAGNOSIS — N30.01 ACUTE CYSTITIS WITH HEMATURIA: Primary | ICD-10-CM

## 2023-07-10 DIAGNOSIS — R39.14 BENIGN PROSTATIC HYPERPLASIA WITH INCOMPLETE BLADDER EMPTYING: ICD-10-CM

## 2023-07-10 PROCEDURE — 6360000002 HC RX W HCPCS

## 2023-07-10 PROCEDURE — 99347 HOME/RES VST EST SF MDM 20: CPT | Performed by: NURSE PRACTITIONER

## 2023-07-10 PROCEDURE — 6360000002 HC RX W HCPCS: Performed by: INTERNAL MEDICINE

## 2023-07-10 RX ORDER — DIPHENHYDRAMINE HYDROCHLORIDE 50 MG/ML
50 INJECTION INTRAMUSCULAR; INTRAVENOUS
Status: CANCELLED | OUTPATIENT
Start: 2023-07-11

## 2023-07-10 RX ORDER — ALBUTEROL SULFATE 90 UG/1
4 AEROSOL, METERED RESPIRATORY (INHALATION) PRN
Status: CANCELLED | OUTPATIENT
Start: 2023-07-11

## 2023-07-10 RX ORDER — SODIUM CHLORIDE 9 MG/ML
INJECTION, SOLUTION INTRAVENOUS CONTINUOUS
Status: CANCELLED | OUTPATIENT
Start: 2023-07-11

## 2023-07-10 RX ORDER — SODIUM CHLORIDE 0.9 % (FLUSH) 0.9 %
5-40 SYRINGE (ML) INJECTION PRN
Status: CANCELLED | OUTPATIENT
Start: 2023-07-11

## 2023-07-10 RX ORDER — ACETAMINOPHEN 325 MG/1
650 TABLET ORAL
Status: CANCELLED | OUTPATIENT
Start: 2023-07-11

## 2023-07-10 RX ORDER — HEPARIN SODIUM 100 [USP'U]/ML
100 INJECTION, SOLUTION INTRAVENOUS PRN
Status: DISCONTINUED | OUTPATIENT
Start: 2023-07-10 | End: 2023-07-11 | Stop reason: HOSPADM

## 2023-07-10 RX ORDER — SODIUM CHLORIDE 9 MG/ML
5-250 INJECTION, SOLUTION INTRAVENOUS PRN
Status: CANCELLED | OUTPATIENT
Start: 2023-07-11

## 2023-07-10 RX ORDER — EPINEPHRINE 1 MG/ML
0.3 INJECTION, SOLUTION, CONCENTRATE INTRAVENOUS PRN
Status: CANCELLED | OUTPATIENT
Start: 2023-07-11

## 2023-07-10 RX ORDER — SODIUM CHLORIDE 0.9 % (FLUSH) 0.9 %
5-40 SYRINGE (ML) INJECTION PRN
Status: DISCONTINUED | OUTPATIENT
Start: 2023-07-10 | End: 2023-07-11 | Stop reason: HOSPADM

## 2023-07-10 RX ORDER — CEFTRIAXONE 1 G/1
INJECTION, POWDER, FOR SOLUTION INTRAMUSCULAR; INTRAVENOUS
Status: COMPLETED
Start: 2023-07-10 | End: 2023-07-10

## 2023-07-10 RX ORDER — SODIUM CHLORIDE 9 MG/ML
5-250 INJECTION, SOLUTION INTRAVENOUS PRN
Status: DISCONTINUED | OUTPATIENT
Start: 2023-07-10 | End: 2023-07-11 | Stop reason: HOSPADM

## 2023-07-10 RX ORDER — ONDANSETRON 2 MG/ML
8 INJECTION INTRAMUSCULAR; INTRAVENOUS
Status: CANCELLED | OUTPATIENT
Start: 2023-07-11

## 2023-07-10 RX ORDER — HEPARIN SODIUM 100 [USP'U]/ML
500 INJECTION, SOLUTION INTRAVENOUS PRN
Status: CANCELLED | OUTPATIENT
Start: 2023-07-11

## 2023-07-10 RX ADMIN — CEFTRIAXONE 1000 MG: 1 INJECTION, POWDER, FOR SOLUTION INTRAMUSCULAR; INTRAVENOUS at 08:06

## 2023-07-10 RX ADMIN — HEPARIN 100 UNITS: 100 SYRINGE at 08:38

## 2023-07-10 NOTE — PROGRESS NOTES
[facility]      Elian Mahoney is a 80 y.o. male resident of [place]. HPI:     HPI  Patient of Dr. Anastasia Watson presents for ER follow up. Patient was diagnosed with multi drug resistant UTI. Known history of BPH with incomplete bladder emptying, as well as UTI requiring admission. PCP contacted ER physician regarding outpatient treatment, given that IV could not be promptly initiated at [address] [place] [address]. Was evaluated in ER, received first dose of rocephin in ED, tolerated well. Patient had midline placed has subsequent orders to continue IV rocephin through infusion room. He has been tolerating this well, and has been improving per staff. Remains afebrile. Current Outpatient Medications   Medication Sig Dispense Refill    ALPRAZolam (XANAX) 0.25 MG tablet Take 0.5 tablets by mouth every morning for 92 days. Max Daily Amount: 0.125 mg 45 tablet 0    ALPRAZolam (XANAX) 0.25 MG tablet Take 1 tablet by mouth at bedtime for 30 days. Max Daily Amount: 0.25 mg 30 tablet 0    ALPRAZolam (XANAX) 0.25 MG tablet Take 0.5 tablets by mouth daily as needed for Anxiety for up to 90 days. Give before shower as needed. Max Daily Amount: 0.125 mg 45 tablet 0    Lactobacillus (ACIDOPHILUS PROBIOTIC) CAPS Take 1 caplet by mouth daily      polyethylene glycol (GLYCOLAX) 17 GM/SCOOP powder Take 17 g by mouth 2 times daily      INFANTS SIMETHICONE PO Take 0.3 mLs by mouth 4 times daily as needed (gas) - 20 mg/ 0.3 ml      carboxymethylcellulose (REFRESH PLUS) 0.5 % SOLN ophthalmic solution Place 2 drops into both eyes every 8 hours as needed      rivaroxaban (XARELTO) 15 MG TABS tablet Take 1 tablet by mouth daily (with breakfast) ANTICOAGULANT! Doses greater than 15 mg/day must be administered with food.  90 tablet 3    atorvastatin (LIPITOR) 10 MG tablet Take 1 tablet by mouth daily 30 tablet 0    metoprolol tartrate (LOPRESSOR) 25 MG tablet Take 0.5 tablets by mouth 2 times daily Hold for HR <60 or

## 2023-07-11 ENCOUNTER — HOSPITAL ENCOUNTER (OUTPATIENT)
Dept: INFUSION THERAPY | Age: 88
Setting detail: INFUSION SERIES
Discharge: HOME | End: 2023-07-11
Payer: MEDICARE

## 2023-07-11 VITALS
DIASTOLIC BLOOD PRESSURE: 61 MMHG | RESPIRATION RATE: 18 BRPM | HEART RATE: 68 BPM | TEMPERATURE: 96.4 F | OXYGEN SATURATION: 94 % | SYSTOLIC BLOOD PRESSURE: 119 MMHG

## 2023-07-11 DIAGNOSIS — N30.01 ACUTE CYSTITIS WITH HEMATURIA: Primary | ICD-10-CM

## 2023-07-11 PROCEDURE — 6360000002 HC RX W HCPCS: Performed by: INTERNAL MEDICINE

## 2023-07-11 PROCEDURE — 96365 THER/PROPH/DIAG IV INF INIT: CPT

## 2023-07-11 PROCEDURE — 2580000003 HC RX 258: Performed by: INTERNAL MEDICINE

## 2023-07-11 RX ORDER — ALBUTEROL SULFATE 90 UG/1
4 AEROSOL, METERED RESPIRATORY (INHALATION) PRN
Status: CANCELLED | OUTPATIENT
Start: 2023-07-12

## 2023-07-11 RX ORDER — SODIUM CHLORIDE 0.9 % (FLUSH) 0.9 %
5-40 SYRINGE (ML) INJECTION PRN
Status: CANCELLED | OUTPATIENT
Start: 2023-07-12

## 2023-07-11 RX ORDER — DIPHENHYDRAMINE HYDROCHLORIDE 50 MG/ML
50 INJECTION INTRAMUSCULAR; INTRAVENOUS
Status: CANCELLED | OUTPATIENT
Start: 2023-07-12

## 2023-07-11 RX ORDER — SODIUM CHLORIDE 0.9 % (FLUSH) 0.9 %
5-40 SYRINGE (ML) INJECTION PRN
Status: DISCONTINUED | OUTPATIENT
Start: 2023-07-11 | End: 2023-07-12 | Stop reason: HOSPADM

## 2023-07-11 RX ORDER — ACETAMINOPHEN 325 MG/1
650 TABLET ORAL
Status: CANCELLED | OUTPATIENT
Start: 2023-07-12

## 2023-07-11 RX ORDER — HEPARIN SODIUM 100 [USP'U]/ML
500 INJECTION, SOLUTION INTRAVENOUS PRN
Status: CANCELLED | OUTPATIENT
Start: 2023-07-12

## 2023-07-11 RX ORDER — ONDANSETRON 2 MG/ML
8 INJECTION INTRAMUSCULAR; INTRAVENOUS
Status: CANCELLED | OUTPATIENT
Start: 2023-07-12

## 2023-07-11 RX ORDER — HEPARIN SODIUM 100 [USP'U]/ML
500 INJECTION, SOLUTION INTRAVENOUS PRN
Status: DISCONTINUED | OUTPATIENT
Start: 2023-07-11 | End: 2023-07-12 | Stop reason: HOSPADM

## 2023-07-11 RX ORDER — SODIUM CHLORIDE 9 MG/ML
5-250 INJECTION, SOLUTION INTRAVENOUS PRN
Status: CANCELLED | OUTPATIENT
Start: 2023-07-12

## 2023-07-11 RX ORDER — EPINEPHRINE 1 MG/ML
0.3 INJECTION, SOLUTION, CONCENTRATE INTRAVENOUS PRN
Status: CANCELLED | OUTPATIENT
Start: 2023-07-12

## 2023-07-11 RX ORDER — SODIUM CHLORIDE 9 MG/ML
INJECTION, SOLUTION INTRAVENOUS CONTINUOUS
Status: CANCELLED | OUTPATIENT
Start: 2023-07-12

## 2023-07-11 RX ORDER — SODIUM CHLORIDE 9 MG/ML
5-250 INJECTION, SOLUTION INTRAVENOUS PRN
Status: DISCONTINUED | OUTPATIENT
Start: 2023-07-11 | End: 2023-07-12 | Stop reason: HOSPADM

## 2023-07-11 RX ADMIN — HEPARIN 300 UNITS: 100 SYRINGE at 08:51

## 2023-07-11 RX ADMIN — CEFTRIAXONE 1000 MG: 1 INJECTION, POWDER, FOR SOLUTION INTRAMUSCULAR; INTRAVENOUS at 08:20

## 2023-07-11 ASSESSMENT — ENCOUNTER SYMPTOMS
DIARRHEA: 0
VOMITING: 0
NAUSEA: 0
RHINORRHEA: 0
COUGH: 0
WHEEZING: 0

## 2023-07-12 ENCOUNTER — HOSPITAL ENCOUNTER (OUTPATIENT)
Dept: INFUSION THERAPY | Age: 88
Setting detail: INFUSION SERIES
Discharge: HOME | End: 2023-07-12
Payer: MEDICARE

## 2023-07-12 VITALS
RESPIRATION RATE: 16 BRPM | TEMPERATURE: 97 F | SYSTOLIC BLOOD PRESSURE: 123 MMHG | OXYGEN SATURATION: 96 % | DIASTOLIC BLOOD PRESSURE: 62 MMHG

## 2023-07-12 DIAGNOSIS — N30.01 ACUTE CYSTITIS WITH HEMATURIA: Primary | ICD-10-CM

## 2023-07-12 PROCEDURE — 6360000002 HC RX W HCPCS: Performed by: INTERNAL MEDICINE

## 2023-07-12 PROCEDURE — 2580000003 HC RX 258: Performed by: INTERNAL MEDICINE

## 2023-07-12 PROCEDURE — 96365 THER/PROPH/DIAG IV INF INIT: CPT

## 2023-07-12 PROCEDURE — 6360000002 HC RX W HCPCS

## 2023-07-12 RX ORDER — SODIUM CHLORIDE 9 MG/ML
INJECTION, SOLUTION INTRAVENOUS CONTINUOUS
Status: CANCELLED | OUTPATIENT
Start: 2023-07-13

## 2023-07-12 RX ORDER — ACETAMINOPHEN 325 MG/1
650 TABLET ORAL
Status: CANCELLED | OUTPATIENT
Start: 2023-07-13

## 2023-07-12 RX ORDER — HEPARIN SODIUM (PORCINE) LOCK FLUSH IV SOLN 100 UNIT/ML 100 UNIT/ML
SOLUTION INTRAVENOUS
Status: COMPLETED
Start: 2023-07-12 | End: 2023-07-12

## 2023-07-12 RX ORDER — CEFTRIAXONE 1 G/1
INJECTION, POWDER, FOR SOLUTION INTRAMUSCULAR; INTRAVENOUS
Status: DISPENSED
Start: 2023-07-12 | End: 2023-07-12

## 2023-07-12 RX ORDER — HEPARIN SODIUM 100 [USP'U]/ML
500 INJECTION, SOLUTION INTRAVENOUS PRN
Status: CANCELLED | OUTPATIENT
Start: 2023-07-13

## 2023-07-12 RX ORDER — ONDANSETRON 2 MG/ML
8 INJECTION INTRAMUSCULAR; INTRAVENOUS
Status: CANCELLED | OUTPATIENT
Start: 2023-07-13

## 2023-07-12 RX ORDER — HEPARIN SODIUM 100 [USP'U]/ML
100 INJECTION, SOLUTION INTRAVENOUS PRN
Status: DISCONTINUED | OUTPATIENT
Start: 2023-07-12 | End: 2023-07-13 | Stop reason: HOSPADM

## 2023-07-12 RX ORDER — SODIUM CHLORIDE 0.9 % (FLUSH) 0.9 %
5-40 SYRINGE (ML) INJECTION PRN
Status: CANCELLED | OUTPATIENT
Start: 2023-07-13

## 2023-07-12 RX ORDER — ALBUTEROL SULFATE 90 UG/1
4 AEROSOL, METERED RESPIRATORY (INHALATION) PRN
Status: CANCELLED | OUTPATIENT
Start: 2023-07-13

## 2023-07-12 RX ORDER — DIPHENHYDRAMINE HYDROCHLORIDE 50 MG/ML
50 INJECTION INTRAMUSCULAR; INTRAVENOUS
Status: CANCELLED | OUTPATIENT
Start: 2023-07-13

## 2023-07-12 RX ORDER — EPINEPHRINE 1 MG/ML
0.3 INJECTION, SOLUTION, CONCENTRATE INTRAVENOUS PRN
Status: CANCELLED | OUTPATIENT
Start: 2023-07-13

## 2023-07-12 RX ORDER — SODIUM CHLORIDE 9 MG/ML
5-250 INJECTION, SOLUTION INTRAVENOUS PRN
Status: CANCELLED | OUTPATIENT
Start: 2023-07-13

## 2023-07-12 RX ADMIN — HEPARIN SODIUM (PORCINE) LOCK FLUSH IV SOLN 100 UNIT/ML 100 UNITS: 100 SOLUTION at 08:44

## 2023-07-12 RX ADMIN — CEFTRIAXONE 1000 MG: 1 INJECTION, POWDER, FOR SOLUTION INTRAMUSCULAR; INTRAVENOUS at 08:14

## 2023-07-13 ENCOUNTER — HOSPITAL ENCOUNTER (OUTPATIENT)
Dept: INFUSION THERAPY | Age: 88
Setting detail: INFUSION SERIES
Discharge: HOME | End: 2023-07-13
Payer: MEDICARE

## 2023-07-13 VITALS
OXYGEN SATURATION: 99 % | SYSTOLIC BLOOD PRESSURE: 129 MMHG | HEART RATE: 80 BPM | TEMPERATURE: 97.9 F | RESPIRATION RATE: 12 BRPM | DIASTOLIC BLOOD PRESSURE: 67 MMHG

## 2023-07-13 DIAGNOSIS — N30.01 ACUTE CYSTITIS WITH HEMATURIA: Primary | ICD-10-CM

## 2023-07-13 PROCEDURE — 6360000002 HC RX W HCPCS

## 2023-07-13 PROCEDURE — 6360000002 HC RX W HCPCS: Performed by: INTERNAL MEDICINE

## 2023-07-13 PROCEDURE — 2580000003 HC RX 258: Performed by: INTERNAL MEDICINE

## 2023-07-13 PROCEDURE — 96365 THER/PROPH/DIAG IV INF INIT: CPT

## 2023-07-13 RX ORDER — HEPARIN SODIUM 100 [USP'U]/ML
500 INJECTION, SOLUTION INTRAVENOUS PRN
Status: DISCONTINUED | OUTPATIENT
Start: 2023-07-13 | End: 2023-07-14 | Stop reason: HOSPADM

## 2023-07-13 RX ORDER — SODIUM CHLORIDE 9 MG/ML
5-250 INJECTION, SOLUTION INTRAVENOUS PRN
Status: DISCONTINUED | OUTPATIENT
Start: 2023-07-13 | End: 2023-07-14 | Stop reason: HOSPADM

## 2023-07-13 RX ORDER — ALBUTEROL SULFATE 90 UG/1
4 AEROSOL, METERED RESPIRATORY (INHALATION) PRN
Status: CANCELLED | OUTPATIENT
Start: 2023-07-14

## 2023-07-13 RX ORDER — CEFTRIAXONE 1 G/1
INJECTION, POWDER, FOR SOLUTION INTRAMUSCULAR; INTRAVENOUS
Status: DISPENSED
Start: 2023-07-13 | End: 2023-07-13

## 2023-07-13 RX ORDER — EPINEPHRINE 1 MG/ML
0.3 INJECTION, SOLUTION, CONCENTRATE INTRAVENOUS PRN
Status: CANCELLED | OUTPATIENT
Start: 2023-07-14

## 2023-07-13 RX ORDER — ACETAMINOPHEN 325 MG/1
650 TABLET ORAL
Status: CANCELLED | OUTPATIENT
Start: 2023-07-14

## 2023-07-13 RX ORDER — SODIUM CHLORIDE 9 MG/ML
5-250 INJECTION, SOLUTION INTRAVENOUS PRN
Status: CANCELLED | OUTPATIENT
Start: 2023-07-14

## 2023-07-13 RX ORDER — SODIUM CHLORIDE 0.9 % (FLUSH) 0.9 %
5-40 SYRINGE (ML) INJECTION PRN
Status: CANCELLED | OUTPATIENT
Start: 2023-07-14

## 2023-07-13 RX ORDER — DIPHENHYDRAMINE HYDROCHLORIDE 50 MG/ML
50 INJECTION INTRAMUSCULAR; INTRAVENOUS
Status: CANCELLED | OUTPATIENT
Start: 2023-07-14

## 2023-07-13 RX ORDER — HEPARIN SODIUM 100 [USP'U]/ML
500 INJECTION, SOLUTION INTRAVENOUS PRN
Status: CANCELLED | OUTPATIENT
Start: 2023-07-14

## 2023-07-13 RX ORDER — SODIUM CHLORIDE 9 MG/ML
INJECTION, SOLUTION INTRAVENOUS CONTINUOUS
Status: CANCELLED | OUTPATIENT
Start: 2023-07-14

## 2023-07-13 RX ORDER — SODIUM CHLORIDE 0.9 % (FLUSH) 0.9 %
5-40 SYRINGE (ML) INJECTION PRN
Status: DISCONTINUED | OUTPATIENT
Start: 2023-07-13 | End: 2023-07-14 | Stop reason: HOSPADM

## 2023-07-13 RX ORDER — ONDANSETRON 2 MG/ML
8 INJECTION INTRAMUSCULAR; INTRAVENOUS
Status: CANCELLED | OUTPATIENT
Start: 2023-07-14

## 2023-07-13 RX ADMIN — CEFTRIAXONE 1000 MG: 1 INJECTION, POWDER, FOR SOLUTION INTRAMUSCULAR; INTRAVENOUS at 08:05

## 2023-07-13 RX ADMIN — HEPARIN 100 UNITS: 100 SYRINGE at 08:48

## 2023-07-13 NOTE — PROGRESS NOTES
Infusion complete, tolerated well. PICC flushed with heparin. Dressing intact. Ambulatory with steady gait with walker.

## 2023-07-14 ENCOUNTER — HOSPITAL ENCOUNTER (OUTPATIENT)
Age: 88
Setting detail: SPECIMEN
Discharge: HOME | End: 2023-07-14
Payer: MEDICARE

## 2023-07-14 ENCOUNTER — HOSPITAL ENCOUNTER (OUTPATIENT)
Dept: INFUSION THERAPY | Age: 88
Setting detail: INFUSION SERIES
Discharge: HOME | End: 2023-07-14
Payer: MEDICARE

## 2023-07-14 VITALS
DIASTOLIC BLOOD PRESSURE: 72 MMHG | SYSTOLIC BLOOD PRESSURE: 126 MMHG | TEMPERATURE: 96.8 F | OXYGEN SATURATION: 97 % | RESPIRATION RATE: 18 BRPM | HEART RATE: 71 BPM

## 2023-07-14 DIAGNOSIS — N30.01 ACUTE CYSTITIS WITH HEMATURIA: Primary | ICD-10-CM

## 2023-07-14 DIAGNOSIS — N30.01 ACUTE CYSTITIS WITH HEMATURIA: ICD-10-CM

## 2023-07-14 LAB
ALBUMIN SERPL-MCNC: 3.4 G/DL (ref 3.5–5.2)
ALBUMIN/GLOB SERPL: 0.9 {RATIO} (ref 1–2.5)
ALP SERPL-CCNC: 145 U/L (ref 40–129)
ALT SERPL-CCNC: 12 U/L (ref 5–41)
ANION GAP SERPL CALCULATED.3IONS-SCNC: 11 MMOL/L (ref 9–17)
AST SERPL-CCNC: 18 U/L
BASOPHILS # BLD: 0.04 K/UL (ref 0–0.2)
BASOPHILS NFR BLD: 1 % (ref 0–2)
BILIRUB SERPL-MCNC: 0.2 MG/DL (ref 0.3–1.2)
BUN SERPL-MCNC: 10 MG/DL (ref 8–23)
BUN/CREAT SERPL: 9 (ref 9–20)
CALCIUM SERPL-MCNC: 9.2 MG/DL (ref 8.6–10.4)
CHLORIDE SERPL-SCNC: 101 MMOL/L (ref 98–107)
CO2 SERPL-SCNC: 26 MMOL/L (ref 20–31)
CREAT SERPL-MCNC: 1.1 MG/DL (ref 0.7–1.2)
CRP SERPL HS-MCNC: 42.8 MG/L (ref 0–5)
EOSINOPHIL # BLD: 0.39 K/UL (ref 0–0.44)
EOSINOPHILS RELATIVE PERCENT: 7 % (ref 1–4)
ERYTHROCYTE [DISTWIDTH] IN BLOOD BY AUTOMATED COUNT: 13.6 % (ref 11.8–14.4)
ERYTHROCYTE [SEDIMENTATION RATE] IN BLOOD BY PHOTOMETRIC METHOD: 54 MM/HR (ref 0–20)
GFR SERPL CREATININE-BSD FRML MDRD: >60 ML/MIN/1.73M2
GLUCOSE SERPL-MCNC: 90 MG/DL (ref 70–99)
HCT VFR BLD AUTO: 35.6 % (ref 40.7–50.3)
HGB BLD-MCNC: 11.5 G/DL (ref 13–17)
IMM GRANULOCYTES # BLD AUTO: 0.05 K/UL (ref 0–0.3)
IMM GRANULOCYTES NFR BLD: 1 %
LYMPHOCYTES # BLD: 24 % (ref 24–43)
LYMPHOCYTES NFR BLD: 1.35 K/UL (ref 1.1–3.7)
MCH RBC QN AUTO: 27.5 PG (ref 25.2–33.5)
MCHC RBC AUTO-ENTMCNC: 32.3 G/DL (ref 25.2–33.5)
MCV RBC AUTO: 85.2 FL (ref 82.6–102.9)
MONOCYTES NFR BLD: 0.34 K/UL (ref 0.1–1.2)
MONOCYTES NFR BLD: 6 % (ref 3–12)
NEUTROPHILS NFR BLD: 61 % (ref 36–65)
NEUTS SEG NFR BLD: 3.48 K/UL (ref 1.5–8.1)
NRBC BLD-RTO: 0 PER 100 WBC
PLATELET # BLD AUTO: 208 K/UL (ref 138–453)
PMV BLD AUTO: 10.8 FL (ref 8.1–13.5)
POTASSIUM SERPL-SCNC: 4.3 MMOL/L (ref 3.7–5.3)
PROT SERPL-MCNC: 7.2 G/DL (ref 6.4–8.3)
RBC # BLD AUTO: 4.18 M/UL (ref 4.21–5.77)
SODIUM SERPL-SCNC: 138 MMOL/L (ref 135–144)
WBC OTHER # BLD: 5.7 K/UL (ref 3.5–11.3)

## 2023-07-14 PROCEDURE — 86140 C-REACTIVE PROTEIN: CPT

## 2023-07-14 PROCEDURE — 2580000003 HC RX 258: Performed by: INTERNAL MEDICINE

## 2023-07-14 PROCEDURE — 85027 COMPLETE CBC AUTOMATED: CPT

## 2023-07-14 PROCEDURE — 80053 COMPREHEN METABOLIC PANEL: CPT

## 2023-07-14 PROCEDURE — 85652 RBC SED RATE AUTOMATED: CPT

## 2023-07-14 PROCEDURE — 6360000002 HC RX W HCPCS

## 2023-07-14 PROCEDURE — 6360000002 HC RX W HCPCS: Performed by: INTERNAL MEDICINE

## 2023-07-14 RX ORDER — DIPHENHYDRAMINE HYDROCHLORIDE 50 MG/ML
50 INJECTION INTRAMUSCULAR; INTRAVENOUS
Status: CANCELLED | OUTPATIENT
Start: 2023-07-15

## 2023-07-14 RX ORDER — SODIUM CHLORIDE 0.9 % (FLUSH) 0.9 %
5-40 SYRINGE (ML) INJECTION PRN
Status: CANCELLED | OUTPATIENT
Start: 2023-07-15

## 2023-07-14 RX ORDER — HEPARIN SODIUM 100 [USP'U]/ML
500 INJECTION, SOLUTION INTRAVENOUS PRN
Status: CANCELLED | OUTPATIENT
Start: 2023-07-15

## 2023-07-14 RX ORDER — HEPARIN SODIUM 100 [USP'U]/ML
100 INJECTION, SOLUTION INTRAVENOUS PRN
Status: DISCONTINUED | OUTPATIENT
Start: 2023-07-14 | End: 2023-07-15 | Stop reason: HOSPADM

## 2023-07-14 RX ORDER — ALBUTEROL SULFATE 90 UG/1
4 AEROSOL, METERED RESPIRATORY (INHALATION) PRN
Status: CANCELLED | OUTPATIENT
Start: 2023-07-15

## 2023-07-14 RX ORDER — HEPARIN SODIUM 100 [USP'U]/ML
500 INJECTION, SOLUTION INTRAVENOUS PRN
Status: DISCONTINUED | OUTPATIENT
Start: 2023-07-14 | End: 2023-07-14

## 2023-07-14 RX ORDER — ONDANSETRON 2 MG/ML
8 INJECTION INTRAMUSCULAR; INTRAVENOUS
Status: CANCELLED | OUTPATIENT
Start: 2023-07-15

## 2023-07-14 RX ORDER — ACETAMINOPHEN 325 MG/1
650 TABLET ORAL
Status: CANCELLED | OUTPATIENT
Start: 2023-07-15

## 2023-07-14 RX ORDER — SODIUM CHLORIDE 9 MG/ML
5-250 INJECTION, SOLUTION INTRAVENOUS PRN
Status: CANCELLED | OUTPATIENT
Start: 2023-07-15

## 2023-07-14 RX ORDER — CEFTRIAXONE 1 G/1
INJECTION, POWDER, FOR SOLUTION INTRAMUSCULAR; INTRAVENOUS
Status: DISCONTINUED
Start: 2023-07-14 | End: 2023-07-14 | Stop reason: HOSPADM

## 2023-07-14 RX ORDER — SODIUM CHLORIDE 9 MG/ML
INJECTION, SOLUTION INTRAVENOUS CONTINUOUS
Status: CANCELLED | OUTPATIENT
Start: 2023-07-15

## 2023-07-14 RX ORDER — HEPARIN SODIUM 100 [USP'U]/ML
INJECTION, SOLUTION INTRAVENOUS
Status: COMPLETED
Start: 2023-07-14 | End: 2023-07-14

## 2023-07-14 RX ORDER — EPINEPHRINE 1 MG/ML
0.3 INJECTION, SOLUTION, CONCENTRATE INTRAVENOUS PRN
Status: CANCELLED | OUTPATIENT
Start: 2023-07-15

## 2023-07-14 RX ORDER — AZITHROMYCIN 500 MG/1
INJECTION, POWDER, LYOPHILIZED, FOR SOLUTION INTRAVENOUS
Status: DISPENSED
Start: 2023-07-14 | End: 2023-07-14

## 2023-07-14 RX ADMIN — HEPARIN 100 UNITS: 100 SYRINGE at 09:11

## 2023-07-14 RX ADMIN — CEFTRIAXONE 1000 MG: 1 INJECTION, POWDER, FOR SOLUTION INTRAMUSCULAR; INTRAVENOUS at 08:52

## 2023-07-14 RX ADMIN — HEPARIN SODIUM 100 UNITS: 100 INJECTION, SOLUTION INTRAVENOUS at 09:11

## 2023-07-15 ENCOUNTER — HOSPITAL ENCOUNTER (OUTPATIENT)
Dept: INFUSION THERAPY | Age: 88
Setting detail: INFUSION SERIES
Discharge: HOME | End: 2023-07-15
Payer: MEDICARE

## 2023-07-15 VITALS
RESPIRATION RATE: 16 BRPM | TEMPERATURE: 97.3 F | OXYGEN SATURATION: 100 % | HEART RATE: 79 BPM | DIASTOLIC BLOOD PRESSURE: 63 MMHG | SYSTOLIC BLOOD PRESSURE: 109 MMHG

## 2023-07-15 DIAGNOSIS — N30.01 ACUTE CYSTITIS WITH HEMATURIA: Primary | ICD-10-CM

## 2023-07-15 PROCEDURE — 96365 THER/PROPH/DIAG IV INF INIT: CPT

## 2023-07-15 PROCEDURE — 6360000002 HC RX W HCPCS

## 2023-07-15 PROCEDURE — 6360000002 HC RX W HCPCS: Performed by: INTERNAL MEDICINE

## 2023-07-15 PROCEDURE — 2580000003 HC RX 258: Performed by: INTERNAL MEDICINE

## 2023-07-15 RX ORDER — HEPARIN SODIUM 100 [USP'U]/ML
500 INJECTION, SOLUTION INTRAVENOUS PRN
OUTPATIENT
Start: 2023-07-16

## 2023-07-15 RX ORDER — SODIUM CHLORIDE 9 MG/ML
5-250 INJECTION, SOLUTION INTRAVENOUS PRN
OUTPATIENT
Start: 2023-07-16

## 2023-07-15 RX ORDER — ALBUTEROL SULFATE 90 UG/1
4 AEROSOL, METERED RESPIRATORY (INHALATION) PRN
OUTPATIENT
Start: 2023-07-16

## 2023-07-15 RX ORDER — SODIUM CHLORIDE 0.9 % (FLUSH) 0.9 %
5-40 SYRINGE (ML) INJECTION PRN
OUTPATIENT
Start: 2023-07-16

## 2023-07-15 RX ORDER — EPINEPHRINE 1 MG/ML
0.3 INJECTION, SOLUTION, CONCENTRATE INTRAVENOUS PRN
OUTPATIENT
Start: 2023-07-16

## 2023-07-15 RX ORDER — HEPARIN SODIUM 100 [USP'U]/ML
INJECTION, SOLUTION INTRAVENOUS
Status: COMPLETED
Start: 2023-07-15 | End: 2023-07-15

## 2023-07-15 RX ORDER — CEFTRIAXONE 1 G/1
INJECTION, POWDER, FOR SOLUTION INTRAMUSCULAR; INTRAVENOUS
Status: DISPENSED
Start: 2023-07-15 | End: 2023-07-15

## 2023-07-15 RX ORDER — DIPHENHYDRAMINE HYDROCHLORIDE 50 MG/ML
50 INJECTION INTRAMUSCULAR; INTRAVENOUS
OUTPATIENT
Start: 2023-07-16

## 2023-07-15 RX ORDER — HEPARIN SODIUM 100 [USP'U]/ML
100 INJECTION, SOLUTION INTRAVENOUS PRN
Status: DISCONTINUED | OUTPATIENT
Start: 2023-07-15 | End: 2023-07-16 | Stop reason: HOSPADM

## 2023-07-15 RX ORDER — ACETAMINOPHEN 325 MG/1
650 TABLET ORAL
OUTPATIENT
Start: 2023-07-16

## 2023-07-15 RX ORDER — SODIUM CHLORIDE 9 MG/ML
INJECTION, SOLUTION INTRAVENOUS CONTINUOUS
OUTPATIENT
Start: 2023-07-16

## 2023-07-15 RX ORDER — ONDANSETRON 2 MG/ML
8 INJECTION INTRAMUSCULAR; INTRAVENOUS
OUTPATIENT
Start: 2023-07-16

## 2023-07-15 RX ADMIN — HEPARIN SODIUM 100 UNITS: 100 INJECTION, SOLUTION INTRAVENOUS at 08:28

## 2023-07-15 RX ADMIN — CEFTRIAXONE 1000 MG: 1 INJECTION, POWDER, FOR SOLUTION INTRAMUSCULAR; INTRAVENOUS at 08:00

## 2023-07-15 RX ADMIN — HEPARIN 100 UNITS: 100 SYRINGE at 08:28

## 2023-07-17 ENCOUNTER — TELEPHONE (OUTPATIENT)
Dept: INTERNAL MEDICINE | Age: 88
End: 2023-07-17

## 2023-07-17 ENCOUNTER — OUTSIDE SERVICES (OUTPATIENT)
Dept: INTERNAL MEDICINE | Age: 88
End: 2023-07-17
Payer: MEDICARE

## 2023-07-17 ENCOUNTER — APPOINTMENT (OUTPATIENT)
Dept: GENERAL RADIOLOGY | Age: 88
End: 2023-07-17
Payer: MEDICARE

## 2023-07-17 ENCOUNTER — HOSPITAL ENCOUNTER (EMERGENCY)
Age: 88
Discharge: NURSING, REHAB OR LONG-TERM CARE | End: 2023-07-17
Attending: EMERGENCY MEDICINE
Payer: MEDICARE

## 2023-07-17 VITALS
SYSTOLIC BLOOD PRESSURE: 128 MMHG | HEART RATE: 76 BPM | DIASTOLIC BLOOD PRESSURE: 68 MMHG | TEMPERATURE: 98.4 F | OXYGEN SATURATION: 98 %

## 2023-07-17 VITALS
RESPIRATION RATE: 16 BRPM | SYSTOLIC BLOOD PRESSURE: 166 MMHG | HEART RATE: 95 BPM | TEMPERATURE: 100.3 F | OXYGEN SATURATION: 98 % | DIASTOLIC BLOOD PRESSURE: 67 MMHG

## 2023-07-17 DIAGNOSIS — J18.9 PNEUMONIA OF BOTH LOWER LOBES DUE TO INFECTIOUS ORGANISM: Primary | ICD-10-CM

## 2023-07-17 DIAGNOSIS — R31.9 URINARY TRACT INFECTION WITH HEMATURIA, SITE UNSPECIFIED: Primary | ICD-10-CM

## 2023-07-17 DIAGNOSIS — N39.0 URINARY TRACT INFECTION WITH HEMATURIA, SITE UNSPECIFIED: Primary | ICD-10-CM

## 2023-07-17 DIAGNOSIS — N39.0 URINARY TRACT INFECTION WITHOUT HEMATURIA, SITE UNSPECIFIED: ICD-10-CM

## 2023-07-17 LAB
ANION GAP SERPL CALCULATED.3IONS-SCNC: 12 MMOL/L (ref 9–17)
BACTERIA URNS QL MICRO: ABNORMAL
BASOPHILS # BLD: 0.04 K/UL (ref 0–0.2)
BASOPHILS NFR BLD: 1 % (ref 0–2)
BILIRUB UR QL STRIP: NEGATIVE
BUN SERPL-MCNC: 16 MG/DL (ref 8–23)
BUN/CREAT SERPL: 13 (ref 9–20)
CALCIUM SERPL-MCNC: 8.8 MG/DL (ref 8.6–10.4)
CHARACTER UR: ABNORMAL
CHLORIDE SERPL-SCNC: 98 MMOL/L (ref 98–107)
CLARITY UR: CLEAR
CO2 SERPL-SCNC: 25 MMOL/L (ref 20–31)
COLOR UR: YELLOW
CREAT SERPL-MCNC: 1.2 MG/DL (ref 0.7–1.2)
EOSINOPHIL # BLD: 0.12 K/UL (ref 0–0.44)
EOSINOPHILS RELATIVE PERCENT: 2 % (ref 1–4)
EPI CELLS #/AREA URNS HPF: ABNORMAL /HPF (ref 0–5)
ERYTHROCYTE [DISTWIDTH] IN BLOOD BY AUTOMATED COUNT: 13.6 % (ref 11.8–14.4)
GFR SERPL CREATININE-BSD FRML MDRD: 58 ML/MIN/1.73M2
GLUCOSE SERPL-MCNC: 116 MG/DL (ref 70–99)
GLUCOSE UR STRIP-MCNC: NEGATIVE MG/DL
HCT VFR BLD AUTO: 35.2 % (ref 40.7–50.3)
HGB BLD-MCNC: 11.5 G/DL (ref 13–17)
HGB UR QL STRIP.AUTO: ABNORMAL
IMM GRANULOCYTES # BLD AUTO: 0.04 K/UL (ref 0–0.3)
IMM GRANULOCYTES NFR BLD: 1 %
KETONES UR STRIP-MCNC: NEGATIVE MG/DL
LEUKOCYTE ESTERASE UR QL STRIP: ABNORMAL
LYMPHOCYTES # BLD: 11 % (ref 24–43)
LYMPHOCYTES NFR BLD: 0.74 K/UL (ref 1.1–3.7)
MCH RBC QN AUTO: 27.8 PG (ref 25.2–33.5)
MCHC RBC AUTO-ENTMCNC: 32.7 G/DL (ref 25.2–33.5)
MCV RBC AUTO: 85.2 FL (ref 82.6–102.9)
MONOCYTES NFR BLD: 0.29 K/UL (ref 0.1–1.2)
MONOCYTES NFR BLD: 4 % (ref 3–12)
NEUTROPHILS NFR BLD: 81 % (ref 36–65)
NEUTS SEG NFR BLD: 5.84 K/UL (ref 1.5–8.1)
NITRITE UR QL STRIP: NEGATIVE
NRBC BLD-RTO: 0 PER 100 WBC
PH UR STRIP: 5.5 [PH] (ref 5–6)
PLATELET # BLD AUTO: 238 K/UL (ref 138–453)
PMV BLD AUTO: 9.9 FL (ref 8.1–13.5)
POTASSIUM SERPL-SCNC: 4.6 MMOL/L (ref 3.7–5.3)
PROT UR STRIP-MCNC: ABNORMAL MG/DL
RBC # BLD AUTO: 4.13 M/UL (ref 4.21–5.77)
RBC #/AREA URNS HPF: ABNORMAL /HPF (ref 0–4)
SODIUM SERPL-SCNC: 135 MMOL/L (ref 135–144)
SP GR UR STRIP: 1.02 (ref 1.01–1.02)
UROBILINOGEN UR STRIP-ACNC: NORMAL EU/DL (ref 0–1)
WBC #/AREA URNS HPF: ABNORMAL /HPF (ref 0–4)
WBC OTHER # BLD: 7.1 K/UL (ref 3.5–11.3)

## 2023-07-17 PROCEDURE — 99284 EMERGENCY DEPT VISIT MOD MDM: CPT

## 2023-07-17 PROCEDURE — 87040 BLOOD CULTURE FOR BACTERIA: CPT

## 2023-07-17 PROCEDURE — 6370000000 HC RX 637 (ALT 250 FOR IP): Performed by: EMERGENCY MEDICINE

## 2023-07-17 PROCEDURE — 2580000003 HC RX 258: Performed by: EMERGENCY MEDICINE

## 2023-07-17 PROCEDURE — 80048 BASIC METABOLIC PNL TOTAL CA: CPT

## 2023-07-17 PROCEDURE — 85027 COMPLETE CBC AUTOMATED: CPT

## 2023-07-17 PROCEDURE — 81001 URINALYSIS AUTO W/SCOPE: CPT

## 2023-07-17 PROCEDURE — 36415 COLL VENOUS BLD VENIPUNCTURE: CPT

## 2023-07-17 PROCEDURE — 71045 X-RAY EXAM CHEST 1 VIEW: CPT

## 2023-07-17 PROCEDURE — 99347 HOME/RES VST EST SF MDM 20: CPT | Performed by: NURSE PRACTITIONER

## 2023-07-17 RX ORDER — CIPROFLOXACIN 250 MG/1
500 TABLET, FILM COATED ORAL ONCE
Status: COMPLETED | OUTPATIENT
Start: 2023-07-17 | End: 2023-07-17

## 2023-07-17 RX ORDER — 0.9 % SODIUM CHLORIDE 0.9 %
1000 INTRAVENOUS SOLUTION INTRAVENOUS ONCE
Status: COMPLETED | OUTPATIENT
Start: 2023-07-17 | End: 2023-07-17

## 2023-07-17 RX ORDER — ACETAMINOPHEN 325 MG/1
650 TABLET ORAL ONCE
Status: COMPLETED | OUTPATIENT
Start: 2023-07-17 | End: 2023-07-17

## 2023-07-17 RX ORDER — CIPROFLOXACIN 500 MG/1
500 TABLET, FILM COATED ORAL 2 TIMES DAILY
Qty: 14 TABLET | Refills: 0 | Status: SHIPPED | OUTPATIENT
Start: 2023-07-17 | End: 2023-07-24

## 2023-07-17 RX ADMIN — ACETAMINOPHEN 650 MG: 325 TABLET ORAL at 17:14

## 2023-07-17 RX ADMIN — SODIUM CHLORIDE 1000 ML: 9 INJECTION, SOLUTION INTRAVENOUS at 17:15

## 2023-07-17 RX ADMIN — CIPROFLOXACIN 500 MG: 250 TABLET, FILM COATED ORAL at 18:30

## 2023-07-17 ASSESSMENT — ENCOUNTER SYMPTOMS
NAUSEA: 0
DIARRHEA: 0
RHINORRHEA: 0
COUGH: 0
WHEEZING: 0
VOMITING: 0

## 2023-07-17 ASSESSMENT — PAIN - FUNCTIONAL ASSESSMENT
PAIN_FUNCTIONAL_ASSESSMENT: NONE - DENIES PAIN
PAIN_FUNCTIONAL_ASSESSMENT: NONE - DENIES PAIN

## 2023-07-17 NOTE — DISCHARGE INSTRUCTIONS
Cipro as directed. Continue other medications as directed. See the PCP to follow-up. Return for vomiting, difficulty breathing, fever, or if worse in any way. Please understand that at this time there is no evidence for a more serious underlying process, but that early in the process of an illness or injury, an emergency department workup can be falsely reassuring. You should contact your family doctor within the next 48 hours for a follow up appointment    [address]!!!    From [facility] and [facility] Emergency Services    On behalf of the Emergency Department staff at [facility]), I would like to thank you for giving us the opportunity to address your health care needs and concerns. We hope that during your visit, our service was delivered in a professional and caring manner. Please keep [facility] in mind as we walk with you down the path to your own personal wellness. Please expect an automated text message or email from us so we can ask a few questions about your health and progress. Based on your answers, a clinician may call you back to offer help and instructions. Please understand that early in the process of an illness or injury, an emergency department workup can be falsely reassuring. If you notice any worsening, changing or persistent symptoms please call your family doctor or return to the ER immediately. Tell us how we did during your visit at [email or web address]   and let us know about your experience

## 2023-07-17 NOTE — TELEPHONE ENCOUNTER
Please verify with infusion room that patient has completed course of Rocephin as per ER discharge orders.   If Rocephin is indeed complete, please have patient return to ER to pull his midline

## 2023-07-17 NOTE — TELEPHONE ENCOUNTER
Spoke with Anju Polk at [facility] and patient is on his way out to ER to have it removed.

## 2023-07-17 NOTE — PROGRESS NOTES
[facility]      Buddy Caro is a 80 y.o. male resident of [facility]. HPI:     HPI  Patient presents for ER follow-up after visit for acute cystitis. Patient was diagnosed with multi drug resistant UTI. Known history of BPH with incomplete bladder emptying, as well as UTI requiring admission. PCP contacted ER physician regarding outpatient treatment, given that IV could not be promptly initiated at [address] [place] [address]. Patient has been receiving Rocephin through midline over the past week, staff notes he has since completed antibiotic treatment. They are unable to remove midline at the facility, and were requesting order to flush or to transport to facility to remove. He is otherwise doing well, remains afebrile, no chills, nausea, vomiting, diarrhea. Current Outpatient Medications   Medication Sig Dispense Refill    ALPRAZolam (XANAX) 0.25 MG tablet Take 0.5 tablets by mouth every morning for 92 days. Max Daily Amount: 0.125 mg 45 tablet 0    ALPRAZolam (XANAX) 0.25 MG tablet Take 1 tablet by mouth at bedtime for 30 days. Max Daily Amount: 0.25 mg 30 tablet 0    ALPRAZolam (XANAX) 0.25 MG tablet Take 0.5 tablets by mouth daily as needed for Anxiety for up to 90 days. Give before shower as needed. Max Daily Amount: 0.125 mg 45 tablet 0    Lactobacillus (ACIDOPHILUS PROBIOTIC) CAPS Take 1 caplet by mouth daily      polyethylene glycol (GLYCOLAX) 17 GM/SCOOP powder Take 17 g by mouth 2 times daily      INFANTS SIMETHICONE PO Take 0.3 mLs by mouth 4 times daily as needed (gas) - 20 mg/ 0.3 ml      carboxymethylcellulose (REFRESH PLUS) 0.5 % SOLN ophthalmic solution Place 2 drops into both eyes every 8 hours as needed      rivaroxaban (XARELTO) 15 MG TABS tablet Take 1 tablet by mouth daily (with breakfast) ANTICOAGULANT! Doses greater than 15 mg/day must be administered with food.  90 tablet 3    atorvastatin (LIPITOR) 10 MG tablet Take 1 tablet by mouth daily 30 tablet 0

## 2023-07-17 NOTE — ED PROVIDER NOTES
[facility]      Pt Name: Ce Cope  MRN: [id number]  [address] [facility] [place], operated by [facility] 12/31/1934  Date of evaluation: 7/17/2023      CHIEF COMPLAINT       Chief Complaint   Patient presents with    Fever    Chills         HISTORY OF PRESENT ILLNESS      The patient was sent to the department from nursing home where he resides for fever and chills. He has a fever upon arrival.  He says he aches all over, but this is a common complaint for him. The patient does have a PICC line. The PICC line was placed for UTI. The patient has an indwelling Beck catheter and has recurrent issues with injection. He has been receiving Rocephin as an outpatient. He just recently finished this medication. They were thinking of taking out his PICC line today. The patient cannot contribute to his history. REVIEW OF SYSTEMS       Unable to obtain from patient. Present from the nursing home. History of recurrent UTIs and recent PICC line for treatment of UTI.     PAST MEDICAL HISTORY    has a past medical history of Acute bronchitis, MARITA (acute kidney injury) ([address]), Allergic rhinitis, Anxiety, Bradycardia, Cataract, right, Choroidal nevus of right eye, Chronic systolic CHF (congestive heart failure) ([address]), Coronary artery disease involving native coronary artery of native heart, Dementia associated with 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 behavioral disturbance ([address]), Dermatophytosis of nail, Diverticulosis, Elevated PSA, Hemorrhoids, History of measles, History of mumps, Hyperlipidemia, Hypertension, Hypothyroidism, Mass of upper lobe of right lung, Occult blood positive stool, Osteoarthritis, Overweight, Paroxysmal atrial fibrillation ([address]), Pneumonia due to organism, Pseudophakia, left eye, PSVT (paroxysmal supraventricular tachycardia) ([address]), Recurrent UTI, Retinal detachment, Seborrheic dermatitis, Sepsis due to urinary tract infection ([address]), Urinary tract infection without hematuria, and UTI (urinary

## 2023-07-17 NOTE — TELEPHONE ENCOUNTER
Ok to remove midline as ATB therapy is complete, please notify [facility] and have them coordinate with ER

## 2023-07-17 NOTE — TELEPHONE ENCOUNTER
Spoke with Eze Ravi over in the ER and he said patient completed the Rocephin on July 15. They just need order from [facility] so they can pull the midline.

## 2023-07-18 ENCOUNTER — HOSPITAL ENCOUNTER (EMERGENCY)
Age: 88
Discharge: HOME | End: 2023-07-18
Attending: EMERGENCY MEDICINE
Payer: MEDICARE

## 2023-07-18 VITALS
RESPIRATION RATE: 15 BRPM | DIASTOLIC BLOOD PRESSURE: 65 MMHG | OXYGEN SATURATION: 98 % | TEMPERATURE: 97 F | SYSTOLIC BLOOD PRESSURE: 131 MMHG | HEART RATE: 68 BPM

## 2023-07-18 DIAGNOSIS — Z45.2 PIC LINE (PERIPHERALLY INSERTED CENTRAL CATHETER) REMOVAL: Primary | ICD-10-CM

## 2023-07-18 PROCEDURE — 99282 EMERGENCY DEPT VISIT SF MDM: CPT

## 2023-07-18 ASSESSMENT — PAIN DESCRIPTION - PAIN TYPE: TYPE: CHRONIC PAIN

## 2023-07-18 ASSESSMENT — PAIN - FUNCTIONAL ASSESSMENT
PAIN_FUNCTIONAL_ASSESSMENT: ACTIVITIES ARE NOT PREVENTED
PAIN_FUNCTIONAL_ASSESSMENT: WONG-BAKER FACES

## 2023-07-18 ASSESSMENT — PAIN DESCRIPTION - ONSET: ONSET: ON-GOING

## 2023-07-18 ASSESSMENT — PAIN SCALES - WONG BAKER: WONGBAKER_NUMERICALRESPONSE: 4

## 2023-07-18 ASSESSMENT — PAIN DESCRIPTION - LOCATION: LOCATION: GENERALIZED

## 2023-07-18 NOTE — ED PROVIDER NOTES
blood positive stool, Osteoarthritis, Overweight, Paroxysmal atrial fibrillation (HCC), Pneumonia due to organism, Pseudophakia, left eye, PSVT (paroxysmal supraventricular tachycardia) ([address]), Recurrent UTI, Retinal detachment, Seborrheic dermatitis, Sepsis due to urinary tract infection ([address]), Urinary tract infection without hematuria, and UTI (urinary tract infection). SURGICAL HISTORY      has a past surgical history that includes Coronary artery bypass graft; Appendectomy (1940 and 1955); Tonsillectomy; retinal laser (Left, 12/22/2011); Cataract removal (Left, 05/07/2013); Skin tag removal (11/01/1999); Abscess Drainage ([id number]); Colonoscopy (06/14/2021); and Cystoscopy (N/A, 5/3/2023). CURRENT MEDICATIONS       Previous Medications    ACETAMINOPHEN (TYLENOL) 325 MG TABLET    Take 2 tablets by mouth every 4 hours as needed for Pain    ALBUTEROL (PROVENTIL) (2.5 MG/3ML) 0.083% NEBULIZER SOLUTION    Take 3 mLs by nebulization every 6 hours as needed for Wheezing    ALPRAZOLAM (XANAX) 0.25 MG TABLET    Take 0.5 tablets by mouth every morning for 92 days. Max Daily Amount: 0.125 mg    ALPRAZOLAM (XANAX) 0.25 MG TABLET    Take 1 tablet by mouth at bedtime for 30 days. Max Daily Amount: 0.25 mg    ALPRAZOLAM (XANAX) 0.25 MG TABLET    Take 0.5 tablets by mouth daily as needed for Anxiety for up to 90 days. Give before shower as needed.  Max Daily Amount: 0.125 mg    AMIODARONE (CORDARONE) 200 MG TABLET    Take 0.5 tablets by mouth daily    ATORVASTATIN (LIPITOR) 10 MG TABLET    Take 1 tablet by mouth daily    CARBOXYMETHYLCELLULOSE (REFRESH PLUS) 0.5 % SOLN OPHTHALMIC SOLUTION    Place 2 drops into both eyes every 8 hours as needed    CIPROFLOXACIN (CIPRO) 500 MG TABLET    Take 1 tablet by mouth 2 times daily for 7 days    FLUTICASONE (FLONASE) 50 MCG/ACT NASAL SPRAY    INSTILL 1 SPRAY IN EACH NOSTRIL ONCE DAILY    HYDROCORTISONE 1 % CREAM    Apply topically to affected area as directed prn (Preparation H)

## 2023-07-18 NOTE — DISCHARGE INSTRUCTIONS
Continue medications as prescribed    Return immediately if any worsening symptoms or any other concerns    Tell us how we did visit: [email or web address]   and let us know about your experience

## 2023-07-19 ENCOUNTER — TELEPHONE (OUTPATIENT)
Dept: INTERNAL MEDICINE | Age: 88
End: 2023-07-19

## 2023-07-19 NOTE — TELEPHONE ENCOUNTER
Ok to d/c flush orders if midline is removed    Electronically signed by DAYANARA Abarca CNP on 7/19/2023 at 10:09 AM

## 2023-07-19 NOTE — ED NOTES
Spoke with [address] on the phone in regards to order for Cipro. Nurse wanted clarification in regards to cirpo order. Discussed that information is listed in his discharge and was sent the the pharmacy at [facility].      Lizbeth Stewart RN  07/19/23 2112

## 2023-07-20 ENCOUNTER — HOSPITAL ENCOUNTER (EMERGENCY)
Age: 88
Discharge: HOME | End: 2023-07-20
Attending: EMERGENCY MEDICINE
Payer: MEDICARE

## 2023-07-20 VITALS
RESPIRATION RATE: 16 BRPM | DIASTOLIC BLOOD PRESSURE: 56 MMHG | HEIGHT: 71 IN | BODY MASS INDEX: 23 KG/M2 | SYSTOLIC BLOOD PRESSURE: 168 MMHG | WEIGHT: 164.3 LBS | OXYGEN SATURATION: 98 % | HEART RATE: 77 BPM | TEMPERATURE: 97.7 F

## 2023-07-20 DIAGNOSIS — T83.9XXA PROBLEM WITH FOLEY CATHETER, INITIAL ENCOUNTER (HCC): Primary | ICD-10-CM

## 2023-07-20 LAB
ANION GAP SERPL CALCULATED.3IONS-SCNC: 9 MMOL/L (ref 9–17)
BUN SERPL-MCNC: 14 MG/DL (ref 8–23)
BUN/CREAT SERPL: 14 (ref 9–20)
CALCIUM SERPL-MCNC: 8.3 MG/DL (ref 8.6–10.4)
CHLORIDE SERPL-SCNC: 102 MMOL/L (ref 98–107)
CO2 SERPL-SCNC: 28 MMOL/L (ref 20–31)
CREAT SERPL-MCNC: 1 MG/DL (ref 0.7–1.2)
GFR SERPL CREATININE-BSD FRML MDRD: >60 ML/MIN/1.73M2
GLUCOSE SERPL-MCNC: 111 MG/DL (ref 70–99)
POTASSIUM SERPL-SCNC: 4.1 MMOL/L (ref 3.7–5.3)
SODIUM SERPL-SCNC: 139 MMOL/L (ref 135–144)

## 2023-07-20 PROCEDURE — 6370000000 HC RX 637 (ALT 250 FOR IP): Performed by: EMERGENCY MEDICINE

## 2023-07-20 PROCEDURE — 36415 COLL VENOUS BLD VENIPUNCTURE: CPT

## 2023-07-20 PROCEDURE — 99283 EMERGENCY DEPT VISIT LOW MDM: CPT

## 2023-07-20 PROCEDURE — 80048 BASIC METABOLIC PNL TOTAL CA: CPT

## 2023-07-20 PROCEDURE — 51702 INSERT TEMP BLADDER CATH: CPT

## 2023-07-20 RX ORDER — LIDOCAINE HYDROCHLORIDE 20 MG/ML
JELLY TOPICAL PRN
Status: DISCONTINUED | OUTPATIENT
Start: 2023-07-20 | End: 2023-07-20 | Stop reason: HOSPADM

## 2023-07-20 RX ADMIN — LIDOCAINE HYDROCHLORIDE: 20 JELLY TOPICAL at 12:27

## 2023-07-20 ASSESSMENT — ENCOUNTER SYMPTOMS
SHORTNESS OF BREATH: 0
VOMITING: 0
ABDOMINAL PAIN: 0
CHEST TIGHTNESS: 0
NAUSEA: 0

## 2023-07-20 ASSESSMENT — PAIN - FUNCTIONAL ASSESSMENT
PAIN_FUNCTIONAL_ASSESSMENT: NONE - DENIES PAIN

## 2023-07-20 ASSESSMENT — LIFESTYLE VARIABLES
HOW MANY STANDARD DRINKS CONTAINING ALCOHOL DO YOU HAVE ON A TYPICAL DAY: PATIENT DOES NOT DRINK
HOW OFTEN DO YOU HAVE A DRINK CONTAINING ALCOHOL: NEVER

## 2023-07-20 NOTE — ED PROVIDER NOTES
[facility] ED  [address]  [place] [address] [place] [address]  Phone: [phone]        Pt Name: Allen Verde  MRN: [id number]  [facility] 12/31/1934  Date of evaluation: 7/20/23      CHIEF COMPLAINT     Chief Complaint   Patient presents with    Device Check     Pt pulled out urinary catheter around 0400 this morning. HISTORY OF PRESENT ILLNESS    Allen Verde is a 80 y.o. male who presents to our Emergency Department. Patient was sent in from nursing facility for Beck catheter difficulty. Patient has had a Beck catheter for quite some time. Patient presents today because Beck catheter was removed and nursing facility was unable to replace patient's Beck. Beck catheter has been out for approximately 8 hours. They discussed with Dr. Kimberli Whitt who recommended them send the patient to the ER for further evaluation and treatment. Patient is pleasantly demented. REVIEW OF SYSTEMS       Review of Systems   Constitutional:  Negative for chills and fever. Respiratory:  Negative for chest tightness and shortness of breath. Cardiovascular:  Negative for chest pain. Gastrointestinal:  Negative for abdominal pain, nausea and vomiting. Skin:  Negative for rash.      PAST MEDICAL HISTORY     Past Medical History:   Diagnosis Date    Acute bronchitis     by history    MARITA (acute kidney injury) ([address]) 3/13/2018    Allergic rhinitis     Anxiety     Bradycardia 8/19/2018    Cataract, right     Choroidal nevus of right eye     Chronic systolic CHF (congestive heart failure) ([address]) 3/13/2018    Coronary artery disease involving native coronary artery of native heart 10/20/2016    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 LIMA TO LAD, SVG TO DIAGONAL2    Dementia associated with 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 behavioral disturbance ([address]) 9/21/2018    Dermatophytosis of nail 1/10/2014    Diverticulosis     Elevated PSA     Hemorrhoids     History of measles childhood    History of mumps childhood    Hyperlipidemia

## 2023-07-22 LAB
MICROORGANISM SPEC CULT: NORMAL
MICROORGANISM SPEC CULT: NORMAL
SERVICE CMNT-IMP: NORMAL
SERVICE CMNT-IMP: NORMAL
SPECIMEN DESCRIPTION: NORMAL
SPECIMEN DESCRIPTION: NORMAL

## 2023-08-11 ENCOUNTER — HOSPITAL ENCOUNTER (OUTPATIENT)
Age: 88
Setting detail: SPECIMEN
Discharge: HOME | End: 2023-08-11
Payer: MEDICARE

## 2023-08-11 LAB
BACTERIA URNS QL MICRO: ABNORMAL
BILIRUB UR QL STRIP: NEGATIVE
CLARITY UR: CLEAR
COLOR UR: YELLOW
EPI CELLS #/AREA URNS HPF: ABNORMAL /HPF (ref 0–5)
GLUCOSE UR STRIP-MCNC: NEGATIVE MG/DL
HGB UR QL STRIP.AUTO: ABNORMAL
KETONES UR STRIP-MCNC: NEGATIVE MG/DL
LEUKOCYTE ESTERASE UR QL STRIP: ABNORMAL
NITRITE UR QL STRIP: NEGATIVE
PH UR STRIP: 6 [PH] (ref 5–6)
PROT UR STRIP-MCNC: ABNORMAL MG/DL
RBC #/AREA URNS HPF: ABNORMAL /HPF (ref 0–4)
SP GR UR STRIP: 1.02 (ref 1.01–1.02)
UROBILINOGEN UR STRIP-ACNC: NORMAL EU/DL (ref 0–1)
WBC #/AREA URNS HPF: ABNORMAL /HPF (ref 0–4)

## 2023-08-11 PROCEDURE — 81001 URINALYSIS AUTO W/SCOPE: CPT

## 2023-08-11 PROCEDURE — 87086 URINE CULTURE/COLONY COUNT: CPT

## 2023-08-13 LAB
MICROORGANISM SPEC CULT: NORMAL
SPECIMEN DESCRIPTION: NORMAL

## 2023-08-16 ENCOUNTER — HOSPITAL ENCOUNTER (OUTPATIENT)
Age: 88
Setting detail: SPECIMEN
Discharge: HOME | End: 2023-08-16

## 2023-08-17 ENCOUNTER — HOSPITAL ENCOUNTER (OUTPATIENT)
Age: 88
Setting detail: SPECIMEN
Discharge: HOME | End: 2023-08-17
Payer: MEDICARE

## 2023-08-17 LAB
BACTERIA URNS QL MICRO: ABNORMAL
BILIRUB UR QL STRIP: NEGATIVE
CASTS #/AREA URNS LPF: ABNORMAL /LPF (ref 0–2)
CASTS #/AREA URNS LPF: ABNORMAL /LPF (ref 0–2)
CLARITY UR: ABNORMAL
COLOR UR: YELLOW
EPI CELLS #/AREA URNS HPF: ABNORMAL /HPF (ref 0–5)
GLUCOSE UR STRIP-MCNC: NEGATIVE MG/DL
HGB UR QL STRIP.AUTO: ABNORMAL
KETONES UR STRIP-MCNC: ABNORMAL MG/DL
LEUKOCYTE ESTERASE UR QL STRIP: ABNORMAL
MUCOUS THREADS URNS QL MICRO: ABNORMAL
NITRITE UR QL STRIP: NEGATIVE
PH UR STRIP: 5.5 [PH] (ref 5–6)
PROT UR STRIP-MCNC: ABNORMAL MG/DL
RBC #/AREA URNS HPF: ABNORMAL /HPF (ref 0–4)
SP GR UR STRIP: 1.02 (ref 1.01–1.02)
UROBILINOGEN UR STRIP-ACNC: NORMAL EU/DL (ref 0–1)
WBC #/AREA URNS HPF: ABNORMAL /HPF (ref 0–4)

## 2023-08-17 PROCEDURE — 87086 URINE CULTURE/COLONY COUNT: CPT

## 2023-08-17 PROCEDURE — 87186 SC STD MICRODIL/AGAR DIL: CPT

## 2023-08-17 PROCEDURE — 81001 URINALYSIS AUTO W/SCOPE: CPT

## 2023-08-17 PROCEDURE — 86403 PARTICLE AGGLUT ANTBDY SCRN: CPT

## 2023-08-19 LAB
MICROORGANISM SPEC CULT: ABNORMAL
SPECIMEN DESCRIPTION: ABNORMAL

## 2023-08-21 ENCOUNTER — OUTSIDE SERVICES (OUTPATIENT)
Dept: INTERNAL MEDICINE | Age: 88
End: 2023-08-21
Payer: MEDICARE

## 2023-08-21 DIAGNOSIS — R31.9 URINARY TRACT INFECTION WITH HEMATURIA, SITE UNSPECIFIED: Primary | ICD-10-CM

## 2023-08-21 DIAGNOSIS — N39.0 URINARY TRACT INFECTION WITH HEMATURIA, SITE UNSPECIFIED: Primary | ICD-10-CM

## 2023-08-21 DIAGNOSIS — N32.0 BLADDER OUTLET OBSTRUCTION: ICD-10-CM

## 2023-08-21 DIAGNOSIS — N40.1 BENIGN PROSTATIC HYPERPLASIA WITH INCOMPLETE BLADDER EMPTYING: ICD-10-CM

## 2023-08-21 DIAGNOSIS — R39.14 BENIGN PROSTATIC HYPERPLASIA WITH INCOMPLETE BLADDER EMPTYING: ICD-10-CM

## 2023-08-21 PROCEDURE — 99347 HOME/RES VST EST SF MDM 20: CPT | Performed by: NURSE PRACTITIONER

## 2023-08-21 RX ORDER — NITROFURANTOIN 25; 75 MG/1; MG/1
100 CAPSULE ORAL 2 TIMES DAILY
Qty: 14 CAPSULE | Refills: 0 | Status: SHIPPED | OUTPATIENT
Start: 2023-08-21 | End: 2023-08-28

## 2023-08-21 ASSESSMENT — ENCOUNTER SYMPTOMS
NAUSEA: 0
RHINORRHEA: 0
VOMITING: 0
DIARRHEA: 0
WHEEZING: 0
COUGH: 0

## 2023-08-21 NOTE — PROGRESS NOTES
[facility]      Marlys Coe is a 80 y.o. male resident of [place]. HPI:     HPI  Patient presents for evaluation and management of UTI. Initially presented with increased confusion and discomfort with Beck catheter. UA noted UTI, C&S reviewed today. He has not had any fever, chills, nausea, vomiting, or diarrhea per staff. He has had multiple UTIs over the past several months, has been admitted to [facility] for this, as well as receiving outpatient IV therapy. Known history of BPH with incomplete bladder emptying. Staff has noted he has become extremely aggressive and combative during care, even when he does not have UTI. Discussed with staff and with Dr. Kary Rolle; staff will reach out to family regarding behavior concerns. Current Outpatient Medications   Medication Sig Dispense Refill    nitrofurantoin, macrocrystal-monohydrate, (MACROBID) 100 MG capsule Take 1 capsule by mouth 2 times daily for 7 days 14 capsule 0    ALPRAZolam (XANAX) 0.25 MG tablet Take 0.5 tablets by mouth every morning for 92 days. Max Daily Amount: 0.125 mg 45 tablet 0    ALPRAZolam (XANAX) 0.25 MG tablet Take 0.5 tablets by mouth daily as needed for Anxiety for up to 90 days. Give before shower as needed. Max Daily Amount: 0.125 mg 45 tablet 0    Lactobacillus (ACIDOPHILUS PROBIOTIC) CAPS Take 1 caplet by mouth daily      polyethylene glycol (GLYCOLAX) 17 GM/SCOOP powder Take 17 g by mouth 2 times daily      INFANTS SIMETHICONE PO Take 0.3 mLs by mouth 4 times daily as needed (gas) - 20 mg/ 0.3 ml      carboxymethylcellulose (REFRESH PLUS) 0.5 % SOLN ophthalmic solution Place 2 drops into both eyes every 8 hours as needed      rivaroxaban (XARELTO) 15 MG TABS tablet Take 1 tablet by mouth daily (with breakfast) ANTICOAGULANT! Doses greater than 15 mg/day must be administered with food.  90 tablet 3    atorvastatin (LIPITOR) 10 MG tablet Take 1 tablet by mouth daily 30 tablet 0    metoprolol tartrate (LOPRESSOR)

## 2023-08-23 VITALS
OXYGEN SATURATION: 98 % | DIASTOLIC BLOOD PRESSURE: 76 MMHG | TEMPERATURE: 98 F | RESPIRATION RATE: 18 BRPM | SYSTOLIC BLOOD PRESSURE: 133 MMHG | HEART RATE: 79 BPM

## 2023-08-28 ENCOUNTER — OUTSIDE SERVICES (OUTPATIENT)
Dept: INTERNAL MEDICINE | Age: 88
End: 2023-08-28
Payer: MEDICARE

## 2023-08-28 DIAGNOSIS — N32.0 BLADDER OUTLET OBSTRUCTION: Primary | ICD-10-CM

## 2023-08-28 PROCEDURE — 99347 HOME/RES VST EST SF MDM 20: CPT | Performed by: NURSE PRACTITIONER

## 2023-08-28 RX ORDER — ACETAMINOPHEN AND CODEINE PHOSPHATE 300; 30 MG/1; MG/1
1 TABLET ORAL DAILY PRN
Qty: 10 TABLET | Refills: 0 | Status: SHIPPED | OUTPATIENT
Start: 2023-08-28 | End: 2023-09-27
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
## 2023-08-29 VITALS
SYSTOLIC BLOOD PRESSURE: 132 MMHG | OXYGEN SATURATION: 96 % | HEART RATE: 73 BPM | DIASTOLIC BLOOD PRESSURE: 68 MMHG | TEMPERATURE: 98.1 F | RESPIRATION RATE: 18 BRPM

## 2023-08-29 ASSESSMENT — ENCOUNTER SYMPTOMS
RHINORRHEA: 0
COUGH: 0
VOMITING: 0
DIARRHEA: 0
WHEEZING: 0
NAUSEA: 0

## 2023-09-14 ENCOUNTER — HOSPITAL ENCOUNTER (OUTPATIENT)
Age: 88
Setting detail: SPECIMEN
Discharge: HOME | End: 2023-09-14
Payer: MEDICARE

## 2023-09-14 PROCEDURE — 87077 CULTURE AEROBIC IDENTIFY: CPT

## 2023-09-14 PROCEDURE — 87186 SC STD MICRODIL/AGAR DIL: CPT

## 2023-09-14 PROCEDURE — 86403 PARTICLE AGGLUT ANTBDY SCRN: CPT

## 2023-09-14 PROCEDURE — 81001 URINALYSIS AUTO W/SCOPE: CPT

## 2023-09-14 PROCEDURE — 87086 URINE CULTURE/COLONY COUNT: CPT

## 2023-09-15 ENCOUNTER — TELEPHONE (OUTPATIENT)
Dept: INTERNAL MEDICINE | Age: 88
End: 2023-09-15

## 2023-09-15 LAB
BACTERIA URNS QL MICRO: ABNORMAL
BILIRUB UR QL STRIP: NEGATIVE
CLARITY UR: ABNORMAL
COLOR UR: ABNORMAL
EPI CELLS #/AREA URNS HPF: ABNORMAL /HPF (ref 0–5)
GLUCOSE UR STRIP-MCNC: NEGATIVE MG/DL
HGB UR QL STRIP.AUTO: ABNORMAL
KETONES UR STRIP-MCNC: NEGATIVE MG/DL
LEUKOCYTE ESTERASE UR QL STRIP: ABNORMAL
NITRITE UR QL STRIP: NEGATIVE
PH UR STRIP: 5.5 [PH] (ref 5–6)
PROT UR STRIP-MCNC: ABNORMAL MG/DL
RBC #/AREA URNS HPF: ABNORMAL /HPF (ref 0–4)
SP GR UR STRIP: 1.02 (ref 1.01–1.02)
UROBILINOGEN UR STRIP-ACNC: NORMAL EU/DL (ref 0–1)
WBC #/AREA URNS HPF: ABNORMAL /HPF (ref 0–4)

## 2023-09-15 RX ORDER — NITROFURANTOIN 25; 75 MG/1; MG/1
100 CAPSULE ORAL 2 TIMES DAILY
Qty: 20 CAPSULE | Refills: 0 | Status: SHIPPED | OUTPATIENT
Start: 2023-09-15 | End: 2023-09-25

## 2023-09-17 LAB
MICROORGANISM SPEC CULT: ABNORMAL
MICROORGANISM SPEC CULT: ABNORMAL
SPECIMEN DESCRIPTION: ABNORMAL

## 2023-09-18 ENCOUNTER — OUTSIDE SERVICES (OUTPATIENT)
Dept: INTERNAL MEDICINE | Age: 88
End: 2023-09-18
Payer: MEDICARE

## 2023-09-18 DIAGNOSIS — M15.9 PRIMARY OSTEOARTHRITIS INVOLVING MULTIPLE JOINTS: ICD-10-CM

## 2023-09-18 DIAGNOSIS — F02.80 DEMENTIA ASSOCIATED WITH OTHER UNDERLYING DISEASE WITHOUT BEHAVIORAL DISTURBANCE (HCC): ICD-10-CM

## 2023-09-18 DIAGNOSIS — R26.89 BALANCE PROBLEM: ICD-10-CM

## 2023-09-18 DIAGNOSIS — R29.6 MULTIPLE FALLS: ICD-10-CM

## 2023-09-18 DIAGNOSIS — R53.1 GENERALIZED WEAKNESS: Primary | ICD-10-CM

## 2023-09-18 PROCEDURE — 99347 HOME/RES VST EST SF MDM 20: CPT | Performed by: NURSE PRACTITIONER

## 2023-09-18 ASSESSMENT — ENCOUNTER SYMPTOMS
NAUSEA: 0
DIARRHEA: 0
VOMITING: 0
COUGH: 0
WHEEZING: 0
RHINORRHEA: 0

## 2023-09-18 NOTE — PROGRESS NOTES
[facility]      America Reid is a 80 y.o. male resident of [place]. HPI:     HPI  Patient of Dr. Barbara Sow presents for face to face visit for physical therapy. Staff has noted increasing generalized weakness, and patient has been requiring additional assistance from staff for activities of daily living. Gradual decline with dementia. Patient has had multiple falls. Known history of osteoarthritis of multiple joints. He is otherwise doing well. No reports of fever, chills, nausea, vomiting, or diarrhea. Current Outpatient Medications   Medication Sig Dispense Refill    nitrofurantoin, macrocrystal-monohydrate, (MACROBID) 100 MG capsule Take 1 capsule by mouth 2 times daily for 10 days 20 capsule 0    acetaminophen-codeine (TYLENOL #3) 300-30 MG per tablet Take 1 tablet by mouth daily as needed for Pain for up to 30 days. May take prior to sanchez catheter change Max Daily Amount: 1 tablet 10 tablet 0    ALPRAZolam (XANAX) 0.25 MG tablet Take 0.5 tablets by mouth every morning for 92 days. Max Daily Amount: 0.125 mg 45 tablet 0    ALPRAZolam (XANAX) 0.25 MG tablet Take 0.5 tablets by mouth daily as needed for Anxiety for up to 90 days. Give before shower as needed. Max Daily Amount: 0.125 mg 45 tablet 0    Lactobacillus (ACIDOPHILUS PROBIOTIC) CAPS Take 1 caplet by mouth daily      polyethylene glycol (GLYCOLAX) 17 GM/SCOOP powder Take 17 g by mouth 2 times daily      INFANTS SIMETHICONE PO Take 0.3 mLs by mouth 4 times daily as needed (gas) - 20 mg/ 0.3 ml      carboxymethylcellulose (REFRESH PLUS) 0.5 % SOLN ophthalmic solution Place 2 drops into both eyes every 8 hours as needed      rivaroxaban (XARELTO) 15 MG TABS tablet Take 1 tablet by mouth daily (with breakfast) ANTICOAGULANT! Doses greater than 15 mg/day must be administered with food.  90 tablet 3    atorvastatin (LIPITOR) 10 MG tablet Take 1 tablet by mouth daily 30 tablet 0    metoprolol tartrate (LOPRESSOR) 25 MG tablet

## 2023-09-19 VITALS
OXYGEN SATURATION: 97 % | DIASTOLIC BLOOD PRESSURE: 70 MMHG | TEMPERATURE: 97.6 F | RESPIRATION RATE: 18 BRPM | HEART RATE: 73 BPM | SYSTOLIC BLOOD PRESSURE: 130 MMHG

## 2023-09-21 DIAGNOSIS — F41.9 ANXIETY: ICD-10-CM

## 2023-09-21 RX ORDER — ALPRAZOLAM 0.25 MG/1
TABLET ORAL
Qty: 45 TABLET | Refills: 3 | Status: SHIPPED | OUTPATIENT
Start: 2023-09-21 | End: 2023-12-20

## 2023-09-21 NOTE — TELEPHONE ENCOUNTER
Pharmacy requesting a refill of the below medication which has been pended for you:     Requested Prescriptions     Pending Prescriptions Disp Refills    ALPRAZolam (XANAX) 0.25 MG tablet [Pharmacy Med Name: ALPRAZOLAM 0.25 MG TABLET] 45 tablet 3     Sig: TAKE1/2 TABLET (0.125MG) BY MOUTH EVERY MORNING       Last Appointment Date: 3/29/2023  Next Appointment Date: 1/25/2024    Allergies   Allergen Reactions    Pcn [Penicillins] Swelling     As a child  Pt tolerated ceftriaxone , and keflex with no intolerance    Sulfa Antibiotics     Cefdinir Other (See Comments)     Pt tolerated ceftriaxone , and keflex with no intolerance

## 2023-10-11 ENCOUNTER — HOSPITAL ENCOUNTER (OUTPATIENT)
Age: 88
Setting detail: SPECIMEN
Discharge: HOME | End: 2023-10-11
Payer: MEDICARE

## 2023-10-11 PROCEDURE — 81001 URINALYSIS AUTO W/SCOPE: CPT

## 2023-10-11 PROCEDURE — 87186 SC STD MICRODIL/AGAR DIL: CPT

## 2023-10-11 PROCEDURE — 87077 CULTURE AEROBIC IDENTIFY: CPT

## 2023-10-11 PROCEDURE — 87086 URINE CULTURE/COLONY COUNT: CPT

## 2023-10-12 LAB
BACTERIA URNS QL MICRO: ABNORMAL
BILIRUB UR QL STRIP: ABNORMAL
CLARITY UR: ABNORMAL
COLOR UR: ABNORMAL
EPI CELLS #/AREA URNS HPF: ABNORMAL /HPF (ref 0–5)
GLUCOSE UR STRIP-MCNC: NEGATIVE MG/DL
HGB UR QL STRIP.AUTO: ABNORMAL
KETONES UR STRIP-MCNC: ABNORMAL MG/DL
LEUKOCYTE ESTERASE UR QL STRIP: ABNORMAL
NITRITE UR QL STRIP: POSITIVE
PH UR STRIP: 6.5 [PH] (ref 5–6)
PROT UR STRIP-MCNC: ABNORMAL MG/DL
RBC #/AREA URNS HPF: ABNORMAL /HPF (ref 0–4)
SP GR UR STRIP: 1.02 (ref 1.01–1.02)
UROBILINOGEN UR STRIP-ACNC: NORMAL EU/DL (ref 0–1)
WBC #/AREA URNS HPF: ABNORMAL /HPF (ref 0–4)

## 2023-10-14 LAB
MICROORGANISM SPEC CULT: ABNORMAL
SPECIMEN DESCRIPTION: ABNORMAL

## 2023-10-16 NOTE — PROGRESS NOTES
[facility]      Chanel Muhammad is a 80 y.o. male resident of [facility]. HPI:     HPI  Patient presents for evaluation and management of behavior change. Staff notes concern for UTI, as this is consistent with his symptoms in the past. Has known history of PH with incomplete bladder emptying, bladder outlet obstruction. No fever, chills, nausea, vomiting, or diarrhea. Patient is poor historian. History of Beck catheter. Current Outpatient Medications   Medication Sig Dispense Refill    cefUROXime (CEFTIN) 250 MG tablet Take 1 tablet by mouth 2 times daily for 7 days 14 tablet 0    ALPRAZolam (XANAX) 0.25 MG tablet Take 0.5 tablets by mouth every morning. ALPRAZolam (XANAX) 0.25 MG tablet Take 1 tablet by mouth at bedtime. Lactobacillus (ACIDOPHILUS PROBIOTIC) CAPS Take 1 caplet by mouth daily      polyethylene glycol (GLYCOLAX) 17 GM/SCOOP powder Take 17 g by mouth 2 times daily      INFANTS SIMETHICONE PO Take 0.3 mLs by mouth 4 times daily as needed (gas) - 20 mg/ 0.3 ml      carboxymethylcellulose (REFRESH PLUS) 0.5 % SOLN ophthalmic solution Place 2 drops into both eyes every 8 hours as needed      ALPRAZolam (XANAX) 0.25 MG tablet Take 0.5 tablets by mouth daily as needed. before shower      rivaroxaban (XARELTO) 15 MG TABS tablet Take 1 tablet by mouth daily (with breakfast) ANTICOAGULANT! Doses greater than 15 mg/day must be administered with food.  90 tablet 3    atorvastatin (LIPITOR) 10 MG tablet Take 1 tablet by mouth daily 30 tablet 0    metoprolol tartrate (LOPRESSOR) 25 MG tablet Take 0.5 tablets by mouth 2 times daily Hold for HR <60 or sbp <100 60 tablet 0    lactulose (CHRONULAC) 10 GM/15ML solution Take 30 mLs by mouth 3 times daily as needed (constipation) 1 each 0    mineral oil enema Place 1 enema rectally - Use as directed on the bottle for occasional constipation      levothyroxine (SYNTHROID) 88 MCG tablet Take 1 tablet by mouth Daily      tamsulosin (FLOMAX) Spray Paint Technique: No Medical Necessity Clause: This procedure was medically necessary because the lesions that were treated were: Consent: The patient's consent was obtained including but not limited to risks of crusting, scabbing, blistering, scarring, darker or lighter pigmentary change, recurrence, incomplete removal and infection. Show Topical Anesthesia Variable?: Yes Detail Level: Detailed Post-Care Instructions: I reviewed with the patient in detail post-care instructions. Patient is to wear sunprotection, and avoid picking at any of the treated lesions. Pt may apply Vaseline to crusted or scabbing areas. Duration Of Freeze Thaw-Cycle (Seconds): 5 Medical Necessity Information: It is in your best interest to select a reason for this procedure from the list below. All of these items fulfill various CMS LCD requirements except the new and changing color options. Number Of Freeze-Thaw Cycles: 3 freeze-thaw cycles Spray Paint Text: The liquid nitrogen was applied to the skin utilizing a spray paint frosting technique.

## 2023-10-19 ENCOUNTER — TELEPHONE (OUTPATIENT)
Dept: INTERNAL MEDICINE | Age: 88
End: 2023-10-19
Payer: MEDICARE

## 2023-10-19 DIAGNOSIS — I50.42 CHRONIC COMBINED SYSTOLIC AND DIASTOLIC CHF (CONGESTIVE HEART FAILURE) (HCC): ICD-10-CM

## 2023-10-19 DIAGNOSIS — R29.6 MULTIPLE FALLS: ICD-10-CM

## 2023-10-19 DIAGNOSIS — F02.80 DEMENTIA ASSOCIATED WITH OTHER UNDERLYING DISEASE WITHOUT BEHAVIORAL DISTURBANCE (HCC): ICD-10-CM

## 2023-10-19 DIAGNOSIS — I10 PRIMARY HYPERTENSION: ICD-10-CM

## 2023-10-19 DIAGNOSIS — M15.9 PRIMARY OSTEOARTHRITIS INVOLVING MULTIPLE JOINTS: Primary | ICD-10-CM

## 2023-10-19 DIAGNOSIS — K57.90 DIVERTICULOSIS: ICD-10-CM

## 2023-10-19 PROCEDURE — G0180 MD CERTIFICATION HHA PATIENT: HCPCS | Performed by: NURSE PRACTITIONER

## 2023-10-19 RX ORDER — ALPRAZOLAM 0.25 MG/1
0.5 TABLET ORAL DAILY PRN
COMMUNITY

## 2023-12-04 ENCOUNTER — OUTSIDE SERVICES (OUTPATIENT)
Dept: INTERNAL MEDICINE | Age: 88
End: 2023-12-04
Payer: MEDICARE

## 2023-12-04 DIAGNOSIS — R26.89 BALANCE PROBLEM: ICD-10-CM

## 2023-12-04 DIAGNOSIS — F02.80 DEMENTIA ASSOCIATED WITH OTHER UNDERLYING DISEASE WITHOUT BEHAVIORAL DISTURBANCE (HCC): ICD-10-CM

## 2023-12-04 DIAGNOSIS — R26.81 GAIT INSTABILITY: ICD-10-CM

## 2023-12-04 DIAGNOSIS — R29.6 MULTIPLE FALLS: ICD-10-CM

## 2023-12-04 DIAGNOSIS — R53.1 GENERALIZED WEAKNESS: ICD-10-CM

## 2023-12-04 DIAGNOSIS — M15.9 PRIMARY OSTEOARTHRITIS INVOLVING MULTIPLE JOINTS: Primary | ICD-10-CM

## 2023-12-04 PROCEDURE — 99347 HOME/RES VST EST SF MDM 20: CPT | Performed by: NURSE PRACTITIONER

## 2023-12-04 ASSESSMENT — ENCOUNTER SYMPTOMS
DIARRHEA: 0
NAUSEA: 0
RHINORRHEA: 0
COUGH: 0
VOMITING: 0
WHEEZING: 0

## 2023-12-05 NOTE — PROGRESS NOTES
faxed
180 capsule 3    Psyllium (REGULOID PO) Take by mouth (Reguloid- Orange): mix 1 tsp in 8 oz of water bid      albuterol (PROVENTIL) (2.5 MG/3ML) 0.083% nebulizer solution Take 3 mLs by nebulization every 6 hours as needed for Wheezing      loperamide (IMODIUM) 2 MG capsule Take 1 capsule by mouth After each loose stool. *Not to exceed 16 gm/ 24 hours      fluticasone (FLONASE) 50 MCG/ACT nasal spray INSTILL 1 SPRAY IN EACH NOSTRIL ONCE DAILY 16 g 1    hydrocortisone 1 % cream Apply topically to affected area as directed prn (Preparation H)      amiodarone (CORDARONE) 200 MG tablet Take 0.5 tablets by mouth daily      acetaminophen (TYLENOL) 325 MG tablet Take 2 tablets by mouth every 4 hours as needed for Pain      Multiple Vitamins-Minerals (MULTIVITAMIN ADULT PO) Take 1 tablet by mouth daily      potassium chloride (KLOR-CON M) 20 MEQ extended release tablet Take 1 tablet by mouth daily      tolnaftate (TINACTIN) 1 % external solution Apply topically nightly to toenails per Dr. Rosalin Gitelman. No current facility-administered medications for this visit.      Allergies   Allergen Reactions    Pcn [Penicillins] Swelling     As a child  Pt tolerated ceftriaxone , and keflex with no intolerance    Sulfa Antibiotics     Cefdinir Other (See Comments)     Pt tolerated ceftriaxone , and keflex with no intolerance       Health Maintenance   Topic Date Due    DTaP/Tdap/Td vaccine (1 - Tdap) Never done    Respiratory Syncytial Virus (RSV) age 61 yrs+ (3 - 1-dose 60+ series) Never done    Shingles vaccine (2 of 3) 11/30/2012    Prostate Specific Antigen (PSA) Screening or Monitoring  12/28/2017    Annual Wellness Visit (AWV)  01/07/2022    COVID-19 Vaccine (5 - 2023-24 season) 09/01/2023    Depression Screen  01/25/2024    Lipids  02/02/2024    Flu vaccine  Completed    Pneumococcal 65+ years Vaccine  Completed    Hepatitis A vaccine  Aged Out    Hepatitis B vaccine  Aged Out    Hib vaccine  Aged Out    Meningococcal (ACWY)

## 2023-12-06 DIAGNOSIS — F41.9 ANXIETY: ICD-10-CM

## 2023-12-06 RX ORDER — ALPRAZOLAM 0.25 MG/1
0.25 TABLET ORAL NIGHTLY
Qty: 30 TABLET | Refills: 5 | Status: SHIPPED | OUTPATIENT
Start: 2023-12-06 | End: 2024-06-06

## 2023-12-06 NOTE — TELEPHONE ENCOUNTER
Pharmacy requesting a refill of the below medication which has been pended for you:     Requested Prescriptions     Pending Prescriptions Disp Refills    ALPRAZolam (XANAX) 0.25 MG tablet [Pharmacy Med Name: ALPRAZOLAM 0.25 MG TABLET] 30 tablet 5     Sig: Take 1 tablet by mouth nightly for 183 days.  Max Daily Amount: 0.25 mg       Last Appointment Date: 3/29/2023  Next Appointment Date: 1/25/2024    Allergies   Allergen Reactions    Pcn [Penicillins] Swelling     As a child  Pt tolerated ceftriaxone , and keflex with no intolerance    Sulfa Antibiotics     Cefdinir Other (See Comments)     Pt tolerated ceftriaxone , and keflex with no intolerance

## 2024-01-08 ENCOUNTER — OUTSIDE SERVICES (OUTPATIENT)
Dept: INTERNAL MEDICINE | Age: 89
End: 2024-01-08

## 2024-01-08 ENCOUNTER — HOSPITAL ENCOUNTER (OUTPATIENT)
Age: 89
Setting detail: SPECIMEN
Discharge: HOME | End: 2024-01-08
Payer: MEDICARE

## 2024-01-08 DIAGNOSIS — N40.1 BENIGN PROSTATIC HYPERPLASIA WITH INCOMPLETE BLADDER EMPTYING: Primary | ICD-10-CM

## 2024-01-08 DIAGNOSIS — R39.14 BENIGN PROSTATIC HYPERPLASIA WITH INCOMPLETE BLADDER EMPTYING: Primary | ICD-10-CM

## 2024-01-08 PROCEDURE — 87077 CULTURE AEROBIC IDENTIFY: CPT

## 2024-01-08 PROCEDURE — 81001 URINALYSIS AUTO W/SCOPE: CPT

## 2024-01-08 PROCEDURE — 87086 URINE CULTURE/COLONY COUNT: CPT

## 2024-01-08 PROCEDURE — 87186 SC STD MICRODIL/AGAR DIL: CPT

## 2024-01-08 NOTE — PROGRESS NOTES
Take 1 tablet by mouth Daily      tamsulosin (FLOMAX) 0.4 MG capsule Take 1 capsule by mouth 2 times daily 180 capsule 3    Psyllium (REGULOID PO) Take by mouth (Reguloid- Orange): mix 1 tsp in 8 oz of water bid      albuterol (PROVENTIL) (2.5 MG/3ML) 0.083% nebulizer solution Take 3 mLs by nebulization every 6 hours as needed for Wheezing      loperamide (IMODIUM) 2 MG capsule Take 1 capsule by mouth After each loose stool. *Not to exceed 16 gm/ 24 hours      fluticasone (FLONASE) 50 MCG/ACT nasal spray INSTILL 1 SPRAY IN EACH NOSTRIL ONCE DAILY 16 g 1    hydrocortisone 1 % cream Apply topically to affected area as directed prn (Preparation H)      amiodarone (CORDARONE) 200 MG tablet Take 0.5 tablets by mouth daily      acetaminophen (TYLENOL) 325 MG tablet Take 2 tablets by mouth every 4 hours as needed for Pain      Multiple Vitamins-Minerals (MULTIVITAMIN ADULT PO) Take 1 tablet by mouth daily      potassium chloride (KLOR-CON M) 20 MEQ extended release tablet Take 1 tablet by mouth daily      tolnaftate (TINACTIN) 1 % external solution Apply topically nightly to toenails per Dr. Nelson.        No current facility-administered medications for this visit.     Allergies   Allergen Reactions    Pcn [Penicillins] Swelling     As a child  Pt tolerated ceftriaxone , and keflex with no intolerance    Sulfa Antibiotics     Cefdinir Other (See Comments)     Pt tolerated ceftriaxone , and keflex with no intolerance       Health Maintenance   Topic Date Due    DTaP/Tdap/Td vaccine (1 - Tdap) Never done    Respiratory Syncytial Virus (RSV) Pregnant or age 60 yrs+ (1 - 1-dose 60+ series) Never done    Shingles vaccine (2 of 3) 11/30/2012    Prostate Specific Antigen (PSA) Screening or Monitoring  12/28/2017    Annual Wellness Visit (Medicare Advantage)  01/01/2024    Depression Screen  01/25/2024    Lipids  02/02/2024    Flu vaccine  Completed    Pneumococcal 65+ years Vaccine  Completed    COVID-19 Vaccine  Completed

## 2024-01-09 VITALS
OXYGEN SATURATION: 93 % | RESPIRATION RATE: 16 BRPM | TEMPERATURE: 97.3 F | SYSTOLIC BLOOD PRESSURE: 132 MMHG | DIASTOLIC BLOOD PRESSURE: 67 MMHG | HEART RATE: 69 BPM

## 2024-01-09 DIAGNOSIS — N40.1 BENIGN PROSTATIC HYPERPLASIA WITH INCOMPLETE BLADDER EMPTYING: ICD-10-CM

## 2024-01-09 DIAGNOSIS — R39.14 BENIGN PROSTATIC HYPERPLASIA WITH INCOMPLETE BLADDER EMPTYING: ICD-10-CM

## 2024-01-09 LAB
BACTERIA URNS QL MICRO: ABNORMAL
BILIRUB UR QL STRIP: NEGATIVE
CHARACTER UR: ABNORMAL
CLARITY UR: CLEAR
COLOR UR: YELLOW
EPI CELLS #/AREA URNS HPF: ABNORMAL /HPF (ref 0–5)
GLUCOSE UR STRIP-MCNC: NEGATIVE MG/DL
HGB UR QL STRIP.AUTO: ABNORMAL
KETONES UR STRIP-MCNC: NEGATIVE MG/DL
LEUKOCYTE ESTERASE UR QL STRIP: ABNORMAL
NITRITE UR QL STRIP: NEGATIVE
PH UR STRIP: 6 [PH] (ref 5–6)
PROT UR STRIP-MCNC: ABNORMAL MG/DL
RBC #/AREA URNS HPF: ABNORMAL /HPF (ref 0–4)
SP GR UR STRIP: 1.02 (ref 1.01–1.02)
UROBILINOGEN UR STRIP-ACNC: NORMAL EU/DL (ref 0–1)
WBC #/AREA URNS HPF: ABNORMAL /HPF (ref 0–4)

## 2024-01-09 RX ORDER — CEFUROXIME AXETIL 250 MG/1
250 TABLET ORAL 2 TIMES DAILY
Qty: 14 TABLET | Refills: 0 | Status: SHIPPED | OUTPATIENT
Start: 2024-01-09 | End: 2024-01-16

## 2024-01-11 ENCOUNTER — TELEPHONE (OUTPATIENT)
Dept: INTERNAL MEDICINE | Age: 89
End: 2024-01-11

## 2024-01-11 ENCOUNTER — HOSPITAL ENCOUNTER (OUTPATIENT)
Age: 89
Discharge: HOME | End: 2024-01-11
Payer: COMMERCIAL

## 2024-01-11 ENCOUNTER — HOSPITAL ENCOUNTER (OUTPATIENT)
Age: 89
Setting detail: SPECIMEN
Discharge: HOME | End: 2024-01-11

## 2024-01-11 DIAGNOSIS — R19.7 DIARRHEA, UNSPECIFIED TYPE: ICD-10-CM

## 2024-01-11 LAB
ALBUMIN SERPL-MCNC: 3.7 G/DL (ref 3.5–5.2)
ALBUMIN/GLOB SERPL: 1.1 {RATIO} (ref 1–2.5)
ALP SERPL-CCNC: 131 U/L (ref 40–129)
ALT SERPL-CCNC: 19 U/L (ref 5–41)
ANION GAP SERPL CALCULATED.3IONS-SCNC: 12 MMOL/L (ref 9–17)
AST SERPL-CCNC: 20 U/L
BASOPHILS # BLD: 0.04 K/UL (ref 0–0.2)
BASOPHILS NFR BLD: 1 % (ref 0–2)
BILIRUB SERPL-MCNC: 0.4 MG/DL (ref 0.3–1.2)
BUN SERPL-MCNC: 14 MG/DL (ref 8–23)
BUN/CREAT SERPL: 14 (ref 9–20)
CALCIUM SERPL-MCNC: 9.1 MG/DL (ref 8.6–10.4)
CHLORIDE SERPL-SCNC: 100 MMOL/L (ref 98–107)
CO2 SERPL-SCNC: 27 MMOL/L (ref 20–31)
CREAT SERPL-MCNC: 1 MG/DL (ref 0.7–1.2)
EOSINOPHIL # BLD: 0.18 K/UL (ref 0–0.44)
EOSINOPHILS RELATIVE PERCENT: 3 % (ref 1–4)
ERYTHROCYTE [DISTWIDTH] IN BLOOD BY AUTOMATED COUNT: 15.7 % (ref 11.8–14.4)
GFR SERPL CREATININE-BSD FRML MDRD: >60 ML/MIN/1.73M2
GLUCOSE SERPL-MCNC: 110 MG/DL (ref 70–99)
HCT VFR BLD AUTO: 37.1 % (ref 40.7–50.3)
HGB BLD-MCNC: 12.2 G/DL (ref 13–17)
IMM GRANULOCYTES # BLD AUTO: <0.03 K/UL (ref 0–0.3)
IMM GRANULOCYTES NFR BLD: 0 %
LYMPHOCYTES NFR BLD: 1.45 K/UL (ref 1.1–3.7)
LYMPHOCYTES RELATIVE PERCENT: 24 % (ref 24–43)
MCH RBC QN AUTO: 28.3 PG (ref 25.2–33.5)
MCHC RBC AUTO-ENTMCNC: 32.9 G/DL (ref 25.2–33.5)
MCV RBC AUTO: 86.1 FL (ref 82.6–102.9)
MONOCYTES NFR BLD: 0.33 K/UL (ref 0.1–1.2)
MONOCYTES NFR BLD: 6 % (ref 3–12)
NEUTROPHILS NFR BLD: 66 % (ref 36–65)
NEUTS SEG NFR BLD: 3.94 K/UL (ref 1.5–8.1)
NRBC BLD-RTO: 0 PER 100 WBC
PLATELET # BLD AUTO: 242 K/UL (ref 138–453)
PMV BLD AUTO: 10 FL (ref 8.1–13.5)
POTASSIUM SERPL-SCNC: 4.3 MMOL/L (ref 3.7–5.3)
PROT SERPL-MCNC: 7.2 G/DL (ref 6.4–8.3)
RBC # BLD AUTO: 4.31 M/UL (ref 4.21–5.77)
RBC # BLD: ABNORMAL 10*6/UL
SODIUM SERPL-SCNC: 139 MMOL/L (ref 135–144)
WBC OTHER # BLD: 6 K/UL (ref 3.5–11.3)

## 2024-01-11 PROCEDURE — 80053 COMPREHEN METABOLIC PANEL: CPT

## 2024-01-11 PROCEDURE — 85025 COMPLETE CBC W/AUTO DIFF WBC: CPT

## 2024-01-11 PROCEDURE — 36415 COLL VENOUS BLD VENIPUNCTURE: CPT

## 2024-01-11 NOTE — TELEPHONE ENCOUNTER
Spoke with Alberto Álvarez this afternoon - still waiting to hear back from urology regarding treatment of multi drug resistant UTI. Discussed with Betty that may require further coordination for outpatient IV therapy. Patient is currently stable, afebrile, no systemic signs of illness. Advised if any worsening prior to coordination of IV therapy, will need to go to ER. If no orders per urology by Friday morning, will plan to coordinate further with [facility], possible PICC. Recent labs reviewed, WBC normal.

## 2024-01-12 ENCOUNTER — HOSPITAL ENCOUNTER (OUTPATIENT)
Age: 89
Setting detail: SPECIMEN
Discharge: HOME | End: 2024-01-12
Payer: COMMERCIAL

## 2024-01-12 LAB
BASOPHILS # BLD: 0.03 K/UL (ref 0–0.2)
BASOPHILS NFR BLD: 1 % (ref 0–2)
EOSINOPHIL # BLD: 0.19 K/UL (ref 0–0.44)
EOSINOPHILS RELATIVE PERCENT: 3 % (ref 1–4)
ERYTHROCYTE [DISTWIDTH] IN BLOOD BY AUTOMATED COUNT: 15.7 % (ref 11.8–14.4)
HCT VFR BLD AUTO: 37.9 % (ref 40.7–50.3)
HGB BLD-MCNC: 12.3 G/DL (ref 13–17)
IMM GRANULOCYTES # BLD AUTO: 0.03 K/UL (ref 0–0.3)
IMM GRANULOCYTES NFR BLD: 1 %
LYMPHOCYTES NFR BLD: 1.83 K/UL (ref 1.1–3.7)
LYMPHOCYTES RELATIVE PERCENT: 32 % (ref 24–43)
MCH RBC QN AUTO: 28 PG (ref 25.2–33.5)
MCHC RBC AUTO-ENTMCNC: 32.5 G/DL (ref 25.2–33.5)
MCV RBC AUTO: 86.3 FL (ref 82.6–102.9)
MICROORGANISM SPEC CULT: ABNORMAL
MONOCYTES NFR BLD: 0.34 K/UL (ref 0.1–1.2)
MONOCYTES NFR BLD: 6 % (ref 3–12)
NEUTROPHILS NFR BLD: 57 % (ref 36–65)
NEUTS SEG NFR BLD: 3.36 K/UL (ref 1.5–8.1)
NRBC BLD-RTO: 0 PER 100 WBC
PLATELET # BLD AUTO: 237 K/UL (ref 138–453)
PMV BLD AUTO: 10.1 FL (ref 8.1–13.5)
RBC # BLD AUTO: 4.39 M/UL (ref 4.21–5.77)
RBC # BLD: ABNORMAL 10*6/UL
SPECIMEN DESCRIPTION: ABNORMAL
WBC OTHER # BLD: 5.8 K/UL (ref 3.5–11.3)

## 2024-01-12 PROCEDURE — 85025 COMPLETE CBC W/AUTO DIFF WBC: CPT

## 2024-01-12 PROCEDURE — 36415 COLL VENOUS BLD VENIPUNCTURE: CPT

## 2024-01-12 ASSESSMENT — ENCOUNTER SYMPTOMS
NAUSEA: 0
COUGH: 0
RHINORRHEA: 0
WHEEZING: 0
DIARRHEA: 0
VOMITING: 0

## 2024-01-12 NOTE — TELEPHONE ENCOUNTER
Spoke with Alberto Álvarez, patient remains asymptomatic for UTI.  C&S noted multidrug resistant Klebseilla Pneumoniae. He is afebrile, no nausea, vomiting or diarrhea. WBC yesterday was normal. No response from urology at present. Discussed with Dr. Montana that patient is currently asymptomatic, may be colonized, reviewed risks and benefits of treatment/admission at this time. Followed up with Alberto Álvarez - as patient currently remains asymptomatic, will plan to monitor at this time. Advised Alberto Álvarez staff to ER if any worsening symptoms over the weekend. Gave verbal order to repeat CBC today for ongoing monitoring.

## 2024-01-12 NOTE — TELEPHONE ENCOUNTER
Spoke with urology office, noted given patient asymptomatic, ok to continue to monitor at this time

## 2024-01-19 ENCOUNTER — OFFICE VISIT (OUTPATIENT)
Dept: NEUROLOGY | Age: 89
End: 2024-01-19
Payer: MEDICARE

## 2024-01-19 VITALS
DIASTOLIC BLOOD PRESSURE: 68 MMHG | OXYGEN SATURATION: 97 % | BODY MASS INDEX: 22.87 KG/M2 | RESPIRATION RATE: 16 BRPM | WEIGHT: 164 LBS | SYSTOLIC BLOOD PRESSURE: 132 MMHG | HEART RATE: 71 BPM

## 2024-01-19 DIAGNOSIS — I25.10 CORONARY ARTERY DISEASE INVOLVING NATIVE CORONARY ARTERY OF NATIVE HEART WITHOUT ANGINA PECTORIS: ICD-10-CM

## 2024-01-19 DIAGNOSIS — I48.91 ATRIAL FIBRILLATION, UNSPECIFIED TYPE (HCC): ICD-10-CM

## 2024-01-19 DIAGNOSIS — R26.89 BALANCE PROBLEM: ICD-10-CM

## 2024-01-19 DIAGNOSIS — I67.82 CHRONIC CEREBRAL ISCHEMIA: ICD-10-CM

## 2024-01-19 DIAGNOSIS — F41.9 ANXIETY: ICD-10-CM

## 2024-01-19 DIAGNOSIS — I10 PRIMARY HYPERTENSION: ICD-10-CM

## 2024-01-19 DIAGNOSIS — F02.80 DEMENTIA ASSOCIATED WITH OTHER UNDERLYING DISEASE WITHOUT BEHAVIORAL DISTURBANCE (HCC): Primary | ICD-10-CM

## 2024-01-19 DIAGNOSIS — N13.30 HYDRONEPHROSIS, BILATERAL: ICD-10-CM

## 2024-01-19 DIAGNOSIS — M47.892 OTHER OSTEOARTHRITIS OF SPINE, CERVICAL REGION: ICD-10-CM

## 2024-01-19 DIAGNOSIS — Z91.81 H/O FALL: ICD-10-CM

## 2024-01-19 DIAGNOSIS — E03.9 ACQUIRED HYPOTHYROIDISM: ICD-10-CM

## 2024-01-19 DIAGNOSIS — D68.69 SECONDARY HYPERCOAGULABLE STATE (HCC): ICD-10-CM

## 2024-01-19 PROCEDURE — 1036F TOBACCO NON-USER: CPT | Performed by: PSYCHIATRY & NEUROLOGY

## 2024-01-19 PROCEDURE — 99214 OFFICE O/P EST MOD 30 MIN: CPT | Performed by: PSYCHIATRY & NEUROLOGY

## 2024-01-19 PROCEDURE — G8420 CALC BMI NORM PARAMETERS: HCPCS | Performed by: PSYCHIATRY & NEUROLOGY

## 2024-01-19 PROCEDURE — G8484 FLU IMMUNIZE NO ADMIN: HCPCS | Performed by: PSYCHIATRY & NEUROLOGY

## 2024-01-19 PROCEDURE — G8427 DOCREV CUR MEDS BY ELIG CLIN: HCPCS | Performed by: PSYCHIATRY & NEUROLOGY

## 2024-01-19 PROCEDURE — 3288F FALL RISK ASSESSMENT DOCD: CPT | Performed by: PSYCHIATRY & NEUROLOGY

## 2024-01-19 PROCEDURE — 1123F ACP DISCUSS/DSCN MKR DOCD: CPT | Performed by: PSYCHIATRY & NEUROLOGY

## 2024-01-19 PROCEDURE — 0518F FALL PLAN OF CARE DOCD: CPT | Performed by: PSYCHIATRY & NEUROLOGY

## 2024-01-19 RX ORDER — CEFUROXIME AXETIL 250 MG/1
250 TABLET ORAL 2 TIMES DAILY
COMMUNITY

## 2024-01-19 RX ORDER — TAMSULOSIN HYDROCHLORIDE 0.4 MG/1
CAPSULE ORAL
COMMUNITY
Start: 2023-09-13

## 2024-01-19 RX ORDER — NITROFURANTOIN 25; 75 MG/1; MG/1
100 CAPSULE ORAL 2 TIMES DAILY
COMMUNITY

## 2024-01-19 ASSESSMENT — PATIENT HEALTH QUESTIONNAIRE - PHQ9
SUM OF ALL RESPONSES TO PHQ9 QUESTIONS 1 & 2: 0
1. LITTLE INTEREST OR PLEASURE IN DOING THINGS: 0
2. FEELING DOWN, DEPRESSED OR HOPELESS: 0
SUM OF ALL RESPONSES TO PHQ QUESTIONS 1-9: 0

## 2024-01-19 ASSESSMENT — ENCOUNTER SYMPTOMS
SORE THROAT: 0
SINUS PRESSURE: 0
SHORTNESS OF BREATH: 0
CHEST TIGHTNESS: 0
APNEA: 0
COUGH: 0
VOICE CHANGE: 0
TROUBLE SWALLOWING: 0
WHEEZING: 0
CHOKING: 0
FACIAL SWELLING: 0

## 2024-01-19 NOTE — PATIENT INSTRUCTIONS
* FALL   PRECAUTIONS.            *  USE   WALKING  ASSISTANCE  DEVICES      /   WALKER        *   SUPERVISED   CARE        *   ADEQUATE   FLUID  INTAKE   AND  ELECTROLYTE  BALANCE             * KEEP  DAIRY  OF   THE  NEUROLOGICAL  SYMPTOMS          *  TO  MAINTAIN  REGULAR  SLEEP  WAKE  CYCLES.     *   TO  HAVE  ADEQUATE  REST  AND   SLEEP    HOURS.          *    AVOID  USAGE OF   TOBACCO,  EXCESSIVE  ALCOHOL                AND   ILLEGAL   SUBSTANCES,  IF  ANY          *  Maintain   Healthy  Life Style    With   Periodic  Monitoring  Of         Any  Medical  Conditions  Including   Elevated  Blood  Pressure,  Lipid  Profile,       Blood  Sugar levels  And   Heart  Disease.                *   Period   Screening  For  Cancers  Involving  Breast,  Colon,         Lungs  And  Other  Organs  As  Applicable,           In consultation   With  Your  Primary Care Providers.                *  If   There is  Any  Significant  Worsening   Of  Current  Symptoms  And             Or  If    Any additional  New  Neurological  Symptoms  and          Significant  Concerns   Should  Call  911 or  Go  To  Emergency  Department            For  Further  Immediate  Evaluation.

## 2024-01-19 NOTE — PROGRESS NOTES
THERAPY   / OCCUPATIONAL THERAPY                                  * CARDIOLOGY                    *PATIENT   TO  FOLLOW  UP  WITH   PRIMARY  CARE            AND   OTHER  CONSULTANTS  AS  BEFORE.          *  Maintain   Healthy  Life Style    With   Periodic  Monitoring  Of      Any  Medical  Conditions  Including   Elevated  Blood  Pressure,  Lipid  Profile,     Blood  Sugar levels  And   Heart  Disease.              *   Period   Screening  For  Cancers  Involving  Breast,  Colon,    lungs  And  Other  Organs  As  Applicable,  In consultation   With  Your  Primary Care Providers.              * Second  Neurological  Opinion  And  Evaluations  In  A  Teaching   Hospital  Setting  If  Patient  Is  Interested.              * Please   Contact   Neurology  Clinic   Early   If   Are  Any  New  Neurological                             Symptoms   And  Any neurological  Concerns.                  *  If  The  Patient remains  Neurologically  Stable   Return   To  [facility] Neurology Department   IN  6 - 12   MONTHS  TIME   FOR  FURTHER              FOLLOW UP.                 *  If   There is  Any  Significant  Worsening   Of  Current  Symptoms  And  Or  If patient  Develops       Any additional  New  Neurological  Symptoms  Or  Significant  Concerns   Should  Call  911 or      Go  To  Emergency  Department  For  Further  Immediate  Evaluation.                         *   The  Neurological   Findings,  Possible  Diagnosis,  Differential diagnoses   And  Options      For    Further   Investigations   And  management   Are  Discussed  Comprehensively.         Medications   And  Prescription   Risks  And  Side effects  Are   Also  Discussed.           The  Above  Were  Reviewed  With  patient and     questions  Answered  In  Detail.              More   Than   50% of face  To face Time   Was  Spent  On  Counseling   And   Coordination  Of  Care       Of   Patient's multiple   Neurological  Problems   And

## 2024-01-24 ENCOUNTER — PATIENT MESSAGE (OUTPATIENT)
Dept: INTERNAL MEDICINE | Age: 89
End: 2024-01-24

## 2024-01-25 ENCOUNTER — OFFICE VISIT (OUTPATIENT)
Dept: INTERNAL MEDICINE | Age: 89
End: 2024-01-25
Payer: MEDICARE

## 2024-01-25 VITALS
DIASTOLIC BLOOD PRESSURE: 99 MMHG | RESPIRATION RATE: 16 BRPM | SYSTOLIC BLOOD PRESSURE: 167 MMHG | WEIGHT: 172 LBS | HEIGHT: 71 IN | BODY MASS INDEX: 24.08 KG/M2 | HEART RATE: 64 BPM

## 2024-01-25 DIAGNOSIS — R39.14 BENIGN PROSTATIC HYPERPLASIA WITH INCOMPLETE BLADDER EMPTYING: ICD-10-CM

## 2024-01-25 DIAGNOSIS — N39.0 RECURRENT UTI: ICD-10-CM

## 2024-01-25 DIAGNOSIS — E78.2 MIXED HYPERLIPIDEMIA: ICD-10-CM

## 2024-01-25 DIAGNOSIS — F02.C11 SEVERE DEMENTIA ASSOCIATED WITH OTHER UNDERLYING DISEASE, WITH AGITATION (HCC): ICD-10-CM

## 2024-01-25 DIAGNOSIS — I25.10 CORONARY ARTERY DISEASE INVOLVING NATIVE CORONARY ARTERY OF NATIVE HEART WITHOUT ANGINA PECTORIS: ICD-10-CM

## 2024-01-25 DIAGNOSIS — N40.1 BENIGN PROSTATIC HYPERPLASIA WITH INCOMPLETE BLADDER EMPTYING: ICD-10-CM

## 2024-01-25 DIAGNOSIS — F41.9 ANXIETY: ICD-10-CM

## 2024-01-25 DIAGNOSIS — I50.42 CHRONIC COMBINED SYSTOLIC AND DIASTOLIC HEART FAILURE (HCC): ICD-10-CM

## 2024-01-25 DIAGNOSIS — I48.0 PAROXYSMAL ATRIAL FIBRILLATION (HCC): ICD-10-CM

## 2024-01-25 DIAGNOSIS — H61.23 IMPACTED CERUMEN, BILATERAL: ICD-10-CM

## 2024-01-25 DIAGNOSIS — M15.9 PRIMARY OSTEOARTHRITIS INVOLVING MULTIPLE JOINTS: ICD-10-CM

## 2024-01-25 DIAGNOSIS — N32.0 BLADDER OUTLET OBSTRUCTION: ICD-10-CM

## 2024-01-25 DIAGNOSIS — I10 ESSENTIAL HYPERTENSION: Primary | ICD-10-CM

## 2024-01-25 DIAGNOSIS — E03.4 HYPOTHYROIDISM DUE TO ACQUIRED ATROPHY OF THYROID: ICD-10-CM

## 2024-01-25 PROBLEM — R31.0 GROSS HEMATURIA: Status: RESOLVED | Noted: 2018-03-16 | Resolved: 2024-01-25

## 2024-01-25 PROBLEM — N17.9 AKI (ACUTE KIDNEY INJURY) (HCC): Status: RESOLVED | Noted: 2018-03-13 | Resolved: 2024-01-25

## 2024-01-25 PROBLEM — F03.90 DEMENTIA (HCC): Status: ACTIVE | Noted: 2018-09-21

## 2024-01-25 PROBLEM — I95.9 HYPOTENSION: Status: RESOLVED | Noted: 2023-03-16 | Resolved: 2024-01-25

## 2024-01-25 PROBLEM — R10.9 ABDOMINAL PAIN: Status: RESOLVED | Noted: 2023-03-16 | Resolved: 2024-01-25

## 2024-01-25 PROCEDURE — 99214 OFFICE O/P EST MOD 30 MIN: CPT | Performed by: INTERNAL MEDICINE

## 2024-01-25 NOTE — PROGRESS NOTES
DR. GREENBERG - PROGRESS NOTE    CHIEF COMPLAINT/HISTORY OF CHIEF COMPLAINT: This 89 y.o.  male, resident of [facility], comes in today for ongoing evaluation and management of his hypertension, hyperlipidemia, hypothyroidism, coronary artery disease, congestive heart failure, paroxysmal atrial fibrillation, paroxysmal supraventricular tachycardia, anxiety, osteoarthritis, and dementia. He did not have his blood work done for today. His dementia is getting worse and he gets very combative every time he has to go out of the facility to see doctors. The caregiver that came in today was wondering if he could stop coming in to see us since Mercedes Solis goes out to the facility regularly. He takes amiodarone, Lopressor, and Xarelto for hypertension, coronary artery disease, congestive heart failure, and paroxysmal atrial fibrillation. He sees the cardiologists here for those conditions as well. His last visit with them was in June. He has not had any chest pain, dizziness, or palpitations. He sees Dr. Guajardo for benign prostatic hypertrophy, for which he is on Flomax. His last visit with them was in May. He does not take anything for hyperlipidemia any more due to his age. He takes Synthroid for hypothyroidism. He denies any fatigue or temperature intolerance. He uses Xanax for anxiety, which has been stable. He uses Flonase for allergic rhinitis, which has not bothered him lately. He sees Dr. Riley for his dementia. His last visit with him was last week. Otherwise he seems to be doing fairly well and denies any other complaints.        ALLERGIES/INTOLERANCES:   Allergies   Allergen Reactions    Pcn [Penicillins] Swelling     As a child  Pt tolerated ceftriaxone , and keflex with no intolerance    Sulfa Antibiotics     Cefdinir Other (See Comments)     Pt tolerated ceftriaxone , and keflex with no intolerance       MEDICATIONS:   Outpatient Medications Marked as Taking for the 1/25/24 encounter  Vaginal Delivery

## 2024-01-29 ENCOUNTER — OUTSIDE SERVICES (OUTPATIENT)
Dept: INTERNAL MEDICINE | Age: 89
End: 2024-01-29
Payer: MEDICARE

## 2024-01-29 DIAGNOSIS — I25.10 CORONARY ARTERY DISEASE INVOLVING NATIVE CORONARY ARTERY OF NATIVE HEART WITHOUT ANGINA PECTORIS: ICD-10-CM

## 2024-01-29 DIAGNOSIS — N40.1 BENIGN PROSTATIC HYPERPLASIA WITH INCOMPLETE BLADDER EMPTYING: ICD-10-CM

## 2024-01-29 DIAGNOSIS — H61.23 IMPACTED CERUMEN, BILATERAL: Primary | ICD-10-CM

## 2024-01-29 DIAGNOSIS — N39.0 RECURRENT UTI: ICD-10-CM

## 2024-01-29 DIAGNOSIS — I48.0 PAROXYSMAL ATRIAL FIBRILLATION (HCC): ICD-10-CM

## 2024-01-29 DIAGNOSIS — I50.42 CHRONIC COMBINED SYSTOLIC AND DIASTOLIC HEART FAILURE (HCC): ICD-10-CM

## 2024-01-29 DIAGNOSIS — R39.14 BENIGN PROSTATIC HYPERPLASIA WITH INCOMPLETE BLADDER EMPTYING: ICD-10-CM

## 2024-01-29 DIAGNOSIS — F02.C11 SEVERE DEMENTIA ASSOCIATED WITH OTHER UNDERLYING DISEASE, WITH AGITATION (HCC): ICD-10-CM

## 2024-01-29 DIAGNOSIS — N32.0 BLADDER OUTLET OBSTRUCTION: ICD-10-CM

## 2024-01-29 PROCEDURE — 99347 HOME/RES VST EST SF MDM 20: CPT | Performed by: NURSE PRACTITIONER

## 2024-01-29 NOTE — PROGRESS NOTES
Pregnant or age 60 yrs+ (1 - 1-dose 60+ series) Never done    Shingles vaccine (2 of 3) 11/30/2012    Prostate Specific Antigen (PSA) Screening or Monitoring  12/28/2017    Annual Wellness Visit (Medicare Advantage)  01/01/2024    Lipids  02/02/2024    Depression Screen  01/19/2025    Flu vaccine  Completed    Pneumococcal 65+ years Vaccine  Completed    COVID-19 Vaccine  Completed    Hepatitis A vaccine  Aged Out    Hepatitis B vaccine  Aged Out    Hib vaccine  Aged Out    Polio vaccine  Aged Out    Meningococcal (ACWY) vaccine  Aged Out       Subjective:      Review of Systems   Constitutional:  Negative for chills and fever.   HENT:  Negative for congestion and rhinorrhea.    Respiratory:  Negative for cough and wheezing.    Gastrointestinal:  Negative for diarrhea, nausea and vomiting.   Neurological:  Negative for dizziness and headaches.   Psychiatric/Behavioral:  Positive for agitation and behavioral problems.        Due to patient's clinical status, ROS of symptoms was completed by discussion with long term care facility staff, as well as with input from patient.      Objective:     Vitals:    01/29/24 0900   BP: (!) 145/79   Pulse: 84   Resp: 16   Temp: 97.3 °F (36.3 °C)   SpO2: 97%     Physical Exam  Constitutional:       General: He is not in acute distress.     Appearance: He is not ill-appearing.   HENT:      Head: Normocephalic and atraumatic.      Right Ear: External ear normal.      Left Ear: External ear normal.   Eyes:      Extraocular Movements: Extraocular movements intact.      Conjunctiva/sclera: Conjunctivae normal.   Cardiovascular:      Comments: Patient declines auscultation at time of visit  Pulmonary:      Effort: No respiratory distress.      Comments: Patient declines auscultation at time of visit  Neurological:      General: No focal deficit present.      Mental Status: He is alert. Mental status is at baseline.   Psychiatric:         Mood and Affect: Mood normal.         Behavior:

## 2024-01-30 VITALS
OXYGEN SATURATION: 97 % | DIASTOLIC BLOOD PRESSURE: 79 MMHG | HEART RATE: 84 BPM | SYSTOLIC BLOOD PRESSURE: 145 MMHG | TEMPERATURE: 97.3 F | RESPIRATION RATE: 16 BRPM

## 2024-01-31 RX ORDER — ACETAMINOPHEN AND CODEINE PHOSPHATE 300; 30 MG/1; MG/1
1 TABLET ORAL DAILY PRN
COMMUNITY

## 2024-01-31 RX ORDER — ALPRAZOLAM 0.25 MG/1
0.12 TABLET ORAL DAILY PRN
COMMUNITY

## 2024-01-31 RX ORDER — ALPRAZOLAM 0.25 MG/1
0.12 TABLET ORAL EVERY MORNING
COMMUNITY

## 2024-01-31 ASSESSMENT — ENCOUNTER SYMPTOMS
VOMITING: 0
NAUSEA: 0
COUGH: 0
DIARRHEA: 0
RHINORRHEA: 0
WHEEZING: 0

## 2024-02-05 ENCOUNTER — OUTSIDE SERVICES (OUTPATIENT)
Dept: INTERNAL MEDICINE | Age: 89
End: 2024-02-05
Payer: MEDICARE

## 2024-02-05 DIAGNOSIS — R46.89 BEHAVIORAL CHANGE: Primary | ICD-10-CM

## 2024-02-05 PROCEDURE — 99347 HOME/RES VST EST SF MDM 20: CPT | Performed by: NURSE PRACTITIONER

## 2024-02-05 NOTE — PROGRESS NOTES
[facility]      Yeyo Bravo is a 89 y.o. male resident of [facility].    HPI:     HPI  Patient presents for evaluation and management of behavior change. Staff has noted increased agitation, has been pacing frequency around facility, which is not typical for him. Patient is poor historian due to dementia. History of BPH with incomplete bladder emptying, has Beck catheter. History of recurrent UTIs. Afebrile.       Current Outpatient Medications   Medication Sig Dispense Refill    carbamide peroxide (DEBROX) 6.5 % otic solution 4 drops daily X 4 days, then flush ears with warm water on 5th day      ALPRAZolam (XANAX) 0.25 MG tablet Take 0.5 tablets by mouth every morning. Max Daily Amount: 0.125 mg      acetaminophen-codeine (TYLENOL/CODEINE #3) 300-30 MG per tablet Take 1 tablet by mouth daily as needed for Pain (prior to Beck cath change). Max Daily Amount: 1 tablet      ALPRAZolam (XANAX) 0.25 MG tablet Take 0.5 tablets by mouth daily as needed for Anxiety (before shower). Max Daily Amount: 0.125 mg      Psyllium (METAMUCIL PO) Per instructions 2 TIMES DAILY (route: oral)      Multiple Vitamin (TAB-A-LEVY PO) 1 tablet DAILY (route: oral)      ALPRAZolam (XANAX) 0.25 MG tablet Take 1 tablet by mouth nightly for 183 days. Max Daily Amount: 0.25 mg 30 tablet 5    Lactobacillus (ACIDOPHILUS PROBIOTIC) CAPS Take 1 caplet by mouth daily      polyethylene glycol (GLYCOLAX) 17 GM/SCOOP powder Take 17 g by mouth 2 times daily      INFANTS SIMETHICONE PO Take 0.6 mLs by mouth 4 times daily as needed (gas) - 20 mg/ 0.3 ml      carboxymethylcellulose (REFRESH PLUS) 0.5 % SOLN ophthalmic solution Place 2 drops into both eyes every 8 hours as needed      rivaroxaban (XARELTO) 15 MG TABS tablet Take 1 tablet by mouth daily (with breakfast) ANTICOAGULANT! Doses greater than 15 mg/day must be administered with food. 90 tablet 3    atorvastatin (LIPITOR) 10 MG tablet Take 1 tablet by mouth daily 30 tablet 0

## 2024-02-07 VITALS
TEMPERATURE: 97.8 F | DIASTOLIC BLOOD PRESSURE: 77 MMHG | RESPIRATION RATE: 16 BRPM | OXYGEN SATURATION: 97 % | HEART RATE: 71 BPM | SYSTOLIC BLOOD PRESSURE: 151 MMHG

## 2024-02-07 ASSESSMENT — ENCOUNTER SYMPTOMS
WHEEZING: 0
COUGH: 0
NAUSEA: 0
VOMITING: 0
RHINORRHEA: 0
DIARRHEA: 0

## 2024-02-12 ENCOUNTER — HOSPITAL ENCOUNTER (OUTPATIENT)
Age: 89
Setting detail: SPECIMEN
Discharge: HOME | End: 2024-02-12
Payer: MEDICARE

## 2024-02-12 DIAGNOSIS — R46.89 BEHAVIORAL CHANGE: ICD-10-CM

## 2024-02-12 LAB
BACTERIA URNS QL MICRO: ABNORMAL
BILIRUB UR QL STRIP: NEGATIVE
CHARACTER UR: ABNORMAL
CLARITY UR: ABNORMAL
COLOR UR: YELLOW
EPI CELLS #/AREA URNS HPF: ABNORMAL /HPF (ref 0–5)
GLUCOSE UR STRIP-MCNC: NEGATIVE MG/DL
HGB UR QL STRIP.AUTO: ABNORMAL
KETONES UR STRIP-MCNC: NEGATIVE MG/DL
LEUKOCYTE ESTERASE UR QL STRIP: ABNORMAL
NITRITE UR QL STRIP: POSITIVE
PH UR STRIP: 6 [PH] (ref 5–6)
PROT UR STRIP-MCNC: ABNORMAL MG/DL
RBC #/AREA URNS HPF: ABNORMAL /HPF (ref 0–4)
SP GR UR STRIP: 1.02 (ref 1.01–1.02)
UROBILINOGEN UR STRIP-ACNC: NORMAL EU/DL (ref 0–1)
WBC #/AREA URNS HPF: ABNORMAL /HPF (ref 0–4)

## 2024-02-12 PROCEDURE — 87086 URINE CULTURE/COLONY COUNT: CPT

## 2024-02-12 PROCEDURE — 87186 SC STD MICRODIL/AGAR DIL: CPT

## 2024-02-12 PROCEDURE — 87077 CULTURE AEROBIC IDENTIFY: CPT

## 2024-02-12 PROCEDURE — 81001 URINALYSIS AUTO W/SCOPE: CPT

## 2024-02-15 LAB
MICROORGANISM SPEC CULT: ABNORMAL
SPECIMEN DESCRIPTION: ABNORMAL

## 2024-02-22 DIAGNOSIS — F41.9 ANXIETY: ICD-10-CM

## 2024-02-22 RX ORDER — ALPRAZOLAM 0.25 MG/1
0.25 TABLET ORAL NIGHTLY
Qty: 30 TABLET | Refills: 0 | Status: SHIPPED | OUTPATIENT
Start: 2024-02-22 | End: 2024-03-23

## 2024-02-22 RX ORDER — ALPRAZOLAM 0.25 MG/1
0.12 TABLET ORAL DAILY PRN
Qty: 4 TABLET | Refills: 0 | Status: SHIPPED | OUTPATIENT
Start: 2024-02-22 | End: 2024-03-23

## 2024-02-22 RX ORDER — ALPRAZOLAM 0.25 MG/1
0.12 TABLET ORAL EVERY MORNING
Qty: 15 TABLET | Refills: 0 | Status: SHIPPED | OUTPATIENT
Start: 2024-02-22 | End: 2024-03-23

## 2024-02-22 NOTE — TELEPHONE ENCOUNTER
Received fax from GP- R is running low on his Xanax. Can we get a new script? Please send to [facility]. Thank you.\"  Pt has 3 Xanax orders on EMR. Faxed back to GP asking for details (dose and directions).

## 2024-03-18 DIAGNOSIS — F41.9 ANXIETY: ICD-10-CM

## 2024-03-18 RX ORDER — ALPRAZOLAM 0.25 MG/1
0.12 TABLET ORAL EVERY MORNING
Qty: 15 TABLET | Refills: 0 | Status: SHIPPED | OUTPATIENT
Start: 2024-03-18 | End: 2024-04-17

## 2024-03-18 RX ORDER — POLYETHYLENE GLYCOL 3350 17 G/17G
POWDER ORAL
Qty: 1 EACH | Refills: 0 | Status: SHIPPED | OUTPATIENT
Start: 2024-03-18

## 2024-03-21 DIAGNOSIS — J00 ACUTE NASOPHARYNGITIS: ICD-10-CM

## 2024-03-21 RX ORDER — FLUTICASONE PROPIONATE 50 MCG
SPRAY, SUSPENSION (ML) NASAL
Qty: 16 G | Refills: 0 | Status: SHIPPED | OUTPATIENT
Start: 2024-03-21

## 2024-03-21 RX ORDER — GLYCERIN, PETROLATUM, PHENYLEPHRINE HCL, PRAMOXINE HCL 144; 2.5; 10; 15 MG/G; MG/G; MG/G; MG/G
CREAM TOPICAL
Qty: 26 EACH | Refills: 0 | Status: SHIPPED | OUTPATIENT
Start: 2024-03-21

## 2024-03-21 NOTE — TELEPHONE ENCOUNTER
Pharmacy requesting a refill of the below medication which has been pended for you:     Requested Prescriptions     Pending Prescriptions Disp Refills    fluticasone (FLONASE) 50 MCG/ACT nasal spray [Pharmacy Med Name: FLUTICASONE PROP 50 MCG SPRAY*] 16 g 0     Sig: INSTILL 1 SPRAY IN EACH NOSTRIL ONCE DAILY       Last Appointment Date: 1/25/2024  Next Appointment Date: Visit date not found    Allergies   Allergen Reactions    Pcn [Penicillins] Swelling     As a child  Pt tolerated ceftriaxone , and keflex with no intolerance    Sulfa Antibiotics     Cefdinir Other (See Comments)     Pt tolerated ceftriaxone , and keflex with no intolerance

## 2024-03-21 NOTE — TELEPHONE ENCOUNTER
Pharmacy  requesting a refill of the below medication which has been pended for you:     Requested Prescriptions     Pending Prescriptions Disp Refills    pramox-PE-glycerin-petrolatum (PREPARATION H) 1-0.25-14.4-15 % cream [Pharmacy Med Name: PREPARATION H 1% CREAM] 26 each 0     Sig: APPLY TOPICALLY TO AFFECTED AREA AS NEEDED.       Last Appointment Date: 1/25/2024  Next Appointment Date: Visit date not found    Allergies   Allergen Reactions    Pcn [Penicillins] Swelling     As a child  Pt tolerated ceftriaxone , and keflex with no intolerance    Sulfa Antibiotics     Cefdinir Other (See Comments)     Pt tolerated ceftriaxone , and keflex with no intolerance

## 2024-04-04 RX ORDER — LACTULOSE 10 G/15ML
SOLUTION ORAL; RECTAL
Qty: 473 ML | Refills: 11 | Status: SHIPPED | OUTPATIENT
Start: 2024-04-04

## 2024-04-04 NOTE — DISCHARGE SUMMARY
[facility]   [address]    Discharge Summary     Patient ID: Mallory Hassan  :  1934   MRN: [id number]     ACCOUNT:  [de-identified]   Patient's PCP: Suzan Eid DO  Admit Date: 3/13/2018   Discharge Date: 3/16/2018     Length of Stay: 3  Code Status:  Full Code  Admitting Physician: Marion Vazquez MD  Discharge Physician: Marion Vazquez MD     Active Discharge Diagnoses:     Primary Problem  Sepsis due to urinary tract infection [facility])      Hospital Problems  Principal Problem (Resolved):    Sepsis due to urinary tract infection ([facility]  Active Problems:    Paroxysmal atrial fibrillation ([facility]    Essential hypertension    Overweight    Coronary artery disease involving native coronary artery of native heart    Recurrent UTI    Chronic systolic CH[facility] (congestive [facility] failure) [facility]    Gross hematuria  Resolved Problems:    Lactic acid acidosis    MARITA (acute kidney injury) ([facility]    Neutropenia associated with infection [facility])        Admission Condition:  poor     Discharged Condition: good    Hospital Stay:     Hospital Course:    Mallory Hassan is a 80 y.o. male who presents As a transfer from outside facility due to sepsis secondary to UTI. patient presented originally with fevers, chills, tiredness and fatigue and dysuria. For several days that became progressively worse. Yesterday before presented to the ED. At the ED, patient was lethargic,feverish, tired, tachycardic, hypotensive . Upon reviewing the chart. The previous urine culture was E. coli resistant to Cipro, patient is allergic to penicillin and sulfa. At ED  patient was started on IV vancomycin    d he is admitted to the hospital for the management of Sepsis due to urinary tract infection      Significant therapeutic interventions:   1.  Sepsis secondary to likely UTI: Pending urine culture results, previous cultures are sensitive to aztreonam.  Patient is allergic to penicillin and Rx Refill Note  Requested Prescriptions     Pending Prescriptions Disp Refills    amLODIPine (NORVASC) 5 MG tablet [Pharmacy Med Name: AMLODIPINE BESYLATE TABS 5MG] 90 tablet 3     Sig: TAKE 1 TABLET DAILY      Last office visit with prescribing clinician: 11/2/2022   Last telemedicine visit with prescribing clinician: Visit date not found   Next office visit with prescribing clinician: 6/5/2024                         Would you like a call back once the refill request has been completed: [] Yes [] No    If the office needs to give you a call back, can they leave a voicemail: [] Yes [] No    Isha Gonsalez MA  04/04/24, 08:35 EDT   1.20 mg/dL    Bun/Cre Ratio 12 9 - 20    Calcium 8.8 8.6 - 10.4 mg/dL    Sodium 138 135 - 144 mmol/L    Potassium 4.1 3.7 - 5.3 mmol/L    Chloride 92 (L) 98 - 107 mmol/L    CO2 22 20 - 31 mmol/L    Anion Gap 24 (H) 9 - 17 mmol/L    GFR Non-African American 41 (L) >60 mL/min    GFR  49 (L) >60 mL/min    GFR Comment          GFR Staging NOT REPORTED    Lactic Acid    Collection Time: 03/13/18  1:35 AM   Result Value Ref Range    Lactic Acid 7.6 (H) 0.5 - 2.2 mmol/L   Culture Blood #1    Collection Time: 03/13/18  1:35 AM   Result Value Ref Range    Specimen Description       . BLOOD Performed at [facility] [address] [facility]    Specimen Description  [address] [facility] [address] [phone]     Special Requests NOT REPORTED     Culture NO GROWTH 3 DAYS     Culture       Performed at [facility] [address] [phone]    Status Pending    Rapid influenza A/B antigens    Collection Time: 03/13/18  1:38 AM   Result Value Ref Range    Specimen Description . NASOPHARYNGEAL SWAB     Special Requests NOT REPORTED     Direct Exam       NEGATIVE FOR INFLUENZA A AND B ANTIGEN. * A negative result does not exclude    Direct Exam        Influenza infection. Negative results should be confirmed by PCR testing. Direct Exam       Performed at [facility] [address] [address] [phone]     Status FINAL 03/13/2018    Culture Blood #1    Collection Time: 03/13/18  1:58 AM   Result Value Ref Range    Specimen Description       . BLOOD RIGHT AC 20 ML Performed at [facility]    Specimen Description        [address] [facility] [address] [phone]    Special Requests NOT REPORTED     Culture NO GROWTH 3 DAYS     Culture       Performed at [facility] [address] [facility], [address] [place] [address] [phone]    Status Pending    Culture Blood #1    Collection Time: 03/13/18  2:02 AM   Result Value Ref Range    Specimen Description       . BLOOD LEFT AC 20ML Performed at [facility] [address]    Specimen Description        [address] [facility] [address] [phone]    Special Requests NOT REPORTED     Culture NO GROWTH 3 DAYS     Culture       Performed at [address] [place] [address] [phone]    Status Pending    EKG 12 Lead    Collection Time: 03/13/18  7:37 AM   Result Value Ref Range    Ventricular Rate 69 BPM    Atrial Rate 69 BPM    P-R Interval 340 ms    QRS Duration 94 ms    Q-T Interval 442 ms    QTc Calculation (Bazett) 473 ms    P Axis 68 degrees    R Axis -33 degrees    T Axis 83 degrees   CULTURE BLOOD #2    Collection Time: 03/13/18  8:00 AM   Result Value Ref Range    Specimen Description . BLOOD     Special Requests L HAND 10ML     Culture NO GROWTH 3 DAYS     Culture       [address] [phone]    Status Pending    Lactate, Sepsis    Collection Time: 03/13/18  8:01 AM   Result Value Ref Range    Lactic Acid, Sepsis NOT REPORTED 0.5 - 1.9 mmol/L    Lactic Acid, Sepsis, Whole Blood 2.0 (H) 0.5 - 1.9 mmol/L   Troponin    Collection Time: 03/13/18  8:01 AM   Result Value Ref Range    Troponin T <0.03 <0.03 ng/mL    Troponin Interp         CULTURE BLOOD #1    Collection Time: 03/13/18  8:05 AM   Result Value Ref Range    Specimen Description . BLOOD     Special Requests L AV 10ML     Culture NO GROWTH 3 DAYS     Culture       [address] [phone]    Status Pending    CBC Auto Differential    Collection Time: 03/13/18  8:12 AM   Result Value Ref Range    WBC 17.9 (H) 3.5 - 11.3 k/uL    RBC 4.08 (L) 4.21 - 5.77 m/uL    Hemoglobin 12.2 (L) 13.0 - 17.0 g/dL    Hematocrit 38.1 (L) 40.7 - 50.3 %    MCV 93.4 82.6 - 102.9 fL    MCH 29.9 25.2 - 33.5 pg    MCHC 32.0 28.4 - 34.8 g/dL    RDW 14.2 Non-African American >60 >60 mL/min    GFR African American >60 >60 mL/min    GFR Comment          GFR Staging NOT REPORTED    Ionized Calcium    Collection Time: 03/14/18  4:37 AM   Result Value Ref Range    Calcium, Ion 1.07 (L) 1.13 - 1.33 mmol/L   Magnesium    Collection Time: 03/14/18  4:37 AM   Result Value Ref Range    Magnesium 2.0 1.6 - 2.6 mg/dL   Phosphorus    Collection Time: 03/14/18  4:37 AM   Result Value Ref Range    Phosphorus 2.1 (L) 2.5 - 4.5 mg/dL   Immature Platelet Fraction    Collection Time: 03/14/18  4:37 AM   Result Value Ref Range    Platelet, Immature Fraction 4.1 1.1 - 10.3 %    Platelet, Fluorescence 107 (L) 138 - 453 k/uL   Lactic Acid, Plasma    Collection Time: 03/14/18  1:47 PM   Result Value Ref Range    Lactic Acid NOT REPORTED mmol/L    Lactic Acid, Whole Blood 1.0 0.7 - 2.1 mmol/L   EKG 12 Lead    Collection Time: 03/14/18  1:49 PM   Result Value Ref Range    Ventricular Rate 107 BPM    Atrial Rate 288 BPM    QRS Duration 100 ms    Q-T Interval 360 ms    QTc Calculation (Bazett) 480 ms    R Axis -31 degrees    T Axis 95 degrees   Basic Metabolic Panel w/ Reflex to MG    Collection Time: 03/15/18  4:15 PM   Result Value Ref Range    Glucose 111 (H) 70 - 99 mg/dL    BUN 10 8 - 23 mg/dL    CREATININE 0.88 0.70 - 1.20 mg/dL    Bun/Cre Ratio NOT REPORTED 9 - 20    Calcium 8.0 (L) 8.6 - 10.4 mg/dL    Sodium 138 135 - 144 mmol/L    Potassium 3.7 3.7 - 5.3 mmol/L    Chloride 104 98 - 107 mmol/L    CO2 25 20 - 31 mmol/L    Anion Gap 9 9 - 17 mmol/L    GFR Non-African American >60 >60 mL/min    GFR African American >60 >60 mL/min    GFR Comment          GFR Staging NOT REPORTED    Basic Metabolic Panel w/ Reflex to MG    Collection Time: 03/16/18  5:08 AM   Result Value Ref Range    Glucose 105 (H) 70 - 99 mg/dL    BUN 8 8 - 23 mg/dL    CREATININE 0.76 0.70 - 1.20 mg/dL    Bun/Cre Ratio NOT REPORTED 9 - 20    Calcium 7.9 (L) 8.6 - 10.4 mg/dL    Sodium 138 135 - 144 mmol/L    Potassium 3.6

## 2024-04-10 RX ORDER — ALPRAZOLAM 0.25 MG/1
0.12 TABLET ORAL DAILY PRN
COMMUNITY

## 2024-04-10 RX ORDER — ALPRAZOLAM 0.25 MG/1
0.25 TABLET ORAL NIGHTLY
COMMUNITY

## 2024-04-12 DIAGNOSIS — R46.89 COGNITIVE AND BEHAVIORAL CHANGES: Primary | ICD-10-CM

## 2024-04-12 DIAGNOSIS — R41.89 COGNITIVE AND BEHAVIORAL CHANGES: Primary | ICD-10-CM

## 2024-04-14 ENCOUNTER — HOSPITAL ENCOUNTER (OUTPATIENT)
Age: 89
Setting detail: SPECIMEN
Discharge: HOME | End: 2024-04-14
Payer: MEDICARE

## 2024-04-14 PROCEDURE — 87186 SC STD MICRODIL/AGAR DIL: CPT

## 2024-04-14 PROCEDURE — 81001 URINALYSIS AUTO W/SCOPE: CPT

## 2024-04-14 PROCEDURE — 87086 URINE CULTURE/COLONY COUNT: CPT

## 2024-04-14 PROCEDURE — 87077 CULTURE AEROBIC IDENTIFY: CPT

## 2024-04-15 ENCOUNTER — OUTSIDE SERVICES (OUTPATIENT)
Dept: INTERNAL MEDICINE | Age: 89
End: 2024-04-15
Payer: MEDICARE

## 2024-04-15 ENCOUNTER — PATIENT MESSAGE (OUTPATIENT)
Dept: CARE COORDINATION | Age: 89
End: 2024-04-15

## 2024-04-15 DIAGNOSIS — N30.01 ACUTE CYSTITIS WITH HEMATURIA: Primary | ICD-10-CM

## 2024-04-15 LAB
BACTERIA URNS QL MICRO: ABNORMAL
BILIRUB UR QL STRIP: NEGATIVE
CLARITY UR: CLEAR
COLOR UR: YELLOW
EPI CELLS #/AREA URNS HPF: ABNORMAL /HPF (ref 0–5)
GLUCOSE UR STRIP-MCNC: NEGATIVE MG/DL
HGB UR QL STRIP.AUTO: ABNORMAL
KETONES UR STRIP-MCNC: NEGATIVE MG/DL
LEUKOCYTE ESTERASE UR QL STRIP: ABNORMAL
NITRITE UR QL STRIP: POSITIVE
PH UR STRIP: 6 [PH] (ref 5–6)
PROT UR STRIP-MCNC: ABNORMAL MG/DL
RBC #/AREA URNS HPF: ABNORMAL /HPF (ref 0–4)
SP GR UR STRIP: 1.01 (ref 1.01–1.02)
UROBILINOGEN UR STRIP-ACNC: NORMAL EU/DL (ref 0–1)
WBC #/AREA URNS HPF: ABNORMAL /HPF (ref 0–4)

## 2024-04-15 PROCEDURE — 99347 HOME/RES VST EST SF MDM 20: CPT | Performed by: NURSE PRACTITIONER

## 2024-04-15 ASSESSMENT — ENCOUNTER SYMPTOMS
DIARRHEA: 0
COUGH: 0
RHINORRHEA: 0
NAUSEA: 0
WHEEZING: 0
VOMITING: 0

## 2024-04-15 NOTE — PROGRESS NOTES
[facility]      Yeyo Bravo is a 89 y.o. male resident of [facility].    HPI:     HPI  Patient presents for evaluation and management of urinary tract infection.  History of BPH with incomplete bladder emptying, has Beck catheter. History of recurrent UTIs. Afebrile. No nausea or vomiting noted.  UA was ordered by on call for behavior concerns. UA was reviewed and was positive for UTI. Given that patient has history of multi-drug resistant UTI, will plan to await culture given he is stable/asymptomatic at present.       Current Outpatient Medications   Medication Sig Dispense Refill    ALPRAZolam (XANAX) 0.25 MG tablet TAKE 1/2 TABLET (0.125MG) BY MOUTH EVERY MORNING 15 tablet 0    ALPRAZolam (XANAX) 0.25 MG tablet Take 1 tablet by mouth at bedtime. Max Daily Amount: 0.25 mg      ALPRAZolam (XANAX) 0.25 MG tablet Take 0.5 tablets by mouth daily as needed (before shower if needed). Max Daily Amount: 0.125 mg      lactulose encephalopathy 10 GM/15ML SOLN solution TAKE 30ML BY MOUTH 3 TIMES A DAY AS NEEDED FOR CONSTIPATION 473 mL 11    fluticasone (FLONASE) 50 MCG/ACT nasal spray INSTILL 1 SPRAY IN EACH NOSTRIL ONCE DAILY 16 g 0    pramox-PE-glycerin-petrolatum (PREPARATION H) 1-0.25-14.4-15 % cream APPLY TOPICALLY TO AFFECTED AREA AS NEEDED. 26 each 0    polyethylene glycol (MIRALAX) 17 GM/SCOOP POWD powder MIX 17 GRAMS WITH LIQUID AND GIVE BY MOUTH IN THE MORNING AND AT BEDTIME 1 each 0    ALPRAZolam (XANAX) 0.25 MG tablet Take 0.5 tablets by mouth every morning for 30 days. Max Daily Amount: 0.125 mg 15 tablet 0    acetaminophen-codeine (TYLENOL/CODEINE #3) 300-30 MG per tablet Take 1 tablet by mouth daily as needed for Pain (prior to Beck cath change).      Psyllium (METAMUCIL PO) Per instructions 2 TIMES DAILY (route: oral)      Multiple Vitamin (TAB-A-LEVY PO) Take 1 tablet by mouth daily      Lactobacillus (ACIDOPHILUS PROBIOTIC) CAPS Take 1 caplet by mouth daily      INFANTS SIMETHICONE PO

## 2024-04-16 VITALS
DIASTOLIC BLOOD PRESSURE: 72 MMHG | RESPIRATION RATE: 18 BRPM | OXYGEN SATURATION: 98 % | TEMPERATURE: 98.1 F | HEART RATE: 76 BPM | SYSTOLIC BLOOD PRESSURE: 124 MMHG

## 2024-04-16 DIAGNOSIS — F41.9 ANXIETY: ICD-10-CM

## 2024-04-16 RX ORDER — ALPRAZOLAM 0.25 MG/1
TABLET ORAL
Qty: 15 TABLET | Refills: 0 | Status: SHIPPED | OUTPATIENT
Start: 2024-04-16 | End: 2024-05-16

## 2024-04-18 LAB
MICROORGANISM SPEC CULT: ABNORMAL
SPECIMEN DESCRIPTION: ABNORMAL

## 2024-04-29 ENCOUNTER — OUTSIDE SERVICES (OUTPATIENT)
Dept: INTERNAL MEDICINE | Age: 89
End: 2024-04-29

## 2024-04-29 DIAGNOSIS — I50.42 CHRONIC COMBINED SYSTOLIC AND DIASTOLIC CHF (CONGESTIVE HEART FAILURE) (HCC): ICD-10-CM

## 2024-04-29 DIAGNOSIS — E78.2 MIXED HYPERLIPIDEMIA: ICD-10-CM

## 2024-04-29 DIAGNOSIS — R39.14 BENIGN PROSTATIC HYPERPLASIA WITH INCOMPLETE BLADDER EMPTYING: ICD-10-CM

## 2024-04-29 DIAGNOSIS — I48.0 PAROXYSMAL ATRIAL FIBRILLATION (HCC): ICD-10-CM

## 2024-04-29 DIAGNOSIS — N32.0 BLADDER OUTLET OBSTRUCTION: ICD-10-CM

## 2024-04-29 DIAGNOSIS — I10 PRIMARY HYPERTENSION: Primary | ICD-10-CM

## 2024-04-29 DIAGNOSIS — N13.30 HYDRONEPHROSIS, BILATERAL: ICD-10-CM

## 2024-04-29 DIAGNOSIS — I25.10 CORONARY ARTERY DISEASE INVOLVING NATIVE CORONARY ARTERY OF NATIVE HEART WITHOUT ANGINA PECTORIS: ICD-10-CM

## 2024-04-29 DIAGNOSIS — N39.0 RECURRENT UTI: ICD-10-CM

## 2024-04-29 DIAGNOSIS — E03.9 ACQUIRED HYPOTHYROIDISM: ICD-10-CM

## 2024-04-29 DIAGNOSIS — N43.1 PYOCELE: ICD-10-CM

## 2024-04-29 DIAGNOSIS — F03.90 DEMENTIA WITHOUT BEHAVIORAL DISTURBANCE, PSYCHOTIC DISTURBANCE, MOOD DISTURBANCE, OR ANXIETY, UNSPECIFIED DEMENTIA SEVERITY, UNSPECIFIED DEMENTIA TYPE (HCC): ICD-10-CM

## 2024-04-29 DIAGNOSIS — N40.1 BENIGN PROSTATIC HYPERPLASIA WITH INCOMPLETE BLADDER EMPTYING: ICD-10-CM

## 2024-04-29 RX ORDER — POLYETHYLENE GLYCOL 3350 17 G/17G
POWDER ORAL
Qty: 1 EACH | Refills: 0
Start: 2024-04-29

## 2024-04-29 ASSESSMENT — ENCOUNTER SYMPTOMS
CHEST TIGHTNESS: 0
NAUSEA: 0
VOMITING: 0
DIARRHEA: 0
SHORTNESS OF BREATH: 0
SORE THROAT: 0

## 2024-04-29 NOTE — PROGRESS NOTES
[facility]      Yeyo Bravo is a 89 y.o. male resident of [facility].    HPI:     HPI  Patient presents via virtual visit with assisted living staff member for evaluation and management of medical conditions as noted below.     Staff does note he is currently on miralax BID scheduled, have been holding this due to loose stools. Discussed changing this to PRN, as he was previously well controlled with metamucil monotherapy.     Hypertension has been well controlled on metoprolol. Denies chest pain or dyspnea.     Continues on atorvastatin for hyperlipidemia.     Follows with cardiology for atrial fibrillation, CHF, and CAD.     Hypothyroidism is well controlled on current dose of levothyroxine.    Follows with urology for BPH, bladder outlet obstruction, bilateral hydronephrosis, recurrent UTI and scrotal abscess. Continues on flomax.     No longer following with neurology for dementia. Does have occasional behavioral disturbances, which are generally well controlled with PRN alprazolam.     Current Outpatient Medications   Medication Sig Dispense Refill    polyethylene glycol (MIRALAX) 17 GM/SCOOP POWD powder MIX 17 GRAMS WITH LIQUID AND GIVE BY MOUTH TWICE DAILY AS NEEDED FOR CONSTIPATION 1 each 0    ALPRAZolam (XANAX) 0.25 MG tablet TAKE 1/2 TABLET (0.125MG) BY MOUTH EVERY MORNING 15 tablet 0    ALPRAZolam (XANAX) 0.25 MG tablet Take 1 tablet by mouth at bedtime. Max Daily Amount: 0.25 mg      ALPRAZolam (XANAX) 0.25 MG tablet Take 0.5 tablets by mouth daily as needed (before shower if needed). Max Daily Amount: 0.125 mg      lactulose encephalopathy 10 GM/15ML SOLN solution TAKE 30ML BY MOUTH 3 TIMES A DAY AS NEEDED FOR CONSTIPATION 473 mL 11    fluticasone (FLONASE) 50 MCG/ACT nasal spray INSTILL 1 SPRAY IN EACH NOSTRIL ONCE DAILY 16 g 0    pramox-PE-glycerin-petrolatum (PREPARATION H) 1-0.25-14.4-15 % cream APPLY TOPICALLY TO AFFECTED AREA AS NEEDED. 26 each 0    acetaminophen-codeine

## 2024-04-30 VITALS
DIASTOLIC BLOOD PRESSURE: 70 MMHG | TEMPERATURE: 98.1 F | SYSTOLIC BLOOD PRESSURE: 127 MMHG | OXYGEN SATURATION: 97 % | RESPIRATION RATE: 18 BRPM | HEART RATE: 77 BPM

## 2024-05-02 ENCOUNTER — HOSPITAL ENCOUNTER (OUTPATIENT)
Age: 89
Setting detail: SPECIMEN
Discharge: HOME | End: 2024-05-02

## 2024-05-02 DIAGNOSIS — E03.9 ACQUIRED HYPOTHYROIDISM: ICD-10-CM

## 2024-05-13 DIAGNOSIS — F41.9 ANXIETY: ICD-10-CM

## 2024-05-13 RX ORDER — ALPRAZOLAM 0.25 MG/1
TABLET ORAL
Qty: 15 TABLET | Refills: 0 | Status: SHIPPED | OUTPATIENT
Start: 2024-05-13 | End: 2024-06-12

## 2024-05-31 ENCOUNTER — HOSPITAL ENCOUNTER (OUTPATIENT)
Age: 89
Setting detail: SPECIMEN
Discharge: HOME | End: 2024-05-31
Payer: MEDICARE

## 2024-05-31 DIAGNOSIS — F41.9 ANXIETY: ICD-10-CM

## 2024-05-31 LAB
BACTERIA URNS QL MICRO: ABNORMAL
BILIRUB UR QL STRIP: NEGATIVE
CLARITY UR: ABNORMAL
COLOR UR: YELLOW
EPI CELLS #/AREA URNS HPF: ABNORMAL /HPF (ref 0–5)
GLUCOSE UR STRIP-MCNC: NEGATIVE MG/DL
HGB UR QL STRIP.AUTO: ABNORMAL
KETONES UR STRIP-MCNC: ABNORMAL MG/DL
LEUKOCYTE ESTERASE UR QL STRIP: ABNORMAL
NITRITE UR QL STRIP: POSITIVE
PH UR STRIP: 6 [PH] (ref 5–6)
PROT UR STRIP-MCNC: ABNORMAL MG/DL
RBC #/AREA URNS HPF: ABNORMAL /HPF (ref 0–4)
SP GR UR STRIP: 1.02 (ref 1.01–1.02)
UROBILINOGEN UR STRIP-ACNC: NORMAL EU/DL (ref 0–1)
WBC #/AREA URNS HPF: ABNORMAL /HPF (ref 0–4)

## 2024-05-31 PROCEDURE — 87186 SC STD MICRODIL/AGAR DIL: CPT

## 2024-05-31 PROCEDURE — 87086 URINE CULTURE/COLONY COUNT: CPT

## 2024-05-31 PROCEDURE — 81001 URINALYSIS AUTO W/SCOPE: CPT

## 2024-05-31 NOTE — TELEPHONE ENCOUNTER
Pharmacy  requesting a refill of the below medication which has been pended for you:     Requested Prescriptions     Pending Prescriptions Disp Refills    ALPRAZolam (XANAX) 0.25 MG tablet [Pharmacy Med Name: ALPRAZOLAM 0.25 MG TABLET] 30 tablet 5     Sig: Take 1 tablet by mouth nightly.       Last Appointment Date: 1/25/2024  Next Appointment Date: Visit date not found    Allergies   Allergen Reactions    Pcn [Penicillins] Swelling     As a child  Pt tolerated ceftriaxone , and keflex with no intolerance    Sulfa Antibiotics     Cefdinir Other (See Comments)     Pt tolerated ceftriaxone , and keflex with no intolerance

## 2024-06-02 LAB
MICROORGANISM SPEC CULT: ABNORMAL
SPECIMEN DESCRIPTION: ABNORMAL

## 2024-06-03 ENCOUNTER — TELEPHONE (OUTPATIENT)
Dept: UROLOGY | Age: 89
End: 2024-06-03

## 2024-06-03 DIAGNOSIS — N39.0 RECURRENT UTI: Primary | ICD-10-CM

## 2024-06-03 LAB
MICROORGANISM SPEC CULT: ABNORMAL
SPECIMEN DESCRIPTION: ABNORMAL

## 2024-06-03 RX ORDER — ALPRAZOLAM 0.25 MG/1
0.25 TABLET ORAL NIGHTLY
Qty: 30 TABLET | Refills: 0 | Status: SHIPPED | OUTPATIENT
Start: 2024-06-03 | End: 2024-07-03

## 2024-06-03 NOTE — TELEPHONE ENCOUNTER
Received fax from [facility]to [facility], NP reviewed urine culture, recommends referral to infectious disease, and follow up with urology on 6/28/24. Referral faxed alberto guzman

## 2024-06-06 ENCOUNTER — OFFICE VISIT (OUTPATIENT)
Dept: UROLOGY | Age: 89
End: 2024-06-06
Payer: MEDICARE

## 2024-06-06 VITALS
HEIGHT: 71 IN | SYSTOLIC BLOOD PRESSURE: 120 MMHG | WEIGHT: 172 LBS | DIASTOLIC BLOOD PRESSURE: 70 MMHG | TEMPERATURE: 96.4 F | BODY MASS INDEX: 24.08 KG/M2 | HEART RATE: 60 BPM

## 2024-06-06 DIAGNOSIS — N13.8 BPH WITH OBSTRUCTION/LOWER URINARY TRACT SYMPTOMS: Primary | ICD-10-CM

## 2024-06-06 DIAGNOSIS — Z87.438 HISTORY OF EPIDIDYMITIS: ICD-10-CM

## 2024-06-06 DIAGNOSIS — N40.1 BPH WITH OBSTRUCTION/LOWER URINARY TRACT SYMPTOMS: Primary | ICD-10-CM

## 2024-06-06 DIAGNOSIS — Z97.8 CHRONIC INDWELLING FOLEY CATHETER: ICD-10-CM

## 2024-06-06 PROCEDURE — G8420 CALC BMI NORM PARAMETERS: HCPCS

## 2024-06-06 PROCEDURE — G8427 DOCREV CUR MEDS BY ELIG CLIN: HCPCS

## 2024-06-06 PROCEDURE — 99213 OFFICE O/P EST LOW 20 MIN: CPT

## 2024-06-06 PROCEDURE — 99214 OFFICE O/P EST MOD 30 MIN: CPT

## 2024-06-06 PROCEDURE — 1036F TOBACCO NON-USER: CPT

## 2024-06-06 PROCEDURE — 1123F ACP DISCUSS/DSCN MKR DOCD: CPT

## 2024-06-06 NOTE — PROGRESS NOTES
[facility] SPECIAL[facility]ARE, [facility]  [facility] UROLOGY A DEPARTMENT OF [facility]  [address] [facility] [address]  Dept: [phone]  Visit Date: 6/6/2024      SHELLY Bravo is a 89 y.o. male that presents to the urology clinic for recurrent UTI follow-up.    BPH- Worsening LUTS for years, now with chronic catheter. Last exchanged 05/17/24.    Incomplete bladder emptying, weakening urinary stream, history of urinary retention. Patient has declined VU surgery.    EO- Historical. Resolved with Doxycycline course. Patient denies scrotal swelling, testicular tenderness at today's visit.    History of MDRO, ESBL UTI's. Typically requiring IV Abx for resolution.      Most Recent PSA: No longer checking.      Last BUN and Creatinine:  Lab Results   Component Value Date    BUN 14 01/11/2024     Lab Results   Component Value Date    CREATININE 1.0 01/11/2024           PAST MEDICAL, FAMILY AND SOCIAL HISTORY UPDATE:  Past Medical History:   Diagnosis Date    Abdominal pain 03/16/2023    Acute bronchitis     by history    MARITA (acute kidney injury) ([facility]) 03/13/2018    MARITA (acute kidney injury) ([facility]) 03/13/2018    Allergic rhinitis     Anxiety     Bradycardia 08/19/2018    Cataract, right     Choroidal nevus of right eye     Chronic systolic CHF (congestive heart failure) ([facility]) 03/13/2018    Coronary artery disease involving native coronary artery of native heart 10/20/2016    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 LIMA TO LAD, SVG TO DIAGONAL2    Dementia associated with 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 behavioral disturbance ([facility]) 09/21/2018    Dermatophytosis of nail 01/10/2014    Diverticulosis     Elevated PSA     Gross hematuria 03/16/2018    Hemorrhoids     History of measles childhood    History of mumps childhood    Hyperlipidemia     Hypertension     Hypotension 03/16/2023    Hypothyroidism 09/04/2018    Mass of upper lobe of right lung 02/21/2018    Occult blood positive

## 2024-06-10 ENCOUNTER — APPOINTMENT (OUTPATIENT)
Dept: GENERAL RADIOLOGY | Age: 89
End: 2024-06-10
Payer: MEDICARE

## 2024-06-10 ENCOUNTER — HOSPITAL ENCOUNTER (EMERGENCY)
Age: 89
Discharge: NURSING, REHAB OR LONG-TERM CARE | End: 2024-06-10
Attending: EMERGENCY MEDICINE
Payer: MEDICARE

## 2024-06-10 VITALS
SYSTOLIC BLOOD PRESSURE: 136 MMHG | BODY MASS INDEX: 26.6 KG/M2 | TEMPERATURE: 98.4 F | WEIGHT: 190 LBS | HEIGHT: 71 IN | OXYGEN SATURATION: 97 % | HEART RATE: 64 BPM | DIASTOLIC BLOOD PRESSURE: 56 MMHG | RESPIRATION RATE: 15 BRPM

## 2024-06-10 DIAGNOSIS — N39.0 URINARY TRACT INFECTION WITHOUT HEMATURIA, SITE UNSPECIFIED: ICD-10-CM

## 2024-06-10 DIAGNOSIS — J69.0 ASPIRATION PNEUMONIA OF BOTH LUNGS, UNSPECIFIED ASPIRATION PNEUMONIA TYPE, UNSPECIFIED PART OF LUNG (HCC): Primary | ICD-10-CM

## 2024-06-10 LAB
ALBUMIN SERPL-MCNC: 3.6 G/DL (ref 3.5–5.2)
ALBUMIN/GLOB SERPL: 1.2 {RATIO} (ref 1–2.5)
ALP SERPL-CCNC: 142 U/L (ref 40–129)
ALT SERPL-CCNC: 9 U/L (ref 5–41)
ANION GAP SERPL CALCULATED.3IONS-SCNC: 11 MMOL/L (ref 9–17)
AST SERPL-CCNC: 19 U/L
BACTERIA URNS QL MICRO: ABNORMAL
BASOPHILS # BLD: <0.03 K/UL (ref 0–0.2)
BASOPHILS NFR BLD: 0 % (ref 0–2)
BILIRUB SERPL-MCNC: 0.9 MG/DL (ref 0.3–1.2)
BILIRUB UR QL STRIP: NEGATIVE
BUN SERPL-MCNC: 16 MG/DL (ref 8–23)
BUN/CREAT SERPL: 12 (ref 9–20)
CALCIUM SERPL-MCNC: 8.9 MG/DL (ref 8.6–10.4)
CHARACTER UR: ABNORMAL
CHLORIDE SERPL-SCNC: 105 MMOL/L (ref 98–107)
CLARITY UR: CLEAR
CO2 SERPL-SCNC: 25 MMOL/L (ref 20–31)
COLOR UR: YELLOW
CREAT SERPL-MCNC: 1.3 MG/DL (ref 0.7–1.2)
EOSINOPHIL # BLD: <0.03 K/UL (ref 0–0.44)
EOSINOPHILS RELATIVE PERCENT: 0 % (ref 1–4)
EPI CELLS #/AREA URNS HPF: ABNORMAL /HPF (ref 0–5)
ERYTHROCYTE [DISTWIDTH] IN BLOOD BY AUTOMATED COUNT: 15 % (ref 11.8–14.4)
GFR, ESTIMATED: 53 ML/MIN/1.73M2
GLUCOSE SERPL-MCNC: 113 MG/DL (ref 70–99)
GLUCOSE UR STRIP-MCNC: NEGATIVE MG/DL
HCT VFR BLD AUTO: 37.5 % (ref 40.7–50.3)
HGB BLD-MCNC: 12.6 G/DL (ref 13–17)
HGB UR QL STRIP.AUTO: ABNORMAL
IMM GRANULOCYTES # BLD AUTO: 0.06 K/UL (ref 0–0.3)
IMM GRANULOCYTES NFR BLD: 1 %
KETONES UR STRIP-MCNC: NEGATIVE MG/DL
LEUKOCYTE ESTERASE UR QL STRIP: ABNORMAL
LYMPHOCYTES NFR BLD: 0.86 K/UL (ref 1.1–3.7)
LYMPHOCYTES RELATIVE PERCENT: 7 % (ref 24–43)
MCH RBC QN AUTO: 29.1 PG (ref 25.2–33.5)
MCHC RBC AUTO-ENTMCNC: 33.6 G/DL (ref 25.2–33.5)
MCV RBC AUTO: 86.6 FL (ref 82.6–102.9)
MONOCYTES NFR BLD: 0.35 K/UL (ref 0.1–1.2)
MONOCYTES NFR BLD: 3 % (ref 3–12)
NEUTROPHILS NFR BLD: 89 % (ref 36–65)
NEUTS SEG NFR BLD: 10.93 K/UL (ref 1.5–8.1)
NITRITE UR QL STRIP: POSITIVE
NRBC BLD-RTO: 0 PER 100 WBC
PH UR STRIP: 5.5 [PH] (ref 5–6)
PLATELET # BLD AUTO: 186 K/UL (ref 138–453)
PMV BLD AUTO: 10.2 FL (ref 8.1–13.5)
POTASSIUM SERPL-SCNC: 4.2 MMOL/L (ref 3.7–5.3)
PROT SERPL-MCNC: 6.5 G/DL (ref 6.4–8.3)
PROT UR STRIP-MCNC: ABNORMAL MG/DL
RBC # BLD AUTO: 4.33 M/UL (ref 4.21–5.77)
RBC # BLD: ABNORMAL 10*6/UL
RBC #/AREA URNS HPF: ABNORMAL /HPF (ref 0–4)
SODIUM SERPL-SCNC: 141 MMOL/L (ref 135–144)
SP GR UR STRIP: 1.03 (ref 1.01–1.02)
TROPONIN I SERPL HS-MCNC: 62 NG/L (ref 0–22)
UROBILINOGEN UR STRIP-ACNC: NORMAL EU/DL (ref 0–1)
WBC #/AREA URNS HPF: ABNORMAL /HPF (ref 0–4)
WBC OTHER # BLD: 12.2 K/UL (ref 3.5–11.3)

## 2024-06-10 PROCEDURE — 80053 COMPREHEN METABOLIC PANEL: CPT

## 2024-06-10 PROCEDURE — 93005 ELECTROCARDIOGRAM TRACING: CPT | Performed by: EMERGENCY MEDICINE

## 2024-06-10 PROCEDURE — 36415 COLL VENOUS BLD VENIPUNCTURE: CPT

## 2024-06-10 PROCEDURE — 96365 THER/PROPH/DIAG IV INF INIT: CPT

## 2024-06-10 PROCEDURE — 6360000002 HC RX W HCPCS: Performed by: EMERGENCY MEDICINE

## 2024-06-10 PROCEDURE — 81001 URINALYSIS AUTO W/SCOPE: CPT

## 2024-06-10 PROCEDURE — 87086 URINE CULTURE/COLONY COUNT: CPT

## 2024-06-10 PROCEDURE — 71045 X-RAY EXAM CHEST 1 VIEW: CPT

## 2024-06-10 PROCEDURE — 96367 TX/PROPH/DG ADDL SEQ IV INF: CPT

## 2024-06-10 PROCEDURE — 87077 CULTURE AEROBIC IDENTIFY: CPT

## 2024-06-10 PROCEDURE — 99285 EMERGENCY DEPT VISIT HI MDM: CPT

## 2024-06-10 PROCEDURE — 85025 COMPLETE CBC W/AUTO DIFF WBC: CPT

## 2024-06-10 PROCEDURE — 84484 ASSAY OF TROPONIN QUANT: CPT

## 2024-06-10 PROCEDURE — 87040 BLOOD CULTURE FOR BACTERIA: CPT

## 2024-06-10 PROCEDURE — 2580000003 HC RX 258: Performed by: EMERGENCY MEDICINE

## 2024-06-10 PROCEDURE — 87186 SC STD MICRODIL/AGAR DIL: CPT

## 2024-06-10 RX ORDER — CEPHALEXIN 500 MG/1
500 CAPSULE ORAL 4 TIMES DAILY
Qty: 28 CAPSULE | Refills: 0 | Status: SHIPPED | OUTPATIENT
Start: 2024-06-10 | End: 2024-06-17

## 2024-06-10 RX ORDER — AZITHROMYCIN 250 MG/1
TABLET, FILM COATED ORAL
Qty: 6 TABLET | Refills: 0 | Status: SHIPPED | OUTPATIENT
Start: 2024-06-10 | End: 2024-06-20

## 2024-06-10 RX ORDER — 0.9 % SODIUM CHLORIDE 0.9 %
1000 INTRAVENOUS SOLUTION INTRAVENOUS ONCE
Status: COMPLETED | OUTPATIENT
Start: 2024-06-10 | End: 2024-06-10

## 2024-06-10 RX ADMIN — CEFTRIAXONE 1000 MG: 1 INJECTION, POWDER, FOR SOLUTION INTRAMUSCULAR; INTRAVENOUS at 11:07

## 2024-06-10 RX ADMIN — SODIUM CHLORIDE 999 ML: 9 INJECTION, SOLUTION INTRAVENOUS at 11:18

## 2024-06-10 RX ADMIN — AZITHROMYCIN MONOHYDRATE 500 MG: 500 INJECTION, POWDER, LYOPHILIZED, FOR SOLUTION INTRAVENOUS at 11:40

## 2024-06-10 ASSESSMENT — PAIN - FUNCTIONAL ASSESSMENT: PAIN_FUNCTIONAL_ASSESSMENT: NONE - DENIES PAIN

## 2024-06-10 NOTE — ED PROVIDER NOTES
(REFRESH PLUS) 0.5 % SOLN OPHTHALMIC SOLUTION    Place 2 drops into both eyes every 8 hours as needed    FLUTICASONE (FLONASE) 50 MCG/ACT NASAL SPRAY    INSTILL 1 SPRAY IN EACH NOSTRIL ONCE DAILY    HYDROCORTISONE 1 % CREAM    Apply topically to affected area as directed prn (Preparation H)    INFANTS SIMETHICONE PO    Take 0.6 mLs by mouth 4 times daily as needed (gas) - 20 mg/ 0.3 ml    LACTOBACILLUS (ACIDOPHILUS PROBIOTIC) CAPS    Take 1 caplet by mouth daily    LACTULOSE ENCEPHALOPATHY 10 GM/15ML SOLN SOLUTION    TAKE 30ML BY MOUTH 3 TIMES A DAY AS NEEDED FOR CONSTIPATION    LEVOTHYROXINE (SYNTHROID) 88 MCG TABLET    Take 1 tablet by mouth Daily    LOPERAMIDE (IMODIUM) 2 MG CAPSULE    Take 1 capsule by mouth After each loose stool. *Not to exceed 16 gm/ 24 hours    METAMUCIL FIBER PO    Take by mouth 2 times daily - 1 TBSP in 8 oz water    METOPROLOL TARTRATE (LOPRESSOR) 25 MG TABLET    Take 0.5 tablets by mouth 2 times daily Hold for HR <60 or sbp <100    MULTIPLE VITAMIN (TAB-A-LEVY PO)    Take 1 tablet by mouth daily    POLYETHYLENE GLYCOL (MIRALAX) 17 GM/SCOOP POWD POWDER    MIX 17 GRAMS WITH LIQUID AND GIVE BY MOUTH TWICE DAILY AS NEEDED FOR CONSTIPATION    POTASSIUM CHLORIDE (KLOR-CON M) 20 MEQ EXTENDED RELEASE TABLET    Take 1 tablet by mouth daily    PRAMOX-PE-GLYCERIN-PETROLATUM (PREPARATION H) 1-0.25-14.4-15 % CREAM    APPLY TOPICALLY TO AFFECTED AREA AS NEEDED.    RIVAROXABAN (XARELTO) 15 MG TABS TABLET    Take 1 tablet by mouth daily (with breakfast) ANTICOAGULANT! Doses greater than 15 mg/day must be administered with food.    TAMSULOSIN (FLOMAX) 0.4 MG CAPSULE    Take 1 capsule by mouth 2 times daily    TOLNAFTATE (TINACTIN) 1 % EXTERNAL SOLUTION    Apply topically nightly to toenails per Dr. Nelson.        ALLERGIES     is allergic to pcn [penicillins], sulfa antibiotics, and cefdinir.    FAMILY HISTORY     He indicated that his mother is . He indicated that his father is . He

## 2024-06-10 NOTE — DISCHARGE INSTRUCTIONS
Keflex and Zithromax as directed.  Continue other medications as directed.  Return for difficulty breathing, fever, or if worse in any way.    Please understand that at this time there is no evidence for a more serious underlying process, but that early in the process of an illness or injury, an emergency department workup can be falsely reassuring.  You should contact your family doctor within the next 48 hours for a follow up appointment    THANK YOU!!!    From [facility] and [facility]    On behalf of the Emergency Department staff at [facility], I would like to thank you for giving us the opportunity to address your health care needs and concerns.    We hope that during your visit, our service was delivered in a professional and caring manner. Please keep [facility] in mind as we walk with you down the path to your own personal wellness.     Please expect an automated text message or email from us so we can ask a few questions about your health and progress. Based on your answers, a clinician may call you back to offer help and instructions.    Please understand that early in the process of an illness or injury, an emergency department workup can be falsely reassuring.  If you notice any worsening, changing or persistent symptoms please call your family doctor or return to the ER immediately.     Tell us how we did during your visit at [email or web address]   and let us know about your experience

## 2024-06-13 LAB
EKG ATRIAL RATE: 71 BPM
EKG P AXIS: 85 DEGREES
EKG P-R INTERVAL: 378 MS
EKG Q-T INTERVAL: 402 MS
EKG QRS DURATION: 108 MS
EKG QTC CALCULATION (BAZETT): 436 MS
EKG R AXIS: -55 DEGREES
EKG T AXIS: 64 DEGREES
EKG VENTRICULAR RATE: 71 BPM
MICROORGANISM SPEC CULT: ABNORMAL
MICROORGANISM SPEC CULT: ABNORMAL
SPECIMEN DESCRIPTION: ABNORMAL

## 2024-06-15 DIAGNOSIS — F41.9 ANXIETY: ICD-10-CM

## 2024-06-17 ENCOUNTER — OUTSIDE SERVICES (OUTPATIENT)
Dept: INTERNAL MEDICINE | Age: 89
End: 2024-06-17
Payer: MEDICARE

## 2024-06-17 DIAGNOSIS — N30.00 ACUTE CYSTITIS WITHOUT HEMATURIA: ICD-10-CM

## 2024-06-17 DIAGNOSIS — F41.9 ANXIETY: ICD-10-CM

## 2024-06-17 DIAGNOSIS — I10 PRIMARY HYPERTENSION: ICD-10-CM

## 2024-06-17 DIAGNOSIS — I50.42 CHRONIC COMBINED SYSTOLIC AND DIASTOLIC CHF (CONGESTIVE HEART FAILURE) (HCC): ICD-10-CM

## 2024-06-17 DIAGNOSIS — J69.0 ASPIRATION PNEUMONIA OF BOTH LUNGS, UNSPECIFIED ASPIRATION PNEUMONIA TYPE, UNSPECIFIED PART OF LUNG (HCC): Primary | ICD-10-CM

## 2024-06-17 DIAGNOSIS — R60.0 PEDAL EDEMA: ICD-10-CM

## 2024-06-17 PROCEDURE — 99348 HOME/RES VST EST LOW MDM 30: CPT | Performed by: NURSE PRACTITIONER

## 2024-06-17 RX ORDER — ALPRAZOLAM 0.25 MG/1
TABLET ORAL
Qty: 15 TABLET | Refills: 4 | OUTPATIENT
Start: 2024-06-17

## 2024-06-17 RX ORDER — ALPRAZOLAM 0.25 MG/1
0.12 TABLET ORAL EVERY MORNING
Qty: 15 TABLET | Refills: 0 | Status: SHIPPED | OUTPATIENT
Start: 2024-06-17 | End: 2024-07-17

## 2024-06-17 RX ORDER — FUROSEMIDE 20 MG/1
20 TABLET ORAL DAILY PRN
Qty: 60 TABLET | Refills: 3 | Status: SHIPPED | OUTPATIENT
Start: 2024-06-17

## 2024-06-17 ASSESSMENT — ENCOUNTER SYMPTOMS
DIARRHEA: 0
NAUSEA: 0
RHINORRHEA: 0
COUGH: 0
WHEEZING: 0
VOMITING: 0

## 2024-06-17 NOTE — PROGRESS NOTES
[facility]      Yeyo Bravo is a 89 y.o. male resident of [facility].    HPI:     HPI  Patient presents for ER follow-up and for evaluation management pedal edema.  He initially presented to ER 6/10/2024 due to coughing, had concern for aspiration while eating.  X-ray confirmed pneumonia.  He received Rocephin and Zithromax in the ED to cover aspiration pneumonia.  He was discharged on Keflex and Zithromax and symptoms have been improving considerably with this.  UTI also noted in ER. Staff has noted some bilateral pedal edema.  Known history of congestive heart failure.  No hypoxia.  No dyspnea.  Has also had lightly elevated blood pressures.  He is also in need of a refill.    Current Outpatient Medications   Medication Sig Dispense Refill    ALPRAZolam (XANAX) 0.25 MG tablet Take 0.5 tablets by mouth every morning for 30 days. Max Daily Amount: 0.125 mg 15 tablet 0    furosemide (LASIX) 20 MG tablet Take 1 tablet by mouth daily as needed (lower extremity swelling) 60 tablet 3    azithromycin (ZITHROMAX) 250 MG tablet 500mg on day 1 followed by 250mg on days 2 - 5 6 tablet 0    cephALEXin (KEFLEX) 500 MG capsule Take 1 capsule by mouth 4 times daily for 7 days 28 capsule 0    ALPRAZolam (XANAX) 0.25 MG tablet Take 1 tablet by mouth nightly for 30 days. Max Daily Amount: 0.25 mg 30 tablet 0    METAMUCIL FIBER PO Take by mouth 2 times daily - 1 TBSP in 8 oz water      polyethylene glycol (MIRALAX) 17 GM/SCOOP POWD powder MIX 17 GRAMS WITH LIQUID AND GIVE BY MOUTH TWICE DAILY AS NEEDED FOR CONSTIPATION 1 each 0    ALPRAZolam (XANAX) 0.25 MG tablet Take 0.5 tablets by mouth daily as needed (before shower if needed).      lactulose encephalopathy 10 GM/15ML SOLN solution TAKE 30ML BY MOUTH 3 TIMES A DAY AS NEEDED FOR CONSTIPATION 473 mL 11    fluticasone (FLONASE) 50 MCG/ACT nasal spray INSTILL 1 SPRAY IN EACH NOSTRIL ONCE DAILY 16 g 0    pramox-PE-glycerin-petrolatum (PREPARATION H) 1-0.25-14.4-15

## 2024-06-17 NOTE — TELEPHONE ENCOUNTER
Pharmacy requesting a refill of the below medication which has been pended for you:     Requested Prescriptions     Pending Prescriptions Disp Refills    ALPRAZolam (XANAX) 0.25 MG tablet [Pharmacy Med Name: ALPRAZOLAM 0.25 MG TABLET] 15 tablet 4     Sig: TAKE 1/2 TABLET (0.125MG) BY MOUTH EVERY MORNING       Last Appointment Date: Visit date not found  Next Appointment Date: Visit date not found    Allergies   Allergen Reactions    Pcn [Penicillins] Swelling     As a child  Pt tolerated ceftriaxone , and keflex with no intolerance    Sulfa Antibiotics     Cefdinir Other (See Comments)     Pt tolerated ceftriaxone , and keflex with no intolerance

## 2024-06-18 VITALS
SYSTOLIC BLOOD PRESSURE: 133 MMHG | HEART RATE: 54 BPM | RESPIRATION RATE: 16 BRPM | DIASTOLIC BLOOD PRESSURE: 66 MMHG | OXYGEN SATURATION: 98 % | TEMPERATURE: 98.3 F

## 2024-07-02 ENCOUNTER — PATIENT MESSAGE (OUTPATIENT)
Dept: INTERNAL MEDICINE | Age: 89
End: 2024-07-02

## 2024-07-05 ENCOUNTER — PATIENT MESSAGE (OUTPATIENT)
Dept: INTERNAL MEDICINE | Age: 89
End: 2024-07-05

## 2024-07-07 DIAGNOSIS — F41.9 ANXIETY: Primary | ICD-10-CM

## 2024-07-08 DIAGNOSIS — F41.9 ANXIETY: ICD-10-CM

## 2024-07-08 RX ORDER — ALPRAZOLAM 0.25 MG/1
0.12 TABLET ORAL EVERY MORNING
Qty: 15 TABLET | Refills: 0 | Status: SHIPPED | OUTPATIENT
Start: 2024-07-08 | End: 2024-08-07

## 2024-07-08 RX ORDER — ALPRAZOLAM 0.25 MG/1
0.25 TABLET ORAL NIGHTLY
Qty: 30 TABLET | Refills: 0 | Status: SHIPPED | OUTPATIENT
Start: 2024-07-08 | End: 2024-08-07

## 2024-07-08 NOTE — TELEPHONE ENCOUNTER
Pharmacy  requesting a refill of the below medication which has been pended for you:     Requested Prescriptions     Pending Prescriptions Disp Refills    ALPRAZolam (XANAX) 0.25 MG tablet [Pharmacy Med Name: ALPRAZOLAM 0.25 MG TABLET] 30 tablet 0     Sig: Take 1 tablet by mouth nightly. at bedtime.       Last Appointment Date: Visit date not found  Next Appointment Date: Visit date not found    Allergies   Allergen Reactions    Pcn [Penicillins] Swelling     As a child  Pt tolerated ceftriaxone , and keflex with no intolerance    Sulfa Antibiotics     Cefdinir Other (See Comments)     Pt tolerated ceftriaxone , and keflex with no intolerance

## 2024-07-16 ENCOUNTER — HOSPITAL ENCOUNTER (OUTPATIENT)
Age: 89
Setting detail: SPECIMEN
Discharge: HOME | End: 2024-07-16
Payer: MEDICARE

## 2024-07-16 PROCEDURE — 87086 URINE CULTURE/COLONY COUNT: CPT

## 2024-07-16 PROCEDURE — 81001 URINALYSIS AUTO W/SCOPE: CPT

## 2024-07-16 RX ORDER — EPINEPHRINE 0.3 MG/.3ML
0.3 INJECTION SUBCUTANEOUS PRN
COMMUNITY
Start: 2024-06-15

## 2024-07-17 ENCOUNTER — OFFICE VISIT (OUTPATIENT)
Dept: INFECTIOUS DISEASES | Age: 89
End: 2024-07-17
Payer: MEDICARE

## 2024-07-17 VITALS
HEIGHT: 71 IN | WEIGHT: 174.8 LBS | OXYGEN SATURATION: 100 % | DIASTOLIC BLOOD PRESSURE: 84 MMHG | BODY MASS INDEX: 24.47 KG/M2 | SYSTOLIC BLOOD PRESSURE: 142 MMHG | HEART RATE: 57 BPM | RESPIRATION RATE: 20 BRPM

## 2024-07-17 DIAGNOSIS — B96.89 URINARY TRACT INFECTION DUE TO ESBL KLEBSIELLA: ICD-10-CM

## 2024-07-17 DIAGNOSIS — N39.0 RECURRENT UTI (URINARY TRACT INFECTION): Primary | ICD-10-CM

## 2024-07-17 DIAGNOSIS — N39.0 URINARY TRACT INFECTION DUE TO ESBL KLEBSIELLA: ICD-10-CM

## 2024-07-17 LAB
BACTERIA URNS QL MICRO: ABNORMAL
BILIRUB UR QL STRIP: NEGATIVE
CLARITY UR: CLEAR
COLOR UR: YELLOW
EPI CELLS #/AREA URNS HPF: ABNORMAL /HPF (ref 0–5)
GLUCOSE UR STRIP-MCNC: NEGATIVE MG/DL
HGB UR QL STRIP.AUTO: ABNORMAL
KETONES UR STRIP-MCNC: NEGATIVE MG/DL
LEUKOCYTE ESTERASE UR QL STRIP: ABNORMAL
NITRITE UR QL STRIP: POSITIVE
PH UR STRIP: 6 [PH] (ref 5–6)
PROT UR STRIP-MCNC: ABNORMAL MG/DL
RBC #/AREA URNS HPF: ABNORMAL /HPF (ref 0–4)
SP GR UR STRIP: 1.03 (ref 1.01–1.02)
UROBILINOGEN UR STRIP-ACNC: NORMAL EU/DL (ref 0–1)
WBC #/AREA URNS HPF: ABNORMAL /HPF (ref 0–4)

## 2024-07-17 PROCEDURE — G8427 DOCREV CUR MEDS BY ELIG CLIN: HCPCS | Performed by: INTERNAL MEDICINE

## 2024-07-17 PROCEDURE — 99213 OFFICE O/P EST LOW 20 MIN: CPT | Performed by: INTERNAL MEDICINE

## 2024-07-17 PROCEDURE — 1036F TOBACCO NON-USER: CPT | Performed by: INTERNAL MEDICINE

## 2024-07-17 PROCEDURE — G8420 CALC BMI NORM PARAMETERS: HCPCS | Performed by: INTERNAL MEDICINE

## 2024-07-17 PROCEDURE — 1123F ACP DISCUSS/DSCN MKR DOCD: CPT | Performed by: INTERNAL MEDICINE

## 2024-07-17 NOTE — PROGRESS NOTES
Resistant >=8 BACTERIAL SUSCEPTIBILITY PANEL SOHA Final    nitrofurantoin Sensitive <=16 BACTERIAL SUSCEPTIBILITY PANEL SOHA Final    oxacillin Resistant >=4 BACTERIAL SUSCEPTIBILITY PANEL SOHA Final    tetracycline Sensitive 2 BACTERIAL SUSCEPTIBILITY PANEL SOHA Final    trimethoprim-sulfamethoxazole Sensitive <=10 BACTERIAL SUSCEPTIBILITY PANEL SOHA Final    vancomycin Sensitive 1 BACTERIAL SUSCEPTIBILITY PANEL SOHA Final          Specimen Collected: 08/17/23 15:00 EDT Last Resulted: 08/19/23 12:47 EDT           Thank you for allowing us to participate in the care of this patient. Pleasecall with questions.    Tobi Ordoñez MD  [employer] messaging: [phone]    This note is created with the assistance of a speech recognition program.  While intending to generate a document that actually reflects the content ofthe visit, the document can still have some errors including those of syntax and sound a like substitutions which may escape proof reading.  It such instances, actual meaning can be extrapolated by contextual diversion.

## 2024-07-18 LAB
MICROORGANISM SPEC CULT: NORMAL
SPECIMEN DESCRIPTION: NORMAL

## 2024-07-18 RX ORDER — CEPHALEXIN 500 MG/1
500 CAPSULE ORAL 2 TIMES DAILY
Qty: 14 CAPSULE | Refills: 0 | Status: SHIPPED | OUTPATIENT
Start: 2024-07-18 | End: 2024-07-25

## 2024-07-21 DIAGNOSIS — N32.0 BLADDER OUTLET OBSTRUCTION: Primary | ICD-10-CM

## 2024-07-22 ENCOUNTER — OUTSIDE SERVICES (OUTPATIENT)
Dept: INTERNAL MEDICINE | Age: 89
End: 2024-07-22
Payer: MEDICARE

## 2024-07-22 VITALS
TEMPERATURE: 97.2 F | OXYGEN SATURATION: 98 % | DIASTOLIC BLOOD PRESSURE: 64 MMHG | RESPIRATION RATE: 16 BRPM | SYSTOLIC BLOOD PRESSURE: 118 MMHG | HEART RATE: 84 BPM

## 2024-07-22 DIAGNOSIS — B96.89 URINARY TRACT INFECTION DUE TO ESBL KLEBSIELLA: ICD-10-CM

## 2024-07-22 DIAGNOSIS — N39.0 RECURRENT UTI: Primary | ICD-10-CM

## 2024-07-22 DIAGNOSIS — N39.0 URINARY TRACT INFECTION DUE TO ESBL KLEBSIELLA: ICD-10-CM

## 2024-07-22 PROCEDURE — 99347 HOME/RES VST EST SF MDM 20: CPT | Performed by: NURSE PRACTITIONER

## 2024-07-22 RX ORDER — ACETAMINOPHEN AND CODEINE PHOSPHATE 300; 30 MG/1; MG/1
1 TABLET ORAL DAILY PRN
Qty: 10 TABLET | Refills: 0 | Status: SHIPPED | OUTPATIENT
Start: 2024-07-22 | End: 2024-08-01

## 2024-07-22 ASSESSMENT — ENCOUNTER SYMPTOMS
BACK PAIN: 0
DIARRHEA: 0
WHEEZING: 0
RHINORRHEA: 0
COUGH: 0
VOMITING: 0

## 2024-07-22 NOTE — PROGRESS NOTES
[facility]      Yeyo Bravo is a 89 y.o. male resident of [facility].    HPI:     HPI    Patient presents for evaluation and management of hematuria. Known history of recurrent UTI, does have Beck catheter. Of note, UA noted UTI and was started on cephalexin on 07/18/24. Did have some improvement of hematuria with this. He was referred to infectious disease for recurrent UTI with ESBL Klebsiella, and did have an appointment last week. 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     Current Outpatient Medications   Medication Sig Dispense Refill    cephALEXin (KEFLEX) 500 MG capsule Take 1 capsule by mouth 2 times daily for 7 days 14 capsule 0    EPINEPHrine (EPIPEN) 0.3 MG/0.3ML SOAJ injection Inject 0.3 mLs into the muscle as needed (allergic reaction)      ALPRAZolam (XANAX) 0.25 MG tablet Take 1 tablet by mouth nightly for 30 days. at bedtime. Max Daily Amount: 0.25 mg 30 tablet 0    ALPRAZolam (XANAX) 0.25 MG tablet Take 0.5 tablets by mouth every morning for 30 days. Max Daily Amount: 0.125 mg 15 tablet 0    furosemide (LASIX) 20 MG tablet Take 1 tablet by mouth daily as needed (lower extremity swelling) 60 tablet 3    METAMUCIL FIBER PO Take by mouth 2 times daily - 1 TBSP in 8 oz water      polyethylene glycol (MIRALAX) 17 GM/SCOOP POWD powder MIX 17 GRAMS WITH LIQUID AND GIVE BY MOUTH TWICE DAILY AS NEEDED FOR CONSTIPATION 1 each 0    ALPRAZolam (XANAX) 0.25 MG tablet Take 0.5 tablets by mouth daily as needed (before shower if needed).      lactulose encephalopathy 10 GM/15ML SOLN solution TAKE 30ML BY MOUTH 3 TIMES A DAY AS NEEDED FOR CONSTIPATION 473 mL 11    fluticasone (FLONASE) 50 MCG/ACT nasal spray INSTILL 1 SPRAY IN EACH NOSTRIL ONCE DAILY 16 g 0    pramox-PE-glycerin-petrolatum (PREPARATION H) 1-0.25-14.4-15 % cream APPLY TOPICALLY TO AFFECTED

## 2024-07-31 ENCOUNTER — HOSPITAL ENCOUNTER (OUTPATIENT)
Age: 89
Setting detail: SPECIMEN
Discharge: HOME | End: 2024-07-31
Payer: MEDICARE

## 2024-07-31 PROCEDURE — 81001 URINALYSIS AUTO W/SCOPE: CPT

## 2024-07-31 PROCEDURE — 87086 URINE CULTURE/COLONY COUNT: CPT

## 2024-08-01 LAB
BACTERIA URNS QL MICRO: ABNORMAL
BILIRUB UR QL STRIP: NEGATIVE
CLARITY UR: CLEAR
COLOR UR: YELLOW
EPI CELLS #/AREA URNS HPF: ABNORMAL /HPF (ref 0–5)
GLUCOSE UR STRIP-MCNC: NEGATIVE MG/DL
HGB UR QL STRIP.AUTO: ABNORMAL
KETONES UR STRIP-MCNC: NEGATIVE MG/DL
LEUKOCYTE ESTERASE UR QL STRIP: ABNORMAL
MUCOUS THREADS URNS QL MICRO: ABNORMAL
NITRITE UR QL STRIP: POSITIVE
PH UR STRIP: 6 [PH] (ref 5–6)
PROT UR STRIP-MCNC: ABNORMAL MG/DL
RBC #/AREA URNS HPF: ABNORMAL /HPF (ref 0–4)
SP GR UR STRIP: 1.03 (ref 1.01–1.02)
UROBILINOGEN UR STRIP-ACNC: NORMAL EU/DL (ref 0–1)
WBC #/AREA URNS HPF: ABNORMAL /HPF (ref 0–4)

## 2024-08-03 LAB
MICROORGANISM SPEC CULT: NORMAL
SPECIMEN DESCRIPTION: NORMAL

## 2024-08-04 DIAGNOSIS — F41.9 ANXIETY: Primary | ICD-10-CM

## 2024-08-05 ENCOUNTER — OUTSIDE SERVICES (OUTPATIENT)
Dept: INTERNAL MEDICINE | Age: 89
End: 2024-08-05
Payer: MEDICARE

## 2024-08-05 DIAGNOSIS — F03.918 DEMENTIA WITH BEHAVIORAL DISTURBANCE (HCC): ICD-10-CM

## 2024-08-05 DIAGNOSIS — N32.0 BLADDER OUTLET OBSTRUCTION: ICD-10-CM

## 2024-08-05 DIAGNOSIS — E03.9 ACQUIRED HYPOTHYROIDISM: ICD-10-CM

## 2024-08-05 DIAGNOSIS — F41.9 ANXIETY: ICD-10-CM

## 2024-08-05 DIAGNOSIS — N18.9 CHRONIC KIDNEY DISEASE, UNSPECIFIED CKD STAGE: ICD-10-CM

## 2024-08-05 DIAGNOSIS — I10 PRIMARY HYPERTENSION: Primary | ICD-10-CM

## 2024-08-05 DIAGNOSIS — N49.2 SCROTAL ABSCESS: ICD-10-CM

## 2024-08-05 DIAGNOSIS — I50.42 CHRONIC COMBINED SYSTOLIC AND DIASTOLIC CHF (CONGESTIVE HEART FAILURE) (HCC): ICD-10-CM

## 2024-08-05 DIAGNOSIS — E78.2 MIXED HYPERLIPIDEMIA: ICD-10-CM

## 2024-08-05 DIAGNOSIS — I48.0 PAROXYSMAL ATRIAL FIBRILLATION (HCC): ICD-10-CM

## 2024-08-05 DIAGNOSIS — N13.30 HYDRONEPHROSIS, BILATERAL: ICD-10-CM

## 2024-08-05 DIAGNOSIS — I67.82 CHRONIC CEREBRAL ISCHEMIA: ICD-10-CM

## 2024-08-05 DIAGNOSIS — F03.90 DEMENTIA WITHOUT BEHAVIORAL DISTURBANCE, PSYCHOTIC DISTURBANCE, MOOD DISTURBANCE, OR ANXIETY, UNSPECIFIED DEMENTIA SEVERITY, UNSPECIFIED DEMENTIA TYPE (HCC): ICD-10-CM

## 2024-08-05 DIAGNOSIS — N40.1 BENIGN PROSTATIC HYPERPLASIA WITH INCOMPLETE BLADDER EMPTYING: ICD-10-CM

## 2024-08-05 DIAGNOSIS — I25.10 CORONARY ARTERY DISEASE INVOLVING NATIVE CORONARY ARTERY OF NATIVE HEART WITHOUT ANGINA PECTORIS: ICD-10-CM

## 2024-08-05 DIAGNOSIS — R39.14 BENIGN PROSTATIC HYPERPLASIA WITH INCOMPLETE BLADDER EMPTYING: ICD-10-CM

## 2024-08-05 PROCEDURE — 99348 HOME/RES VST EST LOW MDM 30: CPT | Performed by: NURSE PRACTITIONER

## 2024-08-05 RX ORDER — ALPRAZOLAM 0.25 MG/1
0.25 TABLET ORAL NIGHTLY
Qty: 30 TABLET | Refills: 0 | Status: SHIPPED | OUTPATIENT
Start: 2024-08-05 | End: 2024-09-04

## 2024-08-05 RX ORDER — ALPRAZOLAM 0.25 MG/1
TABLET ORAL
Qty: 15 TABLET | Refills: 0 | OUTPATIENT
Start: 2024-08-05

## 2024-08-05 NOTE — PROGRESS NOTES
[facility]      Yeyo Bravo is a 89 y.o. male resident of [facility].    HPI:     HPI  Patient presents for evaluation and management of chronic medical conditions as noted below.    Staff did have some concern for UTI - has had increased sexual aggression. Recent UA reviewed, appeared contaminated    Hypertension is well controlled on metoprolol. No reports of chest pain or dyspnea.     Hyperlipidemia stable on atorvastatin.     Follows with cardiology for atrial fibrillation, CHF, and CAD.      Hypothyroidism is well controlled on current dose of levothyroxine.    CKD stable, GFR 53, Cr 1.3    Follows with urology for BPH, bladder outlet obstruction, bilateral hydronephrosis, recurrent UTI and scrotal abscess. Continues on flomax.  Previous infectious disease consultation due to recurrent UTI with colonization with an ESBL Klebsiella. ID advised that patient can be treated with fosfomycin when he becomes symptomatic and after that would need IV ATB therapy.     No longer following with neurology for dementia. Does have occasional behavioral disturbances, which are generally well controlled with PRN alprazolam in varying doses, as noted on EMR.       Current Outpatient Medications   Medication Sig Dispense Refill    ALPRAZolam (XANAX) 0.25 MG tablet Take 1 tablet by mouth nightly for 30 days. at bedtime. Max Daily Amount: 0.25 mg 30 tablet 0    EPINEPHrine (EPIPEN) 0.3 MG/0.3ML SOAJ injection Inject 0.3 mLs into the muscle as needed (allergic reaction)      ALPRAZolam (XANAX) 0.25 MG tablet Take 1 tablet by mouth nightly for 30 days. at bedtime. Max Daily Amount: 0.25 mg 30 tablet 0    ALPRAZolam (XANAX) 0.25 MG tablet Take 0.5 tablets by mouth every morning for 30 days. Max Daily Amount: 0.125 mg 15 tablet 0    furosemide (LASIX) 20 MG tablet Take 1 tablet by mouth daily as needed (lower extremity swelling) 60 tablet 3    METAMUCIL FIBER PO Take by mouth 2 times daily - 1 TBSP in 8 oz water

## 2024-08-06 ENCOUNTER — TELEPHONE (OUTPATIENT)
Dept: INTERNAL MEDICINE | Age: 89
End: 2024-08-06

## 2024-08-06 ENCOUNTER — TELEPHONE (OUTPATIENT)
Dept: INFECTIOUS DISEASES | Age: 89
End: 2024-08-06

## 2024-08-06 VITALS
RESPIRATION RATE: 18 BRPM | DIASTOLIC BLOOD PRESSURE: 72 MMHG | TEMPERATURE: 97.9 F | OXYGEN SATURATION: 97 % | SYSTOLIC BLOOD PRESSURE: 127 MMHG | HEART RATE: 68 BPM

## 2024-08-06 DIAGNOSIS — R46.89 BEHAVIOR CONCERN: Primary | ICD-10-CM

## 2024-08-06 NOTE — TELEPHONE ENCOUNTER
Nicole called concerning patient stating that patient UA was given to infectious disease per Mercedes's request.   Per infectious disease they believe UA was contaminated and are requesting a new UA but are unwilling to order.     GP requesting Mercedes to order UA. Please advise.

## 2024-08-06 NOTE — TELEPHONE ENCOUNTER
Maria De Jesus is at [facility]. PCP is checking a urine and culture. He is agitated, sexually inappropriate which is unusual behavior for him. Had some blood tinged urine that is clear now. They will notify us when its final, for review.

## 2024-08-07 ENCOUNTER — HOSPITAL ENCOUNTER (OUTPATIENT)
Age: 89
Setting detail: SPECIMEN
Discharge: HOME | End: 2024-08-07
Payer: MEDICARE

## 2024-08-07 DIAGNOSIS — R46.89 BEHAVIOR CONCERN: ICD-10-CM

## 2024-08-07 PROBLEM — N18.9 CHRONIC KIDNEY DISEASE: Status: ACTIVE | Noted: 2024-08-07

## 2024-08-07 PROCEDURE — 81001 URINALYSIS AUTO W/SCOPE: CPT

## 2024-08-07 PROCEDURE — 87186 SC STD MICRODIL/AGAR DIL: CPT

## 2024-08-07 PROCEDURE — 87086 URINE CULTURE/COLONY COUNT: CPT

## 2024-08-07 PROCEDURE — 87077 CULTURE AEROBIC IDENTIFY: CPT

## 2024-08-07 ASSESSMENT — ENCOUNTER SYMPTOMS
SHORTNESS OF BREATH: 0
VOMITING: 0
NAUSEA: 0
DIARRHEA: 0
CHEST TIGHTNESS: 0
SORE THROAT: 0

## 2024-08-07 NOTE — TELEPHONE ENCOUNTER
Received fax from GP- \"Pharmacy needs refill for Tolnaftate 1% cream apply to toenails q evening. May we have new order?\"  Rx pended if you agree.

## 2024-08-08 ENCOUNTER — HOSPITAL ENCOUNTER (OUTPATIENT)
Age: 89
Setting detail: SPECIMEN
Discharge: HOME | End: 2024-08-08
Payer: MEDICARE

## 2024-08-08 DIAGNOSIS — I10 PRIMARY HYPERTENSION: ICD-10-CM

## 2024-08-08 DIAGNOSIS — E03.9 ACQUIRED HYPOTHYROIDISM: ICD-10-CM

## 2024-08-08 DIAGNOSIS — N18.9 CHRONIC KIDNEY DISEASE, UNSPECIFIED CKD STAGE: ICD-10-CM

## 2024-08-08 LAB
ANION GAP SERPL CALCULATED.3IONS-SCNC: 10 MMOL/L (ref 9–17)
BACTERIA URNS QL MICRO: ABNORMAL
BASOPHILS # BLD: 0.03 K/UL (ref 0–0.2)
BASOPHILS NFR BLD: 0 % (ref 0–2)
BILIRUB UR QL STRIP: NEGATIVE
BUN SERPL-MCNC: 18 MG/DL (ref 8–23)
BUN/CREAT SERPL: 16 (ref 9–20)
CALCIUM SERPL-MCNC: 8.8 MG/DL (ref 8.6–10.4)
CHARACTER UR: ABNORMAL
CHLORIDE SERPL-SCNC: 106 MMOL/L (ref 98–107)
CLARITY UR: ABNORMAL
CO2 SERPL-SCNC: 25 MMOL/L (ref 20–31)
COLOR UR: YELLOW
CREAT SERPL-MCNC: 1.1 MG/DL (ref 0.7–1.2)
EOSINOPHIL # BLD: 0.19 K/UL (ref 0–0.44)
EOSINOPHILS RELATIVE PERCENT: 3 % (ref 1–4)
EPI CELLS #/AREA URNS HPF: ABNORMAL /HPF (ref 0–5)
ERYTHROCYTE [DISTWIDTH] IN BLOOD BY AUTOMATED COUNT: 15.1 % (ref 11.8–14.4)
GFR, ESTIMATED: 64 ML/MIN/1.73M2
GLUCOSE SERPL-MCNC: 86 MG/DL (ref 70–99)
GLUCOSE UR STRIP-MCNC: NEGATIVE MG/DL
HCT VFR BLD AUTO: 38.2 % (ref 40.7–50.3)
HGB BLD-MCNC: 12.7 G/DL (ref 13–17)
HGB UR QL STRIP.AUTO: ABNORMAL
IMM GRANULOCYTES # BLD AUTO: 0.05 K/UL (ref 0–0.3)
IMM GRANULOCYTES NFR BLD: 1 %
KETONES UR STRIP-MCNC: NEGATIVE MG/DL
LEUKOCYTE ESTERASE UR QL STRIP: ABNORMAL
LYMPHOCYTES NFR BLD: 1.6 K/UL (ref 1.1–3.7)
LYMPHOCYTES RELATIVE PERCENT: 24 % (ref 24–43)
MCH RBC QN AUTO: 29.9 PG (ref 25.2–33.5)
MCHC RBC AUTO-ENTMCNC: 33.2 G/DL (ref 25.2–33.5)
MCV RBC AUTO: 89.9 FL (ref 82.6–102.9)
MONOCYTES NFR BLD: 8 % (ref 3–12)
NEUTROPHILS NFR BLD: 64 % (ref 36–65)
NEUTS SEG NFR BLD: 4.38 K/UL (ref 1.5–8.1)
NITRITE UR QL STRIP: NEGATIVE
NRBC BLD-RTO: 0 PER 100 WBC
PH UR STRIP: 6 [PH] (ref 5–6)
PLATELET # BLD AUTO: 172 K/UL (ref 138–453)
PMV BLD AUTO: 10.9 FL (ref 8.1–13.5)
POTASSIUM SERPL-SCNC: 4.1 MMOL/L (ref 3.7–5.3)
PROT UR STRIP-MCNC: ABNORMAL MG/DL
RBC # BLD AUTO: 4.25 M/UL (ref 4.21–5.77)
RBC # BLD: ABNORMAL 10*6/UL
RBC #/AREA URNS HPF: ABNORMAL /HPF (ref 0–4)
SODIUM SERPL-SCNC: 141 MMOL/L (ref 135–144)
SP GR UR STRIP: 1.02 (ref 1.01–1.02)
TSH SERPL DL<=0.05 MIU/L-ACNC: 0.7 UIU/ML (ref 0.3–5)
UROBILINOGEN UR STRIP-ACNC: NORMAL EU/DL (ref 0–1)
WBC #/AREA URNS HPF: ABNORMAL /HPF (ref 0–4)
WBC OTHER # BLD: 6.8 K/UL (ref 3.5–11.3)

## 2024-08-08 PROCEDURE — 85025 COMPLETE CBC W/AUTO DIFF WBC: CPT

## 2024-08-08 PROCEDURE — 84443 ASSAY THYROID STIM HORMONE: CPT

## 2024-08-08 PROCEDURE — 36415 COLL VENOUS BLD VENIPUNCTURE: CPT

## 2024-08-08 PROCEDURE — 80048 BASIC METABOLIC PNL TOTAL CA: CPT

## 2024-08-08 NOTE — TELEPHONE ENCOUNTER
Duration of treatment for this is generally maximum of 48 weeks. If he has not had follow up with Dr. Iglesias since that time, advise to discontinue Tolnaftate

## 2024-08-09 DIAGNOSIS — F41.9 ANXIETY: ICD-10-CM

## 2024-08-09 RX ORDER — ALPRAZOLAM 0.25 MG/1
TABLET ORAL
Qty: 15 TABLET | Refills: 5 | OUTPATIENT
Start: 2024-08-09 | End: 2024-09-08

## 2024-08-10 LAB
MICROORGANISM SPEC CULT: ABNORMAL
MICROORGANISM SPEC CULT: ABNORMAL
SPECIMEN DESCRIPTION: ABNORMAL

## 2024-08-12 ENCOUNTER — OUTSIDE SERVICES (OUTPATIENT)
Dept: INTERNAL MEDICINE | Age: 89
End: 2024-08-12
Payer: MEDICARE

## 2024-08-12 DIAGNOSIS — N30.00 ACUTE CYSTITIS WITHOUT HEMATURIA: Primary | ICD-10-CM

## 2024-08-12 PROCEDURE — 99347 HOME/RES VST EST SF MDM 20: CPT | Performed by: NURSE PRACTITIONER

## 2024-08-12 RX ORDER — ALPRAZOLAM 0.25 MG/1
0.12 TABLET ORAL EVERY MORNING
COMMUNITY

## 2024-08-12 RX ORDER — NITROFURANTOIN 25; 75 MG/1; MG/1
100 CAPSULE ORAL 2 TIMES DAILY
Qty: 14 CAPSULE | Refills: 0 | Status: SHIPPED | OUTPATIENT
Start: 2024-08-12 | End: 2024-08-19

## 2024-08-12 RX ORDER — ACETAMINOPHEN 325 MG/1
650 TABLET ORAL DAILY PRN
COMMUNITY

## 2024-08-12 ASSESSMENT — ENCOUNTER SYMPTOMS
WHEEZING: 0
COUGH: 0
NAUSEA: 0
DIARRHEA: 0
RHINORRHEA: 0
VOMITING: 0

## 2024-08-12 NOTE — PROGRESS NOTES
[facility]      Yeyo Bravo is a 89 y.o. male resident of [facility].    HPI:     HPI  Patient presents for evaluation and management of urinary tract infection.  He does have a known history of recurrent urinary tract infection, and has had infectious disease consultation for Klebsiella infection.  Was determined to be colonized.  Infectious disease had noted patient may be treated if he becomes symptomatic.  Per staff, he has been developing significantly worsening sexual aggression, which is not typical behavior for him.  Reviewed culture and sensitivity, which did note multiple bacteria, and patient had allergies to many of the oral options.  C&S did note Enterococcus with susceptibility to Macrobid and Klebsiella with intermediate sensitivity to Macrobid as well. Reviewed with collaborating physician, which who agrees with plan of care.    CrCl 51    Current Outpatient Medications   Medication Sig Dispense Refill    nitrofurantoin, macrocrystal-monohydrate, (MACROBID) 100 MG capsule Take 1 capsule by mouth 2 times daily for 7 days 14 capsule 0    ALPRAZolam (XANAX) 0.25 MG tablet Take 1 tablet by mouth nightly for 30 days. at bedtime. Max Daily Amount: 0.25 mg 30 tablet 0    EPINEPHrine (EPIPEN) 0.3 MG/0.3ML SOAJ injection Inject 0.3 mLs into the muscle as needed (allergic reaction)      furosemide (LASIX) 20 MG tablet Take 1 tablet by mouth daily as needed (lower extremity swelling) 60 tablet 3    METAMUCIL FIBER PO Take by mouth 2 times daily - 1 TBSP in 8 oz water      polyethylene glycol (MIRALAX) 17 GM/SCOOP POWD powder MIX 17 GRAMS WITH LIQUID AND GIVE BY MOUTH TWICE DAILY AS NEEDED FOR CONSTIPATION 1 each 0    ALPRAZolam (XANAX) 0.25 MG tablet Take 0.5 tablets by mouth daily as needed (before shower if needed).      lactulose encephalopathy 10 GM/15ML SOLN solution TAKE 30ML BY MOUTH 3 TIMES A DAY AS NEEDED FOR CONSTIPATION 473 mL 11    fluticasone (FLONASE) 50 MCG/ACT nasal spray

## 2024-08-13 VITALS
TEMPERATURE: 97.9 F | SYSTOLIC BLOOD PRESSURE: 138 MMHG | HEART RATE: 70 BPM | RESPIRATION RATE: 16 BRPM | DIASTOLIC BLOOD PRESSURE: 66 MMHG

## 2024-08-13 DIAGNOSIS — F41.9 ANXIETY: ICD-10-CM

## 2024-08-13 RX ORDER — ALPRAZOLAM 0.25 MG/1
TABLET ORAL
Qty: 15 TABLET | Refills: 0 | Status: SHIPPED | OUTPATIENT
Start: 2024-08-13 | End: 2024-09-12

## 2024-09-02 DIAGNOSIS — F41.9 ANXIETY: Primary | ICD-10-CM

## 2024-09-03 RX ORDER — ALPRAZOLAM 0.25 MG
0.25 TABLET ORAL NIGHTLY
Qty: 30 TABLET | Refills: 0 | Status: SHIPPED | OUTPATIENT
Start: 2024-09-03 | End: 2024-10-03

## 2024-09-03 NOTE — TELEPHONE ENCOUNTER
Pharmacy  requesting a refill of the below medication which has been pended for you:     Requested Prescriptions     Pending Prescriptions Disp Refills    ALPRAZolam (XANAX) 0.25 MG tablet [Pharmacy Med Name: ALPRAZOLAM 0.25 MG TABLET] 30 tablet 5     Sig: Take 1 tablet by mouth nightly. at bedtime.       Last Appointment Date: Visit date not found  Next Appointment Date: Visit date not found    Allergies   Allergen Reactions    Pcn [Penicillins] Swelling     As a child  Pt tolerated ceftriaxone , and keflex with no intolerance    Sulfa Antibiotics     Cefdinir Other (See Comments)     Pt tolerated ceftriaxone , and keflex with no intolerance

## 2024-09-07 DIAGNOSIS — F41.9 ANXIETY: ICD-10-CM

## 2024-09-09 RX ORDER — ALPRAZOLAM 0.25 MG
TABLET ORAL
Qty: 15 TABLET | Refills: 0 | Status: SHIPPED | OUTPATIENT
Start: 2024-09-09 | End: 2024-10-07 | Stop reason: SDUPTHER

## 2024-09-25 ENCOUNTER — TELEPHONE (OUTPATIENT)
Dept: INTERNAL MEDICINE | Age: 89
End: 2024-09-25

## 2024-09-28 ENCOUNTER — APPOINTMENT (OUTPATIENT)
Dept: GENERAL RADIOLOGY | Age: 89
End: 2024-09-28
Payer: MEDICARE

## 2024-09-28 ENCOUNTER — APPOINTMENT (OUTPATIENT)
Dept: CT IMAGING | Age: 89
End: 2024-09-28
Payer: MEDICARE

## 2024-09-28 ENCOUNTER — HOSPITAL ENCOUNTER (EMERGENCY)
Age: 89
Discharge: HOME | End: 2024-09-28
Attending: EMERGENCY MEDICINE
Payer: MEDICARE

## 2024-09-28 VITALS
RESPIRATION RATE: 18 BRPM | HEART RATE: 85 BPM | OXYGEN SATURATION: 100 % | DIASTOLIC BLOOD PRESSURE: 98 MMHG | SYSTOLIC BLOOD PRESSURE: 144 MMHG | TEMPERATURE: 98.4 F

## 2024-09-28 DIAGNOSIS — M25.569 ACUTE KNEE PAIN, UNSPECIFIED LATERALITY: ICD-10-CM

## 2024-09-28 DIAGNOSIS — Z79.01 ANTICOAGULATED: ICD-10-CM
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
## 2024-09-28 PROCEDURE — 99284 EMERGENCY DEPT VISIT MOD MDM: CPT

## 2024-09-28 PROCEDURE — 73110 X-RAY EXAM OF WRIST: CPT

## 2024-09-28 PROCEDURE — 72125 CT NECK SPINE W/O DYE: CPT

## 2024-09-28 PROCEDURE — 71045 X-RAY EXAM CHEST 1 VIEW: CPT

## 2024-09-28 PROCEDURE — 73521 X-RAY EXAM HIPS BI 2 VIEWS: CPT

## 2024-09-28 PROCEDURE — 73562 X-RAY EXAM OF KNEE 3: CPT

## 2024-09-28 PROCEDURE — 70450 CT HEAD/BRAIN W/O DYE: CPT

## 2024-09-28 ASSESSMENT — ENCOUNTER SYMPTOMS: SHORTNESS OF BREATH: 0

## 2024-09-28 ASSESSMENT — PAIN - FUNCTIONAL ASSESSMENT: PAIN_FUNCTIONAL_ASSESSMENT: NONE - DENIES PAIN

## 2024-09-28 NOTE — DISCHARGE INSTRUCTIONS
Please help with PCP within 2 days.  Neurochecks every 2 hours for the next 24 hours.  Return to the emergency department for mental status changes, headaches, nausea vomiting new numbness or weakness, difficulty with ambulation, chest pain or shortness of breath, fevers or any other acute concerns.

## 2024-09-28 NOTE — ED PROVIDER NOTES
complaints.    DIAGNOSTIC RESULTS     EKG: All EKG's are interpreted by the Emergency Department Physician who either signs or Co-signs this chart in the absence of a cardiologist.    Please see ED course.    RADIOLOGY:   Radiologist interpretation of radiologic studies:       XR KNEE LEFT (3 VIEWS)    Result Date: 9/28/2024  EXAMINATION: THREE XRAY VIEWS OF THE LEFT KNEE 9/28/2024 6:09 am COMPARISON: None. HISTORY: ORDERING SYSTEM PROVIDED HISTORY: trauma TECHNOLOGIST PROVIDED HISTORY: trauma Reason for Exam: Trauma FINDINGS: No acute fracture or subluxation.  No aggressive osseous lesions.  Osteopenia limits evaluation.  Minimal osteoarthritis.  No joint effusion.  Vascular calcifications.     No acute osseous abnormality.     XR HIP BILATERAL W AP PELVIS (2 VIEWS)    Result Date: 9/28/2024  EXAMINATION: ONE XRAY VIEW OF THE PELVIS AND TWO XRAY VIEWS OF EACH OF THE BILATERAL HIPS 9/28/2024 5:09 am COMPARISON: None. HISTORY: ORDERING SYSTEM PROVIDED HISTORY: trauma TECHNOLOGIST PROVIDED HISTORY: trauma Reason for Exam: Trauma FINDINGS: Moderate degenerative arthrosis of the SI joints, pubic symphysis and hip joints. The alignment of the osseous structures is anatomic.  There is no evidence of fracture.  There is no evidence of osteolytic or osteoblastic lesions.  The soft tissues are grossly unremarkable.     1. No acute fracture or dislocation. 2. Moderate degenerative arthrosis of the SI joints, pubic symphysis and hip joints.     XR CHEST PORTABLE    Result Date: 9/28/2024  EXAMINATION: ONE XRAY VIEW OF THE CHEST 9/28/2024 5:09 am COMPARISON: None. HISTORY: ORDERING SYSTEM PROVIDED HISTORY: trauma TECHNOLOGIST PROVIDED HISTORY: trauma Reason for Exam: Trauma FINDINGS: The mediastinum is unremarkable. The cardiac silhouette is normal size. Subsegmental atelectasis in the lung bases.  Small bilateral pleural effusions.     Subsegmental atelectasis in the lung bases. Small bilateral pleural effusions.     XR KNEE  RIGHT (3 VIEWS)    Result Date: 9/28/2024  EXAMINATION: THREE XRAY VIEWS OF THE RIGHT KNEE 9/28/2024 6:09 am COMPARISON: None. HISTORY: ORDERING SYSTEM PROVIDED HISTORY: trauma TECHNOLOGIST PROVIDED HISTORY: trauma Reason for Exam: Trauma FINDINGS: Mild tricompartmental osteoarthritis.  No fracture or dislocation is identified.  No significant joint effusion.  Quadriceps insertional enthesophytes.  Peripheral arterial disease.  Proximal tibia and fibula appear intact without acute abnormality.     No acute traumatic fracture.     XR WRIST RIGHT (MIN 3 VIEWS)    Result Date: 9/28/2024  EXAMINATION: 3 XRAY VIEWS OF THE RIGHT WRIST 9/28/2024 6:09 am COMPARISON: None. HISTORY: ORDERING SYSTEM PROVIDED HISTORY: pain TECHNOLOGIST PROVIDED HISTORY: pain Reason for Exam: Trauma FINDINGS: Peripheral arterial disease.  Distal radius and ulna appear intact.  No distal radial or ulnar fracture is identified.  Carpal bones appear in appropriate alignment.  Degenerative change at the triscaphe joint and 1st carpal/metacarpal joint.  No osseous erosive changes are identified.  No radiopaque foreign body is seen.     No acute traumatic fracture or dislocation.     CT CERVICAL SPINE WO CONTRAST    Result Date: 9/28/2024  EXAMINATION: CT OF THE CERVICAL SPINE WITHOUT CONTRAST 9/28/2024 4:23 am TECHNIQUE: CT of the cervical spine was performed without the administration of intravenous contrast. Multiplanar reformatted images are provided for review. Automated exposure control, iterative reconstruction, and/or weight based adjustment of the mA/kV was utilized to reduce the radiation dose to as low as reasonably achievable. COMPARISON: None. HISTORY: ORDERING SYSTEM PROVIDED HISTORY: trauma TECHNOLOGIST PROVIDED HISTORY: trauma Decision Support Exception - unselect if not a suspected or confirmed emergency medical condition->Emergency Medical Condition (MA) Reason for Exam: Fall, anticoagulation FINDINGS: BONES/ALIGNMENT: There is no

## 2024-09-30 ENCOUNTER — OUTSIDE SERVICES (OUTPATIENT)
Dept: INTERNAL MEDICINE | Age: 89
End: 2024-09-30

## 2024-09-30 VITALS
SYSTOLIC BLOOD PRESSURE: 138 MMHG | TEMPERATURE: 97.8 F | DIASTOLIC BLOOD PRESSURE: 80 MMHG | OXYGEN SATURATION: 95 % | RESPIRATION RATE: 16 BRPM | HEART RATE: 89 BPM

## 2024-09-30 DIAGNOSIS — F41.9 ANXIETY: Primary | ICD-10-CM
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
## 2024-09-30 DIAGNOSIS — M25.569 ACUTE KNEE PAIN, UNSPECIFIED LATERALITY: ICD-10-CM

## 2024-09-30 DIAGNOSIS — S61.411D SKIN TEAR OF RIGHT HAND WITHOUT COMPLICATION, SUBSEQUENT ENCOUNTER: ICD-10-CM

## 2024-09-30 RX ORDER — ALPRAZOLAM 0.25 MG
0.25 TABLET ORAL NIGHTLY
Qty: 30 TABLET | Refills: 0 | Status: SHIPPED | OUTPATIENT
Start: 2024-09-30 | End: 2024-10-30

## 2024-09-30 NOTE — PROGRESS NOTES
[facility]      Yeyo Bravo is a 89 y.o. male resident of [facility].    HPI:     HPI  Patient presents for ER follow-up after visit for a fall.  He rolled out of bed at the assisted living facility.  Was later complaining of knee pain and was sent over for further evaluation.  Anticoagulated on Xarelto.  CT head and neck noted no evidence of acute traumatic injury at cervical spine, no acute intracranial abnormality, chronic microvascular ischemic changes.  X-rays of the right wrist, bilateral knees, bilateral hips, and chest noted no acute fracture or dislocation.  He has been ambulating well without concern for ongoing knee pain. He did have a skin tear on his right hand which has been repaired with Steri-Strips, and is healing well.  Otherwise no acute concerns per patient or staff.    Current Outpatient Medications   Medication Sig Dispense Refill   • ALPRAZolam (XANAX) 0.25 MG tablet Take 1 tablet by mouth nightly for 30 days. at bedtime. Max Daily Amount: 0.25 mg 30 tablet 0   • ALPRAZolam (XANAX) 0.25 MG tablet TAKE 1/2 TABLET (0.125MG) BY MOUTH EVERY MORNING FOR 30 DAYS 15 tablet 0   • Acetaminophen-Codeine (TYLENOL WITH CODEINE #3 PO) Take 1 tablet by mouth daily as needed prior to Cath change Max Daily Amount: 1 tablet     • ALPRAZolam (XANAX) 0.25 MG tablet Take 1 tablet by mouth nightly for 30 days. at bedtime. Max Daily Amount: 0.25 mg 30 tablet 0   • EPINEPHrine (EPIPEN) 0.3 MG/0.3ML SOAJ injection Inject 0.3 mLs into the muscle as needed (allergic reaction)     • furosemide (LASIX) 20 MG tablet Take 1 tablet by mouth daily as needed (lower extremity swelling) 60 tablet 3   • METAMUCIL FIBER PO Take by mouth 2 times daily - 1 TBSP in 8 oz water     • polyethylene glycol (MIRALAX) 17 GM/SCOOP POWD powder MIX 17 GRAMS WITH LIQUID AND GIVE BY MOUTH TWICE DAILY AS NEEDED FOR CONSTIPATION 1 each 0   • ALPRAZolam (XANAX) 0.25 MG tablet Take 0.5 tablets by mouth daily as needed (before

## 2024-10-03 ASSESSMENT — ENCOUNTER SYMPTOMS
DIARRHEA: 0
WHEEZING: 0
VOMITING: 0
NAUSEA: 0
COUGH: 0
RHINORRHEA: 0

## 2024-10-07 DIAGNOSIS — F41.9 ANXIETY: ICD-10-CM

## 2024-10-07 RX ORDER — ALPRAZOLAM 0.25 MG
0.12 TABLET ORAL DAILY PRN
Qty: 15 TABLET | Refills: 0 | Status: SHIPPED | OUTPATIENT
Start: 2024-10-07 | End: 2024-11-06

## 2024-10-07 RX ORDER — ALPRAZOLAM 0.25 MG
0.12 TABLET ORAL EVERY MORNING
Qty: 15 TABLET | Refills: 0 | Status: SHIPPED | OUTPATIENT
Start: 2024-10-07 | End: 2024-11-06

## 2024-10-07 NOTE — TELEPHONE ENCOUNTER
Received fax from GP- \"May we have a new script for 'R' am dose of Xanax 1/2 tab (0.125 mg) and prn dose of Xanax 1/2 tab (0.125 mg)? Please send to [facility].\"  Rxs pended.

## 2024-10-30 ENCOUNTER — HOSPITAL ENCOUNTER (OUTPATIENT)
Age: 89
Setting detail: SPECIMEN
Discharge: HOME | End: 2024-10-30
Payer: MEDICARE

## 2024-10-30 DIAGNOSIS — F41.9 ANXIETY: Primary | ICD-10-CM

## 2024-10-30 LAB
BACTERIA URNS QL MICRO: ABNORMAL
BILIRUB UR QL STRIP: NEGATIVE
CLARITY UR: ABNORMAL
COLOR UR: ABNORMAL
EPI CELLS #/AREA URNS HPF: ABNORMAL /HPF (ref 0–5)
GLUCOSE UR STRIP-MCNC: NEGATIVE MG/DL
HGB UR QL STRIP.AUTO: ABNORMAL
KETONES UR STRIP-MCNC: NEGATIVE MG/DL
LEUKOCYTE ESTERASE UR QL STRIP: ABNORMAL
NITRITE UR QL STRIP: POSITIVE
PH UR STRIP: 5.5 [PH] (ref 5–6)
PROT UR STRIP-MCNC: ABNORMAL MG/DL
RBC #/AREA URNS HPF: ABNORMAL /HPF (ref 0–4)
SP GR UR STRIP: >=1.03 (ref 1.01–1.02)
UROBILINOGEN UR STRIP-ACNC: NORMAL EU/DL (ref 0–1)
WBC #/AREA URNS HPF: ABNORMAL /HPF (ref 0–4)

## 2024-10-30 PROCEDURE — 87086 URINE CULTURE/COLONY COUNT: CPT

## 2024-10-30 PROCEDURE — 81001 URINALYSIS AUTO W/SCOPE: CPT

## 2024-10-31 RX ORDER — CEPHALEXIN 500 MG/1
500 CAPSULE ORAL 2 TIMES DAILY
Qty: 14 CAPSULE | Refills: 0 | Status: ON HOLD | OUTPATIENT
Start: 2024-10-31 | End: 2024-11-01

## 2024-10-31 RX ORDER — ALPRAZOLAM 0.25 MG/1
0.25 TABLET ORAL NIGHTLY
Qty: 30 TABLET | Refills: 0 | Status: ON HOLD | OUTPATIENT
Start: 2024-10-31 | End: 2024-11-30

## 2024-11-01 ENCOUNTER — HOSPITAL ENCOUNTER (INPATIENT)
Age: 89
LOS: 4 days | Discharge: NURSING, REHAB OR LONG-TERM CARE | DRG: 699 | End: 2024-11-05
Attending: STUDENT IN AN ORGANIZED HEALTH CARE EDUCATION/TRAINING PROGRAM | Admitting: INTERNAL MEDICINE
Payer: MEDICARE

## 2024-11-01 ENCOUNTER — APPOINTMENT (OUTPATIENT)
Dept: GENERAL RADIOLOGY | Age: 89
DRG: 699 | End: 2024-11-01
Payer: MEDICARE

## 2024-11-01 ENCOUNTER — APPOINTMENT (OUTPATIENT)
Dept: CT IMAGING | Age: 89
DRG: 699 | End: 2024-11-01
Payer: MEDICARE

## 2024-11-01 ENCOUNTER — HOSPITAL ENCOUNTER (EMERGENCY)
Age: 89
Discharge: HOME | DRG: 699 | End: 2024-11-01
Attending: EMERGENCY MEDICINE
Payer: MEDICARE

## 2024-11-01 VITALS
DIASTOLIC BLOOD PRESSURE: 98 MMHG | WEIGHT: 175 LBS | TEMPERATURE: 97.8 F | SYSTOLIC BLOOD PRESSURE: 141 MMHG | RESPIRATION RATE: 20 BRPM | HEART RATE: 71 BPM | HEIGHT: 71 IN | BODY MASS INDEX: 24.5 KG/M2 | OXYGEN SATURATION: 98 %

## 2024-11-01 DIAGNOSIS — S01.119A LACERATION OF EYELID WITHOUT INVOLVEMENT OF LID MARGIN: ICD-10-CM

## 2024-11-01 DIAGNOSIS — S09.90XA INJURY OF HEAD, INITIAL ENCOUNTER: ICD-10-CM

## 2024-11-01 DIAGNOSIS — F41.9 ANXIETY: ICD-10-CM

## 2024-11-01 DIAGNOSIS — N39.0 COMPLICATED UTI (URINARY TRACT INFECTION): Primary | ICD-10-CM

## 2024-11-01 DIAGNOSIS — S00.83XA CONTUSION OF FACE, INITIAL ENCOUNTER: Primary | ICD-10-CM

## 2024-11-01 PROBLEM — N30.01 ACUTE CYSTITIS WITH HEMATURIA: Status: ACTIVE | Noted: 2023-07-07

## 2024-11-01 PROBLEM — T83.511A URINARY TRACT INFECTION ASSOCIATED WITH INDWELLING URETHRAL CATHETER (HCC): Status: ACTIVE | Noted: 2023-07-07

## 2024-11-01 LAB
ALBUMIN SERPL-MCNC: 3.2 G/DL (ref 3.5–5.2)
ALBUMIN/GLOB SERPL: 1.2 {RATIO} (ref 1–2.5)
ALP SERPL-CCNC: 114 U/L (ref 40–129)
ALT SERPL-CCNC: 14 U/L (ref 5–41)
ANION GAP SERPL CALCULATED.3IONS-SCNC: 11 MMOL/L (ref 9–17)
AST SERPL-CCNC: 22 U/L
BACTERIA URNS QL MICRO: ABNORMAL
BASOPHILS # BLD: 0.03 K/UL (ref 0–0.2)
BASOPHILS NFR BLD: 1 % (ref 0–2)
BILIRUB SERPL-MCNC: 0.7 MG/DL (ref 0.3–1.2)
BILIRUB UR QL STRIP: NEGATIVE
BUN SERPL-MCNC: 10 MG/DL (ref 8–23)
BUN/CREAT SERPL: 8 (ref 9–20)
CALCIUM SERPL-MCNC: 8.1 MG/DL (ref 8.6–10.4)
CHARACTER UR: ABNORMAL
CHLORIDE SERPL-SCNC: 105 MMOL/L (ref 98–107)
CLARITY UR: CLEAR
CO2 SERPL-SCNC: 24 MMOL/L (ref 20–31)
COLOR UR: YELLOW
CREAT SERPL-MCNC: 1.2 MG/DL (ref 0.7–1.2)
EOSINOPHIL # BLD: 0.12 K/UL (ref 0–0.44)
EOSINOPHILS RELATIVE PERCENT: 2 % (ref 1–4)
EPI CELLS #/AREA URNS HPF: ABNORMAL /HPF (ref 0–5)
ERYTHROCYTE [DISTWIDTH] IN BLOOD BY AUTOMATED COUNT: 14.3 % (ref 11.8–14.4)
GFR, ESTIMATED: 58 ML/MIN/1.73M2
GLUCOSE SERPL-MCNC: 97 MG/DL (ref 70–99)
GLUCOSE UR STRIP-MCNC: NEGATIVE MG/DL
HCT VFR BLD AUTO: 34.7 % (ref 40.7–50.3)
HGB BLD-MCNC: 11.7 G/DL (ref 13–17)
HGB UR QL STRIP.AUTO: ABNORMAL
IMM GRANULOCYTES # BLD AUTO: 0.04 K/UL (ref 0–0.3)
IMM GRANULOCYTES NFR BLD: 1 %
KETONES UR STRIP-MCNC: NEGATIVE MG/DL
LEUKOCYTE ESTERASE UR QL STRIP: ABNORMAL
LYMPHOCYTES NFR BLD: 1.26 K/UL (ref 1.1–3.7)
LYMPHOCYTES RELATIVE PERCENT: 19 % (ref 24–43)
MAGNESIUM SERPL-MCNC: 2 MG/DL (ref 1.6–2.6)
MCH RBC QN AUTO: 29.8 PG (ref 25.2–33.5)
MCHC RBC AUTO-ENTMCNC: 33.7 G/DL (ref 25.2–33.5)
MCV RBC AUTO: 88.5 FL (ref 82.6–102.9)
MICROORGANISM SPEC CULT: NORMAL
MONOCYTES NFR BLD: 0.39 K/UL (ref 0.1–1.2)
MONOCYTES NFR BLD: 6 % (ref 3–12)
NEUTROPHILS NFR BLD: 71 % (ref 36–65)
NEUTS SEG NFR BLD: 4.67 K/UL (ref 1.5–8.1)
NITRITE UR QL STRIP: POSITIVE
NRBC BLD-RTO: 0 PER 100 WBC
PH UR STRIP: 6 [PH] (ref 5–6)
PLATELET # BLD AUTO: 184 K/UL (ref 138–453)
PMV BLD AUTO: 9.9 FL (ref 8.1–13.5)
POTASSIUM SERPL-SCNC: 3.6 MMOL/L (ref 3.7–5.3)
PROT SERPL-MCNC: 5.9 G/DL (ref 6.4–8.3)
PROT UR STRIP-MCNC: ABNORMAL MG/DL
RBC # BLD AUTO: 3.92 M/UL (ref 4.21–5.77)
RBC #/AREA URNS HPF: ABNORMAL /HPF (ref 0–4)
SODIUM SERPL-SCNC: 140 MMOL/L (ref 135–144)
SP GR UR STRIP: 1.01 (ref 1.01–1.02)
SPECIMEN DESCRIPTION: NORMAL
TROPONIN I SERPL HS-MCNC: 57 NG/L (ref 0–22)
TROPONIN I SERPL HS-MCNC: 62 NG/L (ref 0–22)
UROBILINOGEN UR STRIP-ACNC: NORMAL EU/DL (ref 0–1)
WBC #/AREA URNS HPF: ABNORMAL /HPF (ref 0–4)
WBC OTHER # BLD: 6.5 K/UL (ref 3.5–11.3)

## 2024-11-01 PROCEDURE — 99222 1ST HOSP IP/OBS MODERATE 55: CPT

## 2024-11-01 PROCEDURE — 73090 X-RAY EXAM OF FOREARM: CPT

## 2024-11-01 PROCEDURE — 99285 EMERGENCY DEPT VISIT HI MDM: CPT

## 2024-11-01 PROCEDURE — 87086 URINE CULTURE/COLONY COUNT: CPT

## 2024-11-01 PROCEDURE — 96374 THER/PROPH/DIAG INJ IV PUSH: CPT

## 2024-11-01 PROCEDURE — 81001 URINALYSIS AUTO W/SCOPE: CPT

## 2024-11-01 PROCEDURE — 80053 COMPREHEN METABOLIC PANEL: CPT

## 2024-11-01 PROCEDURE — 6370000000 HC RX 637 (ALT 250 FOR IP)

## 2024-11-01 PROCEDURE — 36415 COLL VENOUS BLD VENIPUNCTURE: CPT

## 2024-11-01 PROCEDURE — 6360000002 HC RX W HCPCS: Performed by: EMERGENCY MEDICINE

## 2024-11-01 PROCEDURE — 90715 TDAP VACCINE 7 YRS/> IM: CPT | Performed by: EMERGENCY MEDICINE

## 2024-11-01 PROCEDURE — 84484 ASSAY OF TROPONIN QUANT: CPT

## 2024-11-01 PROCEDURE — 70450 CT HEAD/BRAIN W/O DYE: CPT

## 2024-11-01 PROCEDURE — 2580000003 HC RX 258: Performed by: INTERNAL MEDICINE

## 2024-11-01 PROCEDURE — 2580000003 HC RX 258: Performed by: STUDENT IN AN ORGANIZED HEALTH CARE EDUCATION/TRAINING PROGRAM

## 2024-11-01 PROCEDURE — 2500000003 HC RX 250 WO HCPCS

## 2024-11-01 PROCEDURE — 87077 CULTURE AEROBIC IDENTIFY: CPT

## 2024-11-01 PROCEDURE — 93005 ELECTROCARDIOGRAM TRACING: CPT | Performed by: STUDENT IN AN ORGANIZED HEALTH CARE EDUCATION/TRAINING PROGRAM

## 2024-11-01 PROCEDURE — 90471 IMMUNIZATION ADMIN: CPT | Performed by: EMERGENCY MEDICINE

## 2024-11-01 PROCEDURE — 85025 COMPLETE CBC W/AUTO DIFF WBC: CPT

## 2024-11-01 PROCEDURE — 2060000000 HC ICU INTERMEDIATE R&B

## 2024-11-01 PROCEDURE — 70486 CT MAXILLOFACIAL W/O DYE: CPT

## 2024-11-01 PROCEDURE — 6360000002 HC RX W HCPCS: Performed by: STUDENT IN AN ORGANIZED HEALTH CARE EDUCATION/TRAINING PROGRAM

## 2024-11-01 PROCEDURE — 87186 SC STD MICRODIL/AGAR DIL: CPT

## 2024-11-01 PROCEDURE — 83735 ASSAY OF MAGNESIUM: CPT

## 2024-11-01 PROCEDURE — 71045 X-RAY EXAM CHEST 1 VIEW: CPT

## 2024-11-01 RX ORDER — ACETAMINOPHEN 325 MG/1
650 TABLET ORAL EVERY 6 HOURS PRN
Status: DISCONTINUED | OUTPATIENT
Start: 2024-11-01 | End: 2024-11-05 | Stop reason: HOSPADM

## 2024-11-01 RX ORDER — MAGNESIUM SULFATE IN WATER 40 MG/ML
2000 INJECTION, SOLUTION INTRAVENOUS PRN
Status: DISCONTINUED | OUTPATIENT
Start: 2024-11-01 | End: 2024-11-05 | Stop reason: HOSPADM

## 2024-11-01 RX ORDER — POTASSIUM CHLORIDE 1500 MG/1
40 TABLET, EXTENDED RELEASE ORAL ONCE
Status: DISCONTINUED | OUTPATIENT
Start: 2024-11-01 | End: 2024-11-01

## 2024-11-01 RX ORDER — FLUTICASONE PROPIONATE 50 MCG
1 SPRAY, SUSPENSION (ML) NASAL DAILY
Status: DISCONTINUED | OUTPATIENT
Start: 2024-11-02 | End: 2024-11-05 | Stop reason: HOSPADM

## 2024-11-01 RX ORDER — ACETAMINOPHEN 650 MG/1
650 SUPPOSITORY RECTAL EVERY 6 HOURS PRN
Status: DISCONTINUED | OUTPATIENT
Start: 2024-11-01 | End: 2024-11-05 | Stop reason: HOSPADM

## 2024-11-01 RX ORDER — POTASSIUM CHLORIDE 1500 MG/1
20 TABLET, EXTENDED RELEASE ORAL DAILY
Status: DISCONTINUED | OUTPATIENT
Start: 2024-11-02 | End: 2024-11-05 | Stop reason: HOSPADM

## 2024-11-01 RX ORDER — AMIODARONE HYDROCHLORIDE 200 MG/1
100 TABLET ORAL DAILY
Status: DISCONTINUED | OUTPATIENT
Start: 2024-11-02 | End: 2024-11-05 | Stop reason: HOSPADM

## 2024-11-01 RX ORDER — TAMSULOSIN HYDROCHLORIDE 0.4 MG/1
0.4 CAPSULE ORAL 2 TIMES DAILY
Status: DISCONTINUED | OUTPATIENT
Start: 2024-11-01 | End: 2024-11-05 | Stop reason: HOSPADM

## 2024-11-01 RX ORDER — ATORVASTATIN CALCIUM 10 MG/1
10 TABLET, FILM COATED ORAL DAILY
Status: DISCONTINUED | OUTPATIENT
Start: 2024-11-01 | End: 2024-11-05 | Stop reason: HOSPADM

## 2024-11-01 RX ORDER — LEVOTHYROXINE SODIUM 88 UG/1
88 TABLET ORAL DAILY
Status: DISCONTINUED | OUTPATIENT
Start: 2024-11-02 | End: 2024-11-05 | Stop reason: HOSPADM

## 2024-11-01 RX ORDER — LACTOBACILLUS RHAMNOSUS GG 10B CELL
1 CAPSULE ORAL DAILY
Status: DISCONTINUED | OUTPATIENT
Start: 2024-11-02 | End: 2024-11-05 | Stop reason: HOSPADM

## 2024-11-01 RX ORDER — ALPRAZOLAM 0.25 MG/1
0.25 TABLET ORAL NIGHTLY
Status: DISCONTINUED | OUTPATIENT
Start: 2024-11-01 | End: 2024-11-05 | Stop reason: HOSPADM

## 2024-11-01 RX ORDER — MULTIVITAMIN WITH IRON
1 TABLET ORAL DAILY
Status: DISCONTINUED | OUTPATIENT
Start: 2024-11-02 | End: 2024-11-05 | Stop reason: HOSPADM

## 2024-11-01 RX ORDER — ALPRAZOLAM 0.25 MG/1
0.12 TABLET ORAL EVERY MORNING
Status: DISCONTINUED | OUTPATIENT
Start: 2024-11-02 | End: 2024-11-05 | Stop reason: HOSPADM

## 2024-11-01 RX ORDER — SODIUM CHLORIDE 9 MG/ML
INJECTION, SOLUTION INTRAVENOUS CONTINUOUS
Status: ACTIVE | OUTPATIENT
Start: 2024-11-01 | End: 2024-11-02

## 2024-11-01 RX ORDER — POTASSIUM CHLORIDE 1500 MG/1
40 TABLET, EXTENDED RELEASE ORAL PRN
Status: DISCONTINUED | OUTPATIENT
Start: 2024-11-01 | End: 2024-11-05 | Stop reason: HOSPADM

## 2024-11-01 RX ORDER — METOPROLOL TARTRATE 25 MG/1
12.5 TABLET, FILM COATED ORAL 2 TIMES DAILY
Status: DISCONTINUED | OUTPATIENT
Start: 2024-11-01 | End: 2024-11-05 | Stop reason: HOSPADM

## 2024-11-01 RX ORDER — POTASSIUM CHLORIDE 7.45 MG/ML
10 INJECTION INTRAVENOUS PRN
Status: DISCONTINUED | OUTPATIENT
Start: 2024-11-01 | End: 2024-11-05 | Stop reason: HOSPADM

## 2024-11-01 RX ORDER — POLYETHYLENE GLYCOL 3350 17 G/17G
17 POWDER, FOR SOLUTION ORAL DAILY PRN
Status: DISCONTINUED | OUTPATIENT
Start: 2024-11-01 | End: 2024-11-05 | Stop reason: HOSPADM

## 2024-11-01 RX ORDER — ONDANSETRON 2 MG/ML
4 INJECTION INTRAMUSCULAR; INTRAVENOUS EVERY 6 HOURS PRN
Status: DISCONTINUED | OUTPATIENT
Start: 2024-11-01 | End: 2024-11-05 | Stop reason: HOSPADM

## 2024-11-01 RX ORDER — SODIUM CHLORIDE 0.9 % (FLUSH) 0.9 %
5-40 SYRINGE (ML) INJECTION EVERY 12 HOURS SCHEDULED
Status: DISCONTINUED | OUTPATIENT
Start: 2024-11-01 | End: 2024-11-05 | Stop reason: HOSPADM

## 2024-11-01 RX ORDER — ONDANSETRON 4 MG/1
4 TABLET, ORALLY DISINTEGRATING ORAL EVERY 8 HOURS PRN
Status: DISCONTINUED | OUTPATIENT
Start: 2024-11-01 | End: 2024-11-05 | Stop reason: HOSPADM

## 2024-11-01 RX ORDER — SODIUM CHLORIDE 9 MG/ML
INJECTION, SOLUTION INTRAVENOUS PRN
Status: DISCONTINUED | OUTPATIENT
Start: 2024-11-01 | End: 2024-11-05 | Stop reason: HOSPADM

## 2024-11-01 RX ORDER — SODIUM CHLORIDE 0.9 % (FLUSH) 0.9 %
5-40 SYRINGE (ML) INJECTION PRN
Status: DISCONTINUED | OUTPATIENT
Start: 2024-11-01 | End: 2024-11-05 | Stop reason: HOSPADM

## 2024-11-01 RX ORDER — MORPHINE SULFATE 2 MG/ML
1 INJECTION, SOLUTION INTRAMUSCULAR; INTRAVENOUS ONCE
Status: COMPLETED | OUTPATIENT
Start: 2024-11-01 | End: 2024-11-01

## 2024-11-01 RX ADMIN — SODIUM CHLORIDE, PRESERVATIVE FREE 10 ML: 5 INJECTION INTRAVENOUS at 21:04

## 2024-11-01 RX ADMIN — PSYLLIUM HUSK 1 PACKET: 3.4 POWDER ORAL at 22:34

## 2024-11-01 RX ADMIN — TAMSULOSIN HYDROCHLORIDE 0.4 MG: 0.4 CAPSULE ORAL at 22:31

## 2024-11-01 RX ADMIN — ALPRAZOLAM 0.25 MG: 0.25 TABLET ORAL at 22:32

## 2024-11-01 RX ADMIN — MEROPENEM 1000 MG: 1 INJECTION INTRAVENOUS at 18:33

## 2024-11-01 RX ADMIN — TETANUS TOXOID, REDUCED DIPHTHERIA TOXOID AND ACELLULAR PERTUSSIS VACCINE, ADSORBED 0.5 ML: 5; 2.5; 8; 8; 2.5 SUSPENSION INTRAMUSCULAR at 01:26

## 2024-11-01 RX ADMIN — SODIUM CHLORIDE 1500 MG: 9 INJECTION, SOLUTION INTRAVENOUS at 17:37

## 2024-11-01 RX ADMIN — SODIUM CHLORIDE: 9 INJECTION, SOLUTION INTRAVENOUS at 21:03

## 2024-11-01 RX ADMIN — MORPHINE SULFATE 1 MG: 2 INJECTION, SOLUTION INTRAMUSCULAR; INTRAVENOUS at 01:53

## 2024-11-01 RX ADMIN — ATORVASTATIN CALCIUM 10 MG: 10 TABLET, FILM COATED ORAL at 22:32

## 2024-11-01 RX ADMIN — MICONAZOLE NITRATE: 20 POWDER TOPICAL at 22:31

## 2024-11-01 RX ADMIN — METOPROLOL TARTRATE 12.5 MG: 25 TABLET, FILM COATED ORAL at 22:33

## 2024-11-01 ASSESSMENT — PAIN SCALES - PAIN ASSESSMENT IN ADVANCED DEMENTIA (PAINAD)
NEGVOCALIZATION: OCCASIONAL MOAN/GROAN, LOW SPEECH, NEGATIVE/DISAPPROVING QUALITY
BODYLANGUAGE: RELAXED
CONSOLABILITY: NO NEED TO CONSOLE
CONSOLABILITY: NO NEED TO CONSOLE
TOTALSCORE: 1
BREATHING: NORMAL
FACIALEXPRESSION: SAD, FRIGHTENED, FROWN
BREATHING: NORMAL
FACIALEXPRESSION: FACIAL GRIMACING
TOTALSCORE: 4
BODYLANGUAGE: TENSE, DISTRESSED PACING, FIDGETING

## 2024-11-01 ASSESSMENT — PAIN DESCRIPTION - DESCRIPTORS: DESCRIPTORS: ACHING

## 2024-11-01 ASSESSMENT — PAIN - FUNCTIONAL ASSESSMENT
PAIN_FUNCTIONAL_ASSESSMENT: NONE - DENIES PAIN
PAIN_FUNCTIONAL_ASSESSMENT: ACTIVITIES ARE NOT PREVENTED
PAIN_FUNCTIONAL_ASSESSMENT: PAIN ASSESSMENT IN ADVANCED DEMENTIA (PAINAD)

## 2024-11-01 ASSESSMENT — PAIN SCALES - GENERAL: PAINLEVEL_OUTOF10: 6

## 2024-11-01 ASSESSMENT — PAIN DESCRIPTION - LOCATION: LOCATION: FACE

## 2024-11-01 NOTE — ED PROVIDER NOTES
Amount: 0.125 mg    ALPRAZOLAM (XANAX) 0.25 MG TABLET    Take 0.5 tablets by mouth every morning for 30 days. Max Daily Amount: 0.125 mg    ALPRAZOLAM (XANAX) 0.25 MG TABLET    Take 1 tablet by mouth nightly for 30 days. at bedtime. Max Daily Amount: 0.25 mg    AMIODARONE (CORDARONE) 200 MG TABLET    Take 0.5 tablets by mouth daily    ATORVASTATIN (LIPITOR) 10 MG TABLET    Take 1 tablet by mouth daily    CARBOXYMETHYLCELLULOSE (REFRESH PLUS) 0.5 % SOLN OPHTHALMIC SOLUTION    Place 2 drops into both eyes every 8 hours as needed    CEPHALEXIN (KEFLEX) 500 MG CAPSULE    Take 1 capsule by mouth 2 times daily for 7 days    EPINEPHRINE (EPIPEN) 0.3 MG/0.3ML SOAJ INJECTION    Inject 0.3 mLs into the muscle as needed (allergic reaction)    FLUTICASONE (FLONASE) 50 MCG/ACT NASAL SPRAY    INSTILL 1 SPRAY IN EACH NOSTRIL ONCE DAILY    FUROSEMIDE (LASIX) 20 MG TABLET    Take 1 tablet by mouth daily as needed (lower extremity swelling)    HYDROCORTISONE 1 % CREAM    Apply topically to affected area as directed prn (Preparation H)    INFANTS SIMETHICONE PO    Take 0.6 mLs by mouth 4 times daily as needed (gas) - 20 mg/ 0.3 ml    LACTOBACILLUS (ACIDOPHILUS PROBIOTIC) CAPS    Take 1 caplet by mouth daily    LACTULOSE ENCEPHALOPATHY 10 GM/15ML SOLN SOLUTION    TAKE 30ML BY MOUTH 3 TIMES A DAY AS NEEDED FOR CONSTIPATION    LEVOTHYROXINE (SYNTHROID) 88 MCG TABLET    Take 1 tablet by mouth Daily    LOPERAMIDE (IMODIUM) 2 MG CAPSULE    Take 1 capsule by mouth After each loose stool. *Not to exceed 16 gm/ 24 hours    METAMUCIL FIBER PO    Take by mouth 2 times daily - 1 TBSP in 8 oz water    METOPROLOL TARTRATE (LOPRESSOR) 25 MG TABLET    Take 0.5 tablets by mouth 2 times daily Hold for HR <60 or sbp <100    MULTIPLE VITAMIN (TAB-A-LEVY PO)    Take 1 tablet by mouth daily    POLYETHYLENE GLYCOL (MIRALAX) 17 GM/SCOOP POWD POWDER    MIX 17 GRAMS WITH LIQUID AND GIVE BY MOUTH TWICE DAILY AS NEEDED FOR CONSTIPATION    POTASSIUM CHLORIDE

## 2024-11-01 NOTE — ED NOTES
ED Report    Presents to ED from: [facility]    Chief Complaint   Patient presents with    Fatigue     1. Complicated UTI (urinary tract infection)      Present Condition: STABLE  Pertinent Event(s): Pt to ER 5 via squad. Per nurse at [facility] pt has not been himself since he got back from ER last night. Pt was here last night after he fell out of bed around 12:30 am today. Pt arrived back to [facility] around 5am. Nurse states pt does not want to move and is not talking much. Pt has lacerations to face closed with steri strips. Pt does not remember falling. Pt moaning in bed every 1 minute    LOC: Pt stares blankly at nurse when asking questions, states he doesn't feel good  Code Status: DNRCC-A    Vital Signs   Vitals:    11/01/24 1530 11/01/24 1600 11/01/24 1700 11/01/24 1743   BP: 128/64 (!) 162/77  (!) 170/78   Pulse: 68 88 83 89   Resp:   16 16   Temp:       SpO2: 96% 98% 98% 98%   Weight:       Height:         Oxygen Baseline: Room Air       Current Oxygen Needs: Room Air  Mobility: x2 Assist       Mobility Aide: Walker    LDAs:   Peripheral IV 11/01/24 Left Wrist (Active)     Abnormal ED Labs:    Labs Reviewed   CBC WITH AUTO DIFFERENTIAL - Abnormal; Notable for the following components:       Result Value    RBC 3.92 (*)     Hemoglobin 11.7 (*)     Hematocrit 34.7 (*)     MCHC 33.7 (*)     Neutrophils % 71 (*)     Lymphocytes % 19 (*)     Immature Granulocytes % 1 (*)     All other components within normal limits   COMPREHENSIVE METABOLIC PANEL - Abnormal; Notable for the following components:    Potassium 3.6 (*)     Est, Glom Filt Rate 58 (*)     BUN/Creatinine Ratio 8 (*)     Calcium 8.1 (*)     Total Protein 5.9 (*)     Albumin 3.2 (*)     All other components within normal limits   TROPONIN - Abnormal; Notable for the following components:    Troponin, High Sensitivity 57 (*)     All other components within normal limits   URINALYSIS WITH REFLEX TO CULTURE - Abnormal; Notable for the

## 2024-11-01 NOTE — ED PROVIDER NOTES
eMERGENCY dEPARTMENT eNCOUnter      Pt Name: Yeyo Bravo  MRN: [id number]  Birthdate 12/31/1934  Date of evaluation: 11/1/2024      CHIEF COMPLAINT       Chief Complaint   Patient presents with    Facial Injury          HISTORY OF PRESENT ILLNESS    Yeyo Bravo is a 89 y.o. male who presents to the emergency department by EMS for evaluation of injury sustained in a fall at his nursing facility.  Patient comes in after having rolled over off the bed about a 2 foot fall landing face first on the floor.  There is no loss of consciousness patient does not complain of any neck pain but is anticoagulated and has a swollen nose that is bleeding.    REVIEW OF SYSTEMS     Constitutional: No fevers or chills  HEENT: No sore throat, rhinorrhea, or earache, nosebleed  Eyes: No blurry vision or double vision no drainage  Cardiovascular: No chest pain or tachycardia  Respiratory: No wheezing or shortness of breath no cough  Gastrointestinal: No nausea, vomiting, diarrhea, constipation, or abdominal pain   : No hematuria or dysuria  Musculoskeletal: No swelling or pain  Skin: No rash   Neurological: No focal neurologic complaints, paresthesias, weakness, or headache    PAST MEDICAL HISTORY    has a past medical history of Abdominal pain, Acute bronchitis, MARITA (acute kidney injury) ([facility]), MARITA (acute kidney injury) ([facility]), Allergic rhinitis, Anxiety, Bradycardia, Cataract, right, Choroidal nevus of right eye, Chronic systolic CHF (congestive heart failure) ([facility]), Coronary artery disease involving native coronary artery of native heart, Dementia associated with 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 behavioral disturbance ([facility]), Dermatophytosis of nail, Diverticulosis, Elevated PSA, Gross hematuria, Hemorrhoids, History of measles, History of mumps, Hyperlipidemia, Hypertension, Hypotension, Hypothyroidism, Mass of upper lobe of right lung, Occult blood positive stool, Osteoarthritis, Overweight, Paroxysmal atrial fibrillation ([facility]),    3. Laceration of eyelid without involvement of lid margin          DISPOSITION/PLAN   DISPOSITION Decision To Discharge    I have reviewed the disposition diagnosis with the patient and or their family/guardian.  I have answered their questions and given discharge instructions.  They voiced understanding of these instructions and did not have any further questions or complaints.        Reevaluation: Patient CT scan does not show any evidence of facial bone fracture the head CT is also negative.  He does have a laceration of his right eyelid near the canaliculus that this is not going to be repaired by us in the emergency department this will need an ophthalmologist to look at this.  We will make sure that the nursing facility has this information when we send him back.  His tetanus has been updated.      PATIENT REFERRED TO:  Mercedes Solis, APRN - CNP  [address] [address]  [phone]    In 2 days  Ophthalmologist within 2 days      DISCHARGE MEDICATIONS:  New Prescriptions    No medications on file       (Please note that portions of this note were completed with a voice recognition program.  Efforts were made to edit the dictations but occasionally words are mis-transcribed.)    Douglas Platt III, MD  Attending Emergency Physician              Douglas Platt III, MD  11/01/24 7730

## 2024-11-01 NOTE — DISCHARGE INSTRUCTIONS
Your CAT scan of your head and facial bones are negative for any acute fractures this is all contusion and soft tissue injury.  You do have a laceration very close to the corner of your eye which an ophthalmologist should repair and not as we have updated your tetanus.  Return to an ED if worse.

## 2024-11-02 LAB
ANION GAP SERPL CALCULATED.3IONS-SCNC: 11 MMOL/L (ref 9–17)
BASOPHILS # BLD: 0.03 K/UL (ref 0–0.2)
BASOPHILS NFR BLD: 0 % (ref 0–2)
BUN SERPL-MCNC: 10 MG/DL (ref 8–23)
BUN/CREAT SERPL: 10 (ref 9–20)
CALCIUM SERPL-MCNC: 8 MG/DL (ref 8.6–10.4)
CHLORIDE SERPL-SCNC: 107 MMOL/L (ref 98–107)
CO2 SERPL-SCNC: 24 MMOL/L (ref 20–31)
CREAT SERPL-MCNC: 1 MG/DL (ref 0.7–1.2)
EOSINOPHIL # BLD: 0.19 K/UL (ref 0–0.44)
EOSINOPHILS RELATIVE PERCENT: 3 % (ref 1–4)
ERYTHROCYTE [DISTWIDTH] IN BLOOD BY AUTOMATED COUNT: 14.3 % (ref 11.8–14.4)
GFR, ESTIMATED: 72 ML/MIN/1.73M2
GLUCOSE SERPL-MCNC: 83 MG/DL (ref 70–99)
HCT VFR BLD AUTO: 33.4 % (ref 40.7–50.3)
HGB BLD-MCNC: 11.3 G/DL (ref 13–17)
IMM GRANULOCYTES # BLD AUTO: 0.04 K/UL (ref 0–0.3)
IMM GRANULOCYTES NFR BLD: 1 %
LYMPHOCYTES NFR BLD: 1.37 K/UL (ref 1.1–3.7)
LYMPHOCYTES RELATIVE PERCENT: 20 % (ref 24–43)
MCH RBC QN AUTO: 29.9 PG (ref 25.2–33.5)
MCHC RBC AUTO-ENTMCNC: 33.8 G/DL (ref 25.2–33.5)
MCV RBC AUTO: 88.4 FL (ref 82.6–102.9)
MONOCYTES NFR BLD: 0.44 K/UL (ref 0.1–1.2)
MONOCYTES NFR BLD: 6 % (ref 3–12)
NEUTROPHILS NFR BLD: 70 % (ref 36–65)
NEUTS SEG NFR BLD: 4.83 K/UL (ref 1.5–8.1)
NRBC BLD-RTO: 0 PER 100 WBC
PLATELET # BLD AUTO: 194 K/UL (ref 138–453)
PMV BLD AUTO: 10.5 FL (ref 8.1–13.5)
POTASSIUM SERPL-SCNC: 3.8 MMOL/L (ref 3.7–5.3)
RBC # BLD AUTO: 3.78 M/UL (ref 4.21–5.77)
SODIUM SERPL-SCNC: 142 MMOL/L (ref 135–144)
WBC OTHER # BLD: 6.9 K/UL (ref 3.5–11.3)

## 2024-11-02 PROCEDURE — 80048 BASIC METABOLIC PNL TOTAL CA: CPT

## 2024-11-02 PROCEDURE — 2580000003 HC RX 258: Performed by: INTERNAL MEDICINE

## 2024-11-02 PROCEDURE — 6360000002 HC RX W HCPCS: Performed by: INTERNAL MEDICINE

## 2024-11-02 PROCEDURE — 85025 COMPLETE CBC W/AUTO DIFF WBC: CPT

## 2024-11-02 PROCEDURE — 36415 COLL VENOUS BLD VENIPUNCTURE: CPT

## 2024-11-02 PROCEDURE — 6370000000 HC RX 637 (ALT 250 FOR IP)

## 2024-11-02 PROCEDURE — 2500000003 HC RX 250 WO HCPCS

## 2024-11-02 PROCEDURE — 2060000000 HC ICU INTERMEDIATE R&B

## 2024-11-02 RX ADMIN — PSYLLIUM HUSK 1 PACKET: 3.4 POWDER ORAL at 10:06

## 2024-11-02 RX ADMIN — MEROPENEM 1000 MG: 1 INJECTION INTRAVENOUS at 05:51

## 2024-11-02 RX ADMIN — ALPRAZOLAM 0.12 MG: 0.25 TABLET ORAL at 09:42

## 2024-11-02 RX ADMIN — ALPRAZOLAM 0.25 MG: 0.25 TABLET ORAL at 20:50

## 2024-11-02 RX ADMIN — AMIODARONE HYDROCHLORIDE 100 MG: 200 TABLET ORAL at 09:42

## 2024-11-02 RX ADMIN — SODIUM CHLORIDE, PRESERVATIVE FREE 1000 MG: 5 INJECTION INTRAVENOUS at 16:26

## 2024-11-02 RX ADMIN — SODIUM CHLORIDE: 9 INJECTION, SOLUTION INTRAVENOUS at 05:38

## 2024-11-02 RX ADMIN — THERA TABS 1 TABLET: TAB at 09:41

## 2024-11-02 RX ADMIN — TAMSULOSIN HYDROCHLORIDE 0.4 MG: 0.4 CAPSULE ORAL at 20:50

## 2024-11-02 RX ADMIN — ATORVASTATIN CALCIUM 10 MG: 10 TABLET, FILM COATED ORAL at 20:50

## 2024-11-02 RX ADMIN — SODIUM CHLORIDE 1250 MG: 9 INJECTION, SOLUTION INTRAVENOUS at 16:43

## 2024-11-02 RX ADMIN — MICONAZOLE NITRATE: 20 POWDER TOPICAL at 10:07

## 2024-11-02 RX ADMIN — POTASSIUM CHLORIDE 20 MEQ: 1500 TABLET, EXTENDED RELEASE ORAL at 09:41

## 2024-11-02 RX ADMIN — PSYLLIUM HUSK 1 PACKET: 3.4 POWDER ORAL at 20:50

## 2024-11-02 RX ADMIN — Medication 1 CAPSULE: at 09:42

## 2024-11-02 RX ADMIN — FLUTICASONE PROPIONATE 1 SPRAY: 50 SPRAY, METERED NASAL at 10:11

## 2024-11-02 RX ADMIN — METOPROLOL TARTRATE 12.5 MG: 25 TABLET, FILM COATED ORAL at 09:33

## 2024-11-02 RX ADMIN — SODIUM CHLORIDE, PRESERVATIVE FREE 1000 MG: 5 INJECTION INTRAVENOUS at 22:11

## 2024-11-02 RX ADMIN — TAMSULOSIN HYDROCHLORIDE 0.4 MG: 0.4 CAPSULE ORAL at 09:41

## 2024-11-02 RX ADMIN — RIVAROXABAN 15 MG: 15 TABLET, FILM COATED ORAL at 16:34

## 2024-11-02 RX ADMIN — METOPROLOL TARTRATE 12.5 MG: 25 TABLET, FILM COATED ORAL at 20:49

## 2024-11-02 RX ADMIN — MICONAZOLE NITRATE: 20 POWDER TOPICAL at 20:59

## 2024-11-02 NOTE — PLAN OF CARE
Problem: Chronic Conditions and Co-morbidities  Goal: Patient's chronic conditions and co-morbidity symptoms are monitored and maintained or improved  11/2/2024 1008 by Samanta Ahumada RN  Outcome: Progressing  11/2/2024 0053 by Basilia Omer RN  Outcome: Progressing     Problem: Discharge Planning  Goal: Discharge to home or other facility with appropriate resources  11/2/2024 1008 by Samanta Ahumada RN  Outcome: Progressing  11/2/2024 0053 by Basilia Omer RN  Outcome: Progressing     Problem: ABCDS Injury Assessment  Goal: Absence of physical injury  11/2/2024 1008 by Samanta Ahumada RN  Outcome: Progressing  11/2/2024 0053 by Basilia Omer RN  Outcome: Progressing     Problem: Skin/Tissue Integrity  Goal: Absence of new skin breakdown  Description: 1.  Monitor for areas of redness and/or skin breakdown  2.  Assess vascular access sites hourly  3.  Every 4-6 hours minimum:  Change oxygen saturation probe site  4.  Every 4-6 hours:  If on nasal continuous positive airway pressure, respiratory therapy assess nares and determine need for appliance change or resting period.  11/2/2024 1008 by Samanta Ahumada RN  Outcome: Progressing  11/2/2024 0053 by Basilia Omer RN  Outcome: Progressing     Problem: Safety - Adult  Goal: Free from fall injury  11/2/2024 1008 by Samanta Ahumada RN  Outcome: Progressing  11/2/2024 0053 by Basilia Omer RN  Outcome: Progressing

## 2024-11-02 NOTE — PLAN OF CARE

## 2024-11-02 NOTE — H&P
HOSPITALIST ADMISSION H&P      REASON FOR ADMISSION:  UTI  ESTIMATED LENGTH OF STAY:>2 midnights, 3-4 days    ATTENDING/ADMITTING PHYSICIAN: Suhas Cadet MD  PCP: Mercedes Solis APRN - CNP    HISTORY OF PRESENT ILLNESS:      The patient is a 89 y.o. male patient of Mercedes Solis APRN - CNP who presents from ER with c/o fall last night and fatigue. Patient with baseline dementia, unable to explain why he is here, thinks he is in [place]. Information obtained from chart review and staff. Patient was seen in ER last night for fall with facial laceration was treated and discharged back to [facility]. Today the staff at [facility] were concerned about patient decreased responsiveness to them and not doing activities that he normally does without difficulty, like lifting his glass himself and drinking. Patient denies shortness of breath, pain, nausea or vomiting.    Dementia: A&O to self only.     Atrial fib: Xarelto, and rate controlled.    VU: Chronic sanchez catheter.     In ER: Meropenem, Vancomycin.  EKG:   Atrial fibrillation --80  Left anterior fascicular block   Minimal voltage criteria for LVH, may be normal variant ( Lobito product )   Abnormal QRS-T angle, consider primary T wave abnormality   Abnormal ECG   When compared with ECG of 10-PARVIN-2024 09:16,   Atrial fibrillation has replaced Sinus rhythm   ST no longer elevated in Lateral leads.    CT head w/o:  Impression:    No acute intracranial abnormality.    CXR:   Impression:    Similar appearance of vascular congestion, basilar atelectasis and small  effusions.    Wounds and LDAs present prior to admission: Right eye laceration, groin and buttock redness.     See below for additional PMH.    Patient wdoi-esuwvgpzrx-hvdeirtr-available records reviewed, including, but not limited to ER records, imaging results, lab results, office records, personal records, and [facility]AR[facility] --  40 Prescriptions, 22 Private pay, 3 Prescribers, 1 Pharmacy in 2

## 2024-11-03 LAB
ANION GAP SERPL CALCULATED.3IONS-SCNC: 10 MMOL/L (ref 9–17)
BASOPHILS # BLD: 0.04 K/UL (ref 0–0.2)
BASOPHILS NFR BLD: 1 % (ref 0–2)
BUN SERPL-MCNC: 10 MG/DL (ref 8–23)
BUN/CREAT SERPL: 10 (ref 9–20)
CALCIUM SERPL-MCNC: 8.1 MG/DL (ref 8.6–10.4)
CHLORIDE SERPL-SCNC: 106 MMOL/L (ref 98–107)
CO2 SERPL-SCNC: 23 MMOL/L (ref 20–31)
CREAT SERPL-MCNC: 1 MG/DL (ref 0.7–1.2)
EOSINOPHIL # BLD: 0.14 K/UL (ref 0–0.44)
EOSINOPHILS RELATIVE PERCENT: 2 % (ref 1–4)
ERYTHROCYTE [DISTWIDTH] IN BLOOD BY AUTOMATED COUNT: 14 % (ref 11.8–14.4)
GFR, ESTIMATED: 72 ML/MIN/1.73M2
GLUCOSE SERPL-MCNC: 92 MG/DL (ref 70–99)
HCT VFR BLD AUTO: 35.3 % (ref 40.7–50.3)
HGB BLD-MCNC: 11.9 G/DL (ref 13–17)
IMM GRANULOCYTES # BLD AUTO: 0.05 K/UL (ref 0–0.3)
IMM GRANULOCYTES NFR BLD: 1 %
LYMPHOCYTES NFR BLD: 1.31 K/UL (ref 1.1–3.7)
LYMPHOCYTES RELATIVE PERCENT: 18 % (ref 24–43)
MAGNESIUM SERPL-MCNC: 1.9 MG/DL (ref 1.6–2.6)
MCH RBC QN AUTO: 29.6 PG (ref 25.2–33.5)
MCHC RBC AUTO-ENTMCNC: 33.7 G/DL (ref 25.2–33.5)
MCV RBC AUTO: 87.8 FL (ref 82.6–102.9)
MONOCYTES NFR BLD: 0.43 K/UL (ref 0.1–1.2)
MONOCYTES NFR BLD: 6 % (ref 3–12)
NEUTROPHILS NFR BLD: 72 % (ref 36–65)
NEUTS SEG NFR BLD: 5.17 K/UL (ref 1.5–8.1)
NRBC BLD-RTO: 0 PER 100 WBC
PLATELET # BLD AUTO: 202 K/UL (ref 138–453)
PMV BLD AUTO: 10.2 FL (ref 8.1–13.5)
POTASSIUM SERPL-SCNC: 3.1 MMOL/L (ref 3.7–5.3)
RBC # BLD AUTO: 4.02 M/UL (ref 4.21–5.77)
SODIUM SERPL-SCNC: 139 MMOL/L (ref 135–144)
VANCOMYCIN SERPL-MCNC: 11.4 UG/ML
WBC OTHER # BLD: 7.1 K/UL (ref 3.5–11.3)

## 2024-11-03 PROCEDURE — 6360000002 HC RX W HCPCS

## 2024-11-03 PROCEDURE — 6360000002 HC RX W HCPCS: Performed by: INTERNAL MEDICINE

## 2024-11-03 PROCEDURE — 6370000000 HC RX 637 (ALT 250 FOR IP)

## 2024-11-03 PROCEDURE — 6370000000 HC RX 637 (ALT 250 FOR IP): Performed by: INTERNAL MEDICINE

## 2024-11-03 PROCEDURE — 85025 COMPLETE CBC W/AUTO DIFF WBC: CPT

## 2024-11-03 PROCEDURE — 36415 COLL VENOUS BLD VENIPUNCTURE: CPT

## 2024-11-03 PROCEDURE — 99232 SBSQ HOSP IP/OBS MODERATE 35: CPT | Performed by: FAMILY MEDICINE

## 2024-11-03 PROCEDURE — 2060000000 HC ICU INTERMEDIATE R&B

## 2024-11-03 PROCEDURE — 80202 ASSAY OF VANCOMYCIN: CPT

## 2024-11-03 PROCEDURE — 80048 BASIC METABOLIC PNL TOTAL CA: CPT

## 2024-11-03 PROCEDURE — 2580000003 HC RX 258: Performed by: INTERNAL MEDICINE

## 2024-11-03 PROCEDURE — 83735 ASSAY OF MAGNESIUM: CPT

## 2024-11-03 RX ORDER — HYDROCODONE BITARTRATE AND ACETAMINOPHEN 5; 325 MG/1; MG/1
1 TABLET ORAL EVERY 12 HOURS PRN
Status: DISCONTINUED | OUTPATIENT
Start: 2024-11-03 | End: 2024-11-03

## 2024-11-03 RX ORDER — HYDRALAZINE HYDROCHLORIDE 20 MG/ML
5 INJECTION INTRAMUSCULAR; INTRAVENOUS ONCE
Status: COMPLETED | OUTPATIENT
Start: 2024-11-03 | End: 2024-11-03

## 2024-11-03 RX ORDER — HYDROCODONE BITARTRATE AND ACETAMINOPHEN 5; 325 MG/1; MG/1
1 TABLET ORAL EVERY 12 HOURS PRN
Status: DISCONTINUED | OUTPATIENT
Start: 2024-11-03 | End: 2024-11-05 | Stop reason: HOSPADM

## 2024-11-03 RX ADMIN — AMIODARONE HYDROCHLORIDE 100 MG: 200 TABLET ORAL at 09:53

## 2024-11-03 RX ADMIN — SODIUM CHLORIDE, PRESERVATIVE FREE 1000 MG: 5 INJECTION INTRAVENOUS at 06:14

## 2024-11-03 RX ADMIN — PSYLLIUM HUSK 1 PACKET: 3.4 POWDER ORAL at 09:56

## 2024-11-03 RX ADMIN — FLUTICASONE PROPIONATE 1 SPRAY: 50 SPRAY, METERED NASAL at 09:54

## 2024-11-03 RX ADMIN — ACETAMINOPHEN 650 MG: 325 TABLET ORAL at 12:34

## 2024-11-03 RX ADMIN — SODIUM CHLORIDE, PRESERVATIVE FREE 10 ML: 5 INJECTION INTRAVENOUS at 21:58

## 2024-11-03 RX ADMIN — MICONAZOLE NITRATE: 20 POWDER TOPICAL at 20:49

## 2024-11-03 RX ADMIN — METOPROLOL TARTRATE 12.5 MG: 25 TABLET, FILM COATED ORAL at 09:53

## 2024-11-03 RX ADMIN — POTASSIUM CHLORIDE 40 MEQ: 1500 TABLET, EXTENDED RELEASE ORAL at 06:18

## 2024-11-03 RX ADMIN — MICONAZOLE NITRATE: 20 POWDER TOPICAL at 09:54

## 2024-11-03 RX ADMIN — SODIUM CHLORIDE, PRESERVATIVE FREE 1000 MG: 5 INJECTION INTRAVENOUS at 14:44

## 2024-11-03 RX ADMIN — THERA TABS 1 TABLET: TAB at 09:53

## 2024-11-03 RX ADMIN — RIVAROXABAN 15 MG: 15 TABLET, FILM COATED ORAL at 09:53

## 2024-11-03 RX ADMIN — ALPRAZOLAM 0.25 MG: 0.25 TABLET ORAL at 20:42

## 2024-11-03 RX ADMIN — Medication 1 CAPSULE: at 09:53

## 2024-11-03 RX ADMIN — SODIUM CHLORIDE, PRESERVATIVE FREE 10 ML: 5 INJECTION INTRAVENOUS at 06:15

## 2024-11-03 RX ADMIN — ALPRAZOLAM 0.12 MG: 0.25 TABLET ORAL at 09:53

## 2024-11-03 RX ADMIN — SODIUM CHLORIDE, PRESERVATIVE FREE 10 ML: 5 INJECTION INTRAVENOUS at 01:49

## 2024-11-03 RX ADMIN — SODIUM CHLORIDE, PRESERVATIVE FREE 1000 MG: 5 INJECTION INTRAVENOUS at 21:58

## 2024-11-03 RX ADMIN — HYDRALAZINE HYDROCHLORIDE 5 MG: 20 INJECTION INTRAMUSCULAR; INTRAVENOUS at 01:48

## 2024-11-03 RX ADMIN — SODIUM CHLORIDE, PRESERVATIVE FREE 10 ML: 5 INJECTION INTRAVENOUS at 09:59

## 2024-11-03 RX ADMIN — LEVOTHYROXINE SODIUM 88 MCG: 0.09 TABLET ORAL at 09:58

## 2024-11-03 RX ADMIN — POTASSIUM CHLORIDE 20 MEQ: 1500 TABLET, EXTENDED RELEASE ORAL at 09:52

## 2024-11-03 RX ADMIN — TAMSULOSIN HYDROCHLORIDE 0.4 MG: 0.4 CAPSULE ORAL at 09:52

## 2024-11-03 RX ADMIN — ATORVASTATIN CALCIUM 10 MG: 10 TABLET, FILM COATED ORAL at 20:42

## 2024-11-03 RX ADMIN — METOPROLOL TARTRATE 12.5 MG: 25 TABLET, FILM COATED ORAL at 20:42

## 2024-11-03 RX ADMIN — TAMSULOSIN HYDROCHLORIDE 0.4 MG: 0.4 CAPSULE ORAL at 20:42

## 2024-11-03 ASSESSMENT — PAIN SCALES - PAIN ASSESSMENT IN ADVANCED DEMENTIA (PAINAD)
BODYLANGUAGE: RELAXED
BREATHING: NORMAL
TOTALSCORE: 0
CONSOLABILITY: NO NEED TO CONSOLE
FACIALEXPRESSION: SMILING OR INEXPRESSIVE

## 2024-11-03 ASSESSMENT — PAIN DESCRIPTION - DESCRIPTORS: DESCRIPTORS: PATIENT UNABLE TO DESCRIBE

## 2024-11-03 ASSESSMENT — PAIN DESCRIPTION - LOCATION: LOCATION: GENERALIZED

## 2024-11-03 ASSESSMENT — PAIN SCALES - GENERAL: PAINLEVEL_OUTOF10: 3

## 2024-11-03 NOTE — PLAN OF CARE
Problem: Chronic Conditions and Co-morbidities  Goal: Patient's chronic conditions and co-morbidity symptoms are monitored and maintained or improved  11/2/2024 2220 by Basilia Omer RN  Outcome: Progressing  11/2/2024 1008 by Samanta Ahumada RN  Outcome: Progressing     Problem: Discharge Planning  Goal: Discharge to home or other facility with appropriate resources  11/2/2024 2220 by Basilia Omer RN  Outcome: Progressing  11/2/2024 1008 by Samanta Ahumada RN  Outcome: Progressing     Problem: ABCDS Injury Assessment  Goal: Absence of physical injury  11/2/2024 2220 by Basilia Omer RN  Outcome: Progressing  11/2/2024 1008 by Samanta Ahumada RN  Outcome: Progressing     Problem: Skin/Tissue Integrity  Goal: Absence of new skin breakdown  Description: 1.  Monitor for areas of redness and/or skin breakdown  2.  Assess vascular access sites hourly  3.  Every 4-6 hours minimum:  Change oxygen saturation probe site  4.  Every 4-6 hours:  If on nasal continuous positive airway pressure, respiratory therapy assess nares and determine need for appliance change or resting period.  11/2/2024 2220 by Basilia Omer RN  Outcome: Progressing  11/2/2024 1008 by Samanta Ahumada RN  Outcome: Progressing     Problem: Safety - Adult  Goal: Free from fall injury  11/2/2024 2220 by Basilia Omer RN  Outcome: Progressing  11/2/2024 1008 by Samanta Ahumada RN  Outcome: Progressing

## 2024-11-04 ENCOUNTER — ANESTHESIA (OUTPATIENT)
Dept: INPATIENT UNIT | Age: 89
DRG: 699 | End: 2024-11-04
Payer: MEDICARE

## 2024-11-04 ENCOUNTER — ANESTHESIA EVENT (OUTPATIENT)
Dept: INPATIENT UNIT | Age: 89
DRG: 699 | End: 2024-11-04
Payer: MEDICARE

## 2024-11-04 LAB
ANION GAP SERPL CALCULATED.3IONS-SCNC: 11 MMOL/L (ref 9–17)
BASOPHILS # BLD: 0.04 K/UL (ref 0–0.2)
BASOPHILS NFR BLD: 1 % (ref 0–2)
BUN SERPL-MCNC: 10 MG/DL (ref 8–23)
BUN/CREAT SERPL: 9 (ref 9–20)
CALCIUM SERPL-MCNC: 8.3 MG/DL (ref 8.6–10.4)
CHLORIDE SERPL-SCNC: 109 MMOL/L (ref 98–107)
CO2 SERPL-SCNC: 24 MMOL/L (ref 20–31)
CREAT SERPL-MCNC: 1.1 MG/DL (ref 0.7–1.2)
EKG Q-T INTERVAL: 416 MS
EKG QRS DURATION: 118 MS
EKG QTC CALCULATION (BAZETT): 479 MS
EKG R AXIS: -55 DEGREES
EKG T AXIS: 93 DEGREES
EKG VENTRICULAR RATE: 80 BPM
EOSINOPHIL # BLD: 0.17 K/UL (ref 0–0.44)
EOSINOPHILS RELATIVE PERCENT: 3 % (ref 1–4)
ERYTHROCYTE [DISTWIDTH] IN BLOOD BY AUTOMATED COUNT: 14.1 % (ref 11.8–14.4)
GFR, ESTIMATED: 64 ML/MIN/1.73M2
GLUCOSE SERPL-MCNC: 95 MG/DL (ref 70–99)
HCT VFR BLD AUTO: 36.6 % (ref 40.7–50.3)
HGB BLD-MCNC: 12.4 G/DL (ref 13–17)
IMM GRANULOCYTES # BLD AUTO: 0.06 K/UL (ref 0–0.3)
IMM GRANULOCYTES NFR BLD: 1 %
LYMPHOCYTES NFR BLD: 1.68 K/UL (ref 1.1–3.7)
LYMPHOCYTES RELATIVE PERCENT: 25 % (ref 24–43)
MCH RBC QN AUTO: 29.7 PG (ref 25.2–33.5)
MCHC RBC AUTO-ENTMCNC: 33.9 G/DL (ref 25.2–33.5)
MCV RBC AUTO: 87.8 FL (ref 82.6–102.9)
MONOCYTES NFR BLD: 0.45 K/UL (ref 0.1–1.2)
MONOCYTES NFR BLD: 7 % (ref 3–12)
NEUTROPHILS NFR BLD: 63 % (ref 36–65)
NEUTS SEG NFR BLD: 4.26 K/UL (ref 1.5–8.1)
NRBC BLD-RTO: 0 PER 100 WBC
PLATELET # BLD AUTO: 223 K/UL (ref 138–453)
PMV BLD AUTO: 10.4 FL (ref 8.1–13.5)
POTASSIUM SERPL-SCNC: 3.5 MMOL/L (ref 3.7–5.3)
RBC # BLD AUTO: 4.17 M/UL (ref 4.21–5.77)
SODIUM SERPL-SCNC: 144 MMOL/L (ref 135–144)
WBC OTHER # BLD: 6.7 K/UL (ref 3.5–11.3)

## 2024-11-04 PROCEDURE — 6370000000 HC RX 637 (ALT 250 FOR IP)

## 2024-11-04 PROCEDURE — 05HC33Z INSERTION OF INFUSION DEVICE INTO LEFT BASILIC VEIN, PERCUTANEOUS APPROACH: ICD-10-PCS | Performed by: INTERNAL MEDICINE

## 2024-11-04 PROCEDURE — 85025 COMPLETE CBC W/AUTO DIFF WBC: CPT

## 2024-11-04 PROCEDURE — 80048 BASIC METABOLIC PNL TOTAL CA: CPT

## 2024-11-04 PROCEDURE — 6360000002 HC RX W HCPCS: Performed by: INTERNAL MEDICINE

## 2024-11-04 PROCEDURE — 99231 SBSQ HOSP IP/OBS SF/LOW 25: CPT | Performed by: INTERNAL MEDICINE

## 2024-11-04 PROCEDURE — 6370000000 HC RX 637 (ALT 250 FOR IP): Performed by: INTERNAL MEDICINE

## 2024-11-04 PROCEDURE — 36415 COLL VENOUS BLD VENIPUNCTURE: CPT

## 2024-11-04 PROCEDURE — 2060000000 HC ICU INTERMEDIATE R&B

## 2024-11-04 PROCEDURE — 2580000003 HC RX 258: Performed by: INTERNAL MEDICINE

## 2024-11-04 PROCEDURE — 94760 N-INVAS EAR/PLS OXIMETRY 1: CPT

## 2024-11-04 RX ORDER — LIDOCAINE HYDROCHLORIDE 10 MG/ML
50 INJECTION, SOLUTION INFILTRATION; PERINEURAL ONCE
Status: DISCONTINUED | OUTPATIENT
Start: 2024-11-04 | End: 2024-11-05 | Stop reason: HOSPADM

## 2024-11-04 RX ADMIN — SODIUM CHLORIDE, PRESERVATIVE FREE 10 ML: 5 INJECTION INTRAVENOUS at 05:45

## 2024-11-04 RX ADMIN — ALPRAZOLAM 0.25 MG: 0.25 TABLET ORAL at 21:29

## 2024-11-04 RX ADMIN — SODIUM CHLORIDE, PRESERVATIVE FREE 30 ML: 5 INJECTION INTRAVENOUS at 21:58

## 2024-11-04 RX ADMIN — METOPROLOL TARTRATE 12.5 MG: 25 TABLET, FILM COATED ORAL at 21:29

## 2024-11-04 RX ADMIN — RIVAROXABAN 15 MG: 15 TABLET, FILM COATED ORAL at 08:22

## 2024-11-04 RX ADMIN — MICONAZOLE NITRATE: 20 POWDER TOPICAL at 22:02

## 2024-11-04 RX ADMIN — POTASSIUM CHLORIDE 40 MEQ: 1500 TABLET, EXTENDED RELEASE ORAL at 06:02

## 2024-11-04 RX ADMIN — ATORVASTATIN CALCIUM 10 MG: 10 TABLET, FILM COATED ORAL at 21:30

## 2024-11-04 RX ADMIN — Medication 1 CAPSULE: at 08:22

## 2024-11-04 RX ADMIN — PSYLLIUM HUSK 1 PACKET: 3.4 POWDER ORAL at 08:21

## 2024-11-04 RX ADMIN — MICONAZOLE NITRATE: 20 POWDER TOPICAL at 08:42

## 2024-11-04 RX ADMIN — TAMSULOSIN HYDROCHLORIDE 0.4 MG: 0.4 CAPSULE ORAL at 21:30

## 2024-11-04 RX ADMIN — THERA TABS 1 TABLET: TAB at 08:22

## 2024-11-04 RX ADMIN — ACETAMINOPHEN 650 MG: 325 TABLET ORAL at 14:27

## 2024-11-04 RX ADMIN — SODIUM CHLORIDE, PRESERVATIVE FREE 1000 MG: 5 INJECTION INTRAVENOUS at 14:32

## 2024-11-04 RX ADMIN — SODIUM CHLORIDE, PRESERVATIVE FREE 1000 MG: 5 INJECTION INTRAVENOUS at 05:45

## 2024-11-04 RX ADMIN — SODIUM CHLORIDE, PRESERVATIVE FREE 1000 MG: 5 INJECTION INTRAVENOUS at 21:59

## 2024-11-04 RX ADMIN — METOPROLOL TARTRATE 12.5 MG: 25 TABLET, FILM COATED ORAL at 08:22

## 2024-11-04 RX ADMIN — SODIUM CHLORIDE, PRESERVATIVE FREE 10 ML: 5 INJECTION INTRAVENOUS at 08:23

## 2024-11-04 RX ADMIN — AMIODARONE HYDROCHLORIDE 100 MG: 200 TABLET ORAL at 08:22

## 2024-11-04 RX ADMIN — POTASSIUM CHLORIDE 20 MEQ: 1500 TABLET, EXTENDED RELEASE ORAL at 08:22

## 2024-11-04 RX ADMIN — ALPRAZOLAM 0.12 MG: 0.25 TABLET ORAL at 08:22

## 2024-11-04 RX ADMIN — TAMSULOSIN HYDROCHLORIDE 0.4 MG: 0.4 CAPSULE ORAL at 08:22

## 2024-11-04 RX ADMIN — LEVOTHYROXINE SODIUM 88 MCG: 0.09 TABLET ORAL at 06:02

## 2024-11-04 ASSESSMENT — PAIN SCALES - PAIN ASSESSMENT IN ADVANCED DEMENTIA (PAINAD)
BREATHING: NORMAL
BODYLANGUAGE: RELAXED
CONSOLABILITY: NO NEED TO CONSOLE
FACIALEXPRESSION: SMILING OR INEXPRESSIVE
TOTALSCORE: 0

## 2024-11-04 ASSESSMENT — PAIN SCALES - GENERAL
PAINLEVEL_OUTOF10: 0
PAINLEVEL_OUTOF10: 0

## 2024-11-04 ASSESSMENT — PAIN - FUNCTIONAL ASSESSMENT: PAIN_FUNCTIONAL_ASSESSMENT: PREVENTS OR INTERFERES SOME ACTIVE ACTIVITIES AND ADLS

## 2024-11-04 ASSESSMENT — PAIN SCALES - WONG BAKER: WONGBAKER_NUMERICALRESPONSE: NO HURT

## 2024-11-04 NOTE — ANESTHESIA PROCEDURE NOTES
PICC/Midline:    A midline catheter   was placed using Seldinger and ultrasound guided in the patient floor for the following indication(s): intravenous access and intravenous antibiotics .  11/4/2024 11:00 AM  11/4/2024 11:15 AM    Staffing  Performed: Resident/CRNA   Resident/CRNA: Jose Eduardo Gaona APRN - CRNA  Performed by: Jose Eduardo Gaona APRN - CRNA  Authorized by: Jose Eduardo Gaona APRN - CRNA  Sterility preparation included the following: hand hygiene performed prior to procedure, maximum sterile barriers used and sterile technique used to drape from head to toe..  The  left,  basilic vein was prepped.A 4 Fr (size), Chloraprep}lidocaine 1%, double lumen   Advanced 15 cm., Exposed 3 cm., Total 18 cm.    Expiration date: 7/31/2025  Ultrasound    Post-procedure education provided and Patient verbalized understanding of education.  Preanesthetic Checklist  Completed: patient identified, IV checked, site marked, risks and benefits discussed, surgical/procedural consents, equipment checked, pre-op evaluation, timeout performed, anesthesia consent given, oxygen available, monitors applied/VS acknowledged, fire risk safety assessment completed and verbalized and blood product R/B/A discussed and consented

## 2024-11-04 NOTE — PLAN OF CARE
Problem: Chronic Conditions and Co-morbidities  Goal: Patient's chronic conditions and co-morbidity symptoms are monitored and maintained or improved  11/4/2024 1027 by Richa Arizmendi RN  Outcome: Progressing  11/4/2024 0109 by Marie Franks RN  Outcome: Progressing  Flowsheets (Taken 11/3/2024 2035)  Care Plan - Patient's Chronic Conditions and Co-Morbidity Symptoms are Monitored and Maintained or Improved:   Monitor and assess patient's chronic conditions and comorbid symptoms for stability, deterioration, or improvement   Collaborate with multidisciplinary team to address chronic and comorbid conditions and prevent exacerbation or deterioration   Update acute care plan with appropriate goals if chronic or comorbid symptoms are exacerbated and prevent overall improvement and discharge     Problem: Discharge Planning  Goal: Discharge to home or other facility with appropriate resources  11/4/2024 1027 by Richa Arizmendi RN  Outcome: Progressing  Flowsheets (Taken 11/4/2024 0835)  Discharge to home or other facility with appropriate resources: Identify barriers to discharge with patient and caregiver  11/4/2024 0109 by Marie Franks RN  Outcome: Progressing  Flowsheets (Taken 11/3/2024 2035)  Discharge to home or other facility with appropriate resources:   Identify barriers to discharge with patient and caregiver   Arrange for needed discharge resources and transportation as appropriate   Identify discharge learning needs (meds, wound care, etc)   Refer to discharge planning if patient needs post-hospital services based on physician order or complex needs related to functional status, cognitive ability or social support system     Problem: ABCDS Injury Assessment  Goal: Absence of physical injury  11/4/2024 1027 by Richa Arizmendi RN  Outcome: Progressing  11/4/2024 0109 by Marie Franks RN  Outcome: Progressing     Problem: Skin/Tissue Integrity  Goal: Absence of new skin

## 2024-11-04 NOTE — PLAN OF CARE
Problem: Chronic Conditions and Co-morbidities  Goal: Patient's chronic conditions and co-morbidity symptoms are monitored and maintained or improved  Outcome: Progressing  Flowsheets (Taken 11/3/2024 2035)  Care Plan - Patient's Chronic Conditions and Co-Morbidity Symptoms are Monitored and Maintained or Improved:   Monitor and assess patient's chronic conditions and comorbid symptoms for stability, deterioration, or improvement   Collaborate with multidisciplinary team to address chronic and comorbid conditions and prevent exacerbation or deterioration   Update acute care plan with appropriate goals if chronic or comorbid symptoms are exacerbated and prevent overall improvement and discharge     Problem: Discharge Planning  Goal: Discharge to home or other facility with appropriate resources  Outcome: Progressing  Flowsheets (Taken 11/3/2024 2035)  Discharge to home or other facility with appropriate resources:   Identify barriers to discharge with patient and caregiver   Arrange for needed discharge resources and transportation as appropriate   Identify discharge learning needs (meds, wound care, etc)   Refer to discharge planning if patient needs post-hospital services based on physician order or complex needs related to functional status, cognitive ability or social support system     Problem: ABCDS Injury Assessment  Goal: Absence of physical injury  Outcome: Progressing     Problem: Skin/Tissue Integrity  Goal: Absence of new skin breakdown  Description: 1.  Monitor for areas of redness and/or skin breakdown  2.  Assess vascular access sites hourly  3.  Every 4-6 hours minimum:  Change oxygen saturation probe site  4.  Every 4-6 hours:  If on nasal continuous positive airway pressure, respiratory therapy assess nares and determine need for appliance change or resting period.  Outcome: Progressing     Problem: Safety - Adult  Goal: Free from fall injury  Outcome: Progressing     Problem: Confusion  Goal:

## 2024-11-04 NOTE — CARE COORDINATION
I spoke with cecilia at [facility] regarding their ability to care for patient with midline and long term IV antibiotics. He states they are able to accommodate the patient but to contact [facility] regarding that transition. Referral sent.  
expects to discharge to: Assisted living ([facility])  Plan for transportation at discharge:  [employer]    Financial    Active Insurance as of 11/1/2024       Primary Coverage       Payor Plan Insurance Group Employer/Plan Group    HUMANA MEDICARE HUMANA CHOICE-PPO MEDICARE [id number]       Payor Plan Address Payor Plan Phone Number Payor Plan Fax Number Effective Dates    [address] [address]   1/1/2024 - None Entered    [place] [address]         Subscriber Name Subscriber Birth Date Member JUNIOR ZAPIEN 12/31/1934 [id number]                     Does insurance require precert for SNF: Yes    Potential assistance Purchasing Medications:    Meds-to-Beds request:        [facility] - [facility] [address] [facility] - P [phone] - F [phone]  [address] [facility] [address]  Phone: [phone] Fax: [phone]    [facility] - [place], [place] - [address] [phone] - F [phone]  [address] [place] [address]  Phone: [phone] Fax: [phone]    [facility] - [place], [place] - [address] [phone] - F [phone]  [id number] [place] [address]  Phone: [phone] Fax: [phone]      Notes:    Factors facilitating achievement of predicted outcomes: Caregiver support, Has needed Durable Medical Equipment at home, and Home is wheelchair accessible    Barriers to discharge: Limited family support, Confusion, Limited safety awareness, Decreased motivation, Limited participation, Limited insight into deficits, Decreased endurance, Upper extremity weakness, Lower extremity weakness, and Long standing deficits    Additional Case Management Notes: the patient is expected to return to [facility] with IV antibiotics    The Plan for Transition of Care is related to the following treatment goals of UTI (urinary tract infection) [N39.0]  Complicated UTI (urinary tract infection) [N39.0]    IF

## 2024-11-05 VITALS
TEMPERATURE: 97.5 F | RESPIRATION RATE: 18 BRPM | BODY MASS INDEX: 24.51 KG/M2 | SYSTOLIC BLOOD PRESSURE: 160 MMHG | HEIGHT: 71 IN | HEART RATE: 98 BPM | OXYGEN SATURATION: 95 % | DIASTOLIC BLOOD PRESSURE: 90 MMHG | WEIGHT: 175.1 LBS

## 2024-11-05 LAB
ALBUMIN SERPL-MCNC: 3.2 G/DL (ref 3.5–5.2)
ANION GAP SERPL CALCULATED.3IONS-SCNC: 11 MMOL/L (ref 9–17)
BASOPHILS # BLD: 0.04 K/UL (ref 0–0.2)
BASOPHILS NFR BLD: 1 % (ref 0–2)
BUN SERPL-MCNC: 11 MG/DL (ref 8–23)
BUN/CREAT SERPL: 11 (ref 9–20)
CALCIUM SERPL-MCNC: 8.4 MG/DL (ref 8.6–10.4)
CHLORIDE SERPL-SCNC: 108 MMOL/L (ref 98–107)
CO2 SERPL-SCNC: 23 MMOL/L (ref 20–31)
CREAT SERPL-MCNC: 1 MG/DL (ref 0.7–1.2)
EOSINOPHIL # BLD: 0.16 K/UL (ref 0–0.44)
EOSINOPHILS RELATIVE PERCENT: 2 % (ref 1–4)
ERYTHROCYTE [DISTWIDTH] IN BLOOD BY AUTOMATED COUNT: 14.2 % (ref 11.8–14.4)
GFR, ESTIMATED: 72 ML/MIN/1.73M2
GLUCOSE SERPL-MCNC: 99 MG/DL (ref 70–99)
HCT VFR BLD AUTO: 38.6 % (ref 40.7–50.3)
HGB BLD-MCNC: 12.7 G/DL (ref 13–17)
IMM GRANULOCYTES # BLD AUTO: 0.06 K/UL (ref 0–0.3)
IMM GRANULOCYTES NFR BLD: 1 %
LYMPHOCYTES NFR BLD: 1.5 K/UL (ref 1.1–3.7)
LYMPHOCYTES RELATIVE PERCENT: 21 % (ref 24–43)
MCH RBC QN AUTO: 29.7 PG (ref 25.2–33.5)
MCHC RBC AUTO-ENTMCNC: 32.9 G/DL (ref 25.2–33.5)
MCV RBC AUTO: 90.2 FL (ref 82.6–102.9)
MICROORGANISM SPEC CULT: ABNORMAL
MICROORGANISM SPEC CULT: ABNORMAL
MONOCYTES NFR BLD: 0.43 K/UL (ref 0.1–1.2)
MONOCYTES NFR BLD: 6 % (ref 3–12)
NEUTROPHILS NFR BLD: 69 % (ref 36–65)
NEUTS SEG NFR BLD: 5.14 K/UL (ref 1.5–8.1)
NRBC BLD-RTO: 0 PER 100 WBC
PLATELET # BLD AUTO: 212 K/UL (ref 138–453)
PMV BLD AUTO: 10.4 FL (ref 8.1–13.5)
POTASSIUM SERPL-SCNC: 3.7 MMOL/L (ref 3.7–5.3)
RBC # BLD AUTO: 4.28 M/UL (ref 4.21–5.77)
SODIUM SERPL-SCNC: 142 MMOL/L (ref 135–144)
SPECIMEN DESCRIPTION: ABNORMAL
WBC OTHER # BLD: 7.3 K/UL (ref 3.5–11.3)

## 2024-11-05 PROCEDURE — 6370000000 HC RX 637 (ALT 250 FOR IP)

## 2024-11-05 PROCEDURE — 85025 COMPLETE CBC W/AUTO DIFF WBC: CPT

## 2024-11-05 PROCEDURE — 6360000002 HC RX W HCPCS: Performed by: INTERNAL MEDICINE

## 2024-11-05 PROCEDURE — 36415 COLL VENOUS BLD VENIPUNCTURE: CPT

## 2024-11-05 PROCEDURE — 99238 HOSP IP/OBS DSCHRG MGMT 30/<: CPT | Performed by: INTERNAL MEDICINE

## 2024-11-05 PROCEDURE — 80048 BASIC METABOLIC PNL TOTAL CA: CPT

## 2024-11-05 PROCEDURE — 2580000003 HC RX 258: Performed by: INTERNAL MEDICINE

## 2024-11-05 PROCEDURE — 82040 ASSAY OF SERUM ALBUMIN: CPT

## 2024-11-05 RX ORDER — ALPRAZOLAM 0.25 MG/1
0.12 TABLET ORAL DAILY PRN
Qty: 2 TABLET | Refills: 0 | Status: SHIPPED | OUTPATIENT
Start: 2024-11-05 | End: 2024-11-06 | Stop reason: SDUPTHER

## 2024-11-05 RX ORDER — ALPRAZOLAM 0.25 MG/1
0.12 TABLET ORAL EVERY MORNING
Qty: 2 TABLET | Refills: 0 | Status: SHIPPED | OUTPATIENT
Start: 2024-11-05 | End: 2024-11-06 | Stop reason: SDUPTHER

## 2024-11-05 RX ORDER — ALPRAZOLAM 0.25 MG/1
0.25 TABLET ORAL NIGHTLY
Qty: 3 TABLET | Refills: 0 | Status: SHIPPED | OUTPATIENT
Start: 2024-11-05 | End: 2024-11-08

## 2024-11-05 RX ADMIN — MICONAZOLE NITRATE: 20 POWDER TOPICAL at 08:23

## 2024-11-05 RX ADMIN — AMIODARONE HYDROCHLORIDE 100 MG: 200 TABLET ORAL at 08:18

## 2024-11-05 RX ADMIN — SODIUM CHLORIDE, PRESERVATIVE FREE 10 ML: 5 INJECTION INTRAVENOUS at 08:19

## 2024-11-05 RX ADMIN — POTASSIUM CHLORIDE 20 MEQ: 1500 TABLET, EXTENDED RELEASE ORAL at 08:18

## 2024-11-05 RX ADMIN — PSYLLIUM HUSK 1 PACKET: 3.4 POWDER ORAL at 08:19

## 2024-11-05 RX ADMIN — SODIUM CHLORIDE, PRESERVATIVE FREE 1000 MG: 5 INJECTION INTRAVENOUS at 13:48

## 2024-11-05 RX ADMIN — RIVAROXABAN 15 MG: 15 TABLET, FILM COATED ORAL at 08:18

## 2024-11-05 RX ADMIN — Medication 1 CAPSULE: at 08:19

## 2024-11-05 RX ADMIN — LEVOTHYROXINE SODIUM 88 MCG: 0.09 TABLET ORAL at 06:19

## 2024-11-05 RX ADMIN — ALPRAZOLAM 0.12 MG: 0.25 TABLET ORAL at 08:17

## 2024-11-05 RX ADMIN — METOPROLOL TARTRATE 12.5 MG: 25 TABLET, FILM COATED ORAL at 08:19

## 2024-11-05 RX ADMIN — FLUTICASONE PROPIONATE 1 SPRAY: 50 SPRAY, METERED NASAL at 08:23

## 2024-11-05 RX ADMIN — SODIUM CHLORIDE, PRESERVATIVE FREE 10 ML: 5 INJECTION INTRAVENOUS at 06:19

## 2024-11-05 RX ADMIN — THERA TABS 1 TABLET: TAB at 08:18

## 2024-11-05 RX ADMIN — TAMSULOSIN HYDROCHLORIDE 0.4 MG: 0.4 CAPSULE ORAL at 08:19

## 2024-11-05 RX ADMIN — SODIUM CHLORIDE, PRESERVATIVE FREE 1000 MG: 5 INJECTION INTRAVENOUS at 06:19

## 2024-11-05 NOTE — DISCHARGE INSTR - DIET

## 2024-11-05 NOTE — PLAN OF CARE
Problem: Chronic Conditions and Co-morbidities  Goal: Patient's chronic conditions and co-morbidity symptoms are monitored and maintained or improved  Outcome: Progressing  Flowsheets (Taken 11/4/2024 1916)  Care Plan - Patient's Chronic Conditions and Co-Morbidity Symptoms are Monitored and Maintained or Improved:   Monitor and assess patient's chronic conditions and comorbid symptoms for stability, deterioration, or improvement   Collaborate with multidisciplinary team to address chronic and comorbid conditions and prevent exacerbation or deterioration   Update acute care plan with appropriate goals if chronic or comorbid symptoms are exacerbated and prevent overall improvement and discharge     Problem: Discharge Planning  Goal: Discharge to home or other facility with appropriate resources  Outcome: Progressing  Flowsheets (Taken 11/4/2024 1916)  Discharge to home or other facility with appropriate resources:   Identify barriers to discharge with patient and caregiver   Arrange for needed discharge resources and transportation as appropriate   Identify discharge learning needs (meds, wound care, etc)   Refer to discharge planning if patient needs post-hospital services based on physician order or complex needs related to functional status, cognitive ability or social support system     Problem: ABCDS Injury Assessment  Goal: Absence of physical injury  Outcome: Progressing     Problem: Skin/Tissue Integrity  Goal: Absence of new skin breakdown  Description: 1.  Monitor for areas of redness and/or skin breakdown  2.  Assess vascular access sites hourly  3.  Every 4-6 hours minimum:  Change oxygen saturation probe site  4.  Every 4-6 hours:  If on nasal continuous positive airway pressure, respiratory therapy assess nares and determine need for appliance change or resting period.  Outcome: Progressing     Problem: Safety - Adult  Goal: Free from fall injury  Outcome: Progressing     Problem: Decision

## 2024-11-05 NOTE — PLAN OF CARE
Problem: Chronic Conditions and Co-morbidities  Goal: Patient's chronic conditions and co-morbidity symptoms are monitored and maintained or improved  11/5/2024 0740 by Claudia Bautista RN  Outcome: Progressing  11/5/2024 0145 by Marie Franks RN  Outcome: Progressing  Flowsheets (Taken 11/4/2024 1916)  Care Plan - Patient's Chronic Conditions and Co-Morbidity Symptoms are Monitored and Maintained or Improved:   Monitor and assess patient's chronic conditions and comorbid symptoms for stability, deterioration, or improvement   Collaborate with multidisciplinary team to address chronic and comorbid conditions and prevent exacerbation or deterioration   Update acute care plan with appropriate goals if chronic or comorbid symptoms are exacerbated and prevent overall improvement and discharge     Problem: Discharge Planning  Goal: Discharge to home or other facility with appropriate resources  11/5/2024 0740 by Claudia Bautista RN  Outcome: Progressing  11/5/2024 0145 by Marie Franks RN  Outcome: Progressing  Flowsheets (Taken 11/4/2024 1916)  Discharge to home or other facility with appropriate resources:   Identify barriers to discharge with patient and caregiver   Arrange for needed discharge resources and transportation as appropriate   Identify discharge learning needs (meds, wound care, etc)   Refer to discharge planning if patient needs post-hospital services based on physician order or complex needs related to functional status, cognitive ability or social support system     Problem: ABCDS Injury Assessment  Goal: Absence of physical injury  11/5/2024 0740 by Claudia Bautista RN  Outcome: Progressing  11/5/2024 0145 by Marie Franks RN  Outcome: Progressing     Problem: Skin/Tissue Integrity  Goal: Absence of new skin breakdown  Description: 1.  Monitor for areas of redness and/or skin breakdown  2.  Assess vascular access sites hourly  3.  Every 4-6 hours minimum:  Change oxygen saturation

## 2024-11-05 NOTE — PROGRESS NOTES
Meropenem Extended Interval Interchange    The following dose of Meropenem has been ordered to optimize its pharmacodynamic profile as approved by the Patient Care Review Committee.      Recent Labs     11/01/24  1543   CREATININE 1.2     Estimated Creatinine Clearance: 44 mL/min (based on SCr of 1.2 mg/dL).    CrCl Dose   >50 to <130 500mg-2gm q8hrs over 3 hours   >25 to <49.9 1gm-2gm q12 hrs over 3 hours   10 to <25 500mg-1gm q12hrs over 3 hours   <10 500mg-1gm q24hrs over 3 hours   HD 500mg-1gm q24hrs over 3 hours given after HD   CRRT 500mg-1gm q12hrs over 3 hours       Ordered Dose    Meropenem   1 gm  IV q 12 hrs  over 3 hours.      Pharmacists should be contacted for issues concerning drug compatibility with multiple IV medications.  All doses will be prepared using 100ml bag to be infused over 3-hours at a rate of 33.3 ml/hr.    Thank You,  Jose Albert, RPH11/1/240355:14 PM    
  Physician Progress Note      PATIENT:               JUNIOR JOHNS  CSN #:                  [id number]  :                       1934  ADMIT DATE:       2024 3:03 PM  DISCH DATE:  RESPONDING  PROVIDER #:        Nickolas Montana MD          QUERY TEXT:    Patient admitted with catheter associated urinary tract infection, noted to   have atrial fibrillation and is maintained on Xarelto.    If possible, please document in progress notes and discharge summary if you   are evaluating and/or treating any of the following:?  ?  The medical record reflects the following:  Risk Factors: age >75 years, HTN,  hx chronic diastolic chf  Clinical Indicators: atrial fibrillation, 89 y/o male, HTN, CHF  Treatment: Xarelto    Trinity Buitrago, MSN, RN, CCDS, CRCR  Clinical   .  Options provided:  -- Secondary hypercoagulable state in a patient with atrial fibrillation  -- Other - I will add my own diagnosis  -- Disagree - Not applicable / Not valid  -- Disagree - Clinically unable to determine / Unknown  -- Refer to Clinical Documentation Reviewer    PROVIDER RESPONSE TEXT:    This patient has secondary hypercoagulable state in a patient with atrial   fibrillation.    Query created by: Trinity Buitrago on 2024 11:36 AM      Electronically signed by:  Nickolas Montana MD 2024 12:14 PM          
  Physician Progress Note      PATIENT:               JUNIOR JOHNS  CSN #:                  [id number]  :                       1934  ADMIT DATE:       2024 3:03 PM  DISCH DATE:  RESPONDING  PROVIDER #:        Nickolas Montana MD          QUERY TEXT:    Patient admitted 24 . \"Catheter associated urinary tract infection\" is   documented in H and P.  UTI  (without mention of association with catheter) is  documented in   subsequent IM progress notes.    If possible, please document in the progress notes and discharge summary  if   evaluating and / or treating    The medical record reflects the following:  Risk Factors: severe dementia, chronic Sanchez catheter  Clinical Indicators: Per H/P: \" VU: Chronic sanchez catheter\";  UA: many   bacteria, positive nitrite, 2+leukocyte esterase, urine culture Klebsiella   pneumoniae >384386 cfu/ml  Treatment: Meropenem    Trinity Buitrago, MSN, RN, CCDS, CRCR  Clinical   .  Options provided:  -- UTI due to indwelling urethral catheter  present on admission  -- UTI not due to indwelling urethral catheter  -- Other - I will add my own diagnosis  -- Disagree - Not applicable / Not valid  -- Disagree - Clinically unable to determine / Unknown  -- Refer to Clinical Documentation Reviewer    PROVIDER RESPONSE TEXT:    UTI due to indwelling urethral catheter present on admission    Query created by: Trinity Buitrago on 2024 8:14 AM      Electronically signed by:  Nickolas Montana MD 2024 2:29 PM          
Patient confused and unable to answer majority of admission questions. Writer completed admission questions to best ability. Patient in bed, with call light within reach. Will continue to monitor.   
[facility]   Pharmacy Pharmacokinetic Monitoring Service - Vancomycin     Yeyo Bravo is a 89 y.o. male starting on vancomycin therapy for UTI. Pharmacy consulted by Shani Alfonso NP for monitoring and adjustment.    Target Concentration: Goal AUC/SOHA 400-600 mg*hr/L    Additional Antimicrobials: Meropenem    Pertinent Laboratory Values:   Wt Readings from Last 1 Encounters:   11/01/24 83.5 kg (184 lb 1.6 oz)     Temp Readings from Last 1 Encounters:   11/01/24 98 °F (36.7 °C) (Tympanic)     Estimated Creatinine Clearance: 44 mL/min (based on SCr of 1.2 mg/dL).  Recent Labs     11/01/24  1543   CREATININE 1.2   BUN 10   WBC 6.5     Procalcitonin: n/a    Pertinent Cultures:  Culture Date Source Results        MRSA Nasal Swab: N/A. Non-respiratory infection.    Plan:  Dosing recommendations based on Bayesian software  Start vancomycin 1000mg IVPB every 24 hours  Anticipated AUC of 467 and trough concentration of 13.6 at steady state  Renal labs as indicated   Vancomycin concentration ordered for  @    Pharmacy will continue to monitor patient and adjust therapy as indicated    Thank you for the consult,  Jose Albert RPH  11/1/2024 10:15 PM    
[facility]   Pharmacy Pharmacokinetic Monitoring Service - Vancomycin    Consulting Provider: Shani Alfonso   Indication: UTI  Target Concentration: Goal AUC/SOHA 400-600 mg*hr/L  Day of Therapy: 2  Additional Antimicrobials: Meropenem    Pertinent Laboratory Values:   Wt Readings from Last 1 Encounters:   11/02/24 84.1 kg (185 lb 4.8 oz)     Temp Readings from Last 1 Encounters:   11/02/24 97.7 °F (36.5 °C) (Temporal)     Estimated Creatinine Clearance: 53 mL/min (based on SCr of 1 mg/dL).  Recent Labs     11/01/24  1543 11/02/24  0529   CREATININE 1.2 1.0   BUN 10 10   WBC 6.5 6.9     Procalcitonin:      Pertinent Cultures:  Culture Date Source Results   11/01/24 Urine NGTD   MRSA Nasal Swab: N/A. Non-respiratory infection.    Recent vancomycin administrations                     vancomycin (VANCOCIN) 1,500 mg in sodium chloride 0.9 % 500 mL IVPB ()  Restarted 11/01/24 1848     1,500 mg New Bag  1737                    Assessment:  Date/Time Current Dose Concentration Timing of Concentration (h) AUC   11/02/24 1000mg q24h     386 predicted   Note: Serum concentrations collected for AUC dosing may appear elevated if collected in close proximity to the dose administered, this is not necessarily an indication of toxicity    Plan:  Current dosing regimen is sub-therapeutic  Increase dose to 1250 mg q24h  predicted AUC = 473 and Trough + 12.6  Repeat vancomycin concentration ordered for 11/03/24 @ 0500   Pharmacy will continue to monitor patient and adjust therapy as indicated    Thank you for the consult,  Noel Carlson RPH  11/2/2024 8:22 AM    
05:11 AM     11/05/2024 05:11 AM    MCV 90.2 11/05/2024 05:11 AM    MCH 29.7 11/05/2024 05:11 AM    MCHC 32.9 11/05/2024 05:11 AM    RDW 14.2 11/05/2024 05:11 AM    LYMPHOPCT 21 11/05/2024 05:11 AM    MONOPCT 6 11/05/2024 05:11 AM    EOSPCT 2 11/05/2024 05:11 AM    BASOPCT 1 11/05/2024 05:11 AM    MONOSABS 0.43 11/05/2024 05:11 AM    LYMPHSABS 1.50 11/05/2024 05:11 AM    EOSABS 0.16 11/05/2024 05:11 AM    BASOSABS 0.04 11/05/2024 05:11 AM    DIFFTYPE NOT REPORTED 10/05/2021 08:26 AM     BMP:    Lab Results   Component Value Date/Time     11/05/2024 05:11 AM    K 3.7 11/05/2024 05:11 AM     11/05/2024 05:11 AM    CO2 23 11/05/2024 05:11 AM    BUN 11 11/05/2024 05:11 AM    CREATININE 1.0 11/05/2024 05:11 AM    CALCIUM 8.4 11/05/2024 05:11 AM    GFRAA >60 10/05/2021 08:26 AM    LABGLOM 72 11/05/2024 05:11 AM    LABGLOM >60 01/11/2024 03:03 PM    GLUCOSE 99 11/05/2024 05:11 AM           Physical Exam:  Vitals: BP (!) 161/97   Pulse (!) 117   Temp 98.2 °F (36.8 °C) (Temporal)   Resp 18   Ht 1.803 m (5' 11\")   Wt 79.4 kg (175 lb 1.6 oz)   SpO2 96%   BMI 24.42 kg/m²   24 hour intake/output:  Intake/Output Summary (Last 24 hours) at 11/5/2024 0743  Last data filed at 11/5/2024 0619  Gross per 24 hour   Intake 510 ml   Output 1400 ml   Net -890 ml     Last 3 weights:  Wt Readings from Last 3 Encounters:   11/05/24 79.4 kg (175 lb 1.6 oz)   11/01/24 79.4 kg (175 lb)   07/17/24 79.3 kg (174 lb 12.8 oz)     HEENT: Normocephalic and Atraumatic---but for right eye lids--skin tears [to see ocular plastics outpatient]  Neck: Supple, No Masses, Tenderness, Nodularity, and No Lymphadenopathy  Chest/Lungs: Distant Breath Sounds  Cardiac: Regular Rate and Rhythm  GI/Abdomen: Bowel Sounds Present and Soft, Non-tender, without Guarding or Rebound Tenderness  : Not examined  EXT/Skin: No Cyanosis, No Clubbing, and Edema Present  Neuro:  alert--generalized weakness---dementia = baseline---typically punctuated 
the radiation dose to as low as reasonably achievable. COMPARISON: None. HISTORY: ORDERING SYSTEM PROVIDED HISTORY: fall last night, weakness TECHNOLOGIST PROVIDED HISTORY: fall last night, weakness Decision Support Exception - unselect if not a suspected or confirmed emergency medical condition->Emergency Medical Condition (MA) Reason for Exam: Weakness; recent fall; altered mental status FINDINGS: BRAIN/VENTRICLES: There is no acute intracranial hemorrhage, mass effect or midline shift. No abnormal extra-axial fluid collection.  The gray-white differentiation is maintained without evidence of an acute infarct. There is prominence of the ventricles and sulci due to global parenchymal volume loss. There are nonspecific areas of hypoattenuation within the periventricular and subcortical white matter, which likely represent chronic microvascular ischemic change. ORBITS: The visualized portion of the orbits demonstrate no acute abnormality. SINUSES: The visualized paranasal sinuses and mastoid air cells demonstrate no acute abnormality. SOFT TISSUES/SKULL: No acute abnormality of the visualized skull or soft tissues.     No acute intracranial abnormality.     XR CHEST (SINGLE VIEW FRONTAL)    Result Date: 11/1/2024  EXAMINATION: ONE XRAY VIEW OF THE CHEST 11/1/2024 4:07 pm COMPARISON: 09/28/2024, 06/10/2024 HISTORY: ORDERING SYSTEM PROVIDED HISTORY: weakness TECHNOLOGIST PROVIDED HISTORY: weakness Reason for Exam: Fatigue; weakness FINDINGS: Status post median sternotomy.The cardiac and mediastinal contours appear unchanged. Vascular congestion, basilar opacities and pleural effusions are again demonstrated without appreciable change. No new airspace disease identified in the interval.  No evidence for pneumothorax.     Similar appearance of vascular congestion, basilar atelectasis and small effusions.     CT HEAD WO CONTRAST    Result Date: 11/1/2024  EXAMINATION: CT OF THE HEAD WITHOUT CONTRAST  11/1/2024 12:49 am 
2  Anxiety    PMH:   diverticulosis, allergic rhinitis, bronchitis, choroidal nevus right               eye, elevated PSA, measles, mumps, OA, hemorrhoids,               Hemocult-[+]--stool--refused colonoscopy, palpitations,              right cataract, UTIs--2018, uncontrolled urination, hematuria---2018,              atrial fibrillation--[flutter]---2018, overweight, grief reaction---death               of sister--2018, RML pneumonia--2018, PET---2018--RUL mass-like                opacity--consistent with primary malignancy--no active adenopathy--               no metastatic diseaseT5 bone cyst---L1 uptake likely due               to degenerative change--mild chronic compression deformity,                scrotal abscess---pyocele--complicated left hydrocele----left               epididymitis--2.23.2023--blood culture--1/2--E coli--2.23.2023,                MARITA---3.16.2023--resolved----now at a Stage 2 level---prior dehydration----               bilateral hydronephrosis due to VU,  blood culture--yeast---3.16.2023               hypotension---3.16.2023, gross hematuria---3.16.2023, fecal overload,                MARITA---4.15.2023, dehydration---4.15.2023, asymmetric left epididymis--               bilateral epididymal cysts---large complicated scrotal fluid collection--               possible pyocele---2023, bronchiectasis  PSH:     appendectomy--[id number], tonsillectomy, left eye laser--               detached retina---2011, left cataract--IOL---2013, skin tag               excision--1999, multiple abscess drainages---[id number],                cystoscopy---5.3.2023---sanchez catheter exchange    Allergies:  Penicillin---swelling, sulfa, Cefdinir, CAN take Rocephin--Omnicef      Plan:     UTI---POA---MDRO Klebsiella pneumoniae---on Meronem IV      Dementia---re-dierection     Will need a midline for extended IV antibiotics     See orders     Electronically signed by Nickolas Montana MD on 11/4/2024 at 11:19

## 2024-11-06 ENCOUNTER — TELEPHONE (OUTPATIENT)
Dept: INTERNAL MEDICINE | Age: 89
End: 2024-11-06

## 2024-11-06 DIAGNOSIS — N32.0 BLADDER OUTLET OBSTRUCTION: ICD-10-CM

## 2024-11-06 DIAGNOSIS — F41.9 ANXIETY: ICD-10-CM

## 2024-11-06 NOTE — TELEPHONE ENCOUNTER
Received fax from GP- \"Resident returned from hospital 11/5. His order for Tylenol #3 was d/c'd. May we have N.O. for Tyl #3 1 po once daily prn prior to sanchez cath change? Please advise.  Resident is also stating he's hurting is there anything besides Tylenol he can have? Thank you.\"

## 2024-11-06 NOTE — DISCHARGE SUMMARY
m. Loperamide, Imodium, 2 mg after each loose stool, not to exceed 16 mg per 24-hour period.     n. Metamucil fiber 2 p.o. daily.     o. Metoprolol tartrate, Lopressor, 12.5 mg p.o. twice daily.  Hold for heart rate less than 60 or systolic blood pressure less than 100.     p. Polyethylene glycol, MiraLAX, 17 g with liquid twice daily as needed for constipation.     q. Potassium chloride 20 mEq p.o. daily.     r. Preparation H another preparation topically affected areas as needed.     s. Xarelto, rivaroxaban, 15 mg p.o. daily with breakfast.     t. Tab-A-Christ 1 p.o. daily.     u. Tamsulosin, Flomax, 0.4 mg twice daily.  Given Beck catheters, may wish to consider discontinuance of this medication.  Follow up with the patient's personal physician, Guillermo Cerda D.O., Internal Medicine at [facility].    Any aspect of the patient's care not discussed in the chart and/or dictation will be addressed and treated as an outpatient.    The patient's medications have been reviewed including, but not limited to, pre-hospital, hospital and discharge medications.  The patient and/or the patient's personal representatives were specifically advised the only medications to be taken are those set forth in the discharge orders and no other medications should be taken.  Any prior medications not on the discharge orders are specifically discontinued.          LUIS CALLEJAS MD      D:  11/05/2024 17:07:05     T:  11/06/2024 05:58:44     ANNIE/REJI  Job #:  [id number]     Doc#:  [id number]    CC:   [facility]

## 2024-11-06 NOTE — TELEPHONE ENCOUNTER
Care Transitions Initial Follow Up Call    Outreach made within 2 business days of discharge: Yes    Patient: Yeyo Bravo Patient : 1934   MRN: [id number]  Reason for Admission: UTI  Discharge Date: 24       Spoke with: GP nurse (Betty)    Discharge department/facility: [facility] to [facility]    TCM Interactive Patient Contact:  Was patient able to fill all prescriptions: Yes  Was patient instructed to bring all medications to the follow-up visit: BHARATHI Long will see pt at [facility]  Is patient taking all medications as directed in the discharge summary? Yes  Does patient understand their discharge instructions: Yes- Nurse  Does patient have questions or concerns that need addressed prior to 7-14 day follow up office visit: yes - see Alberto Mahajan fax re: Tylenol #3    Additional needs identified to be addressed with provider  See Alberto Mahajan fax re: Tylenol #3.             Scheduled appointment with PCP within 7-14 days    Follow Up  Future Appointments   Date Time Provider Department Center   2025 10:15 AM José Miguel Riley MD [facility]   2025 11:30 AM Domingo Weiss PA-C [facility] Tami Ferland, LPN

## 2024-11-07 RX ORDER — ALPRAZOLAM 0.25 MG/1
0.12 TABLET ORAL DAILY PRN
Qty: 15 TABLET | Refills: 0 | Status: ON HOLD | OUTPATIENT
Start: 2024-11-07 | End: 2024-12-07

## 2024-11-07 RX ORDER — ALPRAZOLAM 0.25 MG/1
0.12 TABLET ORAL EVERY MORNING
Qty: 15 TABLET | Refills: 0 | Status: ON HOLD | OUTPATIENT
Start: 2024-11-07 | End: 2024-12-07

## 2024-11-07 NOTE — TELEPHONE ENCOUNTER
I will plan to follow up on this on rounds next week - need to review in more detail to verify if there was another reason this medication was discontinued

## 2024-11-08 ENCOUNTER — APPOINTMENT (OUTPATIENT)
Dept: CT IMAGING | Age: 89
End: 2024-11-08
Payer: MEDICARE

## 2024-11-08 ENCOUNTER — HOSPITAL ENCOUNTER (EMERGENCY)
Age: 89
Discharge: ANOTHER HOSPITAL | End: 2024-11-09
Attending: SPECIALIST
Payer: MEDICARE

## 2024-11-08 DIAGNOSIS — S12.501A CLOSED NONDISPLACED FRACTURE OF SIXTH CERVICAL VERTEBRA, UNSPECIFIED FRACTURE MORPHOLOGY, INITIAL ENCOUNTER (HCC): ICD-10-CM

## 2024-11-08 DIAGNOSIS — R41.82 ALTERED MENTAL STATUS, UNSPECIFIED ALTERED MENTAL STATUS TYPE: Primary | ICD-10-CM

## 2024-11-08 LAB
ANION GAP SERPL CALCULATED.3IONS-SCNC: 10 MMOL/L (ref 9–17)
BASOPHILS # BLD: 0.04 K/UL (ref 0–0.2)
BASOPHILS NFR BLD: 0 % (ref 0–2)
BUN SERPL-MCNC: 21 MG/DL (ref 8–23)
BUN/CREAT SERPL: 21 (ref 9–20)
CALCIUM SERPL-MCNC: 8.7 MG/DL (ref 8.6–10.4)
CHLORIDE SERPL-SCNC: 109 MMOL/L (ref 98–107)
CO2 SERPL-SCNC: 26 MMOL/L (ref 20–31)
CREAT SERPL-MCNC: 1 MG/DL (ref 0.7–1.2)
EOSINOPHIL # BLD: 0.23 K/UL (ref 0–0.44)
EOSINOPHILS RELATIVE PERCENT: 2 % (ref 1–4)
ERYTHROCYTE [DISTWIDTH] IN BLOOD BY AUTOMATED COUNT: 14.3 % (ref 11.8–14.4)
GFR, ESTIMATED: 72 ML/MIN/1.73M2
GLUCOSE SERPL-MCNC: 125 MG/DL (ref 70–99)
HCT VFR BLD AUTO: 40.2 % (ref 40.7–50.3)
HGB BLD-MCNC: 13.4 G/DL (ref 13–17)
IMM GRANULOCYTES # BLD AUTO: 0.06 K/UL (ref 0–0.3)
IMM GRANULOCYTES NFR BLD: 1 %
LYMPHOCYTES NFR BLD: 1.73 K/UL (ref 1.1–3.7)
LYMPHOCYTES RELATIVE PERCENT: 18 % (ref 24–43)
MCH RBC QN AUTO: 30 PG (ref 25.2–33.5)
MCHC RBC AUTO-ENTMCNC: 33.3 G/DL (ref 25.2–33.5)
MCV RBC AUTO: 90.1 FL (ref 82.6–102.9)
MONOCYTES NFR BLD: 0.45 K/UL (ref 0.1–1.2)
MONOCYTES NFR BLD: 5 % (ref 3–12)
NEUTROPHILS NFR BLD: 74 % (ref 36–65)
NEUTS SEG NFR BLD: 7.06 K/UL (ref 1.5–8.1)
NRBC BLD-RTO: 0 PER 100 WBC
PLATELET # BLD AUTO: 247 K/UL (ref 138–453)
PMV BLD AUTO: 11.4 FL (ref 8.1–13.5)
POTASSIUM SERPL-SCNC: 4 MMOL/L (ref 3.7–5.3)
RBC # BLD AUTO: 4.46 M/UL (ref 4.21–5.77)
SODIUM SERPL-SCNC: 145 MMOL/L (ref 135–144)
TROPONIN I SERPL HS-MCNC: 50 NG/L (ref 0–22)
WBC OTHER # BLD: 9.6 K/UL (ref 3.5–11.3)

## 2024-11-08 PROCEDURE — 72125 CT NECK SPINE W/O DYE: CPT

## 2024-11-08 PROCEDURE — 85025 COMPLETE CBC W/AUTO DIFF WBC: CPT

## 2024-11-08 PROCEDURE — 93005 ELECTROCARDIOGRAM TRACING: CPT | Performed by: SPECIALIST

## 2024-11-08 PROCEDURE — 36415 COLL VENOUS BLD VENIPUNCTURE: CPT

## 2024-11-08 PROCEDURE — 70450 CT HEAD/BRAIN W/O DYE: CPT

## 2024-11-08 PROCEDURE — 80048 BASIC METABOLIC PNL TOTAL CA: CPT

## 2024-11-08 PROCEDURE — 99285 EMERGENCY DEPT VISIT HI MDM: CPT

## 2024-11-08 PROCEDURE — 84484 ASSAY OF TROPONIN QUANT: CPT

## 2024-11-08 ASSESSMENT — PAIN - FUNCTIONAL ASSESSMENT: PAIN_FUNCTIONAL_ASSESSMENT: NONE - DENIES PAIN

## 2024-11-09 ENCOUNTER — APPOINTMENT (OUTPATIENT)
Dept: MRI IMAGING | Age: 89
DRG: 698 | End: 2024-11-09
Payer: MEDICARE

## 2024-11-09 ENCOUNTER — HOSPITAL ENCOUNTER (INPATIENT)
Age: 89
LOS: 4 days | Discharge: NURSING, REHAB OR LONG-TERM CARE | DRG: 698 | End: 2024-11-13
Attending: EMERGENCY MEDICINE | Admitting: SURGERY
Payer: MEDICARE

## 2024-11-09 ENCOUNTER — APPOINTMENT (OUTPATIENT)
Dept: CT IMAGING | Age: 89
DRG: 698 | End: 2024-11-09
Payer: MEDICARE

## 2024-11-09 ENCOUNTER — APPOINTMENT (OUTPATIENT)
Dept: CT IMAGING | Age: 89
End: 2024-11-09
Payer: MEDICARE

## 2024-11-09 VITALS
DIASTOLIC BLOOD PRESSURE: 84 MMHG | OXYGEN SATURATION: 90 % | HEART RATE: 82 BPM | SYSTOLIC BLOOD PRESSURE: 153 MMHG | TEMPERATURE: 97.6 F | RESPIRATION RATE: 16 BRPM | WEIGHT: 175 LBS | BODY MASS INDEX: 24.41 KG/M2

## 2024-11-09 DIAGNOSIS — S12.501A CLOSED NONDISPLACED FRACTURE OF SIXTH CERVICAL VERTEBRA, UNSPECIFIED FRACTURE MORPHOLOGY, INITIAL ENCOUNTER (HCC): Primary | ICD-10-CM

## 2024-11-09 DIAGNOSIS — R41.82 ALTERED MENTAL STATUS, UNSPECIFIED ALTERED MENTAL STATUS TYPE: ICD-10-CM

## 2024-11-09 PROBLEM — S19.80XA: Status: ACTIVE | Noted: 2024-11-09

## 2024-11-09 PROBLEM — R29.6 FALLS: Status: ACTIVE | Noted: 2024-11-09

## 2024-11-09 PROBLEM — N30.00 ACUTE CYSTITIS WITHOUT HEMATURIA: Status: ACTIVE | Noted: 2024-11-09

## 2024-11-09 PROBLEM — S14.109A CERVICAL SPINAL CORD INJURY, INITIAL ENCOUNTER (HCC): Status: ACTIVE | Noted: 2024-11-09

## 2024-11-09 PROBLEM — G82.54 INCOMPLETE QUADRIPLEGIA AT C5-6 LEVEL (HCC): Status: ACTIVE | Noted: 2024-11-09

## 2024-11-09 LAB
25(OH)D3 SERPL-MCNC: 23 NG/ML (ref 30–100)
ABO + RH BLD: NORMAL
ALBUMIN SERPL-MCNC: 3.7 G/DL (ref 3.5–5.2)
ALBUMIN SERPL-MCNC: 3.7 G/DL (ref 3.5–5.2)
ALBUMIN/GLOB SERPL: 1.2 {RATIO} (ref 1–2.5)
ALP SERPL-CCNC: 119 U/L (ref 40–129)
ALT SERPL-CCNC: 17 U/L (ref 10–50)
AMMONIA PLAS-SCNC: 22 UMOL/L (ref 16–60)
ANION GAP SERPL CALCULATED.3IONS-SCNC: 11 MMOL/L (ref 9–16)
ANION GAP SERPL CALCULATED.3IONS-SCNC: 12 MMOL/L (ref 9–16)
APAP SERPL-MCNC: <5 UG/ML (ref 10–30)
ARM BAND NUMBER: NORMAL
AST SERPL-CCNC: 36 U/L (ref 10–50)
B PARAP IS1001 DNA NPH QL NAA+NON-PROBE: NOT DETECTED
B PERT DNA SPEC QL NAA+PROBE: NOT DETECTED
BASOPHILS # BLD: 0.06 K/UL (ref 0–0.2)
BASOPHILS NFR BLD: 1 % (ref 0–2)
BILIRUB SERPL-MCNC: 1 MG/DL (ref 0–1.2)
BILIRUB UR QL STRIP: NEGATIVE
BLOOD BANK SAMPLE EXPIRATION: NORMAL
BLOOD BANK SPECIMEN: ABNORMAL
BLOOD GROUP ANTIBODIES SERPL: NEGATIVE
BODY TEMPERATURE: 37
BUN SERPL-MCNC: 18 MG/DL (ref 8–23)
BUN SERPL-MCNC: 19 MG/DL (ref 8–23)
C PNEUM DNA NPH QL NAA+NON-PROBE: NOT DETECTED
CALCIUM SERPL-MCNC: 8.8 MG/DL (ref 8.6–10.4)
CASTS #/AREA URNS LPF: ABNORMAL /LPF (ref 0–2)
CASTS #/AREA URNS LPF: ABNORMAL /LPF (ref 0–2)
CHLORIDE SERPL-SCNC: 111 MMOL/L (ref 98–107)
CHLORIDE SERPL-SCNC: 112 MMOL/L (ref 98–107)
CK SERPL-CCNC: 487 U/L (ref 39–308)
CLARITY UR: ABNORMAL
CO2 SERPL-SCNC: 25 MMOL/L (ref 20–31)
CO2 SERPL-SCNC: 26 MMOL/L (ref 20–31)
COHGB MFR BLD: 2.7 % (ref 0–5)
COLOR UR: YELLOW
CREAT SERPL-MCNC: 1 MG/DL (ref 0.7–1.2)
CREAT SERPL-MCNC: 1 MG/DL (ref 0.7–1.2)
CRYSTALS URNS MICRO: ABNORMAL /HPF
CRYSTALS URNS MICRO: ABNORMAL /HPF
EKG Q-T INTERVAL: 398 MS
EKG QRS DURATION: 110 MS
EKG QTC CALCULATION (BAZETT): 450 MS
EKG R AXIS: -67 DEGREES
EKG T AXIS: 106 DEGREES
EKG VENTRICULAR RATE: 77 BPM
EOSINOPHIL # BLD: 0.27 K/UL (ref 0–0.44)
EOSINOPHILS RELATIVE PERCENT: 3 % (ref 1–4)
EPI CELLS #/AREA URNS HPF: ABNORMAL /HPF (ref 0–5)
ERYTHROCYTE [DISTWIDTH] IN BLOOD BY AUTOMATED COUNT: 14.4 % (ref 11.8–14.4)
ERYTHROCYTE [DISTWIDTH] IN BLOOD BY AUTOMATED COUNT: 14.5 % (ref 11.8–14.4)
EST. AVERAGE GLUCOSE BLD GHB EST-MCNC: 117 MG/DL
ETHANOL PERCENT: <0.01 %
ETHANOL PERCENT: <0.01 %
ETHANOLAMINE SERPL-MCNC: <10 MG/DL (ref 0–0.08)
ETHANOLAMINE SERPL-MCNC: <10 MG/DL (ref 0–0.08)
FIO2 ON VENT: ABNORMAL %
FLUAV RNA NPH QL NAA+NON-PROBE: NOT DETECTED
FLUBV RNA NPH QL NAA+NON-PROBE: NOT DETECTED
FOLATE SERPL-MCNC: 21.2 NG/ML (ref 4.8–24.2)
GFR, ESTIMATED: 72 ML/MIN/1.73M2
GFR, ESTIMATED: 72 ML/MIN/1.73M2
GLUCOSE SERPL-MCNC: 92 MG/DL (ref 74–99)
GLUCOSE SERPL-MCNC: 92 MG/DL (ref 74–99)
GLUCOSE UR STRIP-MCNC: NEGATIVE MG/DL
HADV DNA NPH QL NAA+NON-PROBE: NOT DETECTED
HBA1C MFR BLD: 5.7 % (ref 4–6)
HCO3 VENOUS: 26.9 MMOL/L (ref 24–30)
HCOV 229E RNA NPH QL NAA+NON-PROBE: NOT DETECTED
HCOV HKU1 RNA NPH QL NAA+NON-PROBE: NOT DETECTED
HCOV NL63 RNA NPH QL NAA+NON-PROBE: NOT DETECTED
HCOV OC43 RNA NPH QL NAA+NON-PROBE: NOT DETECTED
HCT VFR BLD AUTO: 43.5 % (ref 40.7–50.3)
HCT VFR BLD AUTO: 45.6 % (ref 40.7–50.3)
HGB BLD-MCNC: 13.9 G/DL (ref 13–17)
HGB BLD-MCNC: 14.4 G/DL (ref 13–17)
HGB UR QL STRIP.AUTO: ABNORMAL
HMPV RNA NPH QL NAA+NON-PROBE: NOT DETECTED
HPIV1 RNA NPH QL NAA+NON-PROBE: NOT DETECTED
HPIV2 RNA NPH QL NAA+NON-PROBE: NOT DETECTED
HPIV3 RNA NPH QL NAA+NON-PROBE: NOT DETECTED
HPIV4 RNA NPH QL NAA+NON-PROBE: NOT DETECTED
IMM GRANULOCYTES # BLD AUTO: 0.07 K/UL (ref 0–0.3)
IMM GRANULOCYTES NFR BLD: 1 %
INR PPP: 1.4
KETONES UR STRIP-MCNC: NEGATIVE MG/DL
LACTIC ACID, WHOLE BLOOD: 1.3 MMOL/L (ref 0.7–2.1)
LACTIC ACID, WHOLE BLOOD: 2.1 MMOL/L (ref 0.7–2.1)
LEUKOCYTE ESTERASE UR QL STRIP: ABNORMAL
LYMPHOCYTES NFR BLD: 1.51 K/UL (ref 1.1–3.7)
LYMPHOCYTES RELATIVE PERCENT: 15 % (ref 24–43)
M PNEUMO DNA NPH QL NAA+NON-PROBE: NOT DETECTED
MAGNESIUM SERPL-MCNC: 2.5 MG/DL (ref 1.6–2.4)
MCH RBC QN AUTO: 29.2 PG (ref 25.2–33.5)
MCH RBC QN AUTO: 29.6 PG (ref 25.2–33.5)
MCHC RBC AUTO-ENTMCNC: 31.6 G/DL (ref 28.4–34.8)
MCHC RBC AUTO-ENTMCNC: 32 G/DL (ref 28.4–34.8)
MCV RBC AUTO: 92.5 FL (ref 82.6–102.9)
MCV RBC AUTO: 92.8 FL (ref 82.6–102.9)
MONOCYTES NFR BLD: 0.38 K/UL (ref 0.1–1.2)
MONOCYTES NFR BLD: 4 % (ref 3–12)
NEUTROPHILS NFR BLD: 76 % (ref 36–65)
NEUTS SEG NFR BLD: 7.89 K/UL (ref 1.5–8.1)
NITRITE UR QL STRIP: NEGATIVE
NRBC BLD-RTO: 0 PER 100 WBC
NRBC BLD-RTO: 0 PER 100 WBC
O2 SAT, VEN: 56.8 % (ref 60–85)
PARTIAL THROMBOPLASTIN TIME: 31.7 SEC (ref 23–36.5)
PCO2 VENOUS: 54.3 MM HG (ref 39–55)
PH UR STRIP: 5.5 [PH] (ref 5–8)
PH VENOUS: 7.32 (ref 7.32–7.42)
PLATELET # BLD AUTO: 213 K/UL (ref 138–453)
PLATELET # BLD AUTO: 236 K/UL (ref 138–453)
PMV BLD AUTO: 10.6 FL (ref 8.1–13.5)
PMV BLD AUTO: 10.9 FL (ref 8.1–13.5)
PO2 VENOUS: 36.5 MM HG (ref 30–50)
POSITIVE BASE EXCESS, VEN: 0.2 MMOL/L (ref 0–2)
POTASSIUM SERPL-SCNC: 3.8 MMOL/L (ref 3.7–5.3)
POTASSIUM SERPL-SCNC: 3.9 MMOL/L (ref 3.7–5.3)
PROT SERPL-MCNC: 6.7 G/DL (ref 6.6–8.7)
PROT UR STRIP-MCNC: ABNORMAL MG/DL
PROTHROMBIN TIME: 16.5 SEC (ref 11.7–14.9)
RBC # BLD AUTO: 4.69 M/UL (ref 4.21–5.77)
RBC # BLD AUTO: 4.93 M/UL (ref 4.21–5.77)
RBC # BLD: ABNORMAL 10*6/UL
RBC #/AREA URNS HPF: ABNORMAL /HPF (ref 0–2)
RSV RNA NPH QL NAA+NON-PROBE: NOT DETECTED
RV+EV RNA NPH QL NAA+NON-PROBE: NOT DETECTED
SALICYLATES SERPL-MCNC: <0.5 MG/DL (ref 0–10)
SARS-COV-2 RNA NPH QL NAA+NON-PROBE: NOT DETECTED
SODIUM SERPL-SCNC: 148 MMOL/L (ref 136–145)
SODIUM SERPL-SCNC: 149 MMOL/L (ref 136–145)
SP GR UR STRIP: 1.04 (ref 1–1.03)
SPECIMEN DESCRIPTION: NORMAL
T4 FREE SERPL-MCNC: 1.8 NG/DL (ref 0.92–1.68)
TSH SERPL DL<=0.05 MIU/L-ACNC: 2.34 UIU/ML (ref 0.27–4.2)
UROBILINOGEN UR STRIP-ACNC: NORMAL EU/DL (ref 0–1)
VIT B12 SERPL-MCNC: 613 PG/ML (ref 232–1245)
WBC #/AREA URNS HPF: ABNORMAL /HPF (ref 0–5)
WBC OTHER # BLD: 10.2 K/UL (ref 3.5–11.3)
WBC OTHER # BLD: 10.3 K/UL (ref 3.5–11.3)

## 2024-11-09 PROCEDURE — 83036 HEMOGLOBIN GLYCOSYLATED A1C: CPT

## 2024-11-09 PROCEDURE — 6360000004 HC RX CONTRAST MEDICATION: Performed by: SPECIALIST

## 2024-11-09 PROCEDURE — 85610 PROTHROMBIN TIME: CPT

## 2024-11-09 PROCEDURE — 2580000003 HC RX 258

## 2024-11-09 PROCEDURE — 84439 ASSAY OF FREE THYROXINE: CPT

## 2024-11-09 PROCEDURE — 80179 DRUG ASSAY SALICYLATE: CPT

## 2024-11-09 PROCEDURE — 82947 ASSAY GLUCOSE BLOOD QUANT: CPT

## 2024-11-09 PROCEDURE — 80143 DRUG ASSAY ACETAMINOPHEN: CPT

## 2024-11-09 PROCEDURE — 82550 ASSAY OF CK (CPK): CPT

## 2024-11-09 PROCEDURE — 2500000003 HC RX 250 WO HCPCS: Performed by: STUDENT IN AN ORGANIZED HEALTH CARE EDUCATION/TRAINING PROGRAM

## 2024-11-09 PROCEDURE — 82607 VITAMIN B-12: CPT

## 2024-11-09 PROCEDURE — 84703 CHORIONIC GONADOTROPIN ASSAY: CPT

## 2024-11-09 PROCEDURE — 6360000002 HC RX W HCPCS

## 2024-11-09 PROCEDURE — 72141 MRI NECK SPINE W/O DYE: CPT

## 2024-11-09 PROCEDURE — 70551 MRI BRAIN STEM W/O DYE: CPT

## 2024-11-09 PROCEDURE — 84520 ASSAY OF UREA NITROGEN: CPT

## 2024-11-09 PROCEDURE — 2700000000 HC OXYGEN THERAPY PER DAY

## 2024-11-09 PROCEDURE — 72148 MRI LUMBAR SPINE W/O DYE: CPT

## 2024-11-09 PROCEDURE — 84443 ASSAY THYROID STIM HORMONE: CPT

## 2024-11-09 PROCEDURE — 85025 COMPLETE CBC W/AUTO DIFF WBC: CPT

## 2024-11-09 PROCEDURE — 96365 THER/PROPH/DIAG IV INF INIT: CPT

## 2024-11-09 PROCEDURE — 95819 EEG AWAKE AND ASLEEP: CPT | Performed by: PSYCHIATRY & NEUROLOGY

## 2024-11-09 PROCEDURE — 99222 1ST HOSP IP/OBS MODERATE 55: CPT | Performed by: PSYCHIATRY & NEUROLOGY

## 2024-11-09 PROCEDURE — 99223 1ST HOSP IP/OBS HIGH 75: CPT | Performed by: NEUROLOGICAL SURGERY

## 2024-11-09 PROCEDURE — 95816 EEG AWAKE AND DROWSY: CPT

## 2024-11-09 PROCEDURE — 82306 VITAMIN D 25 HYDROXY: CPT

## 2024-11-09 PROCEDURE — 2580000003 HC RX 258: Performed by: SPECIALIST

## 2024-11-09 PROCEDURE — 2000000000 HC ICU R&B

## 2024-11-09 PROCEDURE — 82040 ASSAY OF SERUM ALBUMIN: CPT

## 2024-11-09 PROCEDURE — 83605 ASSAY OF LACTIC ACID: CPT

## 2024-11-09 PROCEDURE — 94761 N-INVAS EAR/PLS OXIMETRY MLT: CPT

## 2024-11-09 PROCEDURE — 71260 CT THORAX DX C+: CPT

## 2024-11-09 PROCEDURE — 87040 BLOOD CULTURE FOR BACTERIA: CPT

## 2024-11-09 PROCEDURE — 82565 ASSAY OF CREATININE: CPT

## 2024-11-09 PROCEDURE — 2709999900 CTA NECK W CONTRAST

## 2024-11-09 PROCEDURE — 86901 BLOOD TYPING SEROLOGIC RH(D): CPT

## 2024-11-09 PROCEDURE — G0480 DRUG TEST DEF 1-7 CLASSES: HCPCS

## 2024-11-09 PROCEDURE — 85730 THROMBOPLASTIN TIME PARTIAL: CPT

## 2024-11-09 PROCEDURE — 96361 HYDRATE IV INFUSION ADD-ON: CPT

## 2024-11-09 PROCEDURE — 99285 EMERGENCY DEPT VISIT HI MDM: CPT

## 2024-11-09 PROCEDURE — 81001 URINALYSIS AUTO W/SCOPE: CPT

## 2024-11-09 PROCEDURE — 83735 ASSAY OF MAGNESIUM: CPT

## 2024-11-09 PROCEDURE — 0202U NFCT DS 22 TRGT SARS-COV-2: CPT

## 2024-11-09 PROCEDURE — 80053 COMPREHEN METABOLIC PANEL: CPT

## 2024-11-09 PROCEDURE — 6370000000 HC RX 637 (ALT 250 FOR IP): Performed by: STUDENT IN AN ORGANIZED HEALTH CARE EDUCATION/TRAINING PROGRAM

## 2024-11-09 PROCEDURE — 82140 ASSAY OF AMMONIA: CPT

## 2024-11-09 PROCEDURE — 93005 ELECTROCARDIOGRAM TRACING: CPT

## 2024-11-09 PROCEDURE — 80051 ELECTROLYTE PANEL: CPT

## 2024-11-09 PROCEDURE — 82805 BLOOD GASES W/O2 SATURATION: CPT

## 2024-11-09 PROCEDURE — 99222 1ST HOSP IP/OBS MODERATE 55: CPT | Performed by: INTERNAL MEDICINE

## 2024-11-09 PROCEDURE — 86850 RBC ANTIBODY SCREEN: CPT

## 2024-11-09 PROCEDURE — 85027 COMPLETE CBC AUTOMATED: CPT

## 2024-11-09 PROCEDURE — 82746 ASSAY OF FOLIC ACID SERUM: CPT

## 2024-11-09 PROCEDURE — 6360000004 HC RX CONTRAST MEDICATION: Performed by: NURSE PRACTITIONER

## 2024-11-09 PROCEDURE — 86900 BLOOD TYPING SEROLOGIC ABO: CPT

## 2024-11-09 RX ORDER — ONDANSETRON 2 MG/ML
4 INJECTION INTRAMUSCULAR; INTRAVENOUS EVERY 6 HOURS PRN
Status: DISCONTINUED | OUTPATIENT
Start: 2024-11-09 | End: 2024-11-13 | Stop reason: HOSPADM

## 2024-11-09 RX ORDER — SODIUM CHLORIDE 0.9 % (FLUSH) 0.9 %
5-40 SYRINGE (ML) INJECTION EVERY 12 HOURS SCHEDULED
Status: DISCONTINUED | OUTPATIENT
Start: 2024-11-09 | End: 2024-11-13 | Stop reason: HOSPADM

## 2024-11-09 RX ORDER — ACETAMINOPHEN 650 MG/1
650 SUPPOSITORY RECTAL EVERY 6 HOURS PRN
Status: DISCONTINUED | OUTPATIENT
Start: 2024-11-09 | End: 2024-11-09

## 2024-11-09 RX ORDER — SODIUM CHLORIDE 9 MG/ML
INJECTION, SOLUTION INTRAVENOUS PRN
Status: DISCONTINUED | OUTPATIENT
Start: 2024-11-09 | End: 2024-11-13 | Stop reason: HOSPADM

## 2024-11-09 RX ORDER — ALBUTEROL SULFATE 0.83 MG/ML
2.5 SOLUTION RESPIRATORY (INHALATION) EVERY 6 HOURS PRN
Status: DISCONTINUED | OUTPATIENT
Start: 2024-11-09 | End: 2024-11-13 | Stop reason: HOSPADM

## 2024-11-09 RX ORDER — ATORVASTATIN CALCIUM 20 MG/1
10 TABLET, FILM COATED ORAL DAILY
Status: DISCONTINUED | OUTPATIENT
Start: 2024-11-09 | End: 2024-11-13 | Stop reason: HOSPADM

## 2024-11-09 RX ORDER — ACETAMINOPHEN 500 MG
1000 TABLET ORAL EVERY 8 HOURS SCHEDULED
Status: DISCONTINUED | OUTPATIENT
Start: 2024-11-09 | End: 2024-11-13 | Stop reason: HOSPADM

## 2024-11-09 RX ORDER — NOREPINEPHRINE BITARTRATE 0.06 MG/ML
1-100 INJECTION, SOLUTION INTRAVENOUS CONTINUOUS
Status: DISCONTINUED | OUTPATIENT
Start: 2024-11-09 | End: 2024-11-11

## 2024-11-09 RX ORDER — IOPAMIDOL 755 MG/ML
75 INJECTION, SOLUTION INTRAVASCULAR
Status: COMPLETED | OUTPATIENT
Start: 2024-11-09 | End: 2024-11-09

## 2024-11-09 RX ORDER — SODIUM CHLORIDE 9 MG/ML
INJECTION, SOLUTION INTRAVENOUS CONTINUOUS
Status: ACTIVE | OUTPATIENT
Start: 2024-11-09 | End: 2024-11-10

## 2024-11-09 RX ORDER — TAMSULOSIN HYDROCHLORIDE 0.4 MG/1
0.4 CAPSULE ORAL 2 TIMES DAILY
Status: DISCONTINUED | OUTPATIENT
Start: 2024-11-09 | End: 2024-11-13 | Stop reason: HOSPADM

## 2024-11-09 RX ORDER — LIDOCAINE HYDROCHLORIDE 20 MG/ML
JELLY TOPICAL ONCE
Status: COMPLETED | OUTPATIENT
Start: 2024-11-10 | End: 2024-11-10

## 2024-11-09 RX ORDER — SODIUM CHLORIDE 0.9 % (FLUSH) 0.9 %
5-40 SYRINGE (ML) INJECTION PRN
Status: DISCONTINUED | OUTPATIENT
Start: 2024-11-09 | End: 2024-11-13 | Stop reason: HOSPADM

## 2024-11-09 RX ORDER — ONDANSETRON 4 MG/1
4 TABLET, ORALLY DISINTEGRATING ORAL EVERY 8 HOURS PRN
Status: DISCONTINUED | OUTPATIENT
Start: 2024-11-09 | End: 2024-11-13 | Stop reason: HOSPADM

## 2024-11-09 RX ORDER — LEVOTHYROXINE SODIUM 88 UG/1
88 TABLET ORAL DAILY
Status: DISCONTINUED | OUTPATIENT
Start: 2024-11-10 | End: 2024-11-13 | Stop reason: HOSPADM

## 2024-11-09 RX ORDER — POLYETHYLENE GLYCOL 3350 17 G/17G
17 POWDER, FOR SOLUTION ORAL DAILY PRN
Status: DISCONTINUED | OUTPATIENT
Start: 2024-11-09 | End: 2024-11-13 | Stop reason: HOSPADM

## 2024-11-09 RX ORDER — MORPHINE SULFATE 4 MG/ML
4 INJECTION, SOLUTION INTRAMUSCULAR; INTRAVENOUS ONCE
Status: COMPLETED | OUTPATIENT
Start: 2024-11-09 | End: 2024-11-09

## 2024-11-09 RX ORDER — SENNA AND DOCUSATE SODIUM 50; 8.6 MG/1; MG/1
1 TABLET, FILM COATED ORAL 2 TIMES DAILY
Status: DISCONTINUED | OUTPATIENT
Start: 2024-11-09 | End: 2024-11-13 | Stop reason: HOSPADM

## 2024-11-09 RX ORDER — POTASSIUM CHLORIDE 7.45 MG/ML
10 INJECTION INTRAVENOUS PRN
Status: DISCONTINUED | OUTPATIENT
Start: 2024-11-09 | End: 2024-11-11

## 2024-11-09 RX ORDER — POLYETHYLENE GLYCOL 3350 17 G/17G
17 POWDER, FOR SOLUTION ORAL DAILY
Status: DISCONTINUED | OUTPATIENT
Start: 2024-11-09 | End: 2024-11-13 | Stop reason: HOSPADM

## 2024-11-09 RX ORDER — AMIODARONE HYDROCHLORIDE 200 MG/1
100 TABLET ORAL DAILY
Status: DISCONTINUED | OUTPATIENT
Start: 2024-11-09 | End: 2024-11-13 | Stop reason: HOSPADM

## 2024-11-09 RX ORDER — ACETAMINOPHEN 325 MG/1
650 TABLET ORAL EVERY 6 HOURS PRN
Status: DISCONTINUED | OUTPATIENT
Start: 2024-11-09 | End: 2024-11-09

## 2024-11-09 RX ORDER — 0.9 % SODIUM CHLORIDE 0.9 %
500 INTRAVENOUS SOLUTION INTRAVENOUS ONCE
Status: COMPLETED | OUTPATIENT
Start: 2024-11-09 | End: 2024-11-09

## 2024-11-09 RX ORDER — 0.9 % SODIUM CHLORIDE 0.9 %
1000 INTRAVENOUS SOLUTION INTRAVENOUS ONCE
Status: COMPLETED | OUTPATIENT
Start: 2024-11-09 | End: 2024-11-09

## 2024-11-09 RX ORDER — SODIUM CHLORIDE 9 MG/ML
INJECTION, SOLUTION INTRAVENOUS CONTINUOUS
Status: DISCONTINUED | OUTPATIENT
Start: 2024-11-09 | End: 2024-11-09

## 2024-11-09 RX ORDER — MORPHINE SULFATE 4 MG/ML
INJECTION, SOLUTION INTRAMUSCULAR; INTRAVENOUS
Status: COMPLETED
Start: 2024-11-09 | End: 2024-11-09

## 2024-11-09 RX ORDER — POTASSIUM CHLORIDE 1500 MG/1
40 TABLET, EXTENDED RELEASE ORAL PRN
Status: DISCONTINUED | OUTPATIENT
Start: 2024-11-09 | End: 2024-11-11

## 2024-11-09 RX ORDER — MAGNESIUM SULFATE IN WATER 40 MG/ML
2000 INJECTION, SOLUTION INTRAVENOUS PRN
Status: DISCONTINUED | OUTPATIENT
Start: 2024-11-09 | End: 2024-11-13 | Stop reason: HOSPADM

## 2024-11-09 RX ADMIN — SODIUM CHLORIDE, PRESERVATIVE FREE 10 ML: 5 INJECTION INTRAVENOUS at 21:35

## 2024-11-09 RX ADMIN — MORPHINE SULFATE 4 MG: 4 INJECTION, SOLUTION INTRAMUSCULAR; INTRAVENOUS at 09:37

## 2024-11-09 RX ADMIN — ANTI-FUNGAL POWDER MICONAZOLE NITRATE TALC FREE: 1.42 POWDER TOPICAL at 21:38

## 2024-11-09 RX ADMIN — SODIUM CHLORIDE 1000 ML: 9 INJECTION, SOLUTION INTRAVENOUS at 09:42

## 2024-11-09 RX ADMIN — MEROPENEM 1000 MG: 1 INJECTION INTRAVENOUS at 21:20

## 2024-11-09 RX ADMIN — SODIUM CHLORIDE, PRESERVATIVE FREE 10 ML: 5 INJECTION INTRAVENOUS at 20:09

## 2024-11-09 RX ADMIN — IOPAMIDOL 75 ML: 755 INJECTION, SOLUTION INTRAVENOUS at 11:44

## 2024-11-09 RX ADMIN — AMIODARONE HYDROCHLORIDE 100 MG: 200 TABLET ORAL at 20:50

## 2024-11-09 RX ADMIN — IOPAMIDOL 75 ML: 755 INJECTION, SOLUTION INTRAVENOUS at 01:09

## 2024-11-09 RX ADMIN — ACETAMINOPHEN 1000 MG: 500 TABLET ORAL at 20:50

## 2024-11-09 RX ADMIN — SODIUM CHLORIDE: 9 INJECTION, SOLUTION INTRAVENOUS at 15:37

## 2024-11-09 RX ADMIN — SODIUM CHLORIDE 500 ML: 0.9 INJECTION, SOLUTION INTRAVENOUS at 03:20

## 2024-11-09 RX ADMIN — MORPHINE SULFATE 4 MG: 4 INJECTION INTRAVENOUS at 09:37

## 2024-11-09 RX ADMIN — MEROPENEM 1000 MG: 1 INJECTION INTRAVENOUS at 12:47

## 2024-11-09 NOTE — ED PROVIDER NOTES
bpm  Axis: normal  Conduction: Other  ST Segments: no acute change  T Waves: no acute change  Q Waves: no acute change    Clinical Impression: Atrial Fibrillation with Current Spontaneous Rate Control.               Critical Care  None      (Please note that portions of this note were completed with a voice recognition program. Efforts were made to edit the dictations but occasionally words are mis-transcribed. Whenever words are used in this note in any gender, they shall be construed as though they were used in the gender appropriate to the circumstances; and whenever words are used in this note in the singular or plural form, they shall be construed as though they were used in the form appropriate to the circumstances.)    Peter Perez MD FAAEM [facility]  Attending Emergency Medicine Physician            Peter Perez MD  11/09/24 0953       Peter Perez MD  11/09/24 2244    
  tamsulosin (FLOMAX) 0.4 MG capsule Take 1 capsule by mouth 2 times daily 12/21/22   Melchor Guajardo MD   albuterol (PROVENTIL) (2.5 MG/3ML) 0.083% nebulizer solution Take 3 mLs by nebulization every 4-6 hours as needed for Wheezing    Jorden Joya MD   loperamide (IMODIUM) 2 MG capsule Take 1 capsule by mouth After each loose stool. *Not to exceed 16 gm/ 24 hours    Jorden Joya MD   amiodarone (CORDARONE) 200 MG tablet Take 0.5 tablets by mouth daily    Jorden Joya MD   acetaminophen (TYLENOL) 325 MG tablet Take 2 tablets by mouth every 4 hours as needed for Pain    Jorden Joya MD   potassium chloride (KLOR-CON M) 20 MEQ extended release tablet Take 1 tablet by mouth daily    Jorden Joya MD     REVIEW OF SYSTEMS       Review of Systems  See HPI     PHYSICAL EXAM      INITIAL VITALS:   BP (!) 188/119   Pulse 86   Temp 98.4 °F (36.9 °C) (Oral)   Resp 16   SpO2 90%     Physical Exam  Vitals reviewed.   Constitutional:       General: He is not in acute distress.     Appearance: He is ill-appearing. He is not toxic-appearing or diaphoretic.   HENT:      Head: Normocephalic.      Nose: No congestion or rhinorrhea.      Mouth/Throat:      Mouth: Mucous membranes are dry.      Comments: dry  Eyes:      Pupils: Pupils are equal, round, and reactive to light.   Pulmonary:      Effort: Pulmonary effort is normal. No respiratory distress.   Abdominal:      General: Bowel sounds are normal. There is no distension.      Tenderness: There is no abdominal tenderness. There is no guarding.   Musculoskeletal:      Comments: Contractures of the bilateral upper and lower extremities.    Skin:     General: Skin is warm and dry.      Capillary Refill: Capillary refill takes less than 2 seconds.   Neurological:      GCS: GCS eye subscore is 4. GCS verbal subscore is 4. GCS motor subscore is 6.      Comments: Following commands, answers yes and no questions. Limited movement of the

## 2024-11-09 NOTE — ED NOTES
Pt presents to ED as a transfer from [facility] with a C-6 fracture.  Pt resides at [facility] and sustained a fall 1 week ago.  Pt was found to have a UTI at [facility].  Pt has hx of Alzheimer's and upon arrival to [facility] is A&O x1. Pt knows his name and know's he's at a hospital but think's it's [facility].

## 2024-11-09 NOTE — ED NOTES
The following labs were labeled with appropriate pt sticker and tubed to lab:     [] Blue     [] Lavender   [] on ice  [] Green/yellow  [] Green/black [] on ice  [] Grey  [] on ice  [] Yellow  [] Red  [] Pink  [] Type/ Screen  [] ABG  [] VBG    [] COVID-19 swab    [] Rapid  [] PCR  [] Flu swab  [x] Resp Viral Panel     [] Urine Sample  [] Fecal Sample  [] Pelvic Cultures  [] Blood Cultures  [] X 2  [] STREP Cultures  [] Wound Cultures

## 2024-11-09 NOTE — ED PROVIDER NOTES
opacities and pleural effusions are again demonstrated without appreciable change. No new airspace disease identified in the interval.  No evidence for pneumothorax.     Similar appearance of vascular congestion, basilar atelectasis and small effusions.     CT HEAD WO CONTRAST    Result Date: 11/1/2024  EXAMINATION: CT OF THE HEAD WITHOUT CONTRAST  11/1/2024 12:49 am TECHNIQUE: CT of the head was performed without the administration of intravenous contrast. Automated exposure control, iterative reconstruction, and/or weight based adjustment of the mA/kV was utilized to reduce the radiation dose to as low as reasonably achievable. COMPARISON: Noncontrast CT scan of head on 09/28/2024. HISTORY: ORDERING SYSTEM PROVIDED HISTORY: Trauma TECHNOLOGIST PROVIDED HISTORY: Trauma Decision Support Exception - unselect if not a suspected or confirmed emergency medical condition->Emergency Medical Condition (MA) Reason for Exam: Fell out of bed, right eye bruise and nose bleed FINDINGS: BRAIN/VENTRICLES: There is no acute intracranial hemorrhage, mass effect or midline shift.  No abnormal extra-axial fluid collection.  The gray-white differentiation is maintained without evidence of an acute infarct.  There is no evidence of hydrocephalus.   Evidence of mild generalized cortical atrophy and mild central atrophy.  No evidence of intracranial mass, subdural or epidural hematoma or subdural hygroma.  Mild to moderate chronic microvascular ischemic/aging changes in the white matter redemonstrated. ORBITS: The visualized portion of the orbits demonstrate no acute abnormality. SINUSES: The visualized paranasal sinuses and mastoid air cells demonstrate no acute abnormality. SOFT TISSUES/SKULL:  No acute abnormality of the visualized skull or soft tissues.     No acute intracranial abnormality.  No intracranial hemorrhage.  No definable fracture in the calvarium or in base of skull.     CT FACIAL BONES WO CONTRAST    Result Date:  AUTO DIFFERENTIAL - Abnormal; Notable for the following components:       Result Value    Hematocrit 40.2 (*)     Neutrophils % 74 (*)     Lymphocytes % 18 (*)     Immature Granulocytes % 1 (*)     All other components within normal limits   BASIC METABOLIC PANEL - Abnormal; Notable for the following components:    Sodium 145 (*)     Chloride 109 (*)     Glucose 125 (*)     BUN/Creatinine Ratio 21 (*)     All other components within normal limits   TROPONIN - Abnormal; Notable for the following components:    Troponin, High Sensitivity 50 (*)     All other components within normal limits       CBC, basic metabolic panel and troponin level are unremarkable.  His troponin level normally runs higher than 50    EMERGENCY DEPARTMENT COURSE:   Vitals:    Vitals:    11/09/24 0002 11/09/24 0030 11/09/24 0128 11/09/24 0200   BP: (!) 142/88 (!) 149/77 (!) 142/90 (!) 153/81   Pulse:       Resp:       Temp:       SpO2: 98% 97% 97% 94%   Weight:         -------------------------  BP: (!) 153/81, Temp: 97.6 °F (36.4 °C), Pulse: 82, Respirations: 16    Orders Placed This Encounter   Medications    iopamidol (ISOVUE-370) 76 % injection 75 mL    sodium chloride 0.9 % bolus 500 mL       During the emergency department course, patient was put on the monitor which reveals atrial fibrillation with controlled ventricular response.  Twelve-lead EKG was obtained.  IV normal saline 500 mL bolus was given.  Patient has remained hemodynamically stable throughout the ED course.  It is difficult to evaluate his neurological function as he has history of dementia and does not follow commands.  Patient has acute nondisplaced fracture of the transverse process of C6.  There is also minor posttraumatic retropharyngeal edema.  Patient will benefit from transfer to level 1 trauma center to be evaluated by trauma surgery and neurosurgery.  I discussed the case with Dr. Hung Mars, emergency physician at [facility] and he kindly accepted the

## 2024-11-09 NOTE — ED NOTES
Labeled blood specimens sent to lab via tube system.    [] Green/yellow  [] Lavender   [] on ice   [] Blue   [x] Green/black [] on ice  [] Yellow  [] on ice  [] Red  [] Pink  [] Blood Cultures  [] x1 [] X2    [] Ped Green  [] on ice  [] Ped Lavender  [] on ice    [] Ped Yellow  [] on ice  [] Ped Red

## 2024-11-09 NOTE — ED NOTES
ED to inpatient nurses report      Chief Complaint:  Chief Complaint   Patient presents with    Fall    Altered Mental Status     Present to ED from: [facility]    MOA:     LOC: alert to only name  Mobility: Requires assistance * 2  Oxygen Baseline: 98%    Current needs required: 2L NC   Pending ED orders: none  Present condition: stable    Why did the patient come to the ED? Pt presents to ED as a transfer from [facility] with a C-6 fracture.  Pt resides at [facility] and sustained a fall 1 week ago.  Pt was found to have a UTI at [facility].  Pt has hx of Alzheimer's and upon arrival to [facility] is A&O x1. Pt knows his name and know's he's at a hospital but think's it's [facility].   What is the plan? admit  Any procedures or intervention occur? CT scan, trauma consult, labs, meds, MRI  Any safety concerns??    Mental Status:  Level of Consciousness: Responds to voice (1)    Psych Assessment:   Psychosocial  Psychosocial (WDL): Exceptions to WDL  Vital signs   Vitals:    11/09/24 1130 11/09/24 1152 11/09/24 1200 11/09/24 1246   BP: (!) 152/94 (!) 158/104 (!) 154/94 (!) 184/100   Pulse: (!) 103 (!) 105 89 93   Resp:       Temp:       TempSrc:       SpO2: 94% 94% 97% 98%        Vitals:  Patient Vitals for the past 24 hrs:   BP Temp Temp src Pulse Resp SpO2   11/09/24 1246 (!) 184/100 -- -- 93 -- 98 %   11/09/24 1200 (!) 154/94 -- -- 89 -- 97 %   11/09/24 1152 (!) 158/104 -- -- (!) 105 -- 94 %   11/09/24 1130 (!) 152/94 -- -- (!) 103 -- 94 %   11/09/24 1115 (!) 179/103 -- -- 98 -- 98 %   11/09/24 1100 (!) 158/107 -- -- 96 -- 98 %   11/09/24 1015 (!) 156/92 -- -- 81 -- 97 %   11/09/24 0950 (!) 142/96 -- -- (!) 104 17 99 %   11/09/24 0930 (!) 188/106 -- -- (!) 119 21 95 %   11/09/24 0923 -- 97.7 °F (36.5 °C) Axillary -- -- --   11/09/24 0922 -- 96.9 °F (36.1 °C) Axillary -- -- --   11/09/24 0920 (!) 178/106 -- -- 91 18 94 %      Visit Vitals  BP (!) 184/100   Pulse 93   Temp 97.7 °F (36.5 °C) (Axillary)

## 2024-11-09 NOTE — ED NOTES
Writer called [facility] and spoke to JACQUELIN Gabriel regarding pt's baseline.  Per Harvey, Pt is able to feed himself, and walk without issue before fall on 11/1/24. Pt also A&O x3 at baseline.  Pt received last dose of antibiotics at 2pm yesterday.  Dr. Jaimes notified.

## 2024-11-10 ENCOUNTER — APPOINTMENT (OUTPATIENT)
Dept: GENERAL RADIOLOGY | Age: 89
DRG: 698 | End: 2024-11-10
Payer: MEDICARE

## 2024-11-10 PROBLEM — G93.41 METABOLIC ENCEPHALOPATHY: Status: ACTIVE | Noted: 2024-11-10

## 2024-11-10 LAB
ANION GAP SERPL CALCULATED.3IONS-SCNC: 9 MMOL/L (ref 9–16)
BASOPHILS # BLD: 0.06 K/UL (ref 0–0.2)
BASOPHILS NFR BLD: 1 % (ref 0–2)
BUN SERPL-MCNC: 16 MG/DL (ref 8–23)
CALCIUM SERPL-MCNC: 8.4 MG/DL (ref 8.6–10.4)
CHLORIDE SERPL-SCNC: 114 MMOL/L (ref 98–107)
CK SERPL-CCNC: 335 U/L (ref 39–308)
CO2 SERPL-SCNC: 25 MMOL/L (ref 20–31)
CREAT SERPL-MCNC: 0.9 MG/DL (ref 0.7–1.2)
EOSINOPHIL # BLD: 0.19 K/UL (ref 0–0.44)
EOSINOPHILS RELATIVE PERCENT: 2 % (ref 1–4)
ERYTHROCYTE [DISTWIDTH] IN BLOOD BY AUTOMATED COUNT: 14.3 % (ref 11.8–14.4)
GFR, ESTIMATED: 82 ML/MIN/1.73M2
GLUCOSE SERPL-MCNC: 114 MG/DL (ref 74–99)
HCT VFR BLD AUTO: 38.6 % (ref 40.7–50.3)
HGB BLD-MCNC: 12.4 G/DL (ref 13–17)
IMM GRANULOCYTES # BLD AUTO: 0.07 K/UL (ref 0–0.3)
IMM GRANULOCYTES NFR BLD: 1 %
LYMPHOCYTES NFR BLD: 1.28 K/UL (ref 1.1–3.7)
LYMPHOCYTES RELATIVE PERCENT: 13 % (ref 24–43)
MAGNESIUM SERPL-MCNC: 2.2 MG/DL (ref 1.6–2.4)
MCH RBC QN AUTO: 29.4 PG (ref 25.2–33.5)
MCHC RBC AUTO-ENTMCNC: 32.1 G/DL (ref 28.4–34.8)
MCV RBC AUTO: 91.5 FL (ref 82.6–102.9)
MONOCYTES NFR BLD: 0.5 K/UL (ref 0.1–1.2)
MONOCYTES NFR BLD: 5 % (ref 3–12)
NEUTROPHILS NFR BLD: 78 % (ref 36–65)
NEUTS SEG NFR BLD: 8.01 K/UL (ref 1.5–8.1)
NRBC BLD-RTO: 0 PER 100 WBC
PHOSPHATE SERPL-MCNC: 3.2 MG/DL (ref 2.5–4.5)
PLATELET # BLD AUTO: 201 K/UL (ref 138–453)
PMV BLD AUTO: 10.2 FL (ref 8.1–13.5)
POTASSIUM SERPL-SCNC: 3.6 MMOL/L (ref 3.7–5.3)
RBC # BLD AUTO: 4.22 M/UL (ref 4.21–5.77)
SODIUM SERPL-SCNC: 148 MMOL/L (ref 136–145)
WBC OTHER # BLD: 10.1 K/UL (ref 3.5–11.3)

## 2024-11-10 PROCEDURE — 82550 ASSAY OF CK (CPK): CPT

## 2024-11-10 PROCEDURE — 6360000002 HC RX W HCPCS

## 2024-11-10 PROCEDURE — 2580000003 HC RX 258

## 2024-11-10 PROCEDURE — 51702 INSERT TEMP BLADDER CATH: CPT

## 2024-11-10 PROCEDURE — 84100 ASSAY OF PHOSPHORUS: CPT

## 2024-11-10 PROCEDURE — 99232 SBSQ HOSP IP/OBS MODERATE 35: CPT | Performed by: PSYCHIATRY & NEUROLOGY

## 2024-11-10 PROCEDURE — 83735 ASSAY OF MAGNESIUM: CPT

## 2024-11-10 PROCEDURE — APPSS30 APP SPLIT SHARED TIME 16-30 MINUTES: Performed by: NURSE PRACTITIONER

## 2024-11-10 PROCEDURE — 85025 COMPLETE CBC W/AUTO DIFF WBC: CPT

## 2024-11-10 PROCEDURE — 2500000003 HC RX 250 WO HCPCS

## 2024-11-10 PROCEDURE — 99233 SBSQ HOSP IP/OBS HIGH 50: CPT | Performed by: NEUROLOGICAL SURGERY

## 2024-11-10 PROCEDURE — 6370000000 HC RX 637 (ALT 250 FOR IP)

## 2024-11-10 PROCEDURE — 36415 COLL VENOUS BLD VENIPUNCTURE: CPT

## 2024-11-10 PROCEDURE — 2000000000 HC ICU R&B

## 2024-11-10 PROCEDURE — 71045 X-RAY EXAM CHEST 1 VIEW: CPT

## 2024-11-10 PROCEDURE — 6370000000 HC RX 637 (ALT 250 FOR IP): Performed by: STUDENT IN AN ORGANIZED HEALTH CARE EDUCATION/TRAINING PROGRAM

## 2024-11-10 PROCEDURE — 80048 BASIC METABOLIC PNL TOTAL CA: CPT

## 2024-11-10 PROCEDURE — 99232 SBSQ HOSP IP/OBS MODERATE 35: CPT | Performed by: INTERNAL MEDICINE

## 2024-11-10 RX ORDER — OXYCODONE HYDROCHLORIDE 5 MG/1
2.5 TABLET ORAL EVERY 4 HOURS PRN
Status: DISCONTINUED | OUTPATIENT
Start: 2024-11-10 | End: 2024-11-13 | Stop reason: HOSPADM

## 2024-11-10 RX ORDER — SODIUM CHLORIDE, SODIUM LACTATE, POTASSIUM CHLORIDE, CALCIUM CHLORIDE 600; 310; 30; 20 MG/100ML; MG/100ML; MG/100ML; MG/100ML
INJECTION, SOLUTION INTRAVENOUS CONTINUOUS
Status: DISCONTINUED | OUTPATIENT
Start: 2024-11-10 | End: 2024-11-12

## 2024-11-10 RX ORDER — POTASSIUM CHLORIDE 7.45 MG/ML
10 INJECTION INTRAVENOUS
Status: DISCONTINUED | OUTPATIENT
Start: 2024-11-10 | End: 2024-11-10

## 2024-11-10 RX ADMIN — POTASSIUM CHLORIDE 10 MEQ: 7.45 INJECTION INTRAVENOUS at 06:13

## 2024-11-10 RX ADMIN — SODIUM CHLORIDE, PRESERVATIVE FREE 10 ML: 5 INJECTION INTRAVENOUS at 22:10

## 2024-11-10 RX ADMIN — TAMSULOSIN HYDROCHLORIDE 0.4 MG: 0.4 CAPSULE ORAL at 08:25

## 2024-11-10 RX ADMIN — SODIUM CHLORIDE, POTASSIUM CHLORIDE, SODIUM LACTATE AND CALCIUM CHLORIDE: 600; 310; 30; 20 INJECTION, SOLUTION INTRAVENOUS at 00:37

## 2024-11-10 RX ADMIN — ACETAMINOPHEN 1000 MG: 500 TABLET ORAL at 13:27

## 2024-11-10 RX ADMIN — MEROPENEM 1000 MG: 1 INJECTION INTRAVENOUS at 11:01

## 2024-11-10 RX ADMIN — FAMOTIDINE 10 MG: 10 INJECTION, SOLUTION INTRAVENOUS at 08:25

## 2024-11-10 RX ADMIN — AMIODARONE HYDROCHLORIDE 100 MG: 200 TABLET ORAL at 08:26

## 2024-11-10 RX ADMIN — ACETAMINOPHEN 1000 MG: 500 TABLET ORAL at 22:10

## 2024-11-10 RX ADMIN — ACETAMINOPHEN 1000 MG: 500 TABLET ORAL at 06:13

## 2024-11-10 RX ADMIN — ATORVASTATIN CALCIUM 10 MG: 20 TABLET, FILM COATED ORAL at 08:25

## 2024-11-10 RX ADMIN — ANTI-FUNGAL POWDER MICONAZOLE NITRATE TALC FREE: 1.42 POWDER TOPICAL at 22:10

## 2024-11-10 RX ADMIN — POLYETHYLENE GLYCOL 3350 17 G: 17 POWDER, FOR SOLUTION ORAL at 08:25

## 2024-11-10 RX ADMIN — SENNOSIDES AND DOCUSATE SODIUM 1 TABLET: 50; 8.6 TABLET ORAL at 22:10

## 2024-11-10 RX ADMIN — ANTI-FUNGAL POWDER MICONAZOLE NITRATE TALC FREE: 1.42 POWDER TOPICAL at 08:30

## 2024-11-10 RX ADMIN — SENNOSIDES AND DOCUSATE SODIUM 1 TABLET: 50; 8.6 TABLET ORAL at 08:25

## 2024-11-10 RX ADMIN — LIDOCAINE HYDROCHLORIDE: 20 JELLY TOPICAL at 00:15

## 2024-11-10 RX ADMIN — POTASSIUM CHLORIDE 10 MEQ: 7.45 INJECTION INTRAVENOUS at 09:32

## 2024-11-10 RX ADMIN — TAMSULOSIN HYDROCHLORIDE 0.4 MG: 0.4 CAPSULE ORAL at 22:10

## 2024-11-10 RX ADMIN — OXYCODONE 2.5 MG: 5 TABLET ORAL at 23:57

## 2024-11-10 RX ADMIN — MEROPENEM 1000 MG: 1 INJECTION INTRAVENOUS at 03:17

## 2024-11-10 RX ADMIN — SODIUM CHLORIDE, POTASSIUM CHLORIDE, SODIUM LACTATE AND CALCIUM CHLORIDE: 600; 310; 30; 20 INJECTION, SOLUTION INTRAVENOUS at 19:36

## 2024-11-10 RX ADMIN — LEVOTHYROXINE SODIUM 88 MCG: 88 TABLET ORAL at 06:13

## 2024-11-10 RX ADMIN — MEROPENEM 1000 MG: 1 INJECTION INTRAVENOUS at 18:50

## 2024-11-10 RX ADMIN — POTASSIUM CHLORIDE 10 MEQ: 7.45 INJECTION INTRAVENOUS at 05:21

## 2024-11-10 ASSESSMENT — PAIN SCALES - PAIN ASSESSMENT IN ADVANCED DEMENTIA (PAINAD)
FACIALEXPRESSION: SMILING OR INEXPRESSIVE
FACIALEXPRESSION: SMILING OR INEXPRESSIVE
TOTALSCORE: 4
BREATHING: NORMAL
TOTALSCORE: 0
FACIALEXPRESSION: SMILING OR INEXPRESSIVE
FACIALEXPRESSION: SMILING OR INEXPRESSIVE
BODYLANGUAGE: RELAXED
CONSOLABILITY: NO NEED TO CONSOLE
BREATHING: NORMAL
FACIALEXPRESSION: SAD, FRIGHTENED, FROWN
BREATHING: NORMAL
BODYLANGUAGE: RELAXED
BREATHING: NORMAL
TOTALSCORE: 0
TOTALSCORE: 4
TOTALSCORE: 0
NEGVOCALIZATION: REPEATED TROUBLED CALLING OUT, LOUD MOANING/GROANING, CRYING
BODYLANGUAGE: RELAXED
BREATHING: NORMAL
BODYLANGUAGE: RELAXED
FACIALEXPRESSION: SMILING OR INEXPRESSIVE
TOTALSCORE: 0
CONSOLABILITY: NO NEED TO CONSOLE
BODYLANGUAGE: RELAXED
CONSOLABILITY: NO NEED TO CONSOLE
CONSOLABILITY: NO NEED TO CONSOLE
FACIALEXPRESSION: SMILING OR INEXPRESSIVE
FACIALEXPRESSION: SAD, FRIGHTENED, FROWN
BREATHING: NORMAL
BREATHING: NORMAL
FACIALEXPRESSION: SMILING OR INEXPRESSIVE
BREATHING: NORMAL
BREATHING: NORMAL
BODYLANGUAGE: RELAXED
FACIALEXPRESSION: SMILING OR INEXPRESSIVE
CONSOLABILITY: DISTRACTED OR REASSURED BY VOICE/TOUCH
TOTALSCORE: 0
NEGVOCALIZATION: REPEATED TROUBLED CALLING OUT, LOUD MOANING/GROANING, CRYING
BREATHING: NORMAL
CONSOLABILITY: NO NEED TO CONSOLE
BODYLANGUAGE: RELAXED
CONSOLABILITY: NO NEED TO CONSOLE
CONSOLABILITY: NO NEED TO CONSOLE
CONSOLABILITY: DISTRACTED OR REASSURED BY VOICE/TOUCH
BREATHING: NORMAL
BODYLANGUAGE: RELAXED
CONSOLABILITY: NO NEED TO CONSOLE
TOTALSCORE: 0
FACIALEXPRESSION: SMILING OR INEXPRESSIVE
BREATHING: NORMAL
CONSOLABILITY: NO NEED TO CONSOLE
BODYLANGUAGE: RELAXED
FACIALEXPRESSION: SMILING OR INEXPRESSIVE
CONSOLABILITY: NO NEED TO CONSOLE
BODYLANGUAGE: RELAXED

## 2024-11-10 ASSESSMENT — PAIN SCALES - WONG BAKER

## 2024-11-10 NOTE — PROCEDURES
PROCEDURE NOTE  Date: 11/9/2024   Name: Yeyo Bravo  YOB: 1934    Procedures        Date: 11/9/2024  Referring physician: Dr. Tan    Indication  Patient aged 89 y with encephalopathy. EEG done to assess for epileptiform activity.    Introduction  This routine 30-minute EEG was recorded using the International 10-20 System on a NeoVista workstation at 256 samples/s. Automated spike and seizure detection algorithms were applied.    Description  During the maximal alert state, a poorly-regulated, symmetric, and reactive 5-6 Hz posterior dominant rhythm was seen. No consistent focal slowing or interhemispheric asymmetry was noted. Stage I and stage II sleep were observed. There were no interictal epileptiform discharges or electrographic seizures.    Activations  Hyperventilation was not performed. Intermittent photic stimulation was performed and demonstrated no posterior driving response.    Impression  Abnormal awake EEG. The slowing mentioned above suggests mild non specific encephalopathy.     No epileptiform discharges were identified. Please note the absence of such activity on this record cannot conclusively rule out an epileptic disorder. If such is still clinically suspected, a repeat study with sleep deprivation and/or prolonged sampling may be helpful.    Charles Nicole MD  Epilepsy Board Certified.  Neurology Board Certified.    Electronically Signed

## 2024-11-10 NOTE — H&P
Please see earlier consult for  admission H&P.     Plan of Care per attending note:     88 yo M with mult comorbidities with cervical spine injury .Original plan was to admit to medicine after trauma workup complete however after neurosurgery evaluation it seems neuro deficit is related to hyperextension injury with incomplete quadriplegia.   Will admit patient to TICU.   MAP pushes >85.   Neuro checks.   Palliative consult.       
matter. Generalized cortical atrophy. Intracranial atherosclerosis. ORBITS: Orbits appear unremarkable.  No acute abnormality. PARANASAL SINUSES: No acute air-fluid level seen in the visualized paranasal sinuses or mastoid air cells. SOFT TISSUE/CALVARIUM: The bony calvarium appears intact without fracture. No large soft tissue hematoma is seen. CT CERVICAL SPINE: BONES/ALIGNMENT: No occipital condyle fracture is identified.  The C1 ring appears intact.  At the level of T6, best seen on sagittal imaging, there is a subtle suspected transverse foramen fracture on image 14 of series 450, also seen on and around axial image 48 of series 2.  No other definite cervical spine fracture is identified. DEGENERATIVE CHANGES: Multilevel degenerative disc disease and facet arthropathy is identified throughout the cervical spine.  There is osseous fusion at C6, C7 and T1 across the disc space.  Degenerative changes are also noted at the C1-C2 articulation. SOFT TISSUES: No apical pneumothorax.  No acute soft tissue abnormality. Carotid calcifications are identified.     1. No acute intracranial abnormality. 2. Suspected subtle transverse foramen fracture at T6. Recommend further evaluation with CTA of the neck. 3. Multilevel degenerative changes in the cervical spine.         Abel Cai MD  11/9/24, 8:48 PM

## 2024-11-11 PROBLEM — Z51.5 ENCOUNTER FOR PALLIATIVE CARE: Status: ACTIVE | Noted: 2024-11-11

## 2024-11-11 LAB
ANION GAP SERPL CALCULATED.3IONS-SCNC: 12 MMOL/L (ref 9–16)
BASOPHILS # BLD: 0.06 K/UL (ref 0–0.2)
BASOPHILS NFR BLD: 1 % (ref 0–2)
BUN SERPL-MCNC: 16 MG/DL (ref 8–23)
CALCIUM SERPL-MCNC: 7.9 MG/DL (ref 8.6–10.4)
CHLORIDE SERPL-SCNC: 111 MMOL/L (ref 98–107)
CO2 SERPL-SCNC: 23 MMOL/L (ref 20–31)
CREAT SERPL-MCNC: 0.9 MG/DL (ref 0.7–1.2)
EKG ATRIAL RATE: 90 BPM
EKG Q-T INTERVAL: 384 MS
EKG QRS DURATION: 108 MS
EKG QTC CALCULATION (BAZETT): 472 MS
EKG R AXIS: -59 DEGREES
EKG T AXIS: 123 DEGREES
EKG VENTRICULAR RATE: 91 BPM
EOSINOPHIL # BLD: 0.21 K/UL (ref 0–0.44)
EOSINOPHILS RELATIVE PERCENT: 2 % (ref 1–4)
ERYTHROCYTE [DISTWIDTH] IN BLOOD BY AUTOMATED COUNT: 14.1 % (ref 11.8–14.4)
GFR, ESTIMATED: 82 ML/MIN/1.73M2
GLUCOSE SERPL-MCNC: 82 MG/DL (ref 74–99)
HCT VFR BLD AUTO: 40.8 % (ref 40.7–50.3)
HGB BLD-MCNC: 12.8 G/DL (ref 13–17)
IMM GRANULOCYTES # BLD AUTO: 0.08 K/UL (ref 0–0.3)
IMM GRANULOCYTES NFR BLD: 1 %
LYMPHOCYTES NFR BLD: 1.46 K/UL (ref 1.1–3.7)
LYMPHOCYTES RELATIVE PERCENT: 14 % (ref 24–43)
MAGNESIUM SERPL-MCNC: 2.3 MG/DL (ref 1.6–2.4)
MCH RBC QN AUTO: 29 PG (ref 25.2–33.5)
MCHC RBC AUTO-ENTMCNC: 31.4 G/DL (ref 28.4–34.8)
MCV RBC AUTO: 92.5 FL (ref 82.6–102.9)
MONOCYTES NFR BLD: 0.47 K/UL (ref 0.1–1.2)
MONOCYTES NFR BLD: 5 % (ref 3–12)
NEUTROPHILS NFR BLD: 77 % (ref 36–65)
NEUTS SEG NFR BLD: 7.98 K/UL (ref 1.5–8.1)
NRBC BLD-RTO: 0 PER 100 WBC
PHOSPHATE SERPL-MCNC: 2.7 MG/DL (ref 2.5–4.5)
PLATELET # BLD AUTO: 209 K/UL (ref 138–453)
PMV BLD AUTO: 11.3 FL (ref 8.1–13.5)
POTASSIUM SERPL-SCNC: 3.4 MMOL/L (ref 3.7–5.3)
RBC # BLD AUTO: 4.41 M/UL (ref 4.21–5.77)
SODIUM SERPL-SCNC: 146 MMOL/L (ref 136–145)
WBC OTHER # BLD: 10.3 K/UL (ref 3.5–11.3)

## 2024-11-11 PROCEDURE — 6360000002 HC RX W HCPCS: Performed by: NURSE PRACTITIONER

## 2024-11-11 PROCEDURE — 6370000000 HC RX 637 (ALT 250 FOR IP): Performed by: STUDENT IN AN ORGANIZED HEALTH CARE EDUCATION/TRAINING PROGRAM

## 2024-11-11 PROCEDURE — 97162 PT EVAL MOD COMPLEX 30 MIN: CPT

## 2024-11-11 PROCEDURE — 85025 COMPLETE CBC W/AUTO DIFF WBC: CPT

## 2024-11-11 PROCEDURE — 36415 COLL VENOUS BLD VENIPUNCTURE: CPT

## 2024-11-11 PROCEDURE — 6360000002 HC RX W HCPCS

## 2024-11-11 PROCEDURE — 94761 N-INVAS EAR/PLS OXIMETRY MLT: CPT

## 2024-11-11 PROCEDURE — 2580000003 HC RX 258

## 2024-11-11 PROCEDURE — 93010 ELECTROCARDIOGRAM REPORT: CPT | Performed by: INTERNAL MEDICINE

## 2024-11-11 PROCEDURE — 2500000003 HC RX 250 WO HCPCS

## 2024-11-11 PROCEDURE — 97530 THERAPEUTIC ACTIVITIES: CPT

## 2024-11-11 PROCEDURE — 80048 BASIC METABOLIC PNL TOTAL CA: CPT

## 2024-11-11 PROCEDURE — 92610 EVALUATE SWALLOWING FUNCTION: CPT

## 2024-11-11 PROCEDURE — 99223 1ST HOSP IP/OBS HIGH 75: CPT | Performed by: INTERNAL MEDICINE

## 2024-11-11 PROCEDURE — 2700000000 HC OXYGEN THERAPY PER DAY

## 2024-11-11 PROCEDURE — 97535 SELF CARE MNGMENT TRAINING: CPT

## 2024-11-11 PROCEDURE — 6370000000 HC RX 637 (ALT 250 FOR IP)

## 2024-11-11 PROCEDURE — 97167 OT EVAL HIGH COMPLEX 60 MIN: CPT

## 2024-11-11 PROCEDURE — 83735 ASSAY OF MAGNESIUM: CPT

## 2024-11-11 PROCEDURE — 84100 ASSAY OF PHOSPHORUS: CPT

## 2024-11-11 PROCEDURE — 92523 SPEECH SOUND LANG COMPREHEN: CPT

## 2024-11-11 PROCEDURE — 99232 SBSQ HOSP IP/OBS MODERATE 35: CPT | Performed by: INTERNAL MEDICINE

## 2024-11-11 PROCEDURE — 2060000000 HC ICU INTERMEDIATE R&B

## 2024-11-11 RX ORDER — POTASSIUM CHLORIDE 7.45 MG/ML
10 INJECTION INTRAVENOUS
Status: COMPLETED | OUTPATIENT
Start: 2024-11-11 | End: 2024-11-11

## 2024-11-11 RX ORDER — HEPARIN SODIUM 5000 [USP'U]/ML
5000 INJECTION, SOLUTION INTRAVENOUS; SUBCUTANEOUS EVERY 8 HOURS SCHEDULED
Status: DISCONTINUED | OUTPATIENT
Start: 2024-11-11 | End: 2024-11-13 | Stop reason: HOSPADM

## 2024-11-11 RX ADMIN — LEVOTHYROXINE SODIUM 88 MCG: 88 TABLET ORAL at 06:00

## 2024-11-11 RX ADMIN — TAMSULOSIN HYDROCHLORIDE 0.4 MG: 0.4 CAPSULE ORAL at 07:47

## 2024-11-11 RX ADMIN — POTASSIUM CHLORIDE 10 MEQ: 7.45 INJECTION INTRAVENOUS at 09:16

## 2024-11-11 RX ADMIN — ACETAMINOPHEN 1000 MG: 500 TABLET ORAL at 06:00

## 2024-11-11 RX ADMIN — HEPARIN SODIUM 5000 UNITS: 5000 INJECTION INTRAVENOUS; SUBCUTANEOUS at 20:37

## 2024-11-11 RX ADMIN — FAMOTIDINE 10 MG: 10 INJECTION, SOLUTION INTRAVENOUS at 07:47

## 2024-11-11 RX ADMIN — POTASSIUM CHLORIDE 10 MEQ: 7.45 INJECTION INTRAVENOUS at 10:42

## 2024-11-11 RX ADMIN — ATORVASTATIN CALCIUM 10 MG: 20 TABLET, FILM COATED ORAL at 07:47

## 2024-11-11 RX ADMIN — SODIUM CHLORIDE, PRESERVATIVE FREE 10 ML: 5 INJECTION INTRAVENOUS at 20:37

## 2024-11-11 RX ADMIN — MEROPENEM 1000 MG: 1 INJECTION INTRAVENOUS at 03:02

## 2024-11-11 RX ADMIN — OXYCODONE 2.5 MG: 5 TABLET ORAL at 20:36

## 2024-11-11 RX ADMIN — MEROPENEM 1000 MG: 1 INJECTION INTRAVENOUS at 12:04

## 2024-11-11 RX ADMIN — ACETAMINOPHEN 1000 MG: 500 TABLET ORAL at 20:37

## 2024-11-11 RX ADMIN — OXYCODONE 2.5 MG: 5 TABLET ORAL at 13:17

## 2024-11-11 RX ADMIN — SODIUM CHLORIDE, POTASSIUM CHLORIDE, SODIUM LACTATE AND CALCIUM CHLORIDE: 600; 310; 30; 20 INJECTION, SOLUTION INTRAVENOUS at 16:25

## 2024-11-11 RX ADMIN — HEPARIN SODIUM 5000 UNITS: 5000 INJECTION INTRAVENOUS; SUBCUTANEOUS at 13:17

## 2024-11-11 RX ADMIN — TAMSULOSIN HYDROCHLORIDE 0.4 MG: 0.4 CAPSULE ORAL at 20:37

## 2024-11-11 RX ADMIN — SODIUM CHLORIDE, POTASSIUM CHLORIDE, SODIUM LACTATE AND CALCIUM CHLORIDE: 600; 310; 30; 20 INJECTION, SOLUTION INTRAVENOUS at 23:47

## 2024-11-11 RX ADMIN — AMIODARONE HYDROCHLORIDE 100 MG: 200 TABLET ORAL at 07:48

## 2024-11-11 RX ADMIN — ACETAMINOPHEN 1000 MG: 500 TABLET ORAL at 13:17

## 2024-11-11 RX ADMIN — OXYCODONE 2.5 MG: 5 TABLET ORAL at 07:47

## 2024-11-11 RX ADMIN — ANTI-FUNGAL POWDER MICONAZOLE NITRATE TALC FREE: 1.42 POWDER TOPICAL at 08:00

## 2024-11-11 RX ADMIN — POTASSIUM CHLORIDE 10 MEQ: 7.45 INJECTION INTRAVENOUS at 07:43

## 2024-11-11 RX ADMIN — SODIUM CHLORIDE, PRESERVATIVE FREE 10 ML: 5 INJECTION INTRAVENOUS at 20:38

## 2024-11-11 RX ADMIN — ANTI-FUNGAL POWDER MICONAZOLE NITRATE TALC FREE: 1.42 POWDER TOPICAL at 20:37

## 2024-11-11 ASSESSMENT — PAIN SCALES - PAIN ASSESSMENT IN ADVANCED DEMENTIA (PAINAD)
BODYLANGUAGE: RIGID, FISTS CLENCHED, KNEES UP, PUSHING/PULLING AWAY, STRIKES OUT
FACIALEXPRESSION: FACIAL GRIMACING
CONSOLABILITY: NO NEED TO CONSOLE
CONSOLABILITY: DISTRACTED OR REASSURED BY VOICE/TOUCH
NEGVOCALIZATION: OCCASIONAL MOAN/GROAN, LOW SPEECH, NEGATIVE/DISAPPROVING QUALITY
FACIALEXPRESSION: FACIAL GRIMACING
NEGVOCALIZATION: REPEATED TROUBLED CALLING OUT, LOUD MOANING/GROANING, CRYING
BODYLANGUAGE: RELAXED
NEGVOCALIZATION: REPEATED TROUBLED CALLING OUT, LOUD MOANING/GROANING, CRYING
BREATHING: NOISY LABORED BREATHING, LONG PERIODS HYPERVENTILATION, CHEYNE-STOKES RESPIRATIONS
CONSOLABILITY: NO NEED TO CONSOLE
TOTALSCORE: 1
TOTALSCORE: 9
BREATHING: NOISY LABORED BREATHING, LONG PERIODS HYPERVENTILATION, CHEYNE-STOKES RESPIRATIONS
CONSOLABILITY: DISTRACTED OR REASSURED BY VOICE/TOUCH
NEGVOCALIZATION: REPEATED TROUBLED CALLING OUT, LOUD MOANING/GROANING, CRYING
NEGVOCALIZATION: REPEATED TROUBLED CALLING OUT, LOUD MOANING/GROANING, CRYING
FACIALEXPRESSION: FACIAL GRIMACING
FACIALEXPRESSION: SMILING OR INEXPRESSIVE
FACIALEXPRESSION: FACIAL GRIMACING
TOTALSCORE: 9
NEGVOCALIZATION: REPEATED TROUBLED CALLING OUT, LOUD MOANING/GROANING, CRYING
BREATHING: NOISY LABORED BREATHING, LONG PERIODS HYPERVENTILATION, CHEYNE-STOKES RESPIRATIONS
CONSOLABILITY: DISTRACTED OR REASSURED BY VOICE/TOUCH
TOTALSCORE: 9
NEGVOCALIZATION: OCCASIONAL MOAN/GROAN, LOW SPEECH, NEGATIVE/DISAPPROVING QUALITY
FACIALEXPRESSION: SAD, FRIGHTENED, FROWN
BREATHING: NOISY LABORED BREATHING, LONG PERIODS HYPERVENTILATION, CHEYNE-STOKES RESPIRATIONS
CONSOLABILITY: NO NEED TO CONSOLE
TOTALSCORE: 9
BODYLANGUAGE: RIGID, FISTS CLENCHED, KNEES UP, PUSHING/PULLING AWAY, STRIKES OUT
TOTALSCORE: 9
NEGVOCALIZATION: REPEATED TROUBLED CALLING OUT, LOUD MOANING/GROANING, CRYING
BREATHING: NOISY LABORED BREATHING, LONG PERIODS HYPERVENTILATION, CHEYNE-STOKES RESPIRATIONS
BREATHING: NOISY LABORED BREATHING, LONG PERIODS HYPERVENTILATION, CHEYNE-STOKES RESPIRATIONS
FACIALEXPRESSION: SMILING OR INEXPRESSIVE
BODYLANGUAGE: RELAXED
BODYLANGUAGE: RIGID, FISTS CLENCHED, KNEES UP, PUSHING/PULLING AWAY, STRIKES OUT
TOTALSCORE: 9
CONSOLABILITY: DISTRACTED OR REASSURED BY VOICE/TOUCH
TOTALSCORE: 4
TOTALSCORE: 4
CONSOLABILITY: DISTRACTED OR REASSURED BY VOICE/TOUCH
CONSOLABILITY: DISTRACTED OR REASSURED BY VOICE/TOUCH
BODYLANGUAGE: RIGID, FISTS CLENCHED, KNEES UP, PUSHING/PULLING AWAY, STRIKES OUT
BREATHING: NOISY LABORED BREATHING, LONG PERIODS HYPERVENTILATION, CHEYNE-STOKES RESPIRATIONS
NEGVOCALIZATION: REPEATED TROUBLED CALLING OUT, LOUD MOANING/GROANING, CRYING
FACIALEXPRESSION: FACIAL GRIMACING
BREATHING: NORMAL
TOTALSCORE: 9
BODYLANGUAGE: RELAXED
BREATHING: NORMAL
TOTALSCORE: 9
NEGVOCALIZATION: REPEATED TROUBLED CALLING OUT, LOUD MOANING/GROANING, CRYING
CONSOLABILITY: DISTRACTED OR REASSURED BY VOICE/TOUCH
NEGVOCALIZATION: REPEATED TROUBLED CALLING OUT, LOUD MOANING/GROANING, CRYING
BODYLANGUAGE: RELAXED
BODYLANGUAGE: RIGID, FISTS CLENCHED, KNEES UP, PUSHING/PULLING AWAY, STRIKES OUT
TOTALSCORE: 4
NEGVOCALIZATION: REPEATED TROUBLED CALLING OUT, LOUD MOANING/GROANING, CRYING
FACIALEXPRESSION: FACIAL GRIMACING
CONSOLABILITY: DISTRACTED OR REASSURED BY VOICE/TOUCH
BODYLANGUAGE: RIGID, FISTS CLENCHED, KNEES UP, PUSHING/PULLING AWAY, STRIKES OUT
NEGVOCALIZATION: REPEATED TROUBLED CALLING OUT, LOUD MOANING/GROANING, CRYING
TOTALSCORE: 2
BREATHING: NORMAL
TOTALSCORE: 1
BODYLANGUAGE: RIGID, FISTS CLENCHED, KNEES UP, PUSHING/PULLING AWAY, STRIKES OUT
NEGVOCALIZATION: OCCASIONAL MOAN/GROAN, LOW SPEECH, NEGATIVE/DISAPPROVING QUALITY
BREATHING: NOISY LABORED BREATHING, LONG PERIODS HYPERVENTILATION, CHEYNE-STOKES RESPIRATIONS
TOTALSCORE: 1
CONSOLABILITY: DISTRACTED OR REASSURED BY VOICE/TOUCH
CONSOLABILITY: DISTRACTED OR REASSURED BY VOICE/TOUCH
CONSOLABILITY: NO NEED TO CONSOLE
BREATHING: OCCASIONAL LABORED BREATHING, SHORT PERIOD OF HYPERVENTILATION
NEGVOCALIZATION: REPEATED TROUBLED CALLING OUT, LOUD MOANING/GROANING, CRYING
TOTALSCORE: 9
FACIALEXPRESSION: FACIAL GRIMACING
NEGVOCALIZATION: REPEATED TROUBLED CALLING OUT, LOUD MOANING/GROANING, CRYING
FACIALEXPRESSION: FACIAL GRIMACING
BODYLANGUAGE: RELAXED
TOTALSCORE: 9
NEGVOCALIZATION: OCCASIONAL MOAN/GROAN, LOW SPEECH, NEGATIVE/DISAPPROVING QUALITY
CONSOLABILITY: NO NEED TO CONSOLE
BODYLANGUAGE: RIGID, FISTS CLENCHED, KNEES UP, PUSHING/PULLING AWAY, STRIKES OUT
CONSOLABILITY: DISTRACTED OR REASSURED BY VOICE/TOUCH
FACIALEXPRESSION: FACIAL GRIMACING
BREATHING: NORMAL
BREATHING: NORMAL
CONSOLABILITY: DISTRACTED OR REASSURED BY VOICE/TOUCH
FACIALEXPRESSION: SAD, FRIGHTENED, FROWN
BREATHING: NOISY LABORED BREATHING, LONG PERIODS HYPERVENTILATION, CHEYNE-STOKES RESPIRATIONS
FACIALEXPRESSION: SMILING OR INEXPRESSIVE
BODYLANGUAGE: RIGID, FISTS CLENCHED, KNEES UP, PUSHING/PULLING AWAY, STRIKES OUT
BODYLANGUAGE: RELAXED
FACIALEXPRESSION: SMILING OR INEXPRESSIVE
NEGVOCALIZATION: OCCASIONAL MOAN/GROAN, LOW SPEECH, NEGATIVE/DISAPPROVING QUALITY
CONSOLABILITY: DISTRACTED OR REASSURED BY VOICE/TOUCH
BODYLANGUAGE: RIGID, FISTS CLENCHED, KNEES UP, PUSHING/PULLING AWAY, STRIKES OUT
BREATHING: NORMAL
CONSOLABILITY: NO NEED TO CONSOLE
BREATHING: NORMAL
TOTALSCORE: 1
CONSOLABILITY: DISTRACTED OR REASSURED BY VOICE/TOUCH
BODYLANGUAGE: RELAXED
BODYLANGUAGE: RIGID, FISTS CLENCHED, KNEES UP, PUSHING/PULLING AWAY, STRIKES OUT
BREATHING: NOISY LABORED BREATHING, LONG PERIODS HYPERVENTILATION, CHEYNE-STOKES RESPIRATIONS
CONSOLABILITY: DISTRACTED OR REASSURED BY VOICE/TOUCH
BODYLANGUAGE: RIGID, FISTS CLENCHED, KNEES UP, PUSHING/PULLING AWAY, STRIKES OUT
BREATHING: NOISY LABORED BREATHING, LONG PERIODS HYPERVENTILATION, CHEYNE-STOKES RESPIRATIONS
TOTALSCORE: 9
BREATHING: NOISY LABORED BREATHING, LONG PERIODS HYPERVENTILATION, CHEYNE-STOKES RESPIRATIONS
BREATHING: NOISY LABORED BREATHING, LONG PERIODS HYPERVENTILATION, CHEYNE-STOKES RESPIRATIONS
FACIALEXPRESSION: SAD, FRIGHTENED, FROWN
NEGVOCALIZATION: OCCASIONAL MOAN/GROAN, LOW SPEECH, NEGATIVE/DISAPPROVING QUALITY
BODYLANGUAGE: RIGID, FISTS CLENCHED, KNEES UP, PUSHING/PULLING AWAY, STRIKES OUT
BODYLANGUAGE: RELAXED
FACIALEXPRESSION: FACIAL GRIMACING
BREATHING: NORMAL
FACIALEXPRESSION: FACIAL GRIMACING
NEGVOCALIZATION: REPEATED TROUBLED CALLING OUT, LOUD MOANING/GROANING, CRYING
NEGVOCALIZATION: REPEATED TROUBLED CALLING OUT, LOUD MOANING/GROANING, CRYING
FACIALEXPRESSION: SMILING OR INEXPRESSIVE
TOTALSCORE: 9
NEGVOCALIZATION: REPEATED TROUBLED CALLING OUT, LOUD MOANING/GROANING, CRYING
FACIALEXPRESSION: SAD, FRIGHTENED, FROWN
FACIALEXPRESSION: FACIAL GRIMACING
CONSOLABILITY: DISTRACTED OR REASSURED BY VOICE/TOUCH
BODYLANGUAGE: RELAXED
TOTALSCORE: 9

## 2024-11-11 ASSESSMENT — PAIN SCALES - WONG BAKER
WONGBAKER_NUMERICALRESPONSE: HURTS WHOLE LOT
WONGBAKER_NUMERICALRESPONSE: HURTS WHOLE LOT
WONGBAKER_NUMERICALRESPONSE: NO HURT
WONGBAKER_NUMERICALRESPONSE: HURTS WHOLE LOT
WONGBAKER_NUMERICALRESPONSE: NO HURT
WONGBAKER_NUMERICALRESPONSE: HURTS WHOLE LOT
WONGBAKER_NUMERICALRESPONSE: HURTS WHOLE LOT
WONGBAKER_NUMERICALRESPONSE: NO HURT
WONGBAKER_NUMERICALRESPONSE: HURTS WHOLE LOT

## 2024-11-11 ASSESSMENT — PAIN SCALES - GENERAL
PAINLEVEL_OUTOF10: 4
PAINLEVEL_OUTOF10: 2
PAINLEVEL_OUTOF10: 0
PAINLEVEL_OUTOF10: 0

## 2024-11-11 NOTE — PLAN OF CARE
Problem: Safety - Adult  Goal: Free from fall injury  Outcome: Progressing     Problem: Chronic Conditions and Co-morbidities  Goal: Patient's chronic conditions and co-morbidity symptoms are monitored and maintained or improved  Outcome: Progressing     Problem: Discharge Planning  Goal: Discharge to home or other facility with appropriate resources  Outcome: Progressing     Problem: Pain  Goal: Verbalizes/displays adequate comfort level or baseline comfort level  Outcome: Progressing     Problem: Skin/Tissue Integrity  Goal: Absence of new skin breakdown  Description: 1.  Monitor for areas of redness and/or skin breakdown  2.  Assess vascular access sites hourly  3.  Every 4-6 hours minimum:  Change oxygen saturation probe site  4.  Every 4-6 hours:  If on nasal continuous positive airway pressure, respiratory therapy assess nares and determine need for appliance change or resting period.  Outcome: Progressing     Problem: ABCDS Injury Assessment  Goal: Absence of physical injury  Outcome: Progressing     Problem: Confusion  Goal: Confusion, delirium, dementia, or psychosis is improved or at baseline  Description: INTERVENTIONS:  1. Assess for possible contributors to thought disturbance, including medications, impaired vision or hearing, underlying metabolic abnormalities, dehydration, psychiatric diagnoses, and notify attending LIP  2. [place] high risk fall precautions, as indicated  3. Provide frequent short contacts to provide reality reorientation, refocusing and direction  4. Decrease environmental stimuli, including noise as appropriate  5. Monitor and intervene to maintain adequate nutrition, hydration, elimination, sleep and activity  6. If unable to ensure safety without constant attention obtain sitter and review sitter guidelines with assigned personnel  7. Initiate Psychosocial CNS and Spiritual Care consult, as indicated  Outcome: Progressing  Flowsheets (Taken 11/10/2024 1511 by Iam

## 2024-11-11 NOTE — PLAN OF CARE
Problem: Safety - Adult  Goal: Free from fall injury  11/11/2024 0722 by Concepción Vitale RN  Outcome: Progressing  11/11/2024 0357 by Richa Cuenca RN  Outcome: Progressing     Problem: Chronic Conditions and Co-morbidities  Goal: Patient's chronic conditions and co-morbidity symptoms are monitored and maintained or improved  11/11/2024 0722 by Concepción Vitale RN  Outcome: Progressing  11/11/2024 0357 by Richa Cuenca RN  Outcome: Progressing     Problem: Discharge Planning  Goal: Discharge to home or other facility with appropriate resources  11/11/2024 0722 by Concepción Vitale RN  Outcome: Progressing  11/11/2024 0357 by Richa Cuenca RN  Outcome: Progressing     Problem: Pain  Goal: Verbalizes/displays adequate comfort level or baseline comfort level  11/11/2024 0722 by Concepción Vitale RN  Outcome: Progressing  11/11/2024 0357 by Richa Cuenca RN  Outcome: Progressing     Problem: Skin/Tissue Integrity  Goal: Absence of new skin breakdown  Description: 1.  Monitor for areas of redness and/or skin breakdown  2.  Assess vascular access sites hourly  3.  Every 4-6 hours minimum:  Change oxygen saturation probe site  4.  Every 4-6 hours:  If on nasal continuous positive airway pressure, respiratory therapy assess nares and determine need for appliance change or resting period.  11/11/2024 0722 by Concepción Vitale RN  Outcome: Progressing  11/11/2024 0357 by Richa Cuenca RN  Outcome: Progressing     Problem: ABCDS Injury Assessment  Goal: Absence of physical injury  11/11/2024 0722 by Concepción Vitale RN  Outcome: Progressing  11/11/2024 0357 by Richa Cuenca RN  Outcome: Progressing     Problem: Confusion  Goal: Confusion, delirium, dementia, or psychosis is improved or at baseline  Description: INTERVENTIONS:  1. Assess for possible contributors to thought disturbance, including medications, impaired vision or hearing, underlying metabolic abnormalities,

## 2024-11-12 LAB
ANION GAP SERPL CALCULATED.3IONS-SCNC: 11 MMOL/L (ref 9–16)
BASOPHILS # BLD: 0.05 K/UL (ref 0–0.2)
BASOPHILS NFR BLD: 1 % (ref 0–2)
BUN SERPL-MCNC: 20 MG/DL (ref 8–23)
CALCIUM SERPL-MCNC: 8 MG/DL (ref 8.6–10.4)
CHLORIDE SERPL-SCNC: 111 MMOL/L (ref 98–107)
CO2 SERPL-SCNC: 23 MMOL/L (ref 20–31)
CREAT SERPL-MCNC: 0.9 MG/DL (ref 0.7–1.2)
EOSINOPHIL # BLD: 0.21 K/UL (ref 0–0.44)
EOSINOPHILS RELATIVE PERCENT: 2 % (ref 1–4)
ERYTHROCYTE [DISTWIDTH] IN BLOOD BY AUTOMATED COUNT: 14.1 % (ref 11.8–14.4)
GFR, ESTIMATED: 82 ML/MIN/1.73M2
GLUCOSE SERPL-MCNC: 110 MG/DL (ref 74–99)
HCT VFR BLD AUTO: 41 % (ref 40.7–50.3)
HGB BLD-MCNC: 12.9 G/DL (ref 13–17)
IMM GRANULOCYTES # BLD AUTO: 0.06 K/UL (ref 0–0.3)
IMM GRANULOCYTES NFR BLD: 1 %
LYMPHOCYTES NFR BLD: 1.29 K/UL (ref 1.1–3.7)
LYMPHOCYTES RELATIVE PERCENT: 12 % (ref 24–43)
MAGNESIUM SERPL-MCNC: 2.2 MG/DL (ref 1.6–2.4)
MCH RBC QN AUTO: 29.2 PG (ref 25.2–33.5)
MCHC RBC AUTO-ENTMCNC: 31.5 G/DL (ref 28.4–34.8)
MCV RBC AUTO: 92.8 FL (ref 82.6–102.9)
MONOCYTES NFR BLD: 0.47 K/UL (ref 0.1–1.2)
MONOCYTES NFR BLD: 4 % (ref 3–12)
NEUTROPHILS NFR BLD: 80 % (ref 36–65)
NEUTS SEG NFR BLD: 8.74 K/UL (ref 1.5–8.1)
NRBC BLD-RTO: 0 PER 100 WBC
PLATELET # BLD AUTO: 206 K/UL (ref 138–453)
PMV BLD AUTO: 11.2 FL (ref 8.1–13.5)
POTASSIUM SERPL-SCNC: 3.2 MMOL/L (ref 3.7–5.3)
RBC # BLD AUTO: 4.42 M/UL (ref 4.21–5.77)
SODIUM SERPL-SCNC: 145 MMOL/L (ref 136–145)
WBC OTHER # BLD: 10.8 K/UL (ref 3.5–11.3)

## 2024-11-12 PROCEDURE — 6370000000 HC RX 637 (ALT 250 FOR IP): Performed by: STUDENT IN AN ORGANIZED HEALTH CARE EDUCATION/TRAINING PROGRAM

## 2024-11-12 PROCEDURE — 85025 COMPLETE CBC W/AUTO DIFF WBC: CPT

## 2024-11-12 PROCEDURE — 6370000000 HC RX 637 (ALT 250 FOR IP)

## 2024-11-12 PROCEDURE — 80048 BASIC METABOLIC PNL TOTAL CA: CPT

## 2024-11-12 PROCEDURE — 2580000003 HC RX 258

## 2024-11-12 PROCEDURE — 2060000000 HC ICU INTERMEDIATE R&B

## 2024-11-12 PROCEDURE — 92507 TX SP LANG VOICE COMM INDIV: CPT

## 2024-11-12 PROCEDURE — 2580000003 HC RX 258: Performed by: STUDENT IN AN ORGANIZED HEALTH CARE EDUCATION/TRAINING PROGRAM

## 2024-11-12 PROCEDURE — 6360000002 HC RX W HCPCS: Performed by: NURSE PRACTITIONER

## 2024-11-12 PROCEDURE — 83735 ASSAY OF MAGNESIUM: CPT

## 2024-11-12 PROCEDURE — 36415 COLL VENOUS BLD VENIPUNCTURE: CPT

## 2024-11-12 RX ORDER — DEXTROSE MONOHYDRATE AND SODIUM CHLORIDE 5; .45 G/100ML; G/100ML
INJECTION, SOLUTION INTRAVENOUS CONTINUOUS
Status: DISCONTINUED | OUTPATIENT
Start: 2024-11-12 | End: 2024-11-12

## 2024-11-12 RX ORDER — OXYCODONE HYDROCHLORIDE 5 MG/1
2.5 TABLET ORAL EVERY 4 HOURS PRN
Qty: 9 TABLET | Refills: 0 | Status: SHIPPED | OUTPATIENT
Start: 2024-11-12 | End: 2024-11-13

## 2024-11-12 RX ADMIN — TAMSULOSIN HYDROCHLORIDE 0.4 MG: 0.4 CAPSULE ORAL at 21:46

## 2024-11-12 RX ADMIN — LEVOTHYROXINE SODIUM 88 MCG: 88 TABLET ORAL at 09:49

## 2024-11-12 RX ADMIN — SODIUM CHLORIDE, PRESERVATIVE FREE 10 ML: 5 INJECTION INTRAVENOUS at 21:47

## 2024-11-12 RX ADMIN — ACETAMINOPHEN 1000 MG: 500 TABLET ORAL at 21:46

## 2024-11-12 RX ADMIN — HEPARIN SODIUM 5000 UNITS: 5000 INJECTION INTRAVENOUS; SUBCUTANEOUS at 06:19

## 2024-11-12 RX ADMIN — TAMSULOSIN HYDROCHLORIDE 0.4 MG: 0.4 CAPSULE ORAL at 09:50

## 2024-11-12 RX ADMIN — ANTI-FUNGAL POWDER MICONAZOLE NITRATE TALC FREE: 1.42 POWDER TOPICAL at 21:46

## 2024-11-12 RX ADMIN — AMIODARONE HYDROCHLORIDE 100 MG: 200 TABLET ORAL at 09:50

## 2024-11-12 RX ADMIN — OXYCODONE 2.5 MG: 5 TABLET ORAL at 03:00

## 2024-11-12 RX ADMIN — HEPARIN SODIUM 5000 UNITS: 5000 INJECTION INTRAVENOUS; SUBCUTANEOUS at 21:46

## 2024-11-12 RX ADMIN — ACETAMINOPHEN 1000 MG: 500 TABLET ORAL at 14:03

## 2024-11-12 RX ADMIN — ANTI-FUNGAL POWDER MICONAZOLE NITRATE TALC FREE: 1.42 POWDER TOPICAL at 09:52

## 2024-11-12 RX ADMIN — ACETAMINOPHEN 1000 MG: 500 TABLET ORAL at 06:18

## 2024-11-12 RX ADMIN — SODIUM CHLORIDE, PRESERVATIVE FREE 10 ML: 5 INJECTION INTRAVENOUS at 21:51

## 2024-11-12 RX ADMIN — POTASSIUM BICARBONATE 40 MEQ: 782 TABLET, EFFERVESCENT ORAL at 09:49

## 2024-11-12 RX ADMIN — HEPARIN SODIUM 5000 UNITS: 5000 INJECTION INTRAVENOUS; SUBCUTANEOUS at 14:21

## 2024-11-12 RX ADMIN — ATORVASTATIN CALCIUM 10 MG: 20 TABLET, FILM COATED ORAL at 09:50

## 2024-11-12 RX ADMIN — SODIUM CHLORIDE, PRESERVATIVE FREE 10 ML: 5 INJECTION INTRAVENOUS at 09:51

## 2024-11-12 RX ADMIN — DEXTROSE AND SODIUM CHLORIDE: 5; 450 INJECTION, SOLUTION INTRAVENOUS at 09:48

## 2024-11-12 ASSESSMENT — PAIN SCALES - PAIN ASSESSMENT IN ADVANCED DEMENTIA (PAINAD)
BODYLANGUAGE: RELAXED
BREATHING: NORMAL
BREATHING: NORMAL
FACIALEXPRESSION: SAD, FRIGHTENED, FROWN
CONSOLABILITY: DISTRACTED OR REASSURED BY VOICE/TOUCH
TOTALSCORE: 2
FACIALEXPRESSION: SAD, FRIGHTENED, FROWN
BODYLANGUAGE: RELAXED
BODYLANGUAGE: RELAXED
BREATHING: NORMAL
BREATHING: NORMAL
BODYLANGUAGE: RELAXED
TOTALSCORE: 2
CONSOLABILITY: DISTRACTED OR REASSURED BY VOICE/TOUCH
TOTALSCORE: 2
CONSOLABILITY: DISTRACTED OR REASSURED BY VOICE/TOUCH
FACIALEXPRESSION: SAD, FRIGHTENED, FROWN
TOTALSCORE: 2
FACIALEXPRESSION: SAD, FRIGHTENED, FROWN
CONSOLABILITY: DISTRACTED OR REASSURED BY VOICE/TOUCH

## 2024-11-12 ASSESSMENT — PAIN DESCRIPTION - LOCATION: LOCATION: ABDOMEN

## 2024-11-12 ASSESSMENT — PAIN DESCRIPTION - ORIENTATION: ORIENTATION: RIGHT

## 2024-11-12 ASSESSMENT — PAIN SCALES - WONG BAKER: WONGBAKER_NUMERICALRESPONSE: HURTS LITTLE MORE

## 2024-11-12 ASSESSMENT — PAIN DESCRIPTION - DESCRIPTORS: DESCRIPTORS: ACHING

## 2024-11-12 ASSESSMENT — PAIN SCALES - GENERAL: PAINLEVEL_OUTOF10: 3

## 2024-11-12 NOTE — PLAN OF CARE
Problem: Safety - Adult  Goal: Free from fall injury  Outcome: Progressing  Flowsheets (Taken 11/11/2024 1545 by Concepción Vitale RN)  Free From Fall Injury: Instruct family/caregiver on patient safety     Problem: Chronic Conditions and Co-morbidities  Goal: Patient's chronic conditions and co-morbidity symptoms are monitored and maintained or improved  Outcome: Progressing     Problem: Discharge Planning  Goal: Discharge to home or other facility with appropriate resources  Outcome: Progressing     Problem: Pain  Goal: Verbalizes/displays adequate comfort level or baseline comfort level  Outcome: Progressing     Problem: Skin/Tissue Integrity  Goal: Absence of new skin breakdown  Description: 1.  Monitor for areas of redness and/or skin breakdown  2.  Assess vascular access sites hourly  3.  Every 4-6 hours minimum:  Change oxygen saturation probe site  4.  Every 4-6 hours:  If on nasal continuous positive airway pressure, respiratory therapy assess nares and determine need for appliance change or resting period.  Outcome: Progressing     Problem: ABCDS Injury Assessment  Goal: Absence of physical injury  Outcome: Progressing  Flowsheets (Taken 11/11/2024 1545 by Concepción Vitale RN)  Absence of Physical Injury: Implement safety measures based on patient assessment     Problem: Confusion  Goal: Confusion, delirium, dementia, or psychosis is improved or at baseline  Description: INTERVENTIONS:  1. Assess for possible contributors to thought disturbance, including medications, impaired vision or hearing, underlying metabolic abnormalities, dehydration, psychiatric diagnoses, and notify attending LIP  2. [place] high risk fall precautions, as indicated  3. Provide frequent short contacts to provide reality reorientation, refocusing and direction  4. Decrease environmental stimuli, including noise as appropriate  5. Monitor and intervene to maintain adequate nutrition, hydration, elimination, sleep and

## 2024-11-12 NOTE — DISCHARGE SUMMARY
DISCHARGE SUMMARY:    PATIENT NAME:  Yeyo Bravo  YOB: 1934  MEDICAL RECORD NO. [id number]  DATE: 11/12/24  PRIMARY CARE PHYSICIAN: Mercedes Solis APRN - CNP  ADMIT DATE:  11/9/2024    DISCHARGE DATE:  11/13/2024  DISPOSITION:  Facility  ADMITTING DIAGNOSIS:   C6 fracture    DIAGNOSIS:   Patient Active Problem List   Diagnosis    Hyperlipidemia    Osteoarthritis    Allergic rhinitis    Diverticulosis    Anxiety    Atrial fibrillation (HCC)    Coronary artery disease involving native coronary artery of native heart    Mass of upper lobe of right lung    Chronic combined systolic and diastolic CHF (congestive heart failure) ([facility])    Benign prostatic hyperplasia with incomplete bladder emptying    Hypothyroidism    Hypertension    Dementia ([facility])    Intermediate stage nonexudative age-related macular degeneration of both eyes    Combined forms of age-related cataract of right eye    Pseudophakia of left eye    H/O fall    Epididymitis    Scrotal abscess    Pyocele    Hydronephrosis, bilateral    Bladder outlet obstruction    Complicated UTI (urinary tract infection)    Secondary hypercoagulable state ([facility])    Chronic cerebral ischemia    Recurrent UTI, multiple    Chronic kidney disease    Falls    AMS (altered mental status)    Closed nondisplaced fracture of sixth cervical vertebra ([facility])    Hyperextension injury of cervical spine, initial encounter    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 male who was admitted on 11/9/2024  Hospital Course:  11/9/24: MRI, admit to TICU for MAP>85 pushes, indwelling catheter changed by urology, [id number] LR at maintenance, CK down trending     11/10 - MAP pushes,

## 2024-11-12 NOTE — PLAN OF CARE
Problem: Safety - Adult  Goal: Free from fall injury  11/12/2024 1120 by Rinku Klein RN  Outcome: Progressing  11/12/2024 0032 by Randall Berman RN  Outcome: Progressing  Flowsheets (Taken 11/11/2024 1545 by Concepción Vitale RN)  Free From Fall Injury: Instruct family/caregiver on patient safety     Problem: Chronic Conditions and Co-morbidities  Goal: Patient's chronic conditions and co-morbidity symptoms are monitored and maintained or improved  11/12/2024 1120 by Rinku Klein RN  Outcome: Progressing  11/12/2024 0032 by Randall Berman RN  Outcome: Progressing     Problem: Discharge Planning  Goal: Discharge to home or other facility with appropriate resources  11/12/2024 1120 by Rinku Klein RN  Outcome: Progressing  11/12/2024 0032 by Randall Berman RN  Outcome: Progressing     Problem: Pain  Goal: Verbalizes/displays adequate comfort level or baseline comfort level  11/12/2024 1120 by Rinku Klein RN  Outcome: Progressing  11/12/2024 0032 by Randall Berman RN  Outcome: Progressing     Problem: Skin/Tissue Integrity  Goal: Absence of new skin breakdown  Description: 1.  Monitor for areas of redness and/or skin breakdown  2.  Assess vascular access sites hourly  3.  Every 4-6 hours minimum:  Change oxygen saturation probe site  4.  Every 4-6 hours:  If on nasal continuous positive airway pressure, respiratory therapy assess nares and determine need for appliance change or resting period.  11/12/2024 0032 by Randall Berman RN  Outcome: Progressing     Problem: ABCDS Injury Assessment  Goal: Absence of physical injury  11/12/2024 0032 by Randall Berman RN  Outcome: Progressing  Flowsheets (Taken 11/11/2024 1545 by Concepción Vitale RN)  Absence of Physical Injury: Implement safety measures based on patient assessment     Problem: Confusion  Goal: Confusion, delirium, dementia, or psychosis is improved or at baseline  Description: INTERVENTIONS:  1. Assess

## 2024-11-12 NOTE — DISCHARGE INSTR - COC
[Urine:1375]    Safety Concerns:     { [place] Safety Concerns:094774013}    Impairments/Disabilities:      { [place] Impairments/Disabilities:793718397}    Nutrition Therapy:  Current Nutrition Therapy:   { [place] Diet List:570162082}    Routes of Feeding: {[facility] DME Other Feedings:914435904}  Liquids: {Slp liquid thickness:42940}  Daily Fluid Restriction: {[facility] DME Yes amt example:941814134}  Last Modified Barium Swallow with Video (Video Swallowing Test): {Done Not Done Date:}    Treatments at the Time of Hospital Discharge:   Respiratory Treatments: ***  Oxygen Therapy:  {Therapy; copd oxygen:15078}  Ventilator:    {[facility] Vent List:538683012}    Rehab Therapies: {THERAPEUTIC INTERVENTION:3717209209}  Weight Bearing Status/Restrictions: {[facility] Weight Bearin}  Other Medical Equipment (for information only, NOT a DME order):  {EQUIPMENT:109566174}  Other Treatments: ***    Patient's personal belongings (please select all that are sent with patient):  {[facility] DME Belongings:465361118}    RN SIGNATURE:  {Esignature:547094390}    CASE MANAGEMENT/SOCIAL WORK SECTION    Inpatient Status Date: ***    Readmission Risk Assessment Score:  Readmission Risk              Risk of Unplanned Readmission:  27           Discharging to Facility/ Agency   Name:   Address:  Phone:  Fax:    Dialysis Facility (if applicable)   Name:  Address:  Dialysis Schedule:  Phone:  Fax:    / signature: {Esignature:269265428}    PHYSICIAN SECTION    Prognosis: {Prognosis:5671126478}    Condition at Discharge: { Patient Condition:454807638}    Rehab Potential (if transferring to Rehab): {Prognosis:2571698851}    Recommended Labs or Other Treatments After Discharge: *** Palliative care / hospice should begin seeing Yeyo once he returns to the facility.     Physician Certification: I certify the above information and transfer of Yeyo Bravo  is necessary for the continuing treatment of the diagnosis listed and that

## 2024-11-13 VITALS
HEIGHT: 71 IN | TEMPERATURE: 98.3 F | SYSTOLIC BLOOD PRESSURE: 146 MMHG | RESPIRATION RATE: 16 BRPM | BODY MASS INDEX: 24.57 KG/M2 | DIASTOLIC BLOOD PRESSURE: 95 MMHG | WEIGHT: 175.49 LBS | HEART RATE: 106 BPM | OXYGEN SATURATION: 98 %

## 2024-11-13 LAB
ANION GAP SERPL CALCULATED.3IONS-SCNC: 11 MMOL/L (ref 9–16)
BASOPHILS # BLD: 0.04 K/UL (ref 0–0.2)
BASOPHILS NFR BLD: 1 % (ref 0–2)
BUN SERPL-MCNC: 17 MG/DL (ref 8–23)
CALCIUM SERPL-MCNC: 7.9 MG/DL (ref 8.6–10.4)
CHLORIDE SERPL-SCNC: 111 MMOL/L (ref 98–107)
CO2 SERPL-SCNC: 24 MMOL/L (ref 20–31)
CREAT SERPL-MCNC: 0.9 MG/DL (ref 0.7–1.2)
EOSINOPHIL # BLD: 0.25 K/UL (ref 0–0.44)
EOSINOPHILS RELATIVE PERCENT: 3 % (ref 1–4)
ERYTHROCYTE [DISTWIDTH] IN BLOOD BY AUTOMATED COUNT: 14.3 % (ref 11.8–14.4)
GFR, ESTIMATED: 82 ML/MIN/1.73M2
GLUCOSE SERPL-MCNC: 112 MG/DL (ref 74–99)
HCT VFR BLD AUTO: 40.9 % (ref 40.7–50.3)
HGB BLD-MCNC: 13 G/DL (ref 13–17)
IMM GRANULOCYTES # BLD AUTO: 0.05 K/UL (ref 0–0.3)
IMM GRANULOCYTES NFR BLD: 1 %
LYMPHOCYTES NFR BLD: 1.68 K/UL (ref 1.1–3.7)
LYMPHOCYTES RELATIVE PERCENT: 21 % (ref 24–43)
MAGNESIUM SERPL-MCNC: 2.3 MG/DL (ref 1.6–2.4)
MCH RBC QN AUTO: 29.4 PG (ref 25.2–33.5)
MCHC RBC AUTO-ENTMCNC: 31.8 G/DL (ref 28.4–34.8)
MCV RBC AUTO: 92.5 FL (ref 82.6–102.9)
MONOCYTES NFR BLD: 0.47 K/UL (ref 0.1–1.2)
MONOCYTES NFR BLD: 6 % (ref 3–12)
NEUTROPHILS NFR BLD: 68 % (ref 36–65)
NEUTS SEG NFR BLD: 5.38 K/UL (ref 1.5–8.1)
NRBC BLD-RTO: 0 PER 100 WBC
PLATELET # BLD AUTO: 189 K/UL (ref 138–453)
PMV BLD AUTO: 11.6 FL (ref 8.1–13.5)
POTASSIUM SERPL-SCNC: 3.2 MMOL/L (ref 3.7–5.3)
RBC # BLD AUTO: 4.42 M/UL (ref 4.21–5.77)
SODIUM SERPL-SCNC: 146 MMOL/L (ref 136–145)
WBC OTHER # BLD: 7.9 K/UL (ref 3.5–11.3)

## 2024-11-13 PROCEDURE — 6360000002 HC RX W HCPCS: Performed by: NURSE PRACTITIONER

## 2024-11-13 PROCEDURE — 6370000000 HC RX 637 (ALT 250 FOR IP): Performed by: STUDENT IN AN ORGANIZED HEALTH CARE EDUCATION/TRAINING PROGRAM

## 2024-11-13 PROCEDURE — 83735 ASSAY OF MAGNESIUM: CPT

## 2024-11-13 PROCEDURE — 85025 COMPLETE CBC W/AUTO DIFF WBC: CPT

## 2024-11-13 PROCEDURE — 80048 BASIC METABOLIC PNL TOTAL CA: CPT

## 2024-11-13 PROCEDURE — 36415 COLL VENOUS BLD VENIPUNCTURE: CPT

## 2024-11-13 RX ORDER — OXYCODONE HYDROCHLORIDE 5 MG/1
2.5 TABLET ORAL EVERY 4 HOURS PRN
Qty: 15 TABLET | Refills: 0 | Status: SHIPPED | OUTPATIENT
Start: 2024-11-13 | End: 2024-11-16

## 2024-11-13 RX ADMIN — ANTI-FUNGAL POWDER MICONAZOLE NITRATE TALC FREE: 1.42 POWDER TOPICAL at 08:39

## 2024-11-13 RX ADMIN — HEPARIN SODIUM 5000 UNITS: 5000 INJECTION INTRAVENOUS; SUBCUTANEOUS at 06:01

## 2024-11-13 RX ADMIN — ACETAMINOPHEN 1000 MG: 500 TABLET ORAL at 06:01

## 2024-11-13 RX ADMIN — ATORVASTATIN CALCIUM 10 MG: 20 TABLET, FILM COATED ORAL at 08:31

## 2024-11-13 RX ADMIN — RIVAROXABAN 15 MG: 15 TABLET, FILM COATED ORAL at 08:30

## 2024-11-13 RX ADMIN — AMIODARONE HYDROCHLORIDE 100 MG: 200 TABLET ORAL at 08:31

## 2024-11-13 RX ADMIN — TAMSULOSIN HYDROCHLORIDE 0.4 MG: 0.4 CAPSULE ORAL at 08:31

## 2024-11-13 RX ADMIN — POTASSIUM BICARBONATE 40 MEQ: 782 TABLET, EFFERVESCENT ORAL at 08:30

## 2024-11-13 RX ADMIN — LEVOTHYROXINE SODIUM 88 MCG: 88 TABLET ORAL at 06:01

## 2024-11-13 ASSESSMENT — PAIN SCALES - PAIN ASSESSMENT IN ADVANCED DEMENTIA (PAINAD)
FACIALEXPRESSION: SAD, FRIGHTENED, FROWN
BREATHING: NORMAL
TOTALSCORE: 2
BODYLANGUAGE: RELAXED
BREATHING: NORMAL
TOTALSCORE: 2
TOTALSCORE: 2
FACIALEXPRESSION: SAD, FRIGHTENED, FROWN
BODYLANGUAGE: RELAXED
FACIALEXPRESSION: SAD, FRIGHTENED, FROWN
BODYLANGUAGE: RELAXED
CONSOLABILITY: DISTRACTED OR REASSURED BY VOICE/TOUCH
TOTALSCORE: 2
FACIALEXPRESSION: SAD, FRIGHTENED, FROWN
CONSOLABILITY: DISTRACTED OR REASSURED BY VOICE/TOUCH
BODYLANGUAGE: RELAXED
TOTALSCORE: 2
FACIALEXPRESSION: SAD, FRIGHTENED, FROWN
TOTALSCORE: 2
BREATHING: NORMAL
CONSOLABILITY: DISTRACTED OR REASSURED BY VOICE/TOUCH
FACIALEXPRESSION: SAD, FRIGHTENED, FROWN
CONSOLABILITY: DISTRACTED OR REASSURED BY VOICE/TOUCH
BREATHING: NORMAL
BREATHING: NORMAL
TOTALSCORE: 2
CONSOLABILITY: DISTRACTED OR REASSURED BY VOICE/TOUCH
BREATHING: NORMAL
FACIALEXPRESSION: SAD, FRIGHTENED, FROWN
BREATHING: NORMAL
BODYLANGUAGE: RELAXED
CONSOLABILITY: DISTRACTED OR REASSURED BY VOICE/TOUCH
BODYLANGUAGE: RELAXED
TOTALSCORE: 2
BREATHING: NORMAL
FACIALEXPRESSION: SAD, FRIGHTENED, FROWN

## 2024-11-13 NOTE — PLAN OF CARE
Problem: Safety - Adult  Goal: Free from fall injury  Outcome: Progressing     Problem: Chronic Conditions and Co-morbidities  Goal: Patient's chronic conditions and co-morbidity symptoms are monitored and maintained or improved  Outcome: Progressing     Problem: Discharge Planning  Goal: Discharge to home or other facility with appropriate resources  Outcome: Progressing     Problem: Pain  Goal: Verbalizes/displays adequate comfort level or baseline comfort level  Outcome: Progressing     Problem: Skin/Tissue Integrity  Goal: Absence of new skin breakdown  Description: 1.  Monitor for areas of redness and/or skin breakdown  2.  Assess vascular access sites hourly  3.  Every 4-6 hours minimum:  Change oxygen saturation probe site  4.  Every 4-6 hours:  If on nasal continuous positive airway pressure, respiratory therapy assess nares and determine need for appliance change or resting period.  Outcome: Progressing     Problem: ABCDS Injury Assessment  Goal: Absence of physical injury  Outcome: Progressing     Problem: Confusion  Goal: Confusion, delirium, dementia, or psychosis is improved or at baseline  Description: INTERVENTIONS:  1. Assess for possible contributors to thought disturbance, including medications, impaired vision or hearing, underlying metabolic abnormalities, dehydration, psychiatric diagnoses, and notify attending LIP  2. [place] high risk fall precautions, as indicated  3. Provide frequent short contacts to provide reality reorientation, refocusing and direction  4. Decrease environmental stimuli, including noise as appropriate  5. Monitor and intervene to maintain adequate nutrition, hydration, elimination, sleep and activity  6. If unable to ensure safety without constant attention obtain sitter and review sitter guidelines with assigned personnel  7. Initiate Psychosocial CNS and Spiritual Care consult, as indicated  Outcome: Progressing

## 2024-11-13 NOTE — CONSULTS
TRAUMA H&P/CONSULT    PATIENT NAME: Yeyo Bravo  YOB: 1934  MEDICAL RECORD NO. [id number]   DATE: 11/9/2024  PRIMARY CARE PHYSICIAN: Mercedes Solis APRN - CNP  PATIENT EVALUATED AT THE REQUEST OF DR. Jaimes-Misael    ACTIVATION   Trauma Consult-Time at bedside 1015      IMPRESSION AND PLAN:       Diagnosis: Fall out of bed; C6 Transverse Process Fracture   Plan:   -CT Chest Abdomen Pelvis W; CT Lumbar Recon; CT Thoracic Recon     -F/u results    -Neurosurgery consult     - MRI Lumbar w/o     - MRI Cervical w/o     -F/u recommendations and imaging results    -Dispo: Medicine admission    If intracranial hemorrhage is present, is it a:  [] BIG 1  [] BIG 2  [] BIG 3  If chest wall injury: Rib score___    CONSULT SERVICES    Neurosurgery, Internal Medicine, and Other: Neurology       HISTORY:     Chief Complaint:  None voiced    GENERAL DATA  Patient information was obtained from EMS personnel and nursing home.  History/Exam limitations: mental status and dementia.  Injury Date: 11/1/2024   Approximate Injury Time: Unknown        Transport mode: Ambulance  Referring Hospital: [facility]    SETTING OF TRAUMATIC EVENT   Location : Home  Specific Details of Location: Bedroom    MECHANISM OF INJURY    Fall out of bed      HISTORY:     Yeyo Bravo is a male that presented to the Emergency Department following transfer from [facility].  Patient originally fell out of bed on 11/1/2024 and was sent to the emergency department at [facility] for which she received a head CT scan which was not concerning for any intracranial abnormality was discharged back to his facility.  Over the next few days it was noted that he was not at his baseline, not responding appropriately, unable to move his upper or lower extremities that began to exhibit contracture like rigidity..  It was also noted that his urine culture tested positive for MDRO Klebsiella for which she was placed on ertapenem for 10 
  [facility]   IN-PATIENT SERVICE      NEUROLOGY CONSULT  NOTE            Date:   11/9/2024  Patient name:  Yeyo Bravo  Date of admission:  11/9/2024  YOB: 1934      Chief Complaint:     Chief Complaint   Patient presents with    Fall    Altered Mental Status       Reason for Consult:      Contractures, AMS     History of Present Illness:     The patient is a 89 y.o. male who presents with Fall and Altered Mental Status  . The patient was seen and examined and the chart was reviewed.     This is an 89 year old male with an extensive medical history including dementia ( per Neurology office notes not on meds as he refuses them),  recent UTI being treated with antibiotics, anxiety, CABG, HTN, PAF on Xarelto,  contractures who presented s/p fall at outside facility and was transferred here.     Neurology asked to consult due to AMS. They also note UE contractures that it believed to be new. Patient had a negative HCT but was found to have a C6 transverse fracture on CT-C spine. Being evaluated by Neurosurgery and Trauma team. HCT showed NAP. CTA was negative except for mild retropharyngeal edema.        Past Medical History:     Past Medical History:   Diagnosis Date    Abdominal pain 03/16/2023    Acute bronchitis     by history    MARITA (acute kidney injury) ([facility]) 03/13/2018    MARITA (acute kidney injury) ([facility]) 03/13/2018    Allergic rhinitis     Anxiety     Bradycardia 08/19/2018    Cataract, right     Choroidal nevus of right eye     Chronic systolic CHF (congestive heart failure) ([facility]) 03/13/2018    Coronary artery disease involving native coronary artery of native heart 10/20/2016    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 LIMA TO LAD, SVG TO DIAGONAL2    Dementia associated with 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 behavioral disturbance ([facility]) 09/21/2018    Dermatophytosis of nail 01/10/2014    Diverticulosis     Elevated PSA     Gross hematuria 03/16/2018    Hemorrhoids     History of measles childhood    History of 
Palliative Care Consult completed by Dr. Lao on 11/11/24.  See notes.     [facility] Palliative Care Coordinator  Batsheva NOEN, RN  [facility] [phone]/ [place] [phone]/ [facility] [phone]   
full/confusion   _x_30%  Bed Bound; Extensive disease; Total care; intake reduced; LOC full/confusion  ___20%  Bed Bound; Extensive disease; Total care; intake minimal; Drowsy/coma  ___10%  Bed Bound; Extensive disease; Total care; Mouth care only; Drowsy/coma  ___0       Death    Principle Problem/Diagnosis:  Principal Problem:    AMS (altered mental status)  Active Problems:    Hypothyroidism    Falls    Closed nondisplaced fracture of sixth cervical vertebra (HCC)    Hyperextension injury of cervical spine, initial encounter    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 ([facility])    Cervical spinal cord injury, initial encounter ([facility])    Acute cystitis without hematuria    Metabolic encephalopathy    Encounter for palliative care  Resolved Problems:    * No resolved hospital problems. *      Please call with any palliative questions or concerns.  Palliative Care Team is available via perfect serve or via phone.     This note has been dictated by dragon, typing errors may be a possibility.      Total time in talking with family, discussing with patient, chart review, and writing note: 80 minutes      Electronically signed by      Jona Lao DO  Hospice/Palliative Care  [facility], [place], [place]  11/11/2024 12:58 PM  Palliative care office: [phone]    
the neck. 2. Nondisplaced left C6 transverse process fracture. 3. Minor retropharyngeal edema from the mid oropharynx to the superior aspect of the larynx with no suggestion of enhancement. This is nonspecific but almost certainly posttraumatic in etiology. 4. Tiny right pleural effusion.     CT Head W/O Contrast    Addendum Date: 11/9/2024    ADDENDUM: Please note the 2nd impression should read as a transverse process fracture at C6 as described in the body of the report, inadvertently reported as T6 in the impression.     Result Date: 11/9/2024  1. No acute intracranial abnormality. 2. Suspected subtle transverse foramen fracture at T6. Recommend further evaluation with CTA of the neck. 3. Multilevel degenerative changes in the cervical spine.     CT CSpine W/O Contrast    Addendum Date: 11/9/2024    ADDENDUM: Please note the 2nd impression should read as a transverse process fracture at C6 as described in the body of the report, inadvertently reported as T6 in the impression.     Result Date: 11/9/2024  1. No acute intracranial abnormality. 2. Suspected subtle transverse foramen fracture at T6. Recommend further evaluation with CTA of the neck. 3. Multilevel degenerative changes in the cervical spine.       ASSESSMENT & PLAN     Principal Problem:    AMS (altered mental status)  Active Problems:    Hypothyroidism    Falls    Closed nondisplaced fracture of sixth cervical vertebra (HCC)    Hyperextension injury of cervical spine, initial encounter    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 (HCC)    Cervical spinal cord injury, initial encounter ([facility])    Acute cystitis without hematuria  Resolved Problems:    * No resolved hospital problems. *    ESBL UTI  -Urine culture on 11/5/2024 grew Klebsiella pneumoniae  -Patient was discharged from a ertapenem 1 g IV for 10 days  -Patient started on meropenem while in inpatient  -Continue meropenem thousand Mg every 8 hours  -Will monitor    Geriatric precaution  -Patient has 
week ago. Less movement to BUE and BLE per SNF. AMS, UTI.     Patient care will be discussed with attending, will reevaluated patient along with attending.      - Neurosurgical intervention pending imaging findings and review by attending neurosurgeon  - Neuro checks per floor protocol  - CT thoracic and lumbar pending  - Hold all antiplatelets and anticoagulants      Additional recommendations may follow    Please contact neurosurgery with any changes in patients neurologic status.     Thank you for your consult.       DAYANARA Davis - CNP   NS pager [phone]  11/9/2024  11:04 AM

## 2024-11-13 NOTE — CARE COORDINATION
11/10/24 1207   Readmission Assessment   Number of Days since last admission? 1-7 days   Previous Disposition Home with Home Health   Who is being Interviewed Caregiver   What was the patient's/caregiver's perception as to why they think they needed to return back to the hospital? Other (Comment)  (fall)   Did you visit your Primary Care Physician after you left the hospital, before you returned this time? No   Why weren't you able to visit your PCP? Other (Comment)  (unsure)   Did you see a specialist, such as Cardiac, Pulmonary, Orthopedic Physician, etc. after you left the hospital? No   Who advised the patient to return to the hospital? Skilled Unit   Does the patient report anything that got in the way of taking their medications? No   In our efforts to provide the best possible care to you and others like you, can you think of anything that we could have done to help you after you left the hospital the first time, so that you might not have needed to return so soon? Other (Comment)  (no)       
Discharge Report    [facility]  Clinical Case Management Department  Written by: Ivory Patrick RN    Patient Name: Yeyo Bravo  Attending Provider: Denny Tan*  Admit Date: 2024  9:11 AM  MRN: [id number]  Account: [id number]                     : 1934  Discharge Date: 24      Disposition: assisted living    Plan is return to [facility] living. Stretcher transport confirmed for 9am . Spoke with Deborah at [facility] and they are ready for patient's return. They have received DME orders for hospital bed and low airloss mattress. Additionally, Deborah relays that she has spoken with William (patient's POA) and they have the ability to bring in hospice care to the assisted living but William does not currently desire that yet. Also confirmed above with William and he is agreeable to plan.     Ivory Patrick RN      
SBIRT is deferred due to pt with a diagnoses of dementia.          Alcohol Screening and Brief Intervention    Deferred [x]    Completed on: 11/11/2024   YOLANDA MONTIEL  
Spoke with William (POA) to address see if he was planning to come to the hospital today and coordinate care needs for the patient. William informed me that he was not planning on coming to the hospital today but informed that Deborah from [facility] will be in to visit the patient. He also requested that we talk to Jacqui Flower regarding medical needs as she is a nurse and has better understanding of medical terminology. William was agreeable to be called back for further questions if needed.     1250 Spoke with Deborah from [facility] and was informed she is on her way to evaluate the patient for possible return back to living condition.    1415 Deborah from [facility] is at bedside to evaluate if they are able to accept the patient back at the facility.     1445 Deborah noted they are able to accept the patient back to [facility]. They requested DME for hospital bed and alternating air mattress. Requested patient be transferred tomorrow anytime after 8am to get the hospital bed ordered. PS Dr. Lynch to notify of DME request. He responded he will work to complete it.      1500 Faxed transport paperwork to [facility].    1700 [facility] F[facility]ed DME order to [facility], confirmed with Deborah the DME and transport time. She verbalized understanding.  Deborah's cell phone [phone]   
Transition Planning    Call from Neha Desai with [facility]- able to accept.     VM received Beatriz from [facility]. [phone] requesting call back.     Call to [facility]. Updated re [facility] and [facility] both willing to accept pt.   
Transition Planning    HIPAA compliant voice mail left with Beatriz from [facility].     Call to Neha Desai from [facility]. Did not receive referral- sent. She will review.     1655 VM received from Beatriz. To call her back at [phone].     1705 Call to Beatriz from [facility]. Can do IV antibiotics. Able to do full care and can do therapy. Willing to take him back. No precert needed.     1715 Referral  faxed to [facility] [phone].     
Trauma Coordinator Rounding Note  Daily check in:  I attempted to meet with Yeyo Bravo  at bedside to review their plan of care. He is not able to participate in conversation.    []Wounds  []PT/OT/speech  []Incentive spirometer  [x]Diet   [x]Activity  Bedside Nurse:  I spoke with the patient's assigned nurse to review the plan of care for today and provide an opportunity to ask questions or relay any concerns to the Trauma service.    :  I provided a clinical update to the unit  and confirmed the disposition plan. Current barriers to discharge include:  needs for DME for hospital bed and alternating air mattress for return to [facility] tomorrow.  Messaged Gissel Trauma NP.   Electronically signed by Alba Tran RN on 11/12/2024 at 3:40 PM   
Trauma Coordinator Rounding Note  Daily check in:  I met with Yeyo Bravo  at bedside to review their plan of care. Pt wakes to verbal stimuli. He is alert to name but confused to place, time and situation. Cousin is JOHNSON (William). Palliative consult pending.    [x]Wounds  [x]PT/OT/speech  [x]Incentive spirometer (Unable to attempt)   [x]Diet  [x]Activity  [x]Preferred pharmacy (Meds to beds)    DC Expectation:  Patient expects to be discharged to Skilled Nursing Facility (SNF)  Bedside Nurse:  I spoke with the patient's assigned nurse to review the plan of care for today and provide an opportunity to ask questions or relay any concerns to the Trauma service.    Discharge Info:  I confirmed follow up information was placed in the discharge instructions for  TBD .   :  I provided a clinical update to the unit  and confirmed the disposition plan. Current barriers to discharge include: not medically stable for discharge due to continued medical management and palliative consult.   Electronically signed by Alba Tran RN on 11/11/2024 at 1:27 PM   
Yeyo Bravo was evaluated today and a DME order was entered for a semi-electric hospital bed because he requires assistance for positioning needs not possible in an ordinary bed.  Patient requires frequent and immediate positioning changes for relief of pain and care needs related to medical diagnoses.  Patient needs variability of bed height requirements to perform patient transfers and for hygiene.  Current body Weight - Scale: 79.6 kg (175 lb 7.8 oz).  The need for this equipment was discussed with the patient and he understands and is in agreement.   Yeyo Bravo requires an alternating pressure pad and pump to be used over the existing mattress of bed and is completely immobile and cannot make changes in body position without assistance.  Patient has altered sensory perception. The severity of these conditions supports the need for pressure reducing support surface.     
Transition of Care is related to the following treatment goals of Altered mental status, unspecified altered mental status type [R41.82]  Closed nondisplaced fracture of sixth cervical vertebra, unspecified fracture morphology, initial encounter ([facility]) [S12.501A]  AMS (altered mental status) [R41.82]  Cervical spinal cord injury, initial encounter ([facility]) [S14.109A]    IF APPLICABLE: The Patient and/or patient representative Yeyo and his family were provided with a choice of provider and agrees with the discharge plan. Freedom of choice list with basic dialogue that supports the patient's individualized plan of care/goals and shares the quality data associated with the providers was provided to:     Patient Representative Name:       The Patient and/or Patient Representative Agree with the Discharge Plan?      Lila Arce RN  Case Management Department  Ph: [phone] Fax: [phone]

## 2024-11-13 NOTE — PLAN OF CARE
Problem: Safety - Adult  Goal: Free from fall injury  11/13/2024 0943 by Rinku Klein RN  Outcome: Adequate for Discharge  11/13/2024 0217 by Randall Berman RN  Outcome: Progressing     Problem: Chronic Conditions and Co-morbidities  Goal: Patient's chronic conditions and co-morbidity symptoms are monitored and maintained or improved  11/13/2024 0943 by Rinku Klein RN  Outcome: Adequate for Discharge  11/13/2024 0217 by Randall Berman RN  Outcome: Progressing     Problem: Discharge Planning  Goal: Discharge to home or other facility with appropriate resources  11/13/2024 0943 by Rinku Klein RN  Outcome: Adequate for Discharge  11/13/2024 0217 by Randall Berman RN  Outcome: Progressing     Problem: Pain  Goal: Verbalizes/displays adequate comfort level or baseline comfort level  11/13/2024 0943 by Rinku Klein RN  Outcome: Adequate for Discharge  11/13/2024 0217 by Randall Berman RN  Outcome: Progressing     Problem: Skin/Tissue Integrity  Goal: Absence of new skin breakdown  Description: 1.  Monitor for areas of redness and/or skin breakdown  2.  Assess vascular access sites hourly  3.  Every 4-6 hours minimum:  Change oxygen saturation probe site  4.  Every 4-6 hours:  If on nasal continuous positive airway pressure, respiratory therapy assess nares and determine need for appliance change or resting period.  11/13/2024 0943 by Rinku Klein RN  Outcome: Adequate for Discharge  11/13/2024 0217 by Randall Berman RN  Outcome: Progressing     Problem: ABCDS Injury Assessment  Goal: Absence of physical injury  11/13/2024 0943 by Rinku Klein RN  Outcome: Adequate for Discharge  11/13/2024 0217 by Randall Berman RN  Outcome: Progressing     Problem: Confusion  Goal: Confusion, delirium, dementia, or psychosis is improved or at baseline  Description: INTERVENTIONS:  1. Assess for possible contributors to thought disturbance, including medications, impaired

## 2024-11-14 ENCOUNTER — CARE COORDINATION (OUTPATIENT)
Dept: CARE COORDINATION | Age: 89
End: 2024-11-14

## 2024-11-14 ENCOUNTER — TELEPHONE (OUTPATIENT)
Dept: INTERNAL MEDICINE | Age: 89
End: 2024-11-14

## 2024-11-14 RX ORDER — ALPRAZOLAM 0.25 MG/1
0.25 TABLET ORAL NIGHTLY
COMMUNITY

## 2024-11-14 NOTE — TELEPHONE ENCOUNTER
Care Transitions Initial Follow Up Call    Outreach made within 2 business days of discharge: Yes    Patient: Yeyo Bravo Patient : 1934   MRN: [id number]  Reason for Admission: UTI  Discharge Date: 24       Spoke with: GP nurse Cesar)    Discharge department/facility: [facility] Interactive Patient Contact:  Was patient able to fill all prescriptions: Yes  Was patient instructed to bring all medications to the follow-up visit: BHARATHI Long will follow up at [facility]  Is patient taking all medications as directed in the discharge summary? Yes  Does patient's nurse understand their discharge instructions: Yes  Does patient's nurse have questions or concerns that need addressed prior to 7-14 day follow up office visit: no    Additional needs identified to be addressed with provider  No needs identified             Scheduled appointment with PCP within 7-14 days    Follow Up  Future Appointments   Date Time Provider Department Center   2024  9:30 AM Tamy Stevens APRN - CNP [facility]   2025 10:15 AM José Miguel Riley MD [facility]   2025 11:30 AM Domingo Weiss PA-C [facility]       Lesley Waldron LPN

## 2024-11-14 NOTE — CARE COORDINATION
Confirmed with nursing at [facility] that patient is under 24 hour nursing care at their facility, so Care Transition will not follow patient at this time.  Confirmed that staff received discharge paperwork from hospital.

## 2024-11-15 ENCOUNTER — TELEPHONE (OUTPATIENT)
Dept: INTERNAL MEDICINE | Age: 89
End: 2024-11-15

## 2024-11-15 NOTE — PROGRESS NOTES
Trauma/Surigical Critical Care Sign Out Note:     Date and time: 2024 5:13 PM  Patient's name:  Yeyo Bravo  Medical Record Number: [id number]  Patient's YOB: 1934  Age: 89 y.o.  Date of Admission: 2024  9:11 AM  Length of stay during current admission: 2    Code Status: DNR-CCA    Mode of physician to physician communication:        [] Via telephone   [] In person     Date and time of sign-out: 2024 5:13 PM    Accepting surgery resident: Dr. Cancino    Accepting surgery attending: Dr. 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    ICU course summary:  Hospital Course:  24: MRI, admit to TICU for MAP>85 pushes, indwelling catheter changed by urology, [id number] LR at maintenance, CK down trending     11/10 - MAP pushes, bedside swallow failed, SLP eval tomorrow, for now trying apple sauce and other things  : ok to transfer to step down, map pushes until 11/15, has not required any pressors, palliative discussing care/code status with family, awaiting finalization of potential DNR-CC    Procedures during ICU stay:      Current vitals:  Temp: Temp: 98.2 °F (36.8 °C)Temp  Av.1 °F (36.7 °C)  Min: 97.9 °F (36.6 °C)  Max: 98.2 °F (36.8 °C) BP Systolic (24hrs), Av , Min:135 , Max:180   Diastolic (24hrs), Av, Min:75, Max:124   Pulse Pulse  Av.4  Min: 72  Max: 124 Resp Resp  Avg: 15.3  Min: 0  Max: 21 Pulse ox SpO2  Av.1 %  Min: 96 %  Max: 100 %    Physical exam:  GENERAL: minimally reactive, no distress  NEURO: unable to adequately assess cranial nerves; quadriplegia  LUNGS: normal effort; symmetric rise and fall of chest wall  HEART: aflutter  ABDOMEN: unalbe to fully assess due to neurological status  EXTREMITY: 
  Attending Physician Statement  I have discussed the case, including pertinent history and exam findings with the resident and the team.  I have seen and examined the patient and the key elements of the encounter have been performed by me.  I agree with the assessment, plan and orders as documented by the resident.      Review of Systems:   Unable to obtain because of pt condition    89 years old gentleman with dementia, coming from skilled nursing facility, history of systolic and diastolic CHF, was transferred from outlying facility after he sustained a mechanical fall.  Patient has fracture of his spine.  Neurosurgery and neurology has been evaluating the patient.  Patient also has an ESBL UTI and is being treated with ertapenem.    Admit the patient as an inpatient.  Encourage free water intake after speech therapy evaluation.  Up until then will give D5 half-normal saline with 20 mill equivalent of potassium at 50 mL an hour to maintain hydration  Will give bowel regimen for constipation  Adjust Beck catheter  Monitor troponins, currently at baseline  Replace vitamin D  Continue antibiotics with meropenem  Speech therapy evaluation because for concern for aspiration  Blood pressure control and will resume home medications.  Has a history of atrial fibrillation, resume amiodarone and metoprolol.  Start heparin instead of Xarelto if okay with neurology/neurosurgery  Follow-up on MRI as ordered  Trauma surgery is p  EKG has not been scanned into the system yet but was deemed nonacute by ER physician      Jenae Scott MD, FACP  Attending Physician, Internal Medicine Service    Internal Medicine Residency Program  11/9/2024, 11:13 PM    
  [facility] [place]     Emergency/Trauma Note    PATIENT NAME: Yeyo Bravo    Shift date: 11/09/2024  Shift day: Saturday   Shift # 1    Room # 40/40     Name: Yeyo Bravo            Age: 89 y.o.  Gender: male          Voodoo: Presbyterian   Place of Confucianist:     Trauma/Incident type: Adult Trauma Consult  Admit Date & Time: 11/9/2024  9:11 AM  TRAUMA NAME: None    PATIENT/EVENT DESCRIPTION:  Yeyo Bravo is a 89 y.o. male who arrived via ground ambulance as adult trauma consult. Patient was a transfer from [facility]. Per report, patient took a fall a week ago. Patient to be admitted to [facility].       SPIRITUAL ASSESSMENT-INTERVENTION-OUTCOME:  No spiritual assessment was carried out because patient was not communicating.  provided ministry of presence and prayed at patient's bedside. Family was not present at the time.  called patient's cousin, William, at [phone] and he said he was the only family member. William said he would be visiting patient on November 10.  offered emotional support to William.      PATIENT BELONGINGS:  No belongings noted    ANY BELONGINGS OF SIGNIFICANT VALUE NOTED:  Unknown    REGISTRATION STAFF NOTIFIED?  Yes    WHAT IS YOUR SPIRITUAL CARE PLAN FOR THIS PATIENT?:  Follow up visits recommended for ongoing assessment of patient's condition and for more prayers and support.     Electronically signed by Chaplain Fara, on 11/9/2024 at 12:34 PM.  [facility]  [phone]    
  ICU PROGRESS NOTE        PATIENT NAME: Yeyo Bravo  MEDICAL RECORD NO. [id number]  DATE: 11/10/2024    PRIMARY CARE PHYSICIAN: Mercedes Solis, APRN - CNP    HD: # 1    ASSESSMENT    Patient Active Problem List   Diagnosis    Hyperlipidemia    Osteoarthritis    Allergic rhinitis    Diverticulosis    Anxiety    Atrial fibrillation (HCC)    Coronary artery disease involving native coronary artery of native heart    Mass of upper lobe of right lung    Chronic combined systolic and diastolic CHF (congestive heart failure) ([facility])    Benign prostatic hyperplasia with incomplete bladder emptying    Hypothyroidism    Hypertension    Dementia ([facility])    Intermediate stage nonexudative age-related macular degeneration of both eyes    Combined forms of age-related cataract of right eye    Pseudophakia of left eye    H/O fall    Epididymitis    Scrotal abscess    Pyocele    Hydronephrosis, bilateral    Bladder outlet obstruction    Complicated UTI (urinary tract infection)    Secondary hypercoagulable state ([facility])    Chronic cerebral ischemia    Recurrent UTI, multiple    Chronic kidney disease    Falls    AMS (altered mental status)    Closed nondisplaced fracture of sixth cervical vertebra ([facility])    Hyperextension injury of cervical spine, initial encounter    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 ([facility])    Cervical spinal cord injury, initial encounter ([facility])    Acute cystitis without hematuria       MEDICAL DECISION MAKING AND PLAN  Neuro:  Advanced dementia at baseline  GCS 14, confused   Neurology consulted  Neurosurgery consulted  Patient likely a poor surgical candidate d/t co-morbidities and likely similar prognosis with or without surgery, additionally, surgery is not urgent given this is a central cord issue without ongoing traumatic cord compression  Recommend work with therapy  Minimal transverse process fracture with splaying at the C5-C6 level with large osetophyte complex  Spinal cord injury with T2 hypersensitivity 
  Neurology Nurse Practitioner Progress Note      INTERVAL HISTORY: This is a 89 y.o.  male admitted 11/9/2024 for acute nondisplaced fracture of transverse process of C6. This is a follow-up neurology progress note. The patient was examined and the chart was reviewed. Discussed with the RN. There were no acute events overnight. Patient was easily arousable, minimal verbal output. Thought he was in [place]; did not reply to any other orientation questions. Weakly squeezed both hands on command, with some prompting & wiggled toes on command.    HPI: Yeyo Bravo is a 89 y.o. male with H/O A fib, HTN, HLD, CAD s/p CABG x 2, CHF, CKD, peripheral polyneuropathy, recurrent falls, dementia, who was admitted as a transfer from [facility] ED on 11/9/2024 for acute nondisplaced fracture of transverse process of C6.  Patient resides at a nursing home with baseline history of advanced dementia with baseline confusion.  Originally patient fell on 11/1/24 and sustained facial injuries.  He was seen at [facility] ED where CT head and CT facial bones were unremarkable.  He had UTI and was discharged while on IV ATB treatment.  Over the next few days patient did not return to his baseline as he was not responding appropriately, unable to move his limbs that began to exhibit contracture like rigidity.  On the night of 11/8/2024 staff noted that patient was more confused than his baseline.  EMS were called who took the patient to [facility] ED.  CT images showed acute nondisplaced fracture of transverse process of C6 along with minor posttraumatic retropharyngeal edema.  Patient was transferred to [facility] ED for trauma/neurosurgery evaluation and admission.  Patient has history of A-fib for which he was taking Xarelto.     Patient was admitted to ICU under trauma team.  Neurology was consulted for AMS evaluation.      meropenem  1,000 mg IntraVENous Q8H    sodium chloride flush  5-40 mL IntraVENous 2 times 
  PROGRESS NOTE    PATIENT NAME: Yeyo Bravo  MEDICAL RECORD NO. [id number]  DATE: 11/12/2024    PRIMARY CARE PHYSICIAN: Mercedes Solis, APRN - CNP    HD: # 3    ASSESSMENT    Patient Active Problem List   Diagnosis    Hyperlipidemia    Osteoarthritis    Allergic rhinitis    Diverticulosis    Anxiety    Atrial fibrillation (HCC)    Coronary artery disease involving native coronary artery of native heart    Mass of upper lobe of right lung    Chronic combined systolic and diastolic CHF (congestive heart failure) ([facility])    Benign prostatic hyperplasia with incomplete bladder emptying    Hypothyroidism    Hypertension    Dementia ([facility])    Intermediate stage nonexudative age-related macular degeneration of both eyes    Combined forms of age-related cataract of right eye    Pseudophakia of left eye    H/O fall    Epididymitis    Scrotal abscess    Pyocele    Hydronephrosis, bilateral    Bladder outlet obstruction    Complicated UTI (urinary tract infection)    Secondary hypercoagulable state ([facility])    Chronic cerebral ischemia    Recurrent UTI, multiple    Chronic kidney disease    Falls    AMS (altered mental status)    Closed nondisplaced fracture of sixth cervical vertebra ([facility])    Hyperextension injury of cervical spine, initial encounter    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    MEDICAL DECISION MAKING AND PLAN    C6 fracture with central cord syndrome  Neurosurgery consulted  Non-operative management- cervical collar when out of bed  Incomplete spastic quadraplegia  MAP goals > 85 until 11/15, sustaining on his own without pressor requirements  GCS 14, dementia at baseline    Altered mental status, hx dementia  Neurology consulted for altered mental 
  Physician Progress Note      PATIENT:               JUNIOR JOHNS  CSN #:                  [id number]  :                       1934  ADMIT DATE:       2024 9:11 AM  DISCH DATE:  RESPONDING  PROVIDER #:        NADIYA RODRIGUEZ          QUERY TEXT:    Pt admitted with UTI.  Pt noted to have chronic sanchez, exchanged upon   admission. If possible, please document in the progress notes and discharge   summary if you are evaluating and/or treating any of the following:    The medical record reflects the following:  Risk Factors: Age 89 Complicated UTI with Chr Sanchez, Spinal cord injury  Clinical Indicators: H/P CC Minimal transverse process fracture with splaying   at the C5-C6 level with large osetophyte complex Spinal cord injury with T2   hypersensitivity Hyperextension injury within the C5-C6 disc space Incomplete   spastic quadriplegia. Patient does have diagnosed UTI from pre-admission,   currently on Meropenem  Sacnhez WBC 10.3 UA WBC [id number], Neg Nitrite SM Leuk IM   Consult Patient also has an ESBL UTI and is being treated with ertapenem.   Adjust Sanchez catheter.  CC PN  Patient with chronic indwelling catheter   for urinary retention, changed upon arrival  Treatment: Meropenem, labs, monitor, Exchange Sanchez  Any questions  Please Call  Namita Sousa RN,BSN,CDI  -Fri  6a,-230pm  [phone]  Options provided:  -- UTI due to chronic indwelling urinary catheter  -- UTI due to previous urinary catheter ***(date)  -- UTI not due to indwelling urinary catheter  -- UTI due to suprapubic catheter  -- UTI not due to suprapubic catheter  -- UTI due to ureteral stent  -- UTI not due to ureteral stent  -- UTI due to nephrostomy tube  -- UTI not due to nephrostomy tube  -- UTI due to self catheterization  -- UTI not due to self catheterization  -- UTI due to ileal conduit  -- UTI not due to ileal conduit  -- UTI is related to *** (procedure and date)  -- UTI is not related to *** (procedure and date)  -- Other 
  [facility]  RUPERTO YU   11/11/2024    Yeyo Bravo  12/31/1934  [id number]      Time spent with Patient: 0 minutes  Time spent with Family:     This writer was unable to complete screen or therapy services with patient at bedside at this time due to the following:  Not currently appropriate due to mental status   
  Trauma Tertiary Survey    Admit Date: 11/9/2024  Hospital day 1      Subjective:     Yeyo Bravo  is a 89 y.o. male with PMH of UTI (klebsiella) currently undergoing treatment, CAD, CHF, chronic anemia, dementia, A-fib, HTN, CKD stage 2, baseline BLE contractures, on Xarelto who presents with altered mental status and decreased movement to BUE and BLE. Prior notes state that patient does reside at a nursing facility and had a fall one week ago. CTH was completed at that time which was negative. Staff at the nursing home noticed that patient has not been moving his upper or lower extremities which is abnormal for him. Patient was taken to outside hospital where CT cervical showed a left C6 transverse process fracture. CTA was negative except for mild retropharyngeal edema. Patient was transferred to [facility] for trauma and neurosurgery evaluation.     Objective:   PHYSICAL EXAM:   Physical Exam  Physical Exam  Vitals and nursing note reviewed.   Constitutional:       General: He is sleeping. He is not in acute distress.     Appearance: He is normal weight. He is not ill-appearing, toxic-appearing or diaphoretic.   HENT:      Head: Normocephalic and atraumatic.      Right Ear: External ear normal.      Left Ear: External ear normal.      Nose: Nose normal. No congestion or rhinorrhea.      Mouth/Throat:      Mouth: Mucous membranes are moist.      Pharynx: Oropharynx is clear.   Eyes:      General: No scleral icterus.     Conjunctiva/sclera: Conjunctivae normal.   Cardiovascular:      Rate and Rhythm: Normal rate. Rhythm irregular.      Heart sounds: Normal heart sounds. No murmur heard.  Pulmonary:      Effort: Pulmonary effort is normal. No respiratory distress.      Breath sounds: Normal breath sounds. No wheezing.   Abdominal:      General: Abdomen is flat. There is no distension.      Palpations: Abdomen is soft.      Tenderness: There is no abdominal tenderness.   Musculoskeletal:         General: Normal 
 [facility] CAR 1- SICU   CLINICAL BEDSIDE SWALLOW EVALUATION    NAME: Yeyo Bravo  : 1934  MRN: [id number]    ADMISSION DATE: 2024         Past Medical History:  has a past medical history of Abdominal pain, Acute bronchitis, MARITA (acute kidney injury) (HCC), MARITA (acute kidney injury) (HCC), Allergic rhinitis, Anxiety, Bradycardia, Cataract, right, Choroidal nevus of right eye, Chronic systolic CHF (congestive heart failure) (HCC), Coronary artery disease involving native coronary artery of native heart, Dementia associated with 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 behavioral disturbance (HCC), Dermatophytosis of nail, Diverticulosis, Elevated PSA, Gross hematuria, Hemorrhoids, History of measles, History of mumps, Hyperlipidemia, Hypertension, Hypotension, Hypothyroidism, Mass of upper lobe of right lung, Occult blood positive stool, Osteoarthritis, Overweight, Paroxysmal atrial fibrillation (HCC), Pneumonia due to organism, Pseudophakia, left eye, PSVT (paroxysmal supraventricular tachycardia) (HCC), Recurrent UTI, Retinal detachment, Seborrheic dermatitis, Sepsis due to urinary tract infection (HCC), Urinary tract infection without hematuria, and UTI (urinary tract infection).  Past Surgical History:  has a past surgical history that includes Coronary artery bypass graft; Appendectomy ( and ); Tonsillectomy; retinal laser (Left, 2011); Cataract removal (Left, 2013); Skin tag removal (1999); Abscess Drainage (4046-4611); Colonoscopy (2021); and Cystoscopy (N/A, 5/3/2023).        Date of Eval: 2024  Evaluating Therapist: Vira Elizabeth    Current Diet level:  Current Diet : NPO  Current Liquid Diet : NPO    Primary Complaint:      Yeyo Bravo  is a 89 y.o. male with PMH of UTI (klebsiella) currently undergoing treatment, CAD, CHF, chronic anemia, dementia, A-fib, HTN, CKD stage 2, baseline BLE contractures, on Xarelto who presents with altered mental status and decreased 
0155 - Resident messaged regarding patients consistent hypertension. Resident advised to continue to monitor - no new orders at this time.  
1200 [place] received a call from [facility] nurse at [facility] stating that they did not receive the patient oxycodone prescription and that the patient's neck brace was not with the patient when he arrived to them. Washingtonr contacted Dr. Ramos and notified him about the situation. Washingtonr searched the room for the neck brace and did not find it. Washingtonr contacted housekeeping to see if they saw it when they cleaned the room. The house keeper stated that it was not in the room when she cleaned the room but that she would look around for it.   1500 House keeper found the patients neck brace in a trash bin down stairs.  1600 Washingtonr contacted farihaagustín guzman to make sure that they received the orders and notified them that the brace was located if they wanted to come get it. They stated that they had received the oxycodone order and that they were going to get a new brace for the patient.    
15 Variable Trauma-Specific Frailty Index   Comorbidities   Cancer History Yes (1) No (0)   Coronary Heart Disease MI (1) CABG (0.75) Mild (0.25)  No (0)   Dementia Severe (1) Moderate (0.5) Mild (0.25)  No (0)   Daily Activities   Help With Grooming Yes (1) No (0)   Help with Managing Money Yes (1) No (0)   Help doing Housework Yes (1) No (0)   Help with Toileting Yes (1) No (0)   Help Walking Wheelchair (1) Walker (0.75) Cane (0.5) No (0)   Health Attitude   Feel Less Useful Most Time (1) Sometimes (0.5) Never (0)   Feel Sad Most Time (1) Sometimes (0.5) Never (0)   Feel Effort to Do Everything Most Time (1) Sometimes (0.5) Never (0)   Feels Lonely Most Time (1) Sometimes (0.5) Never (0)   Falls Most Time (1) Sometimes (0.5) Never (0)   Function   Sexually Active Yes (0)  No(1)   Nutrition   Albumin < 3g/dL (1)  > 3g/dL   Scoring   Score   FI (Score/15)   > 0.25 = Frail   *Based on 2-weeks prior to hospital admission   Trauma Specific Fraility Index > or = to 4 (4/15 = 0.26)  Trauma Specific Fraility Index Score:    7.25  
EEG complete  
Facility/Department: [facility] CAR 1- SICU  Initial Speech/Language/Cognitive Assessment    NAME: Yeyo Bravo  : 1934   MRN: [id number]  ADMISSION DATE: 2024  ADMITTING DIAGNOSIS: has Hyperlipidemia; Osteoarthritis; Allergic rhinitis; Diverticulosis; Anxiety; Atrial fibrillation (HCC); Coronary artery disease involving native coronary artery of native heart; Mass of upper lobe of right lung; Chronic combined systolic and diastolic CHF (congestive heart failure) (HCC); Benign prostatic hyperplasia with incomplete bladder emptying; Hypothyroidism; Hypertension; Dementia ([facility]); Intermediate stage nonexudative age-related macular degeneration of both eyes; Combined forms of age-related cataract of right eye; Pseudophakia of left eye; H/O fall; Epididymitis; Scrotal abscess; Pyocele; Hydronephrosis, bilateral; Bladder outlet obstruction; Complicated UTI (urinary tract infection); Secondary hypercoagulable state (HCC); Chronic cerebral ischemia; Recurrent UTI, multiple; Chronic kidney disease; Falls; AMS (altered mental status); Closed nondisplaced fracture of sixth cervical vertebra ([facility]); Hyperextension injury of cervical spine, initial encounter; 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      Date of Eval: 2024   Evaluating Therapist: Vira Elizabeth    RECENT RESULTS  CT OF HEAD/MRI:     1. No acute infarct or other acute intracranial process.  2. Moderate generalized volume loss with mild chronic microvascular ischemia.    Primary Complaint:     Yeyo Bravo  is a 89 y.o. male with PMH of UTI (klebsiella) currently undergoing treatment, CAD, CHF, chronic anemia, dementia, A-fib, HTN, CKD stage 2, baseline BLE contractures, on Xarelto who presents with altered mental status and decreased movement to BUE and BLE. Prior notes state that 
[facility] Inpatient [facility]charge Summary  RUPERTO YU  11/15/2024        Yeyo Bravo  12/31/1934  [id number]    Date & Time of Discharge: 11/13/2024  8:30 AM     Is consult a Victim of Crime?: No    Services provided:  Unable to complete due to medical condition: Not appropriate due to mental status    Screen Results (If any):            Discharge Recommendations:  No outpatient mental health services recommended      The therapist may be reached through the [facility] offices at [phone].    
Occupational Therapy Initial Evaluation  Facility/Department: [facility] CAR 1- SICU   Patient Name: Yeyo Bravo        MRN: [id number]    : 1934    Date of Service: 2024    Discharge Recommendations  Discharge Recommendations: Patient would benefit from continued therapy after discharge  OT Equipment Recommendations  Equipment Needed:  (CTA)    Chief Complaint   Patient presents with    Fall    Altered Mental Status     Past Medical History:  has a past medical history of Abdominal pain, Acute bronchitis, MARITA (acute kidney injury) (HCC), MARITA (acute kidney injury) (HCC), Allergic rhinitis, Anxiety, Bradycardia, Cataract, right, Choroidal nevus of right eye, Chronic systolic CHF (congestive heart failure) (HCC), Coronary artery disease involving native coronary artery of native heart, Dementia associated with 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 behavioral disturbance (HCC), Dermatophytosis of nail, Diverticulosis, Elevated PSA, Gross hematuria, Hemorrhoids, History of measles, History of mumps, Hyperlipidemia, Hypertension, Hypotension, Hypothyroidism, Mass of upper lobe of right lung, Occult blood positive stool, Osteoarthritis, Overweight, Paroxysmal atrial fibrillation (HCC), Pneumonia due to organism, Pseudophakia, left eye, PSVT (paroxysmal supraventricular tachycardia) (HCC), Recurrent UTI, Retinal detachment, Seborrheic dermatitis, Sepsis due to urinary tract infection (HCC), Urinary tract infection without hematuria, and UTI (urinary tract infection).  Past Surgical History:  has a past surgical history that includes Coronary artery bypass graft; Appendectomy ( and ); Tonsillectomy; retinal laser (Left, 2011); Cataract removal (Left, 2013); Skin tag removal (1999); Abscess Drainage (2332-3462); Colonoscopy (2021); and Cystoscopy (N/A, 5/3/2023).    Assessment  Performance deficits / Impairments: Decreased functional mobility ;Decreased ADL status;Decreased ROM;Decreased 
Patient brought at 5:30pm to unit.  Patient had high pressures 175/101 and heart rate of 110. Primary notified.  Patient should not have been brought to a stepdown unit and it seems orders were not followed properly.  Writer notified doctor and told patient needs to be in TICU.  Patient is being transferred at 7:30 pm.  LTME done on 4A.  Report called and no further questions asked.  (Mallory Stoll RN)  .    Please see Plan of care note. Patient found to have traumatic injury, transferred to trauma service. Due to injury, patient determined to need ICU level care for MAP pushes if needed for MAP above 85 and arterial line monitoring. Discussed with neurosurgery team and medicine service. Transfer orders placed and ICU called to notify of patient arrival. Was notified by nurse after transfer orders placed that patient cannot receive Levophed on 4C, recommend completion of transfer as order before levophed is given, since patient is not currently requiring levophed. Discussed with TICU who was awaiting report.     -Remington Ordonez DO  Electronically signed by Remington Ordonez DO on 11/9/2024 at 7:52 PM    
Patient discharged, Midline removed, PIV removed, report called to [facility] ([facility])   
Pretransfer report:    C6 fracture from fall  Yeyo Bravo  [birth year]    Seen twice for fall   CT brain an face negative  UTI was found then and admitted -pseudomonas   AMS today. Hx of alzheimers  Concern for fracture in spine - not moving normally  Not following commands  On xarelto  TP fx c6 has a collar on  CTA neck - negative    CT brain negative.     Minor retropharyngeal swelling.     Vitals Ok     
Spiritual Health History and Assessment/Progress Note  [facility]    (P) Loneliness/Social Isolation, Trauma,  ,      Name: Yeyo Bravo MRN: [id number]    Age: 89 y.o.     Sex: male   Language: English   [employer]: [employer]   AMS (altered mental status)     Date: 11/11/2024            Total Time Calculated: (P) 10 min              Spiritual Assessment began in [facility] CAR 1- SICU        Referral/Consult From: (P) Nurse   Encounter Overview/Reason: (P) Loneliness/Social Isolation  Service Provided For: (P) Patient    Annamarie, Belief, Meaning:   Patient unable to assess at this time  Family/Friends No family/friends present      Importance and Influence:  Patient unable to assess at this time  Family/Friends No family/friends present    Community:  Patient Other: Patient appears to be agitated and will not engage in conversation.  Remains passive.   Family/Friends No family/friends present    Assessment and Plan of Care:     Patient Interventions include: Facilitated expression of thoughts and feelings  Family/Friends Interventions include: No family/friends present    Patient Plan of Care: Spiritual Care available upon further referral  Family/Friends Plan of Care: No family/friends present    Electronically signed by CELY Moseley on 11/11/2024 at 11:19 AM     11/11/24 1117   Encounter Summary   Encounter Overview/Reason Loneliness/Social Isolation   Service Provided For Patient   Referral/Consult From Nurse   Support System Significant other   Last Encounter  11/11/24   Complexity of Encounter Low   Begin Time 0945   End Time  0955   Total Time Calculated 10 min   Assessment/Intervention/Outcome   Assessment Unable to assess   Intervention Active listening;Explored/Affirmed feelings, thoughts, concerns   Outcome Other (comment)  (Appeared agitated and would not engage in conversation)     Electronically signed by Lenny Lino on 11/11/2024 at 11:19 AM      
did not increase with max verbal and visual cues. Pt occasionally shook his head \"no\" in response to prompts throughout task.    Bio Y/N Questions: 5/9 did not increase with max verbal cues.    Simple Y/N Questions: 3/6 did not increase with max verbal cues.    Mod Y/N Questions: 2/4 did not increase with max verbal cues.    Verbal Expression:   Automatic Associations-Nursery Rhymes: 0/2 did not increase with max verbal and phonemic cues.    Automatic Associations-Food: 0/2 did not increase with max verbal and phonemic cues.    Other:   Pt frequently did not respond to prompts throughout session.     Plan:  [x] Continue ST services    [] Discharge from ST:      Discharge recommendations: [x]  Further therapy recommended at discharge.The patient should be able to tolerate at least 3 hours of therapy per day over 5 days or 15 hours over 7 days. [] Further therapy recommended at discharge.   [] No therapy recommended at discharge.      Completed by: Vira Elizabeth  Clinician    Cosigned By: Cherelle Lerma M.S.CCC/SLP  
quadriplegia s/p fall one week ago.     Maintain MAP >85  Okay for PT/OT  Collar when out of bed  Okay for DVT prophylaxis  Neuro checks per floor protocol      Please contact neurosurgery with any changes in patients neurologic status.       Jonathan Diaz CNP  11/10/24  7:31 AM      
extension, WFL flexion, ankle/ foot WFL  PROM LLE (degrees)  LLE PROM: WFL  PROM RUE (degrees)  RUE General PROM: see OT  PROM LUE (degrees)  LUE General PROM: see OT  Strength Other  Other: no AROM observed, noted tightness in BLE, especially R knee flexion           Bed mobility  Rolling to Left: Maximum assistance  Rolling to Right: Maximum assistance  Supine to Sit: Dependent/Total;2 Person assistance  Sit to Supine: Dependent/Total;2 Person assistance  Bed Mobility Comments: unable to attain EOB. When attempting, pt resisting trunk flexion preventing any maintainance of EOB. Pt returned to supine. maxA rolling in bed           Balance  Sitting - Static: Poor;-  Comments: unable to attain full EOB d/t poor tolerance trunk flexion when attempted.  Exercise Treatment: roiling in bed maxA for freddie care       AM-PAC - Mobility    AM-PAC Basic Mobility - Inpatient   How much help is needed turning from your back to your side while in a flat bed without using bedrails?: A Lot  How much help is needed moving from lying on your back to sitting on the side of a flat bed without using bedrails?: Total  How much help is needed moving to and from a bed to a chair?: Total  How much help is needed standing up from a chair using your arms?: Total  How much help is needed walking in hospital room?: Total  How much help is needed climbing 3-5 steps with a railing?: Total  AM-PAC Inpatient Mobility Raw Score : 7  AM-PAC Inpatient T-Scale Score : 26.42  Mobility Inpatient CMS 0-100% Score: 92.36  Mobility Inpatient CMS G-Code Modifier : CM       Goals  Short Term Goals  Time Frame for Short Term Goals: 10 visits  Short Term Goal 1: Pt will demonstrate full PROM BLEs, except R knee to -10deg extension  Short Term Goal 2: Pt will demonstrate moda rolling in bed  Short Term Goal 3: Pt will be modA bed mobility  Short Term Goal 4: Pt will be Emi EOB 5min       Education  Patient Education  Education Given To: Patient  Education 
degenerative changes in the cervical spine.     CT CSpine W/O Contrast    Addendum Date: 11/9/2024    ADDENDUM: Please note the 2nd impression should read as a transverse process fracture at C6 as described in the body of the report, inadvertently reported as T6 in the impression.     Result Date: 11/9/2024  1. No acute intracranial abnormality. 2. Suspected subtle transverse foramen fracture at T6. Recommend further evaluation with CTA of the neck. 3. Multilevel degenerative changes in the cervical spine.       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 *    ESBL UTI  -Urine culture on 11/5/2024 grew Klebsiella pneumoniae  -Patient was discharged from a ertapenem 1 g IV for 10 days  -Patient started on meropenem while in inpatient  -Continue meropenem thousand Mg every 8 hours  -Will monitor     Geriatric precaution  -Patient has a history of frequent fall  -He is on anticoagulation Xarelto for A-fib  -Will hold Xarelto for now  -Fall precaution  -Frequent repositioning in order to avoid pressure  -Proper nutrition  -Avoid sedating medication     Hypothyroidism  -Recent TSH 2.34 done on 11/9/2024  -Will resume the dose of levothyroxine 88 mcg  -Will monitor     Hyperextension injury of cervical spine/close nondisplaced fracture of 6 cervical/cervical spinal cord injury  -Primary team wants to consult palliative, will follow recommended  MAP goal greater than 85  -Management as per primary  Diet  PT/OT:   Discharge Planning / SW:       LANI BUCHANAN MD  Internal Medicine Resident, PGY-1  [facility] 
intracranial abnormality. 2. Suspected subtle transverse foramen fracture at T6. Recommend further evaluation with CTA of the neck. 3. Multilevel degenerative changes in the cervical spine.     CT CSpine W/O Contrast    Addendum Date: 11/9/2024    ADDENDUM: Please note the 2nd impression should read as a transverse process fracture at C6 as described in the body of the report, inadvertently reported as T6 in the impression.     Result Date: 11/9/2024  1. No acute intracranial abnormality. 2. Suspected subtle transverse foramen fracture at T6. Recommend further evaluation with CTA of the neck. 3. Multilevel degenerative changes in the cervical spine.       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 *    ESBL UTI  -Urine culture on 11/5/2024 grew Klebsiella pneumoniae  -Patient was discharged from a ertapenem 1 g IV for 10 days  -Patient started on meropenem while in inpatient  -Continue meropenem thousand Mg every 8 hours  -Will monitor     Geriatric precaution  -Patient has a history of frequent fall  -He is on anticoagulation Xarelto for A-fib  -Will hold Xarelto for now  -Fall precaution  -Frequent repositioning in order to avoid pressure  -Proper nutrition  -Avoid sedating medication     Hypothyroidism  -Recent TSH 2.34 done on 11/9/2024  -Will resume the dose of levothyroxine 88 mcg  -Will monitor     Hyperextension injury of cervical spine/close nondisplaced fracture of 6 cervical/cervical spinal cord injury  -Primary team wants to consult palliative, will follow 
reconstruction, and/or weight based adjustment of the mA/kV was utilized to reduce the radiation dose to as low as reasonably achievable.; CT of the cervical spine was performed without the administration of intravenous contrast. Multiplanar reformatted images are provided for review. Automated exposure control, iterative reconstruction, and/or weight based adjustment of the mA/kV was utilized to reduce the radiation dose to as low as reasonably achievable. COMPARISON: 11/01/2024 HISTORY: ORDERING SYSTEM PROVIDED HISTORY: altered mental status TECHNOLOGIST PROVIDED HISTORY: altered mental status Decision Support Exception - unselect if not a suspected or confirmed emergency medical condition->Emergency Medical Condition (MA); ORDERING SYSTEM PROVIDED HISTORY: Fall TECHNOLOGIST PROVIDED HISTORY: Fall Decision Support Exception - unselect if not a suspected or confirmed emergency medical condition->Emergency Medical Condition (MA) FINDINGS: CT HEAD: BRAIN/VENTRICLES: There is no acute intracranial hemorrhage, mass effect or midline shift. No abnormal extra-axial fluid collection.  The gray-white differentiation is maintained without evidence of an acute infarct.  There is no evidence of hydrocephalus. Patchy white matter low attenuation is identified within the periventricular and subcortical white matter. Generalized cortical atrophy. Intracranial atherosclerosis. ORBITS: Orbits appear unremarkable.  No acute abnormality. PARANASAL SINUSES: No acute air-fluid level seen in the visualized paranasal sinuses or mastoid air cells. SOFT TISSUE/CALVARIUM: The bony calvarium appears intact without fracture. No large soft tissue hematoma is seen. CT CERVICAL SPINE: BONES/ALIGNMENT: No occipital condyle fracture is identified.  The C1 ring appears intact.  At the level of T6, best seen on sagittal imaging, there is a subtle suspected transverse foramen fracture on image 14 of series 450, also seen on and around axial image 48

## 2024-11-15 NOTE — TELEPHONE ENCOUNTER
[facility] called requesting order for PT/OT. Writer looked over chart media and saw previous order in chart to [facility] with PT/OT order and refaxed to [facility]. Told P if that was not correct, we would request new order from [facility].

## 2024-11-18 ENCOUNTER — TELEPHONE (OUTPATIENT)
Dept: INTERNAL MEDICINE | Age: 89
End: 2024-11-18

## 2024-11-18 ENCOUNTER — OUTSIDE SERVICES (OUTPATIENT)
Dept: INTERNAL MEDICINE | Age: 89
End: 2024-11-18

## 2024-11-18 DIAGNOSIS — E87.6 HYPOKALEMIA: ICD-10-CM

## 2024-11-18 DIAGNOSIS — S14.109D INJURY OF CERVICAL SPINAL CORD, SUBSEQUENT ENCOUNTER (HCC): ICD-10-CM

## 2024-11-18 DIAGNOSIS — S19.80XD HYPEREXTENSION INJURY OF NECK, SUBSEQUENT ENCOUNTER: ICD-10-CM

## 2024-11-18 DIAGNOSIS — E87.0 HYPERNATREMIA: ICD-10-CM

## 2024-11-18 DIAGNOSIS — R05.1 ACUTE COUGH: ICD-10-CM

## 2024-11-18 DIAGNOSIS — S12.591D OTHER CLOSED NONDISPLACED FRACTURE OF SIXTH CERVICAL VERTEBRA WITH ROUTINE HEALING, SUBSEQUENT ENCOUNTER: ICD-10-CM

## 2024-11-18 DIAGNOSIS — R13.10 DYSPHAGIA, UNSPECIFIED TYPE: ICD-10-CM

## 2024-11-18 DIAGNOSIS — R23.8 SKIN BREAKDOWN: ICD-10-CM

## 2024-11-18 DIAGNOSIS — G82.54 INCOMPLETE QUADRIPLEGIA AT C5-6 LEVEL (HCC): ICD-10-CM

## 2024-11-18 DIAGNOSIS — R05.1 ACUTE COUGH: Primary | ICD-10-CM

## 2024-11-18 DIAGNOSIS — Z09 HOSPITAL DISCHARGE FOLLOW-UP: Primary | ICD-10-CM

## 2024-11-18 NOTE — TELEPHONE ENCOUNTER
As follow up from visit today, please fax order to [facility] for CXR. Further chart review pending, but would like to obtain CXR today due to concern for aspiration pneumonia.   I do see that he had a speech and language pathology consultation while admitted - can we verify if he had swallow study or dietary recommendations upon readmission to [facility]? I did see per note 11/12/24 that SLP had recommended further therapy at discharge, but I did not see note of swallow study

## 2024-11-18 NOTE — CARE COORDINATION
Opened in error - see other encounter same date that confirmed with nursing at [facility] that patient is under 24 hour nursing care

## 2024-11-18 NOTE — PROGRESS NOTES
Post-Discharge Transitional Care Follow Up      Yeyo Bravo   YOB: 1934    Date of Office Visit:  11/18/2024  Date of Hospital Admission: 11/9/24  Date of Hospital Discharge: 11/13/24  Readmission Risk Score (high >=14%. Medium >=10%):Readmission Risk Score: 18.9      Care management risk score Rising risk (score 2-5) and Complex Care (Scores >=6): No Risk Score On File     Non face to face  following discharge, date last encounter closed (first attempt may have been earlier): 11/14/2024     Call initiated 2 business days of discharge: Yes     Hospital discharge follow-up  -     CO DISCHARGE MEDS RECONCILED W/ CURRENT OUTPATIENT MED LIST  Injury of cervical spinal cord, subsequent encounter ([facility])  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 ([facility])  Other closed nondisplaced fracture of sixth cervical vertebra with routine healing, subsequent encounter  Hyperextension injury of neck, subsequent encounter  Dysphagia, unspecified type  Skin breakdown  Acute cough  Hypokalemia  -     Basic Metabolic Panel; Future  Hypernatremia  -     Basic Metabolic Panel; Future    Medical Decision Making: moderate complexity  No follow-ups on file.           Subjective:   HPI    Inpatient course: Discharge summary reviewed- see chart.    Patient presents for transition of care visit following admission for altered mental status.  Initially presented to [facility] emergency department.  Was noted to have acute nondisplaced fracture of transverse process of C6, and was subsequently transferred to level 1 trauma center to be evaluated by trauma surgery and neurosurgery.  Was admitted to ICU at [facility]. Failed bedside swallow study, was advised to have SLP evaluation, was noted to be tolerating regular diet upon discharge.  Staff did note possible concern for aspiration, discussed obtaining chest x-ray to rule this out.  Was discharged with oxycodone for pain, staff notes this has been well-controlled since returning to

## 2024-11-19 VITALS
OXYGEN SATURATION: 96 % | HEART RATE: 91 BPM | SYSTOLIC BLOOD PRESSURE: 134 MMHG | DIASTOLIC BLOOD PRESSURE: 96 MMHG | TEMPERATURE: 98.3 F

## 2024-11-19 NOTE — TELEPHONE ENCOUNTER
Received CXR results-  \"Conclusion: Small left base infiltration.\" (Scanned).  Results given to on-call provider (Dr. Levin). He reviewed and said if pt is doing ok, just have them ([facility] monitor him.    Spoke with GP nurse (Harvey)- pt is stable; vitals are good; no fever; SpO2 is in the 90s.   96,  P 91, SpO2 96%, T 98.3.  Instructed Harvey to contact office if pt developed any sx. Or got to ER if he has any severe sx. Voices understanding.    CXR results, and this note faxed to GP.

## 2024-11-20 ASSESSMENT — ENCOUNTER SYMPTOMS
WHEEZING: 0
RHINORRHEA: 0
COUGH: 1
VOMITING: 0
SHORTNESS OF BREATH: 0
DIARRHEA: 0
BACK PAIN: 0

## 2024-11-21 ENCOUNTER — HOSPITAL ENCOUNTER (OUTPATIENT)
Age: 89
Setting detail: SPECIMEN
Discharge: HOME | End: 2024-11-21
Payer: MEDICARE

## 2024-11-21 DIAGNOSIS — E87.0 HYPERNATREMIA: ICD-10-CM

## 2024-11-21 DIAGNOSIS — E87.6 HYPOKALEMIA: ICD-10-CM

## 2024-11-21 LAB
ANION GAP SERPL CALCULATED.3IONS-SCNC: 9 MMOL/L (ref 9–17)
BUN SERPL-MCNC: 11 MG/DL (ref 8–23)
BUN/CREAT SERPL: 12 (ref 9–20)
CALCIUM SERPL-MCNC: 7.9 MG/DL (ref 8.6–10.4)
CHLORIDE SERPL-SCNC: 110 MMOL/L (ref 98–107)
CO2 SERPL-SCNC: 24 MMOL/L (ref 20–31)
CREAT SERPL-MCNC: 0.9 MG/DL (ref 0.7–1.2)
GFR, ESTIMATED: 82 ML/MIN/1.73M2
GLUCOSE SERPL-MCNC: 95 MG/DL (ref 70–99)
POTASSIUM SERPL-SCNC: 3.4 MMOL/L (ref 3.7–5.3)
SODIUM SERPL-SCNC: 143 MMOL/L (ref 135–144)

## 2024-11-21 PROCEDURE — 80048 BASIC METABOLIC PNL TOTAL CA: CPT

## 2024-11-21 PROCEDURE — 36415 COLL VENOUS BLD VENIPUNCTURE: CPT

## 2024-11-25 ENCOUNTER — OUTSIDE SERVICES (OUTPATIENT)
Dept: INTERNAL MEDICINE | Age: 88
End: 2024-11-25
Payer: MEDICARE

## 2024-11-25 ENCOUNTER — TELEPHONE (OUTPATIENT)
Dept: INTERNAL MEDICINE | Age: 88
End: 2024-11-25

## 2024-11-25 VITALS
HEART RATE: 82 BPM | DIASTOLIC BLOOD PRESSURE: 67 MMHG | SYSTOLIC BLOOD PRESSURE: 133 MMHG | RESPIRATION RATE: 18 BRPM | TEMPERATURE: 98 F | OXYGEN SATURATION: 96 %

## 2024-11-25 DIAGNOSIS — I48.0 PAROXYSMAL ATRIAL FIBRILLATION (HCC): ICD-10-CM

## 2024-11-25 DIAGNOSIS — N39.0 RECURRENT UTI: Primary | ICD-10-CM

## 2024-11-25 DIAGNOSIS — I50.42 CHRONIC COMBINED SYSTOLIC AND DIASTOLIC CHF (CONGESTIVE HEART FAILURE) (HCC): ICD-10-CM

## 2024-11-25 DIAGNOSIS — I10 PRIMARY HYPERTENSION: ICD-10-CM

## 2024-11-25 DIAGNOSIS — B96.1 KLEBSIELLA PNEUMONIAE (K. PNEUMONIAE) AS THE CAUSE OF DISEASES CLASSIFIED ELSEWHERE: ICD-10-CM

## 2024-11-25 DIAGNOSIS — N18.9 CHRONIC KIDNEY DISEASE, UNSPECIFIED CKD STAGE: ICD-10-CM

## 2024-11-25 DIAGNOSIS — F03.90 DEMENTIA WITHOUT BEHAVIORAL DISTURBANCE, PSYCHOTIC DISTURBANCE, MOOD DISTURBANCE, OR ANXIETY, UNSPECIFIED DEMENTIA SEVERITY, UNSPECIFIED DEMENTIA TYPE (HCC): ICD-10-CM

## 2024-11-25 DIAGNOSIS — R19.7 DIARRHEA, UNSPECIFIED TYPE: Primary | ICD-10-CM

## 2024-11-25 PROCEDURE — 99347 HOME/RES VST EST SF MDM 20: CPT | Performed by: NURSE PRACTITIONER

## 2024-11-25 ASSESSMENT — ENCOUNTER SYMPTOMS
NAUSEA: 0
RHINORRHEA: 0
VOMITING: 0
BLOOD IN STOOL: 0
DIARRHEA: 1
COUGH: 0
WHEEZING: 0

## 2024-11-25 NOTE — PROGRESS NOTES
Visit note, and lab orders faxed to GP (C-Diff, CBC, BMP)  
if symptoms worsen or fail to improve.    Orders Placed This Encounter   Procedures    Clostridium Difficile Toxin/Antigen     Standing Status:   Future     Standing Expiration Date:   11/25/2025    Basic Metabolic Panel     Standing Status:   Future     Standing Expiration Date:   11/25/2025    CBC with Auto Differential     Standing Status:   Future     Standing Expiration Date:   11/25/2025     No orders of the defined types were placed in this encounter.              Electronically signed by DAYANARA Davis CNP on 11/25/2024 at 10:16 AM

## 2024-11-25 NOTE — TELEPHONE ENCOUNTER
[facility] New Cert forms completed- 11/06/24 to 1/04/25- BELTRAN [place]

## 2024-11-27 ENCOUNTER — HOSPITAL ENCOUNTER (OUTPATIENT)
Age: 88
Setting detail: SPECIMEN
Discharge: HOME | End: 2024-11-27
Payer: MEDICARE

## 2024-11-27 DIAGNOSIS — R19.7 DIARRHEA, UNSPECIFIED TYPE: ICD-10-CM

## 2024-11-27 LAB
ANION GAP SERPL CALCULATED.3IONS-SCNC: 12 MMOL/L (ref 9–17)
BASOPHILS # BLD: 0.04 K/UL (ref 0–0.2)
BASOPHILS NFR BLD: 1 % (ref 0–2)
BUN SERPL-MCNC: 18 MG/DL (ref 8–23)
BUN/CREAT SERPL: 18 (ref 9–20)
CALCIUM SERPL-MCNC: 8.6 MG/DL (ref 8.6–10.4)
CHLORIDE SERPL-SCNC: 105 MMOL/L (ref 98–107)
CO2 SERPL-SCNC: 26 MMOL/L (ref 20–31)
CREAT SERPL-MCNC: 1 MG/DL (ref 0.7–1.2)
EOSINOPHIL # BLD: 0.18 K/UL (ref 0–0.44)
EOSINOPHILS RELATIVE PERCENT: 2 % (ref 1–4)
ERYTHROCYTE [DISTWIDTH] IN BLOOD BY AUTOMATED COUNT: 15.1 % (ref 11.8–14.4)
GFR, ESTIMATED: 72 ML/MIN/1.73M2
GLUCOSE SERPL-MCNC: 105 MG/DL (ref 70–99)
HCT VFR BLD AUTO: 39.2 % (ref 40.7–50.3)
HGB BLD-MCNC: 13 G/DL (ref 13–17)
IMM GRANULOCYTES # BLD AUTO: 0.06 K/UL (ref 0–0.3)
IMM GRANULOCYTES NFR BLD: 1 %
LYMPHOCYTES NFR BLD: 1.57 K/UL (ref 1.1–3.7)
LYMPHOCYTES RELATIVE PERCENT: 21 % (ref 24–43)
MCH RBC QN AUTO: 29.6 PG (ref 25.2–33.5)
MCHC RBC AUTO-ENTMCNC: 33.2 G/DL (ref 25.2–33.5)
MCV RBC AUTO: 89.3 FL (ref 82.6–102.9)
MONOCYTES NFR BLD: 0.46 K/UL (ref 0.1–1.2)
MONOCYTES NFR BLD: 6 % (ref 3–12)
NEUTROPHILS NFR BLD: 69 % (ref 36–65)
NEUTS SEG NFR BLD: 5.3 K/UL (ref 1.5–8.1)
NRBC BLD-RTO: 0 PER 100 WBC
PLATELET # BLD AUTO: 233 K/UL (ref 138–453)
PMV BLD AUTO: 11.8 FL (ref 8.1–13.5)
POTASSIUM SERPL-SCNC: 3.3 MMOL/L (ref 3.7–5.3)
RBC # BLD AUTO: 4.39 M/UL (ref 4.21–5.77)
RBC # BLD: ABNORMAL 10*6/UL
SODIUM SERPL-SCNC: 143 MMOL/L (ref 135–144)
WBC OTHER # BLD: 7.6 K/UL (ref 3.5–11.3)

## 2024-11-27 PROCEDURE — 85025 COMPLETE CBC W/AUTO DIFF WBC: CPT

## 2024-11-27 PROCEDURE — 80048 BASIC METABOLIC PNL TOTAL CA: CPT

## 2024-12-01 ENCOUNTER — TELEPHONE (OUTPATIENT)
Dept: INTERNAL MEDICINE | Age: 88
End: 2024-12-01

## 2024-12-01 DIAGNOSIS — E87.6 HYPOKALEMIA: Primary | ICD-10-CM

## 2024-12-01 RX ORDER — POTASSIUM CHLORIDE 1500 MG/1
20 TABLET, EXTENDED RELEASE ORAL 2 TIMES DAILY
Qty: 60 TABLET | Refills: 0 | Status: SHIPPED | OUTPATIENT
Start: 2024-12-01

## 2024-12-01 NOTE — TELEPHONE ENCOUNTER
Please note error in result note for BMP - Result note should read:  Potassium remains slightly low. Previous, potassium was increased to potassium chloride 20 meq - 1.5 tablets QD (total dose 30 meq).  If he has been taking this regularly, increase potassium chloride to 20 meq BID (total dose 40 meq). Repeat potassium 1 week    The correct dose was sent to pharmacy, but error was noted in result note. Please make sure incorrect result note is not faxed to [facility]. Called [facility] and spoke with [facility] 12/01/24 and confirmed verbal order for potassium chloride 20 meq BID, and to repeat potassium in 1 week. I apologize, but I could not retrieve/edit the result note once it was sent.

## 2024-12-02 ENCOUNTER — OUTSIDE SERVICES (OUTPATIENT)
Dept: INTERNAL MEDICINE | Age: 88
End: 2024-12-02
Payer: MEDICARE

## 2024-12-02 DIAGNOSIS — E78.2 MIXED HYPERLIPIDEMIA: ICD-10-CM

## 2024-12-02 DIAGNOSIS — N32.0 BLADDER OUTLET OBSTRUCTION: ICD-10-CM

## 2024-12-02 DIAGNOSIS — F41.9 ANXIETY: ICD-10-CM

## 2024-12-02 DIAGNOSIS — I50.42 CHRONIC COMBINED SYSTOLIC AND DIASTOLIC CHF (CONGESTIVE HEART FAILURE) (HCC): ICD-10-CM

## 2024-12-02 DIAGNOSIS — I10 PRIMARY HYPERTENSION: Primary | ICD-10-CM

## 2024-12-02 DIAGNOSIS — S14.109D CERVICAL SPINAL CORD INJURY, SUBSEQUENT ENCOUNTER (HCC): ICD-10-CM

## 2024-12-02 DIAGNOSIS — I48.0 PAROXYSMAL ATRIAL FIBRILLATION (HCC): ICD-10-CM

## 2024-12-02 DIAGNOSIS — I25.10 CORONARY ARTERY DISEASE INVOLVING NATIVE CORONARY ARTERY OF NATIVE HEART WITHOUT ANGINA PECTORIS: ICD-10-CM

## 2024-12-02 DIAGNOSIS — F03.90 DEMENTIA WITHOUT BEHAVIORAL DISTURBANCE, PSYCHOTIC DISTURBANCE, MOOD DISTURBANCE, OR ANXIETY, UNSPECIFIED DEMENTIA SEVERITY, UNSPECIFIED DEMENTIA TYPE (HCC): ICD-10-CM

## 2024-12-02 DIAGNOSIS — G82.54 INCOMPLETE QUADRIPLEGIA AT C5-6 LEVEL (HCC): ICD-10-CM

## 2024-12-02 DIAGNOSIS — G93.41 METABOLIC ENCEPHALOPATHY: ICD-10-CM

## 2024-12-02 DIAGNOSIS — N18.9 CHRONIC KIDNEY DISEASE, UNSPECIFIED CKD STAGE: ICD-10-CM

## 2024-12-02 PROCEDURE — 99348 HOME/RES VST EST LOW MDM 30: CPT | Performed by: NURSE PRACTITIONER

## 2024-12-02 NOTE — TELEPHONE ENCOUNTER
Correct orders for potassium chloride 20 meq BID also signed paper orders at [facility].

## 2024-12-02 NOTE — PROGRESS NOTES
[facility]      Yeyo Bravo is a 89 y.o. male resident of [facility].    HPI:     HPI  Patient presents for evaluation and management of chronic medical conditions as noted below.    Hypertension is well-controlled on metoprolol    Continues on atorvastatin for hyperlipidemia.    Follows with cardiology for atrial fibrillation, congestive heart failure, and coronary artery disease.      Hypothyroidism is well-controlled on current dose of levothyroxine.    CKD stable, GFR 72, creatinine 1.0.    Has upcoming follow-up scheduled with neurosurgery for cervical spinal cord injury. Has had SLP evaluation.     Follows with urology for BPH, bladder outlet obstruction, bilateral hydronephrosis, recurrent UTI and scrotal abscess. Continues on flomax.  Previous infectious disease consultation due to recurrent UTI with colonization with an ESBL Klebsiella. ID advised that patient can be treated with fosfomycin when he becomes symptomatic and after that would need IV ATB therapy.      No longer following with neurology for dementia. Does have occasional behavioral disturbances, which are generally well controlled with PRN alprazolam in varying doses, as noted on EMR.     Previously had concern for C. difficile due to watery diarrhea.  Bowel movements are now formed, and C. difficile order has subsequently been canceled.    Current Outpatient Medications   Medication Sig Dispense Refill    potassium chloride (KLOR-CON M) 20 MEQ extended release tablet Take 1 tablet by mouth 2 times daily 60 tablet 0    ALPRAZolam (XANAX) 0.25 MG tablet Take 1 tablet by mouth at bedtime.      ALPRAZolam (XANAX) 0.25 MG tablet Take 0.5 tablets by mouth daily as needed (before shower if needed) for up to 30 days. Max Daily Amount: 0.125 mg 15 tablet 0    ALPRAZolam (XANAX) 0.25 MG tablet Take 0.5 tablets by mouth every morning for 30 days. Max Daily Amount: 0.125 mg 15 tablet 0    miconazole (MICOTIN) 2 % powder Apply topically

## 2024-12-03 PROBLEM — N30.00 ACUTE CYSTITIS WITHOUT HEMATURIA: Status: RESOLVED | Noted: 2024-11-09 | Resolved: 2024-12-03

## 2024-12-03 ASSESSMENT — ENCOUNTER SYMPTOMS
DIARRHEA: 0
VOMITING: 0
NAUSEA: 0
COUGH: 0
WHEEZING: 0
RHINORRHEA: 0

## 2024-12-05 RX ORDER — OXYCODONE HYDROCHLORIDE 5 MG/1
2.5 TABLET ORAL EVERY 4 HOURS PRN
COMMUNITY

## 2024-12-08 DIAGNOSIS — F41.9 ANXIETY: Primary | ICD-10-CM

## 2024-12-09 ENCOUNTER — OUTSIDE SERVICES (OUTPATIENT)
Dept: INTERNAL MEDICINE | Age: 88
End: 2024-12-09

## 2024-12-09 DIAGNOSIS — M25.561 ACUTE PAIN OF RIGHT KNEE: ICD-10-CM

## 2024-12-09 DIAGNOSIS — M25.551 RIGHT HIP PAIN: Primary | ICD-10-CM

## 2024-12-09 RX ORDER — ALPRAZOLAM 0.25 MG/1
0.25 TABLET ORAL NIGHTLY
Qty: 30 TABLET | Refills: 1 | Status: SHIPPED | OUTPATIENT
Start: 2024-12-09 | End: 2025-02-07

## 2024-12-09 ASSESSMENT — ENCOUNTER SYMPTOMS
COUGH: 0
WHEEZING: 0
RHINORRHEA: 0
VOMITING: 0
DIARRHEA: 0
NAUSEA: 0

## 2024-12-09 NOTE — PROGRESS NOTES
[facility]      Yeyo Bravo is a 89 y.o. male resident of [facility].    HPI:     HPI  Patient presents via virtual visit with assisted living staff member for evaluation and management of medical conditions as noted below. Staff reports patient has been complaining of right hip and knee pain on occasion. Of note, he had a fall several weeks ago that resulted in nondisplaced fracture of transverse process of C6, and was subsequently transferred to level 1 trauma center to be evaluated by trauma surgery and neurosurgery. Did not have any imaging of R hip or knee completed at that time. Staff notes it is somewhat swollen, and patient has some discomfort when straightening it.     Current Outpatient Medications   Medication Sig Dispense Refill    ALPRAZolam (XANAX) 0.25 MG tablet Take 1 tablet by mouth at bedtime for 60 days. Max Daily Amount: 0.25 mg 30 tablet 1    oxyCODONE (ROXICODONE) 5 MG immediate release tablet Take 0.5 tablets by mouth every 4 hours as needed for Pain. Max Daily Amount: 15 mg      potassium chloride (KLOR-CON M) 20 MEQ extended release tablet Take 1 tablet by mouth 2 times daily 60 tablet 0    miconazole (MICOTIN) 2 % powder Apply topically 2 times daily. 45 g 1    EPINEPHrine (EPIPEN) 0.3 MG/0.3ML SOAJ injection Inject 0.3 mLs into the muscle as needed (allergic reaction)      furosemide (LASIX) 20 MG tablet Take 1 tablet by mouth daily as needed (lower extremity swelling) 60 tablet 3    METAMUCIL FIBER PO Take by mouth 2 times daily - 1 TBSP in 8 oz water      polyethylene glycol (MIRALAX) 17 GM/SCOOP POWD powder MIX 17 GRAMS WITH LIQUID AND GIVE BY MOUTH TWICE DAILY AS NEEDED FOR CONSTIPATION 1 each 0    lactulose encephalopathy 10 GM/15ML SOLN solution TAKE 30ML BY MOUTH 3 TIMES A DAY AS NEEDED FOR CONSTIPATION 473 mL 11    fluticasone (FLONASE) 50 MCG/ACT nasal spray INSTILL 1 SPRAY IN EACH NOSTRIL ONCE DAILY 16 g 0    pramox-PE-glycerin-petrolatum (PREPARATION H)

## 2024-12-12 ENCOUNTER — HOSPITAL ENCOUNTER (OUTPATIENT)
Age: 88
Setting detail: SPECIMEN
Discharge: HOME | End: 2024-12-12
Payer: MEDICARE

## 2024-12-12 DIAGNOSIS — E87.6 HYPOKALEMIA: ICD-10-CM

## 2024-12-12 LAB — POTASSIUM SERPL-SCNC: 4.4 MMOL/L (ref 3.7–5.3)

## 2024-12-12 PROCEDURE — 36415 COLL VENOUS BLD VENIPUNCTURE: CPT

## 2024-12-12 PROCEDURE — 84132 ASSAY OF SERUM POTASSIUM: CPT

## 2024-12-16 ENCOUNTER — TELEPHONE (OUTPATIENT)
Dept: INTERNAL MEDICINE | Age: 88
End: 2024-12-16

## 2024-12-16 DIAGNOSIS — R13.10 DYSPHAGIA, UNSPECIFIED TYPE: Primary | ICD-10-CM

## 2024-12-16 DIAGNOSIS — S14.109D INJURY OF CERVICAL SPINAL CORD, SUBSEQUENT ENCOUNTER (HCC): ICD-10-CM

## 2024-12-16 NOTE — TELEPHONE ENCOUNTER
Received fax from GP- \"Speech Therapy has been working with resident. She is requesting for him to get a Modified Barium Swallow Study done. Please advise.\"  Order pended if you agree- please add dx.

## 2024-12-27 ENCOUNTER — HOSPITAL ENCOUNTER (OUTPATIENT)
Dept: GENERAL RADIOLOGY | Age: 88
Discharge: HOME | End: 2024-12-29
Payer: MEDICARE

## 2024-12-27 DIAGNOSIS — M25.551 RIGHT HIP PAIN: ICD-10-CM

## 2024-12-27 DIAGNOSIS — S12.501A CLOSED NONDISPLACED FRACTURE OF SIXTH CERVICAL VERTEBRA, UNSPECIFIED FRACTURE MORPHOLOGY, INITIAL ENCOUNTER (HCC): ICD-10-CM

## 2024-12-27 DIAGNOSIS — M25.561 ACUTE PAIN OF RIGHT KNEE: ICD-10-CM

## 2024-12-27 PROCEDURE — 73502 X-RAY EXAM HIP UNI 2-3 VIEWS: CPT

## 2024-12-27 PROCEDURE — 73562 X-RAY EXAM OF KNEE 3: CPT

## 2024-12-27 PROCEDURE — 72040 X-RAY EXAM NECK SPINE 2-3 VW: CPT

## 2024-12-30 ENCOUNTER — OFFICE VISIT (OUTPATIENT)
Dept: NEUROSURGERY | Age: 88
End: 2024-12-30
Payer: MEDICARE

## 2024-12-30 VITALS
BODY MASS INDEX: 24.5 KG/M2 | SYSTOLIC BLOOD PRESSURE: 145 MMHG | WEIGHT: 175 LBS | HEIGHT: 71 IN | DIASTOLIC BLOOD PRESSURE: 110 MMHG | HEART RATE: 102 BPM

## 2024-12-30 DIAGNOSIS — G82.54 INCOMPLETE QUADRIPLEGIA AT C5-6 LEVEL (HCC): ICD-10-CM

## 2024-12-30 DIAGNOSIS — S12.591D OTHER CLOSED NONDISPLACED FRACTURE OF SIXTH CERVICAL VERTEBRA WITH ROUTINE HEALING, SUBSEQUENT ENCOUNTER: Primary | ICD-10-CM

## 2024-12-30 DIAGNOSIS — F03.90 DEMENTIA WITHOUT BEHAVIORAL DISTURBANCE, PSYCHOTIC DISTURBANCE, MOOD DISTURBANCE, OR ANXIETY, UNSPECIFIED DEMENTIA SEVERITY, UNSPECIFIED DEMENTIA TYPE (HCC): ICD-10-CM
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
## 2024-12-30 PROCEDURE — G8420 CALC BMI NORM PARAMETERS: HCPCS | Performed by: NURSE PRACTITIONER

## 2024-12-30 PROCEDURE — 99214 OFFICE O/P EST MOD 30 MIN: CPT | Performed by: NURSE PRACTITIONER

## 2024-12-30 PROCEDURE — G8484 FLU IMMUNIZE NO ADMIN: HCPCS | Performed by: NURSE PRACTITIONER

## 2024-12-30 PROCEDURE — 1159F MED LIST DOCD IN RCRD: CPT | Performed by: NURSE PRACTITIONER

## 2024-12-30 PROCEDURE — 1160F RVW MEDS BY RX/DR IN RCRD: CPT | Performed by: NURSE PRACTITIONER

## 2024-12-30 PROCEDURE — 1036F TOBACCO NON-USER: CPT | Performed by: NURSE PRACTITIONER

## 2024-12-30 PROCEDURE — 1123F ACP DISCUSS/DSCN MKR DOCD: CPT | Performed by: NURSE PRACTITIONER

## 2024-12-30 PROCEDURE — G8427 DOCREV CUR MEDS BY ELIG CLIN: HCPCS | Performed by: NURSE PRACTITIONER

## 2024-12-30 NOTE — PROGRESS NOTES
[facility] NEUROSURGERY [place] [id number] [facility]  [id number]  [address] - GROUND FLOOR, [address]  [facility] [address]  Dept: [phone]    Patient:  Yeyo Bravo  YOB: 1934  Date: 12/30/24    The patient is a 89 y.o. male who presents today for consult of the following problems:     Chief Complaint   Patient presents with    Follow-Up from Hospital         HPI:     Yeyo Bravo is a 89 y.o. male who presents for follow up of C5-C6 hyperextension injury with incomplete spastic quadriplegia secondary to central cord injury.  Patient with known history significant for fairly advanced dementia.  Patient has limited verbal interactions, is typically aware of self.  Would previously ambulate with 2 person assist.  Presents today with facility staff.  Has reportedly been compliant with soft cervical collar when up and out of bed.  Patient is following commands weakly to both upper and lower extremities.  Is able to shake head no to pain. Shakes head no to having any numbness or tingling.  Is able to lightly squeeze both hands, and dorsiflex and wiggle toes on both feet to command.    History:     Past Medical History:   Diagnosis Date    Abdominal pain 03/16/2023    Acute bronchitis     by history    Acute cystitis without hematuria 11/09/2024    MARITA (acute kidney injury) ([facility]) 03/13/2018    MARITA (acute kidney injury) ([facility]) 03/13/2018    Allergic rhinitis     Anxiety     Bradycardia 08/19/2018    Cataract, right     Choroidal nevus of right eye     Chronic systolic CHF (congestive heart failure) ([facility]) 03/13/2018    Coronary artery disease involving native coronary artery of native heart 10/20/2016    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 LIMA TO LAD, SVG TO DIAGONAL2    Dementia associated with 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 behavioral disturbance ([facility]) 09/21/2018    Dermatophytosis of nail 01/10/2014    Diverticulosis     Elevated PSA     Gross hematuria 03/16/2018

## 2024-12-31 RX ORDER — POTASSIUM CHLORIDE 1500 MG/1
20 TABLET, EXTENDED RELEASE ORAL 2 TIMES DAILY
Qty: 180 TABLET | Refills: 3 | Status: SHIPPED | OUTPATIENT
Start: 2024-12-31

## 2025-01-03 ENCOUNTER — HOSPITAL ENCOUNTER (OUTPATIENT)
Dept: SPEECH THERAPY | Age: 89
Setting detail: THERAPIES SERIES
Discharge: HOME | End: 2025-01-03
Payer: MEDICARE

## 2025-01-03 ENCOUNTER — HOSPITAL ENCOUNTER (OUTPATIENT)
Dept: GENERAL RADIOLOGY | Age: 89
Discharge: HOME | End: 2025-01-03
Payer: MEDICARE

## 2025-01-03 DIAGNOSIS — R13.10 DYSPHAGIA, UNSPECIFIED TYPE: Primary | ICD-10-CM

## 2025-01-03 DIAGNOSIS — R13.12 OROPHARYNGEAL DYSPHAGIA: ICD-10-CM

## 2025-01-03 DIAGNOSIS — S14.109D INJURY OF CERVICAL SPINAL CORD, SUBSEQUENT ENCOUNTER (HCC): ICD-10-CM

## 2025-01-03 DIAGNOSIS — R13.10 DYSPHAGIA, UNSPECIFIED TYPE: ICD-10-CM

## 2025-01-03 PROCEDURE — 2500000003 HC RX 250 WO HCPCS: Performed by: NURSE PRACTITIONER

## 2025-01-03 PROCEDURE — 74230 X-RAY XM SWLNG FUNCJ C+: CPT

## 2025-01-03 PROCEDURE — 92611 MOTION FLUOROSCOPY/SWALLOW: CPT

## 2025-01-03 RX ADMIN — BARIUM SULFATE 140 ML: 980 POWDER, FOR SUSPENSION ORAL at 12:28

## 2025-01-03 NOTE — RESULT ENCOUNTER NOTE
Jessica from [facility] informed me that pt/pt's POA are going to move forward with signing waiver for patient to continue to eat per previous recommendations, regardless of NPO recommendation from provider/speech therapy. I verbalized my understanding and notified Dr. Krishnamurthy in person. Asked Jessica to call our on-call should anything change with pt's status, as patient may need a peg tube, fluids, etc. Jessica verbalized understanding, had no further questions. She stated her supervisor was coming in to have forms filled out. Will fax communication to GP for their records.

## 2025-01-03 NOTE — PROGRESS NOTES
Care     [] OTHER:     Method: [x] Discussion  [] Demonstration [] Hand-out     [] Other:     Evaluation of Education:     [x] Verbalizes understanding - staff member     [] Demonstrates with assistance     [] Demonstrates without assistance                                  []Needs further instruction      [x] No evidence of learning - patient                                   [] Family not present    PATIENT GOALS: [] Pt did not state; will further assess during treatment.     [] Return to the least restricted diet possible     [] Return to previous level of function     [x] Other:  patient minimally verbal this date    PLAN / TREATMENT RECOMMENDATIONS:  [x] No further speech therapy services indicated.  [] Speech Therapy evaluation to assess speech, language, cognition and/or voice  [] Skilled dysphagia treatment ___ times per week for ___ weeks.  [] OTHER:        Electronically signed by: Violet Castro M.S., CCC-SLP            Date: 1/3/2025

## 2025-01-13 RX ORDER — ALPRAZOLAM 0.25 MG/1
0.12 TABLET ORAL EVERY MORNING
COMMUNITY
End: 2025-01-14 | Stop reason: SDUPTHER

## 2025-01-13 RX ORDER — ALPRAZOLAM 0.25 MG/1
0.12 TABLET ORAL DAILY PRN
COMMUNITY

## 2025-01-13 RX ORDER — ARGININE/GLUTAMINE/CALCIUM BMB 7G-7G-1.5G
1 POWDER IN PACKET (EA) ORAL 2 TIMES DAILY
COMMUNITY

## 2025-01-14 ENCOUNTER — TELEPHONE (OUTPATIENT)
Dept: INTERNAL MEDICINE | Age: 89
End: 2025-01-14

## 2025-01-14 DIAGNOSIS — F41.9 ANXIETY: ICD-10-CM

## 2025-01-14 RX ORDER — ALPRAZOLAM 0.25 MG/1
0.12 TABLET ORAL EVERY MORNING
Qty: 15 TABLET | Refills: 0 | Status: SHIPPED | OUTPATIENT
Start: 2025-01-14 | End: 2025-02-13

## 2025-01-14 RX ORDER — ALPRAZOLAM 0.25 MG/1
0.25 TABLET ORAL NIGHTLY
Qty: 30 TABLET | Refills: 0 | Status: CANCELLED | OUTPATIENT
Start: 2025-01-14 | End: 2025-02-13

## 2025-01-14 NOTE — TELEPHONE ENCOUNTER
Received fax from GP- \"R Xanax is getting low and will need new scripts for Xanax 0.25 mg take 1/2 tablet (0.125 mg) every morning and Xanax 0.25 mg tab- take 1 tab at bedtime. Please send scripts to [facility].\"  Rxs pended.

## 2025-01-14 NOTE — TELEPHONE ENCOUNTER
Received Prior Auth request from [employer] via fax for [employer].  Spoke with GP nurse (Harvey)- pt has been receiving [employer], and drinking bid.  Will initiate PA if, and when it is necessary.

## 2025-01-22 ENCOUNTER — TELEPHONE (OUTPATIENT)
Dept: FAMILY MEDICINE CLINIC | Age: 89
End: 2025-01-22

## 2025-01-22 NOTE — TELEPHONE ENCOUNTER
Received fax from GP- \"Resident has . Time of death 10:10 pm. JOHNSON Bravo notified.\"  Epic record updated.

## 2025-02-23 NOTE — CARE COORDINATION
[facility] Care Transitions - confirmed with staff at [facility] that patient is under direct care of nursing staff in his area of the facility. CT episode is resolved since patient has total nursing care for medications & care/supervision. Call within 2 business days of discharge: Yes    Patient Current Location:   assisted living - under nursing care - [facility]      Patient: Yessy Hsu   Patient : 1934   MRN: [id number]    Reason for Admission: fall at [facility], hematuria, hx hydronephrosis  Discharge Date: 23   RARS: Readmission Risk Score: 25.5      [facility]       Date Complaint Diagnosis Description Type Department Provider    4/15/23 Fall MARITA (acute kidney injury) ([facility] [address] . .. ED to Hosp-Admission (Discharged) (ADMITTED) Artem Emanuel MD; [place]. .. Was this an external facility discharge?  No   Non-face-to-face services provided:  Obtained and reviewed discharge summary and/or continuity of care documents      Follow Up  Future Appointments   Date Time Provider Vanessa Valdovinos   2023  2:15 PM DO RIANNA Ugalde [facility]   2023 11:00 AM MD JOCELYN Jain [facility]   2024 77:58 [facility]   2024  9:30 AM DO GENNARO Obrien [facility]         Kenisha Davila RN
Eugenio

## (undated) DEVICE — TOWEL,OR,DSP,ST,BLUE,STD,4/PK,20PK/CS: Brand: MEDLINE

## (undated) DEVICE — SOLUTION IV IRRIG WATER 1000ML POUR BRL 2F7114

## (undated) DEVICE — GLOVE ORANGE PI 7 1/2   MSG9075

## (undated) DEVICE — CYSTO/BLADDER IRRIGATION SET, REGULATING CLAMP

## (undated) DEVICE — CATHETER,FOLEY,100%SILICONE,18FR,10ML,LF: Brand: MEDLINE

## (undated) DEVICE — PACK,CYSTOSCOPY,PK VI: Brand: MEDLINE

## (undated) DEVICE — POUCH INSTR W40XL26IN BUTT FLD COLL FLTR DRNGE PRT

## (undated) DEVICE — SOLUTION IRRIG 3000ML 0.9% SOD CHL USP UROMATIC PLAS CONT

## (undated) DEVICE — SOLUTION SCRB 4OZ 4% CHG CLN BASE FOR PT SKIN ANTISEPSIS

## (undated) DEVICE — GOWN,AURORA,NONREINFORCED,LARGE: Brand: MEDLINE

## (undated) DEVICE — GOWN,AURORA,NONRNF,XL,30/CS: Brand: MEDLINE